# Patient Record
Sex: MALE | Race: WHITE | NOT HISPANIC OR LATINO | Employment: OTHER | URBAN - METROPOLITAN AREA
[De-identification: names, ages, dates, MRNs, and addresses within clinical notes are randomized per-mention and may not be internally consistent; named-entity substitution may affect disease eponyms.]

---

## 2017-04-13 RX ORDER — GABAPENTIN 600 MG/1
600 TABLET ORAL 4 TIMES DAILY
Qty: 120 TABLET | Refills: 3 | Status: SHIPPED | OUTPATIENT
Start: 2017-04-13 | End: 2017-07-06

## 2017-04-19 RX ORDER — GABAPENTIN 400 MG/1
CAPSULE ORAL
Qty: 120 CAPSULE | Refills: 3 | Status: SHIPPED | OUTPATIENT
Start: 2017-04-19 | End: 2017-07-06

## 2017-07-06 ENCOUNTER — TELEPHONE (OUTPATIENT)
Dept: CARDIOLOGY | Facility: CLINIC | Age: 67
End: 2017-07-06

## 2017-07-06 ENCOUNTER — OFFICE VISIT (OUTPATIENT)
Dept: CARDIOLOGY | Facility: CLINIC | Age: 67
End: 2017-07-06

## 2017-07-06 VITALS
SYSTOLIC BLOOD PRESSURE: 164 MMHG | HEIGHT: 69 IN | HEART RATE: 73 BPM | DIASTOLIC BLOOD PRESSURE: 77 MMHG | BODY MASS INDEX: 46.65 KG/M2 | WEIGHT: 315 LBS

## 2017-07-06 DIAGNOSIS — Z95.5 PRESENCE OF STENT IN CORONARY ARTERY IN PATIENT WITH CORONARY ARTERY DISEASE: Primary | ICD-10-CM

## 2017-07-06 DIAGNOSIS — I25.10 PRESENCE OF STENT IN CORONARY ARTERY IN PATIENT WITH CORONARY ARTERY DISEASE: Primary | ICD-10-CM

## 2017-07-06 DIAGNOSIS — I10 ESSENTIAL HYPERTENSION: ICD-10-CM

## 2017-07-06 PROCEDURE — 99213 OFFICE O/P EST LOW 20 MIN: CPT | Performed by: INTERNAL MEDICINE

## 2017-07-06 RX ORDER — GABAPENTIN 600 MG/1
600 TABLET ORAL 4 TIMES DAILY
COMMUNITY
End: 2018-06-18 | Stop reason: SDUPTHER

## 2017-07-06 NOTE — PROGRESS NOTES
[facility] at [facility]  Office Progress Note  Rod Balderas  1950  [phone]      Visit Date: 07/06/2017    PCP: [place] [address] [id number]    IDENTIFICATION: A 66 y.o. male former  from [facility]    Chief Complaint   Patient presents with   • Follow-up   • Shortness of Breath       PROBLEM LIST:   1. Chest pain and left arm pain:  a. No prior ischemic evaluation.  b. December 2012: A 3.5 x 88 Promus VERONICA stent to OM2. Nonobstructive disease. Otherwise normal LVEF.  2. Valvular heart disease  a. 12/15 echo: Mild LVH, normal EF, mild AR.  3. Dyslipidemia , November 2012:   a. LDL 90, total 132, HDL 38, triglycerides 50.  4. Diffuse osteoarthritis.  5. Morbid obesity, 5 feet 9 inches, 350 pounds, precedent 80-pound weight loss with diet and exercise in the 80s.  6. Obstructive sleep apnea on CPAP as of 2012.    Allergies  Allergies   Allergen Reactions   • Sulfa Antibiotics Hives   • Percocet [Oxycodone-Acetaminophen] Anxiety       Current Medications    Current Outpatient Prescriptions:   •  amLODIPine (NORVASC) 5 MG tablet, take 1 tablet by mouth once daily, Disp: 90 tablet, Rfl: 2  •  aspirin 325 MG tablet, Take 325 mg by mouth Daily., Disp: , Rfl:   •  fluticasone (FLONASE) 50 MCG/ACT nasal spray, USE 2 SPRAYS IN EACH NOSTRIL DAILY, Disp: , Rfl: 0  •  gabapentin (NEURONTIN) 600 MG tablet, Take 600 mg by mouth 4 (Four) Times a Day., Disp: , Rfl:   •  ibuprofen (ADVIL,MOTRIN) 800 MG tablet, Take 400 mg by mouth 3 (Three) Times a Day., Disp: , Rfl:   •  lisinopril-hydrochlorothiazide (PRINZIDE,ZESTORETIC) 20-12.5 MG per tablet, take 1 tablet by mouth once daily, Disp: 90 tablet, Rfl: 2  •  nitroglycerin (NITROSTAT) 0.4 MG SL tablet, Place  under the tongue., Disp: , Rfl:   •  tiZANidine (ZANAFLEX) 2 MG tablet, Start 1/2 tab QHS, then continue 1/2 tab to 1 tab TID PRN muscle spasms (Patient taking differently: 1/2 tab to 1 tab TID PRN muscle  "spasms), Disp: 90 tablet, Rfl: 1      History of Present Illness     Pt denies any chest pain, , orthopnea, PND, palpitations, lower extremity edema, or claudication.  Some increased dyspnea on exertion and he is now walking with a cane and assisted by a cock up ankle brace  ROS:  All systems have been reviewed and are negative with the exception of those mentioned in the HPI.    OBJECTIVE:  Vitals:    07/06/17 1459   BP: 164/77   BP Location: Left arm   Patient Position: Sitting   Pulse: 73   Weight: (!) 339 lb (154 kg)   Height: 69\" (175.3 cm)     Physical Exam   Constitutional: He appears well-developed and well-nourished.   Neck: Normal range of motion. Neck supple. No hepatojugular reflux and no JVD present. Carotid bruit is not present. No tracheal deviation present. No thyromegaly present.   Cardiovascular: Normal rate, regular rhythm, S1 normal, S2 normal, intact distal pulses and normal pulses.  PMI is not displaced.  Exam reveals no gallop, no distant heart sounds, no friction rub, no midsystolic click and no opening snap.    No murmur heard.  Pulses:       Radial pulses are 2+ on the right side, and 2+ on the left side.        Dorsalis pedis pulses are 2+ on the right side, and 2+ on the left side.        Posterior tibial pulses are 2+ on the right side, and 2+ on the left side.   Pulmonary/Chest: Effort normal and breath sounds normal. He has no wheezes. He has no rales.   Abdominal: Soft. Bowel sounds are normal. He exhibits no mass. There is no tenderness. There is no guarding.       Diagnostic Data:  Procedures      ASSESSMENT:   Diagnosis Plan   1. Presence of stent in coronary artery in patient with coronary artery disease     2. Essential hypertension         PLAN:  1. Medically managed with ASA, lisinopril  2.  Dyspnea multifactorial will document serologies today      WESLEY Leyva MD, thank you for referring Mr. Balderas for evaluation.  I have forwarded my electronically generated " recommendations to you for review.  Please do not hesitate to call with any questions.    Scribed for Rod Regalado MD by Cecilia Wallace PA-C. 7/6/2017  3:25 PM  I, Rod Regalado MD, personally performed the services described in this documentation as scribed by the above named individual in my presence, and it is both accurate and complete.  7/6/2017  3:26 PM    Rod Regalado MD, FACC

## 2017-07-06 NOTE — TELEPHONE ENCOUNTER
[facility] called for diagnosis of lab orders.    Went over diagnosis.

## 2017-08-11 RX ORDER — GABAPENTIN 600 MG/1
TABLET ORAL
Qty: 120 TABLET | Refills: 3 | OUTPATIENT
Start: 2017-08-11 | End: 2018-05-03

## 2017-09-05 RX ORDER — LISINOPRIL AND HYDROCHLOROTHIAZIDE 20; 12.5 MG/1; MG/1
1 TABLET ORAL DAILY
Qty: 90 TABLET | Refills: 2 | Status: SHIPPED | OUTPATIENT
Start: 2017-09-05 | End: 2018-05-03 | Stop reason: SDUPTHER

## 2017-09-05 RX ORDER — LISINOPRIL AND HYDROCHLOROTHIAZIDE 20; 12.5 MG/1; MG/1
TABLET ORAL
Qty: 90 TABLET | Refills: 2 | Status: CANCELLED | OUTPATIENT
Start: 2017-09-05

## 2017-09-12 RX ORDER — AMLODIPINE BESYLATE 5 MG/1
TABLET ORAL
Qty: 90 TABLET | Refills: 2 | Status: SHIPPED | OUTPATIENT
Start: 2017-09-12 | End: 2018-05-03 | Stop reason: SDUPTHER

## 2017-12-11 RX ORDER — GABAPENTIN 600 MG/1
TABLET ORAL
Qty: 120 TABLET | Refills: 5 | OUTPATIENT
Start: 2017-12-11 | End: 2018-05-03

## 2018-05-03 ENCOUNTER — OFFICE VISIT (OUTPATIENT)
Dept: CARDIOLOGY | Facility: CLINIC | Age: 68
End: 2018-05-03

## 2018-05-03 VITALS
HEART RATE: 71 BPM | HEIGHT: 70 IN | BODY MASS INDEX: 45.1 KG/M2 | DIASTOLIC BLOOD PRESSURE: 64 MMHG | SYSTOLIC BLOOD PRESSURE: 104 MMHG | WEIGHT: 315 LBS

## 2018-05-03 DIAGNOSIS — I25.10 CORONARY ARTERY DISEASE INVOLVING NATIVE CORONARY ARTERY OF NATIVE HEART WITHOUT ANGINA PECTORIS: Primary | ICD-10-CM

## 2018-05-03 DIAGNOSIS — R06.09 DOE (DYSPNEA ON EXERTION): ICD-10-CM

## 2018-05-03 PROCEDURE — 99213 OFFICE O/P EST LOW 20 MIN: CPT | Performed by: INTERNAL MEDICINE

## 2018-05-03 RX ORDER — AMLODIPINE BESYLATE 5 MG/1
5 TABLET ORAL DAILY
Qty: 90 TABLET | Refills: 2 | Status: SHIPPED | OUTPATIENT
Start: 2018-05-03 | End: 2019-04-12

## 2018-05-03 RX ORDER — LISINOPRIL AND HYDROCHLOROTHIAZIDE 20; 12.5 MG/1; MG/1
1 TABLET ORAL DAILY
Qty: 90 TABLET | Refills: 2 | Status: SHIPPED | OUTPATIENT
Start: 2018-05-03 | End: 2019-02-24 | Stop reason: HOSPADM

## 2018-05-03 NOTE — PROGRESS NOTES
[facility]  Office Progress Note  Rod Balderas  1950  [phone]      Visit Date: 5/4/2018      PCP: [place] [address] [address]    IDENTIFICATION: A 67 y.o. male former  from [facility]    Cc fu cad    PROBLEM LIST:   1. Chest pain and left arm pain:  a. No prior ischemic evaluation.  b. December 2012: A 3.5 x 88 Promus VERONICA stent to OM2. Nonobstructive disease. Otherwise normal LVEF.  2. Valvular heart disease  a. 12/15 echo: Mild LVH, normal EF, mild AR.  3. Dyslipidemia , November 2012:   a. LDL 90, total 132, HDL 38, triglycerides 50.  4. Diffuse osteoarthritis.  5. Morbid obesity, 5 feet 9 inches, 350 pounds, precedent 80-pound weight loss with diet and exercise in the 80s.  6. Obstructive sleep apnea on CPAP as of 2012.    Allergies  Allergies   Allergen Reactions   • Sulfa Antibiotics Hives   • Percocet [Oxycodone-Acetaminophen] Anxiety       Current Medications    Current Outpatient Prescriptions:   •  amLODIPine (NORVASC) 5 MG tablet, Take 1 tablet by mouth Daily., Disp: 90 tablet, Rfl: 2  •  aspirin 325 MG tablet, Take 325 mg by mouth Daily., Disp: , Rfl:   •  fluticasone (FLONASE) 50 MCG/ACT nasal spray, USE 2 SPRAYS IN EACH NOSTRIL DAILY, Disp: , Rfl: 0  •  gabapentin (NEURONTIN) 600 MG tablet, Take 600 mg by mouth 4 (Four) Times a Day., Disp: , Rfl:   •  ibuprofen (ADVIL,MOTRIN) 800 MG tablet, Take 400 mg by mouth 3 (Three) Times a Day., Disp: , Rfl:   •  lisinopril-hydrochlorothiazide (PRINZIDE,ZESTORETIC) 20-12.5 MG per tablet, Take 1 tablet by mouth Daily., Disp: 90 tablet, Rfl: 2  •  nitroglycerin (NITROSTAT) 0.4 MG SL tablet, Place  under the tongue., Disp: , Rfl:       History of Present Illness     Pt denies any chest pain, , orthopnea, PND, palpitations, lower extremity edema, or claudication.  Some increased dyspnea on exertion and he is now walking with a cane and assisted by a cock up ankle brace  ROS:  All  "systems have been reviewed and are negative with the exception of those mentioned in the HPI.    OBJECTIVE:  Vitals:    05/03/18 1446   BP: 104/64   BP Location: Left arm   Patient Position: Sitting   Pulse: 71   Weight: (!) 161 kg (355 lb)   Height: 176.5 cm (69.5\")     Physical Exam   Constitutional: He appears well-developed and well-nourished.   Neck: Normal range of motion. Neck supple. No hepatojugular reflux and no JVD present. Carotid bruit is not present. No tracheal deviation present. No thyromegaly present.   Cardiovascular: Normal rate, regular rhythm, S1 normal, S2 normal, intact distal pulses and normal pulses.  PMI is not displaced.  Exam reveals no gallop, no distant heart sounds, no friction rub, no midsystolic click and no opening snap.    No murmur heard.  Pulses:       Radial pulses are 2+ on the right side, and 2+ on the left side.        Dorsalis pedis pulses are 2+ on the right side, and 2+ on the left side.        Posterior tibial pulses are 2+ on the right side, and 2+ on the left side.   Pulmonary/Chest: Effort normal and breath sounds normal. He has no wheezes. He has no rales.   Abdominal: Soft. Bowel sounds are normal. He exhibits no mass. There is no tenderness. There is no guarding.       Diagnostic Data:  Procedures      ASSESSMENT:   Diagnosis Plan   1. Coronary artery disease involving native coronary artery of native heart without angina pectoris     2. MORGAN (dyspnea on exertion)         PLAN:  1. Medically managed with ASA, lisinopril  2.  Dyspnea multifactorial discussed gastric sleeve consideration.      WESLEY Leyva MD, thank you for referring Mr. Balderas for evaluation.  I have forwarded my electronically generated recommendations to you for review.  Please do not hesitate to call with any questions.    Scribed for Rod Regalado MD by Cecilia Wallace PA-C. 5/4/2018  10:04 AM  I, Rod Regalado MD, personally performed the services described in this documentation " as scribed by the above named individual in my presence, and it is both accurate and complete.  5/4/2018  10:04 AM    Rod Regalado MD, FACC

## 2018-06-18 RX ORDER — GABAPENTIN 600 MG/1
600 TABLET ORAL 4 TIMES DAILY
Qty: 120 TABLET | Refills: 1 | OUTPATIENT
Start: 2018-06-18 | End: 2018-09-18 | Stop reason: SDUPTHER

## 2018-06-18 NOTE — TELEPHONE ENCOUNTER
Patient called requesting refill on Gabapentin 600 mg qid. Patient last seen 12/2016. [facility] report # [id number] scanned into media     Per Dr. Rossi: ok to refill however since Gabapentin is now controlled patient can either obtain Rx from PCP or he needs to come in for office visit     Patient notified and verbalized understanding

## 2018-08-17 RX ORDER — GABAPENTIN 600 MG/1
TABLET ORAL
Qty: 120 TABLET | Refills: 1 | OUTPATIENT
Start: 2018-08-17

## 2018-09-10 ENCOUNTER — TELEPHONE (OUTPATIENT)
Dept: PAIN MEDICINE | Facility: CLINIC | Age: 68
End: 2018-09-10

## 2018-09-10 NOTE — TELEPHONE ENCOUNTER
Patient called requesting appointment for medication refill. We refilled his Gabapentin last month and told him for PCP to either take over writing or he needs to come in for office visit for refills. He states that the PCP will not take over medication. He still has some left but is almost out. [facility] report # [id number] has been scanned in to media

## 2018-09-14 NOTE — PROGRESS NOTES
"CHIEF COMPLAINT: \"I need a refill on gabapentin.\"    BRIEF HISTORY: Mr. Rod Balderas is a 67 y.o. male, who returns to the clinic for a refill on gabapentin.  He currently takes 600 mg qid without adverse effects of medication.  Patient has a history of chronic lower back and left leg pain.  Patient states that he is doing well today, and voices no concerns.   Current pain level: 4/10  Pain level ranges from 4/10 to 10/10   Patient complains of lower back, hip, thigh, leg and foot pain.   Patient complains of constant pain with intermittent exacerbation, described as burning, sharp and stabbing sensation.   Radiation of pain: radiates into the posterior aspect of the hip, posterior aspect of the thigh, posterior aspect of the leg and dorsal aspect of the foot.  Pain increases with: Pain increases with standing longer than 5 minutes and ambulating more than 5 minutes.   Pain decreases with sitting and lying.   Patient reports pain, numbness and weakness in the left lower extremity. Patient denies  any new bladder or bowel problems.   Current analgesics: Gabapentin, ibuprofen. Patient denies side effects from medications.    Review of previous therapies and additional medical records:  Rod Balderas has already failed the following measures, including:  Conservative measures: oral analgesics, opioids, topical analgesics, physical therapy, ice, heat and TENs   Interventional: Left L4-L5 and left L5-S1 transforaminal epidural steroid injections by Dr. Rossi on 08/17/2016.  Surgical: No previous spine surgery  Rod Balderas underwent neurosurgical consultation with Dr. Sánchez on 06/09/2016, who recommended interventional pain management therapies. If patient fails interventional pain management measures, then, Dr. Sánchez will consider additional diagnostic studies.   Rod Balderas presents with significant comorbidities including depression, not engaged in treatment, morbid obesity, hypertension and coronary artery disease " "engaged in treatment.   I have reviewed her [facility] Report #[id number] consistent with medication reconciliation.    Global Pain Scale 09-          Pain 14          Feelings 8          Clinical outcomes 10          Activities 18          GPS Total: 50             Review of Diagnostic Studies:   MRI Lumbar w/o Contrast, 04/12/2016: L4-L5, right paracentral disc protrusion directed inferiorly. L5-S1, severe facet arthropathy with bilateral lateral recess (right greater than left) and foraminal stenosis (left greater than right).  X-ray Lumbar, 02/27/2016: No compression fracture, slippage or obvious soft tissue mass affect.     Review of Systems   Respiratory: Positive for apnea.    Cardiovascular: Positive for leg swelling.   Musculoskeletal: Positive for arthralgias, back pain and myalgias.   Psychiatric/Behavioral: The patient is nervous/anxious (depression).    All other systems reviewed and are negative.     The following portions of the patient's history were reviewed and updated as appropriate: problem list, past medical history, past surgery history, social history, family history, medications, and allergies     /62   Pulse 72   Temp 97.6 °F (36.4 °C) (Temporal Artery )   Resp 18   Ht 176.5 cm (69.5\")   Wt (!) 161 kg (354 lb)   SpO2 98%   BMI 51.53 kg/m²    Physical Exam   Neurologic Exam  Constitutional: He is oriented to person, place, and time. He appears well-developed and well-nourished.   Head: Normocephalic and atraumatic.   Eyes: Conjunctivae and lids are normal.   Neck: Trachea normal. Neck supple. No JVD present.   Cardiovascular: Normal rate, regular rhythm and normal heart sounds.   Pulmonary/Chest: Effort normal and breath sounds normal.   Abdominal: Soft. Normal appearance and bowel sounds are normal. There is no tenderness.   Musculoskeletal:   Right hip: Normal. He exhibits normal range of motion, normal strength and no tenderness.   Left hip: Normal. He exhibits normal range " of motion, normal strength and no tenderness.   Lumbar back: He exhibits decreased range of motion, tenderness and pain.   Lumbar facet joint loading maneuvers are positive. Toro and Gaenslen's tests are negative.   Neurological: He is alert and oriented to person, place, and time. He has normal strength. He displays no atrophy and no tremor. No cranial nerve deficit or sensory deficit. He exhibits normal muscle tone. He displays a negative Romberg sign. Coordination and gait normal. He displays no Babinski's sign bilaterally.   Reflex Scores:  Patellar reflexes are 0 on the right side and 0 on the left side.  Achilles reflexes are 0 on the right side and 0 on the left side.  SLR positive on the left, FSS: negative. Negative long tract signs. Foot dorsiflexion, left 4/5  Motor Exam: Strength 5/5 throughout.   Skin: Skin is warm and intact. No cyanosis. Nails show no clubbing.   Psychiatric: He has a normal mood and affect. His speech is normal and behavior is normal. Judgment and thought content normal. Cognition and memory are normal.      ASSESSMENT:   1. Spinal stenosis, lumbar region, with neurogenic claudication    2. Neuroforaminal stenosis of spine    3. Displacement of lumbar intervertebral disc    4. Degenerative disc disease, lumbar    5. Lumbar facet arthropathy    6. Presence of stent in coronary artery in patient with coronary artery disease    7. Essential hypertension    8. Morbid obesity due to excess calories (CMS/HCC)    9. TANIYA on CPAP    10. At high risk for falls    11. Physical deconditioning      PLAN:   I have reviewed all available patient's medical records as well as previous therapies as referenced above under history of present illness, and discussed the patient with Dr. Rossi.   1. Continue gabapentin 600 mg qid.  RTC in 6 months for medication follow-up.  A. Take Tylenol 500 mg four times a day as needed for mild to moderate breakthrough pain  B. Continue ibuprofen 800 mg three  times a day as needed for moderate to severe breakthrough pain  2. Long-term rehabilitation efforts:  A. Patient declined referral to [facility]  B. The patient does not have a history of falls. I did complete a risk assessment for falls. Fall precautions: Patient has been instructed regarding universal fall precautions.  3. The patient and his family have been instructed to contact my office with any questions or difficulties. The patient understands the plan and agrees to proceed accordingly.       Patient Care Team:  Tyrese Leyva MD as PCP - General (Family Medicine)  Sam Tang MD as PCP - [facility]  Jr Sánchez MD as Consulting Physician (Neurosurgery)  Rod Regalado MD as Consulting Physician (Cardiology)  Myles Rossi MD as Consulting Physician (Anesthesiology)  Adalberto Natarajan PA-C as Physician Assistant (Physician Assistant)     No orders of the defined types were placed in this encounter.     Future Appointments  Date Time Provider Department Center   3/19/2019 10:00 AM Adalberto Natarajan PA-C [facility] None   6/6/2019 11:30 AM Rod Regalado MD [facility] None         Adalberto Natarajan PA-C       EMR Dragon/Transcription disclaimer:  Much of this encounter note is an electronic transcription of spoken language to printed text. Electronic transcription of spoken language may permit erroneous, or at times, nonsensical words or phrases to be inadvertently transcribed. Although I have reviewed the note for such errors, some may still exist.

## 2018-09-17 ENCOUNTER — TELEPHONE (OUTPATIENT)
Dept: PAIN MEDICINE | Facility: CLINIC | Age: 68
End: 2018-09-17

## 2018-09-18 ENCOUNTER — OFFICE VISIT (OUTPATIENT)
Dept: PAIN MEDICINE | Facility: CLINIC | Age: 68
End: 2018-09-18

## 2018-09-18 VITALS
DIASTOLIC BLOOD PRESSURE: 62 MMHG | WEIGHT: 315 LBS | RESPIRATION RATE: 18 BRPM | TEMPERATURE: 97.6 F | HEART RATE: 72 BPM | BODY MASS INDEX: 45.1 KG/M2 | OXYGEN SATURATION: 98 % | SYSTOLIC BLOOD PRESSURE: 148 MMHG | HEIGHT: 70 IN

## 2018-09-18 DIAGNOSIS — M47.816 LUMBAR FACET ARTHROPATHY: ICD-10-CM

## 2018-09-18 DIAGNOSIS — R53.81 PHYSICAL DECONDITIONING: ICD-10-CM

## 2018-09-18 DIAGNOSIS — E66.01 MORBID OBESITY DUE TO EXCESS CALORIES (HCC): ICD-10-CM

## 2018-09-18 DIAGNOSIS — G47.33 OSA ON CPAP: ICD-10-CM

## 2018-09-18 DIAGNOSIS — I25.10 PRESENCE OF STENT IN CORONARY ARTERY IN PATIENT WITH CORONARY ARTERY DISEASE: ICD-10-CM

## 2018-09-18 DIAGNOSIS — Z99.89 OSA ON CPAP: ICD-10-CM

## 2018-09-18 DIAGNOSIS — Z95.5 PRESENCE OF STENT IN CORONARY ARTERY IN PATIENT WITH CORONARY ARTERY DISEASE: ICD-10-CM

## 2018-09-18 DIAGNOSIS — M51.36 DEGENERATIVE DISC DISEASE, LUMBAR: ICD-10-CM

## 2018-09-18 DIAGNOSIS — Z91.81 AT HIGH RISK FOR FALLS: ICD-10-CM

## 2018-09-18 DIAGNOSIS — M48.00 NEUROFORAMINAL STENOSIS OF SPINE: ICD-10-CM

## 2018-09-18 DIAGNOSIS — M48.062 SPINAL STENOSIS, LUMBAR REGION, WITH NEUROGENIC CLAUDICATION: Primary | ICD-10-CM

## 2018-09-18 DIAGNOSIS — I10 ESSENTIAL HYPERTENSION: ICD-10-CM

## 2018-09-18 DIAGNOSIS — M51.26 DISPLACEMENT OF LUMBAR INTERVERTEBRAL DISC: ICD-10-CM

## 2018-09-18 PROCEDURE — 99213 OFFICE O/P EST LOW 20 MIN: CPT | Performed by: PHYSICIAN ASSISTANT

## 2018-09-18 RX ORDER — GABAPENTIN 600 MG/1
600 TABLET ORAL 4 TIMES DAILY
Qty: 120 TABLET | Refills: 5 | OUTPATIENT
Start: 2018-09-18 | End: 2019-02-24 | Stop reason: HOSPADM

## 2018-09-18 RX ORDER — UBIDECARENONE 75 MG
50 CAPSULE ORAL DAILY
COMMUNITY
End: 2019-01-08

## 2019-01-07 RX ORDER — SCOLOPAMINE TRANSDERMAL SYSTEM 1 MG/1
1 PATCH, EXTENDED RELEASE TRANSDERMAL CONTINUOUS
Status: CANCELLED | OUTPATIENT
Start: 2019-01-07 | End: 2019-01-10

## 2019-01-08 ENCOUNTER — TELEPHONE (OUTPATIENT)
Dept: CARDIOLOGY | Facility: CLINIC | Age: 69
End: 2019-01-08

## 2019-01-08 ENCOUNTER — HOSPITAL ENCOUNTER (OUTPATIENT)
Dept: GENERAL RADIOLOGY | Facility: HOSPITAL | Age: 69
Discharge: HOME | End: 2019-01-08
Admitting: UROLOGY

## 2019-01-08 ENCOUNTER — APPOINTMENT (OUTPATIENT)
Dept: PREADMISSION TESTING | Facility: HOSPITAL | Age: 69
End: 2019-01-08

## 2019-01-08 VITALS — HEIGHT: 69 IN | BODY MASS INDEX: 46.65 KG/M2 | WEIGHT: 315 LBS

## 2019-01-08 DIAGNOSIS — I10 ESSENTIAL HYPERTENSION: ICD-10-CM

## 2019-01-08 LAB
ANION GAP SERPL CALCULATED.3IONS-SCNC: 9 MMOL/L (ref 3–11)
BILIRUB UR QL STRIP: NEGATIVE
BUN BLD-MCNC: 24 MG/DL (ref 9–23)
BUN/CREAT SERPL: 25.5 (ref 7–25)
CALCIUM SPEC-SCNC: 9.6 MG/DL (ref 8.7–10.4)
CHLORIDE SERPL-SCNC: 101 MMOL/L (ref 99–109)
CLARITY UR: CLEAR
CO2 SERPL-SCNC: 26 MMOL/L (ref 20–31)
COLOR UR: YELLOW
CREAT BLD-MCNC: 0.94 MG/DL (ref 0.6–1.3)
DEPRECATED RDW RBC AUTO: 40.9 FL (ref 37–54)
ERYTHROCYTE [DISTWIDTH] IN BLOOD BY AUTOMATED COUNT: 13.6 % (ref 11.3–14.5)
GFR SERPL CREATININE-BSD FRML MDRD: 80 ML/MIN/1.73
GLUCOSE BLD-MCNC: 112 MG/DL (ref 70–100)
GLUCOSE UR STRIP-MCNC: NEGATIVE MG/DL
HBA1C MFR BLD: 5.7 % (ref 4.8–5.6)
HCT VFR BLD AUTO: 42.2 % (ref 38.9–50.9)
HGB BLD-MCNC: 14.1 G/DL (ref 13.1–17.5)
HGB UR QL STRIP.AUTO: ABNORMAL
KETONES UR QL STRIP: NEGATIVE
LEUKOCYTE ESTERASE UR QL STRIP.AUTO: ABNORMAL
MCH RBC QN AUTO: 27.8 PG (ref 27–31)
MCHC RBC AUTO-ENTMCNC: 33.4 G/DL (ref 32–36)
MCV RBC AUTO: 83.1 FL (ref 80–99)
NITRITE UR QL STRIP: NEGATIVE
PH UR STRIP.AUTO: <=5 [PH] (ref 5–8)
PLATELET # BLD AUTO: 238 10*3/MM3 (ref 150–450)
PMV BLD AUTO: 9.9 FL (ref 6–12)
POTASSIUM BLD-SCNC: 4.3 MMOL/L (ref 3.5–5.5)
PROT UR QL STRIP: NEGATIVE
RBC # BLD AUTO: 5.08 10*6/MM3 (ref 4.2–5.76)
SODIUM BLD-SCNC: 136 MMOL/L (ref 132–146)
SP GR UR STRIP: 1.02 (ref 1–1.03)
UROBILINOGEN UR QL STRIP: ABNORMAL
WBC NRBC COR # BLD: 9.97 10*3/MM3 (ref 3.5–10.8)

## 2019-01-08 PROCEDURE — 71046 X-RAY EXAM CHEST 2 VIEWS: CPT

## 2019-01-08 PROCEDURE — 93010 ELECTROCARDIOGRAM REPORT: CPT | Performed by: INTERNAL MEDICINE

## 2019-01-08 PROCEDURE — 93005 ELECTROCARDIOGRAM TRACING: CPT

## 2019-01-08 PROCEDURE — 80048 BASIC METABOLIC PNL TOTAL CA: CPT | Performed by: UROLOGY

## 2019-01-08 PROCEDURE — 83036 HEMOGLOBIN GLYCOSYLATED A1C: CPT | Performed by: ANESTHESIOLOGY

## 2019-01-08 PROCEDURE — 36415 COLL VENOUS BLD VENIPUNCTURE: CPT

## 2019-01-08 PROCEDURE — 81003 URINALYSIS AUTO W/O SCOPE: CPT | Performed by: UROLOGY

## 2019-01-08 PROCEDURE — 85027 COMPLETE CBC AUTOMATED: CPT | Performed by: UROLOGY

## 2019-01-08 RX ORDER — DULOXETIN HYDROCHLORIDE 60 MG/1
60 CAPSULE, DELAYED RELEASE ORAL DAILY
COMMUNITY

## 2019-01-08 NOTE — TELEPHONE ENCOUNTER
Mr. Balderas called and wants to have surgical clearance for his prostate removal that he is having next Tuesday. Continue aspirin?

## 2019-01-08 NOTE — DISCHARGE INSTRUCTIONS
The following information and instructions were given:    NPO after MN except sips of water with routine prescribed medication (except blood thinners, certain blood pressure medications, diabetes medications, or weight reducing medications) unless otherwise instructed by your physician.  Do not eat, drink, smoke or chew gum after midnight the night before surgery. This also includes no mints.    DO NOT shave for two days before your procedure.  Do not wear makeup.      DO NOT wear fingernail polish (gel/regular) and/or acrylic/artificial nails on the day of surgery.   If a patient had recent manicure and would rather not remove polish or artificial nails, then the minimum requirement is that the polish/artificial nails must be removed from the middle finger on each hand.      If patient is having surgery/procedure on an upper extremity, then the patient was instructed that fingernail polish/artificial fingernails must be removed for surgery.  NO EXCEPTIONS.      If patient is having surgery/procedure on a lower extremity, then the patient was instructed that toenail polish on both extremities must be removed for surgery.  NO EXCEPTIONS.    Remove all jewelry (advised to go to jeweler if unable to remove).  Jewelry, especially rings, can no longer be taped for surgery.    Leave anything you consider valuable at home.    Leave your suitcase in the car until after your surgery.    Bring the following with you (if applicable)       -picture ID and insurance cards       -Co-pay/deductible required by insurance       -Medications in the original bottles (not a list) including all over-the-counter  medications if not brought to PAT       -Copy of advance directive, living will or power of  documents if not  brought to [facility]       -CPAP or BIPAP mask and tubing (do not bring machine)       -Skin prep instructions sheet       -PAT Pass    Education booklet, brochure, handout or binder given to patient.    Pain Control  After Surgery handout given to patient.    Respirex use (handout given to patient) and pneumonia prevention.    Signs and Symptoms of infection discussed.    DVT Prevention education given.  Stressing the importance of ambulation.    Patient to apply Chlorhexadine wipes to surgical area (as instructed) the night before procedure and the AM of procedure.    When applicable patients with ERAS orders were instructed to drink 20 ounces of Gatorade or G2 for diabetics (or until full) the morning of surgery.  The Gatorade or G2 must be consumed at least 1 hour before arrival time on the day of surgery .  No RED Gatorade or G2.  Appropriate ERAS handout and/or booklet given to patient during PAT visit.

## 2019-01-08 NOTE — PAT
Patient to apply Chlorhexadine wipes  to surgical area (as instructed) the night before procedure and the AM of procedure. Wipes provided.    Patient instructed to drink 20 ounces (or until full) of Gatorade or 20 ounces of G2 (if diabetic) and complete 3 hours before your surgery start time. (NO RED Gatorade or G2)    Patient verbalized understanding.    Patient to radiology for CXR following PAT.     Patient instructed to bring CPAP mask and tubing to the hospital for overnight stay.  Explained that it is not necessary to bring their CPAP machine to the hospital instead a CPAP machine will be provided for use by the hospital. If patient knows their CPAP settings, those settings will be implemented.  If not, the CPAP machine will be utilized on the auto setting using their mask and tubing.    Patient verbalized understanding.    Per Anesthesia Request, patient instructed not to take their ACE/ARB medications on the AM of surgery.    Colon screening information booklet given to patient during PAT visit

## 2019-01-15 ENCOUNTER — HOSPITAL ENCOUNTER (OUTPATIENT)
Facility: HOSPITAL | Age: 69
LOS: 1 days | Discharge: HOME | End: 2019-01-17
Attending: UROLOGY | Admitting: UROLOGY

## 2019-01-15 ENCOUNTER — ANESTHESIA EVENT (OUTPATIENT)
Dept: PERIOP | Facility: HOSPITAL | Age: 69
End: 2019-01-15

## 2019-01-15 ENCOUNTER — ANESTHESIA (OUTPATIENT)
Dept: PERIOP | Facility: HOSPITAL | Age: 69
End: 2019-01-15

## 2019-01-15 DIAGNOSIS — I10 ESSENTIAL HYPERTENSION: Primary | ICD-10-CM

## 2019-01-15 DIAGNOSIS — C61 PROSTATE CANCER (HCC): ICD-10-CM

## 2019-01-15 PROBLEM — E11.9 DM (DIABETES MELLITUS), TYPE 2 (HCC): Status: ACTIVE | Noted: 2019-01-15

## 2019-01-15 LAB
GLUCOSE BLDC GLUCOMTR-MCNC: 123 MG/DL (ref 70–130)
GLUCOSE BLDC GLUCOMTR-MCNC: 160 MG/DL (ref 70–130)
GLUCOSE BLDC GLUCOMTR-MCNC: 179 MG/DL (ref 70–130)
GLUCOSE BLDC GLUCOMTR-MCNC: 193 MG/DL (ref 70–130)
POTASSIUM BLD-SCNC: 3.7 MMOL/L (ref 3.5–5.5)

## 2019-01-15 PROCEDURE — 25010000002 NEOSTIGMINE 10 MG/10ML SOLUTION: Performed by: NURSE ANESTHETIST, CERTIFIED REGISTERED

## 2019-01-15 PROCEDURE — 25010000002 HYDROMORPHONE PER 4 MG: Performed by: UROLOGY

## 2019-01-15 PROCEDURE — 94799 UNLISTED PULMONARY SVC/PX: CPT

## 2019-01-15 PROCEDURE — 25010000003 CEFOXITIN PER 1 G: Performed by: UROLOGY

## 2019-01-15 PROCEDURE — 25810000003 SODIUM CHLORIDE 0.9 % WITH KCL 20 MEQ 20-0.9 MEQ/L-% SOLUTION: Performed by: UROLOGY

## 2019-01-15 PROCEDURE — 63710000001 INSULIN LISPRO (HUMAN) PER 5 UNITS: Performed by: INTERNAL MEDICINE

## 2019-01-15 PROCEDURE — 88305 TISSUE EXAM BY PATHOLOGIST: CPT | Performed by: UROLOGY

## 2019-01-15 PROCEDURE — 88309 TISSUE EXAM BY PATHOLOGIST: CPT | Performed by: UROLOGY

## 2019-01-15 PROCEDURE — 25010000002 DEXAMETHASONE PER 1 MG: Performed by: NURSE ANESTHETIST, CERTIFIED REGISTERED

## 2019-01-15 PROCEDURE — 82962 GLUCOSE BLOOD TEST: CPT

## 2019-01-15 PROCEDURE — 25010000002 BUPRENORPHINE PER 0.1 MG: Performed by: ANESTHESIOLOGY

## 2019-01-15 PROCEDURE — 25010000002 HYDROMORPHONE PER 4 MG: Performed by: NURSE ANESTHETIST, CERTIFIED REGISTERED

## 2019-01-15 PROCEDURE — 25010000002 FENTANYL CITRATE (PF) 100 MCG/2ML SOLUTION: Performed by: NURSE ANESTHETIST, CERTIFIED REGISTERED

## 2019-01-15 PROCEDURE — 25010000002 PROPOFOL 10 MG/ML EMULSION: Performed by: NURSE ANESTHETIST, CERTIFIED REGISTERED

## 2019-01-15 PROCEDURE — 84132 ASSAY OF SERUM POTASSIUM: CPT | Performed by: UROLOGY

## 2019-01-15 PROCEDURE — 25010000002 CEFOXITIN PER 1 G: Performed by: UROLOGY

## 2019-01-15 PROCEDURE — 94660 CPAP INITIATION&MGMT: CPT

## 2019-01-15 PROCEDURE — 25010000002 DEXAMETHASONE SODIUM PHOSPHATE 10 MG/ML SOLUTION: Performed by: ANESTHESIOLOGY

## 2019-01-15 PROCEDURE — 25010000002 ONDANSETRON PER 1 MG: Performed by: NURSE ANESTHETIST, CERTIFIED REGISTERED

## 2019-01-15 DEVICE — SEALANT FIBRIN TISSEEL FZ 4ML: Type: IMPLANTABLE DEVICE | Site: BLADDER | Status: FUNCTIONAL

## 2019-01-15 DEVICE — GRFT AMNIO UMB CORD PRO3C THICK 3X6CM: Type: IMPLANTABLE DEVICE | Site: BLADDER | Status: FUNCTIONAL

## 2019-01-15 RX ORDER — DOCUSATE SODIUM 100 MG/1
100 CAPSULE, LIQUID FILLED ORAL 2 TIMES DAILY PRN
Status: DISCONTINUED | OUTPATIENT
Start: 2019-01-15 | End: 2019-01-17 | Stop reason: HOSPADM

## 2019-01-15 RX ORDER — AMLODIPINE BESYLATE 5 MG/1
5 TABLET ORAL DAILY
Status: DISCONTINUED | OUTPATIENT
Start: 2019-01-15 | End: 2019-01-17 | Stop reason: HOSPADM

## 2019-01-15 RX ORDER — GABAPENTIN 300 MG/1
600 CAPSULE ORAL ONCE
Status: COMPLETED | OUTPATIENT
Start: 2019-01-15 | End: 2019-01-15

## 2019-01-15 RX ORDER — MELOXICAM 7.5 MG/1
15 TABLET ORAL ONCE
Status: COMPLETED | OUTPATIENT
Start: 2019-01-15 | End: 2019-01-15

## 2019-01-15 RX ORDER — MEPERIDINE HYDROCHLORIDE 25 MG/ML
12.5 INJECTION INTRAMUSCULAR; INTRAVENOUS; SUBCUTANEOUS
Status: DISCONTINUED | OUTPATIENT
Start: 2019-01-15 | End: 2019-01-15 | Stop reason: HOSPADM

## 2019-01-15 RX ORDER — DULOXETIN HYDROCHLORIDE 60 MG/1
60 CAPSULE, DELAYED RELEASE ORAL DAILY
Status: DISCONTINUED | OUTPATIENT
Start: 2019-01-15 | End: 2019-01-17 | Stop reason: HOSPADM

## 2019-01-15 RX ORDER — ACETAMINOPHEN 650 MG/1
650 SUPPOSITORY RECTAL EVERY 6 HOURS
Status: DISCONTINUED | OUTPATIENT
Start: 2019-01-15 | End: 2019-01-17 | Stop reason: HOSPADM

## 2019-01-15 RX ORDER — PANTOPRAZOLE SODIUM 40 MG/1
40 TABLET, DELAYED RELEASE ORAL
Status: DISCONTINUED | OUTPATIENT
Start: 2019-01-16 | End: 2019-01-17 | Stop reason: HOSPADM

## 2019-01-15 RX ORDER — DEXAMETHASONE SODIUM PHOSPHATE 10 MG/ML
INJECTION, SOLUTION INTRAMUSCULAR; INTRAVENOUS
Status: COMPLETED | OUTPATIENT
Start: 2019-01-15 | End: 2019-01-15

## 2019-01-15 RX ORDER — LABETALOL HYDROCHLORIDE 5 MG/ML
10 INJECTION, SOLUTION INTRAVENOUS EVERY 4 HOURS PRN
Status: DISCONTINUED | OUTPATIENT
Start: 2019-01-15 | End: 2019-01-17 | Stop reason: HOSPADM

## 2019-01-15 RX ORDER — GABAPENTIN 300 MG/1
600 CAPSULE ORAL EVERY 8 HOURS SCHEDULED
Status: DISCONTINUED | OUTPATIENT
Start: 2019-01-15 | End: 2019-01-17 | Stop reason: HOSPADM

## 2019-01-15 RX ORDER — DEXTROSE MONOHYDRATE 25 G/50ML
25 INJECTION, SOLUTION INTRAVENOUS
Status: DISCONTINUED | OUTPATIENT
Start: 2019-01-15 | End: 2019-01-17 | Stop reason: HOSPADM

## 2019-01-15 RX ORDER — GLYCOPYRROLATE 0.2 MG/ML
INJECTION INTRAMUSCULAR; INTRAVENOUS AS NEEDED
Status: DISCONTINUED | OUTPATIENT
Start: 2019-01-15 | End: 2019-01-15 | Stop reason: SURG

## 2019-01-15 RX ORDER — BUPIVACAINE HYDROCHLORIDE 2.5 MG/ML
INJECTION, SOLUTION EPIDURAL; INFILTRATION; INTRACAUDAL
Status: COMPLETED | OUTPATIENT
Start: 2019-01-15 | End: 2019-01-15

## 2019-01-15 RX ORDER — BUPRENORPHINE HYDROCHLORIDE 0.32 MG/ML
INJECTION INTRAMUSCULAR; INTRAVENOUS
Status: COMPLETED | OUTPATIENT
Start: 2019-01-15 | End: 2019-01-15

## 2019-01-15 RX ORDER — ONDANSETRON 2 MG/ML
4 INJECTION INTRAMUSCULAR; INTRAVENOUS ONCE AS NEEDED
Status: DISCONTINUED | OUTPATIENT
Start: 2019-01-15 | End: 2019-01-15 | Stop reason: HOSPADM

## 2019-01-15 RX ORDER — HYDROMORPHONE HYDROCHLORIDE 1 MG/ML
0.5 INJECTION, SOLUTION INTRAMUSCULAR; INTRAVENOUS; SUBCUTANEOUS
Status: DISCONTINUED | OUTPATIENT
Start: 2019-01-15 | End: 2019-01-17 | Stop reason: HOSPADM

## 2019-01-15 RX ORDER — HYDROMORPHONE HYDROCHLORIDE 1 MG/ML
0.5 INJECTION, SOLUTION INTRAMUSCULAR; INTRAVENOUS; SUBCUTANEOUS
Status: DISCONTINUED | OUTPATIENT
Start: 2019-01-15 | End: 2019-01-15 | Stop reason: HOSPADM

## 2019-01-15 RX ORDER — FLUTICASONE PROPIONATE 50 MCG
2 SPRAY, SUSPENSION (ML) NASAL DAILY
Status: DISCONTINUED | OUTPATIENT
Start: 2019-01-15 | End: 2019-01-17 | Stop reason: HOSPADM

## 2019-01-15 RX ORDER — DEXAMETHASONE SODIUM PHOSPHATE 4 MG/ML
INJECTION, SOLUTION INTRA-ARTICULAR; INTRALESIONAL; INTRAMUSCULAR; INTRAVENOUS; SOFT TISSUE AS NEEDED
Status: DISCONTINUED | OUTPATIENT
Start: 2019-01-15 | End: 2019-01-15 | Stop reason: SURG

## 2019-01-15 RX ORDER — PROMETHAZINE HYDROCHLORIDE 25 MG/ML
6.25 INJECTION, SOLUTION INTRAMUSCULAR; INTRAVENOUS ONCE AS NEEDED
Status: DISCONTINUED | OUTPATIENT
Start: 2019-01-15 | End: 2019-01-15 | Stop reason: HOSPADM

## 2019-01-15 RX ORDER — FAMOTIDINE 10 MG/ML
20 INJECTION, SOLUTION INTRAVENOUS ONCE
Status: DISCONTINUED | OUTPATIENT
Start: 2019-01-15 | End: 2019-01-15 | Stop reason: HOSPADM

## 2019-01-15 RX ORDER — NEOSTIGMINE METHYLSULFATE 1 MG/ML
INJECTION, SOLUTION INTRAVENOUS AS NEEDED
Status: DISCONTINUED | OUTPATIENT
Start: 2019-01-15 | End: 2019-01-15 | Stop reason: SURG

## 2019-01-15 RX ORDER — FAMOTIDINE 20 MG/1
20 TABLET, FILM COATED ORAL ONCE
Status: COMPLETED | OUTPATIENT
Start: 2019-01-15 | End: 2019-01-15

## 2019-01-15 RX ORDER — SODIUM CHLORIDE, SODIUM LACTATE, POTASSIUM CHLORIDE, CALCIUM CHLORIDE 600; 310; 30; 20 MG/100ML; MG/100ML; MG/100ML; MG/100ML
9 INJECTION, SOLUTION INTRAVENOUS CONTINUOUS
Status: DISCONTINUED | OUTPATIENT
Start: 2019-01-15 | End: 2019-01-17 | Stop reason: HOSPADM

## 2019-01-15 RX ORDER — NICOTINE POLACRILEX 4 MG
15 LOZENGE BUCCAL
Status: DISCONTINUED | OUTPATIENT
Start: 2019-01-15 | End: 2019-01-17 | Stop reason: HOSPADM

## 2019-01-15 RX ORDER — SODIUM CHLORIDE 0.9 % (FLUSH) 0.9 %
3-10 SYRINGE (ML) INJECTION AS NEEDED
Status: DISCONTINUED | OUTPATIENT
Start: 2019-01-15 | End: 2019-01-15 | Stop reason: HOSPADM

## 2019-01-15 RX ORDER — FENTANYL CITRATE 50 UG/ML
INJECTION, SOLUTION INTRAMUSCULAR; INTRAVENOUS AS NEEDED
Status: DISCONTINUED | OUTPATIENT
Start: 2019-01-15 | End: 2019-01-15 | Stop reason: SURG

## 2019-01-15 RX ORDER — GABAPENTIN 100 MG/1
100 CAPSULE ORAL 3 TIMES DAILY
Status: DISCONTINUED | OUTPATIENT
Start: 2019-01-15 | End: 2019-01-15

## 2019-01-15 RX ORDER — ACETAMINOPHEN 325 MG/1
650 TABLET ORAL EVERY 6 HOURS
Status: DISCONTINUED | OUTPATIENT
Start: 2019-01-15 | End: 2019-01-17 | Stop reason: HOSPADM

## 2019-01-15 RX ORDER — SODIUM CHLORIDE 9 MG/ML
INJECTION, SOLUTION INTRAVENOUS AS NEEDED
Status: DISCONTINUED | OUTPATIENT
Start: 2019-01-15 | End: 2019-01-15 | Stop reason: HOSPADM

## 2019-01-15 RX ORDER — SODIUM CHLORIDE AND POTASSIUM CHLORIDE 150; 900 MG/100ML; MG/100ML
100 INJECTION, SOLUTION INTRAVENOUS CONTINUOUS
Status: DISCONTINUED | OUTPATIENT
Start: 2019-01-15 | End: 2019-01-17 | Stop reason: HOSPADM

## 2019-01-15 RX ORDER — ONDANSETRON 4 MG/1
4 TABLET, FILM COATED ORAL EVERY 6 HOURS PRN
Status: DISCONTINUED | OUTPATIENT
Start: 2019-01-15 | End: 2019-01-17 | Stop reason: HOSPADM

## 2019-01-15 RX ORDER — PROMETHAZINE HYDROCHLORIDE 25 MG/1
25 TABLET ORAL ONCE AS NEEDED
Status: DISCONTINUED | OUTPATIENT
Start: 2019-01-15 | End: 2019-01-15 | Stop reason: HOSPADM

## 2019-01-15 RX ORDER — SODIUM CHLORIDE 0.9 % (FLUSH) 0.9 %
3 SYRINGE (ML) INJECTION EVERY 12 HOURS SCHEDULED
Status: DISCONTINUED | OUTPATIENT
Start: 2019-01-15 | End: 2019-01-15 | Stop reason: HOSPADM

## 2019-01-15 RX ORDER — ONDANSETRON 2 MG/ML
4 INJECTION INTRAMUSCULAR; INTRAVENOUS EVERY 6 HOURS PRN
Status: DISCONTINUED | OUTPATIENT
Start: 2019-01-15 | End: 2019-01-17 | Stop reason: HOSPADM

## 2019-01-15 RX ORDER — MAGNESIUM HYDROXIDE 1200 MG/15ML
LIQUID ORAL AS NEEDED
Status: DISCONTINUED | OUTPATIENT
Start: 2019-01-15 | End: 2019-01-15 | Stop reason: HOSPADM

## 2019-01-15 RX ORDER — ROCURONIUM BROMIDE 10 MG/ML
INJECTION, SOLUTION INTRAVENOUS AS NEEDED
Status: DISCONTINUED | OUTPATIENT
Start: 2019-01-15 | End: 2019-01-15 | Stop reason: SURG

## 2019-01-15 RX ORDER — NITROGLYCERIN 0.4 MG/1
0.4 TABLET SUBLINGUAL
Status: DISCONTINUED | OUTPATIENT
Start: 2019-01-15 | End: 2019-01-17 | Stop reason: HOSPADM

## 2019-01-15 RX ORDER — FENTANYL CITRATE 50 UG/ML
50 INJECTION, SOLUTION INTRAMUSCULAR; INTRAVENOUS
Status: DISCONTINUED | OUTPATIENT
Start: 2019-01-15 | End: 2019-01-15 | Stop reason: HOSPADM

## 2019-01-15 RX ORDER — NALOXONE HCL 0.4 MG/ML
0.1 VIAL (ML) INJECTION
Status: DISCONTINUED | OUTPATIENT
Start: 2019-01-15 | End: 2019-01-17 | Stop reason: HOSPADM

## 2019-01-15 RX ORDER — ACETAMINOPHEN 160 MG/5ML
650 SOLUTION ORAL EVERY 6 HOURS
Status: DISCONTINUED | OUTPATIENT
Start: 2019-01-15 | End: 2019-01-17 | Stop reason: HOSPADM

## 2019-01-15 RX ORDER — SODIUM CHLORIDE, SODIUM LACTATE, POTASSIUM CHLORIDE, CALCIUM CHLORIDE 600; 310; 30; 20 MG/100ML; MG/100ML; MG/100ML; MG/100ML
INJECTION, SOLUTION INTRAVENOUS CONTINUOUS PRN
Status: DISCONTINUED | OUTPATIENT
Start: 2019-01-15 | End: 2019-01-15 | Stop reason: SURG

## 2019-01-15 RX ORDER — PROMETHAZINE HYDROCHLORIDE 25 MG/1
25 SUPPOSITORY RECTAL ONCE AS NEEDED
Status: DISCONTINUED | OUTPATIENT
Start: 2019-01-15 | End: 2019-01-15 | Stop reason: HOSPADM

## 2019-01-15 RX ORDER — LIDOCAINE HYDROCHLORIDE 10 MG/ML
0.5 INJECTION, SOLUTION EPIDURAL; INFILTRATION; INTRACAUDAL; PERINEURAL ONCE AS NEEDED
Status: COMPLETED | OUTPATIENT
Start: 2019-01-15 | End: 2019-01-15

## 2019-01-15 RX ORDER — ACETAMINOPHEN 500 MG
1000 TABLET ORAL ONCE
Status: COMPLETED | OUTPATIENT
Start: 2019-01-15 | End: 2019-01-15

## 2019-01-15 RX ORDER — ONDANSETRON 2 MG/ML
INJECTION INTRAMUSCULAR; INTRAVENOUS AS NEEDED
Status: DISCONTINUED | OUTPATIENT
Start: 2019-01-15 | End: 2019-01-15 | Stop reason: SURG

## 2019-01-15 RX ORDER — PROPOFOL 10 MG/ML
VIAL (ML) INTRAVENOUS AS NEEDED
Status: DISCONTINUED | OUTPATIENT
Start: 2019-01-15 | End: 2019-01-15 | Stop reason: SURG

## 2019-01-15 RX ADMIN — POTASSIUM CHLORIDE AND SODIUM CHLORIDE 100 ML/HR: 900; 150 INJECTION, SOLUTION INTRAVENOUS at 10:08

## 2019-01-15 RX ADMIN — SODIUM CHLORIDE, POTASSIUM CHLORIDE, SODIUM LACTATE AND CALCIUM CHLORIDE 9 ML/HR: 600; 310; 30; 20 INJECTION, SOLUTION INTRAVENOUS at 07:09

## 2019-01-15 RX ADMIN — HYDROMORPHONE HYDROCHLORIDE 0.5 MG: 1 INJECTION, SOLUTION INTRAMUSCULAR; INTRAVENOUS; SUBCUTANEOUS at 10:25

## 2019-01-15 RX ADMIN — AMLODIPINE BESYLATE 5 MG: 5 TABLET ORAL at 13:39

## 2019-01-15 RX ADMIN — MELOXICAM 15 MG: 7.5 TABLET ORAL at 07:08

## 2019-01-15 RX ADMIN — ACETAMINOPHEN 650 MG: 325 TABLET ORAL at 19:37

## 2019-01-15 RX ADMIN — GLYCOPYRROLATE 0.6 MG: 0.2 INJECTION, SOLUTION INTRAMUSCULAR; INTRAVENOUS at 09:22

## 2019-01-15 RX ADMIN — FAMOTIDINE 20 MG: 20 TABLET, FILM COATED ORAL at 07:08

## 2019-01-15 RX ADMIN — SODIUM CHLORIDE, POTASSIUM CHLORIDE, SODIUM LACTATE AND CALCIUM CHLORIDE: 600; 310; 30; 20 INJECTION, SOLUTION INTRAVENOUS at 07:00

## 2019-01-15 RX ADMIN — FLUTICASONE PROPIONATE 2 SPRAY: 50 SPRAY, METERED NASAL at 13:39

## 2019-01-15 RX ADMIN — ROCURONIUM BROMIDE 50 MG: 10 SOLUTION INTRAVENOUS at 07:29

## 2019-01-15 RX ADMIN — FENTANYL CITRATE 100 MCG: 50 INJECTION, SOLUTION INTRAMUSCULAR; INTRAVENOUS at 07:29

## 2019-01-15 RX ADMIN — CEFOXITIN SODIUM 2 G: 1 POWDER, FOR SOLUTION INTRAVENOUS at 07:23

## 2019-01-15 RX ADMIN — CEFOXITIN 2 G: 10 INJECTION, POWDER, FOR SOLUTION INTRAVENOUS at 21:32

## 2019-01-15 RX ADMIN — INSULIN LISPRO 2 UNITS: 100 INJECTION, SOLUTION INTRAVENOUS; SUBCUTANEOUS at 21:59

## 2019-01-15 RX ADMIN — LIDOCAINE HYDROCHLORIDE 0.5 ML: 10 INJECTION, SOLUTION EPIDURAL; INFILTRATION; INTRACAUDAL; PERINEURAL at 07:09

## 2019-01-15 RX ADMIN — GLYCOPYRROLATE 0.2 MG: 0.2 INJECTION, SOLUTION INTRAMUSCULAR; INTRAVENOUS at 07:45

## 2019-01-15 RX ADMIN — ACETAMINOPHEN 650 MG: 325 TABLET ORAL at 13:39

## 2019-01-15 RX ADMIN — DEXAMETHASONE SODIUM PHOSPHATE 4 MG: 10 INJECTION, SOLUTION INTRAMUSCULAR; INTRAVENOUS at 07:44

## 2019-01-15 RX ADMIN — BUPRENORPHINE HYDROCHLORIDE 0.3 MG: 0.32 INJECTION INTRAMUSCULAR; INTRAVENOUS at 07:44

## 2019-01-15 RX ADMIN — FENTANYL CITRATE 50 MCG: 50 INJECTION, SOLUTION INTRAMUSCULAR; INTRAVENOUS at 10:10

## 2019-01-15 RX ADMIN — DULOXETINE HYDROCHLORIDE 60 MG: 60 CAPSULE, DELAYED RELEASE ORAL at 13:39

## 2019-01-15 RX ADMIN — ACETAMINOPHEN 1000 MG: 500 TABLET ORAL at 07:08

## 2019-01-15 RX ADMIN — PROPOFOL 200 MG: 10 INJECTION, EMULSION INTRAVENOUS at 07:29

## 2019-01-15 RX ADMIN — POTASSIUM CHLORIDE AND SODIUM CHLORIDE 100 ML/HR: 900; 150 INJECTION, SOLUTION INTRAVENOUS at 15:26

## 2019-01-15 RX ADMIN — ONDANSETRON 4 MG: 2 INJECTION INTRAMUSCULAR; INTRAVENOUS at 09:22

## 2019-01-15 RX ADMIN — HYDROMORPHONE HYDROCHLORIDE 0.5 MG: 1 INJECTION, SOLUTION INTRAMUSCULAR; INTRAVENOUS; SUBCUTANEOUS at 14:23

## 2019-01-15 RX ADMIN — LISINOPRIL: 10 TABLET ORAL at 13:39

## 2019-01-15 RX ADMIN — DEXAMETHASONE SODIUM PHOSPHATE 8 MG: 4 INJECTION, SOLUTION INTRAMUSCULAR; INTRAVENOUS at 07:52

## 2019-01-15 RX ADMIN — GABAPENTIN 600 MG: 300 CAPSULE ORAL at 13:39

## 2019-01-15 RX ADMIN — HYDROMORPHONE HYDROCHLORIDE 0.5 MG: 1 INJECTION, SOLUTION INTRAMUSCULAR; INTRAVENOUS; SUBCUTANEOUS at 10:15

## 2019-01-15 RX ADMIN — GABAPENTIN 600 MG: 300 CAPSULE ORAL at 21:58

## 2019-01-15 RX ADMIN — NEOSTIGMINE METHYLSULFATE 3 MG: 1 INJECTION, SOLUTION INTRAVENOUS at 09:22

## 2019-01-15 RX ADMIN — BUPIVACAINE HYDROCHLORIDE 60 ML: 2.5 INJECTION, SOLUTION EPIDURAL; INFILTRATION; INTRACAUDAL; PERINEURAL at 07:44

## 2019-01-15 RX ADMIN — FENTANYL CITRATE 50 MCG: 50 INJECTION, SOLUTION INTRAMUSCULAR; INTRAVENOUS at 10:05

## 2019-01-15 RX ADMIN — HYDROMORPHONE HYDROCHLORIDE 0.5 MG: 1 INJECTION, SOLUTION INTRAMUSCULAR; INTRAVENOUS; SUBCUTANEOUS at 10:35

## 2019-01-15 RX ADMIN — ROCURONIUM BROMIDE 20 MG: 10 SOLUTION INTRAVENOUS at 08:29

## 2019-01-15 RX ADMIN — CEFOXITIN 2 G: 10 INJECTION, POWDER, FOR SOLUTION INTRAVENOUS at 15:25

## 2019-01-15 RX ADMIN — GABAPENTIN 600 MG: 300 CAPSULE ORAL at 07:08

## 2019-01-15 NOTE — ANESTHESIA PROCEDURE NOTES
ANESTHESIA PERIPHERAL BLOCK      Patient location during procedure: OR  Reason for block: at surgeon's request and post-op pain management  Performed by  Anesthesiologist: Juanito Rodriguez MD  Preanesthetic Checklist  Completed: patient identified, site marked, surgical consent, pre-op evaluation, timeout performed, IV checked, risks and benefits discussed and monitors and equipment checked  Prep:  Pt Position: supine  Sterile barriers:cap, gloves, sterile barriers and mask  Prep: ChloraPrep  Patient monitoring: blood pressure monitoring, continuous pulse oximetry and EKG  Procedure  Sedation:yes  Performed under: general  Guidance:ultrasound guided  Images:still images obtained    Laterality:Bilateral  Block Type:TAP  Injection Technique:single-shot  Needle Type:short-bevel and echogenic  Needle Gauge:20 G    Medications Used: buprenorphine (BUPRENEX) injection, 0.3 mg  dexamethasone sodium phosphate injection, 4 mg  bupivacaine PF (MARCAINE) 0.25 % injection, 60 mL  Medications  Comment:Block Injection:  LA dose divided between Right and Left block       Adjuncts:  Decadron 4mg PSF, Buprenex 0.3mg (Per total volume of LA)    Post Assessment  Injection Assessment: negative aspiration for heme, incremental injection and no paresthesia on injection  Patient Tolerance:comfortable throughout block  Complications:no  Additional Notes      Under Ultrasound guidance, a BBraun 4inch 360 degree needle was advanced with Normal Saline hydro dissection of tissue.  The Internal Oblique and Transversus Abdominus muscles where visualized.  At or before the aponeurosis of Internal Oblique, local anesthetic spread was visualized in the Transversus Abdominus Plane. Injection was made incrementally with aspiration every 5 mls.  There was no  intravascular injection,  injection pressure was normal, there was no neural injection, and the procedure was completed without difficulty.  Thank You.

## 2019-01-15 NOTE — H&P
Admission      Patient Name: Rod Balderas  MRN: [id number]  : 1950  DOS: 1/15/2019    Attending: Juanito Grace Jr*    Primary Care Provider: Tyrese Leyva MD      Patient Care Team:  Tyrese Leyva MD as PCP - General (Family Medicine)  Rod Regalado MD as Consulting Physician (Cardiology)  Myles Rossi MD as Consulting Physician (Anesthesiology)  Juanito Grace Jr., MD as Consulting Physician (Urology)    Chief complaint:  Prostate cancer.    Subjective   Patient is a 68 y.o. male presented for scheduled surgery by Dr.Slabaugh black. He was diagnosed with localized prostate cancer on biopsy in November.  He discussed options with Dr. Sanjay Black and decided to proceed with prostatectomy.  He underwent robotic-assisted laparoscopic radical prostatectomy under general anesthesia, tolerated procedure well, is admitted for further management.  TAP Block was placed by anesthesia.  Seen in his room after surgery, he is doing quite well.  He is sleepy.  Pain control is adequate.  No nausea or vomiting, no shortness breath or chest pain.  He has history of coronary artery disease with previous stents placed by Dr. Lew.  No recent angina or anginal equivalent.  He has known obstructive sleep apnea and he uses his CPAP at night.  He has history of low back pain related to spinal stenosis, is followed by pain management for that, he has left foot drop for which he uses an orthotic device.  He also uses a quad cane with ambulation.    Allergies:  Allergies   Allergen Reactions   • Percocet [Oxycodone-Acetaminophen] Anxiety   • Sulfa Antibiotics Hives       Meds:  Medications Prior to Admission   Medication Sig Dispense Refill Last Dose   • amLODIPine (NORVASC) 5 MG tablet Take 1 tablet by mouth Daily. 90 tablet 2 2019   • DULoxetine (CYMBALTA) 60 MG capsule Take 60 mg by mouth Daily.   2019   • gabapentin (NEURONTIN) 600 MG tablet Take 1 tablet by mouth 4 (Four) Times  a Day. 120 tablet 5 1/14/2019   • ibuprofen (ADVIL,MOTRIN) 800 MG tablet Take 800 mg by mouth 3 (Three) Times a Day As Needed for Moderate Pain .   1/14/2019   • lisinopril-hydrochlorothiazide (PRINZIDE,ZESTORETIC) 20-12.5 MG per tablet Take 1 tablet by mouth Daily. 90 tablet 2 1/14/2019   • metFORMIN (GLUCOPHAGE) 500 MG tablet Take 500 mg by mouth 2 (Two) Times a Day With Meals.   1/14/2019   • aspirin 325 MG tablet Take 325 mg by mouth Daily.   1/11/2019   • fluticasone (FLONASE) 50 MCG/ACT nasal spray USE 2 SPRAYS IN EACH NOSTRIL DAILY  0 More than a month   • nitroglycerin (NITROSTAT) 0.4 MG SL tablet Place  under the tongue.   More than a month         History:   Past Medical History:   Diagnosis Date   • Anxiety and depression    • Cancer (CMS/HCC)    • Chest pain    • Coronary artery disease    • Diabetes mellitus (CMS/HCC)    • Dyslipidemia    • Extremity pain    • Heart disease    • History of transfusion    • Hypertension    • Low back pain    • TANIYA on CPAP     2-3    • Osteoarthritis     Diffuse osteoarthritis.   • Panic attacks    • Prostate cancer (CMS/HCC)      Past Surgical History:   Procedure Laterality Date   • CARDIAC CATHETERIZATION     • CHOLECYSTECTOMY     • COLONOSCOPY  2015   • CORONARY ANGIOPLASTY WITH STENT PLACEMENT  12/2012 December 2012:  A 3.5 x 88 Promus VERONICA stent to OM2.  Nonobstructive disease.  Otherwise normal LVEF.   • EYE SURGERY Bilateral     cataract   • RETINAL DETACHMENT SURGERY Right      Family History   Problem Relation Age of Onset   • Heart disease Mother    • Thyroid disease Mother    • Heart disease Father    • Cancer Brother      Social History     Tobacco Use   • Smoking status: Former Smoker   • Smokeless tobacco: Former User   Substance Use Topics   • Alcohol use: No   • Drug use: No   .  Retired .  Has 3 children.    Review of Systems  Pertinent items are noted in HPI, all other systems reviewed and negative    Vital Signs  /69 (BP  Location: Right arm, Patient Position: Lying)   Pulse 72   Temp 97.5 °F (36.4 °C) (Oral)   Resp 18   SpO2 91%     Physical Exam:    General Appearance:    Alert, cooperative, in no acute distress   Head:    Normocephalic, without obvious abnormality, atraumatic   Eyes:            Lids and lashes normal, conjunctivae and sclerae normal, no   icterus, no pallor, corneas clear    Ears:    Ears appear intact with no abnormalities noted   Throat:   No oral lesions, no thrush, oral mucosa moist   Neck:   No adenopathy, supple, trachea midline, no thyromegaly        Lungs:     Clear to auscultation,respirations regular, even and                   unlabored    Heart:    Regular rhythm and normal rate, normal S1 and S2, no            murmur, no gallop    Abdomen:     Soft, benign.  Obese.  Incisions clean.  HAYDE drain present slight blood oozing around the drain.     Genitalia:    Deferred   Extremities:   Moves all extremities well, no edema, no cyanosis, no              redness   Pulses:   Pulses palpable and equal bilaterally   Skin:   No bleeding, bruising or rash   Neurologic:   Cranial nerves 2 - 12 grossly intact, sensation intact, left foot drop noted.       Results from last 7 days   Lab Units  01/08/19   1524   WBC 10*3/mm3  9.97   HEMOGLOBIN g/dL  14.1   HEMATOCRIT %  42.2   PLATELETS 10*3/mm3  238     Results from last 7 days   Lab Units  01/15/19   0710  01/08/19   1524   SODIUM mmol/L   --   136   POTASSIUM mmol/L  3.7  4.3   CHLORIDE mmol/L   --   101   CO2 mmol/L   --   26.0   BUN mg/dL   --   24*   CREATININE mg/dL   --   0.94   CALCIUM mg/dL   --   9.6   GLUCOSE mg/dL   --   112*     Lab Results   Component Value Date    HGBA1C 5.70 (H) 01/08/2019       Assessment and Plan:           Prostate cancer , s/p prostatectomy ( RALP)    Spinal stenosis, lumbar region, with neurogenic claudication    Presence of stent in coronary artery in patient with coronary artery disease    Essential hypertension    Morbid  obesity due to excess calories (CMS/HCC)    TANIYA on CPAP    DM (diabetes mellitus), type 2 (CMS/HCC)      Plan    Admit for further management.  1. Ambulation  2. Pain control-prns   3. IS-encourage  4. DVT proph- Mech/ lovenox.  5. Bowel regimen  6. Resume home medications as appropriate  7. Monitor post-op labs  8. DC planning   9. Diet, CL, ADAT with bowel function return. /IVF per surgeon    Hypertension: Continue amlodipine,   Lisinopril-HCTZ, monitor blood pressure closely.  Add when necessary medications.    TANIYA: CPAP at night and as needed.        Diabetes mellitus type 2: Monitor blood glucose with metformin on hold, and sliding scale insulin, diabetic diet when diet is advanced.      Discussed with pt and family.    Arielle Carolina MD  01/15/19  12:51 PM

## 2019-01-15 NOTE — ANESTHESIA PROCEDURE NOTES
ANESTHESIA INTUBATION  Urgency: elective      General Information and Staff    Patient location during procedure: OR  CRNA: Stanley Bhandari CRNA    Indications and Patient Condition  Indications for airway management: airway protection    Preoxygenated: yes  MILS not maintained throughout  Mask difficulty assessment: 1 - vent by mask    Final Airway Details  Final airway type: endotracheal airway      Successful airway: ETT  Cuffed: yes   Successful intubation technique: direct laryngoscopy  Endotracheal tube insertion site: oral  Blade: Euceda  Blade size: 2  ETT size (mm): 8.0  Cormack-Lehane Classification: grade I - full view of glottis  Placement verified by: chest auscultation and capnometry   Cuff volume (mL): 8  Measured from: lips  ETT to lips (cm): 24  Number of attempts at approach: 1

## 2019-01-15 NOTE — NURSING NOTE
Patient seen today on 2F. Enhanced Recovery protocols reviewed with patient.  Protocols/ Goals discussed were: early ambulation, pain control, use of incentive spirometer, and post checklist in room. Patient verbalized understanding.  Will continue to f/u as needed. Thank you.  Nataly Haynes RN, BSN - [facility] Nurse Coordinator

## 2019-01-15 NOTE — OP NOTE
PROSTATECTOMY LAPAROSCOPIC WITH DAVINCI ROBOT  Procedure Note    Rod Balderas  1/15/2019    Pre-op Diagnosis:   Prostate cancer    Post-op Diagnosis:     Prostate cancer    Procedure/CPT® Codes:      Procedure(s):  ROBOTIC PROSTATECTOMY WITH NODES    Surgeon(s):  Juanito Grace Jr., MD    Anesthesia: General with Block    Staff:   Circulator: Maria Antonia Teague RN  Scrub Person: Florencia Kirk; Juan Ramon Finley; [place] Salvador  Nursing Assistant: Petty Mclean  Assistant: Vicente Rhodes PA    Estimated Blood Loss: 300 mL  Urine Voided: * No values recorded between 1/15/2019  7:23 AM and 1/15/2019  9:29 AM *    Specimens:                ID Type Source Tests Collected by Time   A : RIGHT OBTURATOR Lymph Node Pelvis TISSUE PATHOLOGY EXAM Juanito Grace Jr., MD 1/15/2019 0918   B : prostate Tissue Prostate TISSUE PATHOLOGY EXAM Juanito Grace Jr., MD 1/15/2019 0918   C : LEFT OBTURATOR Lymph Node Pelvis TISSUE PATHOLOGY EXAM Juanito Grace Jr., MD 1/15/2019 0918         Drains:   Closed/Suction Drain Abdomen Bulb (Active)       NG/OG Tube Orogastric 16 Fr Center mouth (Active)       Urethral Catheter Silicone 20 Fr. (Active)       Findings: No sign of extraprostatic extension    Complications: None    History: 68-year-old morbidly obese male with localized prostate cancer who presents for surgical therapy.  He understands risks and benefits associated with surgical therapy and wishes to proceed with robotic-assisted laparoscopic radical prostatectomy and gives his full consent.    Operative Report: After giving his full consent patient's brought into the operating suite where he is placed in the supine position.  Anesthesia is induced without difficulty.  He's carefully placed in dorsal lithotomy position padding all pressure points.  Morbid obesity is noted.  After he sterilely prepped and draped in normal fashion Veress needle technique is used to establish pneumoperitoneum.  10 mm blunt  Visiport is then used at the umbilicus to create camera trocar site.  Remainder of the robotic and assistant trochars are placed.  Robot is docked immediately begin the operation by entering the space of Retzius.  Endopelvic fascia is identified bilaterally opened.  Bilateral puboprostatic ligaments are taken down.  Ligature of 0 Vicryl sutures placed around the dorsal venous complex in a figure-of-eight fashion.  Anterior bladder neck is open clear of the base the prostate.  Posterior bladder neck dissection is carried out.  Seminal vesicles and vas deferens are dissected out in their entirety using minimal thermal energy.  Prostatic pedicles were then isolated and taken down using in vessel sealer device.  A thermic technique then is used to dissect the neurovascular bundles away from the posterior lateral aspects of the prostate and a nerve sparing fashion.  Amniotic or tissues used to enhance nerve sparing procedure along the neurovascular bundles.  Anterior dissection was performed dividing the dorsal venous complex.  Apical dissection performed leaving a nice stump of urethra for anastomosis.  After division of the urethra the specimen was placed in the specimen sac.  Bilateral obturator lymph node dissection is carried out using vessel sealer for hemostasis.  Bladder neck is then tailored posteriorly using a 2-0 Vicryl suture.  Running 2-0 Monocryl vesicourethral anastomosis is performed.  This is watertight.  A drain is brought to the #1 robotic trocar site.  Robot is undocked and trochars were removed under laparoscopic guidance.  Christina catheter and HAYDE drain were secured in the place.  Fascial defect at the umbilicus is enlarged and our specimen was delivered within the specimen sac.  All incisions are irrigated.  Subcutaneous tissues brought together using 3-0 Vicryl.  Skin was brought together using Dermabond.  Anesthesia was reversed and the patient's taken to the recovery room in stable  condition.    Juanito Grace Jr., MD     Date: 1/15/2019  Time: 9:29 AM

## 2019-01-15 NOTE — ANESTHESIA POSTPROCEDURE EVALUATION
Patient: Rod Balderas    Procedure Summary     Date:  01/15/19 Room / Location:   [facility] /  [facility]    Anesthesia Start:  0723 Anesthesia Stop:      Procedure:  ROBOTIC PROSTATECTOMY WITH NODES (N/A Abdomen) Diagnosis:      Surgeon:  Juanito Grace Jr., MD Provider:  Juanito Rodriguez MD    Anesthesia Type:  general ASA Status:  3          Anesthesia Type: general  Last vitals  BP   135/71 (01/15/19 0947)   Temp   97.5 °F (36.4 °C) (01/15/19 0947)   Pulse   88 (01/15/19 0947)   Resp   18 (01/15/19 0719)     SpO2   95 % (01/15/19 0947)     Post Anesthesia Care and Evaluation    Patient location during evaluation: PACU  Patient participation: waiting for patient participation  Level of consciousness: responsive to light touch  Pain score: 0  Pain management: adequate  Airway patency: patent  Anesthetic complications: No anesthetic complications  PONV Status: none  Cardiovascular status: hemodynamically stable and acceptable  Respiratory status: nonlabored ventilation, acceptable and nasal cannula  Hydration status: acceptable

## 2019-01-15 NOTE — H&P
Pre-Op H&P  Rod Balderas  [id number]  1950    Chief complaint: prostate cancer    HPI:    Patient is a 68 y.o.male who presents with history of prostate cancer     Review of Systems:  General ROS: negative for chills, fever or skin lesions;  No changes since last office visit  Cardiovascular ROS: no chest pain or dyspnea on exertion  Respiratory ROS: no cough, shortness of breath, or wheezing    Allergies:   Allergies   Allergen Reactions   • Percocet [Oxycodone-Acetaminophen] Anxiety   • Sulfa Antibiotics Hives       Home Meds:    No current facility-administered medications on file prior to encounter.      Current Outpatient Medications on File Prior to Encounter   Medication Sig Dispense Refill   • amLODIPine (NORVASC) 5 MG tablet Take 1 tablet by mouth Daily. 90 tablet 2   • aspirin 325 MG tablet Take 325 mg by mouth Daily.     • fluticasone (FLONASE) 50 MCG/ACT nasal spray USE 2 SPRAYS IN EACH NOSTRIL DAILY  0   • gabapentin (NEURONTIN) 600 MG tablet Take 1 tablet by mouth 4 (Four) Times a Day. 120 tablet 5   • ibuprofen (ADVIL,MOTRIN) 800 MG tablet Take 800 mg by mouth 3 (Three) Times a Day As Needed for Moderate Pain .     • lisinopril-hydrochlorothiazide (PRINZIDE,ZESTORETIC) 20-12.5 MG per tablet Take 1 tablet by mouth Daily. 90 tablet 2   • nitroglycerin (NITROSTAT) 0.4 MG SL tablet Place  under the tongue.         PMH:   Past Medical History:   Diagnosis Date   • Anxiety and depression    • Cancer (CMS/HCC)    • Chest pain    • Coronary artery disease    • Diabetes mellitus (CMS/HCC)    • Dyslipidemia    • Extremity pain    • Heart disease    • History of transfusion    • Hypertension    • Low back pain    • TANIYA on CPAP     2-3    • Osteoarthritis     Diffuse osteoarthritis.   • Panic attacks    • Prostate cancer (CMS/HCC)      PSH:    Past Surgical History:   Procedure Laterality Date   • CARDIAC CATHETERIZATION     • CHOLECYSTECTOMY     • COLONOSCOPY  2015   • CORONARY ANGIOPLASTY WITH STENT  PLACEMENT  12/2012 December 2012:  A 3.5 x 88 Promus VERONICA stent to OM2.  Nonobstructive disease.  Otherwise normal LVEF.   • EYE SURGERY Bilateral     cataract   • RETINAL DETACHMENT SURGERY Right        Immunization History:  Influenza: 2018  Pneumococcal: unsure of date  Tetanus: 2018    Social History:   Tobacco:   Social History     Tobacco Use   Smoking Status Former Smoker   Smokeless Tobacco Former User      Alcohol:     Social History     Substance and Sexual Activity   Alcohol Use No         Physical Exam:  General Appearance:    Alert, cooperative, no distress, appears stated age   Head:    Normocephalic, without obvious abnormality, atraumatic   Lungs:     Clear to auscultation bilaterally, respirations unlabored    Heart:   Regular rate and rhythm, S1 and S2 normal, no murmur, rub    or gallop    Abdomen:    Soft, non-tender.  +bowel sounds   Breast Exam:    deferred   Genitalia:    deferred   Extremities:   Extremities normal, atraumatic, no cyanosis or edema   Skin:   Skin color, texture, turgor normal, no rashes or lesions   Neurologic:   Grossly intact     Cancer Staging (if applicable)  Cancer Patient: _x_ yes __no __unknown; If yes, clinical stage T:__ N:__M:__, stage group or __N/A    Impression: Prostate cancer    Plan: Robot assisted prostatectomy with lymphadenectomy     TRINH Nixon   1/15/2019   6:52 AM

## 2019-01-15 NOTE — ANESTHESIA PREPROCEDURE EVALUATION
Anesthesia Evaluation     Patient summary reviewed   NPO Solid Status: > 8 hours  NPO Liquid Status: > 8 hours           Airway   Mallampati: I  TM distance: >3 FB  Neck ROM: full  No difficulty expected  Dental    (+) upper dentures    Pulmonary - normal exam   Cardiovascular   Exercise tolerance: good (4-7 METS)    Rhythm: regular  Rate: normal        Neuro/Psych  GI/Hepatic/Renal/Endo      Musculoskeletal     Abdominal    Substance History      OB/GYN          Other                        Anesthesia Plan    ASA 3     general     intravenous induction     TAP blocks discussed and agreed

## 2019-01-16 LAB
ANION GAP SERPL CALCULATED.3IONS-SCNC: 5 MMOL/L (ref 3–11)
BUN BLD-MCNC: 16 MG/DL (ref 9–23)
BUN/CREAT SERPL: 21.6 (ref 7–25)
CALCIUM SPEC-SCNC: 8.8 MG/DL (ref 8.7–10.4)
CHLORIDE SERPL-SCNC: 102 MMOL/L (ref 99–109)
CO2 SERPL-SCNC: 25 MMOL/L (ref 20–31)
CREAT BLD-MCNC: 0.74 MG/DL (ref 0.6–1.3)
DEPRECATED RDW RBC AUTO: 40.7 FL (ref 37–54)
ERYTHROCYTE [DISTWIDTH] IN BLOOD BY AUTOMATED COUNT: 13.5 % (ref 11.3–14.5)
GFR SERPL CREATININE-BSD FRML MDRD: 105 ML/MIN/1.73
GLUCOSE BLD-MCNC: 149 MG/DL (ref 70–100)
GLUCOSE BLDC GLUCOMTR-MCNC: 117 MG/DL (ref 70–130)
GLUCOSE BLDC GLUCOMTR-MCNC: 122 MG/DL (ref 70–130)
GLUCOSE BLDC GLUCOMTR-MCNC: 138 MG/DL (ref 70–130)
GLUCOSE BLDC GLUCOMTR-MCNC: 151 MG/DL (ref 70–130)
HCT VFR BLD AUTO: 37.9 % (ref 38.9–50.9)
HGB BLD-MCNC: 12.6 G/DL (ref 13.1–17.5)
MCH RBC QN AUTO: 27.2 PG (ref 27–31)
MCHC RBC AUTO-ENTMCNC: 33.2 G/DL (ref 32–36)
MCV RBC AUTO: 81.9 FL (ref 80–99)
PLATELET # BLD AUTO: 235 10*3/MM3 (ref 150–450)
PMV BLD AUTO: 9.6 FL (ref 6–12)
POTASSIUM BLD-SCNC: 4 MMOL/L (ref 3.5–5.5)
RBC # BLD AUTO: 4.63 10*6/MM3 (ref 4.2–5.76)
SODIUM BLD-SCNC: 132 MMOL/L (ref 132–146)
WBC NRBC COR # BLD: 13.74 10*3/MM3 (ref 3.5–10.8)

## 2019-01-16 PROCEDURE — 94799 UNLISTED PULMONARY SVC/PX: CPT

## 2019-01-16 PROCEDURE — 80048 BASIC METABOLIC PNL TOTAL CA: CPT | Performed by: UROLOGY

## 2019-01-16 PROCEDURE — 97530 THERAPEUTIC ACTIVITIES: CPT

## 2019-01-16 PROCEDURE — 97162 PT EVAL MOD COMPLEX 30 MIN: CPT

## 2019-01-16 PROCEDURE — 25010000002 HYDROMORPHONE PER 4 MG: Performed by: UROLOGY

## 2019-01-16 PROCEDURE — 25010000002 ENOXAPARIN PER 10 MG: Performed by: UROLOGY

## 2019-01-16 PROCEDURE — 85027 COMPLETE CBC AUTOMATED: CPT | Performed by: UROLOGY

## 2019-01-16 PROCEDURE — 97110 THERAPEUTIC EXERCISES: CPT

## 2019-01-16 PROCEDURE — 82962 GLUCOSE BLOOD TEST: CPT

## 2019-01-16 PROCEDURE — 97116 GAIT TRAINING THERAPY: CPT

## 2019-01-16 RX ORDER — NITROFURANTOIN 25; 75 MG/1; MG/1
100 CAPSULE ORAL 2 TIMES DAILY
Qty: 30 CAPSULE | Refills: 0 | Status: ON HOLD
Start: 2019-01-15 | End: 2019-02-09

## 2019-01-16 RX ORDER — POLYETHYLENE GLYCOL 3350 17 G/17G
17 POWDER, FOR SOLUTION ORAL DAILY
Qty: 510 G | Refills: 0 | Status: ON HOLD
Start: 2019-01-15 | End: 2019-02-09

## 2019-01-16 RX ORDER — IBUPROFEN 800 MG/1
800 TABLET ORAL 3 TIMES DAILY PRN
Start: 2019-01-22 | End: 2019-02-24 | Stop reason: HOSPADM

## 2019-01-16 RX ORDER — ASPIRIN 325 MG
325 TABLET ORAL DAILY
Status: ON HOLD
Start: 2019-01-18 | End: 2019-02-11

## 2019-01-16 RX ORDER — PSEUDOEPHEDRINE HCL 30 MG
1 TABLET ORAL 2 TIMES DAILY
Qty: 30 EACH | Refills: 0 | Status: ON HOLD
Start: 2019-01-15 | End: 2019-02-09

## 2019-01-16 RX ORDER — HYDROCODONE BITARTRATE AND ACETAMINOPHEN 7.5; 325 MG/1; MG/1
1 TABLET ORAL EVERY 4 HOURS PRN
Qty: 40 TABLET | Refills: 0
Start: 2019-01-16 | End: 2019-02-24 | Stop reason: HOSPADM

## 2019-01-16 RX ADMIN — ENOXAPARIN SODIUM 40 MG: 40 INJECTION SUBCUTANEOUS at 08:20

## 2019-01-16 RX ADMIN — GABAPENTIN 600 MG: 300 CAPSULE ORAL at 20:55

## 2019-01-16 RX ADMIN — GABAPENTIN 600 MG: 300 CAPSULE ORAL at 06:01

## 2019-01-16 RX ADMIN — LISINOPRIL: 10 TABLET ORAL at 08:30

## 2019-01-16 RX ADMIN — PANTOPRAZOLE SODIUM 40 MG: 40 TABLET, DELAYED RELEASE ORAL at 06:01

## 2019-01-16 RX ADMIN — ACETAMINOPHEN 650 MG: 325 TABLET ORAL at 06:01

## 2019-01-16 RX ADMIN — FLUTICASONE PROPIONATE 2 SPRAY: 50 SPRAY, METERED NASAL at 08:20

## 2019-01-16 RX ADMIN — INSULIN LISPRO 2 UNITS: 100 INJECTION, SOLUTION INTRAVENOUS; SUBCUTANEOUS at 08:19

## 2019-01-16 RX ADMIN — DULOXETINE HYDROCHLORIDE 60 MG: 60 CAPSULE, DELAYED RELEASE ORAL at 08:20

## 2019-01-16 RX ADMIN — ACETAMINOPHEN 650 MG: 325 TABLET ORAL at 01:15

## 2019-01-16 RX ADMIN — ACETAMINOPHEN 650 MG: 325 TABLET ORAL at 18:56

## 2019-01-16 RX ADMIN — DOCUSATE SODIUM 100 MG: 100 CAPSULE, LIQUID FILLED ORAL at 01:15

## 2019-01-16 RX ADMIN — GABAPENTIN 600 MG: 300 CAPSULE ORAL at 13:45

## 2019-01-16 RX ADMIN — DOCUSATE SODIUM 100 MG: 100 CAPSULE, LIQUID FILLED ORAL at 15:16

## 2019-01-16 RX ADMIN — HYDROMORPHONE HYDROCHLORIDE 0.5 MG: 1 INJECTION, SOLUTION INTRAMUSCULAR; INTRAVENOUS; SUBCUTANEOUS at 15:16

## 2019-01-16 RX ADMIN — ONDANSETRON 4 MG: 4 TABLET, FILM COATED ORAL at 15:16

## 2019-01-16 RX ADMIN — AMLODIPINE BESYLATE 5 MG: 5 TABLET ORAL at 08:20

## 2019-01-16 RX ADMIN — ACETAMINOPHEN 650 MG: 325 TABLET ORAL at 13:45

## 2019-01-16 NOTE — PROGRESS NOTES
"IM progress note      Jesse Balderas  [id number]  1950     LOS: 1 day     Attending: Juanito Grace Jr*    Primary Care Provider: Tyrese Leyva MD      Chief Complaint/Reason for visit:  No chief complaint on file.      Subjective   Feels ok. No f/c/n/vom. Tolerated clears for breakfast and soft diet for lunch. Some pain.  Ambulated.    Objective     Vital Signs  Visit Vitals  /84 (BP Location: Right arm, Patient Position: Lying)   Pulse 64   Temp 97.6 °F (36.4 °C) (Oral)   Resp 18   Ht 175 cm (68.9\")   Wt (!) 157 kg (346 lb 2 oz)   SpO2 95%   BMI 51.26 kg/m²     Temp (24hrs), Av.9 °F (36.6 °C), Min:97.5 °F (36.4 °C), Max:98.3 °F (36.8 °C)      Physical Exam:     General Appearance:    Alert, cooperative, in no acute distress   Head:    Normocephalic, without obvious abnormality, atraumatic    Lungs:     Normal effort, symmetric chest rise, no crepitus, clear to      auscultation bilaterally           Heart:    Regular rhythm and normal rate, normal S1 and S2    Abdomen:     Soft, obese, clean incisions. HAYDE present. Mild distention.  : Christina, urine clearing up.   Extremities:   No clubbing, cyanosis or edema.  No deformities.    Pulses:   Pulses palpable and equal bilaterally   Skin:   No bleeding, bruising or rash          Results Review:     I reviewed the patient's new clinical results.   Results from last 7 days   Lab Units 19  0647   WBC 10*3/mm3 13.74*   HEMOGLOBIN g/dL 12.6*   HEMATOCRIT % 37.9*   PLATELETS 10*3/mm3 235     Results from last 7 days   Lab Units 19  0647 01/15/19  0710   SODIUM mmol/L 132  --    POTASSIUM mmol/L 4.0 3.7   CHLORIDE mmol/L 102  --    CO2 mmol/L 25.0  --    BUN mg/dL 16  --    CREATININE mg/dL 0.74  --    CALCIUM mg/dL 8.8  --    GLUCOSE mg/dL 149*  --      I reviewed the patient's new imaging including images and reports.    Results for JESSE BALDERAS (MRN [id number]) as of 2019 17:16   Ref. Range 2019 06:47 2019 07:13 " 1/16/2019 11:27 1/16/2019 16:44   Glucose Latest Ref Range: 70 - 130 mg/dL 149 (H) 151 (H) 117 122       All medications reviewed.     acetaminophen 650 mg Oral Q6H   Or      acetaminophen 650 mg Oral Q6H   Or      acetaminophen 650 mg Rectal Q6H   amLODIPine 5 mg Oral Daily   DULoxetine 60 mg Oral Daily   enoxaparin 40 mg Subcutaneous Daily   fluticasone 2 spray Each Nare Daily   gabapentin 600 mg Oral Q8H   insulin lispro 0-7 Units Subcutaneous 4x Daily With Meals & Nightly   lisinopril-hydroCHLOROthiazide (ZESTORETIC) 10-12.5 mg combo dose  Oral Q24H   pantoprazole 40 mg Oral Q AM         Assessment/Plan       Prostate cancer , s/p prostatectomy ( RALP)    Spinal stenosis, lumbar region, with neurogenic claudication    Presence of stent in coronary artery in patient with coronary artery disease    Essential hypertension    Morbid obesity due to excess calories (CMS/HCC)    TANIYA on CPAP    DM (diabetes mellitus), type 2 (CMS/HCC)    Plan  1. Ambulation. Several times daily.  2. Pain control-prns   3. IS-encouraged  4. DVT proph-Mech/ lovenox.  5. Bowel regimen  6. Diet, ADAT, soft today.    8. DC planning. Probably home in am.     Arielle Carolina MD  01/16/19  5:13 PM

## 2019-01-16 NOTE — PROGRESS NOTES
"Urology    Patient Name: Rod Balderas  Medical Record Number: [id number]  YOB: 1950     LOS: 1 day   Patient Care Team:  Tyrese Leyva MD as PCP - General (Family Medicine)  Rod Regalado MD as Consulting Physician (Cardiology)  yMles Rossi MD as Consulting Physician (Anesthesiology)  Juanito Grace Jr., MD as Consulting Physician (Urology)    Chief Complaint:  No chief complaint on file.      Subjective     Interval History:     Doing well this morning  No flatus    Review of Systems:    The following systems were reviewed and negative;  constitution, respiratory, cardiovascular, gastrointestinal and genitourinary    Objective     Vital Signs  /69 (BP Location: Right arm, Patient Position: Lying)   Pulse 64   Temp 98 °F (36.7 °C) (Oral)   Resp 18   Ht 175 cm (68.9\")   Wt (!) 157 kg (346 lb 2 oz)   SpO2 96%   BMI 51.26 kg/m²   I/O last 3 completed shifts:  In: 480 [P.O.:480]  Out: 1720 [Urine:1400; Drains:20; Blood:300]  I/O this shift:  In: -   Out: 1530 [Urine:1350; Drains:180]      Physical Exam:  General Appearance: No apparent distress  Back: No kyphosis present, no scoliosis present,no tenderness to percussion or Palpation  Lungs: Respirations regular, even and  unlabored  Heart: Regular rhythm and normal rate  Chest Wall: No abnormalities observed  Abdomen: Obese, benign  Genital: Christina catheter in place clear urine  Rectal: Deferred  Extremities: Moves all extremities well, no edema no cyanosis, no redness  Pulses: Pulses palpable  Skin: No bleeding, bruising or rash  Lymph nodes: No palpable adenopathy  Neurologic: Neurologically grossly intact     Results Review:     I reviewed the patient's new clinical results.  Lab Results (last 24 hours)     Procedure Component Value Units Date/Time    POC Glucose Once [[id number]]  (Abnormal) Collected:  01/15/19 2036    Specimen:  Blood Updated:  01/15/19 2038     Glucose 179 mg/dL     POC Glucose Once [[id number]]  " (Abnormal) Collected:  01/15/19 1638    Specimen:  Blood Updated:  01/15/19 1640     Glucose 193 mg/dL     Tissue Pathology Exam [[id number]] Collected:  01/15/19 0918    Specimen:  Lymph Node from Pelvis, Lymph Node from Pelvis, Tissue from Prostate Updated:  01/15/19 1010    POC Glucose Once [[id number]]  (Abnormal) Collected:  01/15/19 1000    Specimen:  Blood Updated:  01/15/19 1003     Glucose 160 mg/dL     Potassium [[id number]]  (Normal) Collected:  01/15/19 0710    Specimen:  Blood Updated:  01/15/19 0735     Potassium 3.7 mmol/L     POC Glucose Once [[id number]]  (Normal) Collected:  01/15/19 0653    Specimen:  Blood Updated:  01/15/19 0655     Glucose 123 mg/dL           Medication Review:    Current Facility-Administered Medications:   •  acetaminophen (TYLENOL) tablet 650 mg, 650 mg, Oral, Q6H, 650 mg at 01/16/19 0115 **OR** acetaminophen (TYLENOL) 160 MG/5ML solution 650 mg, 650 mg, Oral, Q6H **OR** acetaminophen (TYLENOL) suppository 650 mg, 650 mg, Rectal, Q6H, Juanito Grace Jr., MD  •  amLODIPine (NORVASC) tablet 5 mg, 5 mg, Oral, Daily, Arielle Carolina MD, 5 mg at 01/15/19 1339  •  dextrose (D50W) 25 g/ 50mL Intravenous Solution 25 g, 25 g, Intravenous, Q15 Min PRN, Juanito Grace Jr., MD  •  dextrose (D50W) 25 g/ 50mL Intravenous Solution 25 g, 25 g, Intravenous, Q15 Min PRN, Arielle Carolina MD  •  dextrose (GLUTOSE) oral gel 15 g, 15 g, Oral, Q15 Min PRN, Juanito Grace Jr., MD  •  dextrose (GLUTOSE) oral gel 15 g, 15 g, Oral, Q15 Min PRN, Arielle Carolina MD  •  docusate sodium (COLACE) capsule 100 mg, 100 mg, Oral, BID PRN, Juanito Grace Jr., MD, 100 mg at 01/16/19 0115  •  DULoxetine (CYMBALTA) DR capsule 60 mg, 60 mg, Oral, Daily, Juanito Grace Jr., MD, 60 mg at 01/15/19 1339  •  enoxaparin (LOVENOX) syringe 40 mg, 40 mg, Subcutaneous, Daily, Juanito Grace Jr., MD  •  fluticasone (FLONASE) 50 MCG/ACT nasal spray 2 spray, 2 spray, Each  Nare, Daily, Juanito Grace Jr., MD, 2 spray at 01/15/19 1339  •  gabapentin (NEURONTIN) capsule 600 mg, 600 mg, Oral, Q8H, Juanito Grace Jr., MD, 600 mg at 01/15/19 2158  •  glucagon (human recombinant) (GLUCAGEN DIAGNOSTIC) injection 1 mg, 1 mg, Subcutaneous, PRN, Juanito Grace Jr., MD  •  glucagon (human recombinant) (GLUCAGEN DIAGNOSTIC) injection 1 mg, 1 mg, Subcutaneous, PRN, Arielle Carolina MD  •  HYDROmorphone (DILAUDID) injection 0.5 mg, 0.5 mg, Intravenous, Q2H PRN, 0.5 mg at 01/15/19 1423 **AND** naloxone (NARCAN) injection 0.1 mg, 0.1 mg, Intravenous, Q5 Min PRN, Juanito Grace Jr., MD  •  insulin lispro (humaLOG) injection 0-7 Units, 0-7 Units, Subcutaneous, 4x Daily With Meals & Nightly, Arielle Carolina MD, 2 Units at 01/15/19 2159  •  labetalol (NORMODYNE,TRANDATE) injection 10 mg, 10 mg, Intravenous, Q4H PRN, Arielle Carolina MD  •  lactated ringers infusion, 9 mL/hr, Intravenous, Continuous, Juanito Rodriguez MD, Stopped at 01/15/19 1033  •  lisinopril (PRINIVIL,ZESTRIL) 10 mg, hydrochlorothiazide (HYDRODIURIL) 12.5 mg for ZESTORETIC 10-12.5, , Oral, Q24H, Arielle Carolina MD  •  nitroglycerin (NITROSTAT) SL tablet 0.4 mg, 0.4 mg, Sublingual, Q5 Min PRN, Juanito Grace Jr., MD  •  ondansetron (ZOFRAN) tablet 4 mg, 4 mg, Oral, Q6H PRN **OR** ondansetron (ZOFRAN) injection 4 mg, 4 mg, Intravenous, Q6H PRN, Juanito Grace Jr., MD  •  pantoprazole (PROTONIX) EC tablet 40 mg, 40 mg, Oral, Q AM, Juanito Grace Jr., MD  •  sodium chloride 0.9 % bolus 500 mL, 500 mL, Intravenous, TID PRN, Arielle Carolina MD  •  sodium chloride 0.9 % with KCl 20 mEq/L infusion, 100 mL/hr, Intravenous, Continuous, Juanito Grace Jr., MD, Last Rate: 100 mL/hr at 01/16/19 0413, 100 mL/hr at 01/16/19 0413    Assessment and Plan    Wait for bowel function  Home with catheter  Okay to remove HAYDE drain prior to discharge      Prostate cancer , s/p  prostatectomy ( [facility])    Spinal stenosis, lumbar region, with neurogenic claudication    Presence of stent in coronary artery in patient with coronary artery disease    Essential hypertension    Morbid obesity due to excess calories (CMS/HCC)    TANIYA on CPAP    DM (diabetes mellitus), type 2 (CMS/HCC)          Plan for disposition:Wait for bowel function    Juanito Grace Jr., MD  01/16/19  5:55 AM

## 2019-01-16 NOTE — PLAN OF CARE
Problem: Patient Care Overview  Goal: Plan of Care Review  Outcome: Ongoing (interventions implemented as appropriate)   01/16/19 1029   Coping/Psychosocial   Plan of Care Reviewed With patient;spouse   Plan of Care Review   Progress improving   OTHER   Outcome Summary Pt is moving well, understands HEP. Safety assist needed with gait and transfers at this time. Pt's spouse present and assisting pt.

## 2019-01-16 NOTE — PROGRESS NOTES
Discharge Planning Assessment  [facility]     Patient Name: Rod Balderas  MRN: [id number]  Today's Date: 1/16/2019    Admit Date: 1/15/2019    Discharge Needs Assessment     Row Name 01/16/19 1236       Living Environment    Lives With  spouse    Name(s) of Who Lives With Patient  Iveth Akers    Current Living Arrangements  home/apartment/condo    Primary Care Provided by  self    Provides Primary Care For  no one    Family Caregiver if Needed  spouse    Family Caregiver Names  SpouseIveth    Quality of Family Relationships  supportive;involved;helpful    Able to Return to Prior Arrangements  yes    Living Arrangement Comments  Lives with spouse in ranch home.         Resource/Environmental Concerns    Transportation Concerns  car, none       Transition Planning    Patient/Family Anticipates Transition to  home with family    Patient/Family Anticipated Services at Transition  none    Transportation Anticipated  family or friend will provide       Discharge Needs Assessment    Readmission Within the Last 30 Days  no previous admission in last 30 days    Concerns to be Addressed  no discharge needs identified    Equipment Currently Used at Home  bipap/cpap;cane, straight    Equipment Needed After Discharge  none        Discharge Plan     Row Name 01/16/19 1237       Plan    Plan  Plan is home at discharge    Patient/Family in Agreement with Plan  yes    Plan Comments  Met with patient and spouse at bedside.  Patkient is independent with ADL and wife available to assist as needed.  No discharge needs identified      Final Discharge Disposition Code  01 - home or self-care        Destination      No service coordination in this encounter.      Durable Medical Equipment      No service coordination in this encounter.      Dialysis/Infusion      No service coordination in this encounter.      Home Medical Care      No service coordination in this encounter.      Community Resources      No service  coordination in this encounter.          Demographic Summary     Row Name 01/16/19 1234       General Information    Admission Type  same day    Arrived From  operating room    Referral Source  admission list    Reason for Consult  discharge planning    Preferred Language  English        Functional Status     Row Name 01/16/19 1235       Functional Status    Usual Activity Tolerance  good    Current Activity Tolerance  good    Functional Status Comments  Independent with ADL       Functional Status, IADL    Medications  independent    Meal Preparation  independent    Housekeeping  independent    Laundry  independent    Shopping  independent    IADL Comments  Spouse, Iveth assists as needed        Psychosocial    No documentation.       Abuse/Neglect    No documentation.       Legal    No documentation.       Substance Abuse    No documentation.       Patient Forms    No documentation.           Meera Valdivia RN

## 2019-01-16 NOTE — THERAPY EVALUATION
Acute Care - Physical Therapy Initial Evaluation  [facility]     Patient Name: Rod Balderas  : 1950  MRN: [id number]  Today's Date: 2019      Date of Referral to PT: 01/15/19  Referring Physician: Dr Carolina      Admit Date: 1/15/2019    Visit Dx:     ICD-10-CM ICD-9-CM   1. Essential hypertension I10 401.9   2. Prostate cancer (CMS/HCC) C61 185     Patient Active Problem List   Diagnosis   • Degenerative disc disease, lumbar   • Spinal stenosis, lumbar region, with neurogenic claudication   • Neuroforaminal stenosis of spine   • Lumbar facet arthropathy   • Presence of stent in coronary artery in patient with coronary artery disease   • Essential hypertension   • Physical deconditioning   • Morbid obesity due to excess calories (CMS/HCC)   • TANIYA on CPAP   • Displacement of lumbar intervertebral disc   • Dyslipidemia   • Osteoarthritis   • At high risk for falls   • Prostate cancer , s/p prostatectomy ( RALP)   • DM (diabetes mellitus), type 2 (CMS/HCC)     Past Medical History:   Diagnosis Date   • Anxiety and depression    • Cancer (CMS/HCC)    • Chest pain    • Coronary artery disease    • Diabetes mellitus (CMS/HCC)    • Dyslipidemia    • Extremity pain    • Heart disease    • History of transfusion    • Hypertension    • Low back pain    • TANIYA on CPAP     2-3    • Osteoarthritis     Diffuse osteoarthritis.   • Panic attacks    • Prostate cancer (CMS/HCC)      Past Surgical History:   Procedure Laterality Date   • CARDIAC CATHETERIZATION     • CHOLECYSTECTOMY     • COLONOSCOPY     • CORONARY ANGIOPLASTY WITH STENT PLACEMENT  2012:  A 3.5 x 88 Promus VERONICA stent to OM2.  Nonobstructive disease.  Otherwise normal LVEF.   • EYE SURGERY Bilateral     cataract   • PROSTATECTOMY N/A 1/15/2019    Procedure: ROBOTIC PROSTATECTOMY WITH NODES;  Surgeon: Juanito Grace Jr., MD;  Location: [facility];  Service: [facility]   • RETINAL DETACHMENT SURGERY Right         PT ASSESSMENT  (last 12 hours)      Physical Therapy Evaluation     Row Name 01/16/19 0850          PT Evaluation Time/Intention    Subjective Information  no complaints  -BD     Document Type  evaluation  -BD     Mode of Treatment  physical therapy  -BD     Patient Effort  good  -BD     Symptoms Noted During/After Treatment  none  -BD     Row Name 01/16/19 0850          General Information    Patient Profile Reviewed?  yes  -BD     Referring Physician  Dr Carolina  -BD     Patient Observations  alert;cooperative;agree to therapy  -BD     Patient/Family Observations  Pt's wife present at bedside  -BD     General Observations of Patient  Pt UIC, cooperative, Christina, IV, telem  -BD     Prior Level of Function  independent:  -BD     Equipment Currently Used at Home  cane, straight;orthotic device;other (see comments) C-PAP  -BD     Pertinent History of Current Functional Problem  Pt admitted for surgical management of Prostate CA  -BD     Existing Precautions/Restrictions  fall  -BD     Limitations/Impairments  other (see comments) Drop foot L foot. Use AFO when up  -BD     Risks Reviewed  patient and family:;LOB;nausea/vomiting;dizziness  -BD     Benefits Reviewed  patient and family:;improve function  -BD     Barriers to Rehab  none identified  -BD     Row Name 01/16/19 0850          Relationship/Environment    Primary Source of Support/Comfort  spouse  -BD     Name of Support/Comfort Primary Source  Iveth  -BD     Lives With  spouse  -BD     Name(s) of Who Lives With Patient  Iveth  -BD     Family Caregiver if Needed  spouse  -BD     Primary Roles/Responsibilities  retired  -BD     Row Name 01/16/19 0850          Resource/Environmental Concerns    Current Living Arrangements  home/apartment/condo  -BD     Resource/Environmental Concerns  none  -BD     Row Name 01/16/19 0850          Cognitive Assessment/Intervention- PT/OT    Orientation Status (Cognition)  oriented x 4  -BD     Follows Commands (Cognition)  follows one step  commands;over 90% accuracy  -BD     Row Name 01/16/19 0850          Bed Mobility Assessment/Treatment    Comment (Bed Mobility)  Pt UIC  -BD     Row Name 01/16/19 0850          Transfer Assessment/Treatment    Transfer Assessment/Treatment  sit-stand transfer;stand-sit transfer  -BD     Maintains Weight-bearing Status (Transfers)  able to maintain  -BD     Sit-Stand Rudolph (Transfers)  contact guard;1 person assist;1 person to manage equipment  -BD     Stand-Sit Rudolph (Transfers)  contact guard;1 person assist;1 person to manage equipment  -BD     Row Name 01/16/19 0850          Sit-Stand Transfer    Assistive Device (Sit-Stand Transfers)  walker, front-wheeled  -BD     Row Name 01/16/19 0850          Stand-Sit Transfer    Assistive Device (Stand-Sit Transfers)  walker, front-wheeled  -BD     Row Name 01/16/19 0850          Gait/Stairs Assessment/Training    62142 - Gait Training Minutes   20  -BD     Gait/Stairs Assessment/Training  gait/ambulation independence;gait/ambulation assistive device;distance ambulated;gait pattern  -BD     Rudolph Level (Gait)  contact guard;set up;verbal cues  -BD     Assistive Device (Gait)  walker, front-wheeled  -BD     Distance in Feet (Gait)  400  -BD     Pattern (Gait)  step-through  -BD     Deviations/Abnormal Patterns (Gait)  other (see comments) drop foot L, Use AFO  -BD     Bilateral Gait Deviations  forward flexed posture;heel strike decreased  -BD     Maintains Weight-bearing Status (Gait)  able to maintain  -BD     Row Name 01/16/19 0850          General ROM    GENERAL ROM COMMENTS  WFL Bilateral LE's  -BD     Row Name 01/16/19 0850          MMT (Manual Muscle Testing)    General MMT Comments  Grossly WFL for LE's, 4/5  -BD     Row Name 01/16/19 0850          Motor Assessment/Intervention    Additional Documentation  Therapeutic Exercise (Group);Therapeutic Exercise Interventions (Group)  -BD     Row Name 01/16/19 0850          Therapeutic Exercise     09467 - PT Therapeutic Exercise Minutes  10  -BD     Row Name 01/16/19 0850          Therapeutic Exercise    Lower Extremity Range of Motion (Therapeutic Exercise)  ankle dorsiflexion/plantar flexion, bilateral;ankle dorsiflexion/plantar flexion, left;other (see comments) Worked [facility] pt on exercises to help with L foot/drop foot. HE  -BD     Exercise Type (Therapeutic Exercise)  AROM (active range of motion);resistive exercises  -BD     Position (Therapeutic Exercise)  seated  -BD     Expected Outcome (Therapeutic Exercise)  facilitate normal movement patterns  -BD     Row Name 01/16/19 0850          Sensory Assessment/Intervention    Sensory General Assessment  light touch sensation deficits identified  -     Row Name 01/16/19 0850          Vision Assessment/Intervention    Visual Impairment/Limitations  WFL  -     Row Name 01/16/19 0850          Light Touch Sensation Assessment    Left Lower Extremity: Light Touch Sensation Assessment  moderate impairment, 50 to 74% correct responses  -     Row Name 01/16/19 0850          Pain Assessment    Additional Documentation  Pain Scale: Numbers Pre/Post-Treatment (Group)  -     Row Name 01/16/19 0850          Pain Scale: Numbers Pre/Post-Treatment    Pain Scale: Numbers, Pretreatment  0/10 - no pain  -BD     Pain Scale: Numbers, Post-Treatment  0/10 - no pain  -     Row Name             Wound 01/15/19 0805 Other (See comments) abdomen incision    Wound - Properties Group Date first assessed: 01/15/19  -KH Time first assessed: 0805  -KH Side: Other (See comments)  - Location: abdomen  - Type: incision  -KH    Row Name 01/16/19 0850          Plan of Care Review    Plan of Care Reviewed With  patient;family  -BD     Row Name 01/16/19 0850          Physical Therapy Clinical Impression    Date of Referral to PT  01/15/19  -BD     PT Diagnosis (PT Clinical Impression)  impaired functional mobility  -BD     Patient/Family Goals Statement (PT Clinical Impression)  For  pt to return home and heal. Wife will assist at home.  -BD     Criteria for Skilled Interventions Met (PT Clinical Impression)  yes  -BD     Pathology/Pathophysiology Noted (Describe Specifically for Each System)  musculoskeletal  -BD     Rehab Potential (PT Clinical Summary)  good, to achieve stated therapy goals  -BD     Row Name 01/16/19 0850          Vital Signs    Pre Systolic BP Rehab  144  -BD     Pre Treatment Diastolic BP  84  -BD     Pretreatment Heart Rate (beats/min)  78  -BD     Posttreatment Heart Rate (beats/min)  78  -BD     Pre SpO2 (%)  98  -BD     O2 Delivery Pre Treatment  room air  -BD     O2 Delivery Intra Treatment  room air  -BD     Post SpO2 (%)  97  -BD     O2 Delivery Post Treatment  room air  -BD     Pre Patient Position  Sitting  -BD     Intra Patient Position  Standing  -BD     Post Patient Position  Sitting  -BD     Row Name 01/16/19 0850          Physical Therapy Goals    Bed Mobility Goal Selection (PT)  bed mobility, PT goal 1  -BD     Transfer Goal Selection (PT)  transfer, PT goal 1  -BD     Gait Training Goal Selection (PT)  gait training, PT goal 1  -BD     Row Name 01/16/19 0850          Bed Mobility Goal 1 (PT)    Activity/Assistive Device (Bed Mobility Goal 1, PT)  bed mobility activities, all  -BD     [place] Level/Cues Needed (Bed Mobility Goal 1, PT)  independent  -BD     Time Frame (Bed Mobility Goal 1, PT)  10 days  -BD     Progress/Outcomes (Bed Mobility Goal 1, PT)  goal ongoing  -BD     Row Name 01/16/19 0850          Transfer Goal 1 (PT)    Activity/Assistive Device (Transfer Goal 1, PT)  transfers, all  -BD     [place] Level/Cues Needed (Transfer Goal 1, PT)  supervision required  -BD     Time Frame (Transfer Goal 1, PT)  10 days  -BD     Progress/Outcome (Transfer Goal 1, PT)  goal ongoing  -BD     Row Name 01/16/19 0850          Gait Training Goal 1 (PT)    Activity/Assistive Device (Gait Training Goal 1, PT)  gait (walking locomotion);diminish gait  deviation;decrease fall risk  -BD     [place] Level (Gait Training Goal 1, PT)  independent  -BD     Distance (Gait Goal 1, PT)  400  -BD     Time Frame (Gait Training Goal 1, PT)  10 days  -BD     Progress/Outcome (Gait Training Goal 1, PT)  goal ongoing  -BD     Row Name 01/16/19 0850          Patient Education Goal (PT)    Activity (Patient Education Goal, PT)  Pt and family to understand the importance of daily ROM fo rL LE and safety with ambulation  -BD     [place]/Cues/Accuracy (Memory Goal 2, PT)  demonstrates adequately;verbalizes understanding  -BD     Time Frame (Patient Education Goal, PT)  10 days  -BD     Progress/Outcome (Patient Education Goal, PT)  goal ongoing  -BD     Row Name 01/16/19 0897          Positioning and Restraints    Pre-Treatment Position  sitting in chair/recliner  -BD     Post Treatment Position  chair  -BD     In Chair  notified nsg;reclined;call light within reach;encouraged to call for assist;exit alarm on;with family/caregiver;waffle cushion;legs elevated  -BD       User Key  (r) = Recorded By, (t) = Taken By, (c) = Cosigned By    Initials Name Provider Type    BD Latisha Flores, PT Physical Therapist    Maria Antonia Jaeger, RN Registered Nurse        Physical Therapy Education     Title: PT OT SLP Therapies (In Progress)     Topic: Physical Therapy (In Progress)     Point: Mobility training (In Progress)     Learning Progress Summary           Patient Acceptance, E,D, NR by BD at 1/16/2019 10:28 AM   Family Acceptance, E,D, NR by BD at 1/16/2019 10:28 AM                   Point: Home exercise program (In Progress)     Learning Progress Summary           Patient Acceptance, E,D, NR by BD at 1/16/2019 10:28 AM   Family Acceptance, E,D, NR by BD at 1/16/2019 10:28 AM                   Point: Body mechanics (In Progress)     Learning Progress Summary           Patient Acceptance, E,D, NR by BD at 1/16/2019 10:28 AM   Family Acceptance, E,D, NR by BD at 1/16/2019  10:28 AM                   Point: Precautions (In Progress)     Learning Progress Summary           Patient Acceptance, E,D, NR by KENDALL at 1/16/2019 10:28 AM   Family Acceptance, E,D, NR by KENDALL at 1/16/2019 10:28 AM                               User Key     Initials Effective Dates Name Provider Type Discipline     06/08/18 -  Latisha Flores, PT Physical Therapist PT              PT Recommendation and Plan  Anticipated Discharge Disposition (PT): home with assist  Planned Therapy Interventions (PT Eval): bed mobility training, gait training, home exercise program, transfer training  Therapy Frequency (PT Clinical Impression): daily  Outcome Summary/Treatment Plan (PT)  Anticipated Discharge Disposition (PT): home with assist  Plan of Care Reviewed With: patient, spouse  Progress: improving  Outcome Summary: Pt is moving well, understands HEP. Safety assist needed with gait and transfers at this time. Pt's spouse present and assisting pt.  Outcome Measures     Row Name 01/16/19 0850             How much help from another person do you currently need...    Turning from your back to your side while in flat bed without using bedrails?  4  -BD      Moving from lying on back to sitting on the side of a flat bed without bedrails?  4  -BD      Moving to and from a bed to a chair (including a wheelchair)?  3  -BD      Standing up from a chair using your arms (e.g., wheelchair, bedside chair)?  3  -BD      Climbing 3-5 steps with a railing?  2  -BD      To walk in hospital room?  3  -BD      AM-PAC 6 Clicks Score  19  -BD         Functional Assessment    Outcome Measure Options  AM-PAC 6 Clicks Basic Mobility (PT)  -BD        User Key  (r) = Recorded By, (t) = Taken By, (c) = Cosigned By    Initials Name Provider Type    BD Latisha Flores, PT Physical Therapist         Time Calculation:   PT Charges     Row Name 01/16/19 1031 01/16/19 0850          Time Calculation    Start Time  0850  -  --     PT Received On   01/16/19  -  --     PT Goal Re-Cert Due Date  01/26/19  -  --        Time Calculation- PT    Total Timed Code Minutes- PT  30 minute(s)  -BD  --        Timed Charges    90471 - PT Therapeutic Exercise Minutes  --  10  -BD     92610 - Gait Training Minutes   --  20  -BD       User Key  (r) = Recorded By, (t) = Taken By, (c) = Cosigned By    Initials Name Provider Type    Latisha Pantoja, PT Physical Therapist        Therapy Suggested Charges     Code   Minutes Charges    53616 (CPT®) Hc Pt Neuromusc Re Education Ea 15 Min      99730 (CPT®) Hc Pt Ther Proc Ea 15 Min 10 1    07143 (CPT®) Hc Gait Training Ea 15 Min 20 1    98601 (CPT®) Hc Pt Therapeutic Act Ea 15 Min      48748 (CPT®) Hc Pt Manual Therapy Ea 15 Min      63475 (CPT®) Hc Pt Iontophoresis Ea 15 Min      02619 (CPT®) Hc Pt Elec Stim Ea-Per 15 Min      67492 (CPT®) Hc Pt Ultrasound Ea 15 Min      00135 (CPT®) Hc Pt Self Care/Mgmt/Train Ea 15 Min      55665 (CPT®) Hc Pt Prosthetic (S) Train Initial Encounter, Each 15 Min      78952 (CPT®) Hc Pt Orthotic(S)/Prosthetic(S) Encounter, Each 15 Min      60658 (CPT®) Hc Orthotic(S) Mgmt/Train Initial Encounter, Each 15min      Total  30 2        Therapy Charges for Today     Code Description Service Date Service Provider Modifiers Qty    61950062010 HC PT THER PROC EA 15 MIN 1/16/2019 Latisha Flores, PT GP 1    32600351707 HC GAIT TRAINING EA 15 MIN 1/16/2019 Latisha Flores, PT GP 1    99581002817 HC PT EVAL MOD COMPLEXITY 2 1/16/2019 Latisha Flores, PT GP 1    94928094568 HC PT THERAPEUTIC ACT EA 15 MIN 1/16/2019 Latisha Flores, PT GP 1          PT G-Codes  Outcome Measure Options: AM-PAC 6 Clicks Basic Mobility (PT)  AM-PAC 6 Clicks Score: 19      Latisha Flores, PT  1/16/2019

## 2019-01-16 NOTE — PLAN OF CARE
Problem: Patient Care Overview  Goal: Plan of Care Review  Outcome: Ongoing (interventions implemented as appropriate)  Patient up and walking using a cane, patient states pain controlled and passing flatus

## 2019-01-16 NOTE — PLAN OF CARE
Problem: Patient Care Overview  Goal: Plan of Care Review  Outcome: Ongoing (interventions implemented as appropriate)   01/16/19 4311   Coping/Psychosocial   Plan of Care Reviewed With patient   Plan of Care Review   Progress improving   OTHER   Outcome Summary Pt walking, using IS and chewing gum.        Problem: Skin Injury Risk (Adult)  Goal: Identify Related Risk Factors and Signs and Symptoms  Outcome: Ongoing (interventions implemented as appropriate)    Goal: Skin Health and Integrity  Outcome: Ongoing (interventions implemented as appropriate)

## 2019-01-17 VITALS
HEART RATE: 65 BPM | SYSTOLIC BLOOD PRESSURE: 144 MMHG | RESPIRATION RATE: 18 BRPM | WEIGHT: 315 LBS | TEMPERATURE: 97.4 F | DIASTOLIC BLOOD PRESSURE: 72 MMHG | HEIGHT: 69 IN | OXYGEN SATURATION: 97 % | BODY MASS INDEX: 46.65 KG/M2

## 2019-01-17 PROBLEM — G89.18 ACUTE POSTOPERATIVE PAIN: Status: ACTIVE | Noted: 2019-01-17

## 2019-01-17 PROBLEM — D62 ACUTE BLOOD LOSS ANEMIA: Status: ACTIVE | Noted: 2019-01-17

## 2019-01-17 PROBLEM — D72.829 LEUKOCYTOSIS: Status: ACTIVE | Noted: 2019-01-17

## 2019-01-17 LAB
CYTO UR: NORMAL
GLUCOSE BLDC GLUCOMTR-MCNC: 96 MG/DL (ref 70–130)
LAB AP CASE REPORT: NORMAL
LAB AP CLINICAL INFORMATION: NORMAL
LAB AP DIAGNOSIS COMMENT: NORMAL
PATH REPORT.FINAL DX SPEC: NORMAL
PATH REPORT.GROSS SPEC: NORMAL

## 2019-01-17 PROCEDURE — 82962 GLUCOSE BLOOD TEST: CPT

## 2019-01-17 PROCEDURE — 94660 CPAP INITIATION&MGMT: CPT

## 2019-01-17 PROCEDURE — 94799 UNLISTED PULMONARY SVC/PX: CPT

## 2019-01-17 RX ADMIN — DULOXETINE HYDROCHLORIDE 60 MG: 60 CAPSULE, DELAYED RELEASE ORAL at 09:40

## 2019-01-17 RX ADMIN — ACETAMINOPHEN 650 MG: 325 TABLET ORAL at 06:05

## 2019-01-17 RX ADMIN — PANTOPRAZOLE SODIUM 40 MG: 40 TABLET, DELAYED RELEASE ORAL at 06:05

## 2019-01-17 RX ADMIN — AMLODIPINE BESYLATE 5 MG: 5 TABLET ORAL at 09:40

## 2019-01-17 RX ADMIN — GABAPENTIN 600 MG: 300 CAPSULE ORAL at 06:05

## 2019-01-17 RX ADMIN — LISINOPRIL: 10 TABLET ORAL at 09:39

## 2019-01-17 NOTE — PLAN OF CARE
Problem: Patient Care Overview  Goal: Plan of Care Review  Outcome: Ongoing (interventions implemented as appropriate)  Pt walking frequently. Using IC and has good pa in control

## 2019-01-17 NOTE — DISCHARGE SUMMARY
Patient Name: Rod Balderas  MRN: [id number]  : 1950  DOS: 2019    Attending: No att. providers found    Primary Care Provider: Tyrese Leyva MD    Date of Admission:.1/15/2019  5:43 AM    Date of Discharge:  2019    Discharge Diagnosis:     Prostate cancer , s/p prostatectomy ( RALP)    Spinal stenosis, lumbar region, with neurogenic claudication    Presence of stent in coronary artery in patient with coronary artery disease    Essential hypertension    Morbid obesity      TANIYA on CPAP    DM (diabetes mellitus), type 2 (CMS/HCC)    Leukocytosis, mild, likely reactive    Acute postoperative pain    Acute blood loss anemia, mild, asymptomatic      Hospital Course  Patient is a 68 y.o. male presented for scheduled surgery by Dr.Slabaugh black. He was diagnosed with localized prostate cancer on biopsy in November.  He discussed options with Dr. Sanjay Black and decided to proceed with prostatectomy.  He underwent robotic-assisted laparoscopic radical prostatectomy under general anesthesia, tolerated procedure well, was admitted for further management.  TAP Block was placed by anesthesia.    He has history of coronary artery disease with previous stents placed by Dr. Lew.  No recent angina or anginal equivalent.    He has known obstructive sleep apnea and he uses his CPAP at night.  He has history of low back pain related to spinal stenosis, is followed by pain management for that, he has left foot drop for which he uses an orthotic device.  He also uses a quad cane with ambulation.    He was provided pain medication as needed for pain control, he received DVT prophylaxis with subcutaneous Lovenox as well as mechanicals.    Adjustments were made to pain medications to optimize postop pain management. Risks and benefits of opiate medications discussed with patient.     His home medications were resumed as appropriate, he was started on clear liquid diet until he had evidence of bowel function  return at which point regular diet was introduced.     He was provided a bowel regimen and was encouraged to ambulate frequently which he did.  During his stay he passed flatus he tolerated his by mouth diet well.     He had a HAYDE drain postoperatively which has since been removed. He continues to have a Christina catheter in place which he will keep attached to a leg bag until his follow-up appointment with urology.        Procedures Performed  1/15/2019     Pre-op Diagnosis:   Prostate cancer     Post-op Diagnosis:     Prostate cancer     Procedure/CPT® Codes:     Procedure(s):  ROBOTIC PROSTATECTOMY WITH NODES     Surgeon(s):  Juanito Grace Jr., MD    Pertinent Test Results:    I reviewed the patient's new clinical results.   Results from last 7 days   Lab Units 01/16/19  0647   WBC 10*3/mm3 13.74*   HEMOGLOBIN g/dL 12.6*   HEMATOCRIT % 37.9*   PLATELETS 10*3/mm3 235     Results for JESSE YUSUF (MRN [id number]) as of 1/17/2019 15:05   Ref. Range 1/8/2019 15:24   Hemoglobin Latest Ref Range: 13.1 - 17.5 g/dL 14.1   Hematocrit Latest Ref Range: 38.9 - 50.9 % 42.2     Results from last 7 days   Lab Units 01/16/19  0647 01/15/19  0710   SODIUM mmol/L 132  --    POTASSIUM mmol/L 4.0 3.7   CHLORIDE mmol/L 102  --    CO2 mmol/L 25.0  --    BUN mg/dL 16  --    CREATININE mg/dL 0.74  --    CALCIUM mg/dL 8.8  --    GLUCOSE mg/dL 149*  --      Results for JESSE YUSUF (MRN [id number]) as of 1/17/2019 15:05   Ref. Range 1/16/2019 11:27 1/16/2019 16:44 1/16/2019 19:48 1/17/2019 07:24   Glucose Latest Ref Range: 70 - 130 mg/dL 117 122 138 (H) 96     I reviewed the patient's new imaging including images and reports.      Physical therapy: Pt is moving well, understands HEP. Safety assist needed with gait and transfers at this time. Pt's spouse present and assisting pt.    Discharge Assessment:    Vital Signs  Visit Vitals  /72 (BP Location: Right arm, Patient Position: Lying)   Pulse 65   Temp 97.4 °F (36.3 °C)  "(Oral)   Resp 18   Ht 175 cm (68.9\")   Wt (!) 157 kg (346 lb 2 oz)   SpO2 97%   BMI 51.26 kg/m²     No data recorded.      General Appearance:    Alert, cooperative, in no acute distress   Lungs:     Clear to auscultation,respirations regular, even and                   unlabored    Heart:    Regular rhythm and normal rate, normal S1 and S2   Abdomen:     Soft, obese, clean incisions.Dressing over prior HAYDE site.. Mild distention.   Extremities:   Moves all extremities well, no edema, no cyanosis, no              redness   Pulses:   Pulses palpable and equal bilaterally   Skin:   No bleeding, bruising or rash   Neurologic:   Cranial nerves 2 - 12 grossly intact, sensation intact       Discharge Disposition: Home    Discharge Medications     Discharge Medications      New Medications      Instructions Start Date   CLEARLAX powder  Generic drug:  polyethylene glycol   17 g, Oral, Daily      HYDROcodone-acetaminophen 7.5-325 MG per tablet  Commonly known as:  NORCO   1 tablet, Oral, Every 4 Hours PRN      nitrofurantoin (macrocrystal-monohydrate) 100 MG capsule  Commonly known as:  MACROBID   100 mg, Oral, 2 Times Daily      STOOL SOFTENER 100 MG capsule  Generic drug:  docusate sodium   1 capsule, Oral, 2 Times Daily         Changes to Medications      Instructions Start Date   ibuprofen 800 MG tablet  Commonly known as:  YASHIRA QUINN  What changed:  These instructions start on 1/22/2019. If you are unsure what to do until then, ask your doctor or other care provider.   800 mg, Oral, 3 Times Daily PRN   Start Date:  1/22/2019        Continue These Medications      Instructions Start Date   amLODIPine 5 MG tablet  Commonly known as:  NORVASC   5 mg, Oral, Daily      aspirin 325 MG tablet   325 mg, Oral, Daily      DULoxetine 60 MG capsule  Commonly known as:  CYMBALTA   60 mg, Oral, Daily      fluticasone 50 MCG/ACT nasal spray  Commonly known as:  FLONASE   USE 2 SPRAYS IN EACH NOSTRIL DAILY      gabapentin 600 MG " tablet  Commonly known as:  NEURONTIN   600 mg, Oral, 4 Times Daily      lisinopril-hydrochlorothiazide 20-12.5 MG per tablet  Commonly known as:  PRINZIDE,ZESTORETIC   1 tablet, Oral, Daily      metFORMIN 500 MG tablet  Commonly known as:  GLUCOPHAGE   500 mg, Oral, 2 Times Daily With Meals      NITROSTAT 0.4 MG SL tablet  Generic drug:  nitroglycerin   Sublingual             Discharge Diet: soft, bland diet    Activity at Discharge: ambulate    Follow-up Appointments  Dr. Sanjay Black per his orders    Discussed with pt, and  Wife.    I have personally performed the evaluation on this patient. My history is consistant  with above documentation . My exam finding are listed above. I have personally reviewed and discussed the above formulated discharge plan with patient and [place] Carolina MD  01/18/19  5:18 PM

## 2019-01-17 NOTE — PLAN OF CARE
Problem: Patient Care Overview  Goal: Plan of Care Review  Outcome: Ongoing (interventions implemented as appropriate)   01/17/19 0233   Coping/Psychosocial   Plan of Care Reviewed With patient   Plan of Care Review   Progress improving   OTHER   Outcome Summary patient ambulating in halls with his wife. patient still has FC draining ty colored urine. patient tolerating RA. wearing cpap at hs. will continue to monitor.

## 2019-01-17 NOTE — PROGRESS NOTES
Case Management Discharge Note    Final Note: Met with patient and spouse. Ready to go home.  No D/C needs    Destination      No service has been selected for the patient.      Durable Medical Equipment      No service has been selected for the patient.      Dialysis/Infusion      No service has been selected for the patient.      Home Medical Care      No service has been selected for the patient.      Community Resources      No service has been selected for the patient.             Final Discharge Disposition Code: 01 - home or self-care

## 2019-02-08 ENCOUNTER — APPOINTMENT (OUTPATIENT)
Dept: NEPHROLOGY | Facility: HOSPITAL | Age: 69
End: 2019-02-08

## 2019-02-08 ENCOUNTER — APPOINTMENT (OUTPATIENT)
Dept: CT IMAGING | Facility: HOSPITAL | Age: 69
End: 2019-02-08

## 2019-02-08 ENCOUNTER — APPOINTMENT (OUTPATIENT)
Dept: GENERAL RADIOLOGY | Facility: HOSPITAL | Age: 69
End: 2019-02-08

## 2019-02-08 ENCOUNTER — ANESTHESIA EVENT (OUTPATIENT)
Dept: PERIOP | Facility: HOSPITAL | Age: 69
End: 2019-02-08

## 2019-02-08 ENCOUNTER — HOSPITAL ENCOUNTER (INPATIENT)
Facility: HOSPITAL | Age: 69
LOS: 16 days | Discharge: NURSING, REHAB OR LONG-TERM CARE | End: 2019-02-24
Attending: INTERNAL MEDICINE | Admitting: INTERNAL MEDICINE

## 2019-02-08 ENCOUNTER — ANESTHESIA (OUTPATIENT)
Dept: PERIOP | Facility: HOSPITAL | Age: 69
End: 2019-02-08

## 2019-02-08 DIAGNOSIS — Z78.9 IMPAIRED MOBILITY AND ADLS: ICD-10-CM

## 2019-02-08 DIAGNOSIS — Z74.09 IMPAIRED MOBILITY AND ADLS: ICD-10-CM

## 2019-02-08 DIAGNOSIS — Z74.09 IMPAIRED FUNCTIONAL MOBILITY, BALANCE, GAIT, AND ENDURANCE: Primary | ICD-10-CM

## 2019-02-08 PROBLEM — G93.41 METABOLIC ENCEPHALOPATHY: Status: ACTIVE | Noted: 2019-02-08

## 2019-02-08 PROBLEM — N17.9 ACUTE RENAL FAILURE (ARF) (HCC): Status: ACTIVE | Noted: 2019-02-08

## 2019-02-08 LAB
ALBUMIN SERPL-MCNC: 2.95 G/DL (ref 3.2–4.8)
ALBUMIN/GLOB SERPL: 1 G/DL (ref 1.5–2.5)
ALP SERPL-CCNC: 303 U/L (ref 25–100)
ALT SERPL W P-5'-P-CCNC: 43 U/L (ref 7–40)
AMMONIA BLD-SCNC: 62 UMOL/L (ref 19–60)
ANION GAP SERPL CALCULATED.3IONS-SCNC: 10 MMOL/L (ref 3–11)
APTT PPP: 36.8 SECONDS (ref 24–37)
ARTERIAL PATENCY WRIST A: ABNORMAL
AST SERPL-CCNC: 43 U/L (ref 0–33)
ATMOSPHERIC PRESS: ABNORMAL MMHG
BACTERIA UR QL AUTO: ABNORMAL /HPF
BASE EXCESS BLDA CALC-SCNC: -8.4 MMOL/L (ref 0–2)
BASOPHILS # BLD MANUAL: 0 10*3/MM3 (ref 0–0.2)
BASOPHILS NFR BLD AUTO: 0 % (ref 0–1)
BDY SITE: ABNORMAL
BILIRUB SERPL-MCNC: 3 MG/DL (ref 0.3–1.2)
BILIRUB UR QL STRIP: ABNORMAL
BODY TEMPERATURE: 37 C
BUN BLD-MCNC: 97 MG/DL (ref 9–23)
BUN/CREAT SERPL: 18 (ref 7–25)
CALCIUM SPEC-SCNC: 8.5 MG/DL (ref 8.7–10.4)
CHLORIDE SERPL-SCNC: 100 MMOL/L (ref 99–109)
CLARITY UR: ABNORMAL
CO2 BLDA-SCNC: 19 MMOL/L (ref 23–27)
CO2 SERPL-SCNC: 17 MMOL/L (ref 20–31)
COHGB MFR BLD: 1.4 % (ref 0–2)
COLOR UR: ABNORMAL
CREAT BLD-MCNC: 5.39 MG/DL (ref 0.6–1.3)
CREAT UR-MCNC: 89.2 MG/DL
D-LACTATE SERPL-SCNC: 0.8 MMOL/L (ref 0.5–2)
DEPRECATED RDW RBC AUTO: 46.2 FL (ref 37–54)
EOSINOPHIL # BLD MANUAL: 0.31 10*3/MM3 (ref 0.1–0.3)
EOSINOPHIL NFR BLD MANUAL: 1 % (ref 0–3)
EPAP: 0
ERYTHROCYTE [DISTWIDTH] IN BLOOD BY AUTOMATED COUNT: 15.6 % (ref 11.3–14.5)
GFR SERPL CREATININE-BSD FRML MDRD: 11 ML/MIN/1.73
GLOBULIN UR ELPH-MCNC: 3 GM/DL
GLUCOSE BLD-MCNC: 146 MG/DL (ref 70–100)
GLUCOSE BLDC GLUCOMTR-MCNC: 119 MG/DL (ref 70–130)
GLUCOSE BLDC GLUCOMTR-MCNC: 127 MG/DL (ref 70–130)
GLUCOSE UR STRIP-MCNC: NEGATIVE MG/DL
HAV IGM SERPL QL IA: NORMAL
HBV CORE IGM SERPL QL IA: NORMAL
HBV SURFACE AG SERPL QL IA: NORMAL
HCO3 BLDA-SCNC: 17.8 MMOL/L (ref 20–26)
HCT VFR BLD AUTO: 34.1 % (ref 38.9–50.9)
HCT VFR BLD CALC: 33.8 %
HCV AB SER DONR QL: NORMAL
HGB BLD-MCNC: 11.3 G/DL (ref 13.1–17.5)
HGB BLDA-MCNC: 11 G/DL (ref 13.5–17.5)
HGB UR QL STRIP.AUTO: ABNORMAL
HOROWITZ INDEX BLD+IHG-RTO: 32 %
HYALINE CASTS UR QL AUTO: ABNORMAL /LPF
INR PPP: 1.23 (ref 0.85–1.16)
IPAP: 0
KETONES UR QL STRIP: ABNORMAL
LEUKOCYTE ESTERASE UR QL STRIP.AUTO: ABNORMAL
LYMPHOCYTES # BLD MANUAL: 1.57 10*3/MM3 (ref 0.6–4.8)
LYMPHOCYTES NFR BLD MANUAL: 4 % (ref 0–12)
LYMPHOCYTES NFR BLD MANUAL: 5 % (ref 24–44)
MCH RBC QN AUTO: 26.8 PG (ref 27–31)
MCHC RBC AUTO-ENTMCNC: 33.1 G/DL (ref 32–36)
MCV RBC AUTO: 81 FL (ref 80–99)
METAMYELOCYTES NFR BLD MANUAL: 1 % (ref 0–0)
METHGB BLD QL: 0.1 % (ref 0–1.5)
MODALITY: ABNORMAL
MONOCYTES # BLD AUTO: 1.26 10*3/MM3 (ref 0–1)
NEUTROPHILS # BLD AUTO: 27.93 10*3/MM3 (ref 1.5–8.3)
NEUTROPHILS NFR BLD MANUAL: 77 % (ref 41–71)
NEUTS BAND NFR BLD MANUAL: 12 % (ref 0–5)
NITRITE UR QL STRIP: POSITIVE
NOTE: ABNORMAL
OXYHGB MFR BLDV: 95.2 % (ref 94–99)
PAW @ PEAK INSP FLOW SETTING VENT: 0 CMH2O
PCO2 BLDA: 38.4 MM HG
PCO2 TEMP ADJ BLD: 38.4 MM HG (ref 35–48)
PH BLDA: 7.28 PH UNITS (ref 7.35–7.45)
PH UR STRIP.AUTO: 6.5 [PH] (ref 5–8)
PH, TEMP CORRECTED: 7.28 PH UNITS
PLAT MORPH BLD: NORMAL
PLATELET # BLD AUTO: 180 10*3/MM3 (ref 150–450)
PMV BLD AUTO: 9.3 FL (ref 6–12)
PO2 BLDA: 85 MM HG (ref 83–108)
PO2 TEMP ADJ BLD: 85 MM HG (ref 83–108)
POTASSIUM BLD-SCNC: 4.5 MMOL/L (ref 3.5–5.5)
PROCALCITONIN SERPL-MCNC: 7.55 NG/ML
PROT SERPL-MCNC: 5.9 G/DL (ref 5.7–8.2)
PROT UR QL STRIP: ABNORMAL
PROTHROMBIN TIME: 14.9 SECONDS (ref 11.2–14.3)
RBC # BLD AUTO: 4.21 10*6/MM3 (ref 4.2–5.76)
RBC # UR: ABNORMAL /HPF
RBC MORPH BLD: NORMAL
REF LAB TEST METHOD: ABNORMAL
SODIUM BLD-SCNC: 127 MMOL/L (ref 132–146)
SODIUM UR-SCNC: 20 MMOL/L (ref 30–90)
SP GR UR STRIP: 1.01 (ref 1–1.03)
SQUAMOUS #/AREA URNS HPF: ABNORMAL /HPF
TOTAL RATE: 0 BREATHS/MINUTE
UROBILINOGEN UR QL STRIP: ABNORMAL
WBC MORPH BLD: NORMAL
WBC NRBC COR # BLD: 31.38 10*3/MM3 (ref 3.5–10.8)
WBC UR QL AUTO: ABNORMAL /HPF

## 2019-02-08 PROCEDURE — 82962 GLUCOSE BLOOD TEST: CPT

## 2019-02-08 PROCEDURE — 74176 CT ABD & PELVIS W/O CONTRAST: CPT

## 2019-02-08 PROCEDURE — 71045 X-RAY EXAM CHEST 1 VIEW: CPT

## 2019-02-08 PROCEDURE — 94640 AIRWAY INHALATION TREATMENT: CPT

## 2019-02-08 PROCEDURE — P9047 ALBUMIN (HUMAN), 25%, 50ML: HCPCS | Performed by: INTERNAL MEDICINE

## 2019-02-08 PROCEDURE — 76000 FLUOROSCOPY <1 HR PHYS/QHP: CPT

## 2019-02-08 PROCEDURE — 81001 URINALYSIS AUTO W/SCOPE: CPT | Performed by: INTERNAL MEDICINE

## 2019-02-08 PROCEDURE — 80074 ACUTE HEPATITIS PANEL: CPT | Performed by: INTERNAL MEDICINE

## 2019-02-08 PROCEDURE — 94799 UNLISTED PULMONARY SVC/PX: CPT

## 2019-02-08 PROCEDURE — 5A1D70Z PERFORMANCE OF URINARY FILTRATION, INTERMITTENT, LESS THAN 6 HOURS PER DAY: ICD-10-PCS | Performed by: INTERNAL MEDICINE

## 2019-02-08 PROCEDURE — 82805 BLOOD GASES W/O2 SATURATION: CPT

## 2019-02-08 PROCEDURE — 84145 PROCALCITONIN (PCT): CPT | Performed by: INTERNAL MEDICINE

## 2019-02-08 PROCEDURE — C1750 CATH, HEMODIALYSIS,LONG-TERM: HCPCS | Performed by: SURGERY

## 2019-02-08 PROCEDURE — 82570 ASSAY OF URINE CREATININE: CPT | Performed by: INTERNAL MEDICINE

## 2019-02-08 PROCEDURE — 25010000002 HEPARIN (PORCINE) PER 1000 UNITS: Performed by: INTERNAL MEDICINE

## 2019-02-08 PROCEDURE — 85007 BL SMEAR W/DIFF WBC COUNT: CPT | Performed by: INTERNAL MEDICINE

## 2019-02-08 PROCEDURE — 87086 URINE CULTURE/COLONY COUNT: CPT | Performed by: INTERNAL MEDICINE

## 2019-02-08 PROCEDURE — 25010000002 HEPARIN (PORCINE) PER 1000 UNITS: Performed by: SURGERY

## 2019-02-08 PROCEDURE — 94660 CPAP INITIATION&MGMT: CPT

## 2019-02-08 PROCEDURE — 0JH63XZ INSERTION OF TUNNELED VASCULAR ACCESS DEVICE INTO CHEST SUBCUTANEOUS TISSUE AND FASCIA, PERCUTANEOUS APPROACH: ICD-10-PCS | Performed by: SURGERY

## 2019-02-08 PROCEDURE — 85730 THROMBOPLASTIN TIME PARTIAL: CPT | Performed by: INTERNAL MEDICINE

## 2019-02-08 PROCEDURE — 87077 CULTURE AEROBIC IDENTIFY: CPT | Performed by: INTERNAL MEDICINE

## 2019-02-08 PROCEDURE — 80053 COMPREHEN METABOLIC PANEL: CPT | Performed by: INTERNAL MEDICINE

## 2019-02-08 PROCEDURE — 84300 ASSAY OF URINE SODIUM: CPT | Performed by: INTERNAL MEDICINE

## 2019-02-08 PROCEDURE — 36600 WITHDRAWAL OF ARTERIAL BLOOD: CPT

## 2019-02-08 PROCEDURE — 85027 COMPLETE CBC AUTOMATED: CPT | Performed by: INTERNAL MEDICINE

## 2019-02-08 PROCEDURE — 25010000003 CEFAZOLIN 1-4 GM/50ML-% SOLUTION

## 2019-02-08 PROCEDURE — 02HV33Z INSERTION OF INFUSION DEVICE INTO SUPERIOR VENA CAVA, PERCUTANEOUS APPROACH: ICD-10-PCS | Performed by: SURGERY

## 2019-02-08 PROCEDURE — 25010000003 CEFAZOLIN PER 500 MG: Performed by: NURSE ANESTHETIST, CERTIFIED REGISTERED

## 2019-02-08 PROCEDURE — 87186 SC STD MICRODIL/AGAR DIL: CPT | Performed by: INTERNAL MEDICINE

## 2019-02-08 PROCEDURE — 87147 CULTURE TYPE IMMUNOLOGIC: CPT | Performed by: INTERNAL MEDICINE

## 2019-02-08 PROCEDURE — 71250 CT THORAX DX C-: CPT

## 2019-02-08 PROCEDURE — 83605 ASSAY OF LACTIC ACID: CPT | Performed by: INTERNAL MEDICINE

## 2019-02-08 PROCEDURE — 63710000001 INSULIN LISPRO (HUMAN) PER 5 UNITS: Performed by: INTERNAL MEDICINE

## 2019-02-08 PROCEDURE — 87040 BLOOD CULTURE FOR BACTERIA: CPT | Performed by: INTERNAL MEDICINE

## 2019-02-08 PROCEDURE — 25010000002 ALBUMIN HUMAN 25% PER 50 ML: Performed by: INTERNAL MEDICINE

## 2019-02-08 PROCEDURE — 82140 ASSAY OF AMMONIA: CPT | Performed by: INTERNAL MEDICINE

## 2019-02-08 PROCEDURE — 70450 CT HEAD/BRAIN W/O DYE: CPT

## 2019-02-08 PROCEDURE — 85610 PROTHROMBIN TIME: CPT | Performed by: INTERNAL MEDICINE

## 2019-02-08 PROCEDURE — 99291 CRITICAL CARE FIRST HOUR: CPT | Performed by: INTERNAL MEDICINE

## 2019-02-08 RX ORDER — SODIUM CHLORIDE 0.9 % (FLUSH) 0.9 %
3-10 SYRINGE (ML) INJECTION AS NEEDED
Status: DISCONTINUED | OUTPATIENT
Start: 2019-02-08 | End: 2019-02-08 | Stop reason: HOSPADM

## 2019-02-08 RX ORDER — DEXTROSE MONOHYDRATE 25 G/50ML
25 INJECTION, SOLUTION INTRAVENOUS
Status: DISCONTINUED | OUTPATIENT
Start: 2019-02-08 | End: 2019-02-08

## 2019-02-08 RX ORDER — ALBUTEROL SULFATE 2.5 MG/3ML
2.5 SOLUTION RESPIRATORY (INHALATION) EVERY 6 HOURS PRN
Status: DISCONTINUED | OUTPATIENT
Start: 2019-02-08 | End: 2019-02-24 | Stop reason: HOSPADM

## 2019-02-08 RX ORDER — SODIUM CHLORIDE 0.9 % (FLUSH) 0.9 %
3-10 SYRINGE (ML) INJECTION AS NEEDED
Status: DISCONTINUED | OUTPATIENT
Start: 2019-02-08 | End: 2019-02-24 | Stop reason: HOSPADM

## 2019-02-08 RX ORDER — SODIUM CHLORIDE 0.9 % (FLUSH) 0.9 %
3 SYRINGE (ML) INJECTION EVERY 12 HOURS SCHEDULED
Status: DISCONTINUED | OUTPATIENT
Start: 2019-02-08 | End: 2019-02-08 | Stop reason: HOSPADM

## 2019-02-08 RX ORDER — CEFAZOLIN SODIUM 1 G/3ML
INJECTION, POWDER, FOR SOLUTION INTRAMUSCULAR; INTRAVENOUS AS NEEDED
Status: DISCONTINUED | OUTPATIENT
Start: 2019-02-08 | End: 2019-02-08 | Stop reason: SURG

## 2019-02-08 RX ORDER — ALBUMIN (HUMAN) 12.5 G/50ML
12.5 SOLUTION INTRAVENOUS AS NEEDED
Status: CANCELLED | OUTPATIENT
Start: 2019-02-09 | End: 2019-02-09

## 2019-02-08 RX ORDER — HEPARIN SODIUM 1000 [USP'U]/ML
1900 INJECTION, SOLUTION INTRAVENOUS; SUBCUTANEOUS AS NEEDED
Status: DISCONTINUED | OUTPATIENT
Start: 2019-02-08 | End: 2019-02-16

## 2019-02-08 RX ORDER — NICOTINE POLACRILEX 4 MG
15 LOZENGE BUCCAL
Status: DISCONTINUED | OUTPATIENT
Start: 2019-02-08 | End: 2019-02-08

## 2019-02-08 RX ORDER — ONDANSETRON 2 MG/ML
4 INJECTION INTRAMUSCULAR; INTRAVENOUS EVERY 6 HOURS PRN
Status: DISCONTINUED | OUTPATIENT
Start: 2019-02-08 | End: 2019-02-24 | Stop reason: HOSPADM

## 2019-02-08 RX ORDER — ALBUMIN (HUMAN) 12.5 G/50ML
12.5 SOLUTION INTRAVENOUS AS NEEDED
Status: CANCELLED | OUTPATIENT
Start: 2019-02-08 | End: 2019-02-09

## 2019-02-08 RX ORDER — SODIUM CHLORIDE 9 MG/ML
9 INJECTION, SOLUTION INTRAVENOUS CONTINUOUS PRN
Status: DISCONTINUED | OUTPATIENT
Start: 2019-02-08 | End: 2019-02-08

## 2019-02-08 RX ORDER — BUPIVACAINE HYDROCHLORIDE AND EPINEPHRINE 5; 5 MG/ML; UG/ML
INJECTION, SOLUTION EPIDURAL; INTRACAUDAL; PERINEURAL AS NEEDED
Status: DISCONTINUED | OUTPATIENT
Start: 2019-02-08 | End: 2019-02-08 | Stop reason: HOSPADM

## 2019-02-08 RX ORDER — ALBUMIN (HUMAN) 12.5 G/50ML
25 SOLUTION INTRAVENOUS AS NEEDED
Status: COMPLETED | OUTPATIENT
Start: 2019-02-08 | End: 2019-02-09

## 2019-02-08 RX ORDER — CEFAZOLIN SODIUM 2 G/100ML
2 INJECTION, SOLUTION INTRAVENOUS ONCE
Status: DISCONTINUED | OUTPATIENT
Start: 2019-02-08 | End: 2019-02-08 | Stop reason: HOSPADM

## 2019-02-08 RX ORDER — FAMOTIDINE 10 MG/ML
20 INJECTION, SOLUTION INTRAVENOUS
Status: DISCONTINUED | OUTPATIENT
Start: 2019-02-08 | End: 2019-02-08 | Stop reason: HOSPADM

## 2019-02-08 RX ORDER — CEFAZOLIN SODIUM 1 G/50ML
1 INJECTION, SOLUTION INTRAVENOUS EVERY 24 HOURS
Status: DISCONTINUED | OUTPATIENT
Start: 2019-02-08 | End: 2019-02-14

## 2019-02-08 RX ORDER — SODIUM CHLORIDE 0.9 % (FLUSH) 0.9 %
3 SYRINGE (ML) INJECTION EVERY 12 HOURS SCHEDULED
Status: DISCONTINUED | OUTPATIENT
Start: 2019-02-08 | End: 2019-02-24 | Stop reason: HOSPADM

## 2019-02-08 RX ADMIN — SODIUM CHLORIDE 9 ML/HR: 9 INJECTION, SOLUTION INTRAVENOUS at 14:16

## 2019-02-08 RX ADMIN — CEFAZOLIN SODIUM 1 G: 1 INJECTION, SOLUTION INTRAVENOUS at 20:13

## 2019-02-08 RX ADMIN — CEFAZOLIN 2 G: 1 INJECTION, POWDER, FOR SOLUTION INTRAVENOUS at 14:42

## 2019-02-08 RX ADMIN — HEPARIN SODIUM 1900 UNITS: 1000 INJECTION, SOLUTION INTRAVENOUS; SUBCUTANEOUS at 17:07

## 2019-02-08 RX ADMIN — ALBUTEROL SULFATE 2.5 MG: 2.5 SOLUTION RESPIRATORY (INHALATION) at 18:38

## 2019-02-08 RX ADMIN — SODIUM CHLORIDE, PRESERVATIVE FREE 3 ML: 5 INJECTION INTRAVENOUS at 20:13

## 2019-02-08 RX ADMIN — ALBUMIN (HUMAN) 25 G: 0.25 INJECTION, SOLUTION INTRAVENOUS at 15:30

## 2019-02-08 RX ADMIN — FAMOTIDINE 20 MG: 10 INJECTION, SOLUTION INTRAVENOUS at 14:15

## 2019-02-08 NOTE — CONSULTS
Consult    Patient Name: Rod Balderas  Medical Record Number: [id number]  YOB: 1950    Date of consultation: 2/8/2019    Referring Provider:Arielle Carolina MD  Reason for Consultation: History of prostate cancer    Patient Care Team:  Tyrese Leyva MD as PCP - General (Family Medicine)  Rod Regalado MD as Consulting Physician (Cardiology)  Myles Rossi MD as Consulting Physician (Anesthesiology)  Juanito Grace Jr., MD as Consulting Physician (Urology)    Chief complaint No chief complaint on file.      Subjective .     History of present illness:    Patient's recent history is as follows:    Patient is a 68 y.o. male who is very pleasant and who is known to me from recent hospitalization mid January for prostatectomy due to prostate cancer.  At that time he was admitted on 1/15/19 underwent robotic laparoscopic prostatectomy, tolerated surgery well, progressed well during the 2 days he spent in the hospital with subsequent ambulation, return of bowel function, tolerance of by mouth diet, subsequently HAYDE drain removal and discharged home.  Following his discharge he did well for several days eventually seen Dr. Sanjay Black about a week after discharge with removal of his Christina catheter that was placed into during surgery.  2 or 3 days after that he started feeling weak and fatigued, a urine culture was ordered through Dr. Sanjay Black's office.  A week ago he was seen in the ER with complaints of back pain and some abdominal discomfort and he was starting to be somewhat confused according to his family.  A CT scan of the head was done at the time, the family believes that was noncontrast CT and was nonrevealing.  His overall condition worsened over the weekend eventually was admitted to [facility] Monday on 2/5/19.  At that time he was diagnosed with acute kidney injury with creatinine of 2.2 and with urinary tract infection with reported he and his  "original urine culture growing \"staph\".  His workup during his stay at [facility] included a renal ultrasound yesterday showing no hydronephrosis and a CT scan done prior to that the report of which I do not have.  At [facility] he did have hematuria and had a 3-way Christina catheter placed, received continuous bladder irrigation, hematuria apparently did improve.  We were contacted due to his worsening renal failure and worsening mental status, his creatinine has crept over the last 3 days to a 5.6 with a BUN of 94 today.  Upon arrival, he is lethargic, awakens briefly and nonstress to talk but unable to verbalize legible speech.  Family reported no fever or chills and no nausea or vomiting.  His appetite has declined significantly over course of a few days.      The patient was nonresponsive when I attempted to be with him at the bedside.  He was currently on dialysis.  I spoke with the family.    Past Medical History:   Diagnosis Date   • Anxiety and depression    • Cancer (CMS/HCC)    • Chest pain    • Coronary artery disease    • Diabetes mellitus (CMS/HCC)    • Dyslipidemia    • Extremity pain    • Heart disease    • History of transfusion    • Hypertension    • Low back pain    • TANIYA on CPAP     2-3    • Osteoarthritis     Diffuse osteoarthritis.   • Panic attacks    • Prostate cancer (CMS/HCC)      Past Surgical History:   Procedure Laterality Date   • CARDIAC CATHETERIZATION     • CHOLECYSTECTOMY     • COLONOSCOPY  2015   • CORONARY ANGIOPLASTY WITH STENT PLACEMENT  12/2012 December 2012:  A 3.5 x 88 Promus VERONICA stent to OM2.  Nonobstructive disease.  Otherwise normal LVEF.   • EYE SURGERY Bilateral     cataract   • PROSTATECTOMY N/A 1/15/2019    Procedure: ROBOTIC PROSTATECTOMY WITH NODES;  Surgeon: Juanito Grace Jr., MD;  Location: [facility];  Service: [facility]   • RETINAL DETACHMENT SURGERY Right      Family History   Problem Relation Age of Onset   • Heart disease Mother    • " Thyroid disease Mother    • Heart disease Father    • Cancer Brother      Social History     Tobacco Use   • Smoking status: Former Smoker   • Smokeless tobacco: Former User   Substance Use Topics   • Alcohol use: No   • Drug use: No     Medications Prior to Admission   Medication Sig Dispense Refill Last Dose   • amLODIPine (NORVASC) 5 MG tablet Take 1 tablet by mouth Daily. 90 tablet 2 1/14/2019   • aspirin 325 MG tablet Take 1 tablet by mouth Daily.      • docusate sodium 100 MG capsule Take 1 capsule by mouth 2 (Two) Times a Day. 30 each 0    • DULoxetine (CYMBALTA) 60 MG capsule Take 60 mg by mouth Daily.   1/14/2019   • fluticasone (FLONASE) 50 MCG/ACT nasal spray USE 2 SPRAYS IN EACH NOSTRIL DAILY  0 More than a month   • gabapentin (NEURONTIN) 600 MG tablet Take 1 tablet by mouth 4 (Four) Times a Day. 120 tablet 5 1/14/2019   • HYDROcodone-acetaminophen (NORCO) 7.5-325 MG per tablet Take 1 tablet by mouth Every 4-6  Hours As Needed for Moderate Pain. 40 tablet 0    • ibuprofen (ADVIL,MOTRIN) 800 MG tablet Take 1 tablet by mouth 3 (Three) Times a Day As Needed for Moderate Pain .      • lisinopril-hydrochlorothiazide (PRINZIDE,ZESTORETIC) 20-12.5 MG per tablet Take 1 tablet by mouth Daily. 90 tablet 2 1/14/2019   • metFORMIN (GLUCOPHAGE) 500 MG tablet Take 500 mg by mouth 2 (Two) Times a Day With Meals.   1/14/2019   • nitrofurantoin, macrocrystal-monohydrate, (MACROBID) 100 MG capsule Take 1 capsule by mouth 2 (Two) Times a Day. 30 capsule 0    • nitroglycerin (NITROSTAT) 0.4 MG SL tablet Place  under the tongue.   More than a month   • polyethylene glycol (MIRALAX) powder Dissolve 1 capful (17g) in water and take by mouth Daily. 510 g 0      Allergies:  Percocet [oxycodone-acetaminophen] and Sulfa antibiotics    Review of Systems  Review of systems could not be obtained due to   patient confusion.    Objective     Vital Signs   /72 (BP Location: Left arm, Patient Position: Lying)   Pulse 87   Temp  "97.2 °F (36.2 °C) (Axillary)   Resp 20   Ht 170.2 cm (67\")   Wt 117 kg (257 lb 3.2 oz)   SpO2 94%   BMI 40.28 kg/m²     Physical Exam:  General Appearance: No apparent distress  Back: No No kyphosis present, no scoliosis present,no tenderness to percussion or Palpation  Lungs: Respirations regular, even and  unlabored  Heart: Regular rhythm and normal rate  Chest Wall: No abnormalities observed  Abdomen: Benign obese  Genital: Christina catheter in place with clear urine  Rectal: Deferred  Skin: No bleeding, bruising or rash  Lymph nodes: No palpable adenopathy      Results Review:  Lab Results (last 24 hours)     Procedure Component Value Units Date/Time    Creatinine, Urine, Random - Urine, Catheter [[id number]] Collected:  02/08/19 1656    Specimen:  Urine, Catheter Updated:  02/08/19 1730     Creatinine, Urine 89.2 mg/dL     Sodium, Urine, Random - Urine, Catheter [[id number]]  (Abnormal) Collected:  02/08/19 1656    Specimen:  Urine, Catheter Updated:  02/08/19 1730     Sodium, Urine 20 mmol/L     Procalcitonin [[id number]]  (Abnormal) Collected:  02/08/19 1240    Specimen:  Blood Updated:  02/08/19 1642     Procalcitonin 7.55 ng/mL     Narrative:       As a Marker for Sepsis (Non-Neonates):   1. <0.5 ng/mL represents a low risk of severe sepsis and/or septic shock.  2. >2 ng/mL represents a high risk of severe sepsis and/or septic shock.    As a Marker for Lower Respiratory Tract Infections that require antibiotic therapy:    PCT on Admission     Antibiotic Therapy       6-12 Hrs later  > 0.5                Strongly Recommended             >0.25 - <0.5         Recommended  0.1 - 0.25           Discouraged              Remeasure/reassess PCT  <0.1                 Strongly Discouraged     Remeasure/reassess PCT                     PCT values of < 0.5 ng/mL do not exclude an infection, because localized infections (without systemic signs) may be associated with such low concentrations, or a systemic infection in " its initial stages (< 6 hours). Furthermore, increased PCT can occur without infection. PCT concentrations between 0.5 and 2.0 ng/mL should be interpreted taking into account the patient's history. It is recommended to retest PCT within 6-24 hours if any concentrations < 2 ng/mL are obtained.    Blood Gas, Arterial With Co-Ox [[id number]]  (Abnormal) Collected:  02/08/19 1540    Specimen:  Arterial Blood Updated:  02/08/19 1541     Site Left Radial     Sam's Test N/A     pH, Arterial 7.276 pH units      Comment: 84 Value below reference range        pCO2, Arterial 38.4 mm Hg      pO2, Arterial 85.0 mm Hg      HCO3, Arterial 17.8 mmol/L      Base Excess, Arterial -8.4 mmol/L      Hemoglobin, Blood Gas 11.0 g/dL      Comment: 84 Value below reference range        Hematocrit, Blood Gas 33.8 %      Oxyhemoglobin 95.2 %      Methemoglobin 0.10 %      Carboxyhemoglobin 1.4 %      CO2 Content 19.0 mmol/L      Temperature 37.0 C      Barometric Pressure for Blood Gas -- mmHg      Comment: N/A        Modality Nasal Cannula     FIO2 32 %      Rate 0 Breaths/minute      PIP 0 cmH2O      Comment: Meter: [id number]     :  [id number]        IPAP 0     EPAP 0     Note --     pH, Temp Corrected 7.276 pH Units      pCO2, Temperature Corrected 38.4 mm Hg      pO2, Temperature Corrected 85.0 mm Hg     Hepatitis Panel, Acute [[id number]]  (Normal) Collected:  02/08/19 1240    Specimen:  Blood Updated:  02/08/19 1414     Hepatitis B Surface Ag Non-Reactive     Hep A IgM Non-Reactive     Comment: Results may be falsely decreased if patient taking Biotin.        Hep B C IgM Non-Reactive     Comment: Results may be falsely decreased if patient taking Biotin.        Hepatitis C Ab Non-Reactive    POC Glucose Once [[id number]]  (Normal) Collected:  02/08/19 1400    Specimen:  Blood Updated:  02/08/19 1401     Glucose 127 mg/dL     Protime-INR [[id number]]  (Abnormal) Collected:  02/08/19 1240    Specimen:  Blood Updated:  02/08/19  1350     Protime 14.9 Seconds      INR 1.23    CBC & Differential [[id number]] Collected:  02/08/19 1240    Specimen:  Blood Updated:  02/08/19 1342    Narrative:       The following orders were created for panel order CBC & Differential.  Procedure                               Abnormality         Status                     ---------                               -----------         ------                     Manual Differential[[id number]]          Abnormal            Final result               CBC Auto Differential[[id number]]        Abnormal            Final result                 Please view results for these tests on the individual orders.    CBC Auto Differential [[id number]]  (Abnormal) Collected:  02/08/19 1240    Specimen:  Blood Updated:  02/08/19 1342     WBC 31.38 10*3/mm3      RBC 4.21 10*6/mm3      Hemoglobin 11.3 g/dL      Hematocrit 34.1 %      MCV 81.0 fL      MCH 26.8 pg      MCHC 33.1 g/dL      RDW 15.6 %      RDW-SD 46.2 fl      MPV 9.3 fL      Platelets 180 10*3/mm3     Manual Differential [[id number]]  (Abnormal) Collected:  02/08/19 1240    Specimen:  Blood Updated:  02/08/19 1342     Neutrophil % 77.0 %      Lymphocyte % 5.0 %      Monocyte % 4.0 %      Eosinophil % 1.0 %      Basophil % 0.0 %      Bands %  12.0 %      Metamyelocyte % 1.0 %      Neutrophils Absolute 27.93 10*3/mm3      Lymphocytes Absolute 1.57 10*3/mm3      Monocytes Absolute 1.26 10*3/mm3      Eosinophils Absolute 0.31 10*3/mm3      Basophils Absolute 0.00 10*3/mm3      RBC Morphology Normal     WBC Morphology Normal     Platelet Morphology Normal    LSAC Slide Creation [[id number]] Collected:  02/08/19 1240    Specimen:  Blood Updated:  02/08/19 1342    aPTT [[id number]]  (Normal) Collected:  02/08/19 1240    Specimen:  Blood Updated:  02/08/19 1341     PTT 36.8 seconds     Narrative:       PTT = The equivalent PTT values for the therapeutic range of heparin levels at 0.3 to 0.5 U/ml are 55 to 70 seconds.    Comprehensive  Metabolic Panel [[id number]]  (Abnormal) Collected:  02/08/19 1240    Specimen:  Blood Updated:  02/08/19 1325     Glucose 146 mg/dL      BUN 97 mg/dL      Creatinine 5.39 mg/dL      Sodium 127 mmol/L      Potassium 4.5 mmol/L      Chloride 100 mmol/L      CO2 17.0 mmol/L      Calcium 8.5 mg/dL      Total Protein 5.9 g/dL      Albumin 2.95 g/dL      ALT (SGPT) 43 U/L      AST (SGOT) 43 U/L      Alkaline Phosphatase 303 U/L      Total Bilirubin 3.0 mg/dL      eGFR Non African Amer 11 mL/min/1.73      Globulin 3.0 gm/dL      A/G Ratio 1.0 g/dL      BUN/Creatinine Ratio 18.0     Anion Gap 10.0 mmol/L     Narrative:       [employer] Guidelines    Stage     Description        GFR  1         Normal or High     90+  2         Mild decrease      60-89  3         Moderate decrease  30-59  4         Severe decrease    15-29  5         Kidney failure     <15    The MDRD GFR formula is only valid for adults with stable renal function between ages 18 and 70.    Ammonia [[id number]]  (Abnormal) Collected:  02/08/19 1240    Specimen:  Blood Updated:  02/08/19 1325     Ammonia 62 umol/L     Lactic Acid, Plasma [[id number]]  (Normal) Collected:  02/08/19 1240    Specimen:  Blood Updated:  02/08/19 1315     Lactate 0.8 mmol/L      Comment: Falsely depressed results may occur on samples drawn from patients receiving N-Acetylcysteine (NAC) or Metamizole.       Blood Culture - Blood, Hand, Left [[id number]] Collected:  02/08/19 1240    Specimen:  Blood from Hand, Left Updated:  02/08/19 1254        Imaging Results (last 72 hours)     Procedure Component Value Units Date/Time    FL C Arm During Surgery [[id number]] Collected:  02/08/19 1646     Updated:  02/08/19 1646    Narrative:       EXAMINATION: FL C ARM DURING SURGERY- 02/08/2019      INDICATION: Dialysis catheter placement     COMPARISON: NONE     FINDINGS: 4 seconds of fluoroscopy and 4 images used for right-sided  dialysis catheter placement. Please see the procedure  report for full  details.       Impression:       Fluoroscopy for dialysis catheter placement. Please see the  procedure report for full details.      D:  02/08/2019  E:  02/08/2019       XR Chest 1 View [[id number]] Collected:  02/08/19 1628     Updated:  02/08/19 1628    Narrative:       EXAMINATION: XR CHEST 1 VW-02/08/2019:      INDICATION: RIJ line.      COMPARISON: 02/08/2019.     FINDINGS: Portable chest reveals low lung volumes. Deep line catheter  identified on the right with tip in the SVC. Degenerative changes seen  within the spine. The heart is borderline enlarged. Mild prominence seen  of the pulmonary vascularity. No pleural effusion or pneumothorax.           Impression:       Prominence of the pulmonary vascularity with deep line  catheter identified on the right with tip in the SVC. No evidence of  acute parenchymal disease.     D:  02/08/2019  E:  02/08/2019             XR Chest 1 View [[id number]] Collected:  02/08/19 1243     Updated:  02/08/19 1246    Narrative:          EXAMINATION: XR CHEST 1 VW-      INDICATION: respiratory failure      COMPARISON: 01/08/2019     FINDINGS: Heart is borderline enlarged. The vasculature is cephalized.  Lung volumes are relatively low. There is mild discoid atelectasis in  both medial lung bases, but no focal disease elsewhere..           Impression:       Low lung volumes, borderline cardiomegaly and mild pulmonary  vascular congestion. Mild bibasilar discoid atelectasis, new from prior  study.     This report was finalized on 2/8/2019 12:44 PM by DR. Antione Damon MD.              I reviewed the patient's new clinical results.      Assessment and Recommendations  History of prostate cancer  Urosepsis    At this point we will continue the patient's Christina catheter.  We're going to rescan the patient's abdomen however with reports of his CT scans performed at the other hospital being negative I do not anticipate any further recommendations from surgical standpoint.   Plan for supportive care and antibiotic therapy guided by infectious disease.  At a long discussion with the patient's family regarding her plan and they agree.      Acute renal failure (ARF) (CMS/HCC)    Spinal stenosis, lumbar region, with neurogenic claudication    Morbid obesity due to excess calories (CMS/HCC)    TANIYA on CPAP    Dyslipidemia    DM (diabetes mellitus), type 2 (CMS/HCC)    Metabolic encephalopathy      I discussed the patients findings and my recommendations with family, primary care team and consulting provider    Juanito Grace Jr., MD  02/08/19  5:37 PM

## 2019-02-08 NOTE — H&P
"Admission HP     Patient Name: Rod Balderas  MRN: [id number]  : 1950  DOS: 2019    Attending: Arielle Carolina MD    Primary Care Provider: Tyrese Leyva MD      Patient Care Team:  Tyrese Leyva MD as PCP - General (Family Medicine)  Rod Regalado MD as Consulting Physician (Cardiology)  Myles Rossi MD as Consulting Physician (Anesthesiology)  Juanito Grace Jr., MD as Consulting Physician (Urology)    Chief complaint:  Renal failure.    Subjective   Patient is a 68 y.o. male who is very pleasant and who is known to me from recent hospitalization mid January for prostatectomy due to prostate cancer.  At that time he was admitted on 1/15/19 underwent robotic laparoscopic prostatectomy, tolerated surgery well, progressed well during the 2 days he spent in the hospital with subsequent ambulation, return of bowel function, tolerance of by mouth diet, subsequently HAYDE drain removal and discharged home.  Following his discharge he did well for several days eventually seen Dr. Sanjay Black about a week after discharge with removal of his Christina catheter that was placed into during surgery.  2 or 3 days after that he started feeling weak and fatigued, a urine culture was ordered through Dr. Sanjay Black's office.  A week ago he was seen in the ER with complaints of back pain and some abdominal discomfort and he was starting to be somewhat confused according to his family.  A CT scan of the head was done at the time, the family believes that was noncontrast CT and was nonrevealing.  His overall condition worsened over the weekend eventually was admitted to [facility] on Monday on 19.  At that time he was diagnosed with acute kidney injury with creatinine of 2.2 and with urinary tract infection with reported he and his original urine culture growing \"staph\".  His workup during his stay at [facility] included a renal ultrasound yesterday showing no " hydronephrosis and a CT scan done prior to that the report of which I do not have.  At [facility] he did have hematuria and had a 3-way Christina catheter placed, received continuous bladder irrigation, hematuria apparently did improve.  We were contacted due to his worsening renal failure and worsening mental status, his creatinine has crept over the last 3 days to a 5.6 with a BUN of 94 today.  Upon arrival, he is lethargic, awakens briefly and nonstress to talk but unable to verbalize legible speech.  Family reported no fever or chills and no nausea or vomiting.  His appetite has declined significantly over course of a few days.      Allergies:  Allergies   Allergen Reactions   • Percocet [Oxycodone-Acetaminophen] Anxiety   • Sulfa Antibiotics Hives       Meds:  Medications Prior to Admission   Medication Sig Dispense Refill Last Dose   • amLODIPine (NORVASC) 5 MG tablet Take 1 tablet by mouth Daily. 90 tablet 2 1/14/2019   • aspirin 325 MG tablet Take 1 tablet by mouth Daily.      • docusate sodium 100 MG capsule Take 1 capsule by mouth 2 (Two) Times a Day. 30 each 0    • DULoxetine (CYMBALTA) 60 MG capsule Take 60 mg by mouth Daily.   1/14/2019   • fluticasone (FLONASE) 50 MCG/ACT nasal spray USE 2 SPRAYS IN EACH NOSTRIL DAILY  0 More than a month   • gabapentin (NEURONTIN) 600 MG tablet Take 1 tablet by mouth 4 (Four) Times a Day. 120 tablet 5 1/14/2019   • HYDROcodone-acetaminophen (NORCO) 7.5-325 MG per tablet Take 1 tablet by mouth Every 4-6  Hours As Needed for Moderate Pain. 40 tablet 0    • ibuprofen (ADVIL,MOTRIN) 800 MG tablet Take 1 tablet by mouth 3 (Three) Times a Day As Needed for Moderate Pain .      • lisinopril-hydrochlorothiazide (PRINZIDE,ZESTORETIC) 20-12.5 MG per tablet Take 1 tablet by mouth Daily. 90 tablet 2 1/14/2019   • metFORMIN (GLUCOPHAGE) 500 MG tablet Take 500 mg by mouth 2 (Two) Times a Day With Meals.   1/14/2019   • nitrofurantoin, macrocrystal-monohydrate, (MACROBID) 100  MG capsule Take 1 capsule by mouth 2 (Two) Times a Day. 30 capsule 0    • nitroglycerin (NITROSTAT) 0.4 MG SL tablet Place  under the tongue.   More than a month   • polyethylene glycol (MIRALAX) powder Dissolve 1 capful (17g) in water and take by mouth Daily. 510 g 0    At this point we are awaiting medicine list from [facility].  Per discussion with the transferring physician yesterday patient received vancomycin and subsequently transitioned to daptomycin with worsening renal failure and concern.      History:   Past Medical History:   Diagnosis Date   • Anxiety and depression    • Cancer (CMS/HCC)    • Chest pain    • Coronary artery disease    • Diabetes mellitus (CMS/HCC)    • Dyslipidemia    • Extremity pain    • Heart disease    • History of transfusion    • Hypertension    • Low back pain    • TANIYA on CPAP     2-3    • Osteoarthritis     Diffuse osteoarthritis.   • Panic attacks    • Prostate cancer (CMS/HCC)      Past Surgical History:   Procedure Laterality Date   • CARDIAC CATHETERIZATION     • CHOLECYSTECTOMY     • COLONOSCOPY  2015   • CORONARY ANGIOPLASTY WITH STENT PLACEMENT  12/2012 December 2012:  A 3.5 x 88 Promus VERONICA stent to OM2.  Nonobstructive disease.  Otherwise normal LVEF.   • EYE SURGERY Bilateral     cataract   • PROSTATECTOMY N/A 1/15/2019    Procedure: ROBOTIC PROSTATECTOMY WITH NODES;  Surgeon: Juanito Grace Jr., MD;  Location: [facility];  Service: [facility]   • RETINAL DETACHMENT SURGERY Right      Family History   Problem Relation Age of Onset   • Heart disease Mother    • Thyroid disease Mother    • Heart disease Father    • Cancer Brother      Social History     Tobacco Use   • Smoking status: Former Smoker   • Smokeless tobacco: Former User   Substance Use Topics   • Alcohol use: No   • Drug use: No       Review of Systems  Unable to provide. Family provided ROS noted in HPI.    Vital Signs  There were no vitals taken for this visit.    Physical Exam:    General  Appearance:    Lethargic, encephalopathic.   Head:    Normocephalic, without obvious abnormality, atraumatic   Eyes:            Lids and lashes normal, conjunctivae and sclerae normal, no   icterus, no pallor, corneas clear    Ears:    Ears appear intact with no abnormalities noted   Throat:   No oral lesions, no thrush, oral mucosa dry   Neck:   No adenopathy, supple, trachea midline, no thyromegaly         Lungs:     Clear to auscultation,respirations regular, even and                   unlabored    Heart:    Regular rhythm and normal rate, normal S1 and S2, no            murmur, no gallop    Abdomen:     Normal bowel sounds, obese, mild distention. No focal tenderness.    Genitalia:    Deferred   Extremities:   Moves all extremities well, 2 + edema, no cyanosis, no              redness   Pulses:   Pulses palpable and equal bilaterally   Skin:   No bleeding, bruising or rash   Neurologic:   Lethargic. Some tremors. Moves all extremities/ jerks.           Invalid input(s): NEUTOPHILPCT,  EOSPCT        Invalid input(s): LABALBU, PROT  Lab Results   Component Value Date    HGBA1C 5.70 (H) 01/08/2019   Chemistry profile from this morning showed a glucose of 140 creatinine BUN of 5.6 and 94 respectively sodium 131 potassium 4.3 chloride of 99 bicarbonate of 16 ref CMP with albumin of 2.1 alkaline phosphatase 244, globulin 4.2 potassium.  Unremarkable.  CBC today with a white count of 25.6 thousand from 20,002 days ago, H&H 11.4 and 34.1 and platelet count of 196,000    Assessment and Plan:       Acute renal failure (ARF) (CMS/[facility])    Metabolic encephalopathy    Spinal stenosis, lumbar region, with neurogenic claudication    Morbid obesity due to excess calories (CMS/HCC)    TANIYA on CPAP    Dyslipidemia    DM (diabetes mellitus), type 2 (CMS/HCC)    ' staph' sepsis and bacteremia.    Plan  Admit to telemetry for further management.  Patient's acute kidney failure is rapidly progressing.  It is likely multifactorial,  sepsis with reported staph infection both urine and blood is possibly a factor, medications including nonsteroidals which patient has taken quite a bit recently, lisinopril, and other medications likely contributed.    From renal standpoint, workup will be initiated, checking urinalysis, urine creatinine, [facility] consultation, I discussed with Dr. Haile will has started seeing patient with me.  I will keep Christina catheter for the time being, no evidence of obstruction from recent studies at [facility].  We'll consider repeat ultrasound and/or repeat CT scan.  With rapidly progressing renal failure, high BUN/ uremia , encephalopathy, patient is in need of hemodialysis, hemodialysis access has been requested and Dr. Dailey will kindly place that for planned hemodialysis later today.    From infectious disease standpoint, we will obtain all culture results done at [facility] as well as through Dr. Sanjay Black's office, will treat for staph infection, I discussed with Dr. Santana with infectious disease who will oversee patient's antibiotics and ID follow up needs.    From respiratory standpoint, patient is only mildly labored, his baseline morbid obesity and obstructive sleep apnea are complicating factor, I will obtain a chest x-ray stat as well as an ABG, will use noninvasive positive pressure ventilation as needed, will have a low threshold for moving to an intensive care unit setting if indicated.    Will monitor his blood glucose closely with a low-dose sliding scale insulin added.  Also consider alternate route for nutritional support until he is alert enough to take by mouth diet.    Patient is fairly critically ill, I discussed his condition with his family at the bedside as well as with Dr. Banks with nephrology Dr. Todd with infectious disease team and with Dr. Sanjay Black with urology.    45 minutes Critical care time.    Arielle Carolina MD  02/08/19  12:12  PM

## 2019-02-08 NOTE — CONSULTS
Rod Balderas  1950  [id number]    Date of Consult: 2/8/2019 2/8/2019      Requesting Provider: Arielle Carolina MD  Evaluating Physician: Jeff mSith MD    Chief Complaint: confused    Reason for Consultation: MSSA septicemia, sepsis    History of present illness:    Patient is a 68 y.o.  Yr old male with history of prostate cancer status post robotic laparoscopic prostatectomy in mid January and was discharged on 1/17/19. The patient is currently unresponsive and no family is at bedside so history is somewhat limited to history from nursing staff and medical records. The patient was seen a few days after his discharge from his recent  Admission in January and his Christina catheter was discontinued.  About one week ago he went to the ER with complaints of back pain, abdominal pain, and confusion and a CT abdomen and pelvis was apparently okay during that time. He continued to worsen and over the weekend he was admitted to [facility].  He was found to have an initial AK I with a creatinine of 2.2. Per the micro-lab at [facility], the patient's urine cultures and 1 of 2 blood cultures from 2/5/19 grew MSSA. The patient was initially on vancomycin and Zosyn and was subsequently transitioned to daptomycin after he developed worsening renal function with a creatinine up to 5.6 and BUN went up to 94 today.  Due to lack of nephrology at [facility], the patient was transferred to [facility] today for possible dialysis.  He additionally received a dose of nafcillin prior to this transfer.  His last dose of daptomycin was yesterday per pharmacist here (who has discussed with OSH). Since arrival, the patient has remained afebrile but he has had a leukocytosis up to 31.38.  He is anemic with a hemoglobin of 11.3 and a hematocrit of 54.1.  He is hyponatremic to a sodium of 127.  Creatinine was initially elevated 5.39 and B1 was 97 on arrival.  He additionally had  elevation of his LFTs with an ALT of 43 and AST of 43, T bili of 3.0, and alkaline phosphatase of 303.  Pro-calcitonin was elevated to 7.55 lactic acid was normal at 0.8. The patient underwent CT head without contrast which did not show any acute abnormality, CT chest, abdomen and pelvis which showed consolidation of the lung bases bilaterally that is concerning for airspace disease such as pneumonia, gastric distention, and air-filled loops of colon with no obvious obstruction. Blood cultures have been ordered and a reflexive urinalysis has been ordered as well.  A transthoracic echocardiogram was ordered. The patient underwent dialysis today following right IJ dialysis catheter placement.  The infectious disease team has been consulted for antibiotic recommendations.    Past Medical History:   Diagnosis Date   • Anxiety and depression    • Cancer (CMS/HCC)    • Chest pain    • Coronary artery disease    • Diabetes mellitus (CMS/HCC)    • Dyslipidemia    • Extremity pain    • Heart disease    • History of transfusion    • Hypertension    • Low back pain    • TANIYA on CPAP     2-3    • Osteoarthritis     Diffuse osteoarthritis.   • Panic attacks    • Prostate cancer (CMS/HCC)        Past Surgical History:   Procedure Laterality Date   • CARDIAC CATHETERIZATION     • CHOLECYSTECTOMY     • COLONOSCOPY  2015   • CORONARY ANGIOPLASTY WITH STENT PLACEMENT  12/2012 December 2012:  A 3.5 x 88 Promus VERONICA stent to OM2.  Nonobstructive disease.  Otherwise normal LVEF.   • EYE SURGERY Bilateral     cataract   • PROSTATECTOMY N/A 1/15/2019    Procedure: ROBOTIC PROSTATECTOMY WITH NODES;  Surgeon: Juanito Grace Jr., MD;  Location: [facility];  Service: [facility]   • RETINAL DETACHMENT SURGERY Right        Pediatric History   Patient Guardian Status   • Not on file     Other Topics Concern   • Not on file   Social History Narrative   • Not on file   social history: Unable to obtain a full social history given his altered  mental status.    family history includes Cancer in his brother; Heart disease in his father and mother; Thyroid disease in his mother.    Allergies   Allergen Reactions   • Percocet [Oxycodone-Acetaminophen] Anxiety   • Sulfa Antibiotics Hives       Medication:  Current Facility-Administered Medications   Medication Dose Route Frequency Provider Last Rate Last Dose   • albumin human 25 % IV SOLN 25 g  25 g Intravenous PRN Jr Banks MD   25 g at 02/08/19 1530   • albuterol (PROVENTIL) nebulizer solution 0.083% 2.5 mg/3mL  2.5 mg Nebulization Q6H PRN Margoth Martinez APRN   2.5 mg at 02/08/19 1838   • [START ON 2/10/2019] DAPTOmycin (CUBICIN) 500 mg in sodium chloride 0.9 % 50 mL IVPB  6 mg/kg (Adjusted) Intravenous Q48H Jeff Smith MD       • heparin (porcine) injection 1,900 Units  1,900 Units Intracatheter PRN Jr Banks MD   1,900 Units at 02/08/19 1707   • insulin lispro (humaLOG) injection 0-7 Units  0-7 Units Subcutaneous 4x Daily With Meals & Nightly Arielle Carolina MD       • [START ON 2/9/2019] meropenem (MERREM) 500mg/100 mL 0.9% NS IVPB (mbp)  500 mg Intravenous Q24H Jeff Smith MD       • ondansetron (ZOFRAN) injection 4 mg  4 mg Intravenous Q6H PRN Arielle Carolina MD       • Pharmacy Consult   Does not apply Continuous PRN Arielle Carolina MD       • Pharmacy Consult   Does not apply Continuous PRN Jeff Smith MD       • sodium chloride 0.9 % flush 3 mL  3 mL Intravenous Q12H Arielle Carolina MD       • sodium chloride 0.9 % flush 3-10 mL  3-10 mL Intravenous PRN Arielle Carolina MD         Antibiotics:  Anti-Infectives (From admission, onward)    Ordered     Dose/Rate Route Frequency Start Stop    02/08/19 1921  DAPTOmycin (CUBICIN) 500 mg in sodium chloride 0.9 % 50 mL IVPB     Ordering Provider:  Jeff Smith MD    6 mg/kg × 86.5 kg (Adjusted)  100 mL/hr over 30 Minutes Intravenous Every 48 Hours 02/10/19 2100  "02/18/19 2059 02/08/19 1921  meropenem (MERREM) 500mg/100 mL 0.9% NS IVPB (mbp)     Ordering Provider:  Jeff Smith MD    500 mg  over 3 Hours Intravenous Every 24 Hours 02/09/19 2100 02/16/19 2059            Review of Systems  Unable to obtain an accurate review of systems given the patient is too encephalopathic.    Physical Exam:   Vital Signs   /72 (BP Location: Left arm, Patient Position: Lying)   Pulse 86   Temp 97.2 °F (36.2 °C) (Axillary)   Resp 18   Ht 170.2 cm (67\")   Wt 117 kg (257 lb 3.2 oz)   SpO2 96%   BMI 40.28 kg/m²     GENERAL: ill appearing obese gentleman lying in bed.  Opens eyes occasionally but nonverbal and makes some occasional twitching movements  HEENT: Normocephalic, atraumatic.  PERRL. EOMI. +scleral icterus. Oropharynx clear without evidence of thrush or exudate.    NECK: Supple without nuchal rigidity. No mass.  LYMPH: No cervical, axillary or inguinal lymphadenopathy.  HEART: RRR; No murmur, rubs, gallops.   LUNGS: rhonchi anteriorly, no wheezes or rales. Normal respiratory effort on nasal cannula. Nonlabored.  ABDOMEN: Soft, nontender, nondistended. Positive bowel sounds. No rebound or guarding. NO mass or HSM.  EXT:  No cyanosis. Right upper extremity with edema and some mild erythema over the forearm, tenderness to palpation but no palpable cord.  No effusions noted of her knees  : Genitalia generally unremarkable.  +Christina catheter in place with purulent appearing urine.  MSK: diet diffuse tenderness to palpation, mostly over bilateral upper extremities right greater than left. FROM without joint effusions noted arms/legs.    SKIN: Warm and dry without cutaneous eruptions on Inspection/palpation.    NEURO: somnolent but does open eyes occasionally and occasionally tracks with his eyes.  Not oriented.  Not following commands.       Laboratory Data    Results from last 7 days   Lab Units 02/08/19  1240   WBC 10*3/mm3 31.38*   HEMOGLOBIN g/dL 11.3* "   HEMATOCRIT % 34.1*   PLATELETS 10*3/mm3 180     Results from last 7 days   Lab Units 02/08/19  1240   SODIUM mmol/L 127*   POTASSIUM mmol/L 4.5   CHLORIDE mmol/L 100   CO2 mmol/L 17.0*   BUN mg/dL 97*   CREATININE mg/dL 5.39*   GLUCOSE mg/dL 146*   CALCIUM mg/dL 8.5*     Results from last 7 days   Lab Units 02/08/19  1240   ALK PHOS U/L 303*   BILIRUBIN mg/dL 3.0*   ALT (SGPT) U/L 43*   AST (SGOT) U/L 43*               Estimated Creatinine Clearance: 16.3 mL/min (A) (by C-G formula based on SCr of 5.39 mg/dL (H)).       Microbiology:     Culture data from [facility]  2/5/19 urine culture: MSSA  2/5/19 blood culture: MSSA  2/5/19 blood culture: No growth to date        Culture data from [facility]  2/8/19 blood culture: No growth to date         Radiology:  Ct Abdomen Pelvis Without Contrast    Result Date: 2/8/2019        Ct Head Without Contrast    Result Date: 2/8/2019  Chronic changes identified within the brain with no acute intracranial abnormality.  DICTATED:   2/8/2019 EDITED/ls :   2/8/2019      Ct Chest Without Contrast    Result Date: 2/8/2019        Xr Chest 1 View    Result Date: 2/8/2019  Prominence of the pulmonary vascularity with deep line catheter identified on the right with tip in the SVC. No evidence of acute parenchymal disease.  D:  02/08/2019 E:  02/08/2019        Xr Chest 1 View    Result Date: 2/8/2019  Low lung volumes, borderline cardiomegaly and mild pulmonary vascular congestion. Mild bibasilar discoid atelectasis, new from prior study.  This report was finalized on 2/8/2019 12:44 PM by DR. Antione Damon MD.      Fl C Arm During Surgery    Result Date: 2/8/2019  Fluoroscopy for dialysis catheter placement. Please see the procedure report for full details.   D:  02/08/2019 E:  02/08/2019       I have personally reviewed the patient's CT chest from 2/8/19.  Does have some mild bilateral lower lobe airspace disease.    Impression:   This is a 68-year-old with prostate  cancer with recent prostatectomy who unfortunately presented from outside hospital with sepsis and worsening renal function now requiring hemodialysis.  He does have MSSA septicemia and he does have MSSA in his urine indicating either that this infection started in his urine after recent surgical manipulation or he has a high-grade infection/bacteremia.  I do think that he needs an echocardiogram in order to rule out infective endocarditis.  He is overall a very poor historian given that he has acute toxic/metabolic encephalopathy.    1. Sepsis with leukocytosis and elevated pro calcitonin.  Has associated renal failure  2. MSSA septicemia- concern for deep seeded infection. No signs of abscess on CT C/A/P but this was done w/o contrast due to renal dysfunction. Will additionally need echo as below. Possible source is secondary to recent surgical manipulation. No signs of SSTI on exam. Does have some mild consolidative changes on xray.   3. Acute renal failure, now requiring dialysis- unclear if this was all ATN secondary to sepsis or if vancomycin or nafcillin could've played a role.  Seems to have had some renal dysfunction at time of admission.  Did have normal renal ultrasound recently. Now on dialysis.  Christina catheter in place  4. Severe leukocytosis-likely secondary to MSSA septicemia. If patient does have severe watery diarrhea then we will need to send a C. Difficile screen  5. MSSA urinary tract infection/pyelonephritis-in the setting of recent surgical manipulation  6. Right upper extremity swelling and pain/possible thrombophlebitis  7. Acute hypoxic respiratory failure  8. Possible developing pneumonia/ bibasilar consolidations- will need to monitor closely  9. Elevated liver function tests  10. Acute toxic/metabolic encephalopathy-likely related to sepsis/infection and uremia  11. Diabetes mellitus with hyperglycemia-we will need to closely monitor.  This does predispose him to infection  12.  Hyponatremia  13. Anemia  14. Prostate cancer with recent prostatectomy    PLAN: Thank you for asking us to see Rod NOLASCO Andrey, I recommend the followin. Agree with TTE for now.  Will likely need KLAUDIA  2. Obtain right upper extremity venous ultrasound  3. Stop Daptomycin and Meropenem as this all seems to be a severe MSSA infection at this time given positive MSSA cultures from his blood and urine cultures.   4. Start Cefazolin with PK dosing assistance (dosed with HD)  5. Closely monitor cbc with diff and cmp for now  6. Follow up repeat blood cultures and follow up urine studies    Please call me if the patient has clinical deterioration tonight.    I will be off over the weekend and one of my partners will be following the patient for me.     The patient is critically ill with A complex set of medical issues requiring a high level of medical decision-making.  He does have significant risk for further morbidity and mortality. I discussed with nursing staff and with the pharmacist on call.  I reviewed the patient's imaging, labs, and microbiology.  I discussed with the outside hospital microbiology lab.  I spent 50 minutes on the patient's case today ([id number])    Jeff Smith MD  2019

## 2019-02-08 NOTE — PROGRESS NOTES
INTENSIVIST   PROGRESS NOTE     Hospital:  LOS: 0 days      S     Mr. Rod Balderas, 68 y.o. male is followed for:      Sepsis with MODS    T2DM    As an Intensivist, we provide an integrated approach to the ICU patient and family, medical management of comorbid conditions, including but not limited to electrolytes, glycemic control, organ dysfunction, lead interdisciplinary rounds and coordinate the care with all other services, including those from other specialists.     Interval History:  Transferred from [facility] today, due to progressive renal failure and lack of nephrology.    On arrival, he went to the wards, encephalopathic, and then to the OR to get a tunneled dialysis catheter.    Due to his overall condition, he was then transferred to the ICU.    Discussed with Dr. Arielle Carolina (Hospitalist), he had positive cultures for staph at [facility].    Upon arrival to 2A ICU from the OR, he was started immediately on HD.    Discussed with Dr. Banks.     The patient's relevant past medical, surgical and social history were reviewed and updated in Epic as appropriate.     ROS:   Constitutional: Negative for fever.   Respiratory: Negative for dyspnea.   Cardiovascular: Negative for chest pain.   Gastrointestinal: Negative for  nausea, vomiting and diarrhea.        O     Vitals:  Temp: 97 °F (36.1 °C) (02/08/19 1520) Temp  Min: 97 °F (36.1 °C)  Max: 97.7 °F (36.5 °C)   BP: 109/72 (02/08/19 1530) BP  Min: 103/54  Max: 140/74   Pulse: 90 (02/08/19 1530) Pulse  Min: 83  Max: 90   Resp: 18 (02/08/19 1520) Resp  Min: 16  Max: 18   SpO2: 96 % (02/08/19 1530) SpO2  Min: 96 %  Max: 96 %   Device: (P) nasal cannula (02/08/19 1530)    Flow Rate: (P) 3 (02/08/19 1530) Flow (L/min)  Min: 3  Max: 4     Intake/Ouptut 24 hrs (7:00AM - 6:59 AM)  Intake/Output       02/08/19 0700 - 02/09/19 0659    Intake (ml) 150    Output (ml) --    Net (ml) 150    Last Weight  117 kg (257 lb 3.2 oz)           Medications  (drips):    Pharmacy Consult    sodium chloride Last Rate: Stopped (02/08/19 1519)     Physical Examination  Telemetry:  Rhythm: (P) normal sinus rhythm (02/08/19 1530)         Constitutional:  No acute distress.   Cardiovascular: Normal rate, regular and rhythm. Normal heart sounds.  No murmurs, gallop or rub.   Respiratory: No respiratory distress. Normal respiratory effort.  Rhonchi.    Abdominal:  Soft. No masses. Non-tender. No distension. No HSM.   Extremities: No digital cyanosis. No clubbing.  (+) edema.   Neurological:   Eyes open, but no focus.  He does not follow commands.               Lines/Drains/Airways: RIJ Tunneled Dyalisis Catheter  Christina       Hematology:  Results from last 7 days   Lab Units 02/08/19  1240   WBC 10*3/mm3 31.38*   HEMOGLOBIN g/dL 11.3*   MCV fL 81.0   PLATELETS 10*3/mm3 180       Chemistry:  Estimated Creatinine Clearance: 16.3 mL/min (A) (by C-G formula based on SCr of 5.39 mg/dL (H)).    Results from last 7 days   Lab Units 02/08/19  1240   SODIUM mmol/L 127*   POTASSIUM mmol/L 4.5   CHLORIDE mmol/L 100   CO2 mmol/L 17.0*   ANION GAP mmol/L 10.0   GLUCOSE mg/dL 146*   CALCIUM mg/dL 8.5*     Results from last 7 days   Lab Units 02/08/19  1240   BUN mg/dL 97*   CREATININE mg/dL 5.39*           Hepatic:  Results from last 7 days   Lab Units 02/08/19  1240   ALK PHOS U/L 303*   BILIRUBIN mg/dL 3.0*   ALT (SGPT) U/L 43*   AST (SGOT) U/L 43*   ALBUMIN g/dL 2.95*       Arterial Blood Gases:  Results from last 7 days   Lab Units 02/08/19  1540   PH, ARTERIAL pH units 7.276*   PCO2, ARTERIAL mm Hg 38.4   PO2 ART mm Hg 85.0   FIO2 % 32       Images:  Xr Chest 1 View    Result Date: 2/8/2019  Low lung volumes, borderline cardiomegaly and mild pulmonary vascular congestion. Mild bibasilar discoid atelectasis, new from prior study.  This report was finalized on 2/8/2019 12:44 PM by DR. Antione Damon MD.        Echo:       Results: Reviewed.  I reviewed the patient's new laboratory and imaging  results.  I independently reviewed the patient's new images.    Medications: Reviewed.    Assessment/Plan   A / P     68 y.o.male, admitted on 2/8/2019 with Acute renal failure (ARF) (CMS/HCC) [N17.9]: Transferred from [facility] where he was admitted on 2/5/19    1. Sepsis with MODS  1. Apparently BC positive for Staph at [facility]  2. He was on Vanco IV and Dapto at [facility].  3. Renal failure  1. NOE  1. Cr went from 2.2 (on 2/6) to 5.6 (on 2/8)  2. S/p Robotic Laparoscopic Prostatectomy 1/15/19 (due to Prostate cancer)  3. Hematuria on admission to [facility]  4. Encephalopathy  1. R/o Metabolic / Uremia  2. R/o CNS event  3. R/o Sepsis  4. R/o Medications: NSAIDs and/or ACEI.  5. Respiratory failure  1. TANIYA on BiPAP at home  6. Leukocytosis.  7. Liver  1. Elevated ALKP, Bilirubin and mild elevation of transaminases  2. CAD s/p stents 2012  3. Dyslipidemia  4. Obesity III. Body mass index is 40.28 kg/m².   5. Antibiotics: Dapto and MRP one dose, ordered} Per ID  6. PMH: HTN  7. PMH: Spinal stenosis with neurogenic claudication  8. T2DM    Results from last 7 days   Lab Units 02/08/19  1400   GLUCOSE mg/dL 127     Lab Results   Lab Value Date/Time    HGBA1C 5.70 (H) 01/08/2019 1524       Nutrition Support: Patient isn't on Tube Feeding   Modulars: Patient doesn't have any tube feeding modular orders   Diet: NPO Diet   Advance Directives: Code Status and Medical Interventions:   Ordered at: 02/08/19 1208     Code Status:    CPR     Medical Interventions (Level of Support Prior to Arrest):    Full          Assessment / Plan:    1. CT Head and Chest.(Abd/Pelvis already ordered)  2. ABG  3. Abs Per ID  4. PCT today  5. ECHOcardiogram  6. Starting HD    Plan of care and goals reviewed during interdisciplinary rounds.  Level of Risk is High due to:  illness with threat to life or bodily function.   I discussed the patient's findings and my recommendations with patient    Time: was greater than 35  minutes.    (This excludes time spent performing separately reportable procedures and services).  Patient was at high risk of imminent or life-threatening deterioration in his condition due to CNS dysfunction, Respiratory failure and Renal failure.     Jamal Villar MD, FACP, FCCP, CNSC  Intensive Care Medicine, Nutrition Support and Pulmonary Medicine     Addendum    [facility]  2/5/19 BC S aureus (S) Oxacillin  2/5/19 Urine Cx S aureus (S) Oxacillin

## 2019-02-08 NOTE — CONSULTS
General Surgery Consultation Note    Date of Service: 2/8/2019  Rod Balderas  [id number]  1950      Referring Provider: Arielle Carolina MD    Location of Consult: Hospital floor     Reason for Consultation: Renal failure       History of Present Illness:  I am seeing, Rod Balderas, in consultation for Arielle Carolina MD and Jr Carvajal M.D. regarding acute renal failure and the need for  access for the institution of dialysis .  68-year-old gentleman with recent prostate surgery presented with worsening left lower extremity and lower abdominal pain to [facility].  He was subsequently transferred here for further care.  The patient is currently obtunded and unable to give a history.       Past Medical History:   Diagnosis Date   • Anxiety and depression    • Cancer (CMS/HCC)    • Chest pain    • Coronary artery disease    • Diabetes mellitus (CMS/HCC)    • Dyslipidemia    • Extremity pain    • Heart disease    • History of transfusion    • Hypertension    • Low back pain    • TANIYA on CPAP     2-3    • Osteoarthritis     Diffuse osteoarthritis.   • Panic attacks    • Prostate cancer (CMS/HCC)        Allergies   Allergen Reactions   • Percocet [Oxycodone-Acetaminophen] Anxiety   • Sulfa Antibiotics Hives       No current facility-administered medications on file prior to encounter.      Current Outpatient Medications on File Prior to Encounter   Medication Sig Dispense Refill   • amLODIPine (NORVASC) 5 MG tablet Take 1 tablet by mouth Daily. 90 tablet 2   • aspirin 325 MG tablet Take 1 tablet by mouth Daily.     • docusate sodium 100 MG capsule Take 1 capsule by mouth 2 (Two) Times a Day. 30 each 0   • DULoxetine (CYMBALTA) 60 MG capsule Take 60 mg by mouth Daily.     • fluticasone (FLONASE) 50 MCG/ACT nasal spray USE 2 SPRAYS IN EACH NOSTRIL DAILY  0   • gabapentin (NEURONTIN) 600 MG tablet Take 1 tablet by mouth 4 (Four) Times a Day. 120 tablet 5   •  HYDROcodone-acetaminophen (NORCO) 7.5-325 MG per tablet Take 1 tablet by mouth Every 4-6  Hours As Needed for Moderate Pain. 40 tablet 0   • ibuprofen (ADVIL,MOTRIN) 800 MG tablet Take 1 tablet by mouth 3 (Three) Times a Day As Needed for Moderate Pain .     • lisinopril-hydrochlorothiazide (PRINZIDE,ZESTORETIC) 20-12.5 MG per tablet Take 1 tablet by mouth Daily. 90 tablet 2   • metFORMIN (GLUCOPHAGE) 500 MG tablet Take 500 mg by mouth 2 (Two) Times a Day With Meals.     • nitrofurantoin, macrocrystal-monohydrate, (MACROBID) 100 MG capsule Take 1 capsule by mouth 2 (Two) Times a Day. 30 capsule 0   • nitroglycerin (NITROSTAT) 0.4 MG SL tablet Place  under the tongue.     • polyethylene glycol (MIRALAX) powder Dissolve 1 capful (17g) in water and take by mouth Daily. 510 g 0         Current Facility-Administered Medications:   •  DAPTOmycin (CUBICIN) 700 mg in sodium chloride 0.9 % 50 mL IVPB, 8 mg/kg (Adjusted), Intravenous, Q24H, Arielle Carolina MD  •  [MAR Hold] dextrose (D50W) 25 g/ 50mL Intravenous Solution 25 g, 25 g, Intravenous, Q15 Min PRN, Arielle Carolina MD  •  [MAR Hold] dextrose (GLUTOSE) oral gel 15 g, 15 g, Oral, Q15 Min PRN, Arielle Carolina MD  •  famotidine (PEPCID) injection 20 mg, 20 mg, Intravenous, 60 Min Pre-Op, Param Parsons MD, 20 mg at 02/08/19 1415  •  [MAR Hold] glucagon (human recombinant) (GLUCAGEN DIAGNOSTIC) injection 1 mg, 1 mg, Subcutaneous, PRN, Arielle Carolina MD  •  [MAR Hold] insulin lispro (humaLOG) injection 0-7 Units, 0-7 Units, Subcutaneous, 4x Daily With Meals & Nightly, Arielle Carolina MD  •  meropenem (MERREM) 1 g/100 mL 0.9% NS VTB (mbp), 1 g, Intravenous, Once, Arielle Carolina MD  •  [MAR Hold] ondansetron (ZOFRAN) injection 4 mg, 4 mg, Intravenous, Q6H PRN, Arielle Carolina MD  •  Pharmacy Consult, , Does not apply, Continuous PRN, Arielle Carolina MD  •  [MAR Hold] sodium chloride 0.9 % flush 3 mL, 3 mL, Intravenous, Q12H,  Arielle Carolina MD  •  sodium chloride 0.9 % flush 3 mL, 3 mL, Intravenous, Q12H, Param Parsons MD  •  [MAR Hold] sodium chloride 0.9 % flush 3-10 mL, 3-10 mL, Intravenous, PRN, Arielle Carolina MD  •  sodium chloride 0.9 % flush 3-10 mL, 3-10 mL, Intravenous, PRN, Param Parsons MD  •  sodium chloride 0.9 % infusion, 9 mL/hr, Intravenous, Continuous PRN, Param Parsons MD, Last Rate: 9 mL/hr at 02/08/19 1416, 9 mL/hr at 02/08/19 1416    Past Surgical History:   • CARDIAC CATHETERIZATION   • CHOLECYSTECTOMY   • COLONOSCOPY   • CORONARY ANGIOPLASTY WITH STENT PLACEMENT    December 2012:  A 3.5 x 88 Promus VERONICA stent to OM2.  Nonobstructive disease.  Otherwise normal LVEF.   • EYE SURGERY    cataract   • PROSTATECTOMY    Procedure: ROBOTIC PROSTATECTOMY WITH NODES;  Surgeon: Juanito Grace Jr., MD;  Location: [facility];  Service: [facility]   • RETINAL DETACHMENT SURGERY       Family History   Problem Relation Age of Onset   • Heart disease Mother    • Thyroid disease Mother    • Heart disease Father    • Cancer Brother      Social History     Socioeconomic History   • Marital status:      Spouse name: Not on file   • Number of children: Not on file   • Years of education: Not on file   • Highest education level: Not on file   Social Needs   • Financial resource strain: Not on file   • Food insecurity - worry: Not on file   • Food insecurity - inability: Not on file   • Transportation needs - medical: Not on file   • Transportation needs - non-medical: Not on file   Occupational History   • Not on file   Tobacco Use   • Smoking status: Former Smoker   • Smokeless tobacco: Former User   Substance and Sexual Activity   • Alcohol use: No   • Drug use: No   • Sexual activity: Defer   Other Topics Concern   • Not on file   Social History Narrative   • Not on file       Review of Systems:  Unobtainable secondary to the patient's mental status    /54 (BP Location: Right arm, Patient Position:  "Lying)   Pulse 87   Resp 17   Ht 170.2 cm (67\")   Wt 117 kg (257 lb 3.2 oz)   BMI 40.28 kg/m²   Body mass index is 40.28 kg/m².    General: Moderate distress, nonverbal   HEENT: PER, no icterus, normal sclerae  Cardiac: regular rhythm,  no audible rubs  Pulmonary: bilateral breath sounds, non labored  Abdominal: Obese, soft, no generalized peritonitis   Neurologic: awake, alert, no obvious focal deficits  Extremities: warm,  ++ edema  Skin: no obvious rashes nor worrisome lesions seen    CBC  Results from last 7 days   Lab Units 02/08/19  1240   WBC 10*3/mm3 31.38*   HEMOGLOBIN g/dL 11.3*   HEMATOCRIT % 34.1*   PLATELETS 10*3/mm3 180       CMP  Results from last 7 days   Lab Units 02/08/19  1240   SODIUM mmol/L 127*   POTASSIUM mmol/L 4.5   CHLORIDE mmol/L 100   CO2 mmol/L 17.0*   BUN mg/dL 97*   CREATININE mg/dL 5.39*   CALCIUM mg/dL 8.5*   BILIRUBIN mg/dL 3.0*   ALK PHOS U/L 303*   ALT (SGPT) U/L 43*   AST (SGOT) U/L 43*   GLUCOSE mg/dL 146*       Radiology  Imaging Results (last 72 hours)     Procedure Component Value Units Date/Time    XR Chest 1 View [[id number]] Collected:  02/08/19 1243     Updated:  02/08/19 1246    Narrative:          EXAMINATION: XR CHEST 1 VW-      INDICATION: respiratory failure      COMPARISON: 01/08/2019     FINDINGS: Heart is borderline enlarged. The vasculature is cephalized.  Lung volumes are relatively low. There is mild discoid atelectasis in  both medial lung bases, but no focal disease elsewhere..           Impression:       Low lung volumes, borderline cardiomegaly and mild pulmonary  vascular congestion. Mild bibasilar discoid atelectasis, new from prior  study.     This report was finalized on 2/8/2019 12:44 PM by DR. Antione Damon MD.               Assessment:  Acute renal failure  Multiple electrolyte abnormalities  Morbid obesity    Plan:  Nothing by mouth, tunneled hemodialysis catheter placement.  I had a discussion regarding the risks and benefits of catheter placement " with the patient's wife in his presence including, but not limited to bleeding, infection, injury to adjacent viscera (the carotid, lung, myocardium) catheter nonfunction, need for re-intervention, and medical issues from a cardiopulmonary and deep venous thrombosis standpoint.  She understood these risks and wished to proceed with catheter placement.    Bishnu Dailey MD  02/08/19  2:25 PM

## 2019-02-08 NOTE — PROGRESS NOTES
Discharge Planning Assessment  [facility]     Patient Name: Rod Balderas  MRN: [id number]  Today's Date: 2/8/2019    Admit Date: 2/8/2019    Discharge Needs Assessment     Row Name 02/08/19 1601       Living Environment    Lives With  spouse    Current Living Arrangements  home/apartment/condo    Primary Care Provided by  self    Provides Primary Care For  no one       Transition Planning    Patient/Family Anticipates Transition to  home       Discharge Needs Assessment    Equipment Currently Used at Home  bipap/cpap        Discharge Plan     Row Name 02/08/19 1602       Plan    Plan  Home    Patient/Family in Agreement with Plan  yes    Plan Comments  Attempted to speak with pt, however pt had been moved off of the floor for a procedure. Information gathered from pt's chart in Epic. pt has Medicare A and B. Pt's PCP is Tyrese Leyva. Case management will continue to follow for discharge needs as arise.     Final Discharge Disposition Code  01 - home or self-care        Destination      No service coordination in this encounter.      Durable Medical Equipment      No service coordination in this encounter.      Dialysis/Infusion      No service coordination in this encounter.      Home Medical Care      No service coordination in this encounter.      Community Resources      No service coordination in this encounter.          Demographic Summary     Row Name 02/08/19 1601       General Information    Arrived From  [facility]    Reason for Consult  discharge planning        Functional Status    No documentation.       Psychosocial    No documentation.       Abuse/Neglect    No documentation.       Legal    No documentation.       Substance Abuse    No documentation.       Patient Forms    No documentation.           GERMAN Dangelo

## 2019-02-08 NOTE — PLAN OF CARE
Problem: Patient Care Overview  Goal: Plan of Care Review  Outcome: Ongoing (interventions implemented as appropriate)   02/08/19 9347   Coping/Psychosocial   Plan of Care Reviewed With patient;spouse;daughter   Plan of Care Review   Progress no change   OTHER   Outcome Summary Received patient from OR after having right chest wall ana maria cath placed. Non-verbal on admission but able to follow simple commands. VSS. Sats adequate on 3LNC. Within an hour of admission to floor. Pupils at 1mm and fixed and not following commands. Dialysis started and appears to be tolerating. Appears to be in pain as groaning and cries out when touched. Unable to answer questions presently. Family spoken to and states that patient has been with altered level of consciousness since he has been sick at [facility]. Carri insitu on admit with CBI going. Stopped after speaking to Dr. Villar. Will monitor output.        Problem: Skin Injury Risk (Adult)  Goal: Identify Related Risk Factors and Signs and Symptoms  Outcome: Outcome(s) achieved Date Met: 02/08/19    Goal: Skin Health and Integrity  Outcome: Ongoing (interventions implemented as appropriate)      Problem: Fall Risk (Adult)  Goal: Identify Related Risk Factors and Signs and Symptoms  Outcome: Outcome(s) achieved Date Met: 02/08/19    Goal: Absence of Fall  Outcome: Ongoing (interventions implemented as appropriate)      Problem: Hemodialysis (Adult)  Goal: Signs and Symptoms of Listed Potential Problems Will be Absent, Minimized or Managed (Hemodialysis)  Outcome: Ongoing (interventions implemented as appropriate)      Problem: Confusion, Acute (Adult)  Goal: Identify Related Risk Factors and Signs and Symptoms  Outcome: Outcome(s) achieved Date Met: 02/08/19    Goal: Cognitive/Functional Impairments Minimized  Outcome: Ongoing (interventions implemented as appropriate)    Goal: Safety  Outcome: Ongoing (interventions implemented as appropriate)

## 2019-02-08 NOTE — CONSULTS
NAL Consult Note    Rod Balderas  1950  [id number]    Date of Admit:  2/8/2019    Date of Consult: 2/8/2019        Requesting Provider: Arielle Carolina MD  Evaluating Physician: Jr Banks MD        Reason for Consultation:  NOE  History of present illness:    Patient is a 68 y.o.  Yr old male WITH A H/O PROSTATECTOMY 1/15/19, HTN., DM, CAD ASKED TO SEE BECAUSE OF NOE. CREAT 2.8 TO 5.6 OVER THE PAST 2 DAYS AT OUTSIDE HOSPITAL. PATIENT UNABLE TO PROVIDE ANY H/O. WAS ON IBUPROFEN AT HOME ALONF WITH ACE/METFORMIN/HCTZ. FELT TO PROBABLY HAVE UROSEPSIS. BASELINE NOT KNOWN. OTHER LAB AT OSH THIS AM: ZQ=238. BUN=94. K=4.3. CO2=16. ALBUMIN 2.1. CA=8.2. CT SCAN AT OSH: NO HYDRO.    Past Medical History:   Diagnosis Date   • Anxiety and depression    • Cancer (CMS/HCC)    • Chest pain    • Coronary artery disease    • Diabetes mellitus (CMS/HCC)    • Dyslipidemia    • Extremity pain    • Heart disease    • History of transfusion    • Hypertension    • Low back pain    • TANIYA on CPAP     2-3    • Osteoarthritis     Diffuse osteoarthritis.   • Panic attacks    • Prostate cancer (CMS/HCC)        Past Surgical History:   Procedure Laterality Date   • CARDIAC CATHETERIZATION     • CHOLECYSTECTOMY     • COLONOSCOPY  2015   • CORONARY ANGIOPLASTY WITH STENT PLACEMENT  12/2012 December 2012:  A 3.5 x 88 Promus VERONICA stent to OM2.  Nonobstructive disease.  Otherwise normal LVEF.   • EYE SURGERY Bilateral     cataract   • PROSTATECTOMY N/A 1/15/2019    Procedure: ROBOTIC PROSTATECTOMY WITH NODES;  Surgeon: Juanito Grace Jr., MD;  Location: [facility];  Service: [facility]   • RETINAL DETACHMENT SURGERY Right        Social History     Socioeconomic History   • Marital status:      Spouse name: Not on file   • Number of children: Not on file   • Years of education: Not on file   • Highest education level: Not on file   Tobacco Use   • Smoking status: Former Smoker   • Smokeless tobacco: Former User    Substance and Sexual Activity   • Alcohol use: No   • Drug use: No   • Sexual activity: Defer       family history includes Cancer in his brother; Heart disease in his father and mother; Thyroid disease in his mother.    Allergies   Allergen Reactions   • Percocet [Oxycodone-Acetaminophen] Anxiety   • Sulfa Antibiotics Hives       Medication:  No current facility-administered medications for this encounter.     Medications Prior to Admission   Medication Sig Dispense Refill Last Dose   • amLODIPine (NORVASC) 5 MG tablet Take 1 tablet by mouth Daily. 90 tablet 2 1/14/2019   • aspirin 325 MG tablet Take 1 tablet by mouth Daily.      • docusate sodium 100 MG capsule Take 1 capsule by mouth 2 (Two) Times a Day. 30 each 0    • DULoxetine (CYMBALTA) 60 MG capsule Take 60 mg by mouth Daily.   1/14/2019   • fluticasone (FLONASE) 50 MCG/ACT nasal spray USE 2 SPRAYS IN EACH NOSTRIL DAILY  0 More than a month   • gabapentin (NEURONTIN) 600 MG tablet Take 1 tablet by mouth 4 (Four) Times a Day. 120 tablet 5 1/14/2019   • HYDROcodone-acetaminophen (NORCO) 7.5-325 MG per tablet Take 1 tablet by mouth Every 4-6  Hours As Needed for Moderate Pain. 40 tablet 0    • ibuprofen (ADVIL,MOTRIN) 800 MG tablet Take 1 tablet by mouth 3 (Three) Times a Day As Needed for Moderate Pain .      • lisinopril-hydrochlorothiazide (PRINZIDE,ZESTORETIC) 20-12.5 MG per tablet Take 1 tablet by mouth Daily. 90 tablet 2 1/14/2019   • metFORMIN (GLUCOPHAGE) 500 MG tablet Take 500 mg by mouth 2 (Two) Times a Day With Meals.   1/14/2019   • nitrofurantoin, macrocrystal-monohydrate, (MACROBID) 100 MG capsule Take 1 capsule by mouth 2 (Two) Times a Day. 30 capsule 0    • nitroglycerin (NITROSTAT) 0.4 MG SL tablet Place  under the tongue.   More than a month   • polyethylene glycol (MIRALAX) powder Dissolve 1 capful (17g) in water and take by mouth Daily. 510 g 0        Review of Systems: UNOBTAINABLE    Physical Exam:   Vital Signs   There were no vitals  "taken for this visit.     GENERAL: UNRESPONSIVE   HEENT: Normocephalic, atraumatic.  PER.  No conjunctivitis. No icterus. Oropharynx clear without evidence of thrush or exudate. No evidence of peridontal disease.    NECK: Supple without nuchal rigidity. No mass.  LYMPH: No painful cervical, axillary or inguinal lymphadenopathy.  HEART: RRR; No murmur, rubs, gallops. No bruits in neck, abdomen, or groins. TR edema  LUNGS: Normal respiratory effort. Nonlabored. No dullness.  No crepitus.  Clear to auscultation bilaterally without wheezing, rales, rhonchi.  ABDOMEN: Soft, nontender, nondistended. Positive bowel sounds. No rebound or guarding. NO mass or HSM.  JOINTS:  Full range of motion, no redness or tenderness.  EXT:  No cyanosis/clubbing.  :  BLADDER NOT DISTENDED. NO CVAT.  SKIN: Warm and dry without rash  NEURO: UNRESPONSIVE.   PSYCHIATRIC: UNOBTAINABLE    Laboratory Data        Invalid input(s): WBC;3                  Estimated Creatinine Clearance: 133.1 mL/min (by C-G formula based on SCr of 0.74 mg/dL).    Radiology:      Impression: NOE WITH WORSE GFR. ? UREMIC WITH AMS. PROBABLE UROSEPSIS. NOE LIKELY FROM NSAIA USE, ACE AND SEPSIS.       PLAN: Thank you for asking us to see Rod Balderas, I recommend the following: START DIALYSIS. D/W  \"Y\" AND DR HOBSON. HE WILL PLACE A DIALYSIS CATH TODAY. WILL START HD LATER TODAY. WILL FOLLOW.       Jr Banks MD  2/8/2019  12:08 PM                  "

## 2019-02-08 NOTE — ANESTHESIA POSTPROCEDURE EVALUATION
Patient: Rod Balderas    Procedure Summary     Date:  02/08/19 Room / Location:   [facility]  [facility]  [facility]    Anesthesia Start:  1430 Anesthesia Stop:  1520    Procedure:  HEMODIALYSIS CATHETER INSERTION RIGHT INTERNAL JUGULAR (N/A ) Diagnosis:      Surgeon:  Bishnu Dailey MD Provider:  Param Parsons MD    Anesthesia Type:  MAC ASA Status:  4          Anesthesia Type: MAC  Last vitals  BP   140/74 (02/08/19 1520)   Temp   97 °F (36.1 °C) (02/08/19 1520)   Pulse   83 (02/08/19 1520)   Resp   18 (02/08/19 1520)     SpO2   96 % (02/08/19 1520)     Post Anesthesia Care and Evaluation    Patient location during evaluation: ICU  Patient participation: complete - patient participated  Level of consciousness: awake and alert  Pain score: 0  Pain management: adequate  Airway patency: patent  Anesthetic complications: No anesthetic complications  PONV Status: none  Cardiovascular status: hemodynamically stable and acceptable  Respiratory status: nonlabored ventilation, acceptable and nasal cannula  Hydration status: acceptable    Comments: Transported to N203 ICU report given to Addison BARRERA. VSS. Pt received only local by surgeon. Pt nods heads appropriately, no verbalization- same as preop.

## 2019-02-08 NOTE — ANESTHESIA PREPROCEDURE EVALUATION
Anesthesia Evaluation     Patient summary reviewed and Nursing notes reviewed   no history of anesthetic complications:  NPO Solid Status: > 8 hours  NPO Liquid Status: > 8 hours           Airway   Mallampati: II  TM distance: >3 FB  Neck ROM: full  No difficulty expected  Dental - normal exam     Pulmonary - normal exam    breath sounds clear to auscultation  (+) sleep apnea,   Cardiovascular - normal exam    ECG reviewed  Rhythm: regular  Rate: normal    (+) hypertension, CAD, cardiac stents       Neuro/Psych    ROS Comment: Altered mental status, likely secondary to hyponatremia  GI/Hepatic/Renal/Endo    (+) morbid obesity,  renal disease ARF, diabetes mellitus type 2 using insulin,     Musculoskeletal     Abdominal    Substance History      OB/GYN          Other   (+) arthritis   history of cancer (Prostate ca s/p prostatectomy)    ROS/Med Hx Other: Pt lethargic and poorly responsive, hx obtained from family                  Anesthesia Plan    ASA 4     MAC     intravenous induction   Anesthetic plan, all risks, benefits, and alternatives have been provided, discussed and informed consent has been obtained with: patient.    Plan discussed with CRNA.

## 2019-02-08 NOTE — NURSING NOTE
ACC REVIEW REPORT: [facility]        PATIENT NAME: Rod Balderas    PATIENT ID: [id number]    BED: 5G / s564    BED TYPE: TELEMETRY    BED GIVEN TO:   JOSE MAYEN RN    TIME BED GIVEN:   08:30    YOB: 1950    AGE: 68    GENDER:  MALE    PREVIOUS ADMIT TO [facility]:   YES    PREVIOUS ADMISSION DATE:  01/15/2019    PATIENT CLASS:  OUTPATIENT    TODAY'S DATE: 2/8/2019    TRANSFER DATE:   02/08/2019    ETA:   11:30 - 12:30    TRANSFERRING FACILITY:  [facility]    TRANSFERRING FACILITY PHONE # :   MAIN #  [phone]     PT IS ON ACUTE CARE;  [phone]    TRANSFERRING MD:   DR ALARCON    DATE/TIME REQUEST RECEIVED:   02/07/2019 @ 18:24    [facility] RN:  ESTELLA CARPIO RN    REPORT FROM:  JOSE MAYEN RN    TIME REPORT TAKEN:  08:30    DIAGNOSIS:   RENAL FAILURE    REASON FOR TRANSFER TO [facility]:  PT WAS ADMITTED WITH RENAL FAILURE, UROSEPSIS, AND ACUTE KIDNEY INJURY. HE NEEDS A NEPHROLOGY SPECIALIST NOT AVAILABLE AT [facility]    TRANSPORTATION:   LOCAL EMS    CLINICAL REASON FOR TRANSFER TO [facility]:   MR BALDERAS IS A S/P PROSTATECTOMY OF 1/15/19 WHO PRESENTED TO THE ED AT [facility] ON 2/5/19 WITH COMPLAINTS OF NOT BEING ABLE TO VOID.  HE WAS ADMITTED WITH UROSEPSIS, RENAL FAILURE AND ACUTE KIDNEY INJURY.  YESTERDAY HIS INTAKE AND OUTPUT WERE:        1000 ml PO INTAKE,  3000 ml IV INTAKE    AND   OUTPUT  ml URINE.    HIS BUN AND CREATININE HAVE BEEN INCREASING SINCE ADMISSION AS FOLLOWS;  2/6     BUN  66                     2/8  BUN  94  2/6     CREATININE  2.2     2/8  CREATININE  5.6                                               2/8  K+ 4.3,  ,  H/H  11.4  34.1,  Glucose  135.          CLINICAL INFORMATION    HEIGHT:  5'9''    WEIGHT:  326 lbs    ALLERGIES:  SULFA and PERCOCET    CAMPUZANO:   YES, # 16 Fr 3 WAY Campuzano placed on 2/5/19 @ 19:40.  Blood tinged urine was noted and the catheter was irrigated with 250 ml Normal Saline .      OUTPUT for the last 24 hours was 250  ml.    INFECTIOUS DISEASE:  NO    ISOLATION:   NO    1ST VITAL SIGNS:   TIME:  06:09  TEMP:  97.9  PULSE:  84  B/P:  100/54  RESP:  20,  Pt has his own BI PAP he uses at night.     LAB INFORMATION:  2/8/19  Bun 94,  Creatinine 5.6,  K+ 4.3,  ,  H/H  11.4 / 34.1    CULTURE INFORMATION:   NONE    MEDS/IV FLUIDS:  IV ACCESS PER A # 22 ga angio in the Right A/C.  NORMAL SALINE @ 75 ml/hr, decreased from 125 ml/hr this a.m.    MEDICATIONS;  NAFCILLIN 2 GRAMS IV, started this a.m.    PAIN MEDICATIONS;  HYDROCODONE one q 6 hr prn general pain and MORPHINE 4 mg PRN PAIN.      CARDIAC SYSTEM:    CHEST PAIN:  NONE    RHYTHM:   NSR    Is patient taking or has patient been given any drugs that could increase bleeding?    UNKNOWN  (Plavix, Brilinta, Effient, Eliquis, Xarelto, Warfarin, Integrilin, Angiomax)      CARDIAC NOTES:   MR YUSUF HAS SEEN DR. FARIAS AT [facility] FOR HIS CARDIAC CARE.  HE HAS HTN, HYPERLIPIDEMIA, TYPE II DIABETES, OBESITY, AND SLEEP APNEA.         RESPIRATORY SYSTEM:    LUNG SOUNDS:    CLEAR:   YES    OXYGEN:  YES, BI PAP AT NIGHT.     O2 SAT:  HAS BEEN > 92%    ADMINISTRATION ROUTE:  PT IS USING HIS OWN BI PAP AT NIGHT.     RADIOLOGY RESULTS:  A COPY OF HIS CHEST X-RAY HAS BEEN REQUESTED.     RESPIRATORY STATUS:  THE PATIENT HAS HAD NO DIFFICULTY WITH HIS RESPIRATIONS.             CNS/MUSCULOSKELETAL    ALERT AND ORIENTED:    PERSON:   YES  PLACE:  YES  TIME:  YES    INJURY:  NONE:      TIM COMA SCALE:    E:   4  M:   6  V:   5      EXTREMITY WEAKNESS:    LEFT ARM:  NO  RIGHT ARM:  NO  LEFT LEG:   NO  RIGHT LEG:  NO    CNS/MUSCULOSKELETAL NOTES:  MR YUSUF HAS ORDERS FOR AMBULATION PRN WITHOUT ASSISTANCE.  HIS WIFE IS AT THE BEDSIDE.  MR YUSUF HAS REQUIRED ASSISTANCE TO POSITION IN THE BED DUE TO HIS GENERALIZED EDEMA.  HIS LOWER EXTREMITIES ARE 1+ - 2 AND HIS HANDS ARE 2+.          GI//GY      ABDOMINAL PAIN:  NO    VOMITING:  NO    DIARRHEA:   NO    NAUSEA:   NO    BOWEL SOUNDS:  YES    OCCULT STOOL:  N/A    TESTICULAR PAIN:   NONE    HEMATURIA:  NO    GI//GY NOTES:   MR YUSUF IS ON A 2000 calorie ADA DIABETIC DIET.  HIS GLUCOSE AT 07:00 THIS A.M. .    PAST MEDICAL HISTORY:  BILATERAL CATARACTS, TYPE II DIABETES, DJD, DDD, HTN, CAD, SLEEP APNEA, OBESITY, ANEMIA, PROSTATE CANCER WITH PROSTATECTOMY ON 01/15/2019 BY DR GLENN LANG  PAIN MANAGEMENT BY DR MARYURI ROOT AT [facility]  CARDIOLOGY MANAGEMENT BY [facility], DR. FARIAS  NEUROSURGERY MANAGEMENT BY DR NIRU WEBB @ [facility].      ADDITIONAL NOTES:  ON ADMISSION MR YUSUF C/O SEVERE WRIST PAIN.  X-RAYS WERE OBTAINED AND SHOWED NO FRACTURE.           Radha Villatoro RN  2/8/2019  8:22 AM

## 2019-02-09 ENCOUNTER — APPOINTMENT (OUTPATIENT)
Dept: CARDIOLOGY | Facility: HOSPITAL | Age: 69
End: 2019-02-09

## 2019-02-09 ENCOUNTER — APPOINTMENT (OUTPATIENT)
Dept: GENERAL RADIOLOGY | Facility: HOSPITAL | Age: 69
End: 2019-02-09

## 2019-02-09 ENCOUNTER — APPOINTMENT (OUTPATIENT)
Dept: NEPHROLOGY | Facility: HOSPITAL | Age: 69
End: 2019-02-09

## 2019-02-09 LAB
ALBUMIN SERPL-MCNC: 2.96 G/DL (ref 3.2–4.8)
ALBUMIN/GLOB SERPL: 1 G/DL (ref 1.5–2.5)
ALP SERPL-CCNC: 319 U/L (ref 25–100)
ALT SERPL W P-5'-P-CCNC: 33 U/L (ref 7–40)
ANION GAP SERPL CALCULATED.3IONS-SCNC: 12 MMOL/L (ref 3–11)
AST SERPL-CCNC: 46 U/L (ref 0–33)
BASOPHILS # BLD AUTO: 0.04 10*3/MM3 (ref 0–0.2)
BASOPHILS NFR BLD AUTO: 0.2 % (ref 0–1)
BH CV ECHO MEAS - AO ROOT AREA (BSA CORRECTED): 1.4
BH CV ECHO MEAS - AO ROOT AREA: 7.9 CM^2
BH CV ECHO MEAS - AO ROOT DIAM: 3.2 CM
BH CV ECHO MEAS - BSA(HAYCOCK): 2.4 M^2
BH CV ECHO MEAS - BSA: 2.2 M^2
BH CV ECHO MEAS - BZI_BMI: 40.3 KILOGRAMS/M^2
BH CV ECHO MEAS - BZI_METRIC_HEIGHT: 170.2 CM
BH CV ECHO MEAS - BZI_METRIC_WEIGHT: 116.6 KG
BH CV ECHO MEAS - EDV(CUBED): 94.3 ML
BH CV ECHO MEAS - EDV(TEICH): 95 ML
BH CV ECHO MEAS - EF(CUBED): 64.1 %
BH CV ECHO MEAS - EF(TEICH): 55.7 %
BH CV ECHO MEAS - EPSS: 0.34 CM
BH CV ECHO MEAS - ESV(CUBED): 33.9 ML
BH CV ECHO MEAS - ESV(TEICH): 42.1 ML
BH CV ECHO MEAS - FS: 28.9 %
BH CV ECHO MEAS - IVS/LVPW: 1.2
BH CV ECHO MEAS - IVSD: 1.4 CM
BH CV ECHO MEAS - LA DIMENSION: 4 CM
BH CV ECHO MEAS - LA/AO: 1.3
BH CV ECHO MEAS - LV MASS(C)D: 229.5 GRAMS
BH CV ECHO MEAS - LV MASS(C)DI: 102 GRAMS/M^2
BH CV ECHO MEAS - LVIDD: 4.6 CM
BH CV ECHO MEAS - LVIDS: 3.2 CM
BH CV ECHO MEAS - LVPWD: 1.2 CM
BH CV ECHO MEAS - MV A MAX VEL: 43.8 CM/SEC
BH CV ECHO MEAS - MV E MAX VEL: 95 CM/SEC
BH CV ECHO MEAS - MV E/A: 2.2
BH CV ECHO MEAS - PA ACC SLOPE: 869.9 CM/SEC^2
BH CV ECHO MEAS - PA ACC TIME: 0.1 SEC
BH CV ECHO MEAS - PA PR(ACCEL): 34.6 MMHG
BH CV ECHO MEAS - SI(CUBED): 26.9 ML/M^2
BH CV ECHO MEAS - SI(TEICH): 23.5 ML/M^2
BH CV ECHO MEAS - SV(CUBED): 60.4 ML
BH CV ECHO MEAS - SV(TEICH): 52.9 ML
BH CV VAS BP LEFT ARM: NORMAL MMHG
BILIRUB SERPL-MCNC: 2.9 MG/DL (ref 0.3–1.2)
BUN BLD-MCNC: 83 MG/DL (ref 9–23)
BUN/CREAT SERPL: 17.6 (ref 7–25)
CALCIUM SPEC-SCNC: 8.2 MG/DL (ref 8.7–10.4)
CHLORIDE SERPL-SCNC: 103 MMOL/L (ref 99–109)
CO2 SERPL-SCNC: 18 MMOL/L (ref 20–31)
CREAT BLD-MCNC: 4.71 MG/DL (ref 0.6–1.3)
DEPRECATED RDW RBC AUTO: 45.3 FL (ref 37–54)
EOSINOPHIL # BLD AUTO: 0.17 10*3/MM3 (ref 0–0.3)
EOSINOPHIL NFR BLD AUTO: 0.6 % (ref 0–3)
ERYTHROCYTE [DISTWIDTH] IN BLOOD BY AUTOMATED COUNT: 15.4 % (ref 11.3–14.5)
GFR SERPL CREATININE-BSD FRML MDRD: 12 ML/MIN/1.73
GLOBULIN UR ELPH-MCNC: 2.8 GM/DL
GLUCOSE BLD-MCNC: 130 MG/DL (ref 70–100)
GLUCOSE BLDC GLUCOMTR-MCNC: 122 MG/DL (ref 70–130)
GLUCOSE BLDC GLUCOMTR-MCNC: 131 MG/DL (ref 70–130)
GLUCOSE BLDC GLUCOMTR-MCNC: 134 MG/DL (ref 70–130)
GLUCOSE BLDC GLUCOMTR-MCNC: 154 MG/DL (ref 70–130)
GLUCOSE BLDC GLUCOMTR-MCNC: 155 MG/DL (ref 70–130)
HCT VFR BLD AUTO: 32.3 % (ref 38.9–50.9)
HGB BLD-MCNC: 10.9 G/DL (ref 13.1–17.5)
IMM GRANULOCYTES # BLD AUTO: 0.41 10*3/MM3 (ref 0–0.03)
IMM GRANULOCYTES NFR BLD AUTO: 1.6 % (ref 0–0.6)
LV EF 2D ECHO EST: 70 %
LYMPHOCYTES # BLD AUTO: 0.98 10*3/MM3 (ref 0.6–4.8)
LYMPHOCYTES NFR BLD AUTO: 3.7 % (ref 24–44)
MAGNESIUM SERPL-MCNC: 2.3 MG/DL (ref 1.3–2.7)
MCH RBC QN AUTO: 26.7 PG (ref 27–31)
MCHC RBC AUTO-ENTMCNC: 33.7 G/DL (ref 32–36)
MCV RBC AUTO: 79 FL (ref 80–99)
MONOCYTES # BLD AUTO: 1.3 10*3/MM3 (ref 0–1)
MONOCYTES NFR BLD AUTO: 5 % (ref 0–12)
NEUTROPHILS # BLD AUTO: 23.26 10*3/MM3 (ref 1.5–8.3)
NEUTROPHILS NFR BLD AUTO: 88.9 % (ref 41–71)
PHOSPHATE SERPL-MCNC: 6.4 MG/DL (ref 2.4–5.1)
PLATELET # BLD AUTO: 151 10*3/MM3 (ref 150–450)
PMV BLD AUTO: 9 FL (ref 6–12)
POTASSIUM BLD-SCNC: 4.2 MMOL/L (ref 3.5–5.5)
PROT SERPL-MCNC: 5.8 G/DL (ref 5.7–8.2)
RBC # BLD AUTO: 4.09 10*6/MM3 (ref 4.2–5.76)
SODIUM BLD-SCNC: 133 MMOL/L (ref 132–146)
WBC NRBC COR # BLD: 26.16 10*3/MM3 (ref 3.5–10.8)

## 2019-02-09 PROCEDURE — 87070 CULTURE OTHR SPECIMN AEROBIC: CPT | Performed by: INTERNAL MEDICINE

## 2019-02-09 PROCEDURE — 25010000002 HEPARIN (PORCINE) PER 1000 UNITS: Performed by: INTERNAL MEDICINE

## 2019-02-09 PROCEDURE — 93325 DOPPLER ECHO COLOR FLOW MAPG: CPT

## 2019-02-09 PROCEDURE — 94799 UNLISTED PULMONARY SVC/PX: CPT

## 2019-02-09 PROCEDURE — 93325 DOPPLER ECHO COLOR FLOW MAPG: CPT | Performed by: INTERNAL MEDICINE

## 2019-02-09 PROCEDURE — 25010000002 ALBUMIN HUMAN 25% PER 50 ML: Performed by: INTERNAL MEDICINE

## 2019-02-09 PROCEDURE — 93321 DOPPLER ECHO F-UP/LMTD STD: CPT | Performed by: INTERNAL MEDICINE

## 2019-02-09 PROCEDURE — 85025 COMPLETE CBC W/AUTO DIFF WBC: CPT | Performed by: INTERNAL MEDICINE

## 2019-02-09 PROCEDURE — 82962 GLUCOSE BLOOD TEST: CPT

## 2019-02-09 PROCEDURE — 93308 TTE F-UP OR LMTD: CPT | Performed by: INTERNAL MEDICINE

## 2019-02-09 PROCEDURE — 71045 X-RAY EXAM CHEST 1 VIEW: CPT

## 2019-02-09 PROCEDURE — 83735 ASSAY OF MAGNESIUM: CPT | Performed by: INTERNAL MEDICINE

## 2019-02-09 PROCEDURE — 80053 COMPREHEN METABOLIC PANEL: CPT | Performed by: INTERNAL MEDICINE

## 2019-02-09 PROCEDURE — 25010000003 CEFAZOLIN 1-4 GM/50ML-% SOLUTION

## 2019-02-09 PROCEDURE — 84100 ASSAY OF PHOSPHORUS: CPT | Performed by: INTERNAL MEDICINE

## 2019-02-09 PROCEDURE — 25010000002 LINEZOLID 600 MG/300ML SOLUTION: Performed by: INTERNAL MEDICINE

## 2019-02-09 PROCEDURE — 5A1D70Z PERFORMANCE OF URINARY FILTRATION, INTERMITTENT, LESS THAN 6 HOURS PER DAY: ICD-10-PCS | Performed by: INTERNAL MEDICINE

## 2019-02-09 PROCEDURE — 99291 CRITICAL CARE FIRST HOUR: CPT | Performed by: INTERNAL MEDICINE

## 2019-02-09 PROCEDURE — 93308 TTE F-UP OR LMTD: CPT

## 2019-02-09 PROCEDURE — 25010000002 FENTANYL CITRATE (PF) 100 MCG/2ML SOLUTION: Performed by: NURSE PRACTITIONER

## 2019-02-09 PROCEDURE — 93010 ELECTROCARDIOGRAM REPORT: CPT | Performed by: INTERNAL MEDICINE

## 2019-02-09 PROCEDURE — 25010000002 SULFUR HEXAFLUORIDE MICROSPH 60.7-25 MG RECONSTITUTED SUSPENSION: Performed by: INTERNAL MEDICINE

## 2019-02-09 PROCEDURE — 93005 ELECTROCARDIOGRAM TRACING: CPT | Performed by: INTERNAL MEDICINE

## 2019-02-09 PROCEDURE — 93321 DOPPLER ECHO F-UP/LMTD STD: CPT

## 2019-02-09 PROCEDURE — 87205 SMEAR GRAM STAIN: CPT | Performed by: INTERNAL MEDICINE

## 2019-02-09 PROCEDURE — P9047 ALBUMIN (HUMAN), 25%, 50ML: HCPCS | Performed by: INTERNAL MEDICINE

## 2019-02-09 RX ORDER — FENTANYL CITRATE 50 UG/ML
25 INJECTION, SOLUTION INTRAMUSCULAR; INTRAVENOUS
Status: DISCONTINUED | OUTPATIENT
Start: 2019-02-09 | End: 2019-02-16

## 2019-02-09 RX ORDER — LINEZOLID 2 MG/ML
600 INJECTION, SOLUTION INTRAVENOUS EVERY 12 HOURS
Status: DISCONTINUED | OUTPATIENT
Start: 2019-02-09 | End: 2019-02-11

## 2019-02-09 RX ORDER — ALBUMIN (HUMAN) 12.5 G/50ML
12.5 SOLUTION INTRAVENOUS AS NEEDED
Status: ACTIVE | OUTPATIENT
Start: 2019-02-09 | End: 2019-02-10

## 2019-02-09 RX ORDER — DILTIAZEM HYDROCHLORIDE 5 MG/ML
20 INJECTION INTRAVENOUS ONCE
Status: COMPLETED | OUTPATIENT
Start: 2019-02-09 | End: 2019-02-09

## 2019-02-09 RX ORDER — HYDROCODONE BITARTRATE AND ACETAMINOPHEN 5; 325 MG/1; MG/1
1 TABLET ORAL EVERY 6 HOURS PRN
Status: DISPENSED | OUTPATIENT
Start: 2019-02-09 | End: 2019-02-19

## 2019-02-09 RX ORDER — CASTOR OIL AND BALSAM, PERU 788; 87 MG/G; MG/G
OINTMENT TOPICAL EVERY 12 HOURS SCHEDULED
Status: DISCONTINUED | OUTPATIENT
Start: 2019-02-09 | End: 2019-02-24 | Stop reason: HOSPADM

## 2019-02-09 RX ADMIN — ALBUMIN (HUMAN) 25 G: 0.25 INJECTION, SOLUTION INTRAVENOUS at 07:35

## 2019-02-09 RX ADMIN — ALBUMIN (HUMAN) 25 G: 0.25 INJECTION, SOLUTION INTRAVENOUS at 08:30

## 2019-02-09 RX ADMIN — LINEZOLID 600 MG: 600 INJECTION, SOLUTION INTRAVENOUS at 21:04

## 2019-02-09 RX ADMIN — ALBUMIN (HUMAN) 25 G: 0.25 INJECTION, SOLUTION INTRAVENOUS at 07:39

## 2019-02-09 RX ADMIN — HEPARIN SODIUM 1900 UNITS: 1000 INJECTION, SOLUTION INTRAVENOUS; SUBCUTANEOUS at 06:09

## 2019-02-09 RX ADMIN — SODIUM CHLORIDE, PRESERVATIVE FREE 3 ML: 5 INJECTION INTRAVENOUS at 09:44

## 2019-02-09 RX ADMIN — DILTIAZEM HYDROCHLORIDE 5 MG/HR: 5 INJECTION INTRAVENOUS at 09:24

## 2019-02-09 RX ADMIN — SODIUM CHLORIDE, PRESERVATIVE FREE 3 ML: 5 INJECTION INTRAVENOUS at 21:04

## 2019-02-09 RX ADMIN — METRONIDAZOLE 500 MG: 500 INJECTION, SOLUTION INTRAVENOUS at 09:37

## 2019-02-09 RX ADMIN — FENTANYL CITRATE 25 MCG: 50 INJECTION INTRAMUSCULAR; INTRAVENOUS at 11:40

## 2019-02-09 RX ADMIN — LINEZOLID 600 MG: 600 INJECTION, SOLUTION INTRAVENOUS at 09:30

## 2019-02-09 RX ADMIN — DILTIAZEM HYDROCHLORIDE 20 MG: 5 INJECTION INTRAVENOUS at 09:27

## 2019-02-09 RX ADMIN — HYDROCODONE BITARTRATE AND ACETAMINOPHEN 1 TABLET: 5; 325 TABLET ORAL at 22:01

## 2019-02-09 RX ADMIN — AZTREONAM 500 MG: 1 INJECTION, POWDER, LYOPHILIZED, FOR SOLUTION INTRAMUSCULAR; INTRAVENOUS at 21:04

## 2019-02-09 RX ADMIN — SULFUR HEXAFLUORIDE 4 ML: KIT at 11:00

## 2019-02-09 RX ADMIN — SODIUM CHLORIDE 2 G: 9 INJECTION, SOLUTION INTRAVENOUS at 09:16

## 2019-02-09 RX ADMIN — CASTOR OIL AND BALSAM, PERU: 788; 87 OINTMENT TOPICAL at 21:09

## 2019-02-09 RX ADMIN — CEFAZOLIN SODIUM 1 G: 1 INJECTION, SOLUTION INTRAVENOUS at 21:03

## 2019-02-09 RX ADMIN — FENTANYL CITRATE 25 MCG: 50 INJECTION INTRAMUSCULAR; INTRAVENOUS at 05:53

## 2019-02-09 RX ADMIN — DILTIAZEM HYDROCHLORIDE 7.5 MG/HR: 5 INJECTION INTRAVENOUS at 20:17

## 2019-02-09 RX ADMIN — METRONIDAZOLE 500 MG: 500 INJECTION, SOLUTION INTRAVENOUS at 17:51

## 2019-02-09 NOTE — PROGRESS NOTES
[facility] Progress Note    2/8/2019      Antibiotics:  Anti-Infectives (From admission, onward)    Ordered     Dose/Rate Route Frequency Start Stop    02/08/19 1949  ceFAZolin (ANCEF) IVPB 1 g     Ordering Provider:  Juana Ivy [facility]    1 g Intravenous Every 24 Hours 02/08/19 2045 02/22/19 2044          CC: Patient unable    HPI:    Consult by Dr. Jeff Smith; covering for him :      Patient is a 68 y.o.  Yr old male with history of prostate cancer status post robotic laparoscopic prostatectomy in mid January and was discharged on 1/17/19. The patient is currently unresponsive and no family is at bedside so history is somewhat limited to history from nursing staff and medical records. The patient was seen a few days after his discharge from his recent  Admission in January and his Christina catheter was discontinued.  About one week ago he went to the ER with complaints of back pain, abdominal pain, and confusion and a CT abdomen and pelvis was apparently okay during that time. He continued to worsen and over the weekend he was admitted to [facility].  He was found to have an initial AK I with a creatinine of 2.2. Per the micro-lab at [facility], the patient's urine cultures and 1 of 2 blood cultures from 2/5/19 grew MSSA. The patient was initially on vancomycin and Zosyn and was subsequently transitioned to daptomycin after he developed worsening renal function with a creatinine up to 5.6 and BUN went up to 94 today.  Due to lack of nephrology at [facility], the patient was transferred to [facility] today for possible dialysis.  He additionally received a dose of nafcillin prior to this transfer.  His last dose of daptomycin was yesterday per pharmacist here (who has discussed with OSH). Since arrival, the patient has remained afebrile but he has had a leukocytosis up to 31.38.  He is anemic with a hemoglobin of 11.3 and a hematocrit of 54.1.  He is hyponatremic to a  "sodium of 127.  Creatinine was initially elevated 5.39 and B1 was 97 on arrival.  He additionally had elevation of his LFTs with an ALT of 43 and AST of 43, T bili of 3.0, and alkaline phosphatase of 303.  Pro-calcitonin was elevated to 7.55 lactic acid was normal at 0.8. The patient underwent CT head without contrast which did not show any acute abnormality, CT chest, abdomen and pelvis which showed consolidation of the lung bases bilaterally that is concerning for airspace disease such as pneumonia, gastric distention, and air-filled loops of colon with no obvious obstruction. Blood cultures have been ordered and a reflexive urinalysis has been ordered as well.  A transthoracic echocardiogram was ordered. The patient underwent dialysis today following right IJ dialysis catheter placement.  The infectious disease team has been consulted for antibiotic recommendations.    He has methicillin sensitive staph aureus septicemia/bacteriuria compounded by acute renal failure, acute hypoxic respiratory failure and by basilar consolidation by chest CT.  Further compounded by acute encephalopathy.    2/9/19 nursing reports A. fib/RVR, dialysis ongoing with acute renal failure and tachypneic/labored at times.  Some cough but unable to capture for culture so far.  No rash.  He can wiggle toes but not lift legs off the bed.  He moves left arm better than right arm.  No other new focal pain that he will relate although interaction minimal.  No diarrhea.  No vomiting.        ROS:      2/9/19 otherwise unable to obtain      PE:   BP 97/61 Comment: Simultaneous filing. User may be unaware of other data.  Pulse (!) 137 Comment: Simultaneous filing. User may be unaware of other data.  Temp 98 °F (36.7 °C) (Axillary)   Resp 20   Ht 170.2 cm (67\")   Wt 117 kg (257 lb 3.2 oz)   SpO2 95%   BMI 40.28 kg/m²     GENERAL: Opens eyes and follows simple commands but does not interact verbally,/labored  HEENT: Normocephalic, atraumatic. " No conjunctival injection. No icterus. Oropharynx clear without evidence of thrush or exudate. No evidence of peridontal disease.    NECK: Supple without nuchal rigidity. No mass.  LYMPH: No cervical, axillary or inguinal lymphadenopathy.  HEART: Cardiac cardiac; No murmur, rubs, gallops.  No peripheral stigmata/phenomena of endocarditis  LUNGS: Diminished at bases, labored and bilateral rhonchi  ABDOMEN: Soft, nontender, nondistended. Positive bowel sounds. No rebound or guarding. NO mass or HSM.  EXT:  No cyanosis, clubbing. No cord.  Edema noted  : Genitalia generally unremarkable.  With Christina catheter.  MSK: FROM without joint effusions noted arms/legs.    SKIN: Warm and dry without cutaneous eruptions on Inspection/palpation.    NEURO: Wiggles toes, minimally moves right arm, can lift left arm somewhat off bed.    Back with no redness bulge fluctuance or point tenderness    IV with no obvious redness or drainage    Laboratory Data    Results from last 7 days   Lab Units 02/09/19  0510 02/08/19  1240   WBC 10*3/mm3 26.16* 31.38*   HEMOGLOBIN g/dL 10.9* 11.3*   HEMATOCRIT % 32.3* 34.1*   PLATELETS 10*3/mm3 151 180     Results from last 7 days   Lab Units 02/09/19  0510   SODIUM mmol/L 133   POTASSIUM mmol/L 4.2   CHLORIDE mmol/L 103   CO2 mmol/L 18.0*   BUN mg/dL 83*   CREATININE mg/dL 4.71*   GLUCOSE mg/dL 130*   CALCIUM mg/dL 8.2*     Results from last 7 days   Lab Units 02/09/19  0510   ALK PHOS U/L 319*   BILIRUBIN mg/dL 2.9*   ALT (SGPT) U/L 33   AST (SGOT) U/L 46*               Estimated Creatinine Clearance: 18.6 mL/min (A) (by C-G formula based on SCr of 4.71 mg/dL (H)).      Microbiology:      Radiology:  Imaging Results (last 72 hours)     Procedure Component Value Units Date/Time    XR Chest 1 View [[id number]] Updated:  02/09/19 0221    CT Abdomen Pelvis Without Contrast [[id number]] Collected:  02/08/19 1822     Updated:  02/08/19 1822    Narrative:       EXAMINATION: CT CHEST WO CONTRAST, CT  ABDOMEN/PELVIS WO CONTRAST -  2/8/2019     INDICATION: Persistent cough.     TECHNIQUE: Multiple axial CT imaging is obtained of the chest, abdomen  and pelvis without the administration of oral or intravenous contrast.     The radiation dose reduction device was turned on for each scan per the  ALARA (As Low as Reasonably Achievable) protocol.     COMPARISON: There is some consolidation identified posteriorly extending  to the lung bases bilaterally suggesting infiltrates. The upper lung  fields are grossly clear. Motion artifact degrades image quality. There  are degenerative changes identified within the spine. The cardiac  chambers are enlarged. There is no pericardial effusion. No bulky hilar  or axillary lymphadenopathy. The thyroid is homogeneous. Degenerative  change is seen throughout the spine.     ABDOMEN: Motion artifact grades image quality. There is no abnormality  seen within the liver. The spleen is upper limits of normal in size.  There is gastric distention. Air fills the colon with no  evidence of  obvious obstruction. Kidneys and adrenal glands within normal limits.  The pancreas is homogeneous. No abdominal or retroperitoneal  lymphadenopathy. No abnormal mass or fluid collections identified. No  free fluid or free air.     PELVIS: Pelvic organs are unremarkable. The pelvic portion of the   gastrointestinal tract is within normal limits. No pelvic adenopathy.  Degenerative change is identified throughout the spine and pelvis.     FINDINGS: Consolidation at the lung bases bilaterally is concerning for  airspace disease such as pneumonia. The there is gastric distention.  Air-filled loops of colon with no obvious obstruction. No other abnormal  findings are seen within the abdomen or pelvis. Degenerative changes are  seen throughout the spine and pelvis.     DICTATED:   2/8/2019  EDITED/ls :   2/8/2019              Impression:               CT Chest Without Contrast [[id number]] Collected:   02/08/19 1822     Updated:  02/08/19 1822    Narrative:       EXAMINATION: CT CHEST WO CONTRAST, CT ABDOMEN/PELVIS WO CONTRAST -  2/8/2019     INDICATION: Persistent cough.     TECHNIQUE: Multiple axial CT imaging is obtained of the chest, abdomen  and pelvis without the administration of oral or intravenous contrast.     The radiation dose reduction device was turned on for each scan per the  ALARA (As Low as Reasonably Achievable) protocol.     COMPARISON: There is some consolidation identified posteriorly extending  to the lung bases bilaterally suggesting infiltrates. The upper lung  fields are grossly clear. Motion artifact degrades image quality. There  are degenerative changes identified within the spine. The cardiac  chambers are enlarged. There is no pericardial effusion. No bulky hilar  or axillary lymphadenopathy. The thyroid is homogeneous. Degenerative  change is seen throughout the spine.     ABDOMEN: Motion artifact grades image quality. There is no abnormality  seen within the liver. The spleen is upper limits of normal in size.  There is gastric distention. Air fills the colon with no  evidence of  obvious obstruction. Kidneys and adrenal glands within normal limits.  The pancreas is homogeneous. No abdominal or retroperitoneal  lymphadenopathy. No abnormal mass or fluid collections identified. No  free fluid or free air.     PELVIS: Pelvic organs are unremarkable. The pelvic portion of the   gastrointestinal tract is within normal limits. No pelvic adenopathy.  Degenerative change is identified throughout the spine and pelvis.     FINDINGS: Consolidation at the lung bases bilaterally is concerning for  airspace disease such as pneumonia. The there is gastric distention.  Air-filled loops of colon with no obvious obstruction. No other abnormal  findings are seen within the abdomen or pelvis. Degenerative changes are  seen throughout the spine and pelvis.     DICTATED:   2/8/2019  EDITED/ls :    2/8/2019              Impression:               CT Head Without Contrast [[id number]] Collected:  02/08/19 1816     Updated:  02/08/19 1817    Narrative:       EXAMINATION: CT HEAD WO CONTRAST - 2/8/2019     INDICATION: Mental status change, does not follow commands.     TECHNIQUE: Multiple axial CT imaging is obtained of the head from skull  base to skull vertex without the administration of intravenous contrast.     The radiation dose reduction device was turned on for each scan per the  ALARA (As Low as Reasonably Achievable) protocol.     COMPARISON: NONE     FINDINGS: There is atrophy of the brain. Some low-density area seen in  the periventricular white matter suggesting chronic small vessel  ischemic change. No hemorrhage or hydrocephalus. No mass, mass effect,  or midline shift. No abnormal extra-axial  fluid collections identified. Minimal mucosal thickening of the  maxillary sinuses and ethmoid air cells. The bony structures are  unremarkable. The mastoid air cells are patent.       Impression:       Chronic changes identified within the brain with no acute  intracranial abnormality.     DICTATED:   2/8/2019  EDITED/ls :   2/8/2019        FL C Arm During Surgery [[id number]] Collected:  02/08/19 1646     Updated:  02/08/19 1646    Narrative:       EXAMINATION: FL C ARM DURING SURGERY- 02/08/2019      INDICATION: Dialysis catheter placement     COMPARISON: NONE     FINDINGS: 4 seconds of fluoroscopy and 4 images used for right-sided  dialysis catheter placement. Please see the procedure report for full  details.       Impression:       Fluoroscopy for dialysis catheter placement. Please see the  procedure report for full details.      D:  02/08/2019  E:  02/08/2019       XR Chest 1 View [[id number]] Collected:  02/08/19 1628     Updated:  02/08/19 1628    Narrative:       EXAMINATION: XR CHEST 1 VW-02/08/2019:      INDICATION: RIJ line.      COMPARISON: 02/08/2019.     FINDINGS: Portable chest reveals low lung  volumes. Deep line catheter  identified on the right with tip in the SVC. Degenerative changes seen  within the spine. The heart is borderline enlarged. Mild prominence seen  of the pulmonary vascularity. No pleural effusion or pneumothorax.           Impression:       Prominence of the pulmonary vascularity with deep line  catheter identified on the right with tip in the SVC. No evidence of  acute parenchymal disease.     D:  02/08/2019  E:  02/08/2019             XR Chest 1 View [[id number]] Collected:  02/08/19 1243     Updated:  02/08/19 1246    Narrative:          EXAMINATION: XR CHEST 1 VW-      INDICATION: respiratory failure      COMPARISON: 01/08/2019     FINDINGS: Heart is borderline enlarged. The vasculature is cephalized.  Lung volumes are relatively low. There is mild discoid atelectasis in  both medial lung bases, but no focal disease elsewhere..           Impression:       Low lung volumes, borderline cardiomegaly and mild pulmonary  vascular congestion. Mild bibasilar discoid atelectasis, new from prior  study.     This report was finalized on 2/8/2019 12:44 PM by DR. Antione Damon MD.               Impression:     --Acute severe sepsis/septic shock with multiorgan system insufficiency.  Methicillin sensitive staph aureus in blood/urine cultures and bilateral pulmonary infiltrates with potential bilateral pneumonia, HCAP/Aspiration -v- all MSSA; 9 to obtain sputum culture and in the meantime maintain broader coverage for potential nosocomial pathogens until further information given critical/tenuous status and high risk for further serious morbidity and other serious sequela/mortality    --Acute MSSA septicemia/bacteremia and bacteriuria.  Transthoracic echocardiogram report pending.  No peripheral stigmata of endocarditis but certainly at risk.  May require KLAUDIA.    --Acute hypoxic respiratory failure.  Potentially multifactorial, infection versus edema versus ARDS versus combination of these.  Bilateral  "consolidations with possible evolving pneumonia, HCAP/aspiration -v- MSSA; collect sputum, monitor and consider de-escalation depending on culture data and clinical course    --Acute renal failure.  Ongoing dialysis.    --Severe extremity weakness with presentation that include back pain and unclear if spinal/paraspinal infection versus other.  Nursing/radiology/Dr. Olvera to  discussed potential for MRI if able.  Some question of foreign body at head that may limit ability to do MRI.  If unable to do MRI of spine then likely CT scan to help exclude abscess or other paraspinal focus    --Abnormal liver function tests.  Monitor    --Acute atrial fibrillation/RVR    --Prostate cancer with history recent prostatectomy.  Urology following    --Acute encephalopathy.  Presumed toxic/metabolic present.  Further neurologic workup at discretion of critical care team      PLAN:    --IV cefazolin.  Zyvox/Azactam and Flagyl for now until further culture data    --Check/review labs cultures and scans    --Discussed with Dr. Olvera, pharmacy, nursing    --Spinal imaging, likely cervical/thoracic/lumbosacral with MRI if possible.  If MRI not possible than CT scans.    --History per nursing staff    --Discussed her microbiology    --Critically ill with high risk for further serious morbidity and other serious sequela/mortality          Time IN 07:50  Time OUT 08:30  CCT 40\"     Efrme Santana MD  2/9/2019      "

## 2019-02-09 NOTE — PROGRESS NOTES
INTENSIVIST   PROGRESS NOTE     Hospital:  LOS: 1 day      S     Mr. Rod Balderas, 68 y.o. male is followed for:      Sepsis with MODS    T2DM    As an Intensivist, we provide an integrated approach to the ICU patient and family, medical management of comorbid conditions, including but not limited to electrolytes, glycemic control, organ dysfunction, lead interdisciplinary rounds and coordinate the care with all other services, including those from other specialists.     Interval History:  HD X 2 h yesterday. He tolerated well.    HD X 3.5 h today.  A Fib episode this morning ~ 8 am, while on HD. Cardizem gtt started and eventually converted to NSR.    More alert than yesterday, and able to repeat my name.     The patient's relevant past medical, surgical and social history were reviewed and updated in Epic as appropriate.     ROS:   Constitutional: Negative for fever.   Respiratory: Negative for dyspnea.   Cardiovascular: Negative for chest pain.   Gastrointestinal: Negative for  nausea, vomiting and diarrhea.        O     Vitals:  Temp: 97.5 °F (36.4 °C) (02/09/19 1200) Temp  Min: 97 °F (36.1 °C)  Max: 98.3 °F (36.8 °C)   BP: 132/82 (02/09/19 1400) BP  Min: 79/54  Max: 149/75   Pulse: 84 (02/09/19 1400) Pulse  Min: 76  Max: 160   Resp: 20 (02/09/19 1400) Resp  Min: 16  Max: 24   SpO2: 97 % (02/09/19 1400) SpO2  Min: 93 %  Max: 99 %   Device: nasal cannula (02/09/19 1400)    Flow Rate: 3 (02/09/19 1400) Flow (L/min)  Min: 3  Max: 3     Intake/Ouptut 24 hrs (7:00AM - 6:59 AM)  Intake/Output       02/08/19 0700 - 02/09/19 0659 02/09/19 0700 - 02/10/19 0659    Intake (ml) 351 667.6    Output (ml) 1590 580    Net (ml) -1239 87.6    Last Weight  117 kg (257 lb 3.2 oz)  117 kg (257 lb)           Medications (drips):    diltiaZEM Last Rate: 7.5 mg/hr (02/09/19 1015)   Pharmacy Consult      Physical Examination  Telemetry:  Rhythm: normal sinus rhythm (02/09/19 1400)  Atrial Rhythm: atrial fibrillation (02/09/19 1200)       Constitutional:  No acute distress.   Cardiovascular: Normal rate, regular and rhythm. Normal heart sounds.  No murmurs, gallop or rub.   Respiratory: No respiratory distress. Normal respiratory effort.  Rhonchi.    Abdominal:  Soft. No masses. Non-tender. No distension. No HSM.   Extremities: No digital cyanosis. No clubbing.  (+) edema.   Neurological:   Eyes open, but no focus.  He does not follow commands.  Best Eye Response: 4-->(E4) spontaneous (02/09/19 1400)  Best Motor Response: 6-->(M6) obeys commands (02/09/19 1400)  Best Verbal Response: 4-->(V4) confused (02/09/19 1400)  Cabot Coma Scale Score: 14 (02/09/19 1400)   Lines/Drains/Airways: RIJ Tunneled Dyalisis Catheter  Christina       Hematology:  Results from last 7 days   Lab Units 02/09/19  0510 02/08/19  1240   WBC 10*3/mm3 26.16* 31.38*   HEMOGLOBIN g/dL 10.9* 11.3*   MCV fL 79.0* 81.0   PLATELETS 10*3/mm3 151 180       Chemistry:  Estimated Creatinine Clearance: 18.6 mL/min (A) (by C-G formula based on SCr of 4.71 mg/dL (H)).    Results from last 7 days   Lab Units 02/09/19  0510 02/08/19  1240   SODIUM mmol/L 133 127*   POTASSIUM mmol/L 4.2 4.5   CHLORIDE mmol/L 103 100   CO2 mmol/L 18.0* 17.0*   ANION GAP mmol/L 12.0* 10.0   GLUCOSE mg/dL 130* 146*   CALCIUM mg/dL 8.2* 8.5*     Results from last 7 days   Lab Units 02/09/19  0510 02/08/19  1240   BUN mg/dL 83* 97*   CREATININE mg/dL 4.71* 5.39*     Results from last 7 days   Lab Units 02/09/19  0510   MAGNESIUM mg/dL 2.3   PHOSPHORUS mg/dL 6.4*       Hepatic:  Results from last 7 days   Lab Units 02/09/19  0510 02/08/19  1240   ALK PHOS U/L 319* 303*   BILIRUBIN mg/dL 2.9* 3.0*   ALT (SGPT) U/L 33 43*   AST (SGOT) U/L 46* 43*   ALBUMIN g/dL 2.96* 2.95*       Lab Results   Lab Value Date/Time    PROCALCITO 7.55 (H) 02/08/2019 1240       Lab Results   Lab Value Date/Time    BLOODCX No growth at 24 hours 02/08/2019 1240       Lab Results   Lab Value Date/Time    URINECX >100,000 CFU/mL Staphylococcus  aureus (A) 02/08/2019 1826       No results found for: RESPCX      Images:  Ct Abdomen Pelvis Without Contrast    Result Date: 2/9/2019    This report was finalized on 2/9/2019 10:01 AM by Dr. Radha Muhammad MD.      Ct Head Without Contrast    Result Date: 2/9/2019  Chronic changes identified within the brain with no acute intracranial abnormality.  DICTATED:   2/8/2019 EDITED/ls :   2/8/2019  This report was finalized on 2/9/2019 10:01 AM by Dr. Radha Muhammad MD.      Ct Chest Without Contrast    Result Date: 2/9/2019    This report was finalized on 2/9/2019 10:01 AM by Dr. Radha Muhammad MD.      Xr Chest 1 View    Result Date: 2/9/2019  Improvement seen of the pulmonary vascularity in the interval. Mild increased markings identified lung bases bilaterally.  DICTATED:   2/9/2019 EDITED/ls :   2/9/2019       Xr Chest 1 View    Result Date: 2/9/2019  Prominence of the pulmonary vascularity with deep line catheter identified on the right with tip in the SVC. No evidence of acute parenchymal disease.  D:  02/08/2019 E:  02/08/2019  This report was finalized on 2/9/2019 10:07 AM by Dr. Radha Muhammad MD.      Xr Chest 1 View    Result Date: 2/8/2019  Low lung volumes, borderline cardiomegaly and mild pulmonary vascular congestion. Mild bibasilar discoid atelectasis, new from prior study.  This report was finalized on 2/8/2019 12:44 PM by DR. Antione Damon MD.      Fl C Arm During Surgery    Result Date: 2/9/2019  Fluoroscopy for dialysis catheter placement. Please see the procedure report for full details.   D:  02/08/2019 E:  02/08/2019  This report was finalized on 2/9/2019 10:07 AM by Dr. Radha Muhammad MD.        Echo:  Results for orders placed during the hospital encounter of 02/08/19   Adult Transthoracic Echo Limited W/ Cont if Necessary Per Protocol    Narrative · Estimated EF = 70%.  · Left ventricular wall thickness is consistent with mild concentric   hypertrophy.  · Incidental atrial  fibrillation noted.  · Poor valvular echoes , technically limited.          Results: Reviewed.  I reviewed the patient's new laboratory and imaging results.  I independently reviewed the patient's new images.    Medications: Reviewed.    Assessment/Plan   A / P     68 y.o.male, admitted on 2/8/2019 with Acute renal failure (ARF) (CMS/HCC) [N17.9]: Transferred from [facility] where he was admitted on 2/5/19    1. Sepsis with MODS  1. Elevated PCT on admission to [facility]  2. BC positive and Urine Cx positive for MSSA at [facility] (2/5/2019)  3. Urine Cx ([facility] 2/8/19): Staph   4. Renal failure  1. NOE  1. Cr went from 2.2 (on 2/6) to 5.6 (on 2/8)  2. R/o Medications: NSAIDs and/or ACEI.  3. S/p Robotic Laparoscopic Prostatectomy 1/15/19 (due to Prostate cancer)  4. Hematuria on admission to [facility]  5. Encephalopathy  1. Improving, dramatically, since yesterday.  2. Most likely Toxic metabolic  6. Respiratory failure  1. TANIYA on BiPAP at home  2. Bibasilar consolidation as per CT Scan (2/8/19)  7. Leukocytosis.  8. Liver  1. Elevated ALKP, Bilirubin and mild elevation of transaminases  2. CAD s/p stents 2012  3. P AFib  1. Dilitazem, started 2/9/19 and converted to NSR  4. Dyslipidemia  5. Obesity III. Body mass index is 40.24 kg/m².   6. Antibiotics: AZT, Cefazolin, Linezolid, Metronidazole} Per ID  7. PMH: HTN  8. PMH: Spinal stenosis with neurogenic claudication  9. T2DM    Results from last 7 days   Lab Units 02/09/19  1149 02/09/19  0604 02/09/19  0001 02/08/19  1741 02/08/19  1400   GLUCOSE mg/dL 155* 122 131* 119 127     Lab Results   Lab Value Date/Time    HGBA1C 5.70 (H) 01/08/2019 1524       Nutrition Support: Patient isn't on Tube Feeding   Modulars: Patient doesn't have any tube feeding modular orders   Diet: NPO Diet   Advance Directives: Code Status and Medical Interventions:   Ordered at: 02/08/19 1208     Code Status:    CPR     Medical Interventions (Level of Support Prior to Arrest):     Full          Assessment / Plan:    1. Images studies of the spine  2. Leukocytosis improving.  3. ECHO done today, hyperdynamic, No obvious vegetation, but limited views. May need KLAUDIA.  4. Discussed with DR. Santana.  5. Disposition: Keep in ICU.        Plan of care and goals reviewed during interdisciplinary rounds.  Level of Risk is High due to:  illness with threat to life or bodily function.   I discussed the patient's findings and my recommendations with patient    Time: was greater than 31 minutes.    (This excludes time spent performing separately reportable procedures and services).  Patient was at high risk of imminent or life-threatening deterioration in his condition due to CNS dysfunction, Respiratory failure and Renal failure.     Jamal Villar MD, FACP, FCCP, CNSC  Intensive Care Medicine, Nutrition Support and Pulmonary Medicine

## 2019-02-09 NOTE — PLAN OF CARE
Problem: Patient Care Overview  Goal: Plan of Care Review  Outcome: Ongoing (interventions implemented as appropriate)   02/09/19 1116   Coping/Psychosocial   Plan of Care Reviewed With patient;family   Plan of Care Review   Progress improving   OTHER   Outcome Summary Patient consult for wounds to bilateral gluteals (POA)-several DTPI's noted on bilateral gluteals, bilateral posterior upper thighs, bilateral inner thighs-Venelex ordered-PT Wound Care consulted for possible MIST therapy-DTPI's mostly linear in nature. Patient on bariatric AICHA. All interventions in place and implemented-WOC will continue to follow-please consult for questions or concerns. See skin care orders and LDA's.

## 2019-02-09 NOTE — PLAN OF CARE
Problem: Patient Care Overview  Goal: Plan of Care Review  Outcome: Ongoing (interventions implemented as appropriate)   02/09/19 0622   Coping/Psychosocial   Plan of Care Reviewed With spouse;patient   Plan of Care Review   Progress improving   OTHER   Outcome Summary Pt more awake, talking this am; c/o pain in RLE; VSS overnight, afebrile; 150 ml urine output - ty w/ pus like appearance at times; receiving HD this am       Problem: Skin Injury Risk (Adult)  Goal: Skin Health and Integrity  Outcome: Ongoing (interventions implemented as appropriate)      Problem: Fall Risk (Adult)  Goal: Absence of Fall  Outcome: Ongoing (interventions implemented as appropriate)      Problem: Hemodialysis (Adult)  Goal: Signs and Symptoms of Listed Potential Problems Will be Absent, Minimized or Managed (Hemodialysis)  Outcome: Ongoing (interventions implemented as appropriate)      Problem: Confusion, Acute (Adult)  Goal: Cognitive/Functional Impairments Minimized  Outcome: Ongoing (interventions implemented as appropriate)    Goal: Safety  Outcome: Ongoing (interventions implemented as appropriate)

## 2019-02-09 NOTE — PROGRESS NOTES
"   LOS: 1 day    Patient Care Team:  Tyrese Leyva MD as PCP - General (Family Medicine)  Rod Regalado MD as Consulting Physician (Cardiology)  Myles Rossi MD as Consulting Physician (Anesthesiology)  Juanito Grace Jr., MD as Consulting Physician (Urology)    Reason For Visit:  F/U NOE  Subjective           Review of Systems:    Pulm: No soa   CV:  No CP      Objective       aztreonam 500 mg Intravenous Q6H   ceFAZolin 1 g Intravenous Q24H   insulin lispro 0-7 Units Subcutaneous 4x Daily With Meals & Nightly   Linezolid 600 mg Intravenous Q12H   metroNIDAZOLE 500 mg Intravenous Q8H   sodium chloride 3 mL Intravenous Q12H       diltiaZEM 5-15 mg/hr Last Rate: 7.5 mg/hr (02/09/19 0947)   Pharmacy Consult           Vital Signs:  Blood pressure 105/79, pulse (!) 130, temperature 98 °F (36.7 °C), temperature source Axillary, resp. rate 20, height 170.2 cm (67\"), weight 117 kg (257 lb 3.2 oz), SpO2 95 %.    Flowsheet Rows      First Filed Value   Admission Height  170.2 cm (67\") Documented at 02/08/2019 1115   Admission Weight  117 kg (257 lb 3.2 oz) Documented at 02/08/2019 1115          02/08 0701 - 02/09 0700  In: 351 [I.V.:201]  Out: 1590 [Urine:590]    Physical Exam:    General Appearance: NAD, MORE alert. STILL SOME CONFUSION.  Eyes: PER, conjunctivae and sclerae normal, no icterus  Lungs: OCC RHONCHI  Heart/CV: regular rhythm & normal rate, no murmur, no gallop, no rub and TR edema  Abdomen: not distended, soft, non-tender, no masses,  bowel sounds present  Skin: No rash, Warm and dry. TUNNELED HD CATH.    Radiology:            Labs:  Results from last 7 days   Lab Units 02/09/19  0510 02/08/19  1240   WBC 10*3/mm3 26.16* 31.38*   HEMOGLOBIN g/dL 10.9* 11.3*   HEMATOCRIT % 32.3* 34.1*   PLATELETS 10*3/mm3 151 180     Results from last 7 days   Lab Units 02/09/19  0510 02/08/19  1240   SODIUM mmol/L 133 127*   POTASSIUM mmol/L 4.2 4.5   CHLORIDE mmol/L 103 100   CO2 mmol/L 18.0* 17.0* "   BUN mg/dL 83* 97*   CREATININE mg/dL 4.71* 5.39*   CALCIUM mg/dL 8.2* 8.5*   PHOSPHORUS mg/dL 6.4*  --    MAGNESIUM mg/dL 2.3  --    ALBUMIN g/dL 2.96* 2.95*     Results from last 7 days   Lab Units 02/09/19  0510   GLUCOSE mg/dL 130*       Results from last 7 days   Lab Units 02/09/19  0510   ALK PHOS U/L 319*   BILIRUBIN mg/dL 2.9*   ALT (SGPT) U/L 33   AST (SGOT) U/L 46*     Results from last 7 days   Lab Units 02/08/19  1540   PH, ARTERIAL pH units 7.276*   PO2 ART mm Hg 85.0   PCO2, ARTERIAL mm Hg 38.4   HCO3 ART mmol/L 17.8*       Results from last 7 days   Lab Units 02/08/19  1826   COLOR UA  Jessica*   CLARITY UA  Turbid*   PH, URINE  6.5   SPECIFIC GRAVITY, URINE  1.015   GLUCOSE UA  Negative   KETONES UA  15 mg/dL (1+)*   BILIRUBIN UA  Small (1+)*   PROTEIN UA  100 mg/dL (2+)*   BLOOD UA  Large (3+)*   LEUKOCYTES UA  Large (3+)*   NITRITE UA  Positive*       Estimated Creatinine Clearance: 18.6 mL/min (A) (by C-G formula based on SCr of 4.71 mg/dL (H)).      Assessment       Renal failure    Spinal stenosis, lumbar region, with neurogenic claudication    Morbid obesity due to excess calories (CMS/HCC)    TANIYA on CPAP    Dyslipidemia    DM (diabetes mellitus), type 2 (CMS/HCC)    Metabolic encephalopathy            Impression: NONOLIGURIC NOE. ANEMIA.            Recommendations: 2ND HD TX COMPLETED TODAY.      Jr Banks MD  02/09/19  10:11 AM

## 2019-02-09 NOTE — PLAN OF CARE
Problem: Patient Care Overview  Goal: Plan of Care Review  Outcome: Ongoing (interventions implemented as appropriate)   02/09/19 1620   Coping/Psychosocial   Plan of Care Reviewed With patient;spouse   Plan of Care Review   Progress improving   OTHER   Outcome Summary Patient received dialysis this am. At 0750 noted patient's BP lowering and HR elevated and showing Afib with RVR. All team member aware. Eventually started on diltiazem. Difficult to increase gtt due to BP dropping. At 1257 patient converted back into SR. Mentation much improved in the afternoon. Joking with family and staff. Answering questions appropriately. Restarted CBI in the am due to [place] clogging from pus that was coming from bladder. CBI running slowly and urine appears to be clearing up slightly. Diltiazem remains at 7.5mg /hr. Appears to be tolerating it well. Wife at bedside. Started on clear fluids. Reminded family and patient to drink slowly for now. Appears to be swallowing well. Cough productive for thick, tan sputum. Specimen sent. MRI not to be done due to metal fragment in forehead being too close to his brain. Possible CT instead.       Problem: Skin Injury Risk (Adult)  Goal: Skin Health and Integrity  Outcome: Ongoing (interventions implemented as appropriate)      Problem: Fall Risk (Adult)  Goal: Absence of Fall  Outcome: Ongoing (interventions implemented as appropriate)      Problem: Hemodialysis (Adult)  Goal: Signs and Symptoms of Listed Potential Problems Will be Absent, Minimized or Managed (Hemodialysis)  Outcome: Ongoing (interventions implemented as appropriate)      Problem: Confusion, Acute (Adult)  Goal: Cognitive/Functional Impairments Minimized  Outcome: Ongoing (interventions implemented as appropriate)    Goal: Safety  Outcome: Outcome(s) achieved Date Met: 02/09/19

## 2019-02-10 ENCOUNTER — APPOINTMENT (OUTPATIENT)
Dept: CT IMAGING | Facility: HOSPITAL | Age: 69
End: 2019-02-10

## 2019-02-10 LAB
ALBUMIN SERPL-MCNC: 3.05 G/DL (ref 3.2–4.8)
ALBUMIN/GLOB SERPL: 1.2 G/DL (ref 1.5–2.5)
ALP SERPL-CCNC: 300 U/L (ref 25–100)
ALT SERPL W P-5'-P-CCNC: 23 U/L (ref 7–40)
ANION GAP SERPL CALCULATED.3IONS-SCNC: 12 MMOL/L (ref 3–11)
AST SERPL-CCNC: 46 U/L (ref 0–33)
BACTERIA SPEC AEROBE CULT: ABNORMAL
BASOPHILS # BLD MANUAL: 0 10*3/MM3 (ref 0–0.2)
BASOPHILS NFR BLD AUTO: 0 % (ref 0–1)
BILIRUB SERPL-MCNC: 2.6 MG/DL (ref 0.3–1.2)
BUN BLD-MCNC: 64 MG/DL (ref 9–23)
BUN/CREAT SERPL: 18 (ref 7–25)
CALCIUM SPEC-SCNC: 8.2 MG/DL (ref 8.7–10.4)
CHLORIDE SERPL-SCNC: 102 MMOL/L (ref 99–109)
CO2 SERPL-SCNC: 21 MMOL/L (ref 20–31)
CREAT BLD-MCNC: 3.55 MG/DL (ref 0.6–1.3)
DEPRECATED RDW RBC AUTO: 44 FL (ref 37–54)
EOSINOPHIL # BLD MANUAL: 0 10*3/MM3 (ref 0.1–0.3)
EOSINOPHIL NFR BLD MANUAL: 0 % (ref 0–3)
ERYTHROCYTE [DISTWIDTH] IN BLOOD BY AUTOMATED COUNT: 15.2 % (ref 11.3–14.5)
GFR SERPL CREATININE-BSD FRML MDRD: 17 ML/MIN/1.73
GLOBULIN UR ELPH-MCNC: 2.7 GM/DL
GLUCOSE BLD-MCNC: 142 MG/DL (ref 70–100)
GLUCOSE BLDC GLUCOMTR-MCNC: 137 MG/DL (ref 70–130)
GLUCOSE BLDC GLUCOMTR-MCNC: 146 MG/DL (ref 70–130)
GLUCOSE BLDC GLUCOMTR-MCNC: 146 MG/DL (ref 70–130)
GLUCOSE BLDC GLUCOMTR-MCNC: 149 MG/DL (ref 70–130)
GLUCOSE BLDC GLUCOMTR-MCNC: 177 MG/DL (ref 70–130)
HCT VFR BLD AUTO: 30.9 % (ref 38.9–50.9)
HGB BLD-MCNC: 10.5 G/DL (ref 13.1–17.5)
HYPOCHROMIA BLD QL: ABNORMAL
LYMPHOCYTES # BLD MANUAL: 0.81 10*3/MM3 (ref 0.6–4.8)
LYMPHOCYTES NFR BLD MANUAL: 4 % (ref 24–44)
LYMPHOCYTES NFR BLD MANUAL: 5 % (ref 0–12)
MCH RBC QN AUTO: 26.6 PG (ref 27–31)
MCHC RBC AUTO-ENTMCNC: 34 G/DL (ref 32–36)
MCV RBC AUTO: 78.4 FL (ref 80–99)
METAMYELOCYTES NFR BLD MANUAL: 2 % (ref 0–0)
MICROCYTES BLD QL: ABNORMAL
MONOCYTES # BLD AUTO: 1.02 10*3/MM3 (ref 0–1)
NEUTROPHILS # BLD AUTO: 18.1 10*3/MM3 (ref 1.5–8.3)
NEUTROPHILS NFR BLD MANUAL: 86 % (ref 41–71)
NEUTS BAND NFR BLD MANUAL: 3 % (ref 0–5)
PHOSPHATE SERPL-MCNC: 5.8 MG/DL (ref 2.4–5.1)
PLAT MORPH BLD: NORMAL
PLATELET # BLD AUTO: 127 10*3/MM3 (ref 150–450)
PMV BLD AUTO: 8.9 FL (ref 6–12)
POTASSIUM BLD-SCNC: 3.6 MMOL/L (ref 3.5–5.5)
PROCALCITONIN SERPL-MCNC: 4.77 NG/ML
PROT SERPL-MCNC: 5.7 G/DL (ref 5.7–8.2)
RBC # BLD AUTO: 3.94 10*6/MM3 (ref 4.2–5.76)
SODIUM BLD-SCNC: 135 MMOL/L (ref 132–146)
WBC MORPH BLD: NORMAL
WBC NRBC COR # BLD: 20.34 10*3/MM3 (ref 3.5–10.8)

## 2019-02-10 PROCEDURE — 72131 CT LUMBAR SPINE W/O DYE: CPT

## 2019-02-10 PROCEDURE — 72128 CT CHEST SPINE W/O DYE: CPT

## 2019-02-10 PROCEDURE — 25010000002 FENTANYL CITRATE (PF) 100 MCG/2ML SOLUTION: Performed by: NURSE PRACTITIONER

## 2019-02-10 PROCEDURE — 80053 COMPREHEN METABOLIC PANEL: CPT | Performed by: INTERNAL MEDICINE

## 2019-02-10 PROCEDURE — 72125 CT NECK SPINE W/O DYE: CPT

## 2019-02-10 PROCEDURE — 94799 UNLISTED PULMONARY SVC/PX: CPT

## 2019-02-10 PROCEDURE — 82962 GLUCOSE BLOOD TEST: CPT

## 2019-02-10 PROCEDURE — 85007 BL SMEAR W/DIFF WBC COUNT: CPT | Performed by: INTERNAL MEDICINE

## 2019-02-10 PROCEDURE — 94660 CPAP INITIATION&MGMT: CPT

## 2019-02-10 PROCEDURE — 99233 SBSQ HOSP IP/OBS HIGH 50: CPT | Performed by: INTERNAL MEDICINE

## 2019-02-10 PROCEDURE — 25010000002 LINEZOLID 600 MG/300ML SOLUTION: Performed by: INTERNAL MEDICINE

## 2019-02-10 PROCEDURE — 85027 COMPLETE CBC AUTOMATED: CPT | Performed by: INTERNAL MEDICINE

## 2019-02-10 PROCEDURE — 84100 ASSAY OF PHOSPHORUS: CPT | Performed by: INTERNAL MEDICINE

## 2019-02-10 PROCEDURE — 84145 PROCALCITONIN (PCT): CPT | Performed by: INTERNAL MEDICINE

## 2019-02-10 PROCEDURE — 63710000001 INSULIN LISPRO (HUMAN) PER 5 UNITS: Performed by: INTERNAL MEDICINE

## 2019-02-10 PROCEDURE — 25010000003 CEFAZOLIN 1-4 GM/50ML-% SOLUTION

## 2019-02-10 RX ADMIN — METRONIDAZOLE 500 MG: 500 INJECTION, SOLUTION INTRAVENOUS at 08:41

## 2019-02-10 RX ADMIN — SODIUM CHLORIDE, PRESERVATIVE FREE 3 ML: 5 INJECTION INTRAVENOUS at 11:19

## 2019-02-10 RX ADMIN — HYDROCODONE BITARTRATE AND ACETAMINOPHEN 1 TABLET: 5; 325 TABLET ORAL at 11:51

## 2019-02-10 RX ADMIN — LINEZOLID 600 MG: 600 INJECTION, SOLUTION INTRAVENOUS at 11:18

## 2019-02-10 RX ADMIN — INSULIN LISPRO 2 UNITS: 100 INJECTION, SOLUTION INTRAVENOUS; SUBCUTANEOUS at 02:37

## 2019-02-10 RX ADMIN — LINEZOLID 600 MG: 600 INJECTION, SOLUTION INTRAVENOUS at 21:32

## 2019-02-10 RX ADMIN — FENTANYL CITRATE 25 MCG: 50 INJECTION INTRAMUSCULAR; INTRAVENOUS at 23:46

## 2019-02-10 RX ADMIN — CEFAZOLIN SODIUM 1 G: 1 INJECTION, SOLUTION INTRAVENOUS at 19:52

## 2019-02-10 RX ADMIN — SODIUM CHLORIDE, PRESERVATIVE FREE 3 ML: 5 INJECTION INTRAVENOUS at 20:27

## 2019-02-10 RX ADMIN — FENTANYL CITRATE 25 MCG: 50 INJECTION INTRAMUSCULAR; INTRAVENOUS at 20:26

## 2019-02-10 RX ADMIN — AZTREONAM 500 MG: 1 INJECTION, POWDER, LYOPHILIZED, FOR SOLUTION INTRAMUSCULAR; INTRAVENOUS at 21:32

## 2019-02-10 RX ADMIN — CASTOR OIL AND BALSAM, PERU: 788; 87 OINTMENT TOPICAL at 20:26

## 2019-02-10 RX ADMIN — AZTREONAM 500 MG: 1 INJECTION, POWDER, LYOPHILIZED, FOR SOLUTION INTRAMUSCULAR; INTRAVENOUS at 11:16

## 2019-02-10 RX ADMIN — FENTANYL CITRATE 25 MCG: 50 INJECTION INTRAMUSCULAR; INTRAVENOUS at 11:14

## 2019-02-10 RX ADMIN — HYDROCODONE BITARTRATE AND ACETAMINOPHEN 1 TABLET: 5; 325 TABLET ORAL at 22:27

## 2019-02-10 RX ADMIN — METRONIDAZOLE 500 MG: 500 INJECTION, SOLUTION INTRAVENOUS at 18:08

## 2019-02-10 RX ADMIN — METRONIDAZOLE 500 MG: 500 INJECTION, SOLUTION INTRAVENOUS at 02:35

## 2019-02-10 RX ADMIN — CASTOR OIL AND BALSAM, PERU: 788; 87 OINTMENT TOPICAL at 08:40

## 2019-02-10 RX ADMIN — FENTANYL CITRATE 25 MCG: 50 INJECTION INTRAMUSCULAR; INTRAVENOUS at 22:12

## 2019-02-10 RX ADMIN — FENTANYL CITRATE 25 MCG: 50 INJECTION INTRAMUSCULAR; INTRAVENOUS at 21:32

## 2019-02-10 NOTE — PROGRESS NOTES
"   LOS: 2 days    Patient Care Team:  Tyrese Leyva MD as PCP - General (Family Medicine)  Rod Regalado MD as Consulting Physician (Cardiology)  Myles Rossi MD as Consulting Physician (Anesthesiology)  Juanito Grace Jr., MD as Consulting Physician (Urology)    Reason For Visit:  F/U NOE  Subjective           Review of Systems:    Pulm: No soa   CV:  No CP      Objective       aztreonam 500 mg Intravenous Q12H   castor oil-balsam peru  Topical Q12H   ceFAZolin 1 g Intravenous Q24H   insulin lispro 0-7 Units Subcutaneous 4x Daily With Meals & Nightly   Linezolid 600 mg Intravenous Q12H   metroNIDAZOLE 500 mg Intravenous Q8H   sodium chloride 3 mL Intravenous Q12H       diltiaZEM 5-15 mg/hr Last Rate: 7.5 mg/hr (02/09/19 2017)   Pharmacy Consult           Vital Signs:  Blood pressure 132/72, pulse 68, temperature 98.6 °F (37 °C), temperature source Axillary, resp. rate 20, height 170.2 cm (67.01\"), weight 117 kg (257 lb), SpO2 97 %.    Flowsheet Rows      First Filed Value   Admission Height  170.2 cm (67\") Documented at 02/08/2019 1115   Admission Weight  117 kg (257 lb 3.2 oz) Documented at 02/08/2019 1115          02/09 0701 - 02/10 0700  In: 1755.4 [P.O.:370; I.V.:285.4]  Out: 1500 [Urine:800]    Physical Exam:    General Appearance: NAD, alert and cooperative, Ox3  Eyes: PER, conjunctivae and sclerae normal, no icterus  Lungs: OCC RHONCHI  Heart/CV: regular rhythm & normal rate, no murmur, no gallop, no rub and TR edema  Abdomen: not distended, soft, non-tender, no masses,  bowel sounds present  Skin: No rash, Warm and dry. TUNNELED HD CATH.    Radiology:            Labs:  Results from last 7 days   Lab Units 02/10/19  0340 02/09/19  0510 02/08/19  1240   WBC 10*3/mm3 20.34* 26.16* 31.38*   HEMOGLOBIN g/dL 10.5* 10.9* 11.3*   HEMATOCRIT % 30.9* 32.3* 34.1*   PLATELETS 10*3/mm3 127* 151 180     Results from last 7 days   Lab Units 02/10/19  0340 02/09/19  0510 02/08/19  1240   SODIUM " mmol/L 135 133 127*   POTASSIUM mmol/L 3.6 4.2 4.5   CHLORIDE mmol/L 102 103 100   CO2 mmol/L 21.0 18.0* 17.0*   BUN mg/dL 64* 83* 97*   CREATININE mg/dL 3.55* 4.71* 5.39*   CALCIUM mg/dL 8.2* 8.2* 8.5*   PHOSPHORUS mg/dL 5.8* 6.4*  --    MAGNESIUM mg/dL  --  2.3  --    ALBUMIN g/dL 3.05* 2.96* 2.95*     Results from last 7 days   Lab Units 02/10/19  0340   GLUCOSE mg/dL 142*       Results from last 7 days   Lab Units 02/10/19  0340   ALK PHOS U/L 300*   BILIRUBIN mg/dL 2.6*   ALT (SGPT) U/L 23   AST (SGOT) U/L 46*     Results from last 7 days   Lab Units 02/08/19  1540   PH, ARTERIAL pH units 7.276*   PO2 ART mm Hg 85.0   PCO2, ARTERIAL mm Hg 38.4   HCO3 ART mmol/L 17.8*       Results from last 7 days   Lab Units 02/08/19  1826   COLOR UA  Jessica*   CLARITY UA  Turbid*   PH, URINE  6.5   SPECIFIC GRAVITY, URINE  1.015   GLUCOSE UA  Negative   KETONES UA  15 mg/dL (1+)*   BILIRUBIN UA  Small (1+)*   PROTEIN UA  100 mg/dL (2+)*   BLOOD UA  Large (3+)*   LEUKOCYTES UA  Large (3+)*   NITRITE UA  Positive*       Estimated Creatinine Clearance: 24.7 mL/min (A) (by C-G formula based on SCr of 3.55 mg/dL (H)).      Assessment       Renal failure    Spinal stenosis, lumbar region, with neurogenic claudication    Morbid obesity due to excess calories (CMS/HCC)    TANIYA on CPAP    Dyslipidemia    DM (diabetes mellitus), type 2 (CMS/HCC)    Metabolic encephalopathy            Impression: NONOLIGURIC NOE ON CKD. ANEMIA.            Recommendations: HD 2/11/19. CHECK FE.      Jr Banks MD  02/10/19  9:51 AM

## 2019-02-10 NOTE — PROGRESS NOTES
INTENSIVIST   PROGRESS NOTE     Hospital:  LOS: 2 days      S     Mr. Rod Balderas, 68 y.o. male is followed for:      Sepsis with MODS    T2DM    As an Intensivist, we provide an integrated approach to the ICU patient and family, medical management of comorbid conditions, including but not limited to electrolytes, glycemic control, organ dysfunction, lead interdisciplinary rounds and coordinate the care with all other services, including those from other specialists.     Interval History:  He has stayed on NSR after a brief episode of A Fib yesterday, which required diltiazem gtt.    Bipap during the night.    Still weak. Moving arms better.    No respiratory distress.     The patient's relevant past medical, surgical and social history were reviewed and updated in Epic as appropriate.     ROS:   Constitutional: Negative for fever.   Respiratory: Negative for dyspnea.   Cardiovascular: Negative for chest pain.   Gastrointestinal: Negative for  nausea, vomiting and diarrhea.        O     Vitals:  Temp: 98.6 °F (37 °C) (02/10/19 0800) Temp  Min: 97.5 °F (36.4 °C)  Max: 98.6 °F (37 °C)   BP: 132/72 (02/10/19 0800) BP  Min: 91/52  Max: 156/80   Pulse: 68 (02/10/19 0800) Pulse  Min: 68  Max: 138   Resp: 20 (02/10/19 0800) Resp  Min: 16  Max: 24   SpO2: 97 % (02/10/19 0800) SpO2  Min: 93 %  Max: 99 %   Device: NPPV/NIV (02/10/19 0800)    Flow Rate: 1 (02/09/19 2300) Flow (L/min)  Min: 1  Max: 3     Intake/Ouptut 24 hrs (7:00AM - 6:59 AM)  Intake/Output       02/09/19 0700 - 02/10/19 0659 02/10/19 0700 - 02/11/19 0659    Intake (ml) 1755.4 18.7    Output (ml) 1500 175    Net (ml) 255.4 -156.3    Last Weight  117 kg (257 lb)  --           Medications (drips):    diltiaZEM Last Rate: 7.5 mg/hr (02/09/19 2017)   Pharmacy Consult      Physical Examination  Telemetry:  Rhythm: normal sinus rhythm (02/10/19 0800)  Atrial Rhythm: atrial fibrillation (02/09/19 1200)      Constitutional:  No acute distress.   Cardiovascular:  Normal rate, regular and rhythm. Normal heart sounds.  No murmurs, gallop or rub.   Respiratory: No respiratory distress. Normal respiratory effort.  Rhonchi.    Abdominal:  Soft. No masses. Non-tender. No distension. No HSM.   Extremities: No digital cyanosis. No clubbing.  (+) edema.   Neurological:   Eyes open, but no focus.  He does not follow commands.  Best Eye Response: 3-->(E3) to speech (02/10/19 0800)  Best Motor Response: 6-->(M6) obeys commands (02/10/19 0800)  Best Verbal Response: 4-->(V4) confused (02/10/19 0800)  Tom Coma Scale Score: 13 (02/10/19 0800)   Lines/Drains/Airways: RIJ Tunneled Dyalisis Catheter  Christina       Hematology:  Results from last 7 days   Lab Units 02/10/19  0340 02/09/19  0510 02/08/19  1240   WBC 10*3/mm3 20.34* 26.16* 31.38*   HEMOGLOBIN g/dL 10.5* 10.9* 11.3*   MCV fL 78.4* 79.0* 81.0   PLATELETS 10*3/mm3 127* 151 180       Chemistry:  Estimated Creatinine Clearance: 24.7 mL/min (A) (by C-G formula based on SCr of 3.55 mg/dL (H)).    Results from last 7 days   Lab Units 02/10/19  0340 02/09/19  0510 02/08/19  1240   SODIUM mmol/L 135 133 127*   POTASSIUM mmol/L 3.6 4.2 4.5   CHLORIDE mmol/L 102 103 100   CO2 mmol/L 21.0 18.0* 17.0*   ANION GAP mmol/L 12.0* 12.0* 10.0   GLUCOSE mg/dL 142* 130* 146*   CALCIUM mg/dL 8.2* 8.2* 8.5*     Results from last 7 days   Lab Units 02/10/19  0340 02/09/19  0510 02/08/19  1240   BUN mg/dL 64* 83* 97*   CREATININE mg/dL 3.55* 4.71* 5.39*     Results from last 7 days   Lab Units 02/10/19  0340 02/09/19  0510   MAGNESIUM mg/dL  --  2.3   PHOSPHORUS mg/dL 5.8* 6.4*       Hepatic:  Results from last 7 days   Lab Units 02/10/19  0340 02/09/19  0510 02/08/19  1240   ALK PHOS U/L 300* 319* 303*   BILIRUBIN mg/dL 2.6* 2.9* 3.0*   ALT (SGPT) U/L 23 33 43*   AST (SGOT) U/L 46* 46* 43*   ALBUMIN g/dL 3.05* 2.96* 2.95*       Lab Results   Lab Value Date/Time    PROCALCITO 4.77 (H) 02/10/2019 0340    PROCALCITO 7.55 (H) 02/08/2019 1240       Lab  Results   Lab Value Date/Time    BLOODCX No growth at 24 hours 02/08/2019 1240       Lab Results   Lab Value Date/Time    URINECX >100,000 CFU/mL Staphylococcus aureus (A) 02/08/2019 1826       Lab Results   Lab Value Date/Time    RESPCX No growth 02/09/2019 1431         Images:  Ct Abdomen Pelvis Without Contrast    Result Date: 2/9/2019    This report was finalized on 2/9/2019 10:01 AM by Dr. Radha Muhammad MD.      Ct Head Without Contrast    Result Date: 2/9/2019  Chronic changes identified within the brain with no acute intracranial abnormality.  DICTATED:   2/8/2019 EDITED/ls :   2/8/2019  This report was finalized on 2/9/2019 10:01 AM by Dr. Radha Muhammad MD.      Ct Chest Without Contrast    Result Date: 2/9/2019    This report was finalized on 2/9/2019 10:01 AM by Dr. Radha Muhammad MD.      Xr Chest 1 View    Result Date: 2/9/2019  Improvement seen of the pulmonary vascularity in the interval. Mild increased markings identified lung bases bilaterally.  DICTATED:   2/9/2019 EDITED/ls :   2/9/2019       Xr Chest 1 View    Result Date: 2/9/2019  Prominence of the pulmonary vascularity with deep line catheter identified on the right with tip in the SVC. No evidence of acute parenchymal disease.  D:  02/08/2019 E:  02/08/2019  This report was finalized on 2/9/2019 10:07 AM by Dr. Radha Muhammad MD.      Xr Chest 1 View    Result Date: 2/8/2019  Low lung volumes, borderline cardiomegaly and mild pulmonary vascular congestion. Mild bibasilar discoid atelectasis, new from prior study.  This report was finalized on 2/8/2019 12:44 PM by DR. Antione Damon MD.      Fl C Arm During Surgery    Result Date: 2/9/2019  Fluoroscopy for dialysis catheter placement. Please see the procedure report for full details.   D:  02/08/2019 E:  02/08/2019  This report was finalized on 2/9/2019 10:07 AM by Dr. Radha Muhammad MD.        Echo:  Results for orders placed during the hospital encounter of 02/08/19   Adult  Transthoracic Echo Limited W/ Cont if Necessary Per Protocol    Narrative · Estimated EF = 70%.  · Left ventricular wall thickness is consistent with mild concentric   hypertrophy.  · Incidental atrial fibrillation noted.  · Poor valvular echoes , technically limited.          Results: Reviewed.  I reviewed the patient's new laboratory and imaging results.  I independently reviewed the patient's new images.    Medications: Reviewed.    Assessment/Plan   A / P     68 y.o.male, admitted on 2/8/2019 with Acute renal failure (ARF) (CMS/HCC) [N17.9]: Transferred from [facility] where he was admitted on 2/5/19    1. Sepsis with MODS  1. PCT decreasing since admission.  2. BC positive and Urine Cx positive for MSSA at [facility] (2/5/2019)  3. Urine Cx ([facility] 2/8/19): Staph   4. Renal failure  1. NOE  1. Cr went from 2.2 (on 2/6) to 5.6 (on 2/8)  2. R/o Medications: NSAIDs and/or ACEI.  3. S/p Robotic Laparoscopic Prostatectomy 1/15/19 (due to Prostate cancer)  4. Hematuria on admission to [facility]  5. Encephalopathy  1. Improving, dramatically, since yesterday.  2. Most likely Toxic metabolic  6. Respiratory failure  1. TANIYA on BiPAP at home  2. Bibasilar consolidation as per CT Scan (2/8/19)  7. Leukocytosis.  8. Liver  1. Elevated ALKP, Bilirubin and mild elevation of transaminases  2. CAD s/p stents 2012  3. P AFib, brief episode on 2/9/209, requiring Diltiazem and converted to NSR  4. Dyslipidemia  5. Obesity III. Body mass index is 40.24 kg/m².   6. Antibiotics: AZT, Cefazolin, Linezolid, Metronidazole} Per ID  7. PMH: HTN  8. PMH: Spinal stenosis with neurogenic claudication  9. T2DM    Results from last 7 days   Lab Units 02/10/19  0624 02/10/19  0233 02/09/19  2053 02/09/19  1635 02/09/19  1149 02/09/19  0604   GLUCOSE mg/dL 137* 177* 154* 134* 155* 122     Lab Results   Lab Value Date/Time    HGBA1C 5.70 (H) 01/08/2019 1524       Nutrition Support: Patient isn't on Tube Feeding   Modulars: Patient  doesn't have any tube feeding modular orders   Diet: Diet Clear Liquid; Thin; Consistent Carbohydrate   Advance Directives: Code Status and Medical Interventions:   Ordered at: 02/08/19 1208     Code Status:    CPR     Medical Interventions (Level of Support Prior to Arrest):    Full          Assessment / Plan:    1. Images studies of the spine - Pending. MRI not able to do due to presence of Foreign Body in his head. Plan for CT.  2. Hold diltiazem.  3. Leukocytosis improving.  4. Labs in AM  5. CXR in AM  6. HD planned for tomorrow as per Renal.  7. Disposition: Keep in ICU.        Plan of care and goals reviewed during interdisciplinary rounds.  Level of Risk is High due to:  illness with threat to life or bodily function.   I discussed the patient's findings and my recommendations with patient    Time: was greater than 31 minutes.    (This excludes time spent performing separately reportable procedures and services).  Patient was at high risk of imminent or life-threatening deterioration in his condition due to CNS dysfunction, Respiratory failure and Renal failure.     Jamal Villar MD, FACP, FCCP, CNSC  Intensive Care Medicine, Nutrition Support and Pulmonary Medicine

## 2019-02-10 NOTE — PROGRESS NOTES
[facility] Progress Note          Antibiotics:  Anti-Infectives (From admission, onward)    Ordered     Dose/Rate Route Frequency Start Stop    02/09/19 1416  aztreonam (AZACTAM) 500 mg in sodium chloride 0.9 % 50 mL IVPB     Ordering Provider:  Efrem Santana MD    500 mg  over 30 Minutes Intravenous Every 12 Hours 02/09/19 2200 02/16/19 2159    02/09/19 0849  metroNIDAZOLE (FLAGYL) IVPB 500 mg     Ordering Provider:  Efrem Santana MD    500 mg  100 mL/hr over 60 Minutes Intravenous Every 8 Hours 02/09/19 1030 02/16/19 1029    02/09/19 0849  Linezolid (ZYVOX) 600 mg 300 mL     Ordering Provider:  Efrem Santana MD    600 mg  300 mL/hr over 60 Minutes Intravenous Every 12 Hours 02/09/19 1000 02/16/19 0959    02/09/19 0849  aztreonam (AZACTAM) 2 g in sodium chloride 0.9 % 100 mL IVPB-MBP     Ordering Provider:  Efrem Santana MD    2 g  200 mL/hr over 30 Minutes Intravenous Once 02/09/19 1000 02/09/19 0946    02/08/19 1949  ceFAZolin (ANCEF) IVPB 1 g     Ordering Provider:  Juana Ivy, formerly [facility]    1 g Intravenous Every 24 Hours 02/08/19 2045 02/22/19 2044          CC: Patient unable    HPI:    Consult by Dr. Jeff Smith; covering for him :      Patient is a 68 y.o.  Yr old male with history of prostate cancer status post robotic laparoscopic prostatectomy in mid January and was discharged on 1/17/19. The patient is currently unresponsive and no family is at bedside so history is somewhat limited to history from nursing staff and medical records. The patient was seen a few days after his discharge from his recent  Admission in January and his Christina catheter was discontinued.  About one week ago he went to the ER with complaints of back pain, abdominal pain, and confusion and a CT abdomen and pelvis was apparently okay during that time. He continued to worsen and over the weekend he was admitted to [facility].  He was found to have an initial AK I with a creatinine of 2.2. Per the micro-lab at  [facility], the patient's urine cultures and 1 of 2 blood cultures from 2/5/19 grew MSSA. The patient was initially on vancomycin and Zosyn and was subsequently transitioned to daptomycin after he developed worsening renal function with a creatinine up to 5.6 and BUN went up to 94 today.  Due to lack of nephrology at [facility], the patient was transferred to [facility] today for possible dialysis.  He additionally received a dose of nafcillin prior to this transfer.  His last dose of daptomycin was yesterday per pharmacist here (who has discussed with OSH). Since arrival, the patient has remained afebrile but he has had a leukocytosis up to 31.38.  He is anemic with a hemoglobin of 11.3 and a hematocrit of 54.1.  He is hyponatremic to a sodium of 127.  Creatinine was initially elevated 5.39 and B1 was 97 on arrival.  He additionally had elevation of his LFTs with an ALT of 43 and AST of 43, T bili of 3.0, and alkaline phosphatase of 303.  Pro-calcitonin was elevated to 7.55 lactic acid was normal at 0.8. The patient underwent CT head without contrast which did not show any acute abnormality, CT chest, abdomen and pelvis which showed consolidation of the lung bases bilaterally that is concerning for airspace disease such as pneumonia, gastric distention, and air-filled loops of colon with no obvious obstruction. Blood cultures have been ordered and a reflexive urinalysis has been ordered as well.  A transthoracic echocardiogram was ordered. The patient underwent dialysis today following right IJ dialysis catheter placement.  The infectious disease team has been consulted for antibiotic recommendations.    He has methicillin sensitive staph aureus septicemia/bacteriuria compounded by acute renal failure, acute hypoxic respiratory failure and by basilar consolidation by chest CT.  Further compounded by acute encephalopathy.    2/9/19 nursing reports A. fib/RVR, dialysis ongoing  "with acute renal failure and tachypneic/labored at times.  Some cough but unable to capture for culture so far. Empiric HCAP coverage added with zyvox/azactam/flagyl; unable to do MRI of spine so CT's done cervical/thoracic/lumbar spine done 2/10    2/10/19 CT scans of spine; more restful, less labored with breathing and decreased cough;  No rash.  He can wiggle toes but not lift legs off the bed.  He moves left arm better than right arm.  No other new focal pain that he will relate although interaction minimal.  No diarrhea.  No vomiting.        ROS:      2/10/19 otherwise unable to obtain      PE:   /66 (BP Location: Left arm, Patient Position: Lying)   Pulse 75   Temp 98.6 °F (37 °C) (Axillary)   Resp 24   Ht 170.2 cm (67.01\")   Wt 117 kg (257 lb)   SpO2 93%   BMI 40.24 kg/m²     GENERAL: Opens eyes and follows simple commands but does not interact verbally, less labored  HEENT: Normocephalic, atraumatic. No conjunctival injection. No icterus. Oropharynx clear without evidence of thrush or exudate. No evidence of peridontal disease.    NECK: Supple without nuchal rigidity. No mass.  LYMPH: No cervical, axillary or inguinal lymphadenopathy.  HEART: Cardiac cardiac; No murmur, rubs, gallops.  No peripheral stigmata/phenomena of endocarditis  LUNGS: Diminished at bases, labored and bilateral rhonchi  ABDOMEN: Soft, nontender, nondistended. Positive bowel sounds. No rebound or guarding. NO mass or HSM.  EXT:  No cyanosis, clubbing. No cord.  Edema noted  : Genitalia generally unremarkable.  With Christina catheter.  MSK: FROM without joint effusions noted arms/legs.    SKIN: Warm and dry without cutaneous eruptions on Inspection/palpation.    NEURO: Wiggles toes, minimally moves right arm, can lift left arm somewhat off bed.    Back with no redness bulge fluctuance or point tenderness    IV with no obvious redness or drainage    Laboratory Data    Results from last 7 days   Lab Units 02/10/19  0340 " 02/09/19  0510 02/08/19  1240   WBC 10*3/mm3 20.34* 26.16* 31.38*   HEMOGLOBIN g/dL 10.5* 10.9* 11.3*   HEMATOCRIT % 30.9* 32.3* 34.1*   PLATELETS 10*3/mm3 127* 151 180     Results from last 7 days   Lab Units 02/10/19  0340   SODIUM mmol/L 135   POTASSIUM mmol/L 3.6   CHLORIDE mmol/L 102   CO2 mmol/L 21.0   BUN mg/dL 64*   CREATININE mg/dL 3.55*   GLUCOSE mg/dL 142*   CALCIUM mg/dL 8.2*     Results from last 7 days   Lab Units 02/10/19  0340   ALK PHOS U/L 300*   BILIRUBIN mg/dL 2.6*   ALT (SGPT) U/L 23   AST (SGOT) U/L 46*               Estimated Creatinine Clearance: 24.7 mL/min (A) (by C-G formula based on SCr of 3.55 mg/dL (H)).      Microbiology:      Radiology:  Imaging Results (last 72 hours)     Procedure Component Value Units Date/Time    XR Chest 1 View [[id number]] Updated:  02/09/19 0221    CT Abdomen Pelvis Without Contrast [[id number]] Collected:  02/08/19 1822     Updated:  02/08/19 1822    Narrative:       EXAMINATION: CT CHEST WO CONTRAST, CT ABDOMEN/PELVIS WO CONTRAST -  2/8/2019     INDICATION: Persistent cough.     TECHNIQUE: Multiple axial CT imaging is obtained of the chest, abdomen  and pelvis without the administration of oral or intravenous contrast.     The radiation dose reduction device was turned on for each scan per the  ALARA (As Low as Reasonably Achievable) protocol.     COMPARISON: There is some consolidation identified posteriorly extending  to the lung bases bilaterally suggesting infiltrates. The upper lung  fields are grossly clear. Motion artifact degrades image quality. There  are degenerative changes identified within the spine. The cardiac  chambers are enlarged. There is no pericardial effusion. No bulky hilar  or axillary lymphadenopathy. The thyroid is homogeneous. Degenerative  change is seen throughout the spine.     ABDOMEN: Motion artifact grades image quality. There is no abnormality  seen within the liver. The spleen is upper limits of normal in size.  There is  gastric distention. Air fills the colon with no  evidence of  obvious obstruction. Kidneys and adrenal glands within normal limits.  The pancreas is homogeneous. No abdominal or retroperitoneal  lymphadenopathy. No abnormal mass or fluid collections identified. No  free fluid or free air.     PELVIS: Pelvic organs are unremarkable. The pelvic portion of the   gastrointestinal tract is within normal limits. No pelvic adenopathy.  Degenerative change is identified throughout the spine and pelvis.     FINDINGS: Consolidation at the lung bases bilaterally is concerning for  airspace disease such as pneumonia. The there is gastric distention.  Air-filled loops of colon with no obvious obstruction. No other abnormal  findings are seen within the abdomen or pelvis. Degenerative changes are  seen throughout the spine and pelvis.     DICTATED:   2/8/2019  EDITED/ls :   2/8/2019              Impression:               CT Chest Without Contrast [[id number]] Collected:  02/08/19 1822     Updated:  02/08/19 1822    Narrative:       EXAMINATION: CT CHEST WO CONTRAST, CT ABDOMEN/PELVIS WO CONTRAST -  2/8/2019     INDICATION: Persistent cough.     TECHNIQUE: Multiple axial CT imaging is obtained of the chest, abdomen  and pelvis without the administration of oral or intravenous contrast.     The radiation dose reduction device was turned on for each scan per the  ALARA (As Low as Reasonably Achievable) protocol.     COMPARISON: There is some consolidation identified posteriorly extending  to the lung bases bilaterally suggesting infiltrates. The upper lung  fields are grossly clear. Motion artifact degrades image quality. There  are degenerative changes identified within the spine. The cardiac  chambers are enlarged. There is no pericardial effusion. No bulky hilar  or axillary lymphadenopathy. The thyroid is homogeneous. Degenerative  change is seen throughout the spine.     ABDOMEN: Motion artifact grades image quality. There is no  abnormality  seen within the liver. The spleen is upper limits of normal in size.  There is gastric distention. Air fills the colon with no  evidence of  obvious obstruction. Kidneys and adrenal glands within normal limits.  The pancreas is homogeneous. No abdominal or retroperitoneal  lymphadenopathy. No abnormal mass or fluid collections identified. No  free fluid or free air.     PELVIS: Pelvic organs are unremarkable. The pelvic portion of the   gastrointestinal tract is within normal limits. No pelvic adenopathy.  Degenerative change is identified throughout the spine and pelvis.     FINDINGS: Consolidation at the lung bases bilaterally is concerning for  airspace disease such as pneumonia. The there is gastric distention.  Air-filled loops of colon with no obvious obstruction. No other abnormal  findings are seen within the abdomen or pelvis. Degenerative changes are  seen throughout the spine and pelvis.     DICTATED:   2/8/2019  EDITED/ls :   2/8/2019              Impression:               CT Head Without Contrast [[id number]] Collected:  02/08/19 1816     Updated:  02/08/19 1817    Narrative:       EXAMINATION: CT HEAD WO CONTRAST - 2/8/2019     INDICATION: Mental status change, does not follow commands.     TECHNIQUE: Multiple axial CT imaging is obtained of the head from skull  base to skull vertex without the administration of intravenous contrast.     The radiation dose reduction device was turned on for each scan per the  ALARA (As Low as Reasonably Achievable) protocol.     COMPARISON: NONE     FINDINGS: There is atrophy of the brain. Some low-density area seen in  the periventricular white matter suggesting chronic small vessel  ischemic change. No hemorrhage or hydrocephalus. No mass, mass effect,  or midline shift. No abnormal extra-axial  fluid collections identified. Minimal mucosal thickening of the  maxillary sinuses and ethmoid air cells. The bony structures are  unremarkable. The mastoid air  cells are patent.       Impression:       Chronic changes identified within the brain with no acute  intracranial abnormality.     DICTATED:   2/8/2019  EDITED/ls :   2/8/2019        FL C Arm During Surgery [[id number]] Collected:  02/08/19 1646     Updated:  02/08/19 1646    Narrative:       EXAMINATION: FL C ARM DURING SURGERY- 02/08/2019      INDICATION: Dialysis catheter placement     COMPARISON: NONE     FINDINGS: 4 seconds of fluoroscopy and 4 images used for right-sided  dialysis catheter placement. Please see the procedure report for full  details.       Impression:       Fluoroscopy for dialysis catheter placement. Please see the  procedure report for full details.      D:  02/08/2019  E:  02/08/2019       XR Chest 1 View [[id number]] Collected:  02/08/19 1628     Updated:  02/08/19 1628    Narrative:       EXAMINATION: XR CHEST 1 VW-02/08/2019:      INDICATION: RIJ line.      COMPARISON: 02/08/2019.     FINDINGS: Portable chest reveals low lung volumes. Deep line catheter  identified on the right with tip in the SVC. Degenerative changes seen  within the spine. The heart is borderline enlarged. Mild prominence seen  of the pulmonary vascularity. No pleural effusion or pneumothorax.           Impression:       Prominence of the pulmonary vascularity with deep line  catheter identified on the right with tip in the SVC. No evidence of  acute parenchymal disease.     D:  02/08/2019  E:  02/08/2019             XR Chest 1 View [[id number]] Collected:  02/08/19 1243     Updated:  02/08/19 1246    Narrative:          EXAMINATION: XR CHEST 1 VW-      INDICATION: respiratory failure      COMPARISON: 01/08/2019     FINDINGS: Heart is borderline enlarged. The vasculature is cephalized.  Lung volumes are relatively low. There is mild discoid atelectasis in  both medial lung bases, but no focal disease elsewhere..           Impression:       Low lung volumes, borderline cardiomegaly and mild pulmonary  vascular congestion.  Mild bibasilar discoid atelectasis, new from prior  study.     This report was finalized on 2/8/2019 12:44 PM by DR. Antione Damon MD.               Impression:     --Acute severe sepsis/septic shock with multiorgan system insufficiency.  Methicillin sensitive staph aureus in blood/urine cultures and bilateral pulmonary infiltrates with potential bilateral pneumonia, HCAP/Aspiration -v- all MSSA;sputum culture pending and in the meantime maintain broader coverage for potential nosocomial pathogens until further information given critical/tenuous status and high risk for further serious morbidity and other serious sequela/mortality    --Acute MSSA septicemia/bacteremia and bacteriuria.  Transthoracic echocardiogram report pending.  No peripheral stigmata of endocarditis but certainly at risk.  May require KLAUDIA.    --Acute hypoxic respiratory failure.  Potentially multifactorial, infection versus edema versus ARDS versus combination of these.  Bilateral consolidations with possible evolving pneumonia, HCAP/aspiration -v- MSSA; collected sputum, monitor and consider de-escalation depending on culture data and clinical course    --Acute renal failure.  Ongoing dialysis.    --Severe extremity weakness with presentation that include back pain and unclear if spinal/paraspinal infection versus other.  Nursing/radiology/Dr. Olvera to  discussed potential for MRI if able.  Some question of foreign body at head that may limit ability to do MRI.  If unable to do MRI of spine then likely CT scan to help exclude abscess or other paraspinal focus    --Abnormal liver function tests.  Monitor    --Acute atrial fibrillation/RVR    --Prostate cancer with history recent prostatectomy.  Urology following    --Acute encephalopathy.  Presumed toxic/metabolic present.  Further neurologic workup at discretion of critical care team      PLAN:    --IV cefazolin.  Zyvox/Azactam and Flagyl for now until further culture data    --Check/review labs  cultures and scans    --Discussed with Dr. Olvera, pharmacy, nursing    --Spinal imaging noted;  Unable to do MRI and thereofre CT scans done;  No description of abscess by radiology    --History per nursing staff    --Discussed her microbiology    --Critically ill with high risk for further serious morbidity and other serious sequela/mortality    --Dr DILLAN Smith back Monday to resume care        Efrem Santana MD  2/10/2019

## 2019-02-10 NOTE — PLAN OF CARE
Problem: Patient Care Overview  Goal: Plan of Care Review  Outcome: Ongoing (interventions implemented as appropriate)   02/10/19 0519   Coping/Psychosocial   Plan of Care Reviewed With patient;spouse   Plan of Care Review   Progress improving   OTHER   Outcome Summary Pt remained in sinus rhythm, cardizem still infusing at 7.5mg/hr. Answers questions appropriately. Pt complaining of significant right leg pain, Lortab given, and pain eased, pt able to sleep on BiPAP through most of the night. CBI continues to run at a slow drip, urine appears cloudy yellow with sediment. Possible CT scan of spine today. VSS. Awaiting labs, will continue to monitor.     Goal: Individualization and Mutuality  Outcome: Ongoing (interventions implemented as appropriate)    Goal: Discharge Needs Assessment  Outcome: Ongoing (interventions implemented as appropriate)      Problem: Skin Injury Risk (Adult)  Goal: Skin Health and Integrity  Outcome: Ongoing (interventions implemented as appropriate)      Problem: Fall Risk (Adult)  Goal: Absence of Fall  Outcome: Ongoing (interventions implemented as appropriate)      Problem: Hemodialysis (Adult)  Goal: Signs and Symptoms of Listed Potential Problems Will be Absent, Minimized or Managed (Hemodialysis)  Outcome: Ongoing (interventions implemented as appropriate)      Problem: Confusion, Acute (Adult)  Goal: Cognitive/Functional Impairments Minimized  Outcome: Ongoing (interventions implemented as appropriate)

## 2019-02-10 NOTE — PLAN OF CARE
Problem: Patient Care Overview  Goal: Plan of Care Review  Outcome: Ongoing (interventions implemented as appropriate)   02/10/19 3205   Coping/Psychosocial   Plan of Care Reviewed With patient;spouse   Plan of Care Review   Progress improving   OTHER   Outcome Summary patient more alert and oriented today. CT scan of C,T,L spines revealed no abcess. expect HD tomorrow.        Problem: Skin Injury Risk (Adult)  Goal: Skin Health and Integrity  Outcome: Ongoing (interventions implemented as appropriate)      Problem: Fall Risk (Adult)  Goal: Absence of Fall  Outcome: Ongoing (interventions implemented as appropriate)

## 2019-02-11 ENCOUNTER — APPOINTMENT (OUTPATIENT)
Dept: GENERAL RADIOLOGY | Facility: HOSPITAL | Age: 69
End: 2019-02-11

## 2019-02-11 ENCOUNTER — APPOINTMENT (OUTPATIENT)
Dept: NEPHROLOGY | Facility: HOSPITAL | Age: 69
End: 2019-02-11

## 2019-02-11 LAB
ALBUMIN SERPL-MCNC: 2.89 G/DL (ref 3.2–4.8)
ALBUMIN/GLOB SERPL: 1.1 G/DL (ref 1.5–2.5)
ALP SERPL-CCNC: 348 U/L (ref 25–100)
ALT SERPL W P-5'-P-CCNC: 17 U/L (ref 7–40)
ANION GAP SERPL CALCULATED.3IONS-SCNC: 14 MMOL/L (ref 3–11)
AST SERPL-CCNC: 67 U/L (ref 0–33)
BACTERIA SPEC RESP CULT: ABNORMAL
BASOPHILS # BLD AUTO: 0.05 10*3/MM3 (ref 0–0.2)
BASOPHILS # BLD AUTO: 0.06 10*3/MM3 (ref 0–0.2)
BASOPHILS NFR BLD AUTO: 0.2 % (ref 0–1)
BASOPHILS NFR BLD AUTO: 0.3 % (ref 0–1)
BILIRUB SERPL-MCNC: 2.6 MG/DL (ref 0.3–1.2)
BUN BLD-MCNC: 81 MG/DL (ref 9–23)
BUN/CREAT SERPL: 26.6 (ref 7–25)
CALCIUM SPEC-SCNC: 8.2 MG/DL (ref 8.7–10.4)
CHLORIDE SERPL-SCNC: 100 MMOL/L (ref 99–109)
CO2 SERPL-SCNC: 18 MMOL/L (ref 20–31)
CREAT BLD-MCNC: 3.05 MG/DL (ref 0.6–1.3)
CRP SERPL-MCNC: 19.67 MG/DL (ref 0–1)
DEPRECATED RDW RBC AUTO: 42.5 FL (ref 37–54)
DEPRECATED RDW RBC AUTO: 42.6 FL (ref 37–54)
EOSINOPHIL # BLD AUTO: 0.07 10*3/MM3 (ref 0–0.3)
EOSINOPHIL # BLD AUTO: 0.11 10*3/MM3 (ref 0–0.3)
EOSINOPHIL NFR BLD AUTO: 0.3 % (ref 0–3)
EOSINOPHIL NFR BLD AUTO: 0.6 % (ref 0–3)
ERYTHROCYTE [DISTWIDTH] IN BLOOD BY AUTOMATED COUNT: 14.9 % (ref 11.3–14.5)
ERYTHROCYTE [DISTWIDTH] IN BLOOD BY AUTOMATED COUNT: 15 % (ref 11.3–14.5)
FERRITIN SERPL-MCNC: 691 NG/ML (ref 22–322)
GFR SERPL CREATININE-BSD FRML MDRD: 21 ML/MIN/1.73
GLOBULIN UR ELPH-MCNC: 2.6 GM/DL
GLUCOSE BLD-MCNC: 151 MG/DL (ref 70–100)
GLUCOSE BLDC GLUCOMTR-MCNC: 107 MG/DL (ref 70–130)
GLUCOSE BLDC GLUCOMTR-MCNC: 139 MG/DL (ref 70–130)
GLUCOSE BLDC GLUCOMTR-MCNC: 147 MG/DL (ref 70–130)
GLUCOSE BLDC GLUCOMTR-MCNC: 175 MG/DL (ref 70–130)
GRAM STN SPEC: ABNORMAL
HCT VFR BLD AUTO: 32 % (ref 38.9–50.9)
HCT VFR BLD AUTO: 32.9 % (ref 38.9–50.9)
HGB BLD-MCNC: 10.8 G/DL (ref 13.1–17.5)
HGB BLD-MCNC: 11 G/DL (ref 13.1–17.5)
IMM GRANULOCYTES # BLD AUTO: 0.34 10*3/MM3 (ref 0–0.03)
IMM GRANULOCYTES # BLD AUTO: 0.37 10*3/MM3 (ref 0–0.05)
IMM GRANULOCYTES NFR BLD AUTO: 1.8 % (ref 0–0.6)
IMM GRANULOCYTES NFR BLD AUTO: 1.9 % (ref 0–0.6)
IRON 24H UR-MRATE: 22 MCG/DL (ref 50–175)
IRON SATN MFR SERPL: 13 % (ref 20–50)
LYMPHOCYTES # BLD AUTO: 0.62 10*3/MM3 (ref 0.6–4.8)
LYMPHOCYTES # BLD AUTO: 0.83 10*3/MM3 (ref 0.6–4.8)
LYMPHOCYTES NFR BLD AUTO: 3.1 % (ref 24–44)
LYMPHOCYTES NFR BLD AUTO: 4.7 % (ref 24–44)
MAGNESIUM SERPL-MCNC: 2.2 MG/DL (ref 1.3–2.7)
MCH RBC QN AUTO: 26 PG (ref 27–31)
MCH RBC QN AUTO: 26 PG (ref 27–31)
MCHC RBC AUTO-ENTMCNC: 33.4 G/DL (ref 32–36)
MCHC RBC AUTO-ENTMCNC: 33.8 G/DL (ref 32–36)
MCV RBC AUTO: 77.1 FL (ref 80–99)
MCV RBC AUTO: 77.8 FL (ref 80–99)
MONOCYTES # BLD AUTO: 0.57 10*3/MM3 (ref 0–1)
MONOCYTES # BLD AUTO: 0.81 10*3/MM3 (ref 0–1)
MONOCYTES NFR BLD AUTO: 3.2 % (ref 0–12)
MONOCYTES NFR BLD AUTO: 4 % (ref 0–12)
NEUTROPHILS # BLD AUTO: 15.67 10*3/MM3 (ref 1.5–8.3)
NEUTROPHILS # BLD AUTO: 18.66 10*3/MM3 (ref 1.5–8.3)
NEUTROPHILS NFR BLD AUTO: 89.3 % (ref 41–71)
NEUTROPHILS NFR BLD AUTO: 92.4 % (ref 41–71)
PHOSPHATE SERPL-MCNC: 6 MG/DL (ref 2.4–5.1)
PLATELET # BLD AUTO: 125 10*3/MM3 (ref 150–450)
PLATELET # BLD AUTO: 137 10*3/MM3 (ref 150–450)
PMV BLD AUTO: 9.5 FL (ref 6–12)
PMV BLD AUTO: 9.5 FL (ref 6–12)
POTASSIUM BLD-SCNC: 3.6 MMOL/L (ref 3.5–5.5)
PREALB SERPL-MCNC: <5 MG/DL (ref 10–40)
PROT SERPL-MCNC: 5.5 G/DL (ref 5.7–8.2)
RBC # BLD AUTO: 4.15 10*6/MM3 (ref 4.2–5.76)
RBC # BLD AUTO: 4.23 10*6/MM3 (ref 4.2–5.76)
SODIUM BLD-SCNC: 132 MMOL/L (ref 132–146)
TIBC SERPL-MCNC: 176 MCG/DL (ref 250–450)
TRIGL SERPL-MCNC: 143 MG/DL (ref 0–150)
WBC NRBC COR # BLD: 17.58 10*3/MM3 (ref 3.5–10.8)
WBC NRBC COR # BLD: 20.21 10*3/MM3 (ref 3.5–10.8)

## 2019-02-11 PROCEDURE — 83540 ASSAY OF IRON: CPT | Performed by: INTERNAL MEDICINE

## 2019-02-11 PROCEDURE — 99291 CRITICAL CARE FIRST HOUR: CPT | Performed by: INTERNAL MEDICINE

## 2019-02-11 PROCEDURE — 5A1D70Z PERFORMANCE OF URINARY FILTRATION, INTERMITTENT, LESS THAN 6 HOURS PER DAY: ICD-10-PCS | Performed by: INTERNAL MEDICINE

## 2019-02-11 PROCEDURE — 25010000002 FENTANYL CITRATE (PF) 100 MCG/2ML SOLUTION: Performed by: NURSE PRACTITIONER

## 2019-02-11 PROCEDURE — 94799 UNLISTED PULMONARY SVC/PX: CPT

## 2019-02-11 PROCEDURE — 82728 ASSAY OF FERRITIN: CPT | Performed by: INTERNAL MEDICINE

## 2019-02-11 PROCEDURE — 25010000002 NA FERRIC GLUC CPLX PER 12.5 MG: Performed by: INTERNAL MEDICINE

## 2019-02-11 PROCEDURE — 25010000002 LINEZOLID 600 MG/300ML SOLUTION: Performed by: INTERNAL MEDICINE

## 2019-02-11 PROCEDURE — 83550 IRON BINDING TEST: CPT | Performed by: INTERNAL MEDICINE

## 2019-02-11 PROCEDURE — 82962 GLUCOSE BLOOD TEST: CPT

## 2019-02-11 PROCEDURE — 84478 ASSAY OF TRIGLYCERIDES: CPT | Performed by: INTERNAL MEDICINE

## 2019-02-11 PROCEDURE — 83735 ASSAY OF MAGNESIUM: CPT | Performed by: INTERNAL MEDICINE

## 2019-02-11 PROCEDURE — 25010000003 CEFAZOLIN 1-4 GM/50ML-% SOLUTION

## 2019-02-11 PROCEDURE — 84134 ASSAY OF PREALBUMIN: CPT | Performed by: INTERNAL MEDICINE

## 2019-02-11 PROCEDURE — 85025 COMPLETE CBC W/AUTO DIFF WBC: CPT | Performed by: INTERNAL MEDICINE

## 2019-02-11 PROCEDURE — 71045 X-RAY EXAM CHEST 1 VIEW: CPT

## 2019-02-11 PROCEDURE — 86140 C-REACTIVE PROTEIN: CPT | Performed by: INTERNAL MEDICINE

## 2019-02-11 PROCEDURE — 84100 ASSAY OF PHOSPHORUS: CPT | Performed by: INTERNAL MEDICINE

## 2019-02-11 PROCEDURE — 80053 COMPREHEN METABOLIC PANEL: CPT | Performed by: INTERNAL MEDICINE

## 2019-02-11 RX ORDER — ALBUMIN (HUMAN) 12.5 G/50ML
12.5 SOLUTION INTRAVENOUS AS NEEDED
Status: ACTIVE | OUTPATIENT
Start: 2019-02-11 | End: 2019-02-11

## 2019-02-11 RX ORDER — ASPIRIN 81 MG/1
81 TABLET, CHEWABLE ORAL DAILY
COMMUNITY

## 2019-02-11 RX ORDER — ALBUMIN (HUMAN) 12.5 G/50ML
25 SOLUTION INTRAVENOUS AS NEEDED
Status: ACTIVE | OUTPATIENT
Start: 2019-02-11 | End: 2019-02-12

## 2019-02-11 RX ORDER — GABAPENTIN 300 MG/1
300 CAPSULE ORAL DAILY
Status: DISCONTINUED | OUTPATIENT
Start: 2019-02-11 | End: 2019-02-13

## 2019-02-11 RX ORDER — HEPARIN SODIUM 1000 [USP'U]/ML
1900 INJECTION, SOLUTION INTRAVENOUS; SUBCUTANEOUS AS NEEDED
Status: DISCONTINUED | OUTPATIENT
Start: 2019-02-11 | End: 2019-02-16

## 2019-02-11 RX ORDER — LACTULOSE 10 G/15ML
30 SOLUTION ORAL EVERY 8 HOURS PRN
Status: DISCONTINUED | OUTPATIENT
Start: 2019-02-11 | End: 2019-02-24 | Stop reason: HOSPADM

## 2019-02-11 RX ADMIN — CASTOR OIL AND BALSAM, PERU: 788; 87 OINTMENT TOPICAL at 20:41

## 2019-02-11 RX ADMIN — METRONIDAZOLE 500 MG: 500 INJECTION, SOLUTION INTRAVENOUS at 17:56

## 2019-02-11 RX ADMIN — HYDROCODONE BITARTRATE AND ACETAMINOPHEN 1 TABLET: 5; 325 TABLET ORAL at 20:36

## 2019-02-11 RX ADMIN — FENTANYL CITRATE 25 MCG: 50 INJECTION INTRAMUSCULAR; INTRAVENOUS at 15:42

## 2019-02-11 RX ADMIN — SODIUM CHLORIDE, PRESERVATIVE FREE 3 ML: 5 INJECTION INTRAVENOUS at 20:42

## 2019-02-11 RX ADMIN — FENTANYL CITRATE 25 MCG: 50 INJECTION INTRAMUSCULAR; INTRAVENOUS at 22:12

## 2019-02-11 RX ADMIN — CEFAZOLIN SODIUM 1 G: 1 INJECTION, SOLUTION INTRAVENOUS at 20:34

## 2019-02-11 RX ADMIN — SODIUM CHLORIDE 125 MG: 9 INJECTION, SOLUTION INTRAVENOUS at 14:02

## 2019-02-11 RX ADMIN — FENTANYL CITRATE 25 MCG: 50 INJECTION INTRAMUSCULAR; INTRAVENOUS at 17:56

## 2019-02-11 RX ADMIN — METRONIDAZOLE 500 MG: 500 INJECTION, SOLUTION INTRAVENOUS at 12:14

## 2019-02-11 RX ADMIN — DILTIAZEM HYDROCHLORIDE 5 MG/HR: 5 INJECTION INTRAVENOUS at 16:37

## 2019-02-11 RX ADMIN — GABAPENTIN 300 MG: 300 CAPSULE ORAL at 12:05

## 2019-02-11 RX ADMIN — FENTANYL CITRATE 25 MCG: 50 INJECTION INTRAMUSCULAR; INTRAVENOUS at 19:21

## 2019-02-11 RX ADMIN — HYDROCODONE BITARTRATE AND ACETAMINOPHEN 1 TABLET: 5; 325 TABLET ORAL at 14:58

## 2019-02-11 RX ADMIN — HYDROCODONE BITARTRATE AND ACETAMINOPHEN 1 TABLET: 5; 325 TABLET ORAL at 09:01

## 2019-02-11 RX ADMIN — METOPROLOL TARTRATE 5 MG: 5 INJECTION, SOLUTION INTRAVENOUS at 17:28

## 2019-02-11 RX ADMIN — METRONIDAZOLE 500 MG: 500 INJECTION, SOLUTION INTRAVENOUS at 02:19

## 2019-02-11 RX ADMIN — AZTREONAM 500 MG: 1 INJECTION, POWDER, LYOPHILIZED, FOR SOLUTION INTRAMUSCULAR; INTRAVENOUS at 22:11

## 2019-02-11 RX ADMIN — AZTREONAM 500 MG: 1 INJECTION, POWDER, LYOPHILIZED, FOR SOLUTION INTRAMUSCULAR; INTRAVENOUS at 12:04

## 2019-02-11 RX ADMIN — LINEZOLID 600 MG: 600 INJECTION, SOLUTION INTRAVENOUS at 12:43

## 2019-02-11 RX ADMIN — FENTANYL CITRATE 25 MCG: 50 INJECTION INTRAMUSCULAR; INTRAVENOUS at 12:41

## 2019-02-11 RX ADMIN — CASTOR OIL AND BALSAM, PERU: 788; 87 OINTMENT TOPICAL at 12:17

## 2019-02-11 RX ADMIN — INSULIN LISPRO 2 UNITS: 100 INJECTION, SOLUTION INTRAVENOUS; SUBCUTANEOUS at 09:02

## 2019-02-11 NOTE — PROGRESS NOTES
"                  Clinical Nutrition     Nutrition Assessment  Reason for Visit:   MDR, Identified at risk by screening criteria, NPO/Clear liquid    Patient Name: Rod Balderas  YOB: 1950  MRN: [id number]  Date of Encounter: 02/11/19 11:30 AM  Admission date: 2/8/2019     Pt NPO/ Clear Liquids 3 days, rec advance diet as soon as medically indicated      Nutrition Assessment   Admission Diagnosis         Renal failure    Spinal stenosis, lumbar region, with neurogenic claudication    Morbid obesity due to excess calories (CMS/HCC)    TANIYA on CPAP    Dyslipidemia    DM (diabetes mellitus), type 2 (CMS/HCC)    Metabolic encephalopathy        PMH: He  has a past medical history of Anxiety and depression, Cancer (CMS/HCC), Chest pain, Coronary artery disease, Diabetes mellitus (CMS/HCC), Dyslipidemia, Extremity pain, Heart disease, History of transfusion, Hypertension, Low back pain, TANIYA on CPAP, Osteoarthritis, Panic attacks, and Prostate cancer (CMS/HCC).   PSxH: He  has a past surgical history that includes Cholecystectomy; Coronary angioplasty with stent (12/2012); Cardiac catheterization; Eye surgery (Bilateral); Retinal detachment surgery (Right); Colonoscopy (2015); HEMODIALYSIS CATHETER INSERTION RIGHT INTERNAL JUGULAR (N/A, 2/8/2019); and ROBOTIC PROSTATECTOMY WITH NODES (N/A, 1/15/2019).     NOE/ HD    Reported/Observed/Food/Nutrition Related History:     Pt lethargic, resting in bed, on HD at present    Per RN: pt needs laxative    Anthropometrics     Height: 170.2 cm (67.01\")  Last filed wt: Weight: 117 kg (257 lb) (02/09/19 1146)  Weight Method: Bed scale    BMI: BMI (Calculated): 40.2  Obese Class III extreme obesity: > or equal to 40kg/m2    Labs reviewed     Results from last 7 days   Lab Units 02/11/19  0637   SODIUM mmol/L 132   POTASSIUM mmol/L 3.6   CHLORIDE mmol/L 100   CO2 mmol/L 18.0*   BUN mg/dL 81*   CREATININE mg/dL 3.05*   CALCIUM mg/dL 8.2*   BILIRUBIN mg/dL 2.6*   ALK PHOS U/L " 348*   ALT (SGPT) U/L 17   AST (SGOT) U/L 67*   GLUCOSE mg/dL 151*       Results from last 7 days   Lab Units 02/11/19  1114 02/11/19  0722 02/10/19  2025 02/10/19  1630 02/10/19  1104 02/10/19  0624   GLUCOSE mg/dL 107 175* 146* 149* 146* 137*     Lab Results   Lab Value Date/Time    HGBA1C 5.70 (H) 01/08/2019 1524         Medications reviewed   Pertinent:abx, insulin         GI: abdomen distended, no bm    SKIN: B. gluteal DTI's, B. thighs DTI's    Current Nutrition Prescription     PO: Diet Clear Liquid; Thin; Consistent Carbohydrate, Daily Fluid Restriction; 1500 mL Fluid  1500ml fluid restriction    Intake:15% of 3 meals         Nutrition Diagnosis     Problem Increased nutrient needs   Etiology Poor Skin Integrity   Signs/Symptoms B. gluteal DTI's,  B.thigh DTI's       Nutrition Intervention     Interventions Goal    General: Nutrition support treatment   Advance Diet    Nutrition Interventions   1.  Follow treatment progress, Interview for preferences, Supplement provided  RFB 3x/day    Pt NPO/ Clear Liquids 3 days, rec advance diet as soon as medically indicated    Monitor/ Evaluation    I&O, PO intake, Pertinent labs, Weight, Skin status, GI status, Symptoms      Will Continue to follow per protocol      Miranda Nelson RD  Time Spent: 30min

## 2019-02-11 NOTE — PROGRESS NOTES
"   LOS: 3 days    Patient Care Team:  Tyrese Leyva MD as PCP - General (Family Medicine)  Rod Regalado MD as Consulting Physician (Cardiology)  Myles Rossi MD as Consulting Physician (Anesthesiology)  Juanito Grace Jr., MD as Consulting Physician (Urology)    Reason For Visit:  F/U NOE. SEEN ON DIALYSIS.  Subjective           Review of Systems:    Pulm: No soa   CV:  No CP      Objective       aztreonam 500 mg Intravenous Q12H   castor oil-balsam peru  Topical Q12H   ceFAZolin 1 g Intravenous Q24H   ferric gluconate (FERRLECIT) IVPB 125 mg Intravenous Daily   insulin lispro 0-7 Units Subcutaneous 4x Daily With Meals & Nightly   Linezolid 600 mg Intravenous Q12H   metroNIDAZOLE 500 mg Intravenous Q8H   sodium chloride 3 mL Intravenous Q12H       Pharmacy Consult          Vital Signs:  Blood pressure 156/83, pulse 77, temperature 99.3 °F (37.4 °C), temperature source Axillary, resp. rate 16, height 170.2 cm (67.01\"), weight 117 kg (257 lb), SpO2 97 %.    Flowsheet Rows      First Filed Value   Admission Height  170.2 cm (67\") Documented at 02/08/2019 1115   Admission Weight  117 kg (257 lb 3.2 oz) Documented at 02/08/2019 1115          02/10 0701 - 02/11 0700  In: 1651.6 [P.O.:550; I.V.:151.6]  Out: 280 [Urine:280]    Physical Exam:    General Appearance: NAD, alert and cooperative, Ox3  Eyes: PER, conjunctivae and sclerae normal, no icterus  Lungs: RHONCHI  Heart/CV: regular rhythm & normal rate, no murmur, no gallop, no rub and 1+ edema  Abdomen: not distended, soft, non-tender, no masses,  bowel sounds present  Skin: No rash, Warm and dry    Radiology:            Labs: FE SAT 13%. FERRITIN 691.  Results from last 7 days   Lab Units 02/11/19  0637 02/10/19  0340 02/09/19  0510   WBC 10*3/mm3 17.58* 20.34* 26.16*   HEMOGLOBIN g/dL 10.8* 10.5* 10.9*   HEMATOCRIT % 32.0* 30.9* 32.3*   PLATELETS 10*3/mm3 125* 127* 151     Results from last 7 days   Lab Units 02/11/19  0637 02/10/19  0340 " 02/09/19  0510 02/08/19  1240   SODIUM mmol/L 132 135 133 127*   POTASSIUM mmol/L 3.6 3.6 4.2 4.5   CHLORIDE mmol/L 100 102 103 100   CO2 mmol/L 18.0* 21.0 18.0* 17.0*   BUN mg/dL 81* 64* 83* 97*   CREATININE mg/dL 3.05* 3.55* 4.71* 5.39*   CALCIUM mg/dL 8.2* 8.2* 8.2* 8.5*   PHOSPHORUS mg/dL 6.0* 5.8* 6.4*  --    MAGNESIUM mg/dL 2.2  --  2.3  --    ALBUMIN g/dL 2.89* 3.05* 2.96* 2.95*     Results from last 7 days   Lab Units 02/11/19  0637   GLUCOSE mg/dL 151*       Results from last 7 days   Lab Units 02/11/19  0637   ALK PHOS U/L 348*   BILIRUBIN mg/dL 2.6*   ALT (SGPT) U/L 17   AST (SGOT) U/L 67*     Results from last 7 days   Lab Units 02/08/19  1540   PH, ARTERIAL pH units 7.276*   PO2 ART mm Hg 85.0   PCO2, ARTERIAL mm Hg 38.4   HCO3 ART mmol/L 17.8*       Results from last 7 days   Lab Units 02/08/19  1826   COLOR UA  Jessica*   CLARITY UA  Turbid*   PH, URINE  6.5   SPECIFIC GRAVITY, URINE  1.015   GLUCOSE UA  Negative   KETONES UA  15 mg/dL (1+)*   BILIRUBIN UA  Small (1+)*   PROTEIN UA  100 mg/dL (2+)*   BLOOD UA  Large (3+)*   LEUKOCYTES UA  Large (3+)*   NITRITE UA  Positive*       Estimated Creatinine Clearance: 28.8 mL/min (A) (by C-G formula based on SCr of 3.05 mg/dL (H)).      Assessment       Renal failure    Spinal stenosis, lumbar region, with neurogenic claudication    Morbid obesity due to excess calories (CMS/HCC)    TANIYA on CPAP    Dyslipidemia    DM (diabetes mellitus), type 2 (CMS/HCC)    Metabolic encephalopathy            Impression: NOE. ANEMIA WITH LOW FE STORES. HYPONATREMIA.            Recommendations: HD TODAY. IV FE. PO FLUID RESTRICTION.      Jr Banks MD  02/11/19  9:59 AM

## 2019-02-11 NOTE — PROGRESS NOTES
"INTENSIVIST   PROGRESS NOTE     Hospital:  LOS: 3 days      S     Mr. Rod Balderas, 68 y.o. male is followed for:      Sepsis with MODS    T2DM    As an Intensivist, we provide an integrated approach to the ICU patient and family, medical management of comorbid conditions, including but not limited to electrolytes, glycemic control, organ dysfunction, lead interdisciplinary rounds and coordinate the care with all other services, including those from other specialists.     Interval History:  \"Rough night\", because of pain.  Hurts all over.  Otherwise, doing OK.  Constipation.     The patient's relevant past medical, surgical and social history were reviewed and updated in Epic as appropriate.     ROS:   Constitutional: Negative for fever.   Respiratory: Negative for dyspnea.   Cardiovascular: Negative for chest pain.   Gastrointestinal: Negative for  nausea, vomiting and diarrhea.        O     Vitals:  Temp: 97.5 °F (36.4 °C) (02/11/19 1128) Temp  Min: 97.5 °F (36.4 °C)  Max: 99.3 °F (37.4 °C)   BP: 164/77 (02/11/19 1128) BP  Min: 133/76  Max: 169/66   Pulse: 79 (02/11/19 1128) Pulse  Min: 72  Max: 83   Resp: 16 (02/11/19 1128) Resp  Min: 14  Max: 24   SpO2: 98 % (02/11/19 1100) SpO2  Min: 92 %  Max: 100 %   Device: room air (02/11/19 1115)    Flow Rate: 1 (02/09/19 2300) No Data Recorded     Intake/Ouptut 24 hrs (7:00AM - 6:59 AM)  Intake/Output       02/10/19 0700 - 02/11/19 0659 02/11/19 0700 - 02/12/19 0659    Intake (ml) 1651.6 750    Output (ml) 280 3275    Net (ml) 1371.6 -2525         Physical Examination  Telemetry:  Rhythm: normal sinus rhythm (02/11/19 0800)  Atrial Rhythm: atrial fibrillation (02/09/19 1200)      Constitutional:  No acute distress.   Cardiovascular: Normal rate, regular and rhythm. Normal heart sounds.  No murmurs, gallop or rub.   Respiratory: No respiratory distress. Normal respiratory effort.  Rhonchi.    Abdominal:  Soft. No masses. Non-tender. No distension. No HSM.   Extremities: " No digital cyanosis. No clubbing.  (+) edema.   Neurological:   Eyes open, but no focus.  He does not follow commands.  Best Eye Response: 3-->(E3) to speech (02/11/19 0800)  Best Motor Response: 6-->(M6) obeys commands (02/11/19 0800)  Best Verbal Response: 5-->(V5) oriented (02/11/19 0800)  [place] Coma Scale Score: 14 (02/11/19 0800)   Lines/Drains/Airways: RIJ Tunneled Dyalisis Catheter  Christina       Hematology:  Results from last 7 days   Lab Units 02/11/19  0637 02/10/19  0340 02/09/19  0510   WBC 10*3/mm3 17.58* 20.34* 26.16*   HEMOGLOBIN g/dL 10.8* 10.5* 10.9*   MCV fL 77.1* 78.4* 79.0*   PLATELETS 10*3/mm3 125* 127* 151       Chemistry:  Estimated Creatinine Clearance: 28.8 mL/min (A) (by C-G formula based on SCr of 3.05 mg/dL (H)).    Results from last 7 days   Lab Units 02/11/19  0637 02/10/19  0340 02/09/19  0510   SODIUM mmol/L 132 135 133   POTASSIUM mmol/L 3.6 3.6 4.2   CHLORIDE mmol/L 100 102 103   CO2 mmol/L 18.0* 21.0 18.0*   ANION GAP mmol/L 14.0* 12.0* 12.0*   GLUCOSE mg/dL 151* 142* 130*   CALCIUM mg/dL 8.2* 8.2* 8.2*     Results from last 7 days   Lab Units 02/11/19  0637 02/10/19  0340 02/09/19  0510   BUN mg/dL 81* 64* 83*   CREATININE mg/dL 3.05* 3.55* 4.71*     Results from last 7 days   Lab Units 02/11/19  0637 02/10/19  0340 02/09/19  0510   MAGNESIUM mg/dL 2.2  --  2.3   PHOSPHORUS mg/dL 6.0* 5.8* 6.4*       Hepatic:  Results from last 7 days   Lab Units 02/11/19  0637 02/10/19  0340 02/09/19  0510   ALK PHOS U/L 348* 300* 319*   BILIRUBIN mg/dL 2.6* 2.6* 2.9*   ALT (SGPT) U/L 17 23 33   AST (SGOT) U/L 67* 46* 46*   ALBUMIN g/dL 2.89* 3.05* 2.96*       Lab Results   Lab Value Date/Time    BLOODCX No growth at 2 days 02/08/2019 1240       Lab Results   Lab Value Date/Time    URINECX >100,000 CFU/mL Staphylococcus aureus (A) 02/08/2019 1826       Lab Results   Lab Value Date/Time    RESPCX Scant growth (1+) Yeast isolated (A) 02/09/2019 1431         Images:  Ct Cervical Spine Without  Contrast    Result Date: 2/11/2019  Minimal degenerative changes seen within the cervical spine with no evidence of acute fracture or malalignment.  DICTATED:   2/10/2019 EDITED/ls :   2/10/2019  This report was finalized on 2/11/2019 8:59 AM by Dr. Radha Muhammad MD.      Ct Thoracic Spine Without Contrast    Result Date: 2/11/2019  Multilevel degenerative changes seen throughout the thoracic spine with no definite evidence of osteomyelitis. There is minimal consolidation extending posteriorly within the midlungs bilaterally.  DICTATED:   2/10/2019 EDITED/ls :   2/10/2019  This report was finalized on 2/11/2019 8:59 AM by Dr. Radha Muhammad MD.      Ct Lumbar Spine Without Contrast    Result Date: 2/11/2019  Severe central spinal canal narrowing at the L3/L4 and L4/L5 levels. Consider MRI for further evaluation of the nerve roots. Degenerative change is identified within the lumbar spine with no evidence of cortical destruction. No evidence of endplate irregularity.  DICTATED:   2/10/2019 EDITED/ls :   2/10/2019   This report was finalized on 2/11/2019 8:59 AM by Dr. Radha Muhammad MD.      Xr Chest 1 View    Result Date: 2/11/2019  Improvement seen in the aeration of the lung bases. Only minimal increased markings remaining at the left lung base. Dialysis catheter identified on the right with tip in the SVC.  D:  02/11/2019 E:  02/11/2019  This report was finalized on 2/11/2019 9:02 AM by Dr. Radha Muhammad MD.        Echo:  Results for orders placed during the hospital encounter of 02/08/19   Adult Transthoracic Echo Limited W/ Cont if Necessary Per Protocol    Narrative · Estimated EF = 70%.  · Left ventricular wall thickness is consistent with mild concentric   hypertrophy.  · Incidental atrial fibrillation noted.  · Poor valvular echoes , technically limited.          Results: Reviewed.  I reviewed the patient's new laboratory and imaging results.  I independently reviewed the patient's new  images.    Medications: Reviewed.    Assessment/Plan   A / P     68 y.o.male, admitted on 2/8/2019 with Acute renal failure (ARF) (CMS/HCC) [N17.9]: Transferred from [facility] where he was admitted on 2/5/19    1. Sepsis with MODS  1. PCT decreasing since admission.  2. BC positive and Urine Cx positive for MSSA at [facility] (2/5/2019)  3. Urine Cx ([facility] 2/8/19): Staph   4. Renal failure  1. NOE - now on Hemodialysis.  1. Cr went from 2.2 (on 2/6) to 5.6 (on 2/8)  2. R/o Medications: NSAIDs and/or ACEI.  3. S/p Robotic Laparoscopic Prostatectomy 1/15/19 (due to Prostate cancer)  4. Hematuria on admission to [facility]  5. Encephalopathy  1. Improved  2. Most likely Toxic metabolic  6. Respiratory failure  1. TANIYA on BiPAP at home  2. Bibasilar consolidation as per CT Scan (2/8/19)  7. Leukocytosis.  8. Liver  1. Elevated ALKP, Bilirubin and mild elevation of transaminases  2. CAD s/p stents 2012  3. P AFib, brief episode on 2/9/209, requiring Diltiazem and converted to NSR  4. Dyslipidemia  5. Obesity III. Body mass index is 40.24 kg/m².   6. Antibiotics: AZT, Cefazolin, Linezolid, Metronidazole} Per ID  7. PMH: HTN  8. PMH: Spinal stenosis with neurogenic claudication  9. T2DM    Results from last 7 days   Lab Units 02/11/19  1114 02/11/19  0722 02/10/19  2025 02/10/19  1630 02/10/19  1104 02/10/19  0624   GLUCOSE mg/dL 107 175* 146* 149* 146* 137*     Lab Results   Lab Value Date/Time    HGBA1C 5.70 (H) 01/08/2019 1524       Nutrition Support: Patient isn't on Tube Feeding   Modulars: Patient doesn't have any tube feeding modular orders   Diet: Diet Clear Liquid; Thin; Consistent Carbohydrate, Daily Fluid Restriction; 1500 mL Fluid   Advance Directives: Code Status and Medical Interventions:   Ordered at: 02/08/19 1208     Code Status:    CPR     Medical Interventions (Level of Support Prior to Arrest):    Full          Assessment / Plan:    1. CT spine: no abscess  2. Improving  3. HD  today  4. Lactulose  5. Disposition: Keep in ICU.        Plan of care and goals reviewed during interdisciplinary rounds.  Level of Risk is High due to:  illness with threat to life or bodily function.   I discussed the patient's findings and my recommendations with patient    Time: was greater than 35 minutes.    (This excludes time spent performing separately reportable procedures and services).  Patient was at high risk of imminent or life-threatening deterioration in his condition due to CNS dysfunction, Respiratory failure and Renal failure.     Jamal Villar MD, FACP, FCCP, CNSC  Intensive Care Medicine, Nutrition Support and Pulmonary Medicine

## 2019-02-11 NOTE — PROGRESS NOTES
Continued Stay Note   [place]     Patient Name: Rod Balderas  MRN: [id number]  Today's Date: 2/11/2019    Admit Date: 2/8/2019    Discharge Plan     Row Name 02/11/19 1142       Plan    Plan  Home    Plan Comments  Stopped to see patient, patient sleeping,  Currently getting dialysis.  CM will continue to follow.        Discharge Codes    No documentation.       Expected Discharge Date and Time     Expected Discharge Date Expected Discharge Time    Feb 18, 2019             Jacquelyn Mann RN

## 2019-02-11 NOTE — PLAN OF CARE
Problem: Patient Care Overview  Goal: Plan of Care Review  Outcome: Ongoing (interventions implemented as appropriate)   02/11/19 0526   Coping/Psychosocial   Plan of Care Reviewed With patient   Plan of Care Review   Progress improving   OTHER   Outcome Summary Pt exhibited less discomfort tonight than previously. At 0400 this AM pt was totally alert and oriented x4, which he had not been prior to this moment. Has rested well and tolerated CPAP tonight. Pt has had mild hypertension tonight (between 140 and 160 systolic) however would not treat until after dialysis due to the significant drop in pressure during the last treatment.       Problem: Skin Injury Risk (Adult)  Goal: Skin Health and Integrity  Outcome: Ongoing (interventions implemented as appropriate)   02/11/19 0526   Skin Injury Risk (Adult)   Skin Health and Integrity making progress toward outcome       Problem: Fall Risk (Adult)  Goal: Absence of Fall  Outcome: Ongoing (interventions implemented as appropriate)   02/11/19 0526   Fall Risk (Adult)   Absence of Fall making progress toward outcome       Problem: Hemodialysis (Adult)  Goal: Signs and Symptoms of Listed Potential Problems Will be Absent, Minimized or Managed (Hemodialysis)  Outcome: Ongoing (interventions implemented as appropriate)   02/11/19 0526   Goal/Outcome Evaluation   Problems Assessed (Hemodialysis) all   Problems Present (Hemodialysis) cardiovascular complications;situational response;neurologic complications;infection       Problem: Confusion, Acute (Adult)  Goal: Cognitive/Functional Impairments Minimized   02/11/19 0526   Confusion, Acute (Adult)   Cognitive/Functional Impairments Minimized making progress toward outcome

## 2019-02-11 NOTE — PATIENT CARE CONFERENCE
ICU ROUNDS: PT wound care consult pending. PT mobility consult placed today (likely to initiate mobility 2/12).

## 2019-02-12 LAB
ALBUMIN SERPL-MCNC: 2.83 G/DL (ref 3.2–4.8)
ANION GAP SERPL CALCULATED.3IONS-SCNC: 11 MMOL/L (ref 3–11)
APTT PPP: 36.6 SECONDS (ref 24–37)
BASOPHILS # BLD AUTO: 0.06 10*3/MM3 (ref 0–0.2)
BASOPHILS NFR BLD AUTO: 0.4 % (ref 0–1)
BUN BLD-MCNC: 60 MG/DL (ref 9–23)
BUN/CREAT SERPL: 34.5 (ref 7–25)
CALCIUM SPEC-SCNC: 8.1 MG/DL (ref 8.7–10.4)
CHLORIDE SERPL-SCNC: 99 MMOL/L (ref 99–109)
CO2 SERPL-SCNC: 23 MMOL/L (ref 20–31)
CREAT BLD-MCNC: 1.74 MG/DL (ref 0.6–1.3)
DEPRECATED RDW RBC AUTO: 46.7 FL (ref 37–54)
EOSINOPHIL # BLD AUTO: 0.03 10*3/MM3 (ref 0–0.3)
EOSINOPHIL NFR BLD AUTO: 0.2 % (ref 0–3)
ERYTHROCYTE [DISTWIDTH] IN BLOOD BY AUTOMATED COUNT: 15.7 % (ref 11.3–14.5)
GFR SERPL CREATININE-BSD FRML MDRD: 39 ML/MIN/1.73
GLUCOSE BLD-MCNC: 186 MG/DL (ref 70–100)
GLUCOSE BLDC GLUCOMTR-MCNC: 162 MG/DL (ref 70–130)
GLUCOSE BLDC GLUCOMTR-MCNC: 190 MG/DL (ref 70–130)
GLUCOSE BLDC GLUCOMTR-MCNC: 191 MG/DL (ref 70–130)
GLUCOSE BLDC GLUCOMTR-MCNC: 217 MG/DL (ref 70–130)
HCT VFR BLD AUTO: 36 % (ref 38.9–50.9)
HGB BLD-MCNC: 11.7 G/DL (ref 13.1–17.5)
IMM GRANULOCYTES # BLD AUTO: 0.29 10*3/MM3 (ref 0–0.05)
IMM GRANULOCYTES NFR BLD AUTO: 1.8 % (ref 0–0.6)
INR PPP: 1.29 (ref 0.85–1.16)
LYMPHOCYTES # BLD AUTO: 0.7 10*3/MM3 (ref 0.6–4.8)
LYMPHOCYTES NFR BLD AUTO: 4.4 % (ref 24–44)
MCH RBC QN AUTO: 26.8 PG (ref 27–31)
MCHC RBC AUTO-ENTMCNC: 32.5 G/DL (ref 32–36)
MCV RBC AUTO: 82.6 FL (ref 80–99)
MONOCYTES # BLD AUTO: 0.79 10*3/MM3 (ref 0–1)
MONOCYTES NFR BLD AUTO: 5 % (ref 0–12)
NEUTROPHILS # BLD AUTO: 14.31 10*3/MM3 (ref 1.5–8.3)
NEUTROPHILS NFR BLD AUTO: 90 % (ref 41–71)
PHOSPHATE SERPL-MCNC: 5.2 MG/DL (ref 2.4–5.1)
PLAT MORPH BLD: NORMAL
PLATELET # BLD AUTO: 132 10*3/MM3 (ref 150–450)
PMV BLD AUTO: 10.3 FL (ref 6–12)
POTASSIUM BLD-SCNC: 3.4 MMOL/L (ref 3.5–5.5)
PROTHROMBIN TIME: 15.5 SECONDS (ref 11.2–14.3)
RBC # BLD AUTO: 4.36 10*6/MM3 (ref 4.2–5.76)
RBC MORPH BLD: NORMAL
SODIUM BLD-SCNC: 133 MMOL/L (ref 132–146)
WBC MORPH BLD: NORMAL
WBC NRBC COR # BLD: 15.89 10*3/MM3 (ref 3.5–10.8)

## 2019-02-12 PROCEDURE — 94799 UNLISTED PULMONARY SVC/PX: CPT

## 2019-02-12 PROCEDURE — 85730 THROMBOPLASTIN TIME PARTIAL: CPT | Performed by: NURSE PRACTITIONER

## 2019-02-12 PROCEDURE — 82962 GLUCOSE BLOOD TEST: CPT

## 2019-02-12 PROCEDURE — 25010000002 FENTANYL CITRATE (PF) 100 MCG/2ML SOLUTION: Performed by: NURSE PRACTITIONER

## 2019-02-12 PROCEDURE — 97530 THERAPEUTIC ACTIVITIES: CPT

## 2019-02-12 PROCEDURE — 85610 PROTHROMBIN TIME: CPT | Performed by: NURSE PRACTITIONER

## 2019-02-12 PROCEDURE — 97610 LOW FREQUENCY NON-THERMAL US: CPT

## 2019-02-12 PROCEDURE — 85007 BL SMEAR W/DIFF WBC COUNT: CPT | Performed by: INTERNAL MEDICINE

## 2019-02-12 PROCEDURE — 80069 RENAL FUNCTION PANEL: CPT | Performed by: INTERNAL MEDICINE

## 2019-02-12 PROCEDURE — 25010000003 CEFAZOLIN 1-4 GM/50ML-% SOLUTION

## 2019-02-12 PROCEDURE — 25010000002 ONDANSETRON PER 1 MG: Performed by: INTERNAL MEDICINE

## 2019-02-12 PROCEDURE — 97165 OT EVAL LOW COMPLEX 30 MIN: CPT

## 2019-02-12 PROCEDURE — 85025 COMPLETE CBC W/AUTO DIFF WBC: CPT | Performed by: INTERNAL MEDICINE

## 2019-02-12 PROCEDURE — 25010000002 NA FERRIC GLUC CPLX PER 12.5 MG: Performed by: INTERNAL MEDICINE

## 2019-02-12 PROCEDURE — 99233 SBSQ HOSP IP/OBS HIGH 50: CPT | Performed by: INTERNAL MEDICINE

## 2019-02-12 PROCEDURE — 97162 PT EVAL MOD COMPLEX 30 MIN: CPT

## 2019-02-12 RX ORDER — AMLODIPINE BESYLATE 5 MG/1
5 TABLET ORAL
Status: DISCONTINUED | OUTPATIENT
Start: 2019-02-12 | End: 2019-02-24 | Stop reason: HOSPADM

## 2019-02-12 RX ADMIN — INSULIN LISPRO 2 UNITS: 100 INJECTION, SOLUTION INTRAVENOUS; SUBCUTANEOUS at 16:52

## 2019-02-12 RX ADMIN — METRONIDAZOLE 500 MG: 500 INJECTION, SOLUTION INTRAVENOUS at 02:25

## 2019-02-12 RX ADMIN — AZTREONAM 500 MG: 1 INJECTION, POWDER, LYOPHILIZED, FOR SOLUTION INTRAMUSCULAR; INTRAVENOUS at 10:38

## 2019-02-12 RX ADMIN — CASTOR OIL AND BALSAM, PERU: 788; 87 OINTMENT TOPICAL at 08:28

## 2019-02-12 RX ADMIN — GABAPENTIN 300 MG: 300 CAPSULE ORAL at 08:27

## 2019-02-12 RX ADMIN — SODIUM CHLORIDE 125 MG: 9 INJECTION, SOLUTION INTRAVENOUS at 08:27

## 2019-02-12 RX ADMIN — AMLODIPINE BESYLATE 5 MG: 5 TABLET ORAL at 10:38

## 2019-02-12 RX ADMIN — FENTANYL CITRATE 25 MCG: 50 INJECTION INTRAMUSCULAR; INTRAVENOUS at 21:19

## 2019-02-12 RX ADMIN — DILTIAZEM HYDROCHLORIDE 15 MG/HR: 5 INJECTION INTRAVENOUS at 00:12

## 2019-02-12 RX ADMIN — HYDROCODONE BITARTRATE AND ACETAMINOPHEN 1 TABLET: 5; 325 TABLET ORAL at 02:25

## 2019-02-12 RX ADMIN — INSULIN LISPRO 2 UNITS: 100 INJECTION, SOLUTION INTRAVENOUS; SUBCUTANEOUS at 20:32

## 2019-02-12 RX ADMIN — ONDANSETRON 4 MG: 2 INJECTION INTRAMUSCULAR; INTRAVENOUS at 05:11

## 2019-02-12 RX ADMIN — HYDROCODONE BITARTRATE AND ACETAMINOPHEN 1 TABLET: 5; 325 TABLET ORAL at 10:39

## 2019-02-12 RX ADMIN — METRONIDAZOLE 500 MG: 500 INJECTION, SOLUTION INTRAVENOUS at 11:59

## 2019-02-12 RX ADMIN — DILTIAZEM HYDROCHLORIDE 15 MG/HR: 5 INJECTION INTRAVENOUS at 08:29

## 2019-02-12 RX ADMIN — HYDROCODONE BITARTRATE AND ACETAMINOPHEN 1 TABLET: 5; 325 TABLET ORAL at 16:43

## 2019-02-12 RX ADMIN — AZTREONAM 500 MG: 1 INJECTION, POWDER, LYOPHILIZED, FOR SOLUTION INTRAMUSCULAR; INTRAVENOUS at 21:27

## 2019-02-12 RX ADMIN — CEFAZOLIN SODIUM 1 G: 1 INJECTION, SOLUTION INTRAVENOUS at 19:51

## 2019-02-12 RX ADMIN — SODIUM CHLORIDE, PRESERVATIVE FREE 3 ML: 5 INJECTION INTRAVENOUS at 20:22

## 2019-02-12 RX ADMIN — FENTANYL CITRATE 25 MCG: 50 INJECTION INTRAMUSCULAR; INTRAVENOUS at 22:15

## 2019-02-12 RX ADMIN — SODIUM CHLORIDE, PRESERVATIVE FREE 3 ML: 5 INJECTION INTRAVENOUS at 08:29

## 2019-02-12 RX ADMIN — CASTOR OIL AND BALSAM, PERU: 788; 87 OINTMENT TOPICAL at 20:21

## 2019-02-12 RX ADMIN — ONDANSETRON 4 MG: 2 INJECTION INTRAMUSCULAR; INTRAVENOUS at 11:58

## 2019-02-12 RX ADMIN — INSULIN LISPRO 3 UNITS: 100 INJECTION, SOLUTION INTRAVENOUS; SUBCUTANEOUS at 11:58

## 2019-02-12 RX ADMIN — INSULIN LISPRO 2 UNITS: 100 INJECTION, SOLUTION INTRAVENOUS; SUBCUTANEOUS at 08:28

## 2019-02-12 RX ADMIN — METRONIDAZOLE 500 MG: 500 INJECTION, SOLUTION INTRAVENOUS at 18:49

## 2019-02-12 NOTE — PROGRESS NOTES
[facility] Progress Note          Antibiotics:  Anti-Infectives (From admission, onward)    Ordered     Dose/Rate Route Frequency Start Stop    02/09/19 1416  aztreonam (AZACTAM) 500 mg in sodium chloride 0.9 % 50 mL IVPB     Ordering Provider:  Efrem Santana MD    500 mg  over 30 Minutes Intravenous Every 12 Hours 02/09/19 2200 02/16/19 2159    02/09/19 0849  metroNIDAZOLE (FLAGYL) IVPB 500 mg     Ordering Provider:  Efrem Santana MD    500 mg  100 mL/hr over 60 Minutes Intravenous Every 8 Hours 02/09/19 1030 02/16/19 1029    02/09/19 0849  aztreonam (AZACTAM) 2 g in sodium chloride 0.9 % 100 mL IVPB-MBP     Ordering Provider:  Efrem Santana MD    2 g  200 mL/hr over 30 Minutes Intravenous Once 02/09/19 1000 02/09/19 0946    02/08/19 1949  ceFAZolin (ANCEF) IVPB 1 g     Ordering Provider:  Juana Ivy [facility]    1 g Intravenous Every 24 Hours 02/08/19 2045 02/22/19 2044          CC: Patient unable    HPI:    Consult by Dr. Jeff Smith; covering for him :      Patient is a 68 y.o.  Yr old male with history of prostate cancer status post robotic laparoscopic prostatectomy in mid January and was discharged on 1/17/19. The patient is currently unresponsive and no family is at bedside so history is somewhat limited to history from nursing staff and medical records. The patient was seen a few days after his discharge from his recent  Admission in January and his Christina catheter was discontinued.  About one week ago he went to the ER with complaints of back pain, abdominal pain, and confusion and a CT abdomen and pelvis was apparently okay during that time. He continued to worsen and over the weekend he was admitted to [facility].  He was found to have an initial AK I with a creatinine of 2.2. Per the micro-lab at [facility], the patient's urine cultures and 1 of 2 blood cultures from 2/5/19 grew MSSA. The patient was initially on vancomycin and Zosyn and was subsequently transitioned to  daptomycin after he developed worsening renal function with a creatinine up to 5.6 and BUN went up to 94 today.  Due to lack of nephrology at [facility], the patient was transferred to [facility] today for possible dialysis.  He additionally received a dose of nafcillin prior to this transfer.  His last dose of daptomycin was yesterday per pharmacist here (who has discussed with OSH). Since arrival, the patient has remained afebrile but he has had a leukocytosis up to 31.38.  He is anemic with a hemoglobin of 11.3 and a hematocrit of 54.1.  He is hyponatremic to a sodium of 127.  Creatinine was initially elevated 5.39 and B1 was 97 on arrival.  He additionally had elevation of his LFTs with an ALT of 43 and AST of 43, T bili of 3.0, and alkaline phosphatase of 303.  Pro-calcitonin was elevated to 7.55 lactic acid was normal at 0.8. The patient underwent CT head without contrast which did not show any acute abnormality, CT chest, abdomen and pelvis which showed consolidation of the lung bases bilaterally that is concerning for airspace disease such as pneumonia, gastric distention, and air-filled loops of colon with no obvious obstruction. Blood cultures have been ordered and a reflexive urinalysis has been ordered as well.  A transthoracic echocardiogram was ordered. The patient underwent dialysis today following right IJ dialysis catheter placement.  The infectious disease team has been consulted for antibiotic recommendations.    He has methicillin sensitive staph aureus septicemia/bacteriuria compounded by acute renal failure, acute hypoxic respiratory failure and by basilar consolidation by chest CT.  Further compounded by acute encephalopathy.    2/9/19 nursing reports A. fib/RVR, dialysis ongoing with acute renal failure and tachypneic/labored at times.  Some cough but unable to capture for culture so far. Empiric HCAP coverage added with zyvox/azactam/flagyl; unable to do MRI of  "spine so CT's done cervical/thoracic/lumbar spine done 2/10    2/10/19 CT scans of spine; more restful, less labored with breathing and decreased cough;  No rash.  He can wiggle toes but not lift legs off the bed.  He moves left arm better than right arm.  No other new focal pain that he will relate although interaction minimal.  No diarrhea.  No vomiting.    2/11/19: seems to have improved from mental status standpoint slightly per his wife and son who are at bedside. Patient endorses abdominal pain but is not answering a lot of questions. tMAX 99.3f. WBC remains elevated at 20K (down from 31K on arrival). TTE was without evidence of IE but valves poorly visualized. Still getting HD.    ROS:    Limited by encephalopathy. +abdominal pain. +right wrist pain      PE:   /77   Pulse 111   Temp 97.5 °F (36.4 °C) (Oral)   Resp 18   Ht 170.2 cm (67.01\")   Wt 117 kg (257 lb)   SpO2 96%   BMI 40.24 kg/m²     GENERAL: ill appearing, obese,  man. NAD  HEENT: Normocephalic, atraumatic. No conjunctival injection. No icterus.   NECK: Supple without nuchal rigidity. No mass.  Chest: right chest dialysis cath site is without erythema or induration. No chest wall deformities  HEART: Cardiac cardiac; No murmur, rubs, gallops.  No peripheral stigmata/phenomena of endocarditis  LUNGS: Diminished at bases, labored and bilateral rhonchi. On nasal cannula  ABDOMEN: Soft, nontender, nondistended. Positive bowel sounds. No rebound or guarding. NO mass or HSM.  EXT:  No cyanosis, clubbing. No cord.  Edema noted over bilateral lower extremities. R>L upper extremity edema with erythema, warmth, and tenderness near right wrist. Tenderness to flexion and extension of right wrist.  : Genitalia generally unremarkable.  With Christina catheter.  MSK: FROM without joint effusions noted arms/legs.    SKIN: Warm and dry without cutaneous eruptions on Inspection/palpation.    NEURO: somnolent but easy to awaken. Does answer a few " simple questions and follow some simple commands. Oriented to person and family.    IV with no obvious redness or drainage    Laboratory Data    Results from last 7 days   Lab Units 02/11/19  1252 02/11/19  0637 02/10/19  0340   WBC 10*3/mm3 20.21* 17.58* 20.34*   HEMOGLOBIN g/dL 11.0* 10.8* 10.5*   HEMATOCRIT % 32.9* 32.0* 30.9*   PLATELETS 10*3/mm3 137* 125* 127*     Results from last 7 days   Lab Units 02/11/19  0637   SODIUM mmol/L 132   POTASSIUM mmol/L 3.6   CHLORIDE mmol/L 100   CO2 mmol/L 18.0*   BUN mg/dL 81*   CREATININE mg/dL 3.05*   GLUCOSE mg/dL 151*   CALCIUM mg/dL 8.2*     Results from last 7 days   Lab Units 02/11/19  0637   ALK PHOS U/L 348*   BILIRUBIN mg/dL 2.6*   ALT (SGPT) U/L 17   AST (SGOT) U/L 67*         Results from last 7 days   Lab Units 02/11/19  0637   CRP mg/dL 19.67*       Estimated Creatinine Clearance: 28.8 mL/min (A) (by C-G formula based on SCr of 3.05 mg/dL (H)).      Microbiology:      Radiology:  Imaging Results (last 72 hours)     Procedure Component Value Units Date/Time    XR Chest 1 View [[id number]] Updated:  02/09/19 0221    CT Abdomen Pelvis Without Contrast [[id number]] Collected:  02/08/19 1822     Updated:  02/08/19 1822    Narrative:       EXAMINATION: CT CHEST WO CONTRAST, CT ABDOMEN/PELVIS WO CONTRAST -  2/8/2019     INDICATION: Persistent cough.     TECHNIQUE: Multiple axial CT imaging is obtained of the chest, abdomen  and pelvis without the administration of oral or intravenous contrast.     The radiation dose reduction device was turned on for each scan per the  ALARA (As Low as Reasonably Achievable) protocol.     COMPARISON: There is some consolidation identified posteriorly extending  to the lung bases bilaterally suggesting infiltrates. The upper lung  fields are grossly clear. Motion artifact degrades image quality. There  are degenerative changes identified within the spine. The cardiac  chambers are enlarged. There is no pericardial effusion. No bulky  hilar  or axillary lymphadenopathy. The thyroid is homogeneous. Degenerative  change is seen throughout the spine.     ABDOMEN: Motion artifact grades image quality. There is no abnormality  seen within the liver. The spleen is upper limits of normal in size.  There is gastric distention. Air fills the colon with no  evidence of  obvious obstruction. Kidneys and adrenal glands within normal limits.  The pancreas is homogeneous. No abdominal or retroperitoneal  lymphadenopathy. No abnormal mass or fluid collections identified. No  free fluid or free air.     PELVIS: Pelvic organs are unremarkable. The pelvic portion of the   gastrointestinal tract is within normal limits. No pelvic adenopathy.  Degenerative change is identified throughout the spine and pelvis.     FINDINGS: Consolidation at the lung bases bilaterally is concerning for  airspace disease such as pneumonia. The there is gastric distention.  Air-filled loops of colon with no obvious obstruction. No other abnormal  findings are seen within the abdomen or pelvis. Degenerative changes are  seen throughout the spine and pelvis.     DICTATED:   2/8/2019  EDITED/ls :   2/8/2019              Impression:               CT Chest Without Contrast [[id number]] Collected:  02/08/19 1822     Updated:  02/08/19 1822    Narrative:       EXAMINATION: CT CHEST WO CONTRAST, CT ABDOMEN/PELVIS WO CONTRAST -  2/8/2019     INDICATION: Persistent cough.     TECHNIQUE: Multiple axial CT imaging is obtained of the chest, abdomen  and pelvis without the administration of oral or intravenous contrast.     The radiation dose reduction device was turned on for each scan per the  ALARA (As Low as Reasonably Achievable) protocol.     COMPARISON: There is some consolidation identified posteriorly extending  to the lung bases bilaterally suggesting infiltrates. The upper lung  fields are grossly clear. Motion artifact degrades image quality. There  are degenerative changes identified  within the spine. The cardiac  chambers are enlarged. There is no pericardial effusion. No bulky hilar  or axillary lymphadenopathy. The thyroid is homogeneous. Degenerative  change is seen throughout the spine.     ABDOMEN: Motion artifact grades image quality. There is no abnormality  seen within the liver. The spleen is upper limits of normal in size.  There is gastric distention. Air fills the colon with no  evidence of  obvious obstruction. Kidneys and adrenal glands within normal limits.  The pancreas is homogeneous. No abdominal or retroperitoneal  lymphadenopathy. No abnormal mass or fluid collections identified. No  free fluid or free air.     PELVIS: Pelvic organs are unremarkable. The pelvic portion of the   gastrointestinal tract is within normal limits. No pelvic adenopathy.  Degenerative change is identified throughout the spine and pelvis.     FINDINGS: Consolidation at the lung bases bilaterally is concerning for  airspace disease such as pneumonia. The there is gastric distention.  Air-filled loops of colon with no obvious obstruction. No other abnormal  findings are seen within the abdomen or pelvis. Degenerative changes are  seen throughout the spine and pelvis.     DICTATED:   2/8/2019  EDITED/ls :   2/8/2019              Impression:               CT Head Without Contrast [[id number]] Collected:  02/08/19 1816     Updated:  02/08/19 1817    Narrative:       EXAMINATION: CT HEAD WO CONTRAST - 2/8/2019     INDICATION: Mental status change, does not follow commands.     TECHNIQUE: Multiple axial CT imaging is obtained of the head from skull  base to skull vertex without the administration of intravenous contrast.     The radiation dose reduction device was turned on for each scan per the  ALARA (As Low as Reasonably Achievable) protocol.     COMPARISON: NONE     FINDINGS: There is atrophy of the brain. Some low-density area seen in  the periventricular white matter suggesting chronic small  vessel  ischemic change. No hemorrhage or hydrocephalus. No mass, mass effect,  or midline shift. No abnormal extra-axial  fluid collections identified. Minimal mucosal thickening of the  maxillary sinuses and ethmoid air cells. The bony structures are  unremarkable. The mastoid air cells are patent.       Impression:       Chronic changes identified within the brain with no acute  intracranial abnormality.     DICTATED:   2/8/2019  EDITED/ls :   2/8/2019        FL C Arm During Surgery [[id number]] Collected:  02/08/19 1646     Updated:  02/08/19 1646    Narrative:       EXAMINATION: FL C ARM DURING SURGERY- 02/08/2019      INDICATION: Dialysis catheter placement     COMPARISON: NONE     FINDINGS: 4 seconds of fluoroscopy and 4 images used for right-sided  dialysis catheter placement. Please see the procedure report for full  details.       Impression:       Fluoroscopy for dialysis catheter placement. Please see the  procedure report for full details.      D:  02/08/2019  E:  02/08/2019       XR Chest 1 View [[id number]] Collected:  02/08/19 1628     Updated:  02/08/19 1628    Narrative:       EXAMINATION: XR CHEST 1 VW-02/08/2019:      INDICATION: RIJ line.      COMPARISON: 02/08/2019.     FINDINGS: Portable chest reveals low lung volumes. Deep line catheter  identified on the right with tip in the SVC. Degenerative changes seen  within the spine. The heart is borderline enlarged. Mild prominence seen  of the pulmonary vascularity. No pleural effusion or pneumothorax.           Impression:       Prominence of the pulmonary vascularity with deep line  catheter identified on the right with tip in the SVC. No evidence of  acute parenchymal disease.     D:  02/08/2019  E:  02/08/2019             XR Chest 1 View [[id number]] Collected:  02/08/19 1243     Updated:  02/08/19 1246    Narrative:          EXAMINATION: XR CHEST 1 VW-      INDICATION: respiratory failure      COMPARISON: 01/08/2019     FINDINGS: Heart is  borderline enlarged. The vasculature is cephalized.  Lung volumes are relatively low. There is mild discoid atelectasis in  both medial lung bases, but no focal disease elsewhere..           Impression:       Low lung volumes, borderline cardiomegaly and mild pulmonary  vascular congestion. Mild bibasilar discoid atelectasis, new from prior  study.     This report was finalized on 2/8/2019 12:44 PM by DR. Antione Damon MD.           I read the patient's chest xray from today, 2/11/19. Improvement in aeration at lung bases.    Impression:     --Acute severe sepsis/septic shock with multiorgan system insufficiency.  Methicillin sensitive staph aureus in blood/urine cultures and bilateral pulmonary infiltrates with potential bilateral pneumonia, HCAP/Aspiration -v- all MSSA;sputum culture pending and in the meantime maintain broader coverage for potential nosocomial pathogens until further information given critical/tenuous status and high risk for further serious morbidity and other serious sequela/mortality    --Acute MSSA septicemia/bacteremia and bacteriuria.  Transthoracic echocardiogram with poor visualization of heart valves but no noted vegetation.  No peripheral stigmata of endocarditis but certainly at risk.  Should obtain KLAUDIA.    --Acute hypoxic respiratory failure.  Potentially multifactorial, infection versus edema versus ARDS versus combination of these.  Bilateral consolidations with possible evolving pneumonia, HCAP/aspiration -v- MSSA; collected sputum, monitor and consider de-escalation depending on culture data and clinical course    --Acute renal failure.  Ongoing dialysis.    --Severe extremity weakness with presentation that include back pain and unclear if spinal/paraspinal infection versus other.  Nursing/radiology/Dr. Olvera to  discussed potential for MRI if able.  Some question of foreign body at head that may limit ability to do MRI.  Unable to obtain MRI and CT spine without  abscess/osteo    --Abnormal liver function tests.  Monitor    --Acute atrial fibrillation/RVR    --Prostate cancer with history recent prostatectomy.  Urology following    --Acute encephalopathy.  Presumed toxic/metabolic present.  Further neurologic workup at discretion of critical care team    ---right wrist swelling- some concern that the patient could be developing septic arthritis of his left wrist      PLAN:    --IV cefazolin.      --continue Azactam and Flagyl for now. May be able to de-escalate soon pending further improvement (chest xray today appears to have improved aeration of lung bases)    --Recommend KLAUDIA    --consider orthopedic surgery consultation for right wrist    ---de-escalate off Zyvox (little added benefit as suspicion for enterococcus infection or MRSA is low. Has MSSA infection which should be covered by cefazolin)    --Check/review labs cultures and scans    --Spinal imaging noted;  Unable to do MRI and thereofre CT scans done;  No description of abscess by radiology    --History per family    --Discussed with microbiology    --Critically ill with high risk for further serious morbidity and other serious sequela/mortality          Jeff Smith MD  2/11/2019

## 2019-02-12 NOTE — PLAN OF CARE
Problem: Patient Care Overview  Goal: Plan of Care Review  Outcome: Ongoing (interventions implemented as appropriate)   02/12/19 8526   Coping/Psychosocial   Plan of Care Reviewed With patient;spouse   OTHER   Outcome Summary PT re-eval (initial eval for PT mobility) completed. Pt demonstrates significant generalized weakness and decreased indep re: functional mobility, warranting further skilled PT services to promote PLOF. Limited today by c/o abd/groin discomfort; able to sit edge of recliner with mod x2 A. Recommend SNF at d/c based upon current level of function.

## 2019-02-12 NOTE — PROGRESS NOTES
[facility] Progress Note          Antibiotics:  Anti-Infectives (From admission, onward)    Ordered     Dose/Rate Route Frequency Start Stop    02/09/19 1416  aztreonam (AZACTAM) 500 mg in sodium chloride 0.9 % 50 mL IVPB     Ordering Provider:  Efrem Santana MD    500 mg  over 30 Minutes Intravenous Every 12 Hours 02/09/19 2200 02/16/19 2159    02/09/19 0849  metroNIDAZOLE (FLAGYL) IVPB 500 mg     Ordering Provider:  Efrem Santana MD    500 mg  100 mL/hr over 60 Minutes Intravenous Every 8 Hours 02/09/19 1030 02/16/19 1029    02/09/19 0849  aztreonam (AZACTAM) 2 g in sodium chloride 0.9 % 100 mL IVPB-MBP     Ordering Provider:  Efrem Santana MD    2 g  200 mL/hr over 30 Minutes Intravenous Once 02/09/19 1000 02/09/19 0946    02/08/19 1949  ceFAZolin (ANCEF) IVPB 1 g     Ordering Provider:  Juana Ivy [facility]    1 g Intravenous Every 24 Hours 02/08/19 2045 02/22/19 2044          CC: Patient unable    HPI:    Consult by Dr. Jeff Smith; covering for him :      Patient is a 68 y.o.  Yr old male with history of prostate cancer status post robotic laparoscopic prostatectomy in mid January and was discharged on 1/17/19. The patient is currently unresponsive and no family is at bedside so history is somewhat limited to history from nursing staff and medical records. The patient was seen a few days after his discharge from his recent  Admission in January and his Christina catheter was discontinued.  About one week ago he went to the ER with complaints of back pain, abdominal pain, and confusion and a CT abdomen and pelvis was apparently okay during that time. He continued to worsen and over the weekend he was admitted to [facility].  He was found to have an initial AK I with a creatinine of 2.2. Per the micro-lab at [facility], the patient's urine cultures and 1 of 2 blood cultures from 2/5/19 grew MSSA. The patient was initially on vancomycin and Zosyn and was subsequently transitioned to  daptomycin after he developed worsening renal function with a creatinine up to 5.6 and BUN went up to 94 today.  Due to lack of nephrology at [facility], the patient was transferred to [facility] today for possible dialysis.  He additionally received a dose of nafcillin prior to this transfer.  His last dose of daptomycin was yesterday per pharmacist here (who has discussed with OSH). Since arrival, the patient has remained afebrile but he has had a leukocytosis up to 31.38.  He is anemic with a hemoglobin of 11.3 and a hematocrit of 54.1.  He is hyponatremic to a sodium of 127.  Creatinine was initially elevated 5.39 and B1 was 97 on arrival.  He additionally had elevation of his LFTs with an ALT of 43 and AST of 43, T bili of 3.0, and alkaline phosphatase of 303.  Pro-calcitonin was elevated to 7.55 lactic acid was normal at 0.8. The patient underwent CT head without contrast which did not show any acute abnormality, CT chest, abdomen and pelvis which showed consolidation of the lung bases bilaterally that is concerning for airspace disease such as pneumonia, gastric distention, and air-filled loops of colon with no obvious obstruction. Blood cultures have been ordered and a reflexive urinalysis has been ordered as well.  A transthoracic echocardiogram was ordered. The patient underwent dialysis today following right IJ dialysis catheter placement.  The infectious disease team has been consulted for antibiotic recommendations.    He has methicillin sensitive staph aureus septicemia/bacteriuria compounded by acute renal failure, acute hypoxic respiratory failure and by basilar consolidation by chest CT.  Further compounded by acute encephalopathy.    2/9/19 nursing reports A. fib/RVR, dialysis ongoing with acute renal failure and tachypneic/labored at times.  Some cough but unable to capture for culture so far. Empiric HCAP coverage added with zyvox/azactam/flagyl; unable to do MRI of  "spine so CT's done cervical/thoracic/lumbar spine done 2/10    2/10/19 CT scans of spine; more restful, less labored with breathing and decreased cough;  No rash.  He can wiggle toes but not lift legs off the bed.  He moves left arm better than right arm.  No other new focal pain that he will relate although interaction minimal.  No diarrhea.  No vomiting.    2/11/19: seems to have improved from mental status standpoint slightly per his wife and son who are at bedside. Patient endorses abdominal pain but is not answering a lot of questions. tMAX 99.3f. WBC remains elevated at 20K (down from 31K on arrival). TTE was without evidence of IE but valves poorly visualized. Still getting HD.    2/12/19: Patient is starting to feel much better today.  He is responding to a lot of questions and is more alert sitting up in a bedside chair.  Breathing is improving.  No high fevers overnight.  Leukocytosis improved to 15.9 today.  He continues to urinate a small amount but remains on intermittent dialysis.    ROS:    Breathing has improved. No severe abdominal pain. +right wrist pain. No diarrhea or nausea      PE:   BP (P) 137/81   Pulse 108   Temp (P) 98.5 °F (36.9 °C) (Axillary)   Resp (P) 18   Ht 170.2 cm (67.01\")   Wt 117 kg (257 lb)   SpO2 (P) 96%   BMI 40.24 kg/m²     GENERAL: ill appearing, obese,  man. NAD. Sitting up in bedside chair  HEENT: Normocephalic, atraumatic. No conjunctival injection. No icterus.   NECK: Supple without nuchal rigidity. No mass.  Chest: right chest dialysis cath site is without erythema or induration. No chest wall deformities  HEART: Cardiac cardiac; No murmur, rubs, gallops.  No peripheral stigmata/phenomena of endocarditis  LUNGS: Diminished at bases, labored and bilateral rhonchi. On nasal cannula  ABDOMEN: Soft, nontender, nondistended. Positive bowel sounds. No rebound or guarding. NO mass or HSM.  EXT:  No cyanosis, clubbing. No cord.  Edema noted over bilateral lower " extremities. R>L upper extremity edema with erythema, warmth, and tenderness near right wrist. Tenderness to flexion and extension of right wrist.  : Genitalia generally unremarkable.  With Christina catheter.  MSK: FROM without joint effusions noted arms/legs.    SKIN: Warm and dry without cutaneous eruptions on Inspection/palpation.    NEURO: somnolent but easy to awaken. Does answer a few simple questions and follow some simple commands. Oriented to person and family.    IV with no obvious redness or drainage    Laboratory Data    Results from last 7 days   Lab Units 02/12/19  0603 02/11/19  1252 02/11/19  0637   WBC 10*3/mm3 15.89* 20.21* 17.58*   HEMOGLOBIN g/dL 11.7* 11.0* 10.8*   HEMATOCRIT % 36.0* 32.9* 32.0*   PLATELETS 10*3/mm3 132* 137* 125*     Results from last 7 days   Lab Units 02/12/19  0526   SODIUM mmol/L 133   POTASSIUM mmol/L 3.4*   CHLORIDE mmol/L 99   CO2 mmol/L 23.0   BUN mg/dL 60*   CREATININE mg/dL 1.74*   GLUCOSE mg/dL 186*   CALCIUM mg/dL 8.1*     Results from last 7 days   Lab Units 02/11/19  0637   ALK PHOS U/L 348*   BILIRUBIN mg/dL 2.6*   ALT (SGPT) U/L 17   AST (SGOT) U/L 67*         Results from last 7 days   Lab Units 02/11/19  0637   CRP mg/dL 19.67*       Estimated Creatinine Clearance: 50.4 mL/min (A) (by C-G formula based on SCr of 1.74 mg/dL (H)).      Microbiology:      Radiology:  Imaging Results (last 72 hours)     Procedure Component Value Units Date/Time    XR Chest 1 View [[id number]] Updated:  02/09/19 0221    CT Abdomen Pelvis Without Contrast [[id number]] Collected:  02/08/19 1822     Updated:  02/08/19 1822    Narrative:       EXAMINATION: CT CHEST WO CONTRAST, CT ABDOMEN/PELVIS WO CONTRAST -  2/8/2019     INDICATION: Persistent cough.     TECHNIQUE: Multiple axial CT imaging is obtained of the chest, abdomen  and pelvis without the administration of oral or intravenous contrast.     The radiation dose reduction device was turned on for each scan per the  ALARA (As Low as  Reasonably Achievable) protocol.     COMPARISON: There is some consolidation identified posteriorly extending  to the lung bases bilaterally suggesting infiltrates. The upper lung  fields are grossly clear. Motion artifact degrades image quality. There  are degenerative changes identified within the spine. The cardiac  chambers are enlarged. There is no pericardial effusion. No bulky hilar  or axillary lymphadenopathy. The thyroid is homogeneous. Degenerative  change is seen throughout the spine.     ABDOMEN: Motion artifact grades image quality. There is no abnormality  seen within the liver. The spleen is upper limits of normal in size.  There is gastric distention. Air fills the colon with no  evidence of  obvious obstruction. Kidneys and adrenal glands within normal limits.  The pancreas is homogeneous. No abdominal or retroperitoneal  lymphadenopathy. No abnormal mass or fluid collections identified. No  free fluid or free air.     PELVIS: Pelvic organs are unremarkable. The pelvic portion of the   gastrointestinal tract is within normal limits. No pelvic adenopathy.  Degenerative change is identified throughout the spine and pelvis.     FINDINGS: Consolidation at the lung bases bilaterally is concerning for  airspace disease such as pneumonia. The there is gastric distention.  Air-filled loops of colon with no obvious obstruction. No other abnormal  findings are seen within the abdomen or pelvis. Degenerative changes are  seen throughout the spine and pelvis.     DICTATED:   2/8/2019  EDITED/ls :   2/8/2019              Impression:               CT Chest Without Contrast [[id number]] Collected:  02/08/19 1822     Updated:  02/08/19 1822    Narrative:       EXAMINATION: CT CHEST WO CONTRAST, CT ABDOMEN/PELVIS WO CONTRAST -  2/8/2019     INDICATION: Persistent cough.     TECHNIQUE: Multiple axial CT imaging is obtained of the chest, abdomen  and pelvis without the administration of oral or intravenous contrast.      The radiation dose reduction device was turned on for each scan per the  ALARA (As Low as Reasonably Achievable) protocol.     COMPARISON: There is some consolidation identified posteriorly extending  to the lung bases bilaterally suggesting infiltrates. The upper lung  fields are grossly clear. Motion artifact degrades image quality. There  are degenerative changes identified within the spine. The cardiac  chambers are enlarged. There is no pericardial effusion. No bulky hilar  or axillary lymphadenopathy. The thyroid is homogeneous. Degenerative  change is seen throughout the spine.     ABDOMEN: Motion artifact grades image quality. There is no abnormality  seen within the liver. The spleen is upper limits of normal in size.  There is gastric distention. Air fills the colon with no  evidence of  obvious obstruction. Kidneys and adrenal glands within normal limits.  The pancreas is homogeneous. No abdominal or retroperitoneal  lymphadenopathy. No abnormal mass or fluid collections identified. No  free fluid or free air.     PELVIS: Pelvic organs are unremarkable. The pelvic portion of the   gastrointestinal tract is within normal limits. No pelvic adenopathy.  Degenerative change is identified throughout the spine and pelvis.     FINDINGS: Consolidation at the lung bases bilaterally is concerning for  airspace disease such as pneumonia. The there is gastric distention.  Air-filled loops of colon with no obvious obstruction. No other abnormal  findings are seen within the abdomen or pelvis. Degenerative changes are  seen throughout the spine and pelvis.     DICTATED:   2/8/2019  EDITED/ls :   2/8/2019              Impression:               CT Head Without Contrast [[id number]] Collected:  02/08/19 1816     Updated:  02/08/19 1817    Narrative:       EXAMINATION: CT HEAD WO CONTRAST - 2/8/2019     INDICATION: Mental status change, does not follow commands.     TECHNIQUE: Multiple axial CT imaging is obtained of the  head from skull  base to skull vertex without the administration of intravenous contrast.     The radiation dose reduction device was turned on for each scan per the  ALARA (As Low as Reasonably Achievable) protocol.     COMPARISON: NONE     FINDINGS: There is atrophy of the brain. Some low-density area seen in  the periventricular white matter suggesting chronic small vessel  ischemic change. No hemorrhage or hydrocephalus. No mass, mass effect,  or midline shift. No abnormal extra-axial  fluid collections identified. Minimal mucosal thickening of the  maxillary sinuses and ethmoid air cells. The bony structures are  unremarkable. The mastoid air cells are patent.       Impression:       Chronic changes identified within the brain with no acute  intracranial abnormality.     DICTATED:   2/8/2019  EDITED/ls :   2/8/2019        FL C Arm During Surgery [[id number]] Collected:  02/08/19 1646     Updated:  02/08/19 1646    Narrative:       EXAMINATION: FL C ARM DURING SURGERY- 02/08/2019      INDICATION: Dialysis catheter placement     COMPARISON: NONE     FINDINGS: 4 seconds of fluoroscopy and 4 images used for right-sided  dialysis catheter placement. Please see the procedure report for full  details.       Impression:       Fluoroscopy for dialysis catheter placement. Please see the  procedure report for full details.      D:  02/08/2019  E:  02/08/2019       XR Chest 1 View [[id number]] Collected:  02/08/19 1628     Updated:  02/08/19 1628    Narrative:       EXAMINATION: XR CHEST 1 VW-02/08/2019:      INDICATION: RIJ line.      COMPARISON: 02/08/2019.     FINDINGS: Portable chest reveals low lung volumes. Deep line catheter  identified on the right with tip in the SVC. Degenerative changes seen  within the spine. The heart is borderline enlarged. Mild prominence seen  of the pulmonary vascularity. No pleural effusion or pneumothorax.           Impression:       Prominence of the pulmonary vascularity with deep  line  catheter identified on the right with tip in the SVC. No evidence of  acute parenchymal disease.     D:  02/08/2019  E:  02/08/2019             XR Chest 1 View [[id number]] Collected:  02/08/19 1243     Updated:  02/08/19 1246    Narrative:          EXAMINATION: XR CHEST 1 VW-      INDICATION: respiratory failure      COMPARISON: 01/08/2019     FINDINGS: Heart is borderline enlarged. The vasculature is cephalized.  Lung volumes are relatively low. There is mild discoid atelectasis in  both medial lung bases, but no focal disease elsewhere..           Impression:       Low lung volumes, borderline cardiomegaly and mild pulmonary  vascular congestion. Mild bibasilar discoid atelectasis, new from prior  study.     This report was finalized on 2/8/2019 12:44 PM by DR. Antione Damon MD.               Impression:     --Acute severe sepsis/septic shock with multiorgan system insufficiency.  Methicillin sensitive staph aureus in blood/urine cultures and bilateral pulmonary infiltrates with potential bilateral pneumonia, HCAP/Aspiration -v- all MSSA;sputum culture pending and in the meantime maintain broader coverage for potential nosocomial pathogens until further information given critical/tenuous status and high risk for further serious morbidity and other serious sequela/mortality. Leukocytosis is improving.    --Acute MSSA septicemia/bacteremia and bacteriuria.  Transthoracic echocardiogram with poor visualization of heart valves but no noted vegetation.  No peripheral stigmata of endocarditis but certainly at risk.  Should obtain KLAUDIA.    --Acute hypoxic respiratory failure.  Potentially multifactorial, infection versus edema versus ARDS versus combination of these.  Bilateral consolidations with possible evolving pneumonia, HCAP/aspiration -v- MSSA; collected sputum, monitor and consider de-escalation depending on culture data and clinical course    --Acute renal failure.  Ongoing dialysis. Still  urinating    --Severe extremity weakness with presentation that include back pain: unclear if spinal/paraspinal infection versus other.  Nursing/radiology/Dr. Olvera to  discussed potential for MRI if able.  Some question of foreign body at head that may limit ability to do MRI.  Unable to obtain MRI and CT spine without abscess/osteo    --Abnormal liver function tests.  Monitor    --Acute atrial fibrillation/RVR    --Prostate cancer with history recent prostatectomy.  Urology following    --Acute encephalopathy.  Presumed toxic/metabolic present.  Further neurologic workup at discretion of critical care team    ---right wrist swelling- some concern that the patient could be developing septic arthritis of his left wrist      PLAN:    --IV cefazolin.  Will likely need a 6 week course given severity of MSSA infection    --continue Azactam and Flagyl for now. May continue for 7 day course.     --Recommend KLAUDIA when stable enough    --consider orthopedic surgery consultation for right wrist    --Check/review labs cultures and scans    --Spinal imaging noted;  Unable to do MRI and therefore CT scans done;  No description of abscess by radiology      Critically ill with high risk for further serious morbidity and other serious sequela/mortality. I discussed with his wife again today.          Jeff Smith MD  2/12/2019

## 2019-02-12 NOTE — PROGRESS NOTES
"INTENSIVIST   PROGRESS NOTE     Hospital:  LOS: 4 days      S     Mr. Rod Balderas, 68 y.o. male is followed for:      Sepsis with MODS    T2DM    As an Intensivist, we provide an integrated approach to the ICU patient and family, medical management of comorbid conditions, including but not limited to electrolytes, glycemic control, organ dysfunction, lead interdisciplinary rounds and coordinate the care with all other services, including those from other specialists.     Interval History:  Doing better.  More alert every day.  Bipap all night.  \"I am as good as can be\".  New rash in the arms, purpuric type.     The patient's relevant past medical, surgical and social history were reviewed and updated in Epic as appropriate.     ROS:   Constitutional: Negative for fever.   Respiratory: Negative for dyspnea.   Cardiovascular: Negative for chest pain.   Gastrointestinal: Negative for  nausea, vomiting and diarrhea.        O     Vitals:  Temp: 98.3 °F (36.8 °C) (02/12/19 1600) Temp  Min: 97.6 °F (36.4 °C)  Max: 99.7 °F (37.6 °C)   BP: 125/75 (02/12/19 1630) BP  Min: 114/58  Max: 173/103   Pulse: 95 (02/12/19 1630) Pulse  Min: 83  Max: 134   Resp: (P) 16 (02/12/19 1200) Resp  Min: 16  Max: 21   SpO2: 98 % (02/12/19 1630) SpO2  Min: 94 %  Max: 99 %   Device: (P) nasal cannula, humidified (02/12/19 1200)    Flow Rate: (P) 1.5 (02/12/19 1200) Flow (L/min)  Min: 1.5  Max: 2     Intake/Ouptut 24 hrs (7:00AM - 6:59 AM)  Intake/Output       02/11/19 0700 - 02/12/19 0659 02/12/19 0700 - 02/13/19 0659    Intake (ml) 2348 1034.1    Output (ml) 3575 250    Net (ml) -1227 784.1         Physical Examination  Telemetry:  Rhythm: normal sinus rhythm (02/11/19 1400)  Atrial Rhythm: (P) atrial fibrillation (02/12/19 1200)      Constitutional:  No acute distress.   Cardiovascular: Normal rate, regular and rhythm. Normal heart sounds.  No murmurs, gallop or rub.   Respiratory: No respiratory distress. Normal respiratory effort.  Rhonchi. "    Abdominal:  Soft. No masses. Non-tender. No distension. No HSM.   Extremities: No digital cyanosis. No clubbing.  (+) edema.   Neurological:   Eyes open, but no focus.  He does not follow commands.  Best Eye Response: (P) 4-->(E4) spontaneous (02/12/19 1200)  Best Motor Response: (P) 6-->(M6) obeys commands (02/12/19 1200)  Best Verbal Response: (P) 5-->(V5) oriented (02/12/19 1200)  Tom Coma Scale Score: (P) 15 (02/12/19 1200)   Lines/Drains/Airways: RIJ Tunneled Dyalisis Catheter  Christina       Hematology:  Results from last 7 days   Lab Units 02/12/19  0603 02/11/19  1252 02/11/19  0637   WBC 10*3/mm3 15.89* 20.21* 17.58*   HEMOGLOBIN g/dL 11.7* 11.0* 10.8*   MCV fL 82.6 77.8* 77.1*   PLATELETS 10*3/mm3 132* 137* 125*       Chemistry:  Estimated Creatinine Clearance: 50.4 mL/min (A) (by C-G formula based on SCr of 1.74 mg/dL (H)).    Results from last 7 days   Lab Units 02/12/19 0526 02/11/19  0637 02/10/19  0340   SODIUM mmol/L 133 132 135   POTASSIUM mmol/L 3.4* 3.6 3.6   CHLORIDE mmol/L 99 100 102   CO2 mmol/L 23.0 18.0* 21.0   ANION GAP mmol/L 11.0 14.0* 12.0*   GLUCOSE mg/dL 186* 151* 142*   CALCIUM mg/dL 8.1* 8.2* 8.2*     Results from last 7 days   Lab Units 02/12/19  0526 02/11/19  0637 02/10/19  0340   BUN mg/dL 60* 81* 64*   CREATININE mg/dL 1.74* 3.05* 3.55*     Results from last 7 days   Lab Units 02/12/19  0526 02/11/19  0637 02/10/19  0340 02/09/19  0510   MAGNESIUM mg/dL  --  2.2  --  2.3   PHOSPHORUS mg/dL 5.2* 6.0* 5.8* 6.4*       Hepatic:  Results from last 7 days   Lab Units 02/12/19  0526 02/11/19  0637 02/10/19  0340 02/09/19  0510   ALK PHOS U/L  --  348* 300* 319*   BILIRUBIN mg/dL  --  2.6* 2.6* 2.9*   ALT (SGPT) U/L  --  17 23 33   AST (SGOT) U/L  --  67* 46* 46*   ALBUMIN g/dL 2.83* 2.89* 3.05* 2.96*       Lab Results   Lab Value Date/Time    BLOODCX No growth at 4 days 02/08/2019 1240       Lab Results   Lab Value Date/Time    URINECX >100,000 CFU/mL Staphylococcus aureus (A)  02/08/2019 1826       Lab Results   Lab Value Date/Time    RESPCX Scant growth (1+) Yeast isolated (A) 02/09/2019 1431         Images:  Xr Chest 1 View    Result Date: 2/11/2019  Improvement seen in the aeration of the lung bases. Only minimal increased markings remaining at the left lung base. Dialysis catheter identified on the right with tip in the SVC.  D:  02/11/2019 E:  02/11/2019  This report was finalized on 2/11/2019 9:02 AM by Dr. Radha Muhammad MD.        Echo:  Results for orders placed during the hospital encounter of 02/08/19   Adult Transthoracic Echo Limited W/ Cont if Necessary Per Protocol    Narrative · Estimated EF = 70%.  · Left ventricular wall thickness is consistent with mild concentric   hypertrophy.  · Incidental atrial fibrillation noted.  · Poor valvular echoes , technically limited.          Results: Reviewed.  I reviewed the patient's new laboratory and imaging results.  I independently reviewed the patient's new images.    Medications: Reviewed.    Assessment/Plan   A / P     68 y.o.male, admitted on 2/8/2019 with Acute renal failure (ARF) (CMS/[facility]) [N17.9]: Transferred from [facility] where he was admitted on 2/5/19    1. Sepsis with MODS  1. PCT decreasing since admission.  2. BC positive and Urine Cx positive for MSSA at [facility] (2/5/2019)  3. Urine Cx ([facility] 2/8/19): Staph   4. Renal failure  1. NOE - now on Hemodialysis.  1. Cr went from 2.2 (on 2/6) to 5.6 (on 2/8)  2. R/o Medications: NSAIDs and/or ACEI.  3. S/p Robotic Laparoscopic Prostatectomy 1/15/19 (due to Prostate cancer)  4. Hematuria on admission to [facility]  5. Encephalopathy  1. Improved  2. Most likely Toxic metabolic  6. Respiratory failure  1. TANIYA on BiPAP at home  2. Bibasilar consolidation as per CT Scan (2/8/19)  7. Leukocytosis.  8. Liver  1. Elevated ALKP, Bilirubin and mild elevation of transaminases  2. Purpuric type rash upper extremities  1. Only mild Thrombocytopenia  3. CAD s/p  stents 2012  4. P AFib, brief episode on 2/9/209, requiring Diltiazem and converted to NSR  5. Dyslipidemia  6. Obesity III. Body mass index is 40.24 kg/m².   7. Antibiotics: AZT, Cefazolin, Linezolid, Metronidazole} Per ID  8. PMH: HTN  9. PMH: Spinal stenosis with neurogenic claudication  10. T2DM    Results from last 7 days   Lab Units 02/12/19  1645 02/12/19  1121 02/12/19  0704 02/11/19  2042 02/11/19  1641 02/11/19  1114   GLUCOSE mg/dL 191* 217* 190* 147* 139* 107     Lab Results   Lab Value Date/Time    HGBA1C 5.70 (H) 01/08/2019 1524       Nutrition Support: Patient isn't on Tube Feeding   Modulars: Patient doesn't have any tube feeding modular orders   Diet: Diet Regular; Renal, Daily Fluid Restriction, Consistent Carbohydrate; 1500 mL Fluid   Advance Directives: Code Status and Medical Interventions:   Ordered at: 02/08/19 1208     Code Status:    CPR     Medical Interventions (Level of Support Prior to Arrest):    Full          Assessment / Plan:    1. Clinically better.  2. Resume oral antihypertensives  3. Advance Diet  4. ID following, and considering Ortho consult for right wrist swelling.  5. Disposition: Keep in ICU.       Plan of care and goals reviewed during interdisciplinary rounds.  Level of Risk is High due to:  illness with threat to life or bodily function.   I discussed the patient's findings and my recommendations with patient    Jamal Villar MD, FACP, FCCP, CNSC  Intensive Care Medicine, Nutrition Support and Pulmonary Medicine

## 2019-02-12 NOTE — PROGRESS NOTES
"   LOS: 4 days    Patient Care Team:  Tyrese Leyva MD as PCP - General (Family Medicine)  Rod Regalado MD as Consulting Physician (Cardiology)  Myles Rossi MD as Consulting Physician (Anesthesiology)  Juanito Grace Jr., MD as Consulting Physician (Urology)    Reason For Visit:  F/U NOE  Subjective           Review of Systems:    Pulm: No soa   CV:  No CP. GI: SOME NAUSEA.      Objective       aztreonam 500 mg Intravenous Q12H   castor oil-balsam peru  Topical Q12H   ceFAZolin 1 g Intravenous Q24H   ferric gluconate (FERRLECIT) IVPB 125 mg Intravenous Daily   gabapentin 300 mg Oral Daily   insulin lispro 0-7 Units Subcutaneous 4x Daily With Meals & Nightly   metroNIDAZOLE 500 mg Intravenous Q8H   sodium chloride 3 mL Intravenous Q12H       diltiaZEM 5-15 mg/hr Last Rate: 10 mg/hr (02/12/19 0905)   Pharmacy Consult           Vital Signs:  Blood pressure 150/81, pulse 95, temperature 98 °F (36.7 °C), temperature source Oral, resp. rate 20, height 170.2 cm (67.01\"), weight 117 kg (257 lb), SpO2 96 %.    Flowsheet Rows      First Filed Value   Admission Height  170.2 cm (67\") Documented at 02/08/2019 1115   Admission Weight  117 kg (257 lb 3.2 oz) Documented at 02/08/2019 1115          02/11 0701 - 02/12 0700  In: 2348 [P.O.:1140; I.V.:348]  Out: 3575 [Urine:575]    Physical Exam:    General Appearance: NAD, alert and cooperative, Ox3  Eyes: PER, conjunctivae and sclerae normal, no icterus  Lungs: respirations regular and unlabored, no crepitus, clear to auscultation  Heart/CV: regular rhythm & normal rate, no murmur, no gallop, no rub and TR edema  Abdomen: + distended, soft, MILD DIFFUSE tenderness, no masses,  bowel sounds present  Skin: No rash, Warm and dry. : CAMPUZANO    Radiology:            Labs:  Results from last 7 days   Lab Units 02/11/19  1252 02/11/19  0637 02/10/19  0340   WBC 10*3/mm3 20.21* 17.58* 20.34*   HEMOGLOBIN g/dL 11.0* 10.8* 10.5*   HEMATOCRIT % 32.9* 32.0* 30.9* "   PLATELETS 10*3/mm3 137* 125* 127*     Results from last 7 days   Lab Units 02/12/19  0526 02/11/19  0637 02/10/19  0340 02/09/19  0510   SODIUM mmol/L 133 132 135 133   POTASSIUM mmol/L 3.4* 3.6 3.6 4.2   CHLORIDE mmol/L 99 100 102 103   CO2 mmol/L 23.0 18.0* 21.0 18.0*   BUN mg/dL 60* 81* 64* 83*   CREATININE mg/dL 1.74* 3.05* 3.55* 4.71*   CALCIUM mg/dL 8.1* 8.2* 8.2* 8.2*   PHOSPHORUS mg/dL 5.2* 6.0* 5.8* 6.4*   MAGNESIUM mg/dL  --  2.2  --  2.3   ALBUMIN g/dL 2.83* 2.89* 3.05* 2.96*     Results from last 7 days   Lab Units 02/12/19  0526   GLUCOSE mg/dL 186*       Results from last 7 days   Lab Units 02/11/19  0637   ALK PHOS U/L 348*   BILIRUBIN mg/dL 2.6*   ALT (SGPT) U/L 17   AST (SGOT) U/L 67*     Results from last 7 days   Lab Units 02/08/19  1540   PH, ARTERIAL pH units 7.276*   PO2 ART mm Hg 85.0   PCO2, ARTERIAL mm Hg 38.4   HCO3 ART mmol/L 17.8*       Results from last 7 days   Lab Units 02/08/19  1826   COLOR UA  Jessica*   CLARITY UA  Turbid*   PH, URINE  6.5   SPECIFIC GRAVITY, URINE  1.015   GLUCOSE UA  Negative   KETONES UA  15 mg/dL (1+)*   BILIRUBIN UA  Small (1+)*   PROTEIN UA  100 mg/dL (2+)*   BLOOD UA  Large (3+)*   LEUKOCYTES UA  Large (3+)*   NITRITE UA  Positive*       Estimated Creatinine Clearance: 50.4 mL/min (A) (by C-G formula based on SCr of 1.74 mg/dL (H)).      Assessment       Renal failure    Spinal stenosis, lumbar region, with neurogenic claudication    Morbid obesity due to excess calories (CMS/HCC)    TANIYA on CPAP    Dyslipidemia    DM (diabetes mellitus), type 2 (CMS/HCC)    Metabolic encephalopathy            Impression: NONOLIGURIC NOE. HD TX'S 3 OF THE LAST 4 DAYS. ANEMIA ON IV FE.            Recommendations: NO DIALYSIS TODAY. WATCH DAILY FOR HD NEED.      Jr Banks MD  02/12/19  9:45 AM

## 2019-02-12 NOTE — THERAPY RE-EVALUATION
Acute Care - Physical Therapy Re-Evaluation  Psychiatric     Patient Name: Rod Balderas  : 1950  MRN: [id number]  Today's Date: 2019   Onset of Illness/Injury or Date of Surgery: 19  Date of Referral to PT: 19  Referring Physician: MD Aurea      Admit Date: 2019    Visit Dx:     ICD-10-CM ICD-9-CM   1. Impaired functional mobility, balance, gait, and endurance Z74.09 V49.89     Patient Active Problem List   Diagnosis   • Degenerative disc disease, lumbar   • Spinal stenosis, lumbar region, with neurogenic claudication   • Neuroforaminal stenosis of spine   • Lumbar facet arthropathy   • Presence of stent in coronary artery in patient with coronary artery disease   • Essential hypertension   • Physical deconditioning   • Morbid obesity due to excess calories (CMS/HCC)   • TANIYA on CPAP   • Displacement of lumbar intervertebral disc   • Dyslipidemia   • Osteoarthritis   • At high risk for falls   • Prostate cancer , s/p prostatectomy ( RALP)   • DM (diabetes mellitus), type 2 (CMS/HCC)   • Leukocytosis, mild, likely reactive   • Acute postoperative pain   • Acute blood loss anemia, mild, asymptomatic   • Renal failure   • Metabolic encephalopathy     Past Medical History:   Diagnosis Date   • Anxiety and depression    • Cancer (CMS/HCC)    • Chest pain    • Coronary artery disease    • Diabetes mellitus (CMS/HCC)    • Dyslipidemia    • Extremity pain    • Heart disease    • History of transfusion    • Hypertension    • Low back pain    • TANIYA on CPAP     2-3    • Osteoarthritis     Diffuse osteoarthritis.   • Panic attacks    • Prostate cancer (CMS/HCC)      Past Surgical History:   Procedure Laterality Date   • CARDIAC CATHETERIZATION     • CHOLECYSTECTOMY     • COLONOSCOPY     • CORONARY ANGIOPLASTY WITH STENT PLACEMENT  2012:  A 3.5 x 88 Promus VERONICA stent to OM2.  Nonobstructive disease.  Otherwise normal LVEF.   • EYE SURGERY Bilateral     cataract   •  INSERTION HEMODIALYSIS CATHETER N/A 2/8/2019    Procedure: HEMODIALYSIS CATHETER INSERTION RIGHT INTERNAL JUGULAR;  Surgeon: Bishnu Dailey MD;  Location: [facility] OR;  Service: General   • PROSTATECTOMY N/A 1/15/2019    Procedure: ROBOTIC PROSTATECTOMY WITH NODES;  Surgeon: Juanito Grace Jr., MD;  Location: [facility] OR;  Service: DaVinci   • RETINAL DETACHMENT SURGERY Right         PT ASSESSMENT (last 12 hours)      Physical Therapy Evaluation     Row Name 02/12/19 0959 02/12/19 0845       PT Evaluation Time/Intention    Subjective Information  complains of;weakness;fatigue;pain  -  complains of;weakness;fatigue;pain  -    Document Type  re-evaluation  -  evaluation;therapy note (daily note);wound care  -    Mode of Treatment  physical therapy  -LS  individual therapy;physical therapy  -    Patient Effort  good  -  --    Row Name 02/12/19 0959 02/12/19 0845       General Information    Patient Profile Reviewed?  yes  -  yes  -    Onset of Illness/Injury or Date of Surgery  02/08/19  -  02/08/19  -    Referring Physician  MD Aurea  -  Dr Villar  -    Patient Observations  cooperative;agree to therapy;lethargic  -  alert;cooperative;agree to therapy  -    Patient/Family Observations  Wife present.   -  --    Prior Level of Function  independent:;gait;transfer;ADL's;bathing;dressing prior to recent decline in health  -  --    Equipment Currently Used at Home  commode, bedside;cane, quad;walker, rolling  -  --    Pertinent History of Current Functional Problem  To OSH on 2/5 with NOE; transferred to [facility] 2/8 for HLOC; found to be septic. S/p tunnel dialysis cath placement in OR on 2/8 with subsequent transfer to ICU. Pt with recent prostatectomy (1/15/19).   -  Pt admitted 01/15/19 for prostatectomy, then had increasing confusion and worsening renal failure. now presents with DTPI to B ITs.   -    Existing Precautions/Restrictions  fall;oxygen therapy device and  L/min  -  --    Risks Reviewed  patient:;spouse/S.O.:;LOB;dizziness;increased discomfort;change in vital signs  -  patient:;increased discomfort  -    Benefits Reviewed  patient:;spouse/S.O.:;improve function;increase independence;increase strength;increase balance;increase knowledge  -  patient:;improve skin integrity  -    Barriers to Rehab  medically complex  -LS  medically complex;previous functional deficit  -    Row Name 02/12/19 0959          Relationship/Environment    Lives With  spouse  -     Row Name 02/12/19 0959          Resource/Environmental Concerns    Current Living Arrangements  home/apartment/condo  -     Row Name 02/12/19 0959          Home Main Entrance    Number of Stairs, Main Entrance  two  -     Row Name 02/12/19 0959          Cognitive Assessment/Interventions    Additional Documentation  Cognitive Assessment/Intervention (Group)  -     Row Name 02/12/19 0959 02/12/19 0827       Cognitive Assessment/Intervention- PT/OT    Affect/Mental Status (Cognitive)  low arousal/lethargic;anxious  -  --    Orientation Status (Cognition)  oriented to;person;situation  -LS  oriented x 3  -    Follows Commands (Cognition)  follows one step commands;75-90% accuracy;physical/tactile prompts required;repetition of directions required;verbal cues/prompting required  -  WNL  -MF    Cognitive Function (Cognitive)  safety deficit  -  --    Safety Deficit (Cognitive)  mild deficit;awareness of need for assistance;insight into deficits/self awareness;safety precautions awareness  -  --    Personal Safety Interventions  fall prevention program maintained;gait belt;nonskid shoes/slippers when out of bed  -  --    Row Name 02/12/19 0959          Safety Issues, Functional Mobility    Impairments Affecting Function (Mobility)  balance;cognition;coordination;endurance/activity tolerance;pain;shortness of breath;strength  -     Row Name 02/12/19 0959          Bed Mobility  Assessment/Treatment    Comment (Bed Mobility)  UIC upon arrival  -     Row Name 02/12/19 0959          Transfer Assessment/Treatment    Comment (Transfers)  Deferred attempt at STS; pt with difficulty maintaining upright sitting posture due to c/o abd/groin pain  -[facility] Name 02/12/19 0959          Gait/Stairs Assessment/Training    [place] Level (Gait)  unable to assess  -[facility] Name 02/12/19 0959          General ROM    GENERAL ROM COMMENTS  AAROM grossly WFL; AROM limited by weakness  -[facility] Name 02/12/19 0959          MMT (Manual Muscle Testing)    General MMT Comments  pt with difficulty following commands for formal MMT today; BLEs grossly 2+/5  -[facility] Name 02/12/19 0959          Motor Assessment/Intervention    Additional Documentation  Balance (Group);Therapeutic Exercise (Group)  -[facility] Name 02/12/19 0959          Therapeutic Exercise    11665 - PT Therapeutic Activity Minutes  10  -[facility] Name 02/12/19 0959          Balance    Balance  static sitting balance;static standing balance  -LS     Row Name 02/12/19 0959          Static Sitting Balance    Level of [place] (Unsupported Sitting, Static Balance)  moderate assist, 50 to 74% patient effort;2 person assist  -     Sitting Position (Unsupported Sitting, Static Balance)  sitting in chair  -     Time Able to Maintain Position (Unsupported Sitting, Static Balance)  1 to 2 minutes  -[facility] Name 02/12/19 0959          Sensory Assessment/Intervention    Sensory General Assessment  -- TBA further; denies numbness or tingling today  -[facility] Name 02/12/19 0959 02/12/19 0845       Pain Assessment    Additional Documentation  Pain Scale: Numbers Pre/Post-Treatment (Group)  -LS  Pain Scale: FACES Pre/Post-Treatment (Group)  -[facility] Name 02/12/19 0959 02/12/19 0845       Pain Scale: Numbers Pre/Post-Treatment    Pain Location  groin  -LS  back  -MF    Pre/Post Treatment Pain Comment  tolerated  -LS  --    Pain  Intervention(s)  Repositioned;Ambulation/increased activity  -LS  Repositioned  -MF    Row Name 02/12/19 0959 02/12/19 0845       Pain Scale: FACES Pre/Post-Treatment    Pain: FACES Scale, Pretreatment  4-->hurts little more  -LS  4-->hurts little more  -MF    Pain: FACES Scale, Post-Treatment  4-->hurts little more  -LS  4-->hurts little more  -MF    Pre/Post Treatment Pain Comment  due to edema  -LS  some increased c/o pain with rolling  -MF    Row Name             Wound 02/08/19 1519 Other (See comments) neck incision    Wound - Properties Group Date first assessed: 02/08/19  -SM Time first assessed: 1519  -SM Side: Other (See comments)  -SM Location: neck  -SM Type: incision  -SM    Row Name 02/12/19 0845          Wound 02/08/19 1800 Right lower gluteal pressure injury    Wound - Properties Group Date first assessed: 02/08/19  -AB Time first assessed: 1800  -AB Present On Admission : yes  -AB Side: Right  -AB Orientation: lower  -AB Location: gluteal  -AB Type: pressure injury  -AB Stage, Pressure Injury: deep tissue injury  -AB Additional Comments: bilateral gluteals  -MR    Dressing Appearance  open to air  -MF     Base  dry;purple;maroon/purple  -MF     Periwound  dry;pink;redness;blanchable  -MF     Periwound Temperature  warm  -MF     Wound Length (cm)  2.5 cm  -MF     Wound Width (cm)  2.5 cm  -MF     Wound Depth (cm)  0 cm  -MF     Drainage Amount  none  -MF     Care, Wound  cleansed with;sterile normal saline;ultrasound therapy, non contact low frequency 5 min mist   -MF     Dressing Care, Wound  open to air  -MF     Periwound Care, Wound  barrier ointment applied venelex  -MF     Row Name 02/12/19 0845          Wound 02/09/19 1116 Bilateral medial thigh    Wound - Properties Group Date first assessed: 02/09/19  -MR Time first assessed: 1116  -MR Present On Admission : yes  -MR Side: Bilateral  -MR Orientation: medial  -MR Location: thigh  -MR Stage, Pressure Injury: deep tissue injury  -MR    Dressing  Appearance  open to air  -     Base  maroon/purple;red/granulating  -     Periwound  intact;dry;redness;blanchable  -     Periwound Skin Turgor  soft  -MF     Wound Length (cm)  3 cm  -MF     Wound Width (cm)  2.5 cm  -MF     Wound Depth (cm)  0 cm  -MF     Drainage Amount  none  -     Care, Wound  cleansed with;sterile normal saline;ultrasound therapy, non contact low frequency 5 min mist  -     Dressing Care, Wound  open to air  -     Periwound Care, Wound  barrier ointment applied  -     Row Name 02/12/19 0845          Coping    Observed Emotional State  calm;flat;cooperative  -     Verbalized Emotional State  acceptance  -     Row Name 02/12/19 0959 02/12/19 0845       Plan of Care Review    Plan of Care Reviewed With  patient;spouse  -LS  patient;family  -    Row Name 02/12/19 0959 02/12/19 0899       Physical Therapy Clinical Impression    Date of Referral to PT  02/11/19  -LS  02/10/19  -    PT Diagnosis (PT Clinical Impression)  impaired functional mobility, balance, gait  -LS  DTPI to b IT / gluteal areas  -    Patient/Family Goals Statement (PT Clinical Impression)  return to PLOF  -LS  increase mobility  -    Criteria for Skilled Interventions Met (PT Clinical Impression)  yes;treatment indicated  -LS  yes;treatment indicated  -    Pathology/Pathophysiology Noted (Describe Specifically for Each System)  --  integumentary  -    Rehab Potential (PT Clinical Summary)  good, to achieve stated therapy goals  -LS  fair, will monitor progress closely  -    Care Plan Review (PT)  evaluation/treatment results reviewed  -LS  evaluation/treatment results reviewed;care plan/treatment goals reviewed;risks/benefits reviewed;current/potential barriers reviewed;patient/other agree to care plan  -    Care Plan Review, Other Participant (PT Clinical Impression)  spouse  -LS  --    Row Name 02/12/19 0959          Vital Signs    Pretreatment Heart Rate (beats/min)  111  -LS     Posttreatment  Heart Rate (beats/min)  109  -LS     O2 Delivery Pre Treatment  nasal cannula  -LS     Post SpO2 (%)  95  -LS     O2 Delivery Post Treatment  nasal cannula  -LS     Pre Patient Position  Sitting  -LS     Intra Patient Position  Sitting  -LS     Post Patient Position  Sitting  -LS     Row Name 02/12/19 0959 02/12/19 0845       Physical Therapy Goals    Bed Mobility Goal Selection (PT)  bed mobility, PT goal 1  -LS  --    Transfer Goal Selection (PT)  transfer, PT goal 1  -LS  --    Gait Training Goal Selection (PT)  gait training, PT goal 1  -LS  --    Wound Care Goal Selection (PT)  --  wound care, PT goal 1  -    Additional Documentation  --  Wound Care Goal Selection (PT) (Row)  -    Row Name 02/12/19 0959          Bed Mobility Goal 1 (PT)    Activity/Assistive Device (Bed Mobility Goal 1, PT)  sit to supine/supine to sit  -LS     [place] Level/Cues Needed (Bed Mobility Goal 1, PT)  minimum assist (75% or more patient effort)  -LS     Time Frame (Bed Mobility Goal 1, PT)  2 weeks  -LS     Progress/Outcomes (Bed Mobility Goal 1, PT)  goal ongoing  -     Row Name 02/12/19 0959          Transfer Goal 1 (PT)    Activity/Assistive Device (Transfer Goal 1, PT)  sit-to-stand/stand-to-sit;walker, rolling  -LS     [place] Level/Cues Needed (Transfer Goal 1, PT)  moderate assist (50-74% patient effort);2 person assist  -LS     Time Frame (Transfer Goal 1, PT)  2 weeks  -LS     Progress/Outcome (Transfer Goal 1, PT)  goal ongoing  -     Row Name 02/12/19 0959          Gait Training Goal 1 (PT)    Activity/Assistive Device (Gait Training Goal 1, PT)  gait (walking locomotion);walker, rolling  -LS     [place] Level (Gait Training Goal 1, PT)  moderate assist (50-74% patient effort);2 person assist  -LS     Distance (Gait Goal 1, PT)  10  -LS     Time Frame (Gait Training Goal 1, PT)  2 weeks  -LS     Progress/Outcome (Gait Training Goal 1, PT)  goal ongoing  -     Row Name 02/12/19 0845          Wound  Care Goal 1 (PT)    Wound Care Goal 1 (PT)  Decrease DTPIs size by 50% as evidence of wound healing / improved skin integrity.  -MF     Time Frame (Wound Care Goal 1, PT)  10 days  -     Row Name 02/12/19 0959 02/12/19 0845       Positioning and Restraints    Pre-Treatment Position  sitting in chair/recliner  -LS  in bed  -MF    Post Treatment Position  chair  -LS  bed  -MF    In Bed  --  supine;call light within reach;with family/caregiver  -MF    In Chair  notified nsg;reclined;call light within reach;encouraged to call for assist;exit alarm on;with family/caregiver;RUE elevated;LUE elevated;waffle cushion;on mechanical lift sling;legs elevated;heels elevated  -  --    Row Name 02/12/19 0959          Living Environment    Home Accessibility  stairs to enter home  -       User Key  (r) = Recorded By, (t) = Taken By, (c) = Cosigned By    Initials Name Provider Type    MF Efrem Tran, PT Physical Therapist    Ana Andersen, PT Physical Therapist    Rissa Bolden, RN Registered Nurse    Carmen Nelson RN Registered Nurse    Addison Jaffe RN Registered Nurse        Physical Therapy Education     Title: PT OT SLP Therapies (In Progress)     Topic: Physical Therapy (In Progress)     Point: Mobility training (In Progress)     Learning Progress Summary           Patient Acceptance, E,D, NR by  at 2/12/2019  9:59 AM   Significant Other Acceptance, E,D, NR by  at 2/12/2019  9:59 AM                   Point: Body mechanics (In Progress)     Learning Progress Summary           Patient Acceptance, E,D, NR by  at 2/12/2019  9:59 AM   Significant Other Acceptance, E,D, NR by  at 2/12/2019  9:59 AM                   Point: Precautions (In Progress)     Learning Progress Summary           Patient Acceptance, E,D, NR by  at 2/12/2019  9:59 AM   Significant Other Acceptance, E,D, NR by  at 2/12/2019  9:59 AM                               User Key     Initials Effective Dates Name Provider  Type Discipline     06/19/15 -  Ana Akers, PT Physical Therapist PT              PT Recommendation and Plan  Anticipated Discharge Disposition (PT): skilled nursing facility  Planned Therapy Interventions (PT Eval): balance training, bed mobility training, gait training, home exercise program, stair training, strengthening, transfer training, patient/family education  Therapy Frequency (PT Clinical Impression): daily  Outcome Summary/Treatment Plan (PT)  Anticipated Discharge Disposition (PT): skilled nursing facility  Plan of Care Reviewed With: patient, spouse  Outcome Summary: PT re-eval (initial eval for PT mobility) completed. Pt demonstrates significant generalized weakness and decreased indep re: functional mobility, warranting further skilled PT services to promote PLOF. Limited today by c/o abd/groin discomfort; able to sit edge of recliner with mod x2 A. Recommend SNF at d/c based upon current level of function.   Outcome Measures     Row Name 02/12/19 0959             How much help from another person do you currently need...    Turning from your back to your side while in flat bed without using bedrails?  2  -LS      Moving from lying on back to sitting on the side of a flat bed without bedrails?  2  -LS      Moving to and from a bed to a chair (including a wheelchair)?  1  -LS      Standing up from a chair using your arms (e.g., wheelchair, bedside chair)?  1  -LS      Climbing 3-5 steps with a railing?  1  -LS      To walk in hospital room?  1  -LS      AM-PAC 6 Clicks Score  8  -LS         Functional Assessment    Outcome Measure Options  AM-PAC 6 Clicks Basic Mobility (PT)  -LS        User Key  (r) = Recorded By, (t) = Taken By, (c) = Cosigned By    Initials Name Provider Type     Ana Akers, PT Physical Therapist         Time Calculation:   PT Charges     Row Name 02/12/19 0959 02/12/19 0845          Time Calculation    Start Time  0959  -  0845  -MF     PT Received On  02/12/19  -LUKE   --     PT Goal Re-Cert Due Date  02/22/19  -LS  02/22/19  -MF        Time Calculation- PT    Total Timed Code Minutes- PT  10 minute(s)  -LS  --        Timed Charges    73781 - PT Therapeutic Activity Minutes  10  -LS  --       User Key  (r) = Recorded By, (t) = Taken By, (c) = Cosigned By    Initials Name Provider Type    MF Efrem Tran, PT Physical Therapist    LS Ana Akers, PT Physical Therapist        Therapy Suggested Charges     Code   Minutes Charges    94174 (CPT®) Hc Pt Neuromusc Re Education Ea 15 Min      19139 (CPT®) Hc Pt Ther Proc Ea 15 Min      81123 (CPT®) Hc Gait Training Ea 15 Min      45848 (CPT®) Hc Pt Therapeutic Act Ea 15 Min 10 1    35887 (CPT®) Hc Pt Manual Therapy Ea 15 Min      11336 (CPT®) Hc Pt Iontophoresis Ea 15 Min      95217 (CPT®) Hc Pt Elec Stim Ea-Per 15 Min      49343 (CPT®) Hc Pt Ultrasound Ea 15 Min      78134 (CPT®) Hc Pt Self Care/Mgmt/Train Ea 15 Min      36448 (CPT®) Hc Pt Prosthetic (S) Train Initial Encounter, Each 15 Min      56395 (CPT®) Hc Pt Orthotic(S)/Prosthetic(S) Encounter, Each 15 Min      23045 (CPT®) Hc Orthotic(S) Mgmt/Train Initial Encounter, Each 15min      Total  10 1        Therapy Charges for Today     Code Description Service Date Service Provider Modifiers Qty    59109927228 HC PT THERAPEUTIC ACT EA 15 MIN 2/12/2019 Ana Akers, PT GP 1    81740701099 HC PT THER SUPP EA 15 MIN 2/12/2019 Ana Akers, PT GP 1          PT G-Codes  Outcome Measure Options: AM-PAC 6 Clicks Basic Mobility (PT)  AM-PAC 6 Clicks Score: 8      Ana Akers, PT  2/12/2019

## 2019-02-12 NOTE — PROGRESS NOTES
Clinical Nutrition     Multidisciplinary Rounds    Time: 20min  Patient Name: Rod Balderas  Date of Encounter: 02/12/19 12:46 PM  MRN: [id number]  Admission date: 2/8/2019      Reason for visit: MDR. RD to continue to follow per protocol.     Additional information obtained during MDR:  V.O. per intensivist to advance diet as tolerated.      Alert and oriented at time of visit.  Slow to respond to questions.  Wife at bedside, assisting with meals.  Wife reports patient tolerated clear liquids really this morning.  Dislikes clear liquid supplement.  Ready for some solid food.      Current diet: Clear liquid; 1500 mL Fluid  Orders Placed This Encounter      Dietary Nutrition Supplements Boost Breeze (Clear Liquid) TID with meals     EMR reviewed     Intervention:  Follow treatment plan  Care plan reviewed  Advance diet to renal, diabetic for lunch meal  Review menu options   Adjust supplement, will send Boost Glucose Control; vanilla instead per patient prefs.  Monitor intake/ tolerance.     Follow up:   Per protocol      Shelley Okeefe RD  12:46 PM

## 2019-02-12 NOTE — PLAN OF CARE
Problem: Patient Care Overview  Goal: Plan of Care Review  Outcome: Ongoing (interventions implemented as appropriate)   02/12/19 0618   OTHER   Outcome Summary Patient remains on cardizem to maintian HR less than 140, intermittent burst in 150's at start of shift, but one pain was undercontrol no issues with HR. Still unable to wean; Patient pain controlled well with PRN Q6 Norco and Fentanylx2; Other VSS; Rash noted on left hand/arm noted that progressed throughout shift, APRN notified coags obtained and arm propped up, continuing to evaluate for changes; Awaiting other am labs at this time; Wife remains at bedside, with patient A&O x4 this am seeming much more pleasant than start of shift;      Goal: Individualization and Mutuality  Outcome: Ongoing (interventions implemented as appropriate)    Goal: Discharge Needs Assessment  Outcome: Ongoing (interventions implemented as appropriate)    Goal: Interprofessional Rounds/Family Conf  Outcome: Ongoing (interventions implemented as appropriate)      Problem: Skin Injury Risk (Adult)  Goal: Skin Health and Integrity  Outcome: Ongoing (interventions implemented as appropriate)      Problem: Fall Risk (Adult)  Goal: Absence of Fall  Outcome: Ongoing (interventions implemented as appropriate)      Problem: Hemodialysis (Adult)  Goal: Signs and Symptoms of Listed Potential Problems Will be Absent, Minimized or Managed (Hemodialysis)  Outcome: Ongoing (interventions implemented as appropriate)      Problem: Confusion, Acute (Adult)  Goal: Cognitive/Functional Impairments Minimized  Outcome: Ongoing (interventions implemented as appropriate)      Problem: Sepsis/Septic Shock (Adult)  Goal: Signs and Symptoms of Listed Potential Problems Will be Absent, Minimized or Managed (Sepsis/Septic Shock)  Outcome: Ongoing (interventions implemented as appropriate)

## 2019-02-12 NOTE — PLAN OF CARE
Problem: Patient Care Overview  Goal: Plan of Care Review  Outcome: Ongoing (interventions implemented as appropriate)   02/11/19 1953   Coping/Psychosocial   Plan of Care Reviewed With patient;spouse;son   Plan of Care Review   Progress no change   OTHER   Outcome Summary Pt pain persists, moderately controlled with norco 7.5 q6 and fentanyl 25mcg x4. HD in AM UF of 3L. Fluid restriction of 1.5L placed. Both peripheral IVs occluded on first assesment, able to ultrasound 2 new IVs on right side. At 1545, pt converted to atrial fib with RVR, cardizem gtt started and maxed, unable to achieve HR<140, 1x dose of 5mg IV metoprolol given, HR consitently between 90's and 130's. Having moderate size BMs, though abdomen still distended. Pt developing petechiae in left hand, and what appears to be rash on posterior upper arms, though not complaining of pruritis. BP and SpO2 stable for shift. Neurologically intact, although moaning even when not in pain.        Problem: Skin Injury Risk (Adult)  Goal: Skin Health and Integrity  Outcome: Ongoing (interventions implemented as appropriate)      Problem: Hemodialysis (Adult)  Goal: Signs and Symptoms of Listed Potential Problems Will be Absent, Minimized or Managed (Hemodialysis)  Outcome: Ongoing (interventions implemented as appropriate)      Problem: Sepsis/Septic Shock (Adult)  Goal: Signs and Symptoms of Listed Potential Problems Will be Absent, Minimized or Managed (Sepsis/Septic Shock)  Outcome: Ongoing (interventions implemented as appropriate)

## 2019-02-12 NOTE — THERAPY WOUND CARE TREATMENT
Acute Care - Wound/Debridement Initial Evaluation  [facility]     Patient Name: Rod Balderas  : 1950  MRN: [id number]  Today's Date: 2019  Onset of Illness/Injury or Date of Surgery: 19  Date of Referral to PT: 02/10/19   Referring Physician: Dr Villar      Admit Date: 2019    Visit Dx:  No diagnosis found.    Patient Active Problem List   Diagnosis   • Degenerative disc disease, lumbar   • Spinal stenosis, lumbar region, with neurogenic claudication   • Neuroforaminal stenosis of spine   • Lumbar facet arthropathy   • Presence of stent in coronary artery in patient with coronary artery disease   • Essential hypertension   • Physical deconditioning   • Morbid obesity due to excess calories (CMS/HCC)   • TANIYA on CPAP   • Displacement of lumbar intervertebral disc   • Dyslipidemia   • Osteoarthritis   • At high risk for falls   • Prostate cancer , s/p prostatectomy ( [facility])   • DM (diabetes mellitus), type 2 (CMS/HCC)   • Leukocytosis, mild, likely reactive   • Acute postoperative pain   • Acute blood loss anemia, mild, asymptomatic   • Renal failure   • Metabolic encephalopathy        Past Medical History:   Diagnosis Date   • Anxiety and depression    • Cancer (CMS/HCC)    • Chest pain    • Coronary artery disease    • Diabetes mellitus (CMS/HCC)    • Dyslipidemia    • Extremity pain    • Heart disease    • History of transfusion    • Hypertension    • Low back pain    • TANIYA on CPAP     2-3    • Osteoarthritis     Diffuse osteoarthritis.   • Panic attacks    • Prostate cancer (CMS/HCC)         Past Surgical History:   Procedure Laterality Date   • CARDIAC CATHETERIZATION     • CHOLECYSTECTOMY     • COLONOSCOPY     • CORONARY ANGIOPLASTY WITH STENT PLACEMENT  2012:  A 3.5 x 88 Promus VERONICA stent to OM2.  Nonobstructive disease.  Otherwise normal LVEF.   • EYE SURGERY Bilateral     cataract   • INSERTION HEMODIALYSIS CATHETER N/A 2019    Procedure: HEMODIALYSIS  CATHETER INSERTION RIGHT INTERNAL JUGULAR;  Surgeon: Bishnu Dailey MD;  Location:  [facility];  Service: General   • PROSTATECTOMY N/A 1/15/2019    Procedure: ROBOTIC PROSTATECTOMY WITH NODES;  Surgeon: Juanito Grace Jr., MD;  Location:  [facility];  Service: DaVinci   • RETINAL DETACHMENT SURGERY Right            Rash 02/11/19 1200 other (see comments) posterior arm (Active)   Distribution localized 2/12/2019  6:00 AM   Configuration/Shape asymmetric 2/12/2019  6:00 AM   Borders diffuse;irregular 2/12/2019  6:00 AM   Characteristics dry;raised 2/12/2019  6:00 AM   Color red 2/12/2019  6:00 AM       Wound 02/08/19 1519 Other (See comments) neck incision (Active)   Dressing Appearance open to air 2/12/2019  6:00 AM   Closure Adhesive closure strips 2/12/2019  6:00 AM   Base clean;dry;pink 2/12/2019  6:00 AM   Periwound intact;dry;pink;blanchable 2/12/2019  6:00 AM   Periwound Care, Wound dry periwound area maintained 2/11/2019  8:00 PM       Wound 02/08/19 1800 Right lower gluteal pressure injury (Active)   Dressing Appearance open to air 2/12/2019  8:45 AM   Base dry;purple;maroon/purple 2/12/2019  8:45 AM   Periwound dry;pink;redness;blanchable 2/12/2019  8:45 AM   Periwound Temperature warm 2/12/2019  8:45 AM   Wound Length (cm) 2.5 cm 2/12/2019  8:45 AM   Wound Width (cm) 2.5 cm 2/12/2019  8:45 AM   Wound Depth (cm) 0 cm 2/12/2019  8:45 AM   Drainage Amount none 2/12/2019  8:45 AM   Care, Wound cleansed with;sterile normal saline;ultrasound therapy, non contact low frequency 2/12/2019  8:45 AM   Dressing Care, Wound open to air 2/12/2019  8:45 AM   Periwound Care, Wound barrier ointment applied 2/12/2019  8:45 AM       Wound 02/09/19 1116 Bilateral medial thigh (Active)   Dressing Appearance open to air 2/12/2019  8:45 AM   Base maroon/purple;red/granulating 2/12/2019  8:45 AM   Periwound intact;dry;redness;blanchable 2/12/2019  8:45 AM   Periwound Skin Turgor soft 2/12/2019  8:45 AM   Wound Length  (cm) 3 cm 2/12/2019  8:45 AM   Wound Width (cm) 2.5 cm 2/12/2019  8:45 AM   Wound Depth (cm) 0 cm 2/12/2019  8:45 AM   Drainage Amount none 2/12/2019  8:45 AM   Care, Wound cleansed with;sterile normal saline;ultrasound therapy, non contact low frequency 2/12/2019  8:45 AM   Dressing Care, Wound open to air 2/12/2019  8:45 AM   Periwound Care, Wound barrier ointment applied 2/12/2019  8:45 AM         WOUND DEBRIDEMENT                        PT ASSESSMENT (last 12 hours)      Physical Therapy Evaluation     Row Name 02/12/19 0845          PT Evaluation Time/Intention    Subjective Information  complains of;weakness;fatigue;pain  -     Document Type  evaluation;therapy note (daily note);wound care  -     Mode of Treatment  individual therapy;physical therapy  -     Row Name 02/12/19 0868          General Information    Patient Profile Reviewed?  yes  -     Onset of Illness/Injury or Date of Surgery  02/08/19  -     Referring Physician  Dr Villar  -     Patient Observations  alert;cooperative;agree to therapy  -     Pertinent History of Current Functional Problem  Pt admitted 01/15/19 for prostatectomy, then had increasing confusion and worsening renal failure. now presents with DTPI to B ITs.   -     Risks Reviewed  patient:;increased discomfort  -     Benefits Reviewed  patient:;improve skin integrity  -     Barriers to Rehab  medically complex;previous functional deficit  -     Row Name 02/12/19 0845          Cognitive Assessment/Intervention- PT/OT    Orientation Status (Cognition)  oriented x 3  -     Follows Commands (Cognition)  WNL  -     Row Name 02/12/19 0845          Pain Assessment    Additional Documentation  Pain Scale: FACES Pre/Post-Treatment (Group)  -     Row Name 02/12/19 0811          Pain Scale: Numbers Pre/Post-Treatment    Pain Location  back  -     Pain Intervention(s)  Repositioned  -     Row Name 02/12/19 0845          Pain Scale: FACES Pre/Post-Treatment     Pain: FACES Scale, Pretreatment  4-->hurts little more  -MF     Pain: FACES Scale, Post-Treatment  4-->hurts little more  -MF     Pre/Post Treatment Pain Comment  some increased c/o pain with rolling  -MF     Row Name             Wound 02/08/19 1519 Other (See comments) neck incision    Wound - Properties Group Date first assessed: 02/08/19  -SM Time first assessed: 1519  -SM Side: Other (See comments)  -SM Location: neck  -SM Type: incision  -SM    Row Name 02/12/19 0845          Wound 02/08/19 1800 Right lower gluteal pressure injury    Wound - Properties Group Date first assessed: 02/08/19  -AB Time first assessed: 1800  -AB Present On Admission : yes  -AB Side: Right  -AB Orientation: lower  -AB Location: gluteal  -AB Type: pressure injury  -AB Stage, Pressure Injury: deep tissue injury  -AB Additional Comments: bilateral gluteals  -MR    Dressing Appearance  open to air  -MF     Base  dry;purple;maroon/purple  -MF     Periwound  dry;pink;redness;blanchable  -MF     Periwound Temperature  warm  -MF     Wound Length (cm)  2.5 cm  -MF     Wound Width (cm)  2.5 cm  -MF     Wound Depth (cm)  0 cm  -MF     Drainage Amount  none  -MF     Care, Wound  cleansed with;sterile normal saline;ultrasound therapy, non contact low frequency 5 min mist   -MF     Dressing Care, Wound  open to air  -MF     Periwound Care, Wound  barrier ointment applied venelex  -MF     Row Name 02/12/19 0845          Wound 02/09/19 1116 Bilateral medial thigh    Wound - Properties Group Date first assessed: 02/09/19  -MR Time first assessed: 1116  -MR Present On Admission : yes  -MR Side: Bilateral  -MR Orientation: medial  -MR Location: thigh  -MR Stage, Pressure Injury: deep tissue injury  -MR    Dressing Appearance  open to air  -MF     Base  maroon/purple;red/granulating  -MF     Periwound  intact;dry;redness;blanchable  -MF     Periwound Skin Turgor  soft  -MF     Wound Length (cm)  3 cm  -MF     Wound Width (cm)  2.5 cm  -MF     Wound Depth  (cm)  0 cm  -     Drainage Amount  none  -     Care, Wound  cleansed with;sterile normal saline;ultrasound therapy, non contact low frequency 5 min mist  -     Dressing Care, Wound  open to air  -     Periwound Care, Wound  barrier ointment applied  -     Row Name 02/12/19 0845          Coping    Observed Emotional State  calm;flat;cooperative  -     Verbalized Emotional State  acceptance  -     Row Name 02/12/19 0845          Plan of Care Review    Plan of Care Reviewed With  patient;family  -     Row Name 02/12/19 0845          Physical Therapy Clinical Impression    Date of Referral to PT  02/10/19  -     PT Diagnosis (PT Clinical Impression)  DTPI to b IT / gluteal areas  -     Patient/Family Goals Statement (PT Clinical Impression)  increase mobility  -     Criteria for Skilled Interventions Met (PT Clinical Impression)  yes;treatment indicated  -     Pathology/Pathophysiology Noted (Describe Specifically for Each System)  integumentary  -     Rehab Potential (PT Clinical Summary)  fair, will monitor progress closely  -     Care Plan Review (PT)  evaluation/treatment results reviewed;care plan/treatment goals reviewed;risks/benefits reviewed;current/potential barriers reviewed;patient/other agree to care plan  -     Row Name 02/12/19 0812          Physical Therapy Goals    Wound Care Goal Selection (PT)  wound care, PT goal 1  -     Additional Documentation  Wound Care Goal Selection (PT) (Row)  -     Row Name 02/12/19 0845          Wound Care Goal 1 (PT)    Wound Care Goal 1 (PT)  Decrease DTPIs size by 50% as evidence of wound healing / improved skin integrity.  -     Time Frame (Wound Care Goal 1, PT)  10 days  -     Row Name 02/12/19 0845          Positioning and Restraints    Pre-Treatment Position  in bed  -     Post Treatment Position  bed  -     In Bed  supine;call light within reach;with family/caregiver  -       User Key  (r) = Recorded By, (t) = Taken By,  (c) = Cosigned By    Initials Name Provider Type    Efrem Gonzalez, PT Physical Therapist    Rissa Bolden RN Registered Nurse     UbaldoCarmen, RN Registered Nurse    Addison Jaffe RN Registered Nurse            Recommendation and Plan  Planned Therapy Interventions (PT Eval): wound care  Plan of Care Reviewed With: patient, family           Outcome Summary: Pt presents with DTPI to gluetal area / IT with limited mobility and high risk for skin break down. Pt will benefit from mist therapy to help improve skin integrity and increase healing potential   Plan of Care Reviewed With: patient, family            Time Calculation  PT Charges     Row Name 02/12/19 0845             Time Calculation    Start Time  0845  -      PT Goal Re-Cert Due Date  02/22/19  -        User Key  (r) = Recorded By, (t) = Taken By, (c) = Cosigned By    Initials Name Provider Type    Efrem Gonzalez, PT Physical Therapist        Therapy Suggested Charges     Code   Minutes Charges    None             Therapy Charges for Today     Code Description Service Date Service Provider Modifiers Qty    86132670732 HC PT EVAL MOD COMPLEXITY 4 2/12/2019 Efrem Tran, PT GP 1    05894312905 HC PT NLFU MIST 2/12/2019 Efrem Tran, PT GP 1            PT G-Codes  AM-PAC 6 Clicks Score: 6       Efrem Tran PT  2/12/2019

## 2019-02-12 NOTE — PLAN OF CARE
Problem: Patient Care Overview  Goal: Plan of Care Review  Outcome: Ongoing (interventions implemented as appropriate)   02/12/19 8447   Coping/Psychosocial   Plan of Care Reviewed With patient;family   OTHER   Outcome Summary Pt presents with DTPI to gluetal area / IT with limited mobility and high risk for skin break down. Pt will benefit from mist therapy to help improve skin integrity and increase healing potential

## 2019-02-12 NOTE — PLAN OF CARE
Problem: Patient Care Overview  Goal: Plan of Care Review  Outcome: Ongoing (interventions implemented as appropriate)   02/12/19 1504   Coping/Psychosocial   Plan of Care Reviewed With patient   Plan of Care Review   Progress improving   OTHER   Outcome Summary OT completed a brief chart review relating to presenting physical/cognitive deficits. Pt Depx2 for bed/chair t/f w/ mechanical lift, limited d/t significant generalized weakness. RN notified of significant R wrist swelling w/ limited R . Recommend cont skilled IPOT POC. Recommend pt DC to SNF.

## 2019-02-12 NOTE — THERAPY EVALUATION
Acute Care - Occupational Therapy Initial Evaluation  [facility]     Patient Name: Rod Balderas  : 1950  MRN: [id number]  Today's Date: 2019  Onset of Illness/Injury or Date of Surgery: 19  Date of Referral to OT: 19  Referring Physician: MD Aurea    Admit Date: 2019       ICD-10-CM ICD-9-CM   1. Impaired functional mobility, balance, gait, and endurance Z74.09 V49.89   2. Impaired mobility and ADLs Z74.09 799.89     Patient Active Problem List   Diagnosis   • Degenerative disc disease, lumbar   • Spinal stenosis, lumbar region, with neurogenic claudication   • Neuroforaminal stenosis of spine   • Lumbar facet arthropathy   • Presence of stent in coronary artery in patient with coronary artery disease   • Essential hypertension   • Physical deconditioning   • Morbid obesity due to excess calories (CMS/HCC)   • TANIYA on CPAP   • Displacement of lumbar intervertebral disc   • Dyslipidemia   • Osteoarthritis   • At high risk for falls   • Prostate cancer , s/p prostatectomy ( [facility]ALP)   • DM (diabetes mellitus), type 2 (CMS/HCC)   • Leukocytosis, mild, likely reactive   • Acute postoperative pain   • Acute blood loss anemia, mild, asymptomatic   • Renal failure   • Metabolic encephalopathy     Past Medical History:   Diagnosis Date   • Anxiety and depression    • Cancer (CMS/HCC)    • Chest pain    • Coronary artery disease    • Diabetes mellitus (CMS/HCC)    • Dyslipidemia    • Extremity pain    • Heart disease    • History of transfusion    • Hypertension    • Low back pain    • TANIYA on CPAP     2-3    • Osteoarthritis     Diffuse osteoarthritis.   • Panic attacks    • Prostate cancer (CMS/HCC)      Past Surgical History:   Procedure Laterality Date   • CARDIAC CATHETERIZATION     • CHOLECYSTECTOMY     • COLONOSCOPY     • CORONARY ANGIOPLASTY WITH STENT PLACEMENT  2012:  A 3.5 x 88 Promus VERONICA stent to OM2.  Nonobstructive disease.  Otherwise normal LVEF.   • EYE  SURGERY Bilateral     cataract   • INSERTION HEMODIALYSIS CATHETER N/A 2/8/2019    Procedure: HEMODIALYSIS CATHETER INSERTION RIGHT INTERNAL JUGULAR;  Surgeon: Bishnu Dailey MD;  Location:  [facility];  Service: General   • PROSTATECTOMY N/A 1/15/2019    Procedure: ROBOTIC PROSTATECTOMY WITH NODES;  Surgeon: Juanito Grace Jr., MD;  Location:  [facility];  Service: DaVinci   • RETINAL DETACHMENT SURGERY Right           OT ASSESSMENT FLOWSHEET (last 72 hours)      Occupational Therapy Evaluation     Row Name 02/12/19 0935                   OT Evaluation Time/Intention    Subjective Information  complains of;weakness;fatigue;pain  -CL        Document Type  evaluation  -CL        Mode of Treatment  occupational therapy  -CL        Patient Effort  good  -CL        Symptoms Noted During/After Treatment  fatigue  -CL           General Information    Patient Profile Reviewed?  yes  -CL        Onset of Illness/Injury or Date of Surgery  02/11/19  -CL        Referring Physician  MD Aurea  -CL        Prior Level of Function  independent:;all household mobility;transfer;ADL's;dressing;bathing Prior to recent declined  -CL        Equipment Currently Used at Home  commode, bedside;cane, quad;walker, rolling  -CL        Pertinent History of Current Functional Problem  Pt recently admitted on 01/15 s/p prostatectomy. Pt presented to the ED 2/2 to abdominal discomfort. Pt dx w/ NOE, t/f to [facility] for HLOC. Pt now on HD. Pt dx w/ septic shock. PMH: L foot drop w/ AFO  -CL        Existing Precautions/Restrictions  fall;oxygen therapy device and L/min;other (see comments) chronic L foot drop, wears AFO  -CL        Risks Reviewed  patient and family:;LOB;nausea/vomiting;dizziness;increased discomfort;change in vital signs;lines disloged  -CL        Benefits Reviewed  patient and family:;improve function;decrease pain;increase independence;increase strength;increase balance;increase knowledge  -CL        Barriers to Rehab   medically complex  -CL           Relationship/Environment    Lives With  spouse  -CL           Resource/Environmental Concerns    Current Living Arrangements  home/apartment/condo  -CL           Home Main Entrance    Number of Stairs, Main Entrance  two  -CL           Cognitive Assessment/Intervention- PT/OT    Affect/Mental Status (Cognitive)  low arousal/lethargic;anxious  -CL        Orientation Status (Cognition)  oriented x 3  -CL        Follows Commands (Cognition)  follows one step commands;75-90% accuracy;repetition of directions required;verbal cues/prompting required;increased processing time needed  -CL        Cognitive Function (Cognitive)  safety deficit  -CL        Safety Deficit (Cognitive)  mild deficit;awareness of need for assistance;safety precautions awareness  -CL        Personal Safety Interventions  fall prevention program maintained;nonskid shoes/slippers when out of bed;supervised activity  -CL           Bed Mobility Assessment/Treatment    Bed Mobility Assessment/Treatment  rolling left;rolling right  -CL        Rolling Left [place] (Bed Mobility)  maximum assist (25% patient effort);2 person assist;verbal cues  -CL        Rolling Right [place] (Bed Mobility)  maximum assist (25% patient effort);2 person assist;verbal cues  -CL        Assistive Device (Bed Mobility)  bed rails;draw sheet  -CL        Comment (Bed Mobility)  Rolling to place mechanical lift sling.   -CL           Transfer Assessment/Treatment    Transfer Assessment/Treatment  bed-chair transfer  -CL        Comment (Transfers)  Deferred STS.   -CL           Bed-Chair Transfer    Bed-Chair [place] (Transfers)  dependent (less than 25% patient effort);2 person assist;verbal cues  -CL        Assistive Device (Bed-Chair Transfers)  mechanical lift/aid  -CL           General ROM    GENERAL ROM COMMENTS  BUE grossly WFL.   -CL           MMT (Manual Muscle Testing)    General MMT Comments  Limited formally assessment  d/t cogntive status. BUE shldr FE 3-/5, bicep/tricep 3+/5, LUE  3+/5, RUE  2/5 (noted R wrist swelling and poor, RN notified).   -CL           Motor Assessment/Interventions    Additional Documentation  Balance (Group);Therapeutic Exercise (Group)  -CL           Therapeutic Exercise    54027 - OT Therapeutic Activity Minutes  10  -CL           Positioning and Restraints    Pre-Treatment Position  in bed  -CL        Post Treatment Position  chair  -CL        In Chair  notified nsg;sitting;encouraged to call for assist;call light within reach;exit alarm on;with family/caregiver;with PT;waffle cushion;on mechanical lift sling  -CL           Pain Assessment    Additional Documentation  Pain Scale 2: FACES Pre/Post-Treatment (Group)  -CL           Pain Scale 2: FACES Pre/Post-Treatment    Pain 2: FACES Scale, Pretreatment  4-->hurts little more  -CL        Pain 2: FACES Scale, Post-Treatment  4-->hurts little more  -CL        Pain Location 2 - Orientation  generalized  -CL        Pre/Post Treatment Pain 2 Comment  Tolerated.   -CL        Pain Intervention(s) 2  Repositioned;Ambulation/increased activity  -CL           Wound 02/08/19 1519 Other (See comments) neck incision    Wound - Properties Group Date first assessed: 02/08/19  -SM Time first assessed: 1519  -SM Side: Other (See comments)  -SM Location: neck  -SM Type: incision  -SM       Wound 02/08/19 1800 Right lower gluteal pressure injury    Wound - Properties Group Date first assessed: 02/08/19  -AB Time first assessed: 1800  -AB Present On Admission : yes  -AB Side: Right  -AB Orientation: lower  -AB Location: gluteal  -AB Type: pressure injury  -AB Stage, Pressure Injury: deep tissue injury  -AB Additional Comments: bilateral gluteals  -MR       Wound 02/09/19 1116 Bilateral medial thigh    Wound - Properties Group Date first assessed: 02/09/19  -MR Time first assessed: 1116  -MR Present On Admission : yes  -MR Side: Bilateral  -MR Orientation: medial   -MR Location: thigh  -MR Stage, Pressure Injury: deep tissue injury  -MR       Clinical Impression (OT)    Date of Referral to OT  02/11/19  -CL        OT Diagnosis  Decreased independence in ADLs.   -CL        Patient/Family Goals Statement (OT Eval)  Return to PLOF.   -CL        Criteria for Skilled Therapeutic Interventions Met (OT Eval)  yes;treatment indicated  -CL        Rehab Potential (OT Eval)  good, to achieve stated therapy goals  -CL        Therapy Frequency (OT Eval)  daily  -CL        Anticipated Equipment Needs at Discharge (OT)  -- TBA further  -CL        Anticipated Discharge Disposition (OT)  skilled nursing facility  -CL           Vital Signs    Pre Systolic BP Rehab  155  -CL        Pre Treatment Diastolic BP  81  -CL        Pretreatment Heart Rate (beats/min)  108  -CL        Pre SpO2 (%)  98  -CL        O2 Delivery Pre Treatment  supplemental O2  -CL        Pre Patient Position  Supine  -CL        Intra Patient Position  Sitting  -CL        Post Patient Position  Sitting  -CL           OT Goals    Transfer Goal Selection (OT)  transfer, OT goal 1  -CL        Grooming Goal Selection (OT)  grooming, OT goal 1  -CL        Strength Goal Selection (OT)  strength, OT goal 1  -CL        Balance Goal Selection (OT)  balance, OT goal 1  -CL        Additional Documentation  Strength Goal Selection (OT) (Row);Balance Goal Selection (OT) (Row);Grooming Goal Selection (OT) (Row)  -CL           Transfer Goal 1 (OT)    Activity/Assistive Device (Transfer Goal 1, OT)  sit-to-stand/stand-to-sit;toilet  -CL        [place] Level/Cues Needed (Transfer Goal 1, OT)  maximum assist (25-49% patient effort);verbal cues required  -CL        Time Frame (Transfer Goal 1, OT)  by discharge;long term goal (LTG)  -CL        Progress/Outcome (Transfer Goal 1, OT)  goal ongoing  -CL           Grooming Goal 1 (OT)    Activity/Device (Grooming Goal 1, OT)  wash face, hands  -CL        [place] (Grooming Goal 1, OT)   minimum assist (75% or more patient effort);verbal cues required  -CL        Time Frame (Grooming Goal 1, OT)  by discharge;long term goal (LTG)  -CL        Progress/Outcome (Grooming Goal 1, OT)  goal ongoing  -CL           Strength Goal 1 (OT)    Strength Goal 1 (OT)  Pt will tolerate BUE AROM HEP x10 reps to promote ADL performance.   -CL        Time Frame (Strength Goal 1, OT)  by discharge;long term goal (LTG)  -CL        Progress/Outcome (Strength Goal 1, OT)  goal ongoing  -CL           Balance Goal 1 (OT)    Activity/Assistive Device (Balance Goal 1, OT)  sitting, static  -CL        [place] Level/Cues Needed (Balance Goal 1, OT)  minimum assist (75% or more patient effort);verbal cues required  -CL        Time Frame (Balance Goal 1, OT)  by discharge;long term goal (LTG)  -CL        Progress/Outcomes (Balance Goal 1, OT)  goal ongoing  -CL           Living Environment    Home Accessibility  stairs to enter home;tub/shower is not walk in  -CL          User Key  (r) = Recorded By, (t) = Taken By, (c) = Cosigned By    Initials Name Effective Dates    Rissa Bolden RN 06/16/16 -     Carmen Nelson RN 06/16/16 -     CL Cassie Reardon OT 04/03/18 -     Addison Jaffe RN 02/02/17 -          Occupational Therapy Education     Title: PT OT SLP Therapies (In Progress)     Topic: Occupational Therapy (In Progress)     Point: ADL training (In Progress)     Description: Instruct learner(s) on proper safety adaptation and remediation techniques during self care or transfers.   Instruct in proper use of assistive devices.    Learning Progress Summary           Patient Acceptance, E, NR by CL at 2/12/2019  3:03 PM    Comment:  Pt educated on appropriate safety precautions, t/f techniques, positioning, and benefits of therapy.   Family Acceptance, E, NR by CL at 2/12/2019  3:03 PM    Comment:  Pt educated on appropriate safety precautions, t/f techniques, positioning, and benefits of therapy.                    Point: Precautions (In Progress)     Description: Instruct learner(s) on prescribed precautions during self-care and functional transfers.    Learning Progress Summary           Patient Acceptance, E, NR by CL at 2/12/2019  3:03 PM    Comment:  Pt educated on appropriate safety precautions, t/f techniques, positioning, and benefits of therapy.   Family Acceptance, E, NR by CL at 2/12/2019  3:03 PM    Comment:  Pt educated on appropriate safety precautions, t/f techniques, positioning, and benefits of therapy.                   Point: Body mechanics (In Progress)     Description: Instruct learner(s) on proper positioning and spine alignment during self-care, functional mobility activities and/or exercises.    Learning Progress Summary           Patient Acceptance, E, NR by CL at 2/12/2019  3:03 PM    Comment:  Pt educated on appropriate safety precautions, t/f techniques, positioning, and benefits of therapy.   Family Acceptance, E, NR by CL at 2/12/2019  3:03 PM    Comment:  Pt educated on appropriate safety precautions, t/f techniques, positioning, and benefits of therapy.                               User Key     Initials Effective Dates Name Provider Type Discipline     04/03/18 -  Cassie Reardon OT Occupational Therapist OT                  OT Recommendation and Plan  Outcome Summary/Treatment Plan (OT)  Anticipated Equipment Needs at Discharge (OT): (TBA further)  Anticipated Discharge Disposition (OT): skilled nursing facility  Therapy Frequency (OT Eval): daily  Plan of Care Review  Plan of Care Reviewed With: patient  Plan of Care Reviewed With: patient  Outcome Summary: OT completed a brief chart review relating to presenting physical/cognitive deficits. Pt Depx2 for bed/chair t/f w/ mechanical lift, limited d/t significant generalized weakness. RN notified of significant R wrist swelling w/ limited R . Recommend cont skilled IPOT POC. Recommend pt DC to SNF.     Outcome Measures     Row Name  02/12/19 0959 02/12/19 0935          How much help from another person do you currently need...    Turning from your back to your side while in flat bed without using bedrails?  2  -LS  --     Moving from lying on back to sitting on the side of a flat bed without bedrails?  2  -LS  --     Moving to and from a bed to a chair (including a wheelchair)?  1  -LS  --     Standing up from a chair using your arms (e.g., wheelchair, bedside chair)?  1  -LS  --     Climbing 3-5 steps with a railing?  1  -LS  --     To walk in hospital room?  1  -LS  --     AM-PAC 6 Clicks Score  8  -LS  --        How much help from another is currently needed...    Putting on and taking off regular lower body clothing?  --  1  -CL     Bathing (including washing, rinsing, and drying)  --  1  -CL     Toileting (which includes using toilet bed pan or urinal)  --  1  -CL     Putting on and taking off regular upper body clothing  --  1  -CL     Taking care of personal grooming (such as brushing teeth)  --  2  -CL     Eating meals  --  2  -CL     Score  --  8  -CL        Functional Assessment    Outcome Measure Options  AM-PAC 6 Clicks Basic Mobility (PT)  -LS  AM-PAC 6 Clicks Daily Activity (OT)  -CL       User Key  (r) = Recorded By, (t) = Taken By, (c) = Cosigned By    Initials Name Provider Type    Ana Andersen, PT Physical Therapist    CL Cassie Reardon OT Occupational Therapist          Time Calculation:   Time Calculation- OT     Row Name 02/12/19 0935             Time Calculation- OT    OT Start Time  0935  -CL      OT Received On  02/12/19  -CL      OT Goal Re-Cert Due Date  02/22/19  -CL         Timed Charges    31641 - OT Therapeutic Activity Minutes  10  -CL        User Key  (r) = Recorded By, (t) = Taken By, (c) = Cosigned By    Initials Name Provider Type    Cassie Brothers OT Occupational Therapist        Therapy Suggested Charges     Code   Minutes Charges    99904 (CPT®) Hc Ot Neuromusc Re Education Ea 15 Min      11373 (CPT®)  Hc Ot Ther Proc Ea 15 Min      65944 (CPT®) Hc Ot Therapeutic Act Ea 15 Min 10 1    04899 (CPT®) Hc Ot Manual Therapy Ea 15 Min      31253 (CPT®) Hc Ot Iontophoresis Ea 15 Min      85223 (CPT®) Hc Ot Elec Stim Ea-Per 15 Min      09517 (CPT®) Hc Ot Ultrasound Ea 15 Min      83369 (CPT®) Hc Ot Self Care/Mgmt/Train Ea 15 Min      Total  10 1        Therapy Charges for Today     Code Description Service Date Service Provider Modifiers Qty    77042549420 HC OT THERAPEUTIC ACT EA 15 MIN 2/12/2019 Cassie Reardon, OT GO 1    42460873099 HC OT EVAL LOW COMPLEXITY 3 2/12/2019 Cassie Reardon OT GO 1    33813335784 HC OT THER SUPP EA 15 MIN 2/12/2019 Cassie Reardon OT GO 2               Cassie Reardon OT  2/12/2019

## 2019-02-13 ENCOUNTER — APPOINTMENT (OUTPATIENT)
Dept: GENERAL RADIOLOGY | Facility: HOSPITAL | Age: 69
End: 2019-02-13

## 2019-02-13 PROBLEM — I48.0 PAROXYSMAL ATRIAL FIBRILLATION (HCC): Status: ACTIVE | Noted: 2019-02-13

## 2019-02-13 PROBLEM — A41.9 SEPSIS (HCC): Status: ACTIVE | Noted: 2019-02-13

## 2019-02-13 LAB
ALBUMIN SERPL-MCNC: 2.57 G/DL (ref 3.2–4.8)
ANION GAP SERPL CALCULATED.3IONS-SCNC: 10 MMOL/L (ref 3–11)
APTT PPP: 32.5 SECONDS (ref 85–120)
BACTERIA SPEC AEROBE CULT: NORMAL
BASOPHILS # BLD AUTO: 0.03 10*3/MM3 (ref 0–0.2)
BASOPHILS # BLD AUTO: 0.03 10*3/MM3 (ref 0–0.2)
BASOPHILS NFR BLD AUTO: 0.1 % (ref 0–1)
BASOPHILS NFR BLD AUTO: 0.2 % (ref 0–1)
BUN BLD-MCNC: 73 MG/DL (ref 9–23)
BUN/CREAT SERPL: 52.9 (ref 7–25)
CALCIUM SPEC-SCNC: 7.5 MG/DL (ref 8.7–10.4)
CHLORIDE SERPL-SCNC: 103 MMOL/L (ref 99–109)
CO2 SERPL-SCNC: 23 MMOL/L (ref 20–31)
CREAT BLD-MCNC: 1.38 MG/DL (ref 0.6–1.3)
DEPRECATED RDW RBC AUTO: 43.2 FL (ref 37–54)
DEPRECATED RDW RBC AUTO: 44 FL (ref 37–54)
EOSINOPHIL # BLD AUTO: 0.09 10*3/MM3 (ref 0–0.3)
EOSINOPHIL # BLD AUTO: 0.12 10*3/MM3 (ref 0–0.3)
EOSINOPHIL NFR BLD AUTO: 0.4 % (ref 0–3)
EOSINOPHIL NFR BLD AUTO: 0.6 % (ref 0–3)
ERYTHROCYTE [DISTWIDTH] IN BLOOD BY AUTOMATED COUNT: 15.1 % (ref 11.3–14.5)
ERYTHROCYTE [DISTWIDTH] IN BLOOD BY AUTOMATED COUNT: 15.3 % (ref 11.3–14.5)
GFR SERPL CREATININE-BSD FRML MDRD: 51 ML/MIN/1.73
GLUCOSE BLD-MCNC: 149 MG/DL (ref 70–100)
GLUCOSE BLDC GLUCOMTR-MCNC: 138 MG/DL (ref 70–130)
GLUCOSE BLDC GLUCOMTR-MCNC: 167 MG/DL (ref 70–130)
GLUCOSE BLDC GLUCOMTR-MCNC: 180 MG/DL (ref 70–130)
GLUCOSE BLDC GLUCOMTR-MCNC: 186 MG/DL (ref 70–130)
HCT VFR BLD AUTO: 31.9 % (ref 38.9–50.9)
HCT VFR BLD AUTO: 34.8 % (ref 38.9–50.9)
HGB BLD-MCNC: 10.5 G/DL (ref 13.1–17.5)
HGB BLD-MCNC: 11.6 G/DL (ref 13.1–17.5)
IMM GRANULOCYTES # BLD AUTO: 0.18 10*3/MM3 (ref 0–0.05)
IMM GRANULOCYTES # BLD AUTO: 0.21 10*3/MM3 (ref 0–0.05)
IMM GRANULOCYTES NFR BLD AUTO: 0.9 % (ref 0–0.6)
IMM GRANULOCYTES NFR BLD AUTO: 1.1 % (ref 0–0.6)
INR PPP: 1.3 (ref 0.85–1.16)
LYMPHOCYTES # BLD AUTO: 1.07 10*3/MM3 (ref 0.6–4.8)
LYMPHOCYTES # BLD AUTO: 1.2 10*3/MM3 (ref 0.6–4.8)
LYMPHOCYTES NFR BLD AUTO: 5.6 % (ref 24–44)
LYMPHOCYTES NFR BLD AUTO: 5.9 % (ref 24–44)
MCH RBC QN AUTO: 26.3 PG (ref 27–31)
MCH RBC QN AUTO: 26.4 PG (ref 27–31)
MCHC RBC AUTO-ENTMCNC: 32.9 G/DL (ref 32–36)
MCHC RBC AUTO-ENTMCNC: 33.3 G/DL (ref 32–36)
MCV RBC AUTO: 78.9 FL (ref 80–99)
MCV RBC AUTO: 80.4 FL (ref 80–99)
MONOCYTES # BLD AUTO: 0.72 10*3/MM3 (ref 0–1)
MONOCYTES # BLD AUTO: 0.74 10*3/MM3 (ref 0–1)
MONOCYTES NFR BLD AUTO: 3.5 % (ref 0–12)
MONOCYTES NFR BLD AUTO: 3.9 % (ref 0–12)
NEUTROPHILS # BLD AUTO: 16.98 10*3/MM3 (ref 1.5–8.3)
NEUTROPHILS # BLD AUTO: 18.32 10*3/MM3 (ref 1.5–8.3)
NEUTROPHILS NFR BLD AUTO: 89.7 % (ref 41–71)
NEUTROPHILS NFR BLD AUTO: 90.1 % (ref 41–71)
PHOSPHATE SERPL-MCNC: 4.7 MG/DL (ref 2.4–5.1)
PLATELET # BLD AUTO: 168 10*3/MM3 (ref 150–450)
PLATELET # BLD AUTO: 170 10*3/MM3 (ref 150–450)
PMV BLD AUTO: 10 FL (ref 6–12)
PMV BLD AUTO: 10.1 FL (ref 6–12)
POTASSIUM BLD-SCNC: 3.6 MMOL/L (ref 3.5–5.5)
PROTHROMBIN TIME: 15.6 SECONDS (ref 11.2–14.3)
RBC # BLD AUTO: 3.97 10*6/MM3 (ref 4.2–5.76)
RBC # BLD AUTO: 4.41 10*6/MM3 (ref 4.2–5.76)
SODIUM BLD-SCNC: 136 MMOL/L (ref 132–146)
UFH PPP CHRO-ACNC: 0.1 IU/ML (ref 0.3–0.7)
WBC NRBC COR # BLD: 18.94 10*3/MM3 (ref 3.5–10.8)
WBC NRBC COR # BLD: 20.36 10*3/MM3 (ref 3.5–10.8)

## 2019-02-13 PROCEDURE — 25010000002 HEPARIN (PORCINE) PER 1000 UNITS

## 2019-02-13 PROCEDURE — 85025 COMPLETE CBC W/AUTO DIFF WBC: CPT | Performed by: PHYSICIAN ASSISTANT

## 2019-02-13 PROCEDURE — 25010000002 FENTANYL CITRATE (PF) 100 MCG/2ML SOLUTION: Performed by: NURSE PRACTITIONER

## 2019-02-13 PROCEDURE — 99221 1ST HOSP IP/OBS SF/LOW 40: CPT | Performed by: ORTHOPAEDIC SURGERY

## 2019-02-13 PROCEDURE — 20605 DRAIN/INJ JOINT/BURSA W/O US: CPT | Performed by: ORTHOPAEDIC SURGERY

## 2019-02-13 PROCEDURE — 25010000002 NA FERRIC GLUC CPLX PER 12.5 MG: Performed by: INTERNAL MEDICINE

## 2019-02-13 PROCEDURE — 25010000003 CEFAZOLIN 1-4 GM/50ML-% SOLUTION

## 2019-02-13 PROCEDURE — 82962 GLUCOSE BLOOD TEST: CPT

## 2019-02-13 PROCEDURE — 73110 X-RAY EXAM OF WRIST: CPT

## 2019-02-13 PROCEDURE — 25010000002 HEPARIN (PORCINE) IN NACL 25000-0.45 UT/250ML-% SOLUTION

## 2019-02-13 PROCEDURE — 94799 UNLISTED PULMONARY SVC/PX: CPT

## 2019-02-13 PROCEDURE — 97610 LOW FREQUENCY NON-THERMAL US: CPT

## 2019-02-13 PROCEDURE — 85025 COMPLETE CBC W/AUTO DIFF WBC: CPT | Performed by: INTERNAL MEDICINE

## 2019-02-13 PROCEDURE — 99233 SBSQ HOSP IP/OBS HIGH 50: CPT | Performed by: INTERNAL MEDICINE

## 2019-02-13 PROCEDURE — 85610 PROTHROMBIN TIME: CPT | Performed by: PHYSICIAN ASSISTANT

## 2019-02-13 PROCEDURE — 85730 THROMBOPLASTIN TIME PARTIAL: CPT | Performed by: PHYSICIAN ASSISTANT

## 2019-02-13 PROCEDURE — 85520 HEPARIN ASSAY: CPT

## 2019-02-13 PROCEDURE — 99222 1ST HOSP IP/OBS MODERATE 55: CPT | Performed by: INTERNAL MEDICINE

## 2019-02-13 PROCEDURE — 71045 X-RAY EXAM CHEST 1 VIEW: CPT

## 2019-02-13 PROCEDURE — 80069 RENAL FUNCTION PANEL: CPT | Performed by: INTERNAL MEDICINE

## 2019-02-13 RX ORDER — DULOXETIN HYDROCHLORIDE 30 MG/1
30 CAPSULE, DELAYED RELEASE ORAL DAILY
Status: DISCONTINUED | OUTPATIENT
Start: 2019-02-13 | End: 2019-02-16

## 2019-02-13 RX ORDER — GABAPENTIN 300 MG/1
300 CAPSULE ORAL EVERY 12 HOURS SCHEDULED
Status: DISCONTINUED | OUTPATIENT
Start: 2019-02-13 | End: 2019-02-14

## 2019-02-13 RX ORDER — FAMOTIDINE 10 MG/ML
20 INJECTION, SOLUTION INTRAVENOUS EVERY 12 HOURS SCHEDULED
Status: DISCONTINUED | OUTPATIENT
Start: 2019-02-13 | End: 2019-02-14

## 2019-02-13 RX ORDER — HEPARIN SODIUM 1000 [USP'U]/ML
4000 INJECTION, SOLUTION INTRAVENOUS; SUBCUTANEOUS ONCE
Status: COMPLETED | OUTPATIENT
Start: 2019-02-13 | End: 2019-02-13

## 2019-02-13 RX ORDER — BUMETANIDE 0.25 MG/ML
2.5 INJECTION INTRAMUSCULAR; INTRAVENOUS ONCE
Status: COMPLETED | OUTPATIENT
Start: 2019-02-13 | End: 2019-02-13

## 2019-02-13 RX ADMIN — CASTOR OIL AND BALSAM, PERU: 788; 87 OINTMENT TOPICAL at 08:48

## 2019-02-13 RX ADMIN — CASTOR OIL AND BALSAM, PERU: 788; 87 OINTMENT TOPICAL at 20:07

## 2019-02-13 RX ADMIN — GABAPENTIN 300 MG: 300 CAPSULE ORAL at 20:07

## 2019-02-13 RX ADMIN — FAMOTIDINE 20 MG: 10 INJECTION, SOLUTION INTRAVENOUS at 14:53

## 2019-02-13 RX ADMIN — GABAPENTIN 300 MG: 300 CAPSULE ORAL at 12:19

## 2019-02-13 RX ADMIN — METRONIDAZOLE 500 MG: 500 INJECTION, SOLUTION INTRAVENOUS at 13:19

## 2019-02-13 RX ADMIN — DILTIAZEM HYDROCHLORIDE 5 MG/HR: 5 INJECTION INTRAVENOUS at 04:06

## 2019-02-13 RX ADMIN — METRONIDAZOLE 500 MG: 500 INJECTION, SOLUTION INTRAVENOUS at 02:16

## 2019-02-13 RX ADMIN — AZTREONAM 500 MG: 1 INJECTION, POWDER, LYOPHILIZED, FOR SOLUTION INTRAMUSCULAR; INTRAVENOUS at 12:15

## 2019-02-13 RX ADMIN — DULOXETINE 30 MG: 30 CAPSULE, DELAYED RELEASE ORAL at 13:00

## 2019-02-13 RX ADMIN — HEPARIN SODIUM 8.5 UNITS/KG/HR: 10000 INJECTION, SOLUTION INTRAVENOUS at 15:02

## 2019-02-13 RX ADMIN — FENTANYL CITRATE 25 MCG: 50 INJECTION INTRAMUSCULAR; INTRAVENOUS at 01:14

## 2019-02-13 RX ADMIN — SODIUM CHLORIDE, PRESERVATIVE FREE 3 ML: 5 INJECTION INTRAVENOUS at 20:08

## 2019-02-13 RX ADMIN — AMLODIPINE BESYLATE 5 MG: 5 TABLET ORAL at 13:00

## 2019-02-13 RX ADMIN — FENTANYL CITRATE 25 MCG: 50 INJECTION INTRAMUSCULAR; INTRAVENOUS at 06:25

## 2019-02-13 RX ADMIN — FENTANYL CITRATE 25 MCG: 50 INJECTION INTRAMUSCULAR; INTRAVENOUS at 09:45

## 2019-02-13 RX ADMIN — FENTANYL CITRATE 25 MCG: 50 INJECTION INTRAMUSCULAR; INTRAVENOUS at 14:57

## 2019-02-13 RX ADMIN — BUMETANIDE 2.5 MG: 0.25 INJECTION INTRAMUSCULAR; INTRAVENOUS at 12:17

## 2019-02-13 RX ADMIN — HYDROCODONE BITARTRATE AND ACETAMINOPHEN 1 TABLET: 5; 325 TABLET ORAL at 04:29

## 2019-02-13 RX ADMIN — METOPROLOL TARTRATE 25 MG: 25 TABLET ORAL at 16:27

## 2019-02-13 RX ADMIN — FAMOTIDINE 20 MG: 10 INJECTION, SOLUTION INTRAVENOUS at 20:07

## 2019-02-13 RX ADMIN — METRONIDAZOLE 500 MG: 500 INJECTION, SOLUTION INTRAVENOUS at 18:26

## 2019-02-13 RX ADMIN — SODIUM CHLORIDE 125 MG: 9 INJECTION, SOLUTION INTRAVENOUS at 13:03

## 2019-02-13 RX ADMIN — AZTREONAM 500 MG: 1 INJECTION, POWDER, LYOPHILIZED, FOR SOLUTION INTRAMUSCULAR; INTRAVENOUS at 22:17

## 2019-02-13 RX ADMIN — HYDROCODONE BITARTRATE AND ACETAMINOPHEN 1 TABLET: 5; 325 TABLET ORAL at 18:26

## 2019-02-13 RX ADMIN — INSULIN LISPRO 2 UNITS: 100 INJECTION, SOLUTION INTRAVENOUS; SUBCUTANEOUS at 20:07

## 2019-02-13 RX ADMIN — METOPROLOL TARTRATE 25 MG: 25 TABLET ORAL at 20:07

## 2019-02-13 RX ADMIN — CEFAZOLIN SODIUM 1 G: 1 INJECTION, SOLUTION INTRAVENOUS at 19:48

## 2019-02-13 RX ADMIN — HEPARIN SODIUM 4000 UNITS: 1000 INJECTION, SOLUTION INTRAVENOUS; SUBCUTANEOUS at 22:14

## 2019-02-13 RX ADMIN — INSULIN LISPRO 2 UNITS: 100 INJECTION, SOLUTION INTRAVENOUS; SUBCUTANEOUS at 18:31

## 2019-02-13 NOTE — PLAN OF CARE
Problem: Patient Care Overview  Goal: Plan of Care Review  Outcome: Ongoing (interventions implemented as appropriate)   02/13/19 0249   Coping/Psychosocial   Plan of Care Reviewed With patient;family   OTHER   Outcome Summary DTPI areas stable with no issues noted to this point. PT will cont with mist therapy to help improve skin integrity and increase healing potential.

## 2019-02-13 NOTE — PROGRESS NOTES
HEPARIN INFUSION  Rod Balderas is a  68 y.o. male receiving heparin infusion.             Therapy for (VTE/Cardiac):   A fib  Patient Weight: 117 kg  Initial Bolus (Y/N):   N  Any Bolus (Y/N):   N        Signs or Symptoms of Bleeding: Had hematuria on admission (2/8)      Cardiac or Other (Not VTE)   Initial Bolus: 60 units/kg (Max 4,000 units)  Initial rate: 12 units/kg/hr (Max 1,000 units/hr)   Anti-Xa (IU/mL) Bolus Dose Stop Infusion Rate Change Repeat Anti-Xa      ?0.19 60 units/kg 0 hrs Increase rate by   4 units/kg/hr 6 hrs       0.2 - 0.29  30 units/kg 0 hrs Increase rate by 2 units/kg/hr 6 hrs    0.3 - 0.7 0 0 hrs No change 6 hrs      0.71 - 0.99 0  0 hrs Decrease rate by 2 units/kg/hr 6 hrs            ?1 0 Hold 1 hr Decrease rate by 3 units/kg/hr 6 hrs          Results from last 7 days   Lab Units 02/13/19  1220 02/13/19  0534 02/12/19  0605 02/12/19  0603  02/08/19  1240   INR  1.30*  --  1.29*  --   --  1.23*   HEMOGLOBIN g/dL 10.5* 11.6*  --  11.7*   < > 11.3*   HEMATOCRIT % 31.9* 34.8*  --  36.0*   < > 34.1*   PLATELETS 10*3/mm3 170 168  --  132*   < > 180    < > = values in this interval not displayed.          Date   Time   Anti-Xa Current Rate (Unit/kg/hr) Bolus   (Units) Rate Change   (Unit/kg/hr) New Rate (Unit/kg/hr) Next   Anti-Xa Comments  Pump Check Daily   2/13 1401 Initial aPTT: 32.5 0 -- +8.5 8.5 2100 D/w HOLLY [place] [facility]  2/13/2019  1:58 PM

## 2019-02-13 NOTE — PROGRESS NOTES
Continued Stay Note  [facility]     Patient Name: Rod Balderas  MRN: [id number]  Today's Date: 2/13/2019    Admit Date: 2/8/2019    Discharge Plan     Row Name 02/13/19 1102       Plan    Plan  Ongoing    Patient/Family in Agreement with Plan  yes    Plan Comments  Patient sleeping, spoke with wife at bedside.  PT recommends SNF for patient.  Discussed with wife.  Wife would like to look into facilities close to home and does not want a referral made today.  CM will discuss with wife tomorrow.        Discharge Codes    No documentation.       Expected Discharge Date and Time     Expected Discharge Date Expected Discharge Time    Feb 18, 2019             Jacquelyn Mann RN

## 2019-02-13 NOTE — PROGRESS NOTES
"   LOS: 5 days    Patient Care Team:  Tyrese Leyva MD as PCP - General (Family Medicine)  Rod Regalado MD as Consulting Physician (Cardiology)  Myles Rossi MD as Consulting Physician (Anesthesiology)  Juanito Grace Jr., MD as Consulting Physician (Urology)    Reason For Visit:  F/U NOE.  Subjective           Review of Systems:    Pulm: No soa   CV:  No CP      Objective       amLODIPine 5 mg Oral Q24H   aztreonam 500 mg Intravenous Q12H   bumetanide 2.5 mg Intravenous Once   castor oil-balsam peru  Topical Q12H   ceFAZolin 1 g Intravenous Q24H   DULoxetine 30 mg Oral Daily   ferric gluconate (FERRLECIT) IVPB 125 mg Intravenous Daily   gabapentin 300 mg Oral Q12H   insulin lispro 0-7 Units Subcutaneous 4x Daily With Meals & Nightly   metroNIDAZOLE 500 mg Intravenous Q8H   sodium chloride 3 mL Intravenous Q12H       diltiaZEM 5-15 mg/hr Last Rate: 5 mg/hr (02/13/19 0406)   Pharmacy Consult           Vital Signs:  Blood pressure 163/88, pulse 117, temperature 98.6 °F (37 °C), temperature source Axillary, resp. rate 18, height 170.2 cm (67.01\"), weight 117 kg (257 lb), SpO2 96 %.    Flowsheet Rows      First Filed Value   Admission Height  170.2 cm (67\") Documented at 02/08/2019 1115   Admission Weight  117 kg (257 lb 3.2 oz) Documented at 02/08/2019 1115          02/12 0701 - 02/13 0700  In: 1543.9 [P.O.:500; I.V.:483.9]  Out: 250 [Urine:250]    Physical Exam:    General Appearance: NAD, alert and cooperative, Ox3  Eyes: PER, conjunctivae and sclerae normal, no icterus  Lungs: FEW RHONCHI.  Heart/CV: regular rhythm & normal rate, no murmur, no gallop, no rub and 1+ edema  Abdomen: not distended, soft, non-tender, no masses,  bowel sounds present  Skin: No rash, Warm and dry. TUNNELED HD CATH.    Radiology:            Labs:  Results from last 7 days   Lab Units 02/13/19  0534 02/12/19  0603 02/11/19  1252   WBC 10*3/mm3 18.94* 15.89* 20.21*   HEMOGLOBIN g/dL 11.6* 11.7* 11.0*   HEMATOCRIT % " 34.8* 36.0* 32.9*   PLATELETS 10*3/mm3 168 132* 137*     Results from last 7 days   Lab Units 02/13/19  0534 02/12/19  0526 02/11/19  0637 02/10/19  0340 02/09/19  0510   SODIUM mmol/L 136 133 132 135 133   POTASSIUM mmol/L 3.6 3.4* 3.6 3.6 4.2   CHLORIDE mmol/L 103 99 100 102 103   CO2 mmol/L 23.0 23.0 18.0* 21.0 18.0*   BUN mg/dL 73* 60* 81* 64* 83*   CREATININE mg/dL 1.38* 1.74* 3.05* 3.55* 4.71*   CALCIUM mg/dL 7.5* 8.1* 8.2* 8.2* 8.2*   PHOSPHORUS mg/dL 4.7 5.2* 6.0* 5.8* 6.4*   MAGNESIUM mg/dL  --   --  2.2  --  2.3   ALBUMIN g/dL 2.57* 2.83* 2.89* 3.05* 2.96*     Results from last 7 days   Lab Units 02/13/19  0534   GLUCOSE mg/dL 149*       Results from last 7 days   Lab Units 02/11/19  0637   ALK PHOS U/L 348*   BILIRUBIN mg/dL 2.6*   ALT (SGPT) U/L 17   AST (SGOT) U/L 67*     Results from last 7 days   Lab Units 02/08/19  1540   PH, ARTERIAL pH units 7.276*   PO2 ART mm Hg 85.0   PCO2, ARTERIAL mm Hg 38.4   HCO3 ART mmol/L 17.8*       Results from last 7 days   Lab Units 02/08/19  1826   COLOR UA  Jessica*   CLARITY UA  Turbid*   PH, URINE  6.5   SPECIFIC GRAVITY, URINE  1.015   GLUCOSE UA  Negative   KETONES UA  15 mg/dL (1+)*   BILIRUBIN UA  Small (1+)*   PROTEIN UA  100 mg/dL (2+)*   BLOOD UA  Large (3+)*   LEUKOCYTES UA  Large (3+)*   NITRITE UA  Positive*       Estimated Creatinine Clearance: 63.6 mL/min (A) (by C-G formula based on SCr of 1.38 mg/dL (H)).      Assessment       Renal failure    Spinal stenosis, lumbar region, with neurogenic claudication    Morbid obesity due to excess calories (CMS/HCC)    TANIYA on CPAP    Dyslipidemia    DM (diabetes mellitus), type 2 (CMS/HCC)    Metabolic encephalopathy            Impression: NOE WITH IMPROVED GFR. TN. ANEMIA ON IV FE.            Recommendations: HOLD DIALYSIS. IV BUMEX.      Jr Banks MD  02/13/19  10:18 AM

## 2019-02-13 NOTE — CONSULTS
Orthopedic Consult      Patient: Rod Balderas    Date of Admission: 2/8/2019 11:02 AM    YOB: 1950    Medical Record Number: [id number]    Attending Physician: Jamal Villar MD    Consulting Physician: Juanito Ro MD      Chief Complaint(s): Acute renal failure (ARF) (CMS/[facility]) [N17.9]      History of Present Illness: 68 y.o. male admitted to [facility] with Acute renal failure (ARF) (CMS/[facility]) [N17.9]. I was consulted for further evaluation and treatment of right wrist pain and swelling.  Is a gentleman who is admitted to the ICU with acute renal failure and MSSA sepsis.  I was asked to evaluate the patient for right wrist pain and swelling by Dr. Jeff Blake with infectious disease.  Upon seeing the patient, he is complaining of pain and swelling about the right wrist.  He denies any recent trauma or injury.  He states that his pain has been ongoing for several days but the swelling is gotten progressively worse.  His pain is worse with attempted  and formation of a fist.  Immobilization improves his pain.  He denies any numbness or tingling in the extremity.     Allergies   Allergen Reactions   • Percocet [Oxycodone-Acetaminophen] Anxiety   • Sulfa Antibiotics Hives        Home Medications:  Medications Prior to Admission   Medication Sig Dispense Refill Last Dose   • amLODIPine (NORVASC) 5 MG tablet Take 1 tablet by mouth Daily. 90 tablet 2 1/14/2019   • aspirin 81 MG chewable tablet Chew 81 mg Daily.      • DULoxetine (CYMBALTA) 60 MG capsule Take 60 mg by mouth Daily.   1/14/2019   • fluticasone (FLONASE) 50 MCG/ACT nasal spray USE 2 SPRAYS IN EACH NOSTRIL DAILY PRN  0 More than a month   • gabapentin (NEURONTIN) 600 MG tablet Take 1 tablet by mouth 4 (Four) Times a Day. 120 tablet 5 1/14/2019   • HYDROcodone-acetaminophen (NORCO) 7.5-325 MG per tablet Take 1 tablet by mouth Every 4-6  Hours As Needed for Moderate Pain. 40 tablet 0    • ibuprofen (ADVIL,MOTRIN) 800 MG  tablet Take 1 tablet by mouth 3 (Three) Times a Day As Needed for Moderate Pain .      • lisinopril-hydrochlorothiazide (PRINZIDE,ZESTORETIC) 20-12.5 MG per tablet Take 1 tablet by mouth Daily. 90 tablet 2 1/14/2019   • metFORMIN (GLUCOPHAGE) 500 MG tablet Take 500 mg by mouth 2 (Two) Times a Day With Meals.   1/14/2019       Current Medications:  Scheduled Meds:  amLODIPine 5 mg Oral Q24H   aztreonam 500 mg Intravenous Q12H   castor oil-balsam peru  Topical Q12H   ceFAZolin 1 g Intravenous Q24H   DULoxetine 30 mg Oral Daily   famotidine 20 mg Intravenous Q12H   ferric gluconate (FERRLECIT) IVPB 125 mg Intravenous Daily   gabapentin 300 mg Oral Q12H   insulin lispro 0-7 Units Subcutaneous 4x Daily With Meals & Nightly   metoprolol tartrate 25 mg Oral TID   metroNIDAZOLE 500 mg Intravenous Q8H   sodium chloride 3 mL Intravenous Q12H     Continuous Infusions:  diltiaZEM 5-15 mg/hr Last Rate: 5 mg/hr (02/13/19 0406)   heparin 8.5 Units/kg/hr Last Rate: 8.5 Units/kg/hr (02/13/19 7002)   Pharmacy Consult     Pharmacy to Dose Heparin       PRN Meds:.•  albuterol  •  fentaNYL citrate (PF)  •  heparin (porcine)  •  heparin (porcine)  •  HYDROcodone-acetaminophen  •  lactulose  •  melatonin  •  ondansetron  •  Pharmacy Consult  •  Pharmacy to Dose Heparin  •  sodium chloride    Past Medical History:   Diagnosis Date   • Anxiety and depression    • Cancer (CMS/HCC)    • Chest pain    • Coronary artery disease    • Diabetes mellitus (CMS/HCC)    • Dyslipidemia    • Extremity pain    • Heart disease    • History of transfusion    • Hypertension    • Low back pain    • TANIYA on CPAP     2-3    • Osteoarthritis     Diffuse osteoarthritis.   • Panic attacks    • Prostate cancer (CMS/HCC)         Past Surgical History:   Procedure Laterality Date   • CARDIAC CATHETERIZATION     • CHOLECYSTECTOMY     • COLONOSCOPY  2015   • CORONARY ANGIOPLASTY WITH STENT PLACEMENT  12/2012 December 2012:  A 3.5 x 88 Promus VERONICA stent to OM2.   Nonobstructive disease.  Otherwise normal LVEF.   • EYE SURGERY Bilateral     cataract   • INSERTION HEMODIALYSIS CATHETER N/A 2/8/2019    Procedure: HEMODIALYSIS CATHETER INSERTION RIGHT INTERNAL JUGULAR;  Surgeon: Bishnu Dailey MD;  Location: [facility] OR;  Service: General   • PROSTATECTOMY N/A 1/15/2019    Procedure: ROBOTIC PROSTATECTOMY WITH NODES;  Surgeon: Juanito Grace Jr., MD;  Location: [facility] OR;  Service: DaVinci   • RETINAL DETACHMENT SURGERY Right         Social History     Occupational History   • Not on file   Tobacco Use   • Smoking status: Former Smoker   • Smokeless tobacco: Former User   Substance and Sexual Activity   • Alcohol use: No   • Drug use: No   • Sexual activity: Defer    Social History     Social History Narrative   • Not on file        Family History   Problem Relation Age of Onset   • Heart disease Mother    • Thyroid disease Mother    • Heart disease Father    • Cancer Brother        Review of Systems:   General: negative for - chills, fatigue, fever, malaise or night sweats  Psychological: negative for - behavioral disorder, hostility, irritability, mood swings, obsessive thoughts or suicidal ideation  Ophthalmic: negative for - blurry vision, double vision or eye pain  HEENT: negative for - headaches, hearing change, sinus pain, sore throat or visual changes  Hematological and Lymphatic: negative for - bleeding problems, jaundice, night sweats, pallor or swollen lymph nodes  Endocrine: negative for - malaise/lethargy, mood swings, palpitations, polydipsia/polyuria, skin changes or unexpected weight changes  Respiratory: negative for - cough, shortness of breath or wheezing  Cardiovascular: negative for - chest pain, dyspnea on exertion, palpitations or shortness of breath  Gastrointestinal: negative for - abdominal pain, change in bowel habits, change in stools, constipation, gas/bloating or stool incontinence  Genito-Urinary: negative for - dysuria, genital  discharge, nocturia, pelvic pain or scrotal mass/pain  Musculoskeletal: positive for - pain in hand - right and wrist - right  Neurological: negative for - behavioral changes, headaches, speech problems or visual changes  Dermatological: negative for hair changes, lumps, pruritus and skin lesion changes    Physical Exam: 68 y.o. male    GENERAL APPEARANCE: well developed, well nourished and in moderate distress  Vitals:    02/13/19 1000 02/13/19 1100 02/13/19 1200 02/13/19 1300   BP: 156/77 (!) 144/116 131/86 136/77   BP Location:       Patient Position:       Pulse: 108 115 107 111   Resp: 20 20 20 20   Temp:   97.8 °F (36.6 °C)    TempSrc:   Oral    SpO2: 96% 96% 97% 98%   Weight:       Height:         PSYCH: normal affect  HEAD: Normocephalic, without obvious abnormality, atraumatic  EYES: No icterus, corneas clear, PERRLA  CARDIOVASCULAR: pulses palpable and equal bilaterally. Capillary refill less than 2 seconds  LUNGS:  breathing nonlabored  ABDOMEN: Soft, nontender, nondistended  BACK: No C-T- L spine tenderness  EXTREMITIES: no clubbing, cyanosis  MUSCULOSKELETAL:    Left upper extremity: Full painless range of motion of shoulder, elbow, wrist, and digits.  Nontender to palpation throughout the extremity.  Intact EPL, FPL, EDC, FDP, FDS, interosseous, wrist flexion, wrist extension, biceps, triceps, and deltoid. Sensation intact light touch to median, radial, ulnar, and axillary nerves. Palpable radial pulse.  Skin is intact without significant lesions or wounds.    Right upper extremity: Patient demonstrates focal edema about the right forearm extending from the elbow down to the wrist.  There is some slight erythema about the ulnar aspect of the wrist but otherwise no erythematous changes.  He is focally diffusely tender to palpation throughout the soft tissues of the right upper extremity, especially in the swelling areas.  No focal bony tenderness about the hand or wrist.  Passive range of motion of the  wrist elicits very little pain response.  Movement of the digits elicits more pain than movement of the wrist.  Intact EPL, FPL, EDC, FDP, FDS, interosseous, wrist flexion, wrist extension, biceps, triceps, and deltoid. Sensation intact light touch to median, radial, ulnar, and axillary nerves. Palpable radial pulse.  Skin is intact without significant lesions or wounds.      Diagnostic Tests:    Admission on 02/08/2019   No results displayed because visit has over 200 results.          Ct Abdomen Pelvis Without Contrast    Result Date: 2/9/2019  Narrative: EXAMINATION: CT CHEST WO CONTRAST, CT ABDOMEN/PELVIS WO CONTRAST - 2/8/2019  INDICATION: Persistent cough.  TECHNIQUE: Multiple axial CT imaging is obtained of the chest, abdomen and pelvis without the administration of oral or intravenous contrast.  The radiation dose reduction device was turned on for each scan per the ALARA (As Low as Reasonably Achievable) protocol.  COMPARISON: There is some consolidation identified posteriorly extending to the lung bases bilaterally suggesting infiltrates. The upper lung fields are grossly clear. Motion artifact degrades image quality. There are degenerative changes identified within the spine. The cardiac chambers are enlarged. There is no pericardial effusion. No bulky hilar or axillary lymphadenopathy. The thyroid is homogeneous. Degenerative change is seen throughout the spine.  ABDOMEN: Motion artifact grades image quality. There is no abnormality seen within the liver. The spleen is upper limits of normal in size. There is gastric distention. Air fills the colon with no  evidence of obvious obstruction. Kidneys and adrenal glands within normal limits. The pancreas is homogeneous. No abdominal or retroperitoneal lymphadenopathy. No abnormal mass or fluid collections identified. No free fluid or free air.  PELVIS: Pelvic organs are unremarkable. The pelvic portion of the gastrointestinal tract is within normal limits.  No pelvic adenopathy. Degenerative change is identified throughout the spine and pelvis.  FINDINGS: Consolidation at the lung bases bilaterally is concerning for airspace disease such as pneumonia. The there is gastric distention. Air-filled loops of colon with no obvious obstruction. No other abnormal findings are seen within the abdomen or pelvis. Degenerative changes are seen throughout the spine and pelvis.  DICTATED:   2/8/2019 EDITED/ls :   2/8/2019        Impression:   This report was finalized on 2/9/2019 10:01 AM by Dr. Radha Muhammad MD.      Xr Wrist 3+ View Right    Result Date: 2/13/2019  Narrative: EXAMINATION: XR WRIST 3+ VW RIGHT- 02/13/2019  INDICATION: swelling, pain; Z74.09-Other reduced mobility  TECHNIQUE:  3 views right wrist  COMPARISONS:  None.  FINDINGS:  There is transcortical irregularity of the distal ulna which appears to represent a healed fracture. There is cortical irregularity and lucency through the base of the fifth ray metatarsal that could represent a healing or ununited fracture. There is a small well-corticated density seen off the dorsum of the distal radius, possibly a loose body or old fracture fragment. There is soft tissue swelling in the wrist. No embedded radiodense foreign body is identified.      Impression: 1. The distal ulnar diaphysis fracture is probably chronic and is healed in near-anatomic alignment. 2. Age-indeterminate fracture of the base of the fifth ray metacarpal. 3. Soft tissue swelling.  D:  02/13/2019 E:  02/13/2019  This report was finalized on 2/13/2019 11:23 AM by Richard Handy.      Ct Head Without Contrast    Addendum Date: 2/10/2019 Addendum:   ADDENDUM: 2/8/2019 by Radha Muhammad MD/ls.  ADDITIONAL INFORMATION:  Radiopaque densities suggesting metal are seen in the midline of the frontal lobes. Findings suggest shrapnel from a incident when patient was a teenager.  DICTATED:   2/9/2019 EDITED/ls :   2/9/2019  This report was finalized on  2/10/2019 11:20 AM by Dr. Radha Muhammad MD.      Result Date: 2/10/2019  Narrative: EXAMINATION: CT HEAD WO CONTRAST - 2/8/2019  INDICATION: Mental status change, does not follow commands.  TECHNIQUE: Multiple axial CT imaging is obtained of the head from skull base to skull vertex without the administration of intravenous contrast.  The radiation dose reduction device was turned on for each scan per the ALARA (As Low as Reasonably Achievable) protocol.  COMPARISON: NONE  FINDINGS: There is atrophy of the brain. Some low-density area seen in the periventricular white matter suggesting chronic small vessel ischemic change. No hemorrhage or hydrocephalus. No mass, mass effect, or midline shift. No abnormal extra-axial fluid collections identified. Minimal mucosal thickening of the maxillary sinuses and ethmoid air cells. The bony structures are unremarkable. The mastoid air cells are patent.      Impression: Chronic changes identified within the brain with no acute intracranial abnormality.  DICTATED:   2/8/2019 EDITED/ls :   2/8/2019  This report was finalized on 2/9/2019 10:01 AM by Dr. Radha Muhammad MD.      Ct Chest Without Contrast    Result Date: 2/9/2019  Narrative: EXAMINATION: CT CHEST WO CONTRAST, CT ABDOMEN/PELVIS WO CONTRAST - 2/8/2019  INDICATION: Persistent cough.  TECHNIQUE: Multiple axial CT imaging is obtained of the chest, abdomen and pelvis without the administration of oral or intravenous contrast.  The radiation dose reduction device was turned on for each scan per the ALARA (As Low as Reasonably Achievable) protocol.  COMPARISON: There is some consolidation identified posteriorly extending to the lung bases bilaterally suggesting infiltrates. The upper lung fields are grossly clear. Motion artifact degrades image quality. There are degenerative changes identified within the spine. The cardiac chambers are enlarged. There is no pericardial effusion. No bulky hilar or axillary  lymphadenopathy. The thyroid is homogeneous. Degenerative change is seen throughout the spine.  ABDOMEN: Motion artifact grades image quality. There is no abnormality seen within the liver. The spleen is upper limits of normal in size. There is gastric distention. Air fills the colon with no  evidence of obvious obstruction. Kidneys and adrenal glands within normal limits. The pancreas is homogeneous. No abdominal or retroperitoneal lymphadenopathy. No abnormal mass or fluid collections identified. No free fluid or free air.  PELVIS: Pelvic organs are unremarkable. The pelvic portion of the gastrointestinal tract is within normal limits. No pelvic adenopathy. Degenerative change is identified throughout the spine and pelvis.  FINDINGS: Consolidation at the lung bases bilaterally is concerning for airspace disease such as pneumonia. The there is gastric distention. Air-filled loops of colon with no obvious obstruction. No other abnormal findings are seen within the abdomen or pelvis. Degenerative changes are seen throughout the spine and pelvis.  DICTATED:   2/8/2019 EDITED/ls :   2/8/2019        Impression:   This report was finalized on 2/9/2019 10:01 AM by Dr. Radha Muhammad MD.      Ct Cervical Spine Without Contrast    Result Date: 2/11/2019  Narrative: EXAMINATION: CT CERVICAL SPINE WO CONTRAST - 2/10/2019  INDICATION: Neck pain.  TECHNIQUE: Multiple axial CT imaging is obtained of the cervical spine without the administration of intravenous contrast.  The radiation dose reduction device was turned on for each scan per the ALARA (As Low as Reasonably Achievable) protocol.  COMPARISON: NONE  FINDINGS: The lung apices are grossly clear. There are multilevel degenerative changes identified throughout the cervical spine. The vertebral body height and disc spaces are preserved. Facets are well aligned. Pedicles are intact. No prevertebral soft tissue swelling. Minimal degenerative changes identified posteriorly  within the cervical spine. No bony erosion identified. The vertebral body height is preserved.      Impression: Minimal degenerative changes seen within the cervical spine with no evidence of acute fracture or malalignment.  DICTATED:   2/10/2019 EDITED/ls :   2/10/2019  This report was finalized on 2/11/2019 8:59 AM by Dr. Radha Muhammad MD.      Ct Thoracic Spine Without Contrast    Result Date: 2/11/2019  Narrative: EXAMINATION: CT THORACIC SPINE WO CONTRAST - 2/10/2019  INDICATION:  Low back pain, mid back pain.  TECHNIQUE: Multiple axial CT imaging is obtained of the thoracic spine without the administration of intravenous contrast.  The radiation dose reduction device was turned on for each scan per the ALARA (As Low as Reasonably Achievable) protocol.  FINDINGS: Anterior bridging osteophyte formation is  identified scattered throughout the mid and lower thoracic spine. The vertebral body height and disc spaces are preserved. There is lateral bridging osteophyte formation seen along the rightward aspect of the spine. Pedicles are intact. Facets are well aligned. No cortical irregularity identified. There is prominence identified of the perihilar regions bilaterally. Infiltrate identified posteriorly in the midlungs bilaterally. There is no paravertebral soft tissues swelling to suggest evidence of inflammation. No abnormal mass or fluid collection is seen within the paraspinal muscles.      Impression: Multilevel degenerative changes seen throughout the thoracic spine with no definite evidence of osteomyelitis. There is minimal consolidation extending posteriorly within the midlungs bilaterally.  DICTATED:   2/10/2019 EDITED/ls :   2/10/2019  This report was finalized on 2/11/2019 8:59 AM by Dr. Radha Muhammad MD.      Ct Lumbar Spine Without Contrast    Result Date: 2/11/2019  Narrative: EXAMINATION: CT LUMBAR SPINE WO CONTRAST - 2/10/2019  INDICATION: Low back pain.  TECHNIQUE: Multiple axial CT  imaging is obtained of the lumbar spine without the ministration of intravenous contrast. Coronal and sagittal reformatted images were submitted to further facilitate diagnostic accuracy and treatment planning.  The radiation dose reduction device was turned on for each scan per the ALARA (As Low as Reasonably Achievable) protocol.  COMPARISON: NONE  FINDINGS: The vertebral body height and disc spaces are preserved. Facets are well aligned. Pedicles are intact. There is no cortical irregularity identified. Soft tissue windows reveal no evidence of paravertebral inflammation. Vascular calcification is  seen within the abdominal aorta and pelvic vessels. The sacroiliac joints reveal minimal degenerative changes seen. The vertebral body height is preserved. Degenerative change is seen within the posterior facets. There are multiple levels of central spinal canal narrowing probably at the L4/L5 level. There is a large broad-based disc bulge identified. At the L3/L4 level. There is severe narrowing of the central spinal canal. Consider MRI for further evaluation if clinically indicated.      Impression: Severe central spinal canal narrowing at the L3/L4 and L4/L5 levels. Consider MRI for further evaluation of the nerve roots. Degenerative change is identified within the lumbar spine with no evidence of cortical destruction. No evidence of endplate irregularity.  DICTATED:   2/10/2019 EDITED/ls :   2/10/2019   This report was finalized on 2/11/2019 8:59 AM by Dr. Radha Muhammad MD.      Xr Chest 1 View    Result Date: 2/13/2019  Narrative: EXAMINATION: XR CHEST 1 VW-02/13/2019:  INDICATION: Sepsis; Z74.09-Other reduced mobility; Z74.09-Other reduced mobility.  COMPARISON: 02/11/2019.  FINDINGS: Right internal jugular central venous catheter terminates in the distal SVC unchanged from prior. Cardiac silhouette unchanged and within normal limits. Increased central pulmonary vascularity from prior comparison without  focal consolidation or effusion. No pneumothorax.         Impression: Mild increase in pulmonary vascularity centrally from prior comparison without significant effusion or focal consolidation.  D:  02/13/2019 E:  02/13/2019  This report was finalized on 2/13/2019 12:35 PM by Dr. Caden Subramanian.      Xr Chest 1 View    Result Date: 2/11/2019  Narrative: EXAMINATION: XR CHEST 1 VW-02/11/2019:  INDICATION: Sepsis.  COMPARISON: 02/09/2019.  FINDINGS: Portable chest reveals the heart to be enlarged. Dialysis catheter identified on the right with tip in the SVC. Degenerative changes seen within the spine. Mild increased markings identified at the left lung base. Prominence of the pulmonary vascularity. No definite pleural effusion or pneumothorax.         Impression: Improvement seen in the aeration of the lung bases. Only minimal increased markings remaining at the left lung base. Dialysis catheter identified on the right with tip in the SVC.  D:  02/11/2019 E:  02/11/2019  This report was finalized on 2/11/2019 9:02 AM by Dr. Radha Muhammad MD.      Xr Chest 1 View    Result Date: 2/10/2019  Narrative:  EXAMINATION: XR CHEST 1 VW - 2/9/2019  INDICATION: Respiratory failure.  COMPARISON: 2/8/2019  FINDINGS: Portable chest reveals dialysis catheter on the right with tip in the SVC. Lung volumes are low. Heart is enlarged. Degenerative changes seen within the spine. Prominence of the pulmonary vascularity is slightly improved in the interval.         Impression: Improvement seen of the pulmonary vascularity in the interval. Mild increased markings identified lung bases bilaterally.  DICTATED:   2/9/2019 EDITED/ls :   2/9/2019  This report was finalized on 2/10/2019 11:20 AM by Dr. Radha Muhammad MD.      Xr Chest 1 View    Result Date: 2/9/2019  Narrative: EXAMINATION: XR CHEST 1 VW-02/08/2019:  INDICATION: RIJ line.  COMPARISON: 02/08/2019.  FINDINGS: Portable chest reveals low lung volumes. Deep line catheter  identified on the right with tip in the SVC. Degenerative changes seen within the spine. The heart is borderline enlarged. Mild prominence seen of the pulmonary vascularity. No pleural effusion or pneumothorax.         Impression: Prominence of the pulmonary vascularity with deep line catheter identified on the right with tip in the SVC. No evidence of acute parenchymal disease.  D:  02/08/2019 E:  02/08/2019  This report was finalized on 2/9/2019 10:07 AM by Dr. Radha Muhammad MD.      Xr Chest 1 View    Result Date: 2/8/2019  Narrative:  EXAMINATION: XR CHEST 1 VW-  INDICATION: respiratory failure  COMPARISON: 01/08/2019  FINDINGS: Heart is borderline enlarged. The vasculature is cephalized. Lung volumes are relatively low. There is mild discoid atelectasis in both medial lung bases, but no focal disease elsewhere..         Impression: Low lung volumes, borderline cardiomegaly and mild pulmonary vascular congestion. Mild bibasilar discoid atelectasis, new from prior study.  This report was finalized on 2/8/2019 12:44 PM by DR. Antione Damon MD.      Fl C Arm During Surgery    Result Date: 2/9/2019  Narrative: EXAMINATION: FL C ARM DURING SURGERY- 02/08/2019  INDICATION: Dialysis catheter placement  COMPARISON: NONE  FINDINGS: 4 seconds of fluoroscopy and 4 images used for right-sided dialysis catheter placement. Please see the procedure report for full details.      Impression: Fluoroscopy for dialysis catheter placement. Please see the procedure report for full details.   D:  02/08/2019 E:  02/08/2019  This report was finalized on 2/9/2019 10:07 AM by Dr. Radha Muhammad MD.      X-rays of right wrist were personally reviewed.  Radiographs demonstrate prior trauma to the right distal ulnar with well-healed fracture as well as a prior fifth metacarpal fracture that is healed.  No acute bony fracture identified.      Assessment:  Patient Active Problem List   Diagnosis   • Degenerative disc disease, lumbar   •  Spinal stenosis, lumbar region, with neurogenic claudication   • Neuroforaminal stenosis of spine   • Lumbar facet arthropathy   • CAD (coronary artery disease)   • Essential hypertension   • Physical deconditioning   • Morbid obesity due to excess calories (CMS/HCC)   • TANIYA on CPAP   • Displacement of lumbar intervertebral disc   • Dyslipidemia   • Osteoarthritis   • At high risk for falls   • Prostate cancer , s/p prostatectomy ( [facility])   • DM (diabetes mellitus), type 2 (CMS/HCC)   • Leukocytosis, mild, likely reactive   • Acute postoperative pain   • Acute blood loss anemia, mild, asymptomatic   • Renal failure   • Metabolic encephalopathy   • Paroxysmal atrial fibrillation (CMS/HCC)   • Sepsis (CMS/HCC)           Plan:  The patient has clinical and radiographic evidence of right wrist pain and swelling.  Clinically, I believe that the majority of his pain is related to soft tissue swelling which is extra-articular to the wrist joint.  He has minimal pain with passive motion of the wrist which decreases the likelihood of septic arthritis.  However, given the diffuse swelling in the presence of a sepsis situation, aspiration of the wrist is the best way determine whether active infection/septic arthritis is present in the right wrist joint.  I had an extensive discussion with the patient/family regarding treatment options for diagnosing potential septic arthritis.  I explained that aspiration of the wrist joint to be performed at bedside to help determine whether or not a infection in the wrist joint is present.  After discussion of the risks and benefits, the family elected to proceed with bedside aspiration of the wrist joint.      Procedure Note:  I discussed with the patient the potential benefits of performing a diagnostic aspiration of the right wrist as well as potential risks including but not limited to infection, swelling, pain, bleeding, bruising, nerve and/or vessel damage. After verbal consent and  after the area was prepped with alcohol and sterile ChloraPrep, an 18-gauge needle was inserted into the wrist joint via the space between the third and fourth dorsal compartments.  Only a trace amount of yellowish fluid was able to be aspirated into the needle.  This was negligible and unable to be sent for additional studies.  A second attempt was also performed and was again negative for fluid. Upon completion, the needle was withdrawn and a sterile dressing was placed over the aspiration site. The patient tolerated the procedure well. There were no apparent complications.      Based on the lack of significant pain with passive motion of the wrist, and minimal fluid within the wrist joint upon aspiration, the likelihood of septic arthritis of the wrist joint is low.  I recommended continued observation with elevation of the extremity to improve dependent swelling.  I will continue to follow clinically.  At this time I believe no surgical intervention or further workup for the wrist is indicated based on these findings.    Date: 2/13/2019    Juanito Ro MD

## 2019-02-13 NOTE — THERAPY TREATMENT NOTE
Acute Care - Wound/Debridement Treatment Note  [facility]     Patient Name: Rod Balderas  : 1950  MRN: [id number]  Today's Date: 2019  Onset of Illness/Injury or Date of Surgery: 19   Date of Referral to PT: 19   Referring Physician: MD Aurea       Admit Date: 2019    Visit Dx:    ICD-10-CM ICD-9-CM   1. Impaired functional mobility, balance, gait, and endurance Z74.09 V49.89   2. Impaired mobility and ADLs Z74.09 799.89       Patient Active Problem List   Diagnosis   • Degenerative disc disease, lumbar   • Spinal stenosis, lumbar region, with neurogenic claudication   • Neuroforaminal stenosis of spine   • Lumbar facet arthropathy   • Presence of stent in coronary artery in patient with coronary artery disease   • Essential hypertension   • Physical deconditioning   • Morbid obesity due to excess calories (CMS/HCC)   • TANIYA on CPAP   • Displacement of lumbar intervertebral disc   • Dyslipidemia   • Osteoarthritis   • At high risk for falls   • Prostate cancer , s/p prostatectomy ( [facility])   • DM (diabetes mellitus), type 2 (CMS/HCC)   • Leukocytosis, mild, likely reactive   • Acute postoperative pain   • Acute blood loss anemia, mild, asymptomatic   • Renal failure   • Metabolic encephalopathy           Rash 19 1200 other (see comments) posterior arm (Active)   Distribution localized 2019  8:00 AM   Configuration/Shape asymmetric 2019  8:00 AM   Borders diffuse;irregular 2019  8:00 AM   Characteristics dry;raised 2019  8:00 AM   Color red 2019  8:00 AM   Care, Rash open to air 2019  6:00 AM       Rash 19 Left upper arm macular (Active)   Distribution regional 2019  8:00 AM   Configuration/Shape asymmetric 2019  8:00 AM   Borders diffuse;irregular 2019  8:00 AM   Characteristics raised;dry 2019  8:00 AM   Color red 2019  8:00 AM   Care, Rash open to air 2019  6:00 AM       Rash 19 Left posterior  hand macular (Active)   Distribution localized 2/13/2019  8:00 AM   Configuration/Shape asymmetric 2/13/2019  8:00 AM   Borders diffuse;irregular 2/13/2019  8:00 AM   Characteristics dry;raised 2/13/2019  8:00 AM   Color red 2/13/2019  8:00 AM   Care, Rash open to air 2/13/2019  6:00 AM       Rash 02/13/19 0614 Right upper arm macular (Active)   Distribution regional 2/13/2019  8:00 AM   Configuration/Shape asymmetric 2/13/2019  8:00 AM   Borders diffuse;irregular 2/13/2019  8:00 AM   Characteristics dry;raised 2/13/2019  8:00 AM   Color red 2/13/2019  8:00 AM   Care, Rash open to air 2/13/2019  6:00 AM       Wound 02/08/19 1519 Other (See comments) neck incision (Active)   Dressing Appearance open to air 2/13/2019  8:00 AM   Closure Adhesive closure strips 2/13/2019  8:00 AM   Base clean;dry;pink 2/13/2019  8:00 AM   Periwound intact;dry;pink;blanchable 2/13/2019  8:00 AM   Periwound Skin Turgor soft 2/13/2019  8:00 AM   Drainage Amount none 2/13/2019  8:00 AM   Periwound Care, Wound dry periwound area maintained 2/13/2019  6:00 AM       Wound 02/08/19 1800 Right lower gluteal pressure injury (Active)   Dressing Appearance open to air 2/13/2019  9:45 AM   Base dry;purple;red/granulating;maroon/purple 2/13/2019  9:45 AM   Periwound dry;pink;redness;blanchable 2/13/2019  9:45 AM   Periwound Temperature warm 2/13/2019  9:45 AM   Periwound Skin Turgor soft 2/13/2019  9:45 AM   Drainage Amount none 2/13/2019  8:00 AM   Care, Wound irrigated with;sterile normal saline;ultrasound therapy, non contact low frequency 2/13/2019  9:45 AM   Dressing Care, Wound open to air 2/13/2019  6:00 AM   Periwound Care, Wound dry periwound area maintained 2/13/2019  4:00 AM       Wound 02/09/19 1116 Bilateral medial thigh (Active)   Dressing Appearance open to air 2/13/2019  9:45 AM   Closure None 2/13/2019  8:00 AM   Base maroon/purple;red/granulating 2/13/2019  9:45 AM   Periwound intact;dry;redness;blanchable 2/13/2019  9:45 AM    Periwound Temperature warm 2/13/2019  9:45 AM   Periwound Skin Turgor soft 2/13/2019  9:45 AM   Edges irregular 2/13/2019  9:45 AM   Drainage Amount none 2/13/2019  9:45 AM   Care, Wound irrigated with;sterile normal saline;ultrasound therapy, non contact low frequency 2/13/2019  9:45 AM   Dressing Care, Wound open to air 2/13/2019  6:00 AM   Periwound Care, Wound dry periwound area maintained 2/13/2019  4:00 AM         WOUND DEBRIDEMENT                  Therapy Treatment    Rehabilitation Treatment Summary     Row Name 02/13/19 0945             Treatment Time/Intention    Discipline  physical therapist  -      Document Type  therapy note (daily note);wound care  -      Subjective Information  complains of;weakness;fatigue;pain  -      Recorded by [] Efrem Tran, PT 02/13/19 1023      Row Name 02/13/19 0945             Positioning and Restraints    Pre-Treatment Position  in bed  -      Post Treatment Position  bed  -      In Bed  supine;call light within reach  -      Recorded by [] Efrem Tran, PT 02/13/19 1023      Row Name 02/13/19 0945             Pain Assessment    Additional Documentation  Pain Scale: FACES Pre/Post-Treatment (Group)  -      Recorded by [] Efrem Tran, PT 02/13/19 1023      Row Name 02/13/19 0945             Pain Scale: Numbers Pre/Post-Treatment    Pain Location  groin generalized c/o pain with rolling / repositioning  -      Pain Intervention(s)  Repositioned;Medication (See MAR)  -      Recorded by [] Efrem Tran, PT 02/13/19 1023      Row Name 02/13/19 0945             Pain Scale: FACES Pre/Post-Treatment    Pain: FACES Scale, Pretreatment  4-->hurts little more  -      Pain: FACES Scale, Post-Treatment  4-->hurts little more  -      Pre/Post Treatment Pain Comment  6/10 pain with rolling  -      Recorded by [] Efrem Tran, PT 02/13/19 1023      Row Name                Wound 02/08/19 1519 Other (See comments) neck  incision    Wound - Properties Group Date first assessed: 02/08/19 [SM] Time first assessed: 1519 [SM] Side: Other (See comments) [SM] Location: neck [SM] Type: incision [SM] Recorded by:  [SM] Rissa Rodriguez RN 02/08/19 1519    Row Name 02/13/19 0945             Wound 02/08/19 1800 Right lower gluteal pressure injury    Wound - Properties Group Date first assessed: 02/08/19 [AB] Time first assessed: 1800 [AB] Present On Admission : yes [AB] Side: Right [AB] Orientation: lower [AB] Location: gluteal [AB] Type: pressure injury [AB] Stage, Pressure Injury: deep tissue injury [AB] Additional Comments: bilateral gluteals [MR] Recorded by:  [AB] Addison Jasso RN 02/08/19 1855 [MR] Carmen Conner, RN 02/09/19 1227    Dressing Appearance  open to air  -MF      Base  dry;purple;red/granulating;maroon/purple  -MF      Periwound  dry;pink;redness;blanchable  -MF      Periwound Temperature  warm  -MF      Periwound Skin Turgor  soft  -MF      Care, Wound  irrigated with;sterile normal saline;ultrasound therapy, non contact low frequency 5 min mist  -MF      Recorded by [MF] Efrem Tran, PT 02/13/19 1023      Row Name 02/13/19 0945             Wound 02/09/19 1116 Bilateral medial thigh    Wound - Properties Group Date first assessed: 02/09/19 [MR] Time first assessed: 1116 [MR] Present On Admission : yes [MR] Side: Bilateral [MR] Orientation: medial [MR] Location: thigh [MR] Stage, Pressure Injury: deep tissue injury [MR] Recorded by:  [MR] Carmen Conner, RN 02/09/19 1228    Dressing Appearance  open to air  -MF      Base  maroon/purple;red/granulating  -MF      Periwound  intact;dry;redness;blanchable  -MF      Periwound Temperature  warm  -MF      Periwound Skin Turgor  soft  -MF      Edges  irregular  -MF      Drainage Amount  none  -MF      Care, Wound  irrigated with;sterile normal saline;ultrasound therapy, non contact low frequency mist x 5 min  -MF      Recorded by [MF] Efrem Tran, PT 02/13/19  1023      Row Name 02/13/19 0945             Coping    Observed Emotional State  accepting;calm;cooperative  -MF      Verbalized Emotional State  acceptance  -MF      Recorded by [] Efrem Tran, PT 02/13/19 1023      Row Name 02/13/19 0945             Plan of Care Review    Plan of Care Reviewed With  patient;family  -MF      Recorded by [] Efrem Tran, PT 02/13/19 1023        User Key  (r) = Recorded By, (t) = Taken By, (c) = Cosigned By    Initials Name Effective Dates Discipline     Efrem Tran, PT 06/19/15 -  PT    Rissa Bolden RN 06/16/16 -  Nurse    Carmen Nelson RN 06/16/16 -  Nurse    Addison Jaffe RN 02/02/17 -  Nurse          Physical Therapy Education     Title: PT OT SLP Therapies (In Progress)     Topic: Physical Therapy (In Progress)     Point: Mobility training (In Progress)     Learning Progress Summary           Patient Acceptance, E,D, NR by  at 2/12/2019  9:59 AM   Significant Other Acceptance, E,D, NR by  at 2/12/2019  9:59 AM                   Point: Body mechanics (In Progress)     Learning Progress Summary           Patient Acceptance, E,D, NR by  at 2/12/2019  9:59 AM   Significant Other Acceptance, E,D, NR by  at 2/12/2019  9:59 AM                   Point: Precautions (In Progress)     Learning Progress Summary           Patient Acceptance, E,D, NR by  at 2/12/2019  9:59 AM   Significant Other Acceptance, E,D, NR by  at 2/12/2019  9:59 AM                               User Key     Initials Effective Dates Name Provider Type Discipline     06/19/15 -  Ana Akers, PT Physical Therapist PT                  PT Recommendation and Plan  Anticipated Discharge Disposition (PT): skilled nursing facility  Planned Therapy Interventions (PT Eval): balance training, bed mobility training, gait training, home exercise program, stair training, strengthening, transfer training, patient/family education  Therapy Frequency (PT Clinical  Impression): daily               Outcome Summary: DTPI areas stable with no issues noted to this point. PT will cont with mist therapy to help improve skin integrity and increase healing potential.   Plan of Care Reviewed With: patient, family    Outcome Measures     Row Name 02/12/19 0959 02/12/19 0935          How much help from another person do you currently need...    Turning from your back to your side while in flat bed without using bedrails?  2  -LS  --     Moving from lying on back to sitting on the side of a flat bed without bedrails?  2  -LS  --     Moving to and from a bed to a chair (including a wheelchair)?  1  -LS  --     Standing up from a chair using your arms (e.g., wheelchair, bedside chair)?  1  -LS  --     Climbing 3-5 steps with a railing?  1  -LS  --     To walk in hospital room?  1  -LS  --     AM-PAC 6 Clicks Score  8  -LS  --        How much help from another is currently needed...    Putting on and taking off regular lower body clothing?  --  1  -CL     Bathing (including washing, rinsing, and drying)  --  1  -CL     Toileting (which includes using toilet bed pan or urinal)  --  1  -CL     Putting on and taking off regular upper body clothing  --  1  -CL     Taking care of personal grooming (such as brushing teeth)  --  2  -CL     Eating meals  --  2  -CL     Score  --  8  -CL        Functional Assessment    Outcome Measure Options  AM-PAC 6 Clicks Basic Mobility (PT)  -LS  AM-PAC 6 Clicks Daily Activity (OT)  -CL       User Key  (r) = Recorded By, (t) = Taken By, (c) = Cosigned By    Initials Name Provider Type    Ana Andersen, PT Physical Therapist    CL Cassie Reardon, OT Occupational Therapist              Time Calculation  PT Charges     Row Name 02/13/19 0945             Time Calculation    Start Time  0945  -MF      PT Goal Re-Cert Due Date  02/22/19  -        User Key  (r) = Recorded By, (t) = Taken By, (c) = Cosigned By    Initials Name Provider Type    MF Efrem Tran,  PT Physical Therapist         Therapy Suggested Charges     Code   Minutes Charges    60792 (CPT®) Hc Pt Neuromusc Re Education Ea 15 Min      14921 (CPT®) Hc Pt Ther Proc Ea 15 Min      87849 (CPT®) Hc Gait Training Ea 15 Min      93149 (CPT®) Hc Pt Therapeutic Act Ea 15 Min 10 1    62836 (CPT®) Hc Pt Manual Therapy Ea 15 Min      08577 (CPT®) Hc Pt Iontophoresis Ea 15 Min      34140 (CPT®) Hc Pt Elec Stim Ea-Per 15 Min      55938 (CPT®) Hc Pt Ultrasound Ea 15 Min      77346 (CPT®) Hc Pt Self Care/Mgmt/Train Ea 15 Min      89164 (CPT®) Hc Pt Prosthetic (S) Train Initial Encounter, Each 15 Min      67593 (CPT®) Hc Pt Orthotic(S)/Prosthetic(S) Encounter, Each 15 Min      91201 (CPT®) Hc Orthotic(S) Mgmt/Train Initial Encounter, Each 15min      Total  10 1          Therapy Charges for Today     Code Description Service Date Service Provider Modifiers Qty    33606682121 HC PT EVAL MOD COMPLEXITY 4 2/12/2019 Efrem Tran, PT GP 1    14505154682 HC PT NLFU MIST 2/12/2019 Efrem Tran, PT GP 1    44292966014 HC PT NLFU MIST 2/13/2019 Efrem Tran, PT GP 1            PT G-Codes  Outcome Measure Options: AM-PAC 6 Clicks Basic Mobility (PT)  AM-PAC 6 Clicks Score: 8  Score: 8        Efrem Tran, PT  2/13/2019

## 2019-02-13 NOTE — PROGRESS NOTES
INTENSIVIST   PROGRESS NOTE     Hospital:  LOS: 5 days      S     Mr. Rod Balderas, 68 y.o. male is followed for:      Sepsis with MODS    T2DM    As an Intensivist, we provide an integrated approach to the ICU patient and family, medical management of comorbid conditions, including but not limited to electrolytes, glycemic control, organ dysfunction, lead interdisciplinary rounds and coordinate the care with all other services, including those from other specialists.     Interval History:  About same, however right wrist worse, more tender and unable to use it  Otherwise, he feels OK.  Making some urine.  Still edematous.  Still on A fib.     The patient's relevant past medical, surgical and social history were reviewed and updated in Epic as appropriate.     ROS:   Constitutional: Negative for fever.   Respiratory: Negative for dyspnea.   Cardiovascular: Negative for chest pain.   Gastrointestinal: Negative for  nausea, vomiting and diarrhea.        O     Vitals:  Temp: 97.8 °F (36.6 °C) (02/13/19 1200) Temp  Min: 97.8 °F (36.6 °C)  Max: 98.6 °F (37 °C)   BP: 136/77 (02/13/19 1300) BP  Min: 89/66  Max: 171/85   Pulse: 111 (02/13/19 1300) Pulse  Min: 78  Max: 142   Resp: 20 (02/13/19 1300) Resp  Min: 14  Max: 21   SpO2: 98 % (02/13/19 1300) SpO2  Min: 93 %  Max: 100 %   Device: room air (02/13/19 1330)    Flow Rate: 1 (02/13/19 1200) Flow (L/min)  Min: 1  Max: 2     Intake/Ouptut 24 hrs (7:00AM - 6:59 AM)  Intake/Output       02/12/19 0700 - 02/13/19 0659 02/13/19 0700 - 02/14/19 0659    Intake (ml) 1543.9 97    Output (ml) 250 --    Net (ml) 1293.9 97         Physical Examination  Telemetry:  Rhythm: normal sinus rhythm (02/11/19 1400)  Atrial Rhythm: atrial fibrillation (02/13/19 1200)      Constitutional:  No acute distress.   Cardiovascular: IRR. Normal heart sounds.  No murmurs, gallop or rub.   Respiratory: No respiratory distress. Normal respiratory effort.  Rhonchi.    Abdominal:  Soft. No masses.  Non-tender. No distension. No HSM.   Extremities: No digital cyanosis. No clubbing.  (+) edema.   Neurological:   Awake. Follows commands.  Best Eye Response: 4-->(E4) spontaneous (02/13/19 1200)  Best Motor Response: 6-->(M6) obeys commands (02/13/19 1200)  Best Verbal Response: 5-->(V5) oriented (02/13/19 1200)  Tom Coma Scale Score: 15 (02/13/19 1200)   Lines/Drains/Airways: RIJ Tunneled Dyalisis Catheter  Christina       Hematology:  Results from last 7 days   Lab Units 02/13/19  1220 02/13/19  0534 02/12/19  0603   WBC 10*3/mm3 20.36* 18.94* 15.89*   HEMOGLOBIN g/dL 10.5* 11.6* 11.7*   MCV fL 80.4 78.9* 82.6   PLATELETS 10*3/mm3 170 168 132*       Chemistry:  Estimated Creatinine Clearance: 63.6 mL/min (A) (by C-G formula based on SCr of 1.38 mg/dL (H)).    Results from last 7 days   Lab Units 02/13/19  0534 02/12/19  0526 02/11/19  0637   SODIUM mmol/L 136 133 132   POTASSIUM mmol/L 3.6 3.4* 3.6   CHLORIDE mmol/L 103 99 100   CO2 mmol/L 23.0 23.0 18.0*   ANION GAP mmol/L 10.0 11.0 14.0*   GLUCOSE mg/dL 149* 186* 151*   CALCIUM mg/dL 7.5* 8.1* 8.2*     Results from last 7 days   Lab Units 02/13/19  0534 02/12/19  0526 02/11/19  0637   BUN mg/dL 73* 60* 81*   CREATININE mg/dL 1.38* 1.74* 3.05*     Results from last 7 days   Lab Units 02/13/19  0534 02/12/19  0526 02/11/19  0637  02/09/19  0510   MAGNESIUM mg/dL  --   --  2.2  --  2.3   PHOSPHORUS mg/dL 4.7 5.2* 6.0*   < > 6.4*    < > = values in this interval not displayed.       Hepatic:  Results from last 7 days   Lab Units 02/13/19  0534 02/12/19  0526 02/11/19  0637 02/10/19  0340 02/09/19  0510   ALK PHOS U/L  --   --  348* 300* 319*   BILIRUBIN mg/dL  --   --  2.6* 2.6* 2.9*   ALT (SGPT) U/L  --   --  17 23 33   AST (SGOT) U/L  --   --  67* 46* 46*   ALBUMIN g/dL 2.57* 2.83* 2.89* 3.05* 2.96*       Lab Results   Lab Value Date/Time    BLOODCX No growth at 5 days 02/08/2019 1240       Lab Results   Lab Value Date/Time    URINECX >100,000 CFU/mL  Staphylococcus aureus (A) 02/08/2019 1826       Lab Results   Lab Value Date/Time    RESPCX Scant growth (1+) Yeast isolated (A) 02/09/2019 1431         Images:  Xr Wrist 3+ View Right    Result Date: 2/13/2019  1. The distal ulnar diaphysis fracture is probably chronic and is healed in near-anatomic alignment. 2. Age-indeterminate fracture of the base of the fifth ray metacarpal. 3. Soft tissue swelling.  D:  02/13/2019 E:  02/13/2019  This report was finalized on 2/13/2019 11:23 AM by Richard Handy.      Xr Chest 1 View    Result Date: 2/13/2019  Mild increase in pulmonary vascularity centrally from prior comparison without significant effusion or focal consolidation.  D:  02/13/2019 E:  02/13/2019  This report was finalized on 2/13/2019 12:35 PM by Dr. Caden Subramanian.        Echo:  Results for orders placed during the hospital encounter of 02/08/19   Adult Transthoracic Echo Limited W/ Cont if Necessary Per Protocol    Narrative · Estimated EF = 70%.  · Left ventricular wall thickness is consistent with mild concentric   hypertrophy.  · Incidental atrial fibrillation noted.  · Poor valvular echoes , technically limited.          Results: Reviewed.  I reviewed the patient's new laboratory and imaging results.  I independently reviewed the patient's new images.    Medications: Reviewed.    Assessment/Plan   A / P     68 y.o.male, admitted on 2/8/2019 with Acute renal failure (ARF) (CMS/[facility]) [N17.9]: Transferred from [facility] where he was admitted on 2/5/19    1. Sepsis with MODS  1. PCT decreasing since admission.    Lab Results   Lab Value Date/Time    PROCALCITO 4.77 (H) 02/10/2019 0340    PROCALCITO 7.55 (H) 02/08/2019 1240       2. BC positive and Urine Cx positive for MSSA at [facility] (2/5/2019)  3. Urine Cx ([facility] 2/8/19): Staph   4. Renal failure  1. NOE - now on Hemodialysis.  1. Cr went from 2.2 (on 2/6) to 5.6 (on 2/8)  2. R/o Medications: NSAIDs and/or ACEI.  3. S/p Robotic Laparoscopic  Prostatectomy 1/15/19 (due to Prostate cancer)  4. Hematuria on admission to [facility]  5. Encephalopathy  1. Improved  2. Most likely Toxic metabolic  6. Respiratory failure  1. TANIYA on BiPAP at home  2. Bibasilar consolidation as per CT Scan (2/8/19)  7. Leukocytosis.  8. Liver  1. Elevated ALKP, Bilirubin and mild elevation of transaminases  2. Purpuric type rash upper extremities  1. Only mild Thrombocytopenia  3. CAD s/p stents 2012  4. P AFib, brief episode on 2/9/209, requiring Diltiazem and converted to NSR  5. Dyslipidemia  6. Obesity III. Body mass index is 40.24 kg/m².   7. Antibiotics: AZT, Cefazolin, Metronidazole} Per ID  8. PMH: HTN  9. PMH: Spinal stenosis with neurogenic claudication  10. T2DM    Results from last 7 days   Lab Units 02/13/19  1136 02/13/19  0702 02/12/19  2030 02/12/19  1645 02/12/19  1121 02/12/19  0704   GLUCOSE mg/dL 186* 138* 162* 191* 217* 190*     Lab Results   Lab Value Date/Time    HGBA1C 5.70 (H) 01/08/2019 1524       Nutrition Support: Patient isn't on Tube Feeding   Modulars: Patient doesn't have any tube feeding modular orders   Diet: Diet Regular; Renal, Daily Fluid Restriction, Consistent Carbohydrate; 1500 mL Fluid  NPO Diet   Advance Directives: Code Status and Medical Interventions:   Ordered at: 02/08/19 1208     Code Status:    CPR     Medical Interventions (Level of Support Prior to Arrest):    Full          Assessment / Plan:    1. Cardiology consult re: A Fib.  2. Ortho consult as per ID.  3. No dialysis today. Re-assess tomorrow as per Renal.  4. PCT in AM.  5. Disposition: Keep in ICU.       Plan of care and goals reviewed during interdisciplinary rounds.  Level of Risk is High due to:  illness with threat to life or bodily function.   I discussed the patient's findings and my recommendations with patient    Jamal Villar MD, FACP, FCCP, CNSC  Intensive Care Medicine, Nutrition Support and Pulmonary Medicine

## 2019-02-13 NOTE — PROGRESS NOTES
"                  Clinical Nutrition     Nutrition Assessment  Reason for Visit:   MDR, Follow-up protocol    Patient Name: Rod Balderas  YOB: 1950  MRN: [id number]  Date of Encounter: 02/13/19 9:17 AM  Admission date: 2/8/2019     Nutrition Assessment   Admission Diagnosis   Renal failure    Other Applicable diagnosis/problems   Acute severe sepsis/septic shock with multiorgan system insufficiency  Acute MSSA septicemia/bacteremia and bacteriuria  Metabolic encephalopathy- improving  H.D. Started 2/9  Severe extremity weakness     PMH/PSxH    Spinal stenosis, lumbar region, with neurogenic claudication    Morbid obesity due to excess calories (CMS/HCC)    TANIYA on CPAP    Dyslipidemia    DM (diabetes mellitus), type 2 (CMS/HCC)    HTN     CAD    Anxiety and depression     Panic attack     Prostat CA      Robotic Prostatectomy with nodes  cholecy     Reported/Observed/Food/Nutrition Related History:   Awaiting nephrologist decision if plan for HD today.  Continue to have significant edema.  Eating about 50% of meal. Sodium levels improved, continue with order for 1500ml fluid restriction. Plan for card consult for A-fib     Patient sleeping at time of visit, wife at bedside and reports not much PO intake yesterday, improved this morning.  Menu options reviewed with wife.  Did not like Boost G.C. yesterday, wife to attempt again today with lunch meal.   Anthropometrics     Height: 170.2 cm (67.01\")  Last filed wt: Weight: 117 kg (257 lb) (02/09/19 1146)  Weight Method: Bed scale    BMI: BMI (Calculated): 40.2  Obese Class III extreme obesity: > or equal to 40kg/m2    Labs reviewed     Results from last 7 days   Lab Units 02/13/19  0534 02/11/19  0637   SODIUM mmol/L 136 132   POTASSIUM mmol/L 3.6 3.6   CHLORIDE mmol/L 103 100   CO2 mmol/L 23.0 18.0*   BUN mg/dL 73* 81*   CREATININE mg/dL 1.38* 3.05*   CALCIUM mg/dL 7.5* 8.2*   BILIRUBIN mg/dL  --  2.6*   ALK PHOS U/L  --  348*   ALT (SGPT) U/L  --  17 "   AST (SGOT) U/L  --  67*   GLUCOSE mg/dL 149* 151*    < > = values in this interval not displayed.     Medications reviewed   Pertinent:abx, insulin, neurontin    SKIN     SKIN: B. gluteal DTI's, B. thighs DTI's    Current Nutrition Prescription     PO: Diet Regular; Renal, Daily Fluid Restriction, Consistent Carbohydrate; 1500 mL Fluid  Supplement: Boost G.C. Daily     Intake:  isuf data     Nutrition Diagnosis   2/11/2019  Problem Increased nutrient needs   Etiology Poor Skin Integrity   Signs/Symptoms B. gluteal DTI's,  B.thigh DTI's     2/13/2019  Problem Inadquate oral intake    Etiology Clinical condition; diet order    Signs/Symptoms Acute encephalopathy on admission; clear liquid diet started 2/11- diet advanced 2/12     Nutrition Intervention     Interventions Goal    General: Nutrition support treatment   Consume >67% of meals during hospital stay    Nutrition Interventions   1.  Follow treatment progress, Interview for preferences, Encourage intake, Supplement provided    Monitor/ Evaluation    I&O, PO intake, Pertinent labs, Weight, Skin status, GI status, Symptoms      Will Continue to follow per protocol      Shelley Okeefe RD  Time Spent: 30min

## 2019-02-13 NOTE — PROGRESS NOTES
[facility] Progress Note          Antibiotics:  Anti-Infectives (From admission, onward)    Ordered     Dose/Rate Route Frequency Start Stop    02/09/19 1416  aztreonam (AZACTAM) 500 mg in sodium chloride 0.9 % 50 mL IVPB     Ordering Provider:  Efrem Santana MD    500 mg  over 30 Minutes Intravenous Every 12 Hours 02/09/19 2200 02/16/19 2159    02/09/19 0849  metroNIDAZOLE (FLAGYL) IVPB 500 mg     Ordering Provider:  Efrem Santana MD    500 mg  100 mL/hr over 60 Minutes Intravenous Every 8 Hours 02/09/19 1030 02/16/19 1029    02/09/19 0849  aztreonam (AZACTAM) 2 g in sodium chloride 0.9 % 100 mL IVPB-MBP     Ordering Provider:  Efrem Santana MD    2 g  200 mL/hr over 30 Minutes Intravenous Once 02/09/19 1000 02/09/19 0946    02/08/19 1949  ceFAZolin (ANCEF) IVPB 1 g     Ordering Provider:  Juana Ivy [facility]    1 g Intravenous Every 24 Hours 02/08/19 2045 02/22/19 2044          CC: Patient unable    HPI:    Consult by Dr. Jeff Smith; covering for him :      Patient is a 68 y.o.  Yr old male with history of prostate cancer status post robotic laparoscopic prostatectomy in mid January and was discharged on 1/17/19. The patient is currently unresponsive and no family is at bedside so history is somewhat limited to history from nursing staff and medical records. The patient was seen a few days after his discharge from his recent  Admission in January and his Christina catheter was discontinued.  About one week ago he went to the ER with complaints of back pain, abdominal pain, and confusion and a CT abdomen and pelvis was apparently okay during that time. He continued to worsen and over the weekend he was admitted to [facility].  He was found to have an initial AK I with a creatinine of 2.2. Per the micro-lab at [facility], the patient's urine cultures and 1 of 2 blood cultures from 2/5/19 grew MSSA. The patient was initially on vancomycin and Zosyn and was subsequently transitioned to  daptomycin after he developed worsening renal function with a creatinine up to 5.6 and BUN went up to 94 today.  Due to lack of nephrology at [facility], the patient was transferred to [facility] today for possible dialysis.  He additionally received a dose of nafcillin prior to this transfer.  His last dose of daptomycin was yesterday per pharmacist here (who has discussed with OSH). Since arrival, the patient has remained afebrile but he has had a leukocytosis up to 31.38.  He is anemic with a hemoglobin of 11.3 and a hematocrit of 54.1.  He is hyponatremic to a sodium of 127.  Creatinine was initially elevated 5.39 and B1 was 97 on arrival.  He additionally had elevation of his LFTs with an ALT of 43 and AST of 43, T bili of 3.0, and alkaline phosphatase of 303.  Pro-calcitonin was elevated to 7.55 lactic acid was normal at 0.8. The patient underwent CT head without contrast which did not show any acute abnormality, CT chest, abdomen and pelvis which showed consolidation of the lung bases bilaterally that is concerning for airspace disease such as pneumonia, gastric distention, and air-filled loops of colon with no obvious obstruction. Blood cultures have been ordered and a reflexive urinalysis has been ordered as well.  A transthoracic echocardiogram was ordered. The patient underwent dialysis today following right IJ dialysis catheter placement.  The infectious disease team has been consulted for antibiotic recommendations.    He has methicillin sensitive staph aureus septicemia/bacteriuria compounded by acute renal failure, acute hypoxic respiratory failure and by basilar consolidation by chest CT.  Further compounded by acute encephalopathy.    2/9/19 nursing reports A. fib/RVR, dialysis ongoing with acute renal failure and tachypneic/labored at times.  Some cough but unable to capture for culture so far. Empiric HCAP coverage added with zyvox/azactam/flagyl; unable to do MRI of  "spine so CT's done cervical/thoracic/lumbar spine done 2/10    2/10/19 CT scans of spine; more restful, less labored with breathing and decreased cough;  No rash.  He can wiggle toes but not lift legs off the bed.  He moves left arm better than right arm.  No other new focal pain that he will relate although interaction minimal.  No diarrhea.  No vomiting.    2/11/19: seems to have improved from mental status standpoint slightly per his wife and son who are at bedside. Patient endorses abdominal pain but is not answering a lot of questions. tMAX 99.3f. WBC remains elevated at 20K (down from 31K on arrival). TTE was without evidence of IE but valves poorly visualized. Still getting HD.    2/12/19: Patient is starting to feel much better today.  He is responding to a lot of questions and is more alert sitting up in a bedside chair.  Breathing is improving.  No high fevers overnight.  Leukocytosis improved to 15.9 today.  He continues to urinate a small amount but remains on intermittent dialysis.    2/13/19: Feeling about the same as he was yesterday.  Still having some right wrist pain. Shortness of breath is stable. No fevers. Leukocytosis slightly worse today.      ROS:    Breathing has improved. No severe abdominal pain. +right wrist pain. No diarrhea or nausea      PE:   /88 (BP Location: Left arm, Patient Position: Lying)   Pulse 117   Temp 98.6 °F (37 °C) (Axillary)   Resp 18   Ht 170.2 cm (67.01\")   Wt 117 kg (257 lb)   SpO2 96%   BMI 40.24 kg/m²     GENERAL: ill appearing, obese,  man. NAD. Sitting up in bedside chair  HEENT: Normocephalic, atraumatic. No conjunctival injection. No icterus.   NECK: Supple without nuchal rigidity. No mass.  Chest: right chest dialysis cath site is without erythema or induration. No chest wall deformities  HEART: irregular rhythm. No murmur, rubs, gallops. LUNGS: Diminished at bases, labored and bilateral rhonchi. On nasal cannula  ABDOMEN: Soft, " nontender, nondistended. Positive bowel sounds. No rebound or guarding. NO mass or HSM.  EXT:  No cyanosis, clubbing. No cord.  Edema noted over bilateral lower extremities. R>L upper extremity edema with erythema, warmth, and tenderness near right wrist. Tenderness to flexion and extension of right wrist.  : Genitalia generally unremarkable.  With Christina catheter.  SKIN: warm.  Does have petechia appearing areas over upper extremities ?janeway lesions  NEURO: AAOX4. Normal speech    IV with no obvious redness or drainage    Laboratory Data    Results from last 7 days   Lab Units 02/13/19  0534 02/12/19  0603 02/11/19  1252   WBC 10*3/mm3 18.94* 15.89* 20.21*   HEMOGLOBIN g/dL 11.6* 11.7* 11.0*   HEMATOCRIT % 34.8* 36.0* 32.9*   PLATELETS 10*3/mm3 168 132* 137*     Results from last 7 days   Lab Units 02/13/19  0534   SODIUM mmol/L 136   POTASSIUM mmol/L 3.6   CHLORIDE mmol/L 103   CO2 mmol/L 23.0   BUN mg/dL 73*   CREATININE mg/dL 1.38*   GLUCOSE mg/dL 149*   CALCIUM mg/dL 7.5*     Results from last 7 days   Lab Units 02/11/19  0637   ALK PHOS U/L 348*   BILIRUBIN mg/dL 2.6*   ALT (SGPT) U/L 17   AST (SGOT) U/L 67*         Results from last 7 days   Lab Units 02/11/19  0637   CRP mg/dL 19.67*       Estimated Creatinine Clearance: 63.6 mL/min (A) (by C-G formula based on SCr of 1.38 mg/dL (H)).      Microbiology:      Radiology:  Imaging Results (last 72 hours)     Procedure Component Value Units Date/Time    XR Chest 1 View [[id number]] Updated:  02/09/19 0221    CT Abdomen Pelvis Without Contrast [[id number]] Collected:  02/08/19 1822     Updated:  02/08/19 1822    Narrative:       EXAMINATION: CT CHEST WO CONTRAST, CT ABDOMEN/PELVIS WO CONTRAST -  2/8/2019     INDICATION: Persistent cough.     TECHNIQUE: Multiple axial CT imaging is obtained of the chest, abdomen  and pelvis without the administration of oral or intravenous contrast.     The radiation dose reduction device was turned on for each scan per  the  ALARA (As Low as Reasonably Achievable) protocol.     COMPARISON: There is some consolidation identified posteriorly extending  to the lung bases bilaterally suggesting infiltrates. The upper lung  fields are grossly clear. Motion artifact degrades image quality. There  are degenerative changes identified within the spine. The cardiac  chambers are enlarged. There is no pericardial effusion. No bulky hilar  or axillary lymphadenopathy. The thyroid is homogeneous. Degenerative  change is seen throughout the spine.     ABDOMEN: Motion artifact grades image quality. There is no abnormality  seen within the liver. The spleen is upper limits of normal in size.  There is gastric distention. Air fills the colon with no  evidence of  obvious obstruction. Kidneys and adrenal glands within normal limits.  The pancreas is homogeneous. No abdominal or retroperitoneal  lymphadenopathy. No abnormal mass or fluid collections identified. No  free fluid or free air.     PELVIS: Pelvic organs are unremarkable. The pelvic portion of the   gastrointestinal tract is within normal limits. No pelvic adenopathy.  Degenerative change is identified throughout the spine and pelvis.     FINDINGS: Consolidation at the lung bases bilaterally is concerning for  airspace disease such as pneumonia. The there is gastric distention.  Air-filled loops of colon with no obvious obstruction. No other abnormal  findings are seen within the abdomen or pelvis. Degenerative changes are  seen throughout the spine and pelvis.     DICTATED:   2/8/2019  EDITED/ls :   2/8/2019              Impression:               CT Chest Without Contrast [[id number]] Collected:  02/08/19 1822     Updated:  02/08/19 1822    Narrative:       EXAMINATION: CT CHEST WO CONTRAST, CT ABDOMEN/PELVIS WO CONTRAST -  2/8/2019     INDICATION: Persistent cough.     TECHNIQUE: Multiple axial CT imaging is obtained of the chest, abdomen  and pelvis without the administration of oral or  intravenous contrast.     The radiation dose reduction device was turned on for each scan per the  ALARA (As Low as Reasonably Achievable) protocol.     COMPARISON: There is some consolidation identified posteriorly extending  to the lung bases bilaterally suggesting infiltrates. The upper lung  fields are grossly clear. Motion artifact degrades image quality. There  are degenerative changes identified within the spine. The cardiac  chambers are enlarged. There is no pericardial effusion. No bulky hilar  or axillary lymphadenopathy. The thyroid is homogeneous. Degenerative  change is seen throughout the spine.     ABDOMEN: Motion artifact grades image quality. There is no abnormality  seen within the liver. The spleen is upper limits of normal in size.  There is gastric distention. Air fills the colon with no  evidence of  obvious obstruction. Kidneys and adrenal glands within normal limits.  The pancreas is homogeneous. No abdominal or retroperitoneal  lymphadenopathy. No abnormal mass or fluid collections identified. No  free fluid or free air.     PELVIS: Pelvic organs are unremarkable. The pelvic portion of the   gastrointestinal tract is within normal limits. No pelvic adenopathy.  Degenerative change is identified throughout the spine and pelvis.     FINDINGS: Consolidation at the lung bases bilaterally is concerning for  airspace disease such as pneumonia. The there is gastric distention.  Air-filled loops of colon with no obvious obstruction. No other abnormal  findings are seen within the abdomen or pelvis. Degenerative changes are  seen throughout the spine and pelvis.     DICTATED:   2/8/2019  EDITED/ls :   2/8/2019              Impression:               CT Head Without Contrast [[id number]] Collected:  02/08/19 1816     Updated:  02/08/19 1817    Narrative:       EXAMINATION: CT HEAD WO CONTRAST - 2/8/2019     INDICATION: Mental status change, does not follow commands.     TECHNIQUE: Multiple axial CT  imaging is obtained of the head from skull  base to skull vertex without the administration of intravenous contrast.     The radiation dose reduction device was turned on for each scan per the  ALARA (As Low as Reasonably Achievable) protocol.     COMPARISON: NONE     FINDINGS: There is atrophy of the brain. Some low-density area seen in  the periventricular white matter suggesting chronic small vessel  ischemic change. No hemorrhage or hydrocephalus. No mass, mass effect,  or midline shift. No abnormal extra-axial  fluid collections identified. Minimal mucosal thickening of the  maxillary sinuses and ethmoid air cells. The bony structures are  unremarkable. The mastoid air cells are patent.       Impression:       Chronic changes identified within the brain with no acute  intracranial abnormality.     DICTATED:   2/8/2019  EDITED/ls :   2/8/2019        FL C Arm During Surgery [[id number]] Collected:  02/08/19 1646     Updated:  02/08/19 1646    Narrative:       EXAMINATION: FL C ARM DURING SURGERY- 02/08/2019      INDICATION: Dialysis catheter placement     COMPARISON: NONE     FINDINGS: 4 seconds of fluoroscopy and 4 images used for right-sided  dialysis catheter placement. Please see the procedure report for full  details.       Impression:       Fluoroscopy for dialysis catheter placement. Please see the  procedure report for full details.      D:  02/08/2019  E:  02/08/2019       XR Chest 1 View [[id number]] Collected:  02/08/19 1628     Updated:  02/08/19 1628    Narrative:       EXAMINATION: XR CHEST 1 VW-02/08/2019:      INDICATION: RIJ line.      COMPARISON: 02/08/2019.     FINDINGS: Portable chest reveals low lung volumes. Deep line catheter  identified on the right with tip in the SVC. Degenerative changes seen  within the spine. The heart is borderline enlarged. Mild prominence seen  of the pulmonary vascularity. No pleural effusion or pneumothorax.           Impression:       Prominence of the pulmonary  vascularity with deep line  catheter identified on the right with tip in the SVC. No evidence of  acute parenchymal disease.     D:  02/08/2019  E:  02/08/2019             XR Chest 1 View [[id number]] Collected:  02/08/19 1243     Updated:  02/08/19 1246    Narrative:          EXAMINATION: XR CHEST 1 VW-      INDICATION: respiratory failure      COMPARISON: 01/08/2019     FINDINGS: Heart is borderline enlarged. The vasculature is cephalized.  Lung volumes are relatively low. There is mild discoid atelectasis in  both medial lung bases, but no focal disease elsewhere..           Impression:       Low lung volumes, borderline cardiomegaly and mild pulmonary  vascular congestion. Mild bibasilar discoid atelectasis, new from prior  study.     This report was finalized on 2/8/2019 12:44 PM by DR. Antione Damon MD.               Impression:     --Acute severe sepsis/septic shock with multiorgan system insufficiency.  Methicillin sensitive staph aureus in blood/urine cultures and bilateral pulmonary infiltrates with potential bilateral pneumonia, HCAP/Aspiration -v- all MSSA;sputum culture pending and in the meantime maintain broader coverage for potential nosocomial pathogens until further information given critical/tenuous status and high risk for further serious morbidity and other serious sequela/mortality. Leukocytosis has overall improved but did slightly worsen today. Unclear etiology ?onging infection in right wrist.    --Acute MSSA septicemia/bacteremia and bacteriuria.  Transthoracic echocardiogram with poor visualization of heart valves but no noted vegetation.  No peripheral stigmata of endocarditis but certainly at risk.  Should obtain KLAUDIA.    --Acute hypoxic respiratory failure.  Potentially multifactorial, infection versus edema versus ARDS versus combination of these.  Bilateral consolidations with possible evolving pneumonia, HCAP/aspiration -v- MSSA; collected sputum, monitor and consider de-escalation  depending on culture data and clinical course. Improved    --Acute renal failure.  Ongoing dialysis. Still urinating    --Severe extremity weakness with presentation that include back pain: unclear if spinal/paraspinal infection versus other.  Nursing/radiology/Dr. Olvera to  discussed potential for MRI if able.  Some question of foreign body at head that may limit ability to do MRI.  Unable to obtain MRI and CT spine without abscess/osteo    --Abnormal liver function tests.  Monitor    --Acute atrial fibrillation/RVR    --Prostate cancer with history recent prostatectomy.  Urology following    --Acute encephalopathy.  Presumed toxic/metabolic present.  Further neurologic workup at discretion of critical care team    ---right wrist swelling- some concern that the patient could be developing septic arthritis of his left wrist      PLAN:    --IV cefazolin.  Will likely need a 6 week course given severity of MSSA infection    --continue Azactam and Flagyl for now. May continue for 7 day course.     --KLAUDIA ordered    --orthopedic surgery consultation for right wrist    --Check/review labs cultures and scans    --Spinal imaging noted;  Unable to do MRI and therefore CT scans done;  No description of abscess by radiology      Critically ill with high risk for further serious morbidity and other serious sequela/mortality. I discussed with his wife again today.          Jeff Smith MD  2/13/2019

## 2019-02-13 NOTE — CONSULTS
[facility]        Date of Hospital Visit: 19      Place of Service: [facility]    Patient Name: Rod Balderas  :1950    Referral Provider: Intensivist  Primary Care Provider: Tyrese Leyva MD    Chief complaint/Reason for Consultation:  atrial fibrillation         Problem List:  Patient Active Problem List    Diagnosis Date Noted   • *Renal failure 2019   • Paroxysmal atrial fibrillation (CMS/HCC) 2019     Priority: High     Note Last Updated: 2019     1. Echo 19  · Estimated EF = 70%.  · Left ventricular wall thickness is consistent with mild concentric hypertrophy.     • Sepsis (CMS/HCC) 2019     Priority: High   • CAD (coronary artery disease) 2016     Priority: High     Note Last Updated: 2019     1. 2012: A 3.5 x 88 Promus VERONICA stent to OM2. Nonobstructive disease. Otherwise normal LVEF.           • Dyslipidemia      Priority: Medium     Note Last Updated: 2019            • Essential hypertension 2016     Priority: Medium   • DM (diabetes mellitus), type 2 (CMS/HCC) 01/15/2019     Priority: Low   • Morbid obesity due to excess calories (CMS/HCC) 2016     Priority: Low     Note Last Updated: 2016     1. Morbid obesity, 5 feet 9 inches, 350 pounds, precedent 80-pound weight loss with diet and exercise in the 80s.       • TANIYA on CPAP 2016     Priority: Low     Note Last Updated: 2016     2. Obstructive sleep apnea on CPAP as of .         • Metabolic encephalopathy 2019   • Leukocytosis, mild, likely reactive 2019   • Acute postoperative pain 2019   • Acute blood loss anemia, mild, asymptomatic 2019   • Prostate cancer , s/p prostatectomy ( RALP) 01/15/2019   • At high risk for falls 12/15/2016   • Osteoarthritis      Note Last Updated: 2016     Diffuse osteoarthritis.     • Spinal stenosis, lumbar region, with neurogenic  claudication 07/28/2016   • Neuroforaminal stenosis of spine 07/28/2016   • Lumbar facet arthropathy 07/28/2016   • Physical deconditioning 07/28/2016   • Displacement of lumbar intervertebral disc 07/28/2016   • Degenerative disc disease, lumbar 06/09/2016               History of Present Illness:  This is a 68-year-old hypertensive dyslipidemic morbidly obese diabetic male with known coronary artery disease.  Last month he underwent robotic prostate surgery.  Last [facility] he [facility] hospital with a complaint of back pain abdominal pain and confusion.  He was found to have acute kidney injury and was transferred to [facility] for higher level of care.  His renal function continued to decline requiring 2 sessions of hemodialysis.  After his first dialysis he had an episode of atrial fibrillation which apparently self terminated.  After his second dialysis he again had atrial fibrillation with rapid ventricular response which has persisted in spite of treatment with IV Cardizem.  At present he is a moderately poor historian.  His condition has declined due to sepsis/septic shock.  He has been evaluated by the infectious disease service who are requesting transesophageal echocardiogram.  He presently awakes when spoken to but does not always answer questions.  He denies awareness of his rate and rhythm.  He is artery had a surface echocardiogram which is unremarkable.                Past Medical History:   Diagnosis Date   • Anxiety and depression    • Cancer (CMS/HCC)    • Chest pain    • Coronary artery disease    • Diabetes mellitus (CMS/HCC)    • Dyslipidemia    • Extremity pain    • Heart disease    • History of transfusion    • Hypertension    • Low back pain    • TANIYA on CPAP     2-3    • Osteoarthritis     Diffuse osteoarthritis.   • Panic attacks    • Prostate cancer (CMS/HCC)        Past Surgical History:   Procedure Laterality Date   • CARDIAC CATHETERIZATION     •  CHOLECYSTECTOMY     • COLONOSCOPY  2015   • CORONARY ANGIOPLASTY WITH STENT PLACEMENT  12/2012 December 2012:  A 3.5 x 88 Promus VERONICA stent to OM2.  Nonobstructive disease.  Otherwise normal LVEF.   • EYE SURGERY Bilateral     cataract   • INSERTION HEMODIALYSIS CATHETER N/A 2/8/2019    Procedure: HEMODIALYSIS CATHETER INSERTION RIGHT INTERNAL JUGULAR;  Surgeon: Bishnu Daliey MD;  Location:  [facility];  Service: General   • PROSTATECTOMY N/A 1/15/2019    Procedure: ROBOTIC PROSTATECTOMY WITH NODES;  Surgeon: Juanito Grace Jr., MD;  Location:  [facility];  Service: DaVinci   • RETINAL DETACHMENT SURGERY Right        Allergies   Allergen Reactions   • Percocet [Oxycodone-Acetaminophen] Anxiety   • Sulfa Antibiotics Hives       Medications Prior to Admission   Medication Sig Dispense Refill Last Dose   • amLODIPine (NORVASC) 5 MG tablet Take 1 tablet by mouth Daily. 90 tablet 2 1/14/2019   • aspirin 81 MG chewable tablet Chew 81 mg Daily.      • DULoxetine (CYMBALTA) 60 MG capsule Take 60 mg by mouth Daily.   1/14/2019   • fluticasone (FLONASE) 50 MCG/ACT nasal spray USE 2 SPRAYS IN EACH NOSTRIL DAILY PRN  0 More than a month   • gabapentin (NEURONTIN) 600 MG tablet Take 1 tablet by mouth 4 (Four) Times a Day. 120 tablet 5 1/14/2019   • HYDROcodone-acetaminophen (NORCO) 7.5-325 MG per tablet Take 1 tablet by mouth Every 4-6  Hours As Needed for Moderate Pain. 40 tablet 0    • ibuprofen (ADVIL,MOTRIN) 800 MG tablet Take 1 tablet by mouth 3 (Three) Times a Day As Needed for Moderate Pain .      • lisinopril-hydrochlorothiazide (PRINZIDE,ZESTORETIC) 20-12.5 MG per tablet Take 1 tablet by mouth Daily. 90 tablet 2 1/14/2019   • metFORMIN (GLUCOPHAGE) 500 MG tablet Take 500 mg by mouth 2 (Two) Times a Day With Meals.   1/14/2019         Current Facility-Administered Medications:   •  albuterol (PROVENTIL) nebulizer solution 0.083% 2.5 mg/3mL, 2.5 mg, Nebulization, Q6H PRN, Margoth Martinez APRN, 2.5 mg  at 02/08/19 1838  •  amLODIPine (NORVASC) tablet 5 mg, 5 mg, Oral, Q24H, Jamal Villar MD, 5 mg at 02/12/19 1038  •  aztreonam (AZACTAM) 500 mg in sodium chloride 0.9 % 50 mL IVPB, 500 mg, Intravenous, Q12H, Efrem Santana MD, 500 mg at 02/12/19 2127  •  bumetanide (BUMEX) injection 2.5 mg, 2.5 mg, Intravenous, Once, Jr Banks MD  •  castor oil-balsam peru (VENELEX) ointment, , Topical, Q12H, Arielle Carolina MD  •  ceFAZolin (ANCEF) IVPB 1 g, 1 g, Intravenous, Q24H, Juana Ivy, [facility], 1 g at 02/1950  •  diltiaZEM (CARDIZEM) 125 mg in sodium chloride 0.9 % 125 mL (1 mg/mL) infusion, 5-15 mg/hr, Intravenous, Titrated, Jamal Villar MD, Last Rate: 5 mL/hr at 02/13/19 0406, 5 mg/hr at 02/13/19 0406  •  DULoxetine (CYMBALTA) DR capsule 30 mg, 30 mg, Oral, Daily, Pollo Millan, APRN  •  fentaNYL citrate (PF) (SUBLIMAZE) injection 25 mcg, 25 mcg, Intravenous, Q30 Min PRN, Juana Rodriguez, APRN, 25 mcg at 02/13/19 0945  •  ferric gluconate (FERRLECIT) 125 mg in sodium chloride 0.9 % 110 mL IVPB, 125 mg, Intravenous, Daily, Jr Banks MD, Last Rate: 55 mL/hr at 02/12/19 0827, 125 mg at 02/12/19 0827  •  gabapentin (NEURONTIN) capsule 300 mg, 300 mg, Oral, Q12H, Pollo Millan, APRN  •  heparin (porcine) injection 1,900 Units, 1,900 Units, Intracatheter, PRN, Jr Banks MD, 1,900 Units at 02/09/19 0609  •  heparin (porcine) injection 1,900 Units, 1,900 Units, Intracatheter, PRN, Jr Banks MD  •  HYDROcodone-acetaminophen (NORCO) 5-325 MG per tablet 1 tablet, 1 tablet, Oral, Q6H PRN, Jamal Villar MD, 1 tablet at 02/13/19 0429  •  insulin lispro (humaLOG) injection 0-7 Units, 0-7 Units, Subcutaneous, 4x Daily With Meals & Nightly, Arielle Carolina MD, 2 Units at 02/12/19 2032  •  lactulose (CHRONULAC) 10 GM/15ML solution 30 g, 30 g, Oral, Q8H PRN, Jamal Villar MD  •  melatonin sublingual tablet 5 mg, 5 mg, Sublingual, Nightly PRN,  Pollo Millan, APRN  •  metroNIDAZOLE (FLAGYL) IVPB 500 mg, 500 mg, Intravenous, Q8H, Efrem Santana MD, Last Rate: 100 mL/hr at 02/13/19 0216, 500 mg at 02/13/19 0216  •  ondansetron (ZOFRAN) injection 4 mg, 4 mg, Intravenous, Q6H PRN, Arielle Carolina MD, 4 mg at 02/12/19 1158  •  Pharmacy Consult, , Does not apply, Continuous PRN, Jeff Smith MD  •  sodium chloride 0.9 % flush 3 mL, 3 mL, Intravenous, Q12H, Arielle Carolina MD, 3 mL at 02/12/19 2022  •  sodium chloride 0.9 % flush 3-10 mL, 3-10 mL, Intravenous, PRN, Arielle Carolina MD      Social History     Socioeconomic History   • Marital status:      Spouse name: Not on file   • Number of children: Not on file   • Years of education: Not on file   • Highest education level: Not on file   Social Needs   • Financial resource strain: Not on file   • Food insecurity - worry: Not on file   • Food insecurity - inability: Not on file   • Transportation needs - medical: Not on file   • Transportation needs - non-medical: Not on file   Occupational History   • Not on file   Tobacco Use   • Smoking status: Former Smoker   • Smokeless tobacco: Former User   Substance and Sexual Activity   • Alcohol use: No   • Drug use: No   • Sexual activity: Defer   Other Topics Concern   • Not on file   Social History Narrative   • Not on file       Family History   Problem Relation Age of Onset   • Heart disease Mother    • Thyroid disease Mother    • Heart disease Father    • Cancer Brother        REVIEW OF SYSTEMS:   Review of Systems   Constitution: Positive for fever, weakness and malaise/fatigue.   HENT: Negative.    Eyes: Negative.    Cardiovascular: Positive for leg swelling. Negative for chest pain and palpitations.   Respiratory: Negative.    Endocrine: Negative.    Hematologic/Lymphatic: Negative.    Skin: Negative.    Musculoskeletal: Negative.    Gastrointestinal: Positive for abdominal pain.   Genitourinary: Negative.   "  Psychiatric/Behavioral: Positive for altered mental status.   Allergic/Immunologic: Negative.    All other systems reviewed and are negative.           Objective:  Vitals:    02/13/19 0300 02/13/19 0400 02/13/19 0500 02/13/19 0600   BP: 144/62 122/81 137/73 163/88   BP Location: Left arm Left arm Left arm Left arm   Patient Position: Lying Lying Lying Lying   Pulse: 117 117 112 117   Resp: 16 18 16 18   Temp:  98.6 °F (37 °C)     TempSrc:  Axillary     SpO2: 93% 97% 97% 96%   Weight:       Height:         Body mass index is 40.24 kg/m².  Flowsheet Rows      First Filed Value   Admission Height  170.2 cm (67\") Documented at 02/08/2019 1115   Admission Weight  117 kg (257 lb 3.2 oz) Documented at 02/08/2019 1115          Intake/Output Summary (Last 24 hours) at 2/13/2019 1119  Last data filed at 2/13/2019 0600  Gross per 24 hour   Intake 806.8 ml   Output --   Net 806.8 ml       Physical Exam   Constitutional: He is oriented to person, place, and time. He appears well-developed and well-nourished. He appears listless. He has a sickly appearance.   HENT:   Head: Normocephalic and atraumatic.   Mouth/Throat: Oropharynx is clear and moist.   Eyes: EOM are normal. Pupils are equal, round, and reactive to light. No scleral icterus.   Neck: Neck supple. No JVD present. No thyromegaly present.   Cardiovascular: Normal heart sounds. A regularly irregular rhythm present. Tachycardia present. Exam reveals no gallop and no friction rub.   No murmur heard.  Pulmonary/Chest: Breath sounds normal. No stridor. He has no wheezes. He has no rales.   Abdominal: Soft. Bowel sounds are normal. He exhibits no distension and no mass. There is no tenderness.   Musculoskeletal: Normal range of motion. He exhibits edema. He exhibits no tenderness or deformity.   2+ edema bilateral lower extremities   Lymphadenopathy:     He has no cervical adenopathy.   Neurological: He is oriented to person, place, and time. He appears listless. No cranial " nerve deficit. Coordination normal.   Skin: Skin is warm and dry. No rash noted. No erythema.   Psychiatric: He has a normal mood and affect.   Vitals reviewed.                 Lab Review:                Results from last 7 days   Lab Units 02/13/19  0534   SODIUM mmol/L 136   POTASSIUM mmol/L 3.6   CHLORIDE mmol/L 103   CO2 mmol/L 23.0   BUN mg/dL 73*   CREATININE mg/dL 1.38*   GLUCOSE mg/dL 149*   CALCIUM mg/dL 7.5*         Results from last 7 days   Lab Units 02/13/19  0534   WBC 10*3/mm3 18.94*   HEMOGLOBIN g/dL 11.6*   HEMATOCRIT % 34.8*   PLATELETS 10*3/mm3 168     Results from last 7 days   Lab Units 02/12/19  0605 02/08/19  1240   INR  1.29* 1.23*   APTT seconds 36.6 36.8     Results from last 7 days   Lab Units 02/11/19  0637   MAGNESIUM mg/dL 2.2       Lab Results   Component Value Date    TRIG 143 02/11/2019         Lab Results   Component Value Date    HGBA1C 5.70 (H) 01/08/2019         EKG:                   Assessment:   · PAF  · Sepsis  · CAD  · Hypertension      Plan:   · Heparin drip  · Add Lopressor 25 mg tid  · KLAUDIA with possible ECV tomorrow      Scribed for Tyrese Brown MD. by Electronically signed by TRINH Newell, 02/13/19, 11:47 AM.    I,Tyrese Brown M.D., personally performed the services described in this documentation as scribed by the above named individual in my presence, and it is both accurate and complete.  02/13/19  6:08 PM

## 2019-02-13 NOTE — PLAN OF CARE
Problem: Patient Care Overview  Goal: Plan of Care Review  Outcome: Ongoing (interventions implemented as appropriate)   02/13/19 0513   Coping/Psychosocial   Plan of Care Reviewed With patient;spouse   Plan of Care Review   Progress no change   OTHER   Outcome Summary Vitals stable throughout the night, remains in controlled rate atrial fibrillation with diltiazem drip at 5mg/hr. No changes in purpura. Pt more restless tonight, more anxious, appears sad at times.       Problem: Skin Injury Risk (Adult)  Goal: Skin Health and Integrity  Outcome: Ongoing (interventions implemented as appropriate)   02/13/19 0513   Skin Injury Risk (Adult)   Skin Health and Integrity making progress toward outcome       Problem: Fall Risk (Adult)  Goal: Absence of Fall  Outcome: Ongoing (interventions implemented as appropriate)   02/13/19 0513   Fall Risk (Adult)   Absence of Fall making progress toward outcome       Problem: Hemodialysis (Adult)  Goal: Signs and Symptoms of Listed Potential Problems Will be Absent, Minimized or Managed (Hemodialysis)  Outcome: Ongoing (interventions implemented as appropriate)   02/13/19 0513   Goal/Outcome Evaluation   Problems Assessed (Hemodialysis) all   Problems Present (Hemodialysis) fluid imbalance       Problem: Confusion, Acute (Adult)  Goal: Cognitive/Functional Impairments Minimized  Outcome: Ongoing (interventions implemented as appropriate)   02/13/19 0513   Confusion, Acute (Adult)   Cognitive/Functional Impairments Minimized making progress toward outcome       Problem: Sepsis/Septic Shock (Adult)  Goal: Signs and Symptoms of Listed Potential Problems Will be Absent, Minimized or Managed (Sepsis/Septic Shock)  Outcome: Ongoing (interventions implemented as appropriate)   02/13/19 0513   Goal/Outcome Evaluation   Problems Assessed (Sepsis) all   Problems Present (Sepsis) undernutrition;situational response

## 2019-02-14 ENCOUNTER — APPOINTMENT (OUTPATIENT)
Dept: CARDIOLOGY | Facility: HOSPITAL | Age: 69
End: 2019-02-14

## 2019-02-14 LAB
ALBUMIN SERPL-MCNC: 2.62 G/DL (ref 3.2–4.8)
ANION GAP SERPL CALCULATED.3IONS-SCNC: 9 MMOL/L (ref 3–11)
BASOPHILS # BLD MANUAL: 0 10*3/MM3 (ref 0–0.2)
BASOPHILS NFR BLD AUTO: 0 % (ref 0–1)
BH CV ECHO MEAS - BSA(HAYCOCK): 2.4 M^2
BH CV ECHO MEAS - BSA: 2.2 M^2
BH CV ECHO MEAS - BZI_BMI: 40.3 KILOGRAMS/M^2
BH CV ECHO MEAS - BZI_METRIC_HEIGHT: 170.2 CM
BH CV ECHO MEAS - BZI_METRIC_WEIGHT: 116.6 KG
BH CV VAS BP LEFT ARM: NORMAL MMHG
BUN BLD-MCNC: 72 MG/DL (ref 9–23)
BUN/CREAT SERPL: 57.1 (ref 7–25)
CALCIUM SPEC-SCNC: 7.6 MG/DL (ref 8.7–10.4)
CHLORIDE SERPL-SCNC: 104 MMOL/L (ref 99–109)
CO2 SERPL-SCNC: 24 MMOL/L (ref 20–31)
CREAT BLD-MCNC: 1.26 MG/DL (ref 0.6–1.3)
DEPRECATED RDW RBC AUTO: 44.7 FL (ref 37–54)
EOSINOPHIL # BLD MANUAL: 0.19 10*3/MM3 (ref 0.1–0.3)
EOSINOPHIL NFR BLD MANUAL: 1 % (ref 0–3)
ERYTHROCYTE [DISTWIDTH] IN BLOOD BY AUTOMATED COUNT: 15.5 % (ref 11.3–14.5)
GFR SERPL CREATININE-BSD FRML MDRD: 57 ML/MIN/1.73
GLUCOSE BLD-MCNC: 123 MG/DL (ref 70–100)
GLUCOSE BLDC GLUCOMTR-MCNC: 103 MG/DL (ref 70–130)
GLUCOSE BLDC GLUCOMTR-MCNC: 116 MG/DL (ref 70–130)
GLUCOSE BLDC GLUCOMTR-MCNC: 142 MG/DL (ref 70–130)
GLUCOSE BLDC GLUCOMTR-MCNC: 173 MG/DL (ref 70–130)
HCT VFR BLD AUTO: 32.5 % (ref 38.9–50.9)
HGB BLD-MCNC: 10.7 G/DL (ref 13.1–17.5)
HYPOCHROMIA BLD QL: ABNORMAL
LYMPHOCYTES # BLD MANUAL: 0.58 10*3/MM3 (ref 0.6–4.8)
LYMPHOCYTES NFR BLD MANUAL: 3 % (ref 24–44)
LYMPHOCYTES NFR BLD MANUAL: 5 % (ref 0–12)
MCH RBC QN AUTO: 26.4 PG (ref 27–31)
MCHC RBC AUTO-ENTMCNC: 32.9 G/DL (ref 32–36)
MCV RBC AUTO: 80.2 FL (ref 80–99)
MICROCYTES BLD QL: ABNORMAL
MONOCYTES # BLD AUTO: 0.96 10*3/MM3 (ref 0–1)
NEUTROPHILS # BLD AUTO: 17.33 10*3/MM3 (ref 1.5–8.3)
NEUTROPHILS NFR BLD MANUAL: 90 % (ref 41–71)
PHOSPHATE SERPL-MCNC: 4.6 MG/DL (ref 2.4–5.1)
PLAT MORPH BLD: NORMAL
PLATELET # BLD AUTO: 223 10*3/MM3 (ref 150–450)
PMV BLD AUTO: 9.6 FL (ref 6–12)
POLYCHROMASIA BLD QL SMEAR: ABNORMAL
POTASSIUM BLD-SCNC: 3.3 MMOL/L (ref 3.5–5.5)
PROCALCITONIN SERPL-MCNC: 0.95 NG/ML
RBC # BLD AUTO: 4.05 10*6/MM3 (ref 4.2–5.76)
SODIUM BLD-SCNC: 137 MMOL/L (ref 132–146)
TOXIC GRANULATION: ABNORMAL
UFH PPP CHRO-ACNC: 0.1 IU/ML (ref 0.3–0.7)
UFH PPP CHRO-ACNC: 0.1 IU/ML (ref 0.3–0.7)
UFH PPP CHRO-ACNC: 0.12 IU/ML (ref 0.3–0.7)
VARIANT LYMPHS NFR BLD MANUAL: 1 % (ref 0–5)
WBC NRBC COR # BLD: 19.26 10*3/MM3 (ref 3.5–10.8)

## 2019-02-14 PROCEDURE — 82962 GLUCOSE BLOOD TEST: CPT

## 2019-02-14 PROCEDURE — 25010000002 MIDAZOLAM PER 1 MG: Performed by: INTERNAL MEDICINE

## 2019-02-14 PROCEDURE — 92960 CARDIOVERSION ELECTRIC EXT: CPT | Performed by: INTERNAL MEDICINE

## 2019-02-14 PROCEDURE — 97530 THERAPEUTIC ACTIVITIES: CPT

## 2019-02-14 PROCEDURE — 85027 COMPLETE CBC AUTOMATED: CPT | Performed by: INTERNAL MEDICINE

## 2019-02-14 PROCEDURE — 93325 DOPPLER ECHO COLOR FLOW MAPG: CPT | Performed by: INTERNAL MEDICINE

## 2019-02-14 PROCEDURE — 25010000002 FENTANYL CITRATE (PF) 100 MCG/2ML SOLUTION: Performed by: NURSE PRACTITIONER

## 2019-02-14 PROCEDURE — 97110 THERAPEUTIC EXERCISES: CPT

## 2019-02-14 PROCEDURE — 99152 MOD SED SAME PHYS/QHP 5/>YRS: CPT | Performed by: INTERNAL MEDICINE

## 2019-02-14 PROCEDURE — 25010000002 FENTANYL CITRATE (PF) 100 MCG/2ML SOLUTION: Performed by: INTERNAL MEDICINE

## 2019-02-14 PROCEDURE — 25010000002 HEPARIN (PORCINE) IN NACL 25000-0.45 UT/250ML-% SOLUTION

## 2019-02-14 PROCEDURE — 93312 ECHO TRANSESOPHAGEAL: CPT

## 2019-02-14 PROCEDURE — 80069 RENAL FUNCTION PANEL: CPT | Performed by: INTERNAL MEDICINE

## 2019-02-14 PROCEDURE — 25010000002 FENTANYL CITRATE (PF) 100 MCG/2ML SOLUTION

## 2019-02-14 PROCEDURE — 97610 LOW FREQUENCY NON-THERMAL US: CPT

## 2019-02-14 PROCEDURE — 99231 SBSQ HOSP IP/OBS SF/LOW 25: CPT | Performed by: ORTHOPAEDIC SURGERY

## 2019-02-14 PROCEDURE — 84145 PROCALCITONIN (PCT): CPT | Performed by: INTERNAL MEDICINE

## 2019-02-14 PROCEDURE — 93325 DOPPLER ECHO COLOR FLOW MAPG: CPT

## 2019-02-14 PROCEDURE — 85520 HEPARIN ASSAY: CPT

## 2019-02-14 PROCEDURE — 25010000003 CEFAZOLIN IN DEXTROSE 2-4 GM/100ML-% SOLUTION

## 2019-02-14 PROCEDURE — 99233 SBSQ HOSP IP/OBS HIGH 50: CPT | Performed by: INTERNAL MEDICINE

## 2019-02-14 PROCEDURE — 25010000002 HEPARIN (PORCINE) PER 1000 UNITS

## 2019-02-14 PROCEDURE — 25010000002 NA FERRIC GLUC CPLX PER 12.5 MG: Performed by: INTERNAL MEDICINE

## 2019-02-14 PROCEDURE — 85007 BL SMEAR W/DIFF WBC COUNT: CPT | Performed by: INTERNAL MEDICINE

## 2019-02-14 PROCEDURE — 25010000002 MIDAZOLAM PER 1 MG

## 2019-02-14 PROCEDURE — 93312 ECHO TRANSESOPHAGEAL: CPT | Performed by: INTERNAL MEDICINE

## 2019-02-14 RX ORDER — FENTANYL CITRATE 50 UG/ML
INJECTION, SOLUTION INTRAMUSCULAR; INTRAVENOUS
Status: COMPLETED | OUTPATIENT
Start: 2019-02-14 | End: 2019-02-14

## 2019-02-14 RX ORDER — HEPARIN SODIUM 1000 [USP'U]/ML
5000 INJECTION, SOLUTION INTRAVENOUS; SUBCUTANEOUS ONCE
Status: COMPLETED | OUTPATIENT
Start: 2019-02-15 | End: 2019-02-15

## 2019-02-14 RX ORDER — HEPARIN SODIUM 1000 [USP'U]/ML
4000 INJECTION, SOLUTION INTRAVENOUS; SUBCUTANEOUS ONCE
Status: COMPLETED | OUTPATIENT
Start: 2019-02-14 | End: 2019-02-14

## 2019-02-14 RX ORDER — FENTANYL CITRATE 50 UG/ML
INJECTION, SOLUTION INTRAMUSCULAR; INTRAVENOUS
Status: COMPLETED
Start: 2019-02-14 | End: 2019-02-14

## 2019-02-14 RX ORDER — MIDAZOLAM HYDROCHLORIDE 1 MG/ML
INJECTION INTRAMUSCULAR; INTRAVENOUS
Status: COMPLETED | OUTPATIENT
Start: 2019-02-14 | End: 2019-02-14

## 2019-02-14 RX ORDER — FAMOTIDINE 20 MG/1
20 TABLET, FILM COATED ORAL 2 TIMES DAILY
Status: DISCONTINUED | OUTPATIENT
Start: 2019-02-14 | End: 2019-02-24 | Stop reason: HOSPADM

## 2019-02-14 RX ORDER — POTASSIUM CHLORIDE 750 MG/1
40 CAPSULE, EXTENDED RELEASE ORAL ONCE
Status: COMPLETED | OUTPATIENT
Start: 2019-02-14 | End: 2019-02-14

## 2019-02-14 RX ORDER — MIDAZOLAM HYDROCHLORIDE 1 MG/ML
INJECTION INTRAMUSCULAR; INTRAVENOUS
Status: COMPLETED
Start: 2019-02-14 | End: 2019-02-14

## 2019-02-14 RX ORDER — GABAPENTIN 300 MG/1
600 CAPSULE ORAL 4 TIMES DAILY
Status: DISCONTINUED | OUTPATIENT
Start: 2019-02-14 | End: 2019-02-19

## 2019-02-14 RX ORDER — CEFAZOLIN SODIUM 2 G/100ML
2 INJECTION, SOLUTION INTRAVENOUS EVERY 8 HOURS
Status: DISCONTINUED | OUTPATIENT
Start: 2019-02-14 | End: 2019-02-24 | Stop reason: HOSPADM

## 2019-02-14 RX ADMIN — METOPROLOL TARTRATE 25 MG: 25 TABLET ORAL at 20:16

## 2019-02-14 RX ADMIN — HYDROCODONE BITARTRATE AND ACETAMINOPHEN 1 TABLET: 5; 325 TABLET ORAL at 03:22

## 2019-02-14 RX ADMIN — HEPARIN SODIUM 18 UNITS/KG/HR: 10000 INJECTION, SOLUTION INTRAVENOUS at 22:19

## 2019-02-14 RX ADMIN — FENTANYL CITRATE 25 MCG: 50 INJECTION, SOLUTION INTRAMUSCULAR; INTRAVENOUS at 09:29

## 2019-02-14 RX ADMIN — FENTANYL CITRATE 25 MCG: 50 INJECTION, SOLUTION INTRAMUSCULAR; INTRAVENOUS at 09:23

## 2019-02-14 RX ADMIN — DILTIAZEM HYDROCHLORIDE 5 MG/HR: 5 INJECTION INTRAVENOUS at 05:30

## 2019-02-14 RX ADMIN — AZTREONAM 500 MG: 1 INJECTION, POWDER, LYOPHILIZED, FOR SOLUTION INTRAMUSCULAR; INTRAVENOUS at 10:28

## 2019-02-14 RX ADMIN — METOPROLOL TARTRATE 25 MG: 25 TABLET ORAL at 15:25

## 2019-02-14 RX ADMIN — CASTOR OIL AND BALSAM, PERU: 788; 87 OINTMENT TOPICAL at 09:42

## 2019-02-14 RX ADMIN — GABAPENTIN 600 MG: 300 CAPSULE ORAL at 20:16

## 2019-02-14 RX ADMIN — FENTANYL CITRATE 50 MCG: 50 INJECTION INTRAMUSCULAR; INTRAVENOUS at 09:16

## 2019-02-14 RX ADMIN — CASTOR OIL AND BALSAM, PERU: 788; 87 OINTMENT TOPICAL at 20:17

## 2019-02-14 RX ADMIN — HEPARIN SODIUM 4000 UNITS: 1000 INJECTION, SOLUTION INTRAVENOUS; SUBCUTANEOUS at 16:15

## 2019-02-14 RX ADMIN — POTASSIUM CHLORIDE 40 MEQ: 750 CAPSULE, EXTENDED RELEASE ORAL at 11:30

## 2019-02-14 RX ADMIN — GABAPENTIN 600 MG: 300 CAPSULE ORAL at 17:31

## 2019-02-14 RX ADMIN — DULOXETINE 30 MG: 30 CAPSULE, DELAYED RELEASE ORAL at 11:06

## 2019-02-14 RX ADMIN — MIDAZOLAM HYDROCHLORIDE 1 MG: 1 INJECTION, SOLUTION INTRAMUSCULAR; INTRAVENOUS at 09:29

## 2019-02-14 RX ADMIN — FAMOTIDINE 20 MG: 20 TABLET, FILM COATED ORAL at 20:16

## 2019-02-14 RX ADMIN — METRONIDAZOLE 500 MG: 500 INJECTION, SOLUTION INTRAVENOUS at 01:59

## 2019-02-14 RX ADMIN — LACTULOSE 30 G: 10 SOLUTION ORAL at 17:41

## 2019-02-14 RX ADMIN — AMLODIPINE BESYLATE 5 MG: 5 TABLET ORAL at 10:46

## 2019-02-14 RX ADMIN — HEPARIN SODIUM 15 UNITS/KG/HR: 10000 INJECTION, SOLUTION INTRAVENOUS at 08:16

## 2019-02-14 RX ADMIN — INSULIN LISPRO 2 UNITS: 100 INJECTION, SOLUTION INTRAVENOUS; SUBCUTANEOUS at 20:20

## 2019-02-14 RX ADMIN — SODIUM CHLORIDE, PRESERVATIVE FREE 3 ML: 5 INJECTION INTRAVENOUS at 20:16

## 2019-02-14 RX ADMIN — FAMOTIDINE 20 MG: 20 TABLET, FILM COATED ORAL at 10:46

## 2019-02-14 RX ADMIN — HYDROCODONE BITARTRATE AND ACETAMINOPHEN 1 TABLET: 5; 325 TABLET ORAL at 11:06

## 2019-02-14 RX ADMIN — METRONIDAZOLE 500 MG: 500 INJECTION, SOLUTION INTRAVENOUS at 17:41

## 2019-02-14 RX ADMIN — GABAPENTIN 300 MG: 300 CAPSULE ORAL at 10:46

## 2019-02-14 RX ADMIN — AZTREONAM 500 MG: 1 INJECTION, POWDER, LYOPHILIZED, FOR SOLUTION INTRAMUSCULAR; INTRAVENOUS at 22:19

## 2019-02-14 RX ADMIN — METOPROLOL TARTRATE 25 MG: 25 TABLET ORAL at 10:46

## 2019-02-14 RX ADMIN — MIDAZOLAM HYDROCHLORIDE 2 MG: 1 INJECTION, SOLUTION INTRAMUSCULAR; INTRAVENOUS at 09:16

## 2019-02-14 RX ADMIN — METRONIDAZOLE 500 MG: 500 INJECTION, SOLUTION INTRAVENOUS at 11:29

## 2019-02-14 RX ADMIN — CEFAZOLIN SODIUM 2 G: 2 INJECTION, SOLUTION INTRAVENOUS at 20:14

## 2019-02-14 RX ADMIN — SODIUM CHLORIDE 125 MG: 9 INJECTION, SOLUTION INTRAVENOUS at 10:31

## 2019-02-14 RX ADMIN — CEFAZOLIN SODIUM 2 G: 2 INJECTION, SOLUTION INTRAVENOUS at 14:18

## 2019-02-14 RX ADMIN — FENTANYL CITRATE 25 MCG: 50 INJECTION INTRAMUSCULAR; INTRAVENOUS at 15:20

## 2019-02-14 RX ADMIN — HYDROCODONE BITARTRATE AND ACETAMINOPHEN 1 TABLET: 5; 325 TABLET ORAL at 17:31

## 2019-02-14 NOTE — PROGRESS NOTES
HEPARIN INFUSION  Rod Balderas is a  68 y.o. male receiving heparin infusion.             Therapy for (VTE/Cardiac):   A fib  Patient Weight: 117 kg  Initial Bolus (Y/N):   N  Any Bolus (Y/N):   N        Signs or Symptoms of Bleeding: Had hematuria on admission (2/8)      Cardiac or Other (Not VTE)   Initial Bolus: 60 units/kg (Max 4,000 units)  Initial rate: 12 units/kg/hr (Max 1,000 units/hr)   Anti-Xa (IU/mL) Bolus Dose Stop Infusion Rate Change Repeat Anti-Xa      ?0.19 60 units/kg 0 hrs Increase rate by   4 units/kg/hr 6 hrs       0.2 - 0.29  30 units/kg 0 hrs Increase rate by 2 units/kg/hr 6 hrs    0.3 - 0.7 0 0 hrs No change 6 hrs      0.71 - 0.99 0  0 hrs Decrease rate by 2 units/kg/hr 6 hrs            ?1 0 Hold 1 hr Decrease rate by 3 units/kg/hr 6 hrs          Results from last 7 days   Lab Units 02/14/19  0520 02/13/19  1220 02/13/19  0534 02/12/19  0605  02/08/19  1240   INR   --  1.30*  --  1.29*  --  1.23*   HEMOGLOBIN g/dL 10.7* 10.5* 11.6*  --    < > 11.3*   HEMATOCRIT % 32.5* 31.9* 34.8*  --    < > 34.1*   PLATELETS 10*3/mm3 223 170 168  --    < > 180    < > = values in this interval not displayed.          Date   Time   Anti-Xa Current Rate (Unit/kg/hr) Bolus   (Units) Rate Change   (Unit/kg/hr) New Rate (Unit/kg/hr) Next   Anti-Xa Comments  Pump Check Daily   2/13 1401 Initial aPTT: 32.5 0 -- +8.5 8.5 2100 D/w RN   2/13 2055 0.1 8.5 4000 +4 12.5 AM Labs DW RN   2/14 0520 0.1 12.5 -- +2.5 15 1400 DW RN pump rate / weight verified                                                                                                                                                                                                           Laurie Menchaca [facility]  2/14/2019  11:31 AM

## 2019-02-14 NOTE — PROGRESS NOTES
[facility] Progress Note          Antibiotics:  Anti-Infectives (From admission, onward)    Ordered     Dose/Rate Route Frequency Start Stop    02/14/19 1149  ceFAZolin in dextrose (ANCEF) IVPB solution 2 g     Ordering Provider:  Laurie Menchaca RPH    2 g  over 30 Minutes Intravenous Every 8 Hours 02/14/19 1300 02/22/19 1259    02/09/19 1416  aztreonam (AZACTAM) 500 mg in sodium chloride 0.9 % 50 mL IVPB     Ordering Provider:  Efrem Santana MD    500 mg  over 30 Minutes Intravenous Every 12 Hours 02/09/19 2200 02/16/19 2159    02/09/19 0849  metroNIDAZOLE (FLAGYL) IVPB 500 mg     Ordering Provider:  Efrem Santana MD    500 mg  100 mL/hr over 60 Minutes Intravenous Every 8 Hours 02/09/19 1030 02/16/19 1029    02/09/19 0849  aztreonam (AZACTAM) 2 g in sodium chloride 0.9 % 100 mL IVPB-MBP     Ordering Provider:  Efrem Santana MD    2 g  200 mL/hr over 30 Minutes Intravenous Once 02/09/19 1000 02/09/19 0946          CC: Patient unable    HPI:    Consult by Dr. Jeff Smith; covering for him :      Patient is a 68 y.o.  Yr old male with history of prostate cancer status post robotic laparoscopic prostatectomy in mid January and was discharged on 1/17/19. The patient is currently unresponsive and no family is at bedside so history is somewhat limited to history from nursing staff and medical records. The patient was seen a few days after his discharge from his recent  Admission in January and his Christina catheter was discontinued.  About one week ago he went to the ER with complaints of back pain, abdominal pain, and confusion and a CT abdomen and pelvis was apparently okay during that time. He continued to worsen and over the weekend [facility]  He was found to have an initial AK I with a creatinine of 2.2. Per the micro-lab at [facility], the patient's urine cultures and 1 of 2 blood cultures from 2/5/19 grew MSSA. The patient was initially on vancomycin and  Zosyn and was subsequently transitioned to daptomycin after he developed worsening renal function with a creatinine up to 5.6 and BUN went up to 94 today.  Due to lack of nephrology at [facility], the patient was transferred to [facility] today for possible dialysis.  He additionally received a dose of nafcillin prior to this transfer.  His last dose of daptomycin was yesterday per pharmacist here (who has discussed with OSH). Since arrival, the patient has remained afebrile but he has had a leukocytosis up to 31.38.  He is anemic with a hemoglobin of 11.3 and a hematocrit of 54.1.  He is hyponatremic to a sodium of 127.  Creatinine was initially elevated 5.39 and B1 was 97 on arrival.  He additionally had elevation of his LFTs with an ALT of 43 and AST of 43, T bili of 3.0, and alkaline phosphatase of 303.  Pro-calcitonin was elevated to 7.55 lactic acid was normal at 0.8. The patient underwent CT head without contrast which did not show any acute abnormality, CT chest, abdomen and pelvis which showed consolidation of the lung bases bilaterally that is concerning for airspace disease such as pneumonia, gastric distention, and air-filled loops of colon with no obvious obstruction. Blood cultures have been ordered and a reflexive urinalysis has been ordered as well.  A transthoracic echocardiogram was ordered. The patient underwent dialysis today following right IJ dialysis catheter placement.  The infectious disease team has been consulted for antibiotic recommendations.    He has methicillin sensitive staph aureus septicemia/bacteriuria compounded by acute renal failure, acute hypoxic respiratory failure and by basilar consolidation by chest CT.  Further compounded by acute encephalopathy.    2/9/19 nursing reports A. fib/RVR, dialysis ongoing with acute renal failure and tachypneic/labored at times.  Some cough but unable to capture for culture so far. Empiric HCAP coverage added with  "zyvox/azactam/flagyl; unable to do MRI of spine so CT's done cervical/thoracic/lumbar spine done 2/10    2/10/19 CT scans of spine; more restful, less labored with breathing and decreased cough;  No rash.  He can wiggle toes but not lift legs off the bed.  He moves left arm better than right arm.  No other new focal pain that he will relate although interaction minimal.  No diarrhea.  No vomiting.    2/11/19: seems to have improved from mental status standpoint slightly per his wife and son who are at bedside. Patient endorses abdominal pain but is not answering a lot of questions. tMAX 99.3f. WBC remains elevated at 20K (down from 31K on arrival). TTE was without evidence of IE but valves poorly visualized. Still getting HD.    2/12/19: Patient is starting to feel much better today.  He is responding to a lot of questions and is more alert sitting up in a bedside chair.  Breathing is improving.  No high fevers overnight.  Leukocytosis improved to 15.9 today.  He continues to urinate a small amount but remains on intermittent dialysis.    2/13/19: Feeling about the same as he was yesterday.  Still having some right wrist pain. Shortness of breath is stable. No fevers. Leukocytosis slightly worse today.    2/14/19: Patient is somnolent today after his KLAUDIA due to recent sedation. Procedure went well per his family and he is back in NSR and no signs of vegetation but procedure report is pending. No fevers. Leukocytosis slightly improved from yesterday. Family reports that the patient has had no new complaints. He was evaluated by Dr. Ro and no significant concern for septic arthritis.    ROS:    Breathing has improved. No severe abdominal pain. +right wrist pain. No diarrhea or nausea      PE:   /54   Pulse 68   Temp 98.1 °F (36.7 °C) (Oral)   Resp 18   Ht 170.2 cm (67.01\")   Wt 117 kg (257 lb)   SpO2 100%   BMI 40.24 kg/m²     GENERAL: ill appearing, obese,  man. NAD. Lying in bed  HEENT: " Normocephalic, atraumatic. No conjunctival injection. No icterus.   NECK: Supple without nuchal rigidity. No mass.  Chest: right chest dialysis cath site is without erythema or induration. No chest wall deformities  HEART: irregular rhythm. No murmur, rubs, gallops. LUNGS: Diminished at bases, labored and bilateral rhonchi. On nasal cannula  ABDOMEN: Soft, nontender, nondistended. Positive bowel sounds. No rebound or guarding. NO mass or HSM.  EXT:  No cyanosis, clubbing. No cord.  Edema noted over bilateral lower extremities. R>L upper extremity edema. erythema, warmth, and tenderness near right wrist seems to have improved somewhat.   : Genitalia generally unremarkable.  With Christina catheter.  SKIN: warm.  Does have petechia appearing areas over upper extremities   NEURO: somnolent and difficult to awaken. Not responsive to commands.    IV with no obvious redness or drainage    Laboratory Data    Results from last 7 days   Lab Units 02/14/19  0520 02/13/19  1220 02/13/19  0534   WBC 10*3/mm3 19.26* 20.36* 18.94*   HEMOGLOBIN g/dL 10.7* 10.5* 11.6*   HEMATOCRIT % 32.5* 31.9* 34.8*   PLATELETS 10*3/mm3 223 170 168     Results from last 7 days   Lab Units 02/14/19  0520   SODIUM mmol/L 137   POTASSIUM mmol/L 3.3*   CHLORIDE mmol/L 104   CO2 mmol/L 24.0   BUN mg/dL 72*   CREATININE mg/dL 1.26   GLUCOSE mg/dL 123*   CALCIUM mg/dL 7.6*     Results from last 7 days   Lab Units 02/11/19  0637   ALK PHOS U/L 348*   BILIRUBIN mg/dL 2.6*   ALT (SGPT) U/L 17   AST (SGOT) U/L 67*         Results from last 7 days   Lab Units 02/11/19  0637   CRP mg/dL 19.67*       Estimated Creatinine Clearance: 69.6 mL/min (by C-G formula based on SCr of 1.26 mg/dL).      Microbiology:      Radiology:  Imaging Results (last 72 hours)     Procedure Component Value Units Date/Time    XR Chest 1 View [[id number]] Updated:  02/09/19 0221    CT Abdomen Pelvis Without Contrast [[id number]] Collected:  02/08/19 1822     Updated:  02/08/19 1822     Narrative:       EXAMINATION: CT CHEST WO CONTRAST, CT ABDOMEN/PELVIS WO CONTRAST -  2/8/2019     INDICATION: Persistent cough.     TECHNIQUE: Multiple axial CT imaging is obtained of the chest, abdomen  and pelvis without the administration of oral or intravenous contrast.     The radiation dose reduction device was turned on for each scan per the  ALARA (As Low as Reasonably Achievable) protocol.     COMPARISON: There is some consolidation identified posteriorly extending  to the lung bases bilaterally suggesting infiltrates. The upper lung  fields are grossly clear. Motion artifact degrades image quality. There  are degenerative changes identified within the spine. The cardiac  chambers are enlarged. There is no pericardial effusion. No bulky hilar  or axillary lymphadenopathy. The thyroid is homogeneous. Degenerative  change is seen throughout the spine.     ABDOMEN: Motion artifact grades image quality. There is no abnormality  seen within the liver. The spleen is upper limits of normal in size.  There is gastric distention. Air fills the colon with no  evidence of  obvious obstruction. Kidneys and adrenal glands within normal limits.  The pancreas is homogeneous. No abdominal or retroperitoneal  lymphadenopathy. No abnormal mass or fluid collections identified. No  free fluid or free air.     PELVIS: Pelvic organs are unremarkable. The pelvic portion of the   gastrointestinal tract is within normal limits. No pelvic adenopathy.  Degenerative change is identified throughout the spine and pelvis.     FINDINGS: Consolidation at the lung bases bilaterally is concerning for  airspace disease such as pneumonia. The there is gastric distention.  Air-filled loops of colon with no obvious obstruction. No other abnormal  findings are seen within the abdomen or pelvis. Degenerative changes are  seen throughout the spine and pelvis.     DICTATED:   2/8/2019  EDITED/ls :   2/8/2019              Impression:               CT  Chest Without Contrast [[id number]] Collected:  02/08/19 1822     Updated:  02/08/19 1822    Narrative:       EXAMINATION: CT CHEST WO CONTRAST, CT ABDOMEN/PELVIS WO CONTRAST -  2/8/2019     INDICATION: Persistent cough.     TECHNIQUE: Multiple axial CT imaging is obtained of the chest, abdomen  and pelvis without the administration of oral or intravenous contrast.     The radiation dose reduction device was turned on for each scan per the  ALARA (As Low as Reasonably Achievable) protocol.     COMPARISON: There is some consolidation identified posteriorly extending  to the lung bases bilaterally suggesting infiltrates. The upper lung  fields are grossly clear. Motion artifact degrades image quality. There  are degenerative changes identified within the spine. The cardiac  chambers are enlarged. There is no pericardial effusion. No bulky hilar  or axillary lymphadenopathy. The thyroid is homogeneous. Degenerative  change is seen throughout the spine.     ABDOMEN: Motion artifact grades image quality. There is no abnormality  seen within the liver. The spleen is upper limits of normal in size.  There is gastric distention. Air fills the colon with no  evidence of  obvious obstruction. Kidneys and adrenal glands within normal limits.  The pancreas is homogeneous. No abdominal or retroperitoneal  lymphadenopathy. No abnormal mass or fluid collections identified. No  free fluid or free air.     PELVIS: Pelvic organs are unremarkable. The pelvic portion of the   gastrointestinal tract is within normal limits. No pelvic adenopathy.  Degenerative change is identified throughout the spine and pelvis.     FINDINGS: Consolidation at the lung bases bilaterally is concerning for  airspace disease such as pneumonia. The there is gastric distention.  Air-filled loops of colon with no obvious obstruction. No other abnormal  findings are seen within the abdomen or pelvis. Degenerative changes are  seen throughout the spine and  pelvis.     DICTATED:   2/8/2019  EDITED/ls :   2/8/2019              Impression:               CT Head Without Contrast [[id number]] Collected:  02/08/19 1816     Updated:  02/08/19 1817    Narrative:       EXAMINATION: CT HEAD WO CONTRAST - 2/8/2019     INDICATION: Mental status change, does not follow commands.     TECHNIQUE: Multiple axial CT imaging is obtained of the head from skull  base to skull vertex without the administration of intravenous contrast.     The radiation dose reduction device was turned on for each scan per the  ALARA (As Low as Reasonably Achievable) protocol.     COMPARISON: NONE     FINDINGS: There is atrophy of the brain. Some low-density area seen in  the periventricular white matter suggesting chronic small vessel  ischemic change. No hemorrhage or hydrocephalus. No mass, mass effect,  or midline shift. No abnormal extra-axial  fluid collections identified. Minimal mucosal thickening of the  maxillary sinuses and ethmoid air cells. The bony structures are  unremarkable. The mastoid air cells are patent.       Impression:       Chronic changes identified within the brain with no acute  intracranial abnormality.     DICTATED:   2/8/2019  EDITED/ls :   2/8/2019        FL C Arm During Surgery [[id number]] Collected:  02/08/19 1646     Updated:  02/08/19 1646    Narrative:       EXAMINATION: FL C ARM DURING SURGERY- 02/08/2019      INDICATION: Dialysis catheter placement     COMPARISON: NONE     FINDINGS: 4 seconds of fluoroscopy and 4 images used for right-sided  dialysis catheter placement. Please see the procedure report for full  details.       Impression:       Fluoroscopy for dialysis catheter placement. Please see the  procedure report for full details.      D:  02/08/2019  E:  02/08/2019       XR Chest 1 View [[id number]] Collected:  02/08/19 1628     Updated:  02/08/19 1628    Narrative:       EXAMINATION: XR CHEST 1 VW-02/08/2019:      INDICATION: RIJ line.      COMPARISON:  02/08/2019.     FINDINGS: Portable chest reveals low lung volumes. Deep line catheter  identified on the right with tip in the SVC. Degenerative changes seen  within the spine. The heart is borderline enlarged. Mild prominence seen  of the pulmonary vascularity. No pleural effusion or pneumothorax.           Impression:       Prominence of the pulmonary vascularity with deep line  catheter identified on the right with tip in the SVC. No evidence of  acute parenchymal disease.     D:  02/08/2019  E:  02/08/2019             XR Chest 1 View [[id number]] Collected:  02/08/19 1243     Updated:  02/08/19 1246    Narrative:          EXAMINATION: XR CHEST 1 VW-      INDICATION: respiratory failure      COMPARISON: 01/08/2019     FINDINGS: Heart is borderline enlarged. The vasculature is cephalized.  Lung volumes are relatively low. There is mild discoid atelectasis in  both medial lung bases, but no focal disease elsewhere..           Impression:       Low lung volumes, borderline cardiomegaly and mild pulmonary  vascular congestion. Mild bibasilar discoid atelectasis, new from prior  study.     This report was finalized on 2/8/2019 12:44 PM by DR. Antione Damon MD.               Impression:     --Acute severe sepsis/septic shock with multiorgan system insufficiency.  Methicillin sensitive staph aureus in blood/urine cultures and bilateral pulmonary infiltrates with potential bilateral pneumonia, HCAP/Aspiration -v- all MSSA;sputum culture pending and in the meantime maintain broader coverage for potential nosocomial pathogens until further information given critical/tenuous status and high risk for further serious morbidity and other serious sequela/mortality. Leukocytosis has overall improved but has remained elevated over the last couple days. Slight improvement today    --Acute MSSA septicemia/bacteremia and bacteriuria.  Transthoracic echocardiogram with poor visualization of heart valves but no noted vegetation.  No  peripheral stigmata of endocarditis but certainly at risk.  KLAUDIA done 12/14/19 and report pending but reported negative for endocarditis by family.    --Acute hypoxic respiratory failure.  Potentially multifactorial, infection versus edema versus ARDS versus combination of these.  Bilateral consolidations with possible evolving pneumonia, HCAP/aspiration -v- MSSA; collected sputum, monitor and consider de-escalation depending on culture data and clinical course. Improved    --Acute renal failure.  Ongoing dialysis. Still urinating    --Severe extremity weakness with presentation that include back pain: unclear if spinal/paraspinal infection versus other.  Nursing/radiology/Dr. Olvera to  discussed potential for MRI if able.  Some question of foreign body at head that may limit ability to do MRI.  Unable to obtain MRI and CT spine without abscess/osteo    --Abnormal liver function tests.  Monitor    --Acute atrial fibrillation/RVR    --Prostate cancer with history recent prostatectomy.  Urology following    --Acute encephalopathy.  Presumed toxic/metabolic present.  Further neurologic workup at discretion of critical care team    ---right wrist swelling- some concern that the patient could be developing septic arthritis of his left wrist      PLAN:    --IV cefazolin.  Will likely need a 6 week course given severity of MSSA infection    --continue Azactam and Flagyl for now. May continue for 7 day course.     --KLAUDIA reportedly negative for infective endocarditis per family. Official report pending    --orthopedic surgery consultation for right wrist- Dr. Ro evaluated the patient and does not think the patient has septic arthritis at this time    --Check/review labs cultures and scans    --Spinal imaging noted;  Unable to do MRI and therefore CT scans done;  No description of abscess by radiology      Critically ill with high risk for further serious morbidity and other serious sequela/mortality. I discussed with  his wife again today.          Jeff Smith MD  2/14/2019

## 2019-02-14 NOTE — PLAN OF CARE
Problem: Patient Care Overview  Goal: Plan of Care Review  Outcome: Ongoing (interventions implemented as appropriate)   02/14/19 1410   Coping/Psychosocial   Plan of Care Reviewed With patient;spouse   Plan of Care Review   Progress improving   OTHER   Outcome Summary Pt demonstrated ability to progress sitting balance activity duration; progressed LE ther ex. Cont to be limited by c/o groin soreness and decreased motivation to participate. Provided significant edu re: benefit of therapy participation to promote PLOF. Will cont PT POC.

## 2019-02-14 NOTE — PROGRESS NOTES
"   LOS: 6 days    Patient Care Team:  Tyrese Leyva MD as PCP - General (Family Medicine)  Rod Regalado MD as Consulting Physician (Cardiology)  Myles Rossi MD as Consulting Physician (Anesthesiology)  Juanito Grace Jr., MD as Consulting Physician (Urology)    Reason For Visit:  F/U NOE  Subjective           Review of Systems:    Pulm: No soa   CV:  No CP      Objective       amLODIPine 5 mg Oral Q24H   aztreonam 500 mg Intravenous Q12H   castor oil-balsam peru  Topical Q12H   ceFAZolin 1 g Intravenous Q24H   DULoxetine 30 mg Oral Daily   famotidine 20 mg Oral BID   ferric gluconate (FERRLECIT) IVPB 125 mg Intravenous Daily   gabapentin 300 mg Oral Q12H   insulin lispro 0-7 Units Subcutaneous 4x Daily With Meals & Nightly   metoprolol tartrate 25 mg Oral TID   metroNIDAZOLE 500 mg Intravenous Q8H   potassium chloride 40 mEq Oral Once   sodium chloride 3 mL Intravenous Q12H       diltiaZEM 5-15 mg/hr Last Rate: Stopped (02/14/19 0935)   heparin 15 Units/kg/hr Last Rate: 15 Units/kg/hr (02/14/19 0816)   Pharmacy Consult     Pharmacy to Dose Heparin           Vital Signs:  Blood pressure 120/62, pulse 79, temperature 98.1 °F (36.7 °C), temperature source Oral, resp. rate 18, height 170.2 cm (67.01\"), weight 117 kg (257 lb), SpO2 97 %.    Flowsheet Rows      First Filed Value   Admission Height  170.2 cm (67\") Documented at 02/08/2019 1115   Admission Weight  117 kg (257 lb 3.2 oz) Documented at 02/08/2019 1115          02/13 0701 - 02/14 0700  In: 1361.9 [P.O.:540; I.V.:410.9]  Out: 3700 [Urine:3700]    Physical Exam:    General Appearance: NAD, alert and cooperative, Ox3  Eyes: PER, conjunctivae and sclerae normal, no icterus  Lungs: respirations regular and unlabored, no crepitus, clear to auscultation  Heart/CV: regular rhythm & normal rate, no murmur, no gallop, no rub and no edema  Abdomen: not distended, soft, non-tender, no masses,  bowel sounds present  Skin: No rash, Warm and dry. " MD spoke with pt's father prior to see pt in room   TUNNELED HD CATH.    Radiology:            Labs:  Results from last 7 days   Lab Units 02/14/19  0520 02/13/19  1220 02/13/19  0534   WBC 10*3/mm3 19.26* 20.36* 18.94*   HEMOGLOBIN g/dL 10.7* 10.5* 11.6*   HEMATOCRIT % 32.5* 31.9* 34.8*   PLATELETS 10*3/mm3 223 170 168     Results from last 7 days   Lab Units 02/14/19  0520 02/13/19  0534 02/12/19  0526 02/11/19  0637  02/09/19  0510   SODIUM mmol/L 137 136 133 132   < > 133   POTASSIUM mmol/L 3.3* 3.6 3.4* 3.6   < > 4.2   CHLORIDE mmol/L 104 103 99 100   < > 103   CO2 mmol/L 24.0 23.0 23.0 18.0*   < > 18.0*   BUN mg/dL 72* 73* 60* 81*   < > 83*   CREATININE mg/dL 1.26 1.38* 1.74* 3.05*   < > 4.71*   CALCIUM mg/dL 7.6* 7.5* 8.1* 8.2*   < > 8.2*   PHOSPHORUS mg/dL 4.6 4.7 5.2* 6.0*   < > 6.4*   MAGNESIUM mg/dL  --   --   --  2.2  --  2.3   ALBUMIN g/dL 2.62* 2.57* 2.83* 2.89*   < > 2.96*    < > = values in this interval not displayed.     Results from last 7 days   Lab Units 02/14/19  0520   GLUCOSE mg/dL 123*       Results from last 7 days   Lab Units 02/11/19  0637   ALK PHOS U/L 348*   BILIRUBIN mg/dL 2.6*   ALT (SGPT) U/L 17   AST (SGOT) U/L 67*     Results from last 7 days   Lab Units 02/08/19  1540   PH, ARTERIAL pH units 7.276*   PO2 ART mm Hg 85.0   PCO2, ARTERIAL mm Hg 38.4   HCO3 ART mmol/L 17.8*       Results from last 7 days   Lab Units 02/08/19  1826   COLOR UA  Jessica*   CLARITY UA  Turbid*   PH, URINE  6.5   SPECIFIC GRAVITY, URINE  1.015   GLUCOSE UA  Negative   KETONES UA  15 mg/dL (1+)*   BILIRUBIN UA  Small (1+)*   PROTEIN UA  100 mg/dL (2+)*   BLOOD UA  Large (3+)*   LEUKOCYTES UA  Large (3+)*   NITRITE UA  Positive*       Estimated Creatinine Clearance: 69.6 mL/min (by C-G formula based on SCr of 1.26 mg/dL).      Assessment       Renal failure    Spinal stenosis, lumbar region, with neurogenic claudication    Morbid obesity due to excess calories (CMS/HCC)    TANIYA on CPAP    Dyslipidemia    DM (diabetes mellitus), type 2 (CMS/HCC)    Metabolic  encephalopathy    Paroxysmal atrial fibrillation (CMS/HCC)    Sepsis (CMS/HCC)            Impression: NOE WITH IMPROVED RENAL FXN. OFF DIALYSIS. HYPOKALEMIA. ANEMIA.            Recommendations: SUPPLEMENT K. ? TUNNELED HD CATH OUT SOON. OK TO TAKE OUT UJSTEN.      Jr Banks MD  02/14/19  10:47 AM

## 2019-02-14 NOTE — THERAPY TREATMENT NOTE
Acute Care - Wound/Debridement Treatment Note  [facility]     Patient Name: Rod Balderas  : 1950  MRN: [id number]  Today's Date: 2019  Onset of Illness/Injury or Date of Surgery: 19   Date of Referral to PT: 19   Referring Physician: MD Aurea       Admit Date: 2019    Visit Dx:    ICD-10-CM ICD-9-CM   1. Impaired functional mobility, balance, gait, and endurance Z74.09 V49.89   2. Impaired mobility and ADLs Z74.09 799.89       Patient Active Problem List   Diagnosis   • Degenerative disc disease, lumbar   • Spinal stenosis, lumbar region, with neurogenic claudication   • Neuroforaminal stenosis of spine   • Lumbar facet arthropathy   • CAD (coronary artery disease)   • Essential hypertension   • Physical deconditioning   • Morbid obesity due to excess calories (CMS/HCC)   • TANIYA on CPAP   • Displacement of lumbar intervertebral disc   • Dyslipidemia   • Osteoarthritis   • At high risk for falls   • Prostate cancer , s/p prostatectomy ( RALP)   • DM (diabetes mellitus), type 2 (CMS/HCC)   • Leukocytosis, mild, likely reactive   • Acute postoperative pain   • Acute blood loss anemia, mild, asymptomatic   • Renal failure   • Metabolic encephalopathy   • Paroxysmal atrial fibrillation (CMS/HCC)   • Sepsis (CMS/HCC)           Rash 19 1200 other (see comments) posterior arm (Active)   Distribution localized 2019  8:00 AM   Configuration/Shape asymmetric 2019  8:00 AM   Borders diffuse;irregular 2019  8:00 AM   Characteristics dry;raised 2019  8:00 AM   Color red 2019  8:00 AM   Care, Rash open to air 2019  4:00 AM       Rash 19 2000 Left upper arm macular (Active)   Distribution regional 2019  8:00 AM   Configuration/Shape asymmetric 2019  8:00 AM   Borders diffuse;irregular 2019  8:00 AM   Characteristics raised;dry 2019  8:00 AM   Color red 2019  8:00 AM   Care, Rash open to air 2019  6:00 AM       Rash 19  2000 Left posterior hand macular (Active)   Distribution localized 2/14/2019  8:00 AM   Configuration/Shape asymmetric 2/14/2019  8:00 AM   Borders diffuse;irregular 2/14/2019  8:00 AM   Characteristics dry;raised 2/14/2019  8:00 AM   Color purple 2/14/2019  8:00 AM   Care, Rash open to air 2/14/2019  6:00 AM       Rash 02/13/19 0614 Right upper arm macular (Active)   Distribution regional 2/14/2019  8:00 AM   Configuration/Shape asymmetric 2/14/2019  8:00 AM   Borders diffuse;irregular 2/14/2019  8:00 AM   Characteristics dry;raised 2/14/2019  8:00 AM   Color red;purple 2/14/2019  8:00 AM   Care, Rash open to air 2/14/2019  6:00 AM       Wound 02/08/19 1519 Other (See comments) neck incision (Active)   Dressing Appearance open to air 2/14/2019  8:00 AM   Closure Adhesive closure strips 2/14/2019  8:00 AM   Base clean;dry;pink 2/14/2019  8:00 AM   Periwound intact;dry;pink;blanchable 2/14/2019  8:00 AM   Periwound Skin Turgor soft 2/14/2019  8:00 AM   Drainage Amount none 2/14/2019  8:00 AM   Periwound Care, Wound dry periwound area maintained 2/13/2019 10:00 PM       Wound 02/08/19 1800 Right lower gluteal pressure injury (Active)   Dressing Appearance open to air 2/14/2019 11:00 AM   Closure None 2/14/2019  8:00 AM   Base dry;purple;red/granulating;maroon/purple 2/14/2019 11:00 AM   Periwound dry;pink;redness;blanchable 2/14/2019 11:00 AM   Periwound Temperature warm 2/14/2019 11:00 AM   Periwound Skin Turgor soft 2/14/2019 11:00 AM   Drainage Amount none 2/14/2019 11:00 AM   Care, Wound irrigated with;sterile normal saline;ultrasound therapy, non contact low frequency 2/14/2019 11:00 AM   Dressing Care, Wound open to air 2/14/2019 11:00 AM   Periwound Care, Wound dry periwound area maintained 2/13/2019 10:00 PM       Wound 02/09/19 1116 Bilateral medial thigh (Active)   Dressing Appearance open to air 2/14/2019 11:00 AM   Closure None 2/14/2019  8:00 AM   Base maroon/purple;red/granulating 2/14/2019 11:00 AM    Periwound intact;dry;redness;blanchable 2/14/2019 11:00 AM   Periwound Temperature warm 2/14/2019 11:00 AM   Periwound Skin Turgor soft 2/14/2019 11:00 AM   Edges irregular 2/14/2019 11:00 AM   Drainage Amount none 2/14/2019 11:00 AM   Care, Wound irrigated with;sterile normal saline;ultrasound therapy, non contact low frequency 2/14/2019 11:00 AM   Dressing Care, Wound open to air 2/14/2019 11:00 AM       Wound 02/13/19 0800 Left lower gluteal pressure injury (Active)   Dressing Appearance open to air 2/14/2019  8:00 AM   Base dry;maroon/purple;purple 2/14/2019  8:00 AM   Periwound intact;dry;pink 2/14/2019  8:00 AM         WOUND DEBRIDEMENT                  Therapy Treatment    Rehabilitation Treatment Summary     Row Name 02/14/19 1100             Treatment Time/Intention    Discipline  physical therapist  -      Document Type  therapy note (daily note);wound care  -      Subjective Information  complains of;weakness;fatigue;pain  -      Recorded by [] Efrem Tran, PT 02/14/19 1253      Row Name 02/14/19 1100             Positioning and Restraints    Pre-Treatment Position  in bed  -      Post Treatment Position  bed  -      In Bed  supine;call light within reach;with nsg;with family/caregiver  -      Recorded by [] Efrem Tran, PT 02/14/19 1253      Row Name 02/14/19 1100             Pain Assessment    Additional Documentation  Pain Scale: FACES Pre/Post-Treatment (Group)  -      Recorded by [] Efrem Tran, PT 02/14/19 1253      Row Name 02/14/19 1100             Pain Scale: Numbers Pre/Post-Treatment    Pain Location - Orientation  generalized  -      Pain Intervention(s)  Repositioned;Medication (See MAR)  -      Recorded by [] Efrem Tran, PT 02/14/19 1253      Row Name 02/14/19 1100             Pain Scale: FACES Pre/Post-Treatment    Pain: FACES Scale, Pretreatment  2-->hurts little bit  -      Pain: FACES Scale, Post-Treatment  4-->hurts little more   -MF      Pre/Post Treatment Pain Comment  6/10 pain with rolling  -MF      Recorded by [MF] Efrem Tran, PT 02/14/19 1253      Row Name                Wound 02/08/19 1519 Other (See comments) neck incision    Wound - Properties Group Date first assessed: 02/08/19 [SM] Time first assessed: 1519 [SM] Side: Other (See comments) [SM] Location: neck [SM] Type: incision [SM] Recorded by:  [SM] Rissa Rodriguez RN 02/08/19 1519    Row Name 02/14/19 1100             Wound 02/08/19 1800 Right lower gluteal pressure injury    Wound - Properties Group Date first assessed: 02/08/19 [AB] Time first assessed: 1800 [AB] Present On Admission : yes [AB] Side: Right [AB] Orientation: lower [AB] Location: gluteal [AB] Type: pressure injury [AB] Stage, Pressure Injury: deep tissue injury [AB] Additional Comments: bilateral gluteals [MR] Recorded by:  [AB] Addison Jasso RN 02/08/19 1855 [MR] Carmen Conner, RN 02/09/19 1227    Dressing Appearance  open to air  -MF      Base  dry;purple;red/granulating;maroon/purple  -MF      Periwound  dry;pink;redness;blanchable  -MF      Periwound Temperature  warm  -MF      Periwound Skin Turgor  soft  -MF      Drainage Amount  none  -MF      Care, Wound  irrigated with;sterile normal saline;ultrasound therapy, non contact low frequency 5 min  -MF      Dressing Care, Wound  open to air  -MF      Recorded by [MF] Efrem Tran, PT 02/14/19 1253      Row Name 02/14/19 1100             Wound 02/09/19 1116 Bilateral medial thigh    Wound - Properties Group Date first assessed: 02/09/19 [MR] Time first assessed: 1116 [MR] Present On Admission : yes [MR] Side: Bilateral [MR] Orientation: medial [MR] Location: thigh [MR] Stage, Pressure Injury: deep tissue injury [MR] Recorded by:  [MR] Carmen Conner, RN 02/09/19 1228    Dressing Appearance  open to air  -MF      Base  maroon/purple;red/granulating  -MF      Periwound  intact;dry;redness;blanchable  -MF      Periwound Temperature  warm   -MF      Periwound Skin Turgor  soft  -MF      Edges  irregular  -MF      Drainage Amount  none  -MF      Care, Wound  irrigated with;sterile normal saline;ultrasound therapy, non contact low frequency 5 min  -MF      Dressing Care, Wound  open to air  -MF      Recorded by [] Efrem Tran, PT 02/14/19 1253      Row Name                Wound 02/13/19 0800 Left lower gluteal pressure injury    Wound - Properties Group Date first assessed: 02/13/19 [JM] Time first assessed: 0800 [JM] Side: Left [JM] Orientation: lower [JM] Location: gluteal [JM] Type: pressure injury [JM] Stage, Pressure Injury: deep tissue injury [JM] Recorded by:  [] Ivon Eason, RN 02/13/19 1353    Row Name 02/14/19 1100             Coping    Observed Emotional State  accepting;calm;cooperative  -MF      Verbalized Emotional State  acceptance  -MF      Recorded by [] Efrem Tran, PT 02/14/19 1253      Row Name 02/14/19 1100             Plan of Care Review    Plan of Care Reviewed With  patient;spouse  -MF      Recorded by [] Efrem Tran, PT 02/14/19 1253        User Key  (r) = Recorded By, (t) = Taken By, (c) = Cosigned By    Initials Name Effective Dates Discipline     Efrem Tran, PT 06/19/15 -  PT    Rissa Bolden, RN 06/16/16 -  Nurse    Carmen Nelson RN 06/16/16 -  Nurse    Ivon Gutierrez RN 06/16/16 -  Nurse    Addison Jaffe RN 02/02/17 -  Nurse          Physical Therapy Education     Title: PT OT SLP Therapies (In Progress)     Topic: Physical Therapy (In Progress)     Point: Mobility training (In Progress)     Learning Progress Summary           Patient Acceptance, E,D, NR by LS at 2/12/2019  9:59 AM   Significant Other Acceptance, E,D, NR by LS at 2/12/2019  9:59 AM                   Point: Body mechanics (In Progress)     Learning Progress Summary           Patient Acceptance, E,D, NR by LS at 2/12/2019  9:59 AM   Significant Other Acceptance, E,D, NR by LS at 2/12/2019  9:59 AM                    Point: Precautions (In Progress)     Learning Progress Summary           Patient Acceptance, E,D, NR by  at 2/12/2019  9:59 AM   Significant Other Acceptance, E,D, NR by  at 2/12/2019  9:59 AM                               User Key     Initials Effective Dates Name Provider Type Discipline     06/19/15 -  Ana Akers, PT Physical Therapist PT                  PT Recommendation and Plan  Anticipated Discharge Disposition (PT): skilled nursing facility  Planned Therapy Interventions (PT Eval): balance training, bed mobility training, gait training, home exercise program, stair training, strengthening, transfer training, patient/family education  Therapy Frequency (PT Clinical Impression): daily               Outcome Summary: areas of DTPI progressing well with use of mist therapy.  purple discoloration fading with no increase in size noted.  PT will cont with mist therapy daily x 1-2 more days and decrease freq if wounds cont to progress well.   Plan of Care Reviewed With: patient, spouse    Outcome Measures     Row Name 02/12/19 0959 02/12/19 0935          How much help from another person do you currently need...    Turning from your back to your side while in flat bed without using bedrails?  2  -LS  --     Moving from lying on back to sitting on the side of a flat bed without bedrails?  2  -LS  --     Moving to and from a bed to a chair (including a wheelchair)?  1  -LS  --     Standing up from a chair using your arms (e.g., wheelchair, bedside chair)?  1  -LS  --     Climbing 3-5 steps with a railing?  1  -LS  --     To walk in hospital room?  1  -LS  --     AM-PAC 6 Clicks Score  8  -LS  --        How much help from another is currently needed...    Putting on and taking off regular lower body clothing?  --  1  -CL     Bathing (including washing, rinsing, and drying)  --  1  -CL     Toileting (which includes using toilet bed pan or urinal)  --  1  -CL     Putting on and taking off  regular upper body clothing  --  1  -CL     Taking care of personal grooming (such as brushing teeth)  --  2  -CL     Eating meals  --  2  -CL     Score  --  8  -CL        Functional Assessment    Outcome Measure Options  AM-PAC 6 Clicks Basic Mobility (PT)  -LS  AM-PAC 6 Clicks Daily Activity (OT)  -CL       User Key  (r) = Recorded By, (t) = Taken By, (c) = Cosigned By    Initials Name Provider Type    LS Ana Akers, PT Physical Therapist    CL Cassie Reardon, OT Occupational Therapist              Time Calculation  PT Charges     Row Name 02/14/19 1100             Time Calculation    Start Time  1100  -      PT Goal Re-Cert Due Date  02/22/19  -        User Key  (r) = Recorded By, (t) = Taken By, (c) = Cosigned By    Initials Name Provider Type    Efrem Gonzalez, PT Physical Therapist         Therapy Suggested Charges     Code   Minutes Charges    79803 (CPT®) Hc Pt Neuromusc Re Education Ea 15 Min      19040 (CPT®) Hc Pt Ther Proc Ea 15 Min      60222 (CPT®) Hc Gait Training Ea 15 Min      75779 (CPT®) Hc Pt Therapeutic Act Ea 15 Min 10 1    66298 (CPT®) Hc Pt Manual Therapy Ea 15 Min      02542 (CPT®) Hc Pt Iontophoresis Ea 15 Min      55707 (CPT®) Hc Pt Elec Stim Ea-Per 15 Min      59798 (CPT®) Hc Pt Ultrasound Ea 15 Min      29474 (CPT®) Hc Pt Self Care/Mgmt/Train Ea 15 Min      63711 (CPT®) Hc Pt Prosthetic (S) Train Initial Encounter, Each 15 Min      80498 (CPT®) Hc Pt Orthotic(S)/Prosthetic(S) Encounter, Each 15 Min      00305 (CPT®) Hc Orthotic(S) Mgmt/Train Initial Encounter, Each 15min      Total  10 1          Therapy Charges for Today     Code Description Service Date Service Provider Modifiers Qty    73082280279 HC PT NLFU MIST 2/13/2019 Efrem Tran, PT GP 1    19562552899 HC PT NLFU MIST 2/14/2019 Efrem Tran, PT GP 1            PT G-Codes  Outcome Measure Options: AM-PAC 6 Clicks Basic Mobility (PT)  AM-PAC 6 Clicks Score: 8  Score: 8        Efrem Tran  PT  2/14/2019

## 2019-02-14 NOTE — PROGRESS NOTES
Clinical Nutrition     Multidisciplinary Rounds    Time: 20min  Patient Name: Rod Balderas  Date of Encounter: 02/14/19 9:56 AM  MRN: [id number]  Admission date: 2/8/2019      Reason for visit: MDR. RD to continue to follow per protocol.     Additional information obtained during MDR:  S/P KLAUDIA this morning.  NPO this morning for KLAUDIA, ate about 50% of meals yesterday.     Current diet: NPO Diet      Dietary Nutrition Supplements Boost Glucose Control; vanilla daily       EMR reviewed     Intervention:  Follow treatment plan  Care plan reviewed  Review menu options  Supplement provided     Follow up:   Per protocol      Shelley Okeefe RD  9:56 AM

## 2019-02-14 NOTE — PLAN OF CARE
Problem: Patient Care Overview  Goal: Plan of Care Review  Outcome: Ongoing (interventions implemented as appropriate)   02/14/19 9189   Coping/Psychosocial   Plan of Care Reviewed With patient;spouse   Plan of Care Review   Progress improving   OTHER   Outcome Summary Cardioverted successfully to NSR in AM, maintained NSR for rest of shift, other than 3 6-8sec runs of PSVT. Oriented and appropriate, eating more off meals and drinking all of supplements. Up to chair for 3 hours with lift, lactulose given for abdominal fullness/incomplete emptying (per patient report). Christina catheter removed after getting OK from urology. Heparin gtt continues, home dose of gabapentin restarted, as kidney function much improved.        Problem: Skin Injury Risk (Adult)  Goal: Skin Health and Integrity  Outcome: Ongoing (interventions implemented as appropriate)      Problem: Fall Risk (Adult)  Goal: Absence of Fall  Outcome: Outcome(s) achieved Date Met: 02/14/19      Problem: Hemodialysis (Adult)  Goal: Signs and Symptoms of Listed Potential Problems Will be Absent, Minimized or Managed (Hemodialysis)  Outcome: Ongoing (interventions implemented as appropriate)      Problem: Sepsis/Septic Shock (Adult)  Goal: Signs and Symptoms of Listed Potential Problems Will be Absent, Minimized or Managed (Sepsis/Septic Shock)  Outcome: Ongoing (interventions implemented as appropriate)

## 2019-02-14 NOTE — PLAN OF CARE
Problem: Patient Care Overview  Goal: Plan of Care Review  Outcome: Ongoing (interventions implemented as appropriate)   02/14/19 9899   Coping/Psychosocial   Plan of Care Reviewed With patient;spouse   Plan of Care Review   Progress improving   OTHER   Outcome Summary Pt conts to be Depx2 for t/f from bed/chair w/ mechanical lift. Pt educated on/encouraged to perform BUE HEP, though limited d/t c/o joint stiffness and generalized weaknes. Recommend cont skilled IPOT POC.

## 2019-02-14 NOTE — NURSING NOTE
At this time PT wound care is following patient for MIST therapy for bilateral DTPI's. Will defer care to PT wound care at this time and will sign off. Please contact if further needs arise. Thanks

## 2019-02-14 NOTE — PROGRESS NOTES
INTENSIVIST   PROGRESS NOTE     Hospital:  LOS: 6 days      S     Mr. Rod Balderas, 68 y.o. male is followed for:      Sepsis with MODS    T2DM    As an Intensivist, we provide an integrated approach to the ICU patient and family, medical management of comorbid conditions, including but not limited to electrolytes, glycemic control, organ dysfunction, lead interdisciplinary rounds and coordinate the care with all other services, including those from other specialists.     Interval History:  Improving.  Making more urine.  ECV this morning, now on NSR.     The patient's relevant past medical, surgical and social history were reviewed and updated in Epic as appropriate.     ROS:   Constitutional: Negative for fever.   Respiratory: Negative for dyspnea.   Cardiovascular: Negative for chest pain.   Gastrointestinal: Negative for  nausea, vomiting and diarrhea.        O     Vitals:  Temp: 98.8 °F (37.1 °C) (02/14/19 1200) Temp  Min: 97.7 °F (36.5 °C)  Max: 99.2 °F (37.3 °C)   BP: 113/50 (02/14/19 1525) BP  Min: 100/50  Max: 157/72   Pulse: 74 (02/14/19 1525) Pulse  Min: 68  Max: 137   Resp: 18 (02/14/19 1400) Resp  Min: 14  Max: 20   SpO2: 97 % (02/14/19 1500) SpO2  Min: 93 %  Max: 100 %   Device: room air (02/14/19 1400)    Flow Rate: 2 (02/14/19 1200) Flow (L/min)  Min: 2  Max: 6       Intake & Output (last 3 days)       02/11 0701 - 02/12 0700 02/12 0701 - 02/13 0700 02/13 0701 - 02/14 0700 02/14 0701 - 02/15 0700    P.O. 1140     I.V. (mL/kg) 348 (3) 483.9 (4.1) 410.9 (3.5) 89.5 (0.8)    Other 50       IV Piggyback 810 560 411 260    Total Intake(mL/kg) 2348 (20.1) 1543.9 (13.2) 1361.9 (11.6) 1389.5 (11.9)    Urine (mL/kg/hr) 1620 (0.6) 1275 (0.5) 3700 (1.3) 710 (0.7)    Other 3000       Stool 0  0     Total Output 4620 1275 3700 710    Net -2272 +268.9 -2338.1 +679.5            Stool Unmeasured Occurrence 2 x  1 x           Physical Examination  Telemetry:  Rhythm: normal sinus rhythm (02/14/19  1400)  Atrial Rhythm: atrial fibrillation (02/14/19 0924)      Constitutional:  No acute distress.   Cardiovascular: RRR. Normal heart sounds.  No murmurs, gallop or rub.   Respiratory: No respiratory distress. Normal respiratory effort.  Rhonchi.    Abdominal:  Soft. No masses. Non-tender. No distension. No HSM.   Extremities: No digital cyanosis. No clubbing.  (+) edema.   Neurological:   Awake. Follows commands.  Best Eye Response: 4-->(E4) spontaneous (02/14/19 1400)  Best Motor Response: 6-->(M6) obeys commands (02/14/19 1400)  Best Verbal Response: 5-->(V5) oriented (02/14/19 1400)  [place] Coma Scale Score: 15 (02/14/19 1400)   Lines/Drains/Airways: RIJ Tunneled Dyalisis Catheter  Christina       Hematology:  Results from last 7 days   Lab Units 02/14/19  0520 02/13/19  1220 02/13/19  0534   WBC 10*3/mm3 19.26* 20.36* 18.94*   HEMOGLOBIN g/dL 10.7* 10.5* 11.6*   MCV fL 80.2 80.4 78.9*   PLATELETS 10*3/mm3 223 170 168       Chemistry:  Estimated Creatinine Clearance: 69.6 mL/min (by C-G formula based on SCr of 1.26 mg/dL).    Results from last 7 days   Lab Units 02/14/19 0520 02/13/19  0534 02/12/19  0526   SODIUM mmol/L 137 136 133   POTASSIUM mmol/L 3.3* 3.6 3.4*   CHLORIDE mmol/L 104 103 99   CO2 mmol/L 24.0 23.0 23.0   ANION GAP mmol/L 9.0 10.0 11.0   GLUCOSE mg/dL 123* 149* 186*   CALCIUM mg/dL 7.6* 7.5* 8.1*     Results from last 7 days   Lab Units 02/14/19 0520 02/13/19  0534 02/12/19  0526   BUN mg/dL 72* 73* 60*   CREATININE mg/dL 1.26 1.38* 1.74*     Results from last 7 days   Lab Units 02/14/19 0520 02/13/19  0534 02/12/19  0526 02/11/19  0637  02/09/19  0510   MAGNESIUM mg/dL  --   --   --  2.2  --  2.3   PHOSPHORUS mg/dL 4.6 4.7 5.2* 6.0*   < > 6.4*    < > = values in this interval not displayed.       Hepatic:  Results from last 7 days   Lab Units 02/14/19  0520 02/13/19  0534 02/12/19  0526 02/11/19  0637 02/10/19  0340 02/09/19  0510   ALK PHOS U/L  --   --   --  348* 300* 319*   BILIRUBIN mg/dL   --   --   --  2.6* 2.6* 2.9*   ALT (SGPT) U/L  --   --   --  17 23 33   AST (SGOT) U/L  --   --   --  67* 46* 46*   ALBUMIN g/dL 2.62* 2.57* 2.83* 2.89* 3.05* 2.96*       Lab Results   Lab Value Date/Time    BLOODCX No growth at 5 days 02/08/2019 1240       Lab Results   Lab Value Date/Time    URINECX >100,000 CFU/mL Staphylococcus aureus (A) 02/08/2019 1826       Lab Results   Lab Value Date/Time    RESPCX Scant growth (1+) Yeast isolated (A) 02/09/2019 1431         Images:  Xr Wrist 3+ View Right    Result Date: 2/13/2019  1. The distal ulnar diaphysis fracture is probably chronic and is healed in near-anatomic alignment. 2. Age-indeterminate fracture of the base of the fifth ray metacarpal. 3. Soft tissue swelling.  D:  02/13/2019 E:  02/13/2019  This report was finalized on 2/13/2019 11:23 AM by Richard Handy.      Xr Chest 1 View    Result Date: 2/13/2019  Mild increase in pulmonary vascularity centrally from prior comparison without significant effusion or focal consolidation.  D:  02/13/2019 E:  02/13/2019  This report was finalized on 2/13/2019 12:35 PM by Dr. Caden Subramanian.        Echo:  Results for orders placed during the hospital encounter of 02/08/19   Adult Transesophageal Echo (KLAUDIA) W/ Cont if Necessary Per Protocol    Narrative · Estimated EF appears to be in the range of 61 - 65%.  · Left ventricular systolic function is normal.  · Moderate aortic valve regurgitation is present.  · Mild tricuspid valve regurgitation is present.  · Mild mitral valve regurgitation is present  · No evidence of a left atrial appendage thrombus was present.          Results: Reviewed.  I reviewed the patient's new laboratory and imaging results.  I independently reviewed the patient's new images.    Medications: Reviewed.    Assessment/Plan   A / P     68 y.o.male, admitted on 2/8/2019 with Acute renal failure (ARF) (CMS/[facility]) [N17.9]: Transferred from [facility] where he was admitted on 2/5/19    1. Sepsis  with MODS  1. PCT decreasing since admission.    Lab Results   Lab Value Date/Time    PROCALCITO 0.95 (H) 02/14/2019 0520    PROCALCITO 4.77 (H) 02/10/2019 0340    PROCALCITO 7.55 (H) 02/08/2019 1240       2. BC positive and Urine Cx positive for MSSA at [facility] (2/5/2019)  3. Urine Cx ([facility] 2/8/19): Staph aureus  4. Renal failure  1. NOE - now on Hemodialysis.  1. Cr went from 2.2 (on 2/6) to 5.6 (on 2/8) and 1.26 on 02/14/19  2. He seems to be recovering renal function, and may not need more dialysis.  2. R/o Medications: NSAIDs and/or ACEI.  3. S/p Robotic Laparoscopic Prostatectomy 1/15/19 (due to Prostate cancer)  4. Hematuria on admission to [facility]  5. Encephalopathy  1. Improved  2. Most likely Toxic metabolic  6. Respiratory failure  1. TANIYA on BiPAP at home  2. Bibasilar consolidation as per CT Scan (2/8/19)  7. Leukocytosis.  8. Liver  1. Elevated ALKP, Bilirubin and mild elevation of transaminases  9. R wrist aspiration per Ortho on 2/13/2019 basically a dry tap, (2 attempts).  2. Purpuric type rash upper extremities  1. Only mild Thrombocytopenia  3. CAD s/p stents 2012  4. P AFib, brief episode on 2/9/209, requiring Diltiazem and converted to NSR, then back on AFIB requiring cardioversion on 02/14/19   1. KLAUDIA no intracardiac vegetation or abscess. EF 60-65%  5. Dyslipidemia  6. Obesity III. Body mass index is 40.24 kg/m².   7. Antibiotics: AZT, Cefazolin, Metronidazole} Per ID  8. PMH: HTN  9. PMH: Spinal stenosis with neurogenic claudication  10. T2DM    Results from last 7 days   Lab Units 02/14/19  1123 02/14/19  0725 02/13/19 2006 02/13/19  1611 02/13/19  1136 02/13/19  0702   GLUCOSE mg/dL 103 116 167* 180* 186* 138*     Lab Results   Lab Value Date/Time    HGBA1C 5.70 (H) 01/08/2019 1524       Nutrition Support: Patient isn't on Tube Feeding   Modulars: Patient doesn't have any tube feeding modular orders   Diet: Diet Regular; Renal, Daily Fluid Restriction, Consistent Carbohydrate; Other;  2,000   Advance Directives: Code Status and Medical Interventions:   Ordered at: 02/08/19 1208     Code Status:    CPR     Medical Interventions (Level of Support Prior to Arrest):    Full          Assessment / Plan:    1. Improving.  2. Discussed with Dr. Arielle Carolina (Hospitalist), updated in his condition. Continue ICU care. He thinks, with all his medical problems, not quite ready for the [facility].  3. Disposition: Keep in ICU.       Plan of care and goals reviewed during interdisciplinary rounds.  Level of Risk is High due to:  illness with threat to life or bodily function.   I discussed the patient's findings and my recommendations with patient    Jamal Villar MD, FACP, FCCP, CNSC  Intensive Care Medicine, Nutrition Support and Pulmonary Medicine

## 2019-02-14 NOTE — THERAPY TREATMENT NOTE
Acute Care - Occupational Therapy Treatment Note  [facility]     Patient Name: Rod Balderas  : 1950  MRN: [id number]  Today's Date: 2019  Onset of Illness/Injury or Date of Surgery: 19  Date of Referral to OT: 19  Referring Physician: MD Aurea    Admit Date: 2019       ICD-10-CM ICD-9-CM   1. Impaired functional mobility, balance, gait, and endurance Z74.09 V49.89   2. Impaired mobility and ADLs Z74.09 799.89     Patient Active Problem List   Diagnosis   • Degenerative disc disease, lumbar   • Spinal stenosis, lumbar region, with neurogenic claudication   • Neuroforaminal stenosis of spine   • Lumbar facet arthropathy   • CAD (coronary artery disease)   • Essential hypertension   • Physical deconditioning   • Morbid obesity due to excess calories (CMS/HCC)   • TANIYA on CPAP   • Displacement of lumbar intervertebral disc   • Dyslipidemia   • Osteoarthritis   • At high risk for falls   • Prostate cancer , s/p prostatectomy ( RALP)   • DM (diabetes mellitus), type 2 (CMS/HCC)   • Leukocytosis, mild, likely reactive   • Acute postoperative pain   • Acute blood loss anemia, mild, asymptomatic   • Renal failure   • Metabolic encephalopathy   • Paroxysmal atrial fibrillation (CMS/HCC)   • Sepsis (CMS/HCC)     Past Medical History:   Diagnosis Date   • Anxiety and depression    • Cancer (CMS/HCC)    • Chest pain    • Coronary artery disease    • Diabetes mellitus (CMS/HCC)    • Dyslipidemia    • Extremity pain    • Heart disease    • History of transfusion    • Hypertension    • Low back pain    • TANIYA on CPAP     2-3    • Osteoarthritis     Diffuse osteoarthritis.   • Panic attacks    • Prostate cancer (CMS/HCC)      Past Surgical History:   Procedure Laterality Date   • CARDIAC CATHETERIZATION     • CHOLECYSTECTOMY     • COLONOSCOPY     • CORONARY ANGIOPLASTY WITH STENT PLACEMENT  2012:  A 3.5 x 88 Promus VERONICA stent to OM2.  Nonobstructive disease.  Otherwise normal  LVEF.   • EYE SURGERY Bilateral     cataract   • INSERTION HEMODIALYSIS CATHETER N/A 2/8/2019    Procedure: HEMODIALYSIS CATHETER INSERTION RIGHT INTERNAL JUGULAR;  Surgeon: Bishnu Dailey MD;  Location: [facility] OR;  Service: General   • PROSTATECTOMY N/A 1/15/2019    Procedure: ROBOTIC PROSTATECTOMY WITH NODES;  Surgeon: Juanito Grace Jr., MD;  Location: [facility] OR;  Service: DaVinci   • RETINAL DETACHMENT SURGERY Right        Therapy Treatment    Rehabilitation Treatment Summary     Row Name 02/14/19 1352 02/14/19 1100          Treatment Time/Intention    Discipline  occupational therapist  -CL  physical therapist  -MF     Document Type  therapy note (daily note)  -CL  therapy note (daily note);wound care  -MF     Subjective Information  complains of;weakness;fatigue;pain  -CL  complains of;weakness;fatigue;pain  -MF     Patient Effort  good  -CL  --     Existing Precautions/Restrictions  fall;oxygen therapy device and L/min;other (see comments) chronic L foot drop, wears AFO  -CL  --     Recorded by [CL] Cassie Reardon, OT 02/14/19 1634 [MF] Efrem Tran, PT 02/14/19 1253     Row Name 02/14/19 1352             Vital Signs    Pre Systolic BP Rehab  -- VSS, RN cleared for tx.   -CL      Recorded by [CL] Cassie Reardon OT 02/14/19 1634      Row Name 02/14/19 1352             Cognitive Assessment/Intervention- PT/OT    Affect/Mental Status (Cognitive)  confused  -CL      Orientation Status (Cognition)  oriented to;person;place  -CL      Follows Commands (Cognition)  follows one step commands;75-90% accuracy;verbal cues/prompting required;repetition of directions required  -CL      Cognitive Function (Cognitive)  safety deficit  -CL      Safety Deficit (Cognitive)  mild deficit;awareness of need for assistance;insight into deficits/self awareness;safety precautions awareness  -CL      Personal Safety Interventions  fall prevention program maintained;gait belt;nonskid shoes/slippers when out of bed   -CL      Recorded by [CL] Cassie Reardon, OT 02/14/19 1634      Row Name 02/14/19 1352             Bed Mobility Assessment/Treatment    Comment (Bed Mobility)  Pt received on lift sling.   -CL      Recorded by [CL] Cassie Reardon, OT 02/14/19 1634      Row Name 02/14/19 1352             Transfer Assessment/Treatment    Transfer Assessment/Treatment  bed-chair transfer  -CL      Recorded by [CL] Cassie Reardon, OT 02/14/19 1634      Row Name 02/14/19 1352             Bed-Chair Transfer    Bed-Chair [place] (Transfers)  dependent (less than 25% patient effort);2 person assist;verbal cues  -CL      Assistive Device (Bed-Chair Transfers)  mechanical lift/aid  -CL      Recorded by [CL] Cassie Reardon, OT 02/14/19 1634      Row Name 02/14/19 1352             Motor Skills Assessment/Interventions    Additional Documentation  Therapeutic Exercise (Group)  -CL      Recorded by [CL] Cassie Reardon OT 02/14/19 1634      Row Name 02/14/19 1352             Therapeutic Exercise    Therapeutic Exercise  seated, upper extremities  -CL      Additional Documentation  Therapeutic Exercise (Row)  -CL      53467 - OT Therapeutic Exercise Minutes  5  -CL      79909 - OT Therapeutic Activity Minutes  10  -CL      Recorded by [CL] Cassie Reardon OT 02/14/19 1634      Row Name 02/14/19 1352             Upper Extremity Seated Therapeutic Exercise    Performed, Seated Upper Extremity (Therapeutic Exercise)  shoulder flexion/extension;elbow flexion/extension;wrist flexion/extension;digit flexion/extension  -CL      Exercise Type, Seated Upper Extremity (Therapeutic Exercise)  AAROM (active assistive range of motion);AROM (active range of motion)  -CL      Sets/Reps Detail, Seated Upper Extremity (Therapeutic Exercise)  1/10  -CL      Comment, Seated Upper Extremity (Therapeutic Exercise)  R hand tendon glides/tenodesis positioning to improve MP/PIP/DIP ROM w/ noted pain w/ wrist flexion, possible TFCC irritation.   -CL      Recorded by [CL] Alexys  Cassie, OT 02/14/19 1634      Row Name 02/14/19 1352 02/14/19 1100          Positioning and Restraints    Pre-Treatment Position  in bed  -CL  in bed  -MF     Post Treatment Position  chair  -CL  bed  -MF     In Bed  notified nsg;sitting;call light within reach;encouraged to call for assist;with family/caregiver;with PT Pt transitioend to pt treatment.   -CL  supine;call light within reach;with nsg;with family/caregiver  -MF     Recorded by [CL] Cassie Reardon, OT 02/14/19 1634 [MF] Efrem Tran, PT 02/14/19 1253     Row Name 02/14/19 1100             Pain Assessment    Additional Documentation  Pain Scale: FACES Pre/Post-Treatment (Group)  -MF      Recorded by [MF] Efrem Tran, PT 02/14/19 1253      Row Name 02/14/19 1100             Pain Scale: Numbers Pre/Post-Treatment    Pain Location - Orientation  generalized  -MF      Pain Intervention(s)  Repositioned;Medication (See MAR)  -MF      Recorded by [MF] Efrem Tran, PT 02/14/19 1253      Row Name 02/14/19 1100             Pain Scale: FACES Pre/Post-Treatment    Pain: FACES Scale, Pretreatment  2-->hurts little bit  -MF      Pain: FACES Scale, Post-Treatment  4-->hurts little more  -MF      Pre/Post Treatment Pain Comment  6/10 pain with rolling  -MF      Recorded by [MF] Efrem Tran, PT 02/14/19 1253      Row Name 02/14/19 1352             Pain Scale 2: FACES Pre/Post-Treatment    Pain 2: FACES Scale, Pretreatment  4-->hurts little more  -CL      Pain 2: FACES Scale, Post-Treatment  4-->hurts little more  -CL      Pain Location 2 - Orientation  generalized  -CL      Pre/Post Treatment Pain 2 Comment  Tolerated.   -CL      Pain Intervention(s) 2  Repositioned;Ambulation/increased activity  -CL      Recorded by [CL] Cassie Reardon, OT 02/14/19 1634      Row Name                Wound 02/08/19 1519 Other (See comments) neck incision    Wound - Properties Group Date first assessed: 02/08/19 [SM] Time first assessed: 1519 [SM] Side: Other (See  comments) [SM] Location: neck [SM] Type: incision [SM] Recorded by:  [SM] Rissa Rodriguez RN 02/08/19 1519    Row Name 02/14/19 1100             Wound 02/08/19 1800 Right lower gluteal pressure injury    Wound - Properties Group Date first assessed: 02/08/19 [AB] Time first assessed: 1800 [AB] Present On Admission : yes [AB] Side: Right [AB] Orientation: lower [AB] Location: gluteal [AB] Type: pressure injury [AB] Stage, Pressure Injury: deep tissue injury [AB] Additional Comments: bilateral gluteals [MR] Recorded by:  [AB] Addison Jasso RN 02/08/19 1855 [MR] Carmen Conner, RN 02/09/19 1227    Dressing Appearance  open to air  -MF      Base  dry;purple;red/granulating;maroon/purple  -MF      Periwound  dry;pink;redness;blanchable  -MF      Periwound Temperature  warm  -MF      Periwound Skin Turgor  soft  -MF      Drainage Amount  none  -MF      Care, Wound  irrigated with;sterile normal saline;ultrasound therapy, non contact low frequency 5 min  -MF      Dressing Care, Wound  open to air  -MF      Recorded by [MF] Efrem Tran, PT 02/14/19 1253      Row Name 02/14/19 1100             Wound 02/09/19 1116 Bilateral medial thigh    Wound - Properties Group Date first assessed: 02/09/19 [MR] Time first assessed: 1116 [MR] Present On Admission : yes [MR] Side: Bilateral [MR] Orientation: medial [MR] Location: thigh [MR] Stage, Pressure Injury: deep tissue injury [MR] Recorded by:  [MR] Carmen Conner, RN 02/09/19 1228    Dressing Appearance  open to air  -MF      Base  maroon/purple;red/granulating  -MF      Periwound  intact;dry;redness;blanchable  -MF      Periwound Temperature  warm  -MF      Periwound Skin Turgor  soft  -MF      Edges  irregular  -MF      Drainage Amount  none  -MF      Care, Wound  irrigated with;sterile normal saline;ultrasound therapy, non contact low frequency 5 min  -MF      Dressing Care, Wound  open to air  -MF      Recorded by [MF] Efrem Tran, PT 02/14/19 1257       Row Name                Wound 02/13/19 0800 Left lower gluteal pressure injury    Wound - Properties Group Date first assessed: 02/13/19 [JM] Time first assessed: 0800 [JM] Side: Left [JM] Orientation: lower [JM] Location: gluteal [JM] Type: pressure injury [JM] Stage, Pressure Injury: deep tissue injury [JM] Recorded by:  [JM] Ivon Eason, RN 02/13/19 1353    Row Name 02/14/19 1100             Coping    Observed Emotional State  accepting;calm;cooperative  -MF      Verbalized Emotional State  acceptance  -MF      Recorded by [] Efrem Tran, PT 02/14/19 1253      Row Name 02/14/19 1100             Plan of Care Review    Plan of Care Reviewed With  patient;spouse  -MF      Recorded by [] Efrem Tran, PT 02/14/19 1253        User Key  (r) = Recorded By, (t) = Taken By, (c) = Cosigned By    Initials Name Effective Dates Discipline     Efrem Tran, PT 06/19/15 -  PT    Rissa Bolden RN 06/16/16 -  Nurse    Carmen Nelson RN 06/16/16 -  Nurse    Ivon Gutierrez RN 06/16/16 -  Nurse    Cassie Brothers OT 04/03/18 -  OT    Addison Jaffe RN 02/02/17 -  Nurse        Rash 02/11/19 1200 other (see comments) posterior arm (Active)   Distribution localized 2/14/2019  2:00 PM   Configuration/Shape asymmetric 2/14/2019  2:00 PM   Borders diffuse;irregular 2/14/2019  2:00 PM   Characteristics dry;raised 2/14/2019  2:00 PM   Color red 2/14/2019  2:00 PM   Care, Rash open to air 2/14/2019  4:00 AM       Rash 02/12/19 2000 Left upper arm macular (Active)   Distribution regional 2/14/2019  2:00 PM   Configuration/Shape asymmetric 2/14/2019  2:00 PM   Borders diffuse;irregular 2/14/2019  2:00 PM   Characteristics raised;dry 2/14/2019  2:00 PM   Color red 2/14/2019  2:00 PM   Care, Rash open to air 2/14/2019  6:00 AM       Rash 02/12/19 2000 Left posterior hand macular (Active)   Distribution localized 2/14/2019  2:00 PM   Configuration/Shape asymmetric 2/14/2019  2:00 PM   Borders  diffuse;irregular 2/14/2019  2:00 PM   Characteristics dry;raised 2/14/2019  2:00 PM   Color purple 2/14/2019  2:00 PM   Care, Rash open to air 2/14/2019  6:00 AM       Rash 02/13/19 0614 Right upper arm macular (Active)   Distribution regional 2/14/2019  2:00 PM   Configuration/Shape asymmetric 2/14/2019  2:00 PM   Borders diffuse;irregular 2/14/2019  2:00 PM   Characteristics dry;raised 2/14/2019  2:00 PM   Color red;purple 2/14/2019  2:00 PM   Care, Rash open to air 2/14/2019  6:00 AM       Wound 02/08/19 1519 Other (See comments) neck incision (Active)   Dressing Appearance open to air 2/14/2019  2:00 PM   Closure Adhesive closure strips 2/14/2019  2:00 PM   Base clean;dry;pink 2/14/2019  2:00 PM   Periwound intact;dry;pink;blanchable 2/14/2019  2:00 PM   Periwound Skin Turgor soft 2/14/2019  2:00 PM   Drainage Amount none 2/14/2019  2:00 PM   Periwound Care, Wound dry periwound area maintained 2/13/2019 10:00 PM       Wound 02/08/19 1800 Right lower gluteal pressure injury (Active)   Dressing Appearance open to air 2/14/2019  2:00 PM   Closure None 2/14/2019  2:00 PM   Base dry;purple;red/granulating;maroon/purple 2/14/2019 12:00 PM   Periwound dry;pink;redness;blanchable 2/14/2019  2:00 PM   Periwound Temperature warm 2/14/2019  2:00 PM   Periwound Skin Turgor soft 2/14/2019  2:00 PM   Drainage Amount none 2/14/2019  2:00 PM   Care, Wound irrigated with;sterile normal saline;ultrasound therapy, non contact low frequency 2/14/2019 11:00 AM   Dressing Care, Wound open to air 2/14/2019 11:00 AM   Periwound Care, Wound dry periwound area maintained 2/13/2019 10:00 PM       Wound 02/09/19 1116 Bilateral medial thigh (Active)   Dressing Appearance open to air 2/14/2019  2:00 PM   Closure None 2/14/2019  2:00 PM   Base maroon/purple;red/granulating 2/14/2019 12:00 PM   Periwound intact;dry;redness;blanchable 2/14/2019  2:00 PM   Periwound Temperature warm 2/14/2019 11:00 AM   Periwound Skin Turgor soft 2/14/2019  11:00 AM   Edges irregular 2/14/2019 11:00 AM   Drainage Amount none 2/14/2019  2:00 PM   Care, Wound irrigated with;sterile normal saline;ultrasound therapy, non contact low frequency 2/14/2019 11:00 AM   Dressing Care, Wound open to air 2/14/2019 11:00 AM       Wound 02/13/19 0800 Left lower gluteal pressure injury (Active)   Dressing Appearance open to air 2/14/2019  2:00 PM   Base dry;maroon/purple;purple 2/14/2019 12:00 PM   Periwound intact;dry;pink 2/14/2019  2:00 PM       Occupational Therapy Education     Title: PT OT SLP Therapies (In Progress)     Topic: Occupational Therapy (In Progress)     Point: ADL training (In Progress)     Description: Instruct learner(s) on proper safety adaptation and remediation techniques during self care or transfers.   Instruct in proper use of assistive devices.    Learning Progress Summary           Patient Acceptance, E, NR by CL at 2/14/2019  4:34 PM    Comment:  Pt educated on appropriate safety precautions, t/f techniques, role of OT, and benefits of therapy.    Acceptance, E, NR by CL at 2/12/2019  3:03 PM    Comment:  Pt educated on appropriate safety precautions, t/f techniques, positioning, and benefits of therapy.   Family Acceptance, E, NR by CL at 2/14/2019  4:34 PM    Comment:  Pt educated on appropriate safety precautions, t/f techniques, role of OT, and benefits of therapy.    Acceptance, E, NR by CL at 2/12/2019  3:03 PM    Comment:  Pt educated on appropriate safety precautions, t/f techniques, positioning, and benefits of therapy.                   Point: Home exercise program (In Progress)     Description: Instruct learner(s) on appropriate technique for monitoring, assisting and/or progressing therapeutic exercises/activities.    Learning Progress Summary           Patient Acceptance, E, NR by CL at 2/14/2019  4:34 PM    Comment:  Pt educated on appropriate safety precautions, t/f techniques, role of OT, and benefits of therapy.   Family Acceptance, E, NR  by CL at 2/14/2019  4:34 PM    Comment:  Pt educated on appropriate safety precautions, t/f techniques, role of OT, and benefits of therapy.                   Point: Precautions (In Progress)     Description: Instruct learner(s) on prescribed precautions during self-care and functional transfers.    Learning Progress Summary           Patient Acceptance, E, NR by CL at 2/14/2019  4:34 PM    Comment:  Pt educated on appropriate safety precautions, t/f techniques, role of OT, and benefits of therapy.    Acceptance, E, NR by CL at 2/12/2019  3:03 PM    Comment:  Pt educated on appropriate safety precautions, t/f techniques, positioning, and benefits of therapy.   Family Acceptance, E, NR by CL at 2/14/2019  4:34 PM    Comment:  Pt educated on appropriate safety precautions, t/f techniques, role of OT, and benefits of therapy.    Acceptance, E, NR by CL at 2/12/2019  3:03 PM    Comment:  Pt educated on appropriate safety precautions, t/f techniques, positioning, and benefits of therapy.                   Point: Body mechanics (In Progress)     Description: Instruct learner(s) on proper positioning and spine alignment during self-care, functional mobility activities and/or exercises.    Learning Progress Summary           Patient Acceptance, E, NR by CL at 2/14/2019  4:34 PM    Comment:  Pt educated on appropriate safety precautions, t/f techniques, role of OT, and benefits of therapy.    Acceptance, E, NR by CL at 2/12/2019  3:03 PM    Comment:  Pt educated on appropriate safety precautions, t/f techniques, positioning, and benefits of therapy.   Family Acceptance, E, NR by CL at 2/14/2019  4:34 PM    Comment:  Pt educated on appropriate safety precautions, t/f techniques, role of OT, and benefits of therapy.    Acceptance, E, NR by CL at 2/12/2019  3:03 PM    Comment:  Pt educated on appropriate safety precautions, t/f techniques, positioning, and benefits of therapy.                               User Key     Initials  Effective Dates Name Provider Type Discipline    CL 04/03/18 -  Cassie Reardon, OT Occupational Therapist OT                OT Recommendation and Plan  Outcome Summary/Treatment Plan (OT)  Anticipated Equipment Needs at Discharge (OT): (TBA further)  Anticipated Discharge Disposition (OT): skilled nursing facility  Therapy Frequency (OT Eval): daily  Plan of Care Review  Plan of Care Reviewed With: patient, spouse  Plan of Care Reviewed With: patient, spouse  Outcome Summary: Pt conts to be Depx2 for t/f from bed/chair w/ mechanical lift. Pt educated on/encouraged to perform BUE HEP, though limited d/t c/o joint stiffness and generalized weaknes. Recommend cont skilled IPOT POC.   Outcome Measures     Row Name 02/14/19 1352 02/12/19 0959 02/12/19 0935       How much help from another person do you currently need...    Turning from your back to your side while in flat bed without using bedrails?  --  2  -LS  --    Moving from lying on back to sitting on the side of a flat bed without bedrails?  --  2  -LS  --    Moving to and from a bed to a chair (including a wheelchair)?  --  1  -LS  --    Standing up from a chair using your arms (e.g., wheelchair, bedside chair)?  --  1  -LS  --    Climbing 3-5 steps with a railing?  --  1  -LS  --    To walk in hospital room?  --  1  -LS  --    AM-PAC 6 Clicks Score  --  8  -LS  --       How much help from another is currently needed...    Putting on and taking off regular lower body clothing?  1  -CL  --  1  -CL    Bathing (including washing, rinsing, and drying)  1  -CL  --  1  -CL    Toileting (which includes using toilet bed pan or urinal)  1  -CL  --  1  -CL    Putting on and taking off regular upper body clothing  1  -CL  --  1  -CL    Taking care of personal grooming (such as brushing teeth)  2  -CL  --  2  -CL    Eating meals  2  -CL  --  2  -CL    Score  8  -CL  --  8  -CL       Functional Assessment    Outcome Measure Options  AM-PAC 6 Clicks Daily Activity (OT)  -CL   AM-PAC 6 Clicks Basic Mobility (PT)  -LS  AM-PAC 6 Clicks Daily Activity (OT)  -CL      User Key  (r) = Recorded By, (t) = Taken By, (c) = Cosigned By    Initials Name Provider Type    LS Ana Akers, PT Physical Therapist    CL Cassie Reardon OT Occupational Therapist           Time Calculation:   Time Calculation- OT     Row Name 02/14/19 1352             Time Calculation- OT    OT Start Time  1352  -CL      OT Received On  02/14/19  -CL      OT Goal Re-Cert Due Date  02/22/19  -CL         Timed Charges    52502 - OT Therapeutic Exercise Minutes  5  -CL      90523 - OT Therapeutic Activity Minutes  10  -CL        User Key  (r) = Recorded By, (t) = Taken By, (c) = Cosigned By    Initials Name Provider Type    CL Cassie Reardon OT Occupational Therapist           Therapy Suggested Charges     Code   Minutes Charges    38519 (CPT®) Hc Ot Neuromusc Re Education Ea 15 Min      46449 (CPT®) Hc Ot Ther Proc Ea 15 Min 5     49503 (CPT®) Hc Ot Therapeutic Act Ea 15 Min 10 1    83213 (CPT®) Hc Ot Manual Therapy Ea 15 Min      38665 (CPT®) Hc Ot Iontophoresis Ea 15 Min      35780 (CPT®) Hc Ot Elec Stim Ea-Per 15 Min      44461 (CPT®) Hc Ot Ultrasound Ea 15 Min      25146 (CPT®) Hc Ot Self Care/Mgmt/Train Ea 15 Min      Total  15 1        Therapy Charges for Today     Code Description Service Date Service Provider Modifiers Qty    [id number] HC OT THERAPEUTIC ACT EA 15 MIN 2/14/2019 Cassie Reardon OT GO 1               Cassie Reardon OT  2/14/2019

## 2019-02-14 NOTE — POST-PROCEDURE NOTE
Rod Balderas  1950  [id number]    Brief KLAUDIA/ECV Note    KLAUDIA shows no evidence of intracardiac vegetation or abscess  Moderate AI  Mild MR  ALCIRA normal with normal ejection velocity  EF 60-65%    Cardioversion    Indication: afib with rvr    The patient was seen in his ICU room in the post-absorptive state. Continuous monitoring of heart rhythm, oxygen saturation, and intermittent noninvasive blood pressure  were instituted.   The patient was anesthetized by myself and a trained RN. Following adequate anesthesia, the patient underwent electrical cardioversion, using 200 joules of synchronized energy  from the biphasic cardioverter/defibrillator. The patient  Had return of normal sinus rhythm. There were no immediate complications.    I supervised and directed an independent trained observer with the assistance of monitoring the patient's level of consciousness and physiological status throughout the procedure.  Intraoperative service time of 20 minutes    Tyrese Brown MD  2/14/2019  10:19 AM

## 2019-02-14 NOTE — PROGRESS NOTES
"CHIEF COMPLAINT: Follow-up right wrist pain    SUBJECTIVE  Patient resting in bed comfortably.  No complaints of wrist pain at this time.    OBJECTIVE  Temp (24hrs), Av.3 °F (36.8 °C), Min:97.7 °F (36.5 °C), Max:99.2 °F (37.3 °C)    Blood pressure 128/70, pulse 94, temperature 99.2 °F (37.3 °C), temperature source Oral, resp. rate 20, height 170.2 cm (67.01\"), weight 117 kg (257 lb), SpO2 94 %.    Lab Results (last 24 hours)     Procedure Component Value Units Date/Time    Procalcitonin [[id number]]  (Abnormal) Collected:  19    Specimen:  Blood Updated:  19     Procalcitonin 0.95 ng/mL     Narrative:       As a Marker for Sepsis (Non-Neonates):   1. <0.5 ng/mL represents a low risk of severe sepsis and/or septic shock.  2. >2 ng/mL represents a high risk of severe sepsis and/or septic shock.    As a Marker for Lower Respiratory Tract Infections that require antibiotic therapy:    PCT on Admission     Antibiotic Therapy       6-12 Hrs later  > 0.5                Strongly Recommended             >0.25 - <0.5         Recommended  0.1 - 0.25           Discouraged              Remeasure/reassess PCT  <0.1                 Strongly Discouraged     Remeasure/reassess PCT                     PCT values of < 0.5 ng/mL do not exclude an infection, because localized infections (without systemic signs) may be associated with such low concentrations, or a systemic infection in its initial stages (< 6 hours). Furthermore, increased PCT can occur without infection. PCT concentrations between 0.5 and 2.0 ng/mL should be interpreted taking into account the patient's history. It is recommended to retest PCT within 6-24 hours if any concentrations < 2 ng/mL are obtained.    LSAC Slide Creation [[id number]] Collected:  19    Specimen:  Blood Updated:  19    POC Glucose Once [[id number]]  (Normal) Collected:  19    Specimen:  Blood Updated:  1937     Glucose 116 mg/dL     " Renal Function Panel [[id number]]  (Abnormal) Collected:  02/14/19 0520    Specimen:  Blood Updated:  02/14/19 0724     Glucose 123 mg/dL      BUN 72 mg/dL      Creatinine 1.26 mg/dL      Sodium 137 mmol/L      Potassium 3.3 mmol/L      Chloride 104 mmol/L      CO2 24.0 mmol/L      Calcium 7.6 mg/dL      Albumin 2.62 g/dL      Phosphorus 4.6 mg/dL      Anion Gap 9.0 mmol/L      BUN/Creatinine Ratio 57.1     eGFR Non African Amer 57 mL/min/1.73     Narrative:       [employer] Guidelines    Stage     Description        GFR  1         Normal or High     90+  2         Mild decrease      60-89  3         Moderate decrease  30-59  4         Severe decrease    15-29  5         Kidney failure     <15    The MDRD GFR formula is only valid for adults with stable renal function between ages 18 and 70.    Heparin Anti-Xa [[id number]]  (Abnormal) Collected:  02/14/19 0520    Specimen:  Blood Updated:  02/14/19 0711     Heparin Anti-Xa (UFH) 0.10 IU/ml     Manual Differential [[id number]] Collected:  02/14/19 0520    Specimen:  Blood Updated:  02/14/19 0643    CBC & Differential [[id number]] Collected:  02/14/19 0520    Specimen:  Blood Updated:  02/14/19 0643    Narrative:       The following orders were created for panel order CBC & Differential.  Procedure                               Abnormality         Status                     ---------                               -----------         ------                     Manual Differential[[id number]]                              In process                 CBC Auto Differential[[id number]]                            In process                   Please view results for these tests on the individual orders.    CBC Auto Differential [[id number]] Collected:  02/14/19 0520    Specimen:  Blood Updated:  02/14/19 0643    Heparin Anti-Xa [[id number]]  (Abnormal) Collected:  02/13/19 2055    Specimen:  Blood Updated:  02/13/19 2152     Heparin Anti-Xa (UFH) 0.10 IU/ml     POC  Glucose Once [[id number]]  (Abnormal) Collected:  02/13/19 2006    Specimen:  Blood Updated:  02/13/19 2017     Glucose 167 mg/dL     POC Glucose Once [[id number]]  (Abnormal) Collected:  02/13/19 1611    Specimen:  Blood Updated:  02/13/19 1614     Glucose 180 mg/dL     Blood Culture - Blood, Hand, Left [[id number]] Collected:  02/08/19 1240    Specimen:  Blood from Hand, Left Updated:  02/13/19 1300     Blood Culture No growth at 5 days    Protime-INR [[id number]]  (Abnormal) Collected:  02/13/19 1220    Specimen:  Blood Updated:  02/13/19 1247     Protime 15.6 Seconds      INR 1.30    aPTT [[id number]]  (Abnormal) Collected:  02/13/19 1220    Specimen:  Blood Updated:  02/13/19 1247     PTT 32.5 seconds     Narrative:       PTT = The equivalent PTT values for the therapeutic range of heparin levels at 0.3 to 0.5 U/ml are 55 to 70 seconds.    CBC & Differential [[id number]] Collected:  02/13/19 1220    Specimen:  Blood Updated:  02/13/19 1232    Narrative:       The following orders were created for panel order CBC & Differential.  Procedure                               Abnormality         Status                     ---------                               -----------         ------                     CBC Auto Differential[[id number]]        Abnormal            Final result                 Please view results for these tests on the individual orders.    CBC Auto Differential [[id number]]  (Abnormal) Collected:  02/13/19 1220    Specimen:  Blood Updated:  02/13/19 1232     WBC 20.36 10*3/mm3      RBC 3.97 10*6/mm3      Hemoglobin 10.5 g/dL      Hematocrit 31.9 %      MCV 80.4 fL      MCH 26.4 pg      MCHC 32.9 g/dL      RDW 15.3 %      RDW-SD 44.0 fl      MPV 10.0 fL      Platelets 170 10*3/mm3      Neutrophil % 90.1 %      Lymphocyte % 5.9 %      Monocyte % 3.5 %      Eosinophil % 0.4 %      Basophil % 0.1 %      Immature Grans % 0.9 %      Neutrophils, Absolute 18.32 10*3/mm3      Lymphocytes, Absolute 1.20 10*3/mm3       Monocytes, Absolute 0.72 10*3/mm3      Eosinophils, Absolute 0.09 10*3/mm3      Basophils, Absolute 0.03 10*3/mm3      Immature Grans, Absolute 0.18 10*3/mm3     POC Glucose Once [[id number]]  (Abnormal) Collected:  02/13/19 1136    Specimen:  Blood Updated:  02/13/19 1139     Glucose 186 mg/dL             PHYSICAL EXAM  Right upper extremity exam: Diffuse swelling throughout the forearm and hand is slightly improved.  No pain with passive motion of the wrist.Intact EPL, FPL, EDC, FDP, FDS, interosseous, wrist flexion, wrist extension, biceps, triceps, deltoid. Sensation intact light touch to median, radial, ulnar, and axillary nerves. 2+ palpable radial pulse.         Renal failure    Spinal stenosis, lumbar region, with neurogenic claudication    Morbid obesity due to excess calories (CMS/HCC)    TAINYA on CPAP    Dyslipidemia    DM (diabetes mellitus), type 2 (CMS/HCC)    Metabolic encephalopathy    Paroxysmal atrial fibrillation (CMS/HCC)    Sepsis (CMS/HCC)      PLAN / DISPOSITION:  Right wrist pain    Continue observation at this time.    No clinical evidence of infection in the wrist joint at this time.    We will continue to monitor.    Juanito Ro MD  02/14/19  8:10 AM

## 2019-02-14 NOTE — THERAPY TREATMENT NOTE
Acute Care - Physical Therapy Treatment Note   [facility]     Patient Name: Rod Balderas  : 1950  MRN: [id number]  Today's Date: 2019  Onset of Illness/Injury or Date of Surgery: 19  Date of Referral to PT: 19  Referring Physician: MD Aurea    Admit Date: 2019    Visit Dx:    ICD-10-CM ICD-9-CM   1. Impaired functional mobility, balance, gait, and endurance Z74.09 V49.89   2. Impaired mobility and ADLs Z74.09 799.89     Patient Active Problem List   Diagnosis   • Degenerative disc disease, lumbar   • Spinal stenosis, lumbar region, with neurogenic claudication   • Neuroforaminal stenosis of spine   • Lumbar facet arthropathy   • CAD (coronary artery disease)   • Essential hypertension   • Physical deconditioning   • Morbid obesity due to excess calories (CMS/HCC)   • TANIYA on CPAP   • Displacement of lumbar intervertebral disc   • Dyslipidemia   • Osteoarthritis   • At high risk for falls   • Prostate cancer , s/p prostatectomy ( [facility]P)   • DM (diabetes mellitus), type 2 (CMS/HCC)   • Leukocytosis, mild, likely reactive   • Acute postoperative pain   • Acute blood loss anemia, mild, asymptomatic   • Renal failure   • Metabolic encephalopathy   • Paroxysmal atrial fibrillation (CMS/HCC)   • Sepsis (CMS/HCC)       Therapy Treatment    Rehabilitation Treatment Summary     Row Name 19 1410 19 1352 19 1100       Treatment Time/Intention    Discipline  physical therapist  -LS  occupational therapist  -CL  physical therapist  -MF    Document Type  therapy note (daily note)  -LS  therapy note (daily note)  -CL  therapy note (daily note);wound care  -MF    Subjective Information  complains of;fatigue;pain  -LS  complains of;weakness;fatigue;pain  -CL  complains of;weakness;fatigue;pain  -MF    Mode of Treatment  physical therapy  -LS  --  --    Patient/Family Observations  wife present  -LS  --  --    Patient Effort  adequate  -LS  good  -CL  --    Existing  Precautions/Restrictions  fall;oxygen therapy device and L/min;other (see comments) chronic L footdrop; painful groin area  -LS  fall;oxygen therapy device and L/min;other (see comments) chronic L foot drop, wears AFO  -CL  --    Recorded by [LS] Ana Akers, PT 02/14/19 1712 [CL] Cassie Reardon, OT 02/14/19 1634 [MF] Efrem Tran, PT 02/14/19 1253    Row Name 02/14/19 1410 02/14/19 1352          Vital Signs    Pre Systolic BP Rehab  118  -LS  -- VSS, RN cleared for tx.   -CL     Pre Treatment Diastolic BP  55  -LS  --     Pretreatment Heart Rate (beats/min)  78  -LS  --     Posttreatment Heart Rate (beats/min)  73  -LS  --     O2 Delivery Pre Treatment  room air  -LS  --     Post SpO2 (%)  97  -LS  --     O2 Delivery Post Treatment  room air  -LS  --     Pre Patient Position  Supine  -LS  --     Intra Patient Position  Sitting  -LS  --     Post Patient Position  Sitting  -LS  --     Recorded by [LS] Ana Akers, PT 02/14/19 1712 [CL] Cassie Reardon, OT 02/14/19 1634     Row Name 02/14/19 1410 02/14/19 1352          Cognitive Assessment/Intervention- PT/OT    Affect/Mental Status (Cognitive)  WFL  -LS  confused  -CL     Orientation Status (Cognition)  oriented x 4  -LS  oriented to;person;place  -CL     Follows Commands (Cognition)  follows one step commands;75-90% accuracy;increased processing time needed;initiation impaired;physical/tactile prompts required;repetition of directions required;verbal cues/prompting required  -LS  follows one step commands;75-90% accuracy;verbal cues/prompting required;repetition of directions required  -CL     Cognitive Function (Cognitive)  safety deficit  -LS  safety deficit  -CL     Safety Deficit (Cognitive)  moderate deficit;awareness of need for assistance;insight into deficits/self awareness;safety precautions awareness  -LS  mild deficit;awareness of need for assistance;insight into deficits/self awareness;safety precautions awareness  -CL     Personal Safety  Interventions  fall prevention program maintained;nonskid shoes/slippers when out of bed;supervised activity;muscle strengthening facilitated  -LS  fall prevention program maintained;gait belt;nonskid shoes/slippers when out of bed  -CL     Recorded by [LS] Ana Akers, PT 02/14/19 1712 [CL] Cassie Reardon, OT 02/14/19 1634     Row Name 02/14/19 1410 02/14/19 1352          Bed Mobility Assessment/Treatment    Comment (Bed Mobility)  deferred; [facility] lift sling intact upon arrival  -LS  Pt received on lift sling.   -CL     Recorded by [LS] Ana Akers, PT 02/14/19 1712 [CL] Cassie Reardon, OT 02/14/19 1634     Row Name 02/14/19 1410 02/14/19 1352          Transfer Assessment/Treatment    Transfer Assessment/Treatment  bed-chair transfer  -LS  bed-chair transfer  -CL     Comment (Transfers)  poor trunk control; unable to attempt STS  -LS  --     Recorded by [LS] Ana Akers, PT 02/14/19 1712 [CL] Cassie Reardon, OT 02/14/19 1634     Row Name 02/14/19 1410 02/14/19 1352          Bed-Chair Transfer    Bed-Chair [place] (Transfers)  dependent (less than 25% patient effort);2 person assist;verbal cues  -LS  dependent (less than 25% patient effort);2 person assist;verbal cues  -CL     Assistive Device (Bed-Chair Transfers)  mechanical lift/aid  -LS  mechanical lift/aid  -CL     Recorded by [LS] Ana Akers, PT 02/14/19 1712 [CL] Cassie Reardon, OT 02/14/19 1634     Row Name 02/14/19 1410             Gait/Stairs Assessment/Training    [place] Level (Gait)  unable to assess  -LS      Recorded by [LS] Ana Akers, PT 02/14/19 1712      Row Name 02/14/19 1352             Motor Skills Assessment/Interventions    Additional Documentation  Therapeutic Exercise (Group)  -CL      Recorded by [CL] Cassie Reardon, OT 02/14/19 1634      Row Name 02/14/19 1410 02/14/19 1352          Therapeutic Exercise    Therapeutic Exercise  seated, lower extremities  -LS  seated, upper extremities  -CL     Additional Documentation  --   Therapeutic Exercise (Row)  -CL     46841 - PT Therapeutic Exercise Minutes  10  -LS  --     73835 - PT Therapeutic Activity Minutes  15  -LS  --     59608 - OT Therapeutic Exercise Minutes  --  5  -CL     75089 - OT Therapeutic Activity Minutes  --  10  -CL     Recorded by [LS] Ana Akers, PT 02/14/19 1712 [CL] Cassie Reardon, OT 02/14/19 1634     Row Name 02/14/19 1352             Upper Extremity Seated Therapeutic Exercise    Performed, Seated Upper Extremity (Therapeutic Exercise)  shoulder flexion/extension;elbow flexion/extension;wrist flexion/extension;digit flexion/extension  -CL      Exercise Type, Seated Upper Extremity (Therapeutic Exercise)  AAROM (active assistive range of motion);AROM (active range of motion)  -CL      Sets/Reps Detail, Seated Upper Extremity (Therapeutic Exercise)  1/10  -CL      Comment, Seated Upper Extremity (Therapeutic Exercise)  R hand tendon glides/tenodesis positioning to improve MP/PIP/DIP ROM w/ noted pain w/ wrist flexion, possible TFCC irritation.   -CL      Recorded by [CL] Cassie Reardon, OT 02/14/19 1634      Row Name 02/14/19 1410             Lower Extremity Seated Therapeutic Exercise    Performed, Seated Lower Extremity (Therapeutic Exercise)  hip flexion/extension;hip abduction/adduction;LAQ (long arc quad), knee extension;ankle dorsiflexion/plantarflexion  -LS      Exercise Type, Seated Lower Extremity (Therapeutic Exercise)  AAROM (active assistive range of motion)  -LS      Sets/Reps Detail, Seated Lower Extremity (Therapeutic Exercise)  1/10; constant cues for active participation.  -LS      Recorded by [LS] nAa Akers, PT 02/14/19 1712      Row Name 02/14/19 1410             Balance    Balance  static sitting balance  -LS      Recorded by [LS] Ana Akers, PT 02/14/19 1712      Row Name 02/14/19 1410             Static Sitting Balance    Level of [place] (Unsupported Sitting, Static Balance)  maximal assist, 25 to 49% patient effort  -LS       Sitting Position (Unsupported Sitting, Static Balance)  sitting in chair  -LS      Time Able to Maintain Position (Unsupported Sitting, Static Balance)  2 to 3 minutes  -LS      Comment (Unsupported Sitting, Static Balance)  ant/post leans; R/L lateral leans for trunk control  -LS      Recorded by [LS] Ana Akers, PT 02/14/19 1712      Row Name 02/14/19 1410 02/14/19 1352 02/14/19 1100       Positioning and Restraints    Pre-Treatment Position  in bed  -LS  in bed  -CL  in bed  -MF    Post Treatment Position  chair  -LS  chair  -CL  bed  -MF    In Bed  --  notified nsg;sitting;call light within reach;encouraged to call for assist;with family/caregiver;with PT Pt transitioend to pt treatment.   -CL  supine;call light within reach;with nsg;with family/caregiver  -MF    In Chair  notified nsg;reclined;call light within reach;encouraged to call for assist;exit alarm on;with family/caregiver;RUE elevated;LUE elevated;waffle cushion;on mechanical lift sling;legs elevated;heels elevated  -  --  --    Recorded by [LS] Ana Akers, PT 02/14/19 1712 [CL] Cassie Reardon, OT 02/14/19 1634 [MF] Efrem Tran, PT 02/14/19 1253    Row Name 02/14/19 1410 02/14/19 1100          Pain Assessment    Additional Documentation  Pain Scale: Numbers Pre/Post-Treatment (Group)  -LS  Pain Scale: FACES Pre/Post-Treatment (Group)  -     Recorded by [LS] Ana Akers, PT 02/14/19 1712 [MF] Efrem Tran, PT 02/14/19 1253     Row Name 02/14/19 1410 02/14/19 1100          Pain Scale: Numbers Pre/Post-Treatment    Pain Scale: Numbers, Pretreatment  5/10  -LS  --     Pain Scale: Numbers, Post-Treatment  5/10  -LS  --     Pain Location - Orientation  generalized  -LS  generalized  -MF     Pre/Post Treatment Pain Comment  tolerated  -LS  --     Pain Intervention(s)  Repositioned;Ambulation/increased activity  -LS  Repositioned;Medication (See MAR)  -     Recorded by [LS] Ana Akers, PT 02/14/19 1712 [MF] Efrem Tran  PT 02/14/19 1253     Row Name 02/14/19 1100             Pain Scale: FACES Pre/Post-Treatment    Pain: FACES Scale, Pretreatment  2-->hurts little bit  -MF      Pain: FACES Scale, Post-Treatment  4-->hurts little more  -MF      Pre/Post Treatment Pain Comment  6/10 pain with rolling  -MF      Recorded by [MF] Efrem Tran, PT 02/14/19 1253      Row Name 02/14/19 1352             Pain Scale 2: FACES Pre/Post-Treatment    Pain 2: FACES Scale, Pretreatment  4-->hurts little more  -CL      Pain 2: FACES Scale, Post-Treatment  4-->hurts little more  -CL      Pain Location 2 - Orientation  generalized  -CL      Pre/Post Treatment Pain 2 Comment  Tolerated.   -CL      Pain Intervention(s) 2  Repositioned;Ambulation/increased activity  -CL      Recorded by [CL] Cassie Reardon, PINA 02/14/19 1634      Row Name                Wound 02/08/19 1519 Other (See comments) neck incision    Wound - Properties Group Date first assessed: 02/08/19 [SM] Time first assessed: 1519 [SM] Side: Other (See comments) [SM] Location: neck [SM] Type: incision [SM] Recorded by:  [SM] Rissa Rodriguez RN 02/08/19 1519    Row Name 02/14/19 1100             Wound 02/08/19 1800 Right lower gluteal pressure injury    Wound - Properties Group Date first assessed: 02/08/19 [AB] Time first assessed: 1800 [AB] Present On Admission : yes [AB] Side: Right [AB] Orientation: lower [AB] Location: gluteal [AB] Type: pressure injury [AB] Stage, Pressure Injury: deep tissue injury [AB] Additional Comments: bilateral gluteals [MR] Recorded by:  [AB] Addison Jasso, HOLLY 02/08/19 1855 [MR] Carmen Conner RN 02/09/19 1227    Dressing Appearance  open to air  -MF      Base  dry;purple;red/granulating;maroon/purple  -MF      Periwound  dry;pink;redness;blanchable  -MF      Periwound Temperature  warm  -MF      Periwound Skin Turgor  soft  -MF      Drainage Amount  none  -MF      Care, Wound  irrigated with;sterile normal saline;ultrasound therapy, non contact low  frequency 5 min  -MF      Dressing Care, Wound  open to air  -MF      Recorded by [MF] Efrem Tran, PT 02/14/19 1253      Row Name 02/14/19 1100             Wound 02/09/19 1116 Bilateral medial thigh    Wound - Properties Group Date first assessed: 02/09/19 [MR] Time first assessed: 1116 [MR] Present On Admission : yes [MR] Side: Bilateral [MR] Orientation: medial [MR] Location: thigh [MR] Stage, Pressure Injury: deep tissue injury [MR] Recorded by:  [MR] Carmen Conner RN 02/09/19 1228    Dressing Appearance  open to air  -MF      Base  maroon/purple;red/granulating  -MF      Periwound  intact;dry;redness;blanchable  -MF      Periwound Temperature  warm  -MF      Periwound Skin Turgor  soft  -MF      Edges  irregular  -MF      Drainage Amount  none  -MF      Care, Wound  irrigated with;sterile normal saline;ultrasound therapy, non contact low frequency 5 min  -MF      Dressing Care, Wound  open to air  -MF      Recorded by [MF] Efrem Tran, PT 02/14/19 1253      Row Name                Wound 02/13/19 0800 Left lower gluteal pressure injury    Wound - Properties Group Date first assessed: 02/13/19 [JM] Time first assessed: 0800 [JM] Side: Left [JM] Orientation: lower [JM] Location: gluteal [JM] Type: pressure injury [JM] Stage, Pressure Injury: deep tissue injury [JM] Recorded by:  [JM] Ivon Eason RN 02/13/19 1353    Row Name 02/14/19 1100             Coping    Observed Emotional State  accepting;calm;cooperative  -      Verbalized Emotional State  acceptance  -MF      Recorded by [MF] Efrem Tran, PT 02/14/19 1253      Row Name 02/14/19 1410 02/14/19 1100          Plan of Care Review    Plan of Care Reviewed With  patient;spouse  -LS  patient;spouse  -MF     Recorded by [LS] Ana Akers, PT 02/14/19 1712 [MF] Efrem Tran, PT 02/14/19 1253     Row Name 02/14/19 1410             Outcome Summary/Treatment Plan (PT)    Daily Summary of Progress (PT)  progress toward functional  goals is gradual  -LS      Barriers to Overall Progress (PT)  decreased motivation to participate/progress  -LS      Recorded by [LS] Ana Akers, PT 02/14/19 1712        User Key  (r) = Recorded By, (t) = Taken By, (c) = Cosigned By    Initials Name Effective Dates Discipline    MF Efrem Tran, PT 06/19/15 -  PT    LS Ana Akers, PT 06/19/15 -  PT    Rissa Bolden, RN 06/16/16 -  Nurse     Carmen Conner E, RN 06/16/16 -  Nurse    Ivon Gutierrez RN 06/16/16 -  Nurse    CL Cassie Reardon, OT 04/03/18 -  OT    AB Addison Jasso RN 02/02/17 -  Nurse          Rash 02/11/19 1200 other (see comments) posterior arm (Active)   Distribution localized 2/14/2019  4:00 PM   Configuration/Shape asymmetric 2/14/2019  4:00 PM   Borders diffuse;irregular 2/14/2019  4:00 PM   Characteristics dry;raised 2/14/2019  4:00 PM   Color red 2/14/2019  4:00 PM   Care, Rash open to air 2/14/2019  4:00 AM       Rash 02/12/19 2000 Left upper arm macular (Active)   Distribution regional 2/14/2019  4:00 PM   Configuration/Shape asymmetric 2/14/2019  4:00 PM   Borders diffuse;irregular 2/14/2019  4:00 PM   Characteristics raised;dry 2/14/2019  4:00 PM   Color red 2/14/2019  4:00 PM   Care, Rash open to air 2/14/2019  6:00 AM       Rash 02/12/19 2000 Left posterior hand macular (Active)   Distribution localized 2/14/2019  4:00 PM   Configuration/Shape asymmetric 2/14/2019  4:00 PM   Borders diffuse;irregular 2/14/2019  4:00 PM   Characteristics dry;raised 2/14/2019  4:00 PM   Color purple 2/14/2019  4:00 PM   Care, Rash open to air 2/14/2019  6:00 AM       Rash 02/13/19 0614 Right upper arm macular (Active)   Distribution regional 2/14/2019  4:00 PM   Configuration/Shape asymmetric 2/14/2019  4:00 PM   Borders diffuse;irregular 2/14/2019  4:00 PM   Characteristics dry;raised 2/14/2019  4:00 PM   Color red;purple 2/14/2019  4:00 PM   Care, Rash open to air 2/14/2019  6:00 AM       Wound 02/08/19 1519 Other (See comments)  neck incision (Active)   Dressing Appearance open to air 2/14/2019  4:00 PM   Closure Adhesive closure strips 2/14/2019  4:00 PM   Base clean;dry;pink 2/14/2019  4:00 PM   Periwound intact;dry;pink;blanchable 2/14/2019  4:00 PM   Periwound Skin Turgor soft 2/14/2019  4:00 PM   Drainage Amount none 2/14/2019  4:00 PM   Periwound Care, Wound dry periwound area maintained 2/13/2019 10:00 PM       Wound 02/08/19 1800 Right lower gluteal pressure injury (Active)   Dressing Appearance open to air 2/14/2019  4:00 PM   Closure None 2/14/2019  4:00 PM   Base dry;purple;red/granulating;maroon/purple 2/14/2019  4:00 PM   Periwound dry;pink;redness;blanchable 2/14/2019  4:00 PM   Periwound Temperature warm 2/14/2019  4:00 PM   Periwound Skin Turgor soft 2/14/2019  4:00 PM   Drainage Amount none 2/14/2019  4:00 PM   Care, Wound irrigated with;sterile normal saline;ultrasound therapy, non contact low frequency 2/14/2019 11:00 AM   Dressing Care, Wound open to air 2/14/2019 11:00 AM   Periwound Care, Wound dry periwound area maintained 2/13/2019 10:00 PM       Wound 02/09/19 1116 Bilateral medial thigh (Active)   Dressing Appearance open to air 2/14/2019  4:00 PM   Closure None 2/14/2019  4:00 PM   Base maroon/purple;red/granulating 2/14/2019  4:00 PM   Periwound intact;dry;redness;blanchable 2/14/2019  4:00 PM   Periwound Temperature warm 2/14/2019 11:00 AM   Periwound Skin Turgor soft 2/14/2019 11:00 AM   Edges irregular 2/14/2019 11:00 AM   Drainage Amount none 2/14/2019  4:00 PM   Care, Wound irrigated with;sterile normal saline;ultrasound therapy, non contact low frequency 2/14/2019 11:00 AM   Dressing Care, Wound open to air 2/14/2019 11:00 AM       Wound 02/13/19 0800 Left lower gluteal pressure injury (Active)   Dressing Appearance open to air 2/14/2019  4:00 PM   Base dry;maroon/purple;purple 2/14/2019  4:00 PM   Periwound intact;dry;pink 2/14/2019  4:00 PM           Physical Therapy Education     Title: PT OT SLP  Therapies (In Progress)     Topic: Physical Therapy (In Progress)     Point: Mobility training (In Progress)     Learning Progress Summary           Patient Acceptance, E,D, NR by LS at 2/14/2019  2:10 PM    Acceptance, E,D, NR by LS at 2/12/2019  9:59 AM   Significant Other Acceptance, E,D, NR by LS at 2/14/2019  2:10 PM    Acceptance, E,D, NR by LS at 2/12/2019  9:59 AM                   Point: Home exercise program (In Progress)     Learning Progress Summary           Patient Acceptance, E,D, NR by LS at 2/14/2019  2:10 PM   Significant Other Acceptance, E,D, NR by LS at 2/14/2019  2:10 PM                   Point: Body mechanics (In Progress)     Learning Progress Summary           Patient Acceptance, E,D, NR by LS at 2/14/2019  2:10 PM    Acceptance, E,D, NR by LS at 2/12/2019  9:59 AM   Significant Other Acceptance, E,D, NR by LS at 2/14/2019  2:10 PM    Acceptance, E,D, NR by LS at 2/12/2019  9:59 AM                   Point: Precautions (In Progress)     Learning Progress Summary           Patient Acceptance, E,D, NR by LS at 2/14/2019  2:10 PM    Acceptance, E,D, NR by LS at 2/12/2019  9:59 AM   Significant Other Acceptance, E,D, NR by LS at 2/14/2019  2:10 PM    Acceptance, E,D, NR by LS at 2/12/2019  9:59 AM                               User Key     Initials Effective Dates Name Provider Type Discipline     06/19/15 -  Ana Akers, PT Physical Therapist PT                PT Recommendation and Plan  Anticipated Discharge Disposition (PT): skilled nursing facility  Planned Therapy Interventions (PT Eval): balance training, bed mobility training, gait training, home exercise program, stair training, strengthening, transfer training, patient/family education  Therapy Frequency (PT Clinical Impression): daily  Outcome Summary/Treatment Plan (PT)  Daily Summary of Progress (PT): progress toward functional goals is gradual  Barriers to Overall Progress (PT): decreased motivation to  participate/progress  Anticipated Discharge Disposition (PT): skilled nursing facility  Plan of Care Reviewed With: patient, spouse  Progress: improving  Outcome Summary: Pt demonstrated ability to progress sitting balance activity duration; progressed LE ther ex. Cont to be limited by c/o groin soreness and decreased motivation to participate. Provided significant edu re: benefit of therapy participation to promote PLOF. Will cont PT POC.   Outcome Measures     Row Name 02/14/19 1410 02/14/19 1352 02/12/19 0959       How much help from another person do you currently need...    Turning from your back to your side while in flat bed without using bedrails?  2  -LS  --  2  -LS    Moving from lying on back to sitting on the side of a flat bed without bedrails?  2  -LS  --  2  -LS    Moving to and from a bed to a chair (including a wheelchair)?  1  -LS  --  1  -LS    Standing up from a chair using your arms (e.g., wheelchair, bedside chair)?  1  -LS  --  1  -LS    Climbing 3-5 steps with a railing?  1  -LS  --  1  -LS    To walk in hospital room?  1  -LS  --  1  -LS    AM-PAC 6 Clicks Score  8  -LS  --  8  -LS       How much help from another is currently needed...    Putting on and taking off regular lower body clothing?  --  1  -CL  --    Bathing (including washing, rinsing, and drying)  --  1  -CL  --    Toileting (which includes using toilet bed pan or urinal)  --  1  -CL  --    Putting on and taking off regular upper body clothing  --  1  -CL  --    Taking care of personal grooming (such as brushing teeth)  --  2  -CL  --    Eating meals  --  2  -CL  --    Score  --  8  -CL  --       Functional Assessment    Outcome Measure Options  AM-PAC 6 Clicks Basic Mobility (PT)  -LS  AM-PAC 6 Clicks Daily Activity (OT)  -CL  AM-PAC 6 Clicks Basic Mobility (PT)  -LS    Row Name 02/12/19 0935             How much help from another is currently needed...    Putting on and taking off regular lower body clothing?  1  -CL       Bathing (including washing, rinsing, and drying)  1  -CL      Toileting (which includes using toilet bed pan or urinal)  1  -CL      Putting on and taking off regular upper body clothing  1  -CL      Taking care of personal grooming (such as brushing teeth)  2  -CL      Eating meals  2  -CL      Score  8  -CL         Functional Assessment    Outcome Measure Options  AM-PAC 6 Clicks Daily Activity (OT)  -CL        User Key  (r) = Recorded By, (t) = Taken By, (c) = Cosigned By    Initials Name Provider Type    Ana Andersen, PT Physical Therapist    CL Cassie Reardon, OT Occupational Therapist         Time Calculation:   PT Charges     Row Name 02/14/19 1410 02/14/19 1100          Time Calculation    Start Time  1410  -LS  1100  -     PT Received On  02/14/19  -  --     PT Goal Re-Cert Due Date  --  02/22/19  -        Time Calculation- PT    Total Timed Code Minutes- PT  25 minute(s)  -LS  --        Timed Charges    35230 - PT Therapeutic Exercise Minutes  10  -LS  --     69301 - PT Therapeutic Activity Minutes  15  -LS  --       User Key  (r) = Recorded By, (t) = Taken By, (c) = Cosigned By    Initials Name Provider Type    Efrem Gonzalez, PT Physical Therapist    Ana Andersen, PT Physical Therapist        Therapy Suggested Charges     Code   Minutes Charges    16376 (CPT®) Hc Pt Neuromusc Re Education Ea 15 Min      48733 (CPT®) Hc Pt Ther Proc Ea 15 Min 10 1    04741 (CPT®) Hc Gait Training Ea 15 Min      86814 (CPT®) Hc Pt Therapeutic Act Ea 15 Min 15 1    67351 (CPT®) Hc Pt Manual Therapy Ea 15 Min      24108 (CPT®) Hc Pt Iontophoresis Ea 15 Min      33163 (CPT®) Hc Pt Elec Stim Ea-Per 15 Min      62687 (CPT®) Hc Pt Ultrasound Ea 15 Min      75450 (CPT®) Hc Pt Self Care/Mgmt/Train Ea 15 Min      57002 (CPT®) Hc Pt Prosthetic (S) Train Initial Encounter, Each 15 Min      99012 (CPT®) Hc Pt Orthotic(S)/Prosthetic(S) Encounter, Each 15 Min      65659 (CPT®) Hc Orthotic(S) Mgmt/Train Initial  Encounter, Each 15min      Total  25 2        Therapy Charges for Today     Code Description Service Date Service Provider Modifiers Qty    83653444412 HC PT THERAPEUTIC ACT EA 15 MIN 2/14/2019 Ana Akers, PT GP 1    30422627182 HC PT THER PROC EA 15 MIN 2/14/2019 Ana Akers, PT GP 1          PT G-Codes  Outcome Measure Options: AM-PAC 6 Clicks Basic Mobility (PT)  AM-PAC 6 Clicks Score: 8  Score: 8    Ana Akers, PT  2/14/2019

## 2019-02-14 NOTE — PLAN OF CARE
Problem: Patient Care Overview  Goal: Plan of Care Review  Outcome: Ongoing (interventions implemented as appropriate)   02/14/19 1100   Coping/Psychosocial   Plan of Care Reviewed With patient;spouse   OTHER   Outcome Summary areas of DTPI progressing well with use of mist therapy. purple discoloration fading with no increase in size noted. PT will cont with mist therapy daily x 1-2 more days and decrease freq if wounds cont to progress well.

## 2019-02-14 NOTE — PLAN OF CARE
Problem: Patient Care Overview  Goal: Plan of Care Review  Outcome: Ongoing (interventions implemented as appropriate)   02/14/19 0450   Coping/Psychosocial   Plan of Care Reviewed With patient   Plan of Care Review   Progress improving   OTHER   Outcome Summary Pt feeling much more comfortable today. Denied pain all but one time this evening, which was resolved with hydrocodone. Vitals stable throughout the night. Pt remains in atrial fibrillation with rate control via diltiazem drip at 5mg/hr. Pt scheduled to have a KLAUDIA with possible ECV today per Dr Brown. Heparin bolus 4000 units given and rate increased to 12.5 units/kg/hr per pharmacy. Urine output greater than 1L tonight.       Problem: Skin Injury Risk (Adult)  Goal: Skin Health and Integrity  Outcome: Ongoing (interventions implemented as appropriate)   02/14/19 0450   Skin Injury Risk (Adult)   Skin Health and Integrity making progress toward outcome       Problem: Fall Risk (Adult)  Goal: Absence of Fall  Outcome: Ongoing (interventions implemented as appropriate)   02/14/19 0450   Fall Risk (Adult)   Absence of Fall making progress toward outcome       Problem: Hemodialysis (Adult)  Goal: Signs and Symptoms of Listed Potential Problems Will be Absent, Minimized or Managed (Hemodialysis)  Outcome: Ongoing (interventions implemented as appropriate)   02/14/19 0450   Goal/Outcome Evaluation   Problems Assessed (Hemodialysis) all   Problems Present (Hemodialysis) fluid imbalance       Problem: Confusion, Acute (Adult)  Goal: Cognitive/Functional Impairments Minimized  Outcome: Ongoing (interventions implemented as appropriate)   02/14/19 0450   Confusion, Acute (Adult)   Cognitive/Functional Impairments Minimized making progress toward outcome       Problem: Sepsis/Septic Shock (Adult)  Goal: Signs and Symptoms of Listed Potential Problems Will be Absent, Minimized or Managed (Sepsis/Septic Shock)  Outcome: Ongoing (interventions implemented as  appropriate)   02/14/19 5440   Goal/Outcome Evaluation   Problems Assessed (Sepsis) all   Problems Present (Sepsis) situational response

## 2019-02-15 LAB
ALBUMIN SERPL-MCNC: 2.83 G/DL (ref 3.2–4.8)
ANION GAP SERPL CALCULATED.3IONS-SCNC: 7 MMOL/L (ref 3–11)
BASOPHILS # BLD MANUAL: 0.17 10*3/MM3 (ref 0–0.2)
BASOPHILS NFR BLD AUTO: 1 % (ref 0–1)
BUN BLD-MCNC: 68 MG/DL (ref 9–23)
BUN/CREAT SERPL: 53.1 (ref 7–25)
CALCIUM SPEC-SCNC: 8.1 MG/DL (ref 8.7–10.4)
CHLORIDE SERPL-SCNC: 103 MMOL/L (ref 99–109)
CO2 SERPL-SCNC: 26 MMOL/L (ref 20–31)
CREAT BLD-MCNC: 1.28 MG/DL (ref 0.6–1.3)
DEPRECATED RDW RBC AUTO: 45.3 FL (ref 37–54)
EOSINOPHIL # BLD MANUAL: 0.34 10*3/MM3 (ref 0.1–0.3)
EOSINOPHIL NFR BLD MANUAL: 2 % (ref 0–3)
ERYTHROCYTE [DISTWIDTH] IN BLOOD BY AUTOMATED COUNT: 15.9 % (ref 11.3–14.5)
GFR SERPL CREATININE-BSD FRML MDRD: 56 ML/MIN/1.73
GLUCOSE BLD-MCNC: 122 MG/DL (ref 70–100)
GLUCOSE BLDC GLUCOMTR-MCNC: 106 MG/DL (ref 70–130)
GLUCOSE BLDC GLUCOMTR-MCNC: 127 MG/DL (ref 70–130)
GLUCOSE BLDC GLUCOMTR-MCNC: 131 MG/DL (ref 70–130)
GLUCOSE BLDC GLUCOMTR-MCNC: 137 MG/DL (ref 70–130)
HCT VFR BLD AUTO: 30.6 % (ref 38.9–50.9)
HGB BLD-MCNC: 9.9 G/DL (ref 13.1–17.5)
HYPOCHROMIA BLD QL: ABNORMAL
LYMPHOCYTES # BLD MANUAL: 1.37 10*3/MM3 (ref 0.6–4.8)
LYMPHOCYTES NFR BLD MANUAL: 4 % (ref 0–12)
LYMPHOCYTES NFR BLD MANUAL: 8 % (ref 24–44)
MAGNESIUM SERPL-MCNC: 1.8 MG/DL (ref 1.3–2.7)
MCH RBC QN AUTO: 26.5 PG (ref 27–31)
MCHC RBC AUTO-ENTMCNC: 32.4 G/DL (ref 32–36)
MCV RBC AUTO: 82 FL (ref 80–99)
MONOCYTES # BLD AUTO: 0.69 10*3/MM3 (ref 0–1)
NEUTROPHILS # BLD AUTO: 14.23 10*3/MM3 (ref 1.5–8.3)
NEUTROPHILS NFR BLD MANUAL: 80 % (ref 41–71)
NEUTS BAND NFR BLD MANUAL: 3 % (ref 0–5)
PHOSPHATE SERPL-MCNC: 4 MG/DL (ref 2.4–5.1)
PLAT MORPH BLD: NORMAL
PLATELET # BLD AUTO: 256 10*3/MM3 (ref 150–450)
PMV BLD AUTO: 9.8 FL (ref 6–12)
POTASSIUM BLD-SCNC: 3.3 MMOL/L (ref 3.5–5.5)
RBC # BLD AUTO: 3.73 10*6/MM3 (ref 4.2–5.76)
SODIUM BLD-SCNC: 136 MMOL/L (ref 132–146)
UFH PPP CHRO-ACNC: 0.19 IU/ML (ref 0.3–0.7)
UFH PPP CHRO-ACNC: 0.38 IU/ML (ref 0.3–0.7)
VARIANT LYMPHS NFR BLD MANUAL: 2 % (ref 0–5)
WBC MORPH BLD: NORMAL
WBC NRBC COR # BLD: 17.14 10*3/MM3 (ref 3.5–10.8)

## 2019-02-15 PROCEDURE — 85007 BL SMEAR W/DIFF WBC COUNT: CPT | Performed by: INTERNAL MEDICINE

## 2019-02-15 PROCEDURE — 05PYX3Z REMOVAL OF INFUSION DEVICE FROM UPPER VEIN, EXTERNAL APPROACH: ICD-10-PCS | Performed by: SURGERY

## 2019-02-15 PROCEDURE — 25010000002 FENTANYL CITRATE (PF) 100 MCG/2ML SOLUTION: Performed by: NURSE PRACTITIONER

## 2019-02-15 PROCEDURE — 99231 SBSQ HOSP IP/OBS SF/LOW 25: CPT | Performed by: ORTHOPAEDIC SURGERY

## 2019-02-15 PROCEDURE — 85520 HEPARIN ASSAY: CPT

## 2019-02-15 PROCEDURE — 25010000002 HEPARIN (PORCINE) PER 1000 UNITS

## 2019-02-15 PROCEDURE — 25010000003 CEFAZOLIN IN DEXTROSE 2-4 GM/100ML-% SOLUTION

## 2019-02-15 PROCEDURE — 83735 ASSAY OF MAGNESIUM: CPT | Performed by: INTERNAL MEDICINE

## 2019-02-15 PROCEDURE — 97597 DBRDMT OPN WND 1ST 20 CM/<: CPT

## 2019-02-15 PROCEDURE — 97530 THERAPEUTIC ACTIVITIES: CPT

## 2019-02-15 PROCEDURE — 85027 COMPLETE CBC AUTOMATED: CPT | Performed by: INTERNAL MEDICINE

## 2019-02-15 PROCEDURE — 99233 SBSQ HOSP IP/OBS HIGH 50: CPT | Performed by: INTERNAL MEDICINE

## 2019-02-15 PROCEDURE — 82962 GLUCOSE BLOOD TEST: CPT

## 2019-02-15 PROCEDURE — 80069 RENAL FUNCTION PANEL: CPT | Performed by: INTERNAL MEDICINE

## 2019-02-15 PROCEDURE — 25010000002 HEPARIN (PORCINE) IN NACL 25000-0.45 UT/250ML-% SOLUTION

## 2019-02-15 RX ORDER — MAGNESIUM SULFATE HEPTAHYDRATE 40 MG/ML
4 INJECTION, SOLUTION INTRAVENOUS ONCE
Status: COMPLETED | OUTPATIENT
Start: 2019-02-15 | End: 2019-02-15

## 2019-02-15 RX ORDER — HEPARIN SODIUM 1000 [USP'U]/ML
2000 INJECTION, SOLUTION INTRAVENOUS; SUBCUTANEOUS ONCE
Status: DISCONTINUED | OUTPATIENT
Start: 2019-02-15 | End: 2019-02-15

## 2019-02-15 RX ORDER — POTASSIUM CHLORIDE 750 MG/1
40 CAPSULE, EXTENDED RELEASE ORAL EVERY 6 HOURS
Status: COMPLETED | OUTPATIENT
Start: 2019-02-15 | End: 2019-02-15

## 2019-02-15 RX ADMIN — MAGNESIUM SULFATE HEPTAHYDRATE 4 G: 40 INJECTION, SOLUTION INTRAVENOUS at 10:25

## 2019-02-15 RX ADMIN — HYDROCODONE BITARTRATE AND ACETAMINOPHEN 1 TABLET: 5; 325 TABLET ORAL at 16:17

## 2019-02-15 RX ADMIN — METRONIDAZOLE 500 MG: 500 INJECTION, SOLUTION INTRAVENOUS at 02:08

## 2019-02-15 RX ADMIN — HEPARIN SODIUM 5000 UNITS: 1000 INJECTION, SOLUTION INTRAVENOUS; SUBCUTANEOUS at 00:04

## 2019-02-15 RX ADMIN — FENTANYL CITRATE 25 MCG: 50 INJECTION INTRAMUSCULAR; INTRAVENOUS at 06:20

## 2019-02-15 RX ADMIN — FENTANYL CITRATE 25 MCG: 50 INJECTION INTRAMUSCULAR; INTRAVENOUS at 20:00

## 2019-02-15 RX ADMIN — CASTOR OIL AND BALSAM, PERU: 788; 87 OINTMENT TOPICAL at 08:18

## 2019-02-15 RX ADMIN — GABAPENTIN 600 MG: 300 CAPSULE ORAL at 08:16

## 2019-02-15 RX ADMIN — GABAPENTIN 600 MG: 300 CAPSULE ORAL at 17:39

## 2019-02-15 RX ADMIN — CEFAZOLIN SODIUM 2 G: 2 INJECTION, SOLUTION INTRAVENOUS at 22:05

## 2019-02-15 RX ADMIN — METOPROLOL TARTRATE 25 MG: 25 TABLET ORAL at 22:04

## 2019-02-15 RX ADMIN — METOPROLOL TARTRATE 25 MG: 25 TABLET ORAL at 08:16

## 2019-02-15 RX ADMIN — HEPARIN SODIUM 22 UNITS/KG/HR: 10000 INJECTION, SOLUTION INTRAVENOUS at 08:20

## 2019-02-15 RX ADMIN — HYDROCODONE BITARTRATE AND ACETAMINOPHEN 1 TABLET: 5; 325 TABLET ORAL at 08:16

## 2019-02-15 RX ADMIN — FAMOTIDINE 20 MG: 20 TABLET, FILM COATED ORAL at 22:04

## 2019-02-15 RX ADMIN — FAMOTIDINE 20 MG: 20 TABLET, FILM COATED ORAL at 08:15

## 2019-02-15 RX ADMIN — GABAPENTIN 600 MG: 300 CAPSULE ORAL at 12:50

## 2019-02-15 RX ADMIN — CEFAZOLIN SODIUM 2 G: 2 INJECTION, SOLUTION INTRAVENOUS at 04:17

## 2019-02-15 RX ADMIN — POTASSIUM CHLORIDE 40 MEQ: 750 CAPSULE, EXTENDED RELEASE ORAL at 16:16

## 2019-02-15 RX ADMIN — METOPROLOL TARTRATE 25 MG: 25 TABLET ORAL at 16:16

## 2019-02-15 RX ADMIN — POTASSIUM CHLORIDE 40 MEQ: 750 CAPSULE, EXTENDED RELEASE ORAL at 10:42

## 2019-02-15 RX ADMIN — AMLODIPINE BESYLATE 5 MG: 5 TABLET ORAL at 08:15

## 2019-02-15 RX ADMIN — AZTREONAM 500 MG: 1 INJECTION, POWDER, LYOPHILIZED, FOR SOLUTION INTRAMUSCULAR; INTRAVENOUS at 10:28

## 2019-02-15 RX ADMIN — HYDROCODONE BITARTRATE AND ACETAMINOPHEN 1 TABLET: 5; 325 TABLET ORAL at 00:05

## 2019-02-15 RX ADMIN — DULOXETINE 30 MG: 30 CAPSULE, DELAYED RELEASE ORAL at 08:16

## 2019-02-15 RX ADMIN — SODIUM CHLORIDE, PRESERVATIVE FREE 3 ML: 5 INJECTION INTRAVENOUS at 22:06

## 2019-02-15 RX ADMIN — CEFAZOLIN SODIUM 2 G: 2 INJECTION, SOLUTION INTRAVENOUS at 12:50

## 2019-02-15 RX ADMIN — APIXABAN 5 MG: 5 TABLET, FILM COATED ORAL at 17:39

## 2019-02-15 RX ADMIN — METRONIDAZOLE 500 MG: 500 INJECTION, SOLUTION INTRAVENOUS at 10:33

## 2019-02-15 RX ADMIN — HYDROCODONE BITARTRATE AND ACETAMINOPHEN 1 TABLET: 5; 325 TABLET ORAL at 20:00

## 2019-02-15 RX ADMIN — GABAPENTIN 600 MG: 300 CAPSULE ORAL at 22:04

## 2019-02-15 RX ADMIN — CASTOR OIL AND BALSAM, PERU: 788; 87 OINTMENT TOPICAL at 22:05

## 2019-02-15 NOTE — PLAN OF CARE
Problem: Patient Care Overview  Goal: Plan of Care Review  Outcome: Ongoing (interventions implemented as appropriate)   02/15/19 0542   Plan of Care Review   Progress no change   OTHER   Outcome Summary Vitals stable throughout the shift. Pt remained in Sinus Rhythm throughout the night. No noted bursts of PSVT. Increased heparin drip, will transition to eliquis today. Pt appears to be urinating more than plenty, saturating pads hourly. Unable to get a very accurate ouput due to incontinence, anatomical restraints and overall body habitus. Pt had many large liquid bowel movements following lactulose administration, abdomen much softer.       Problem: Skin Injury Risk (Adult)  Goal: Skin Health and Integrity  Outcome: Ongoing (interventions implemented as appropriate)   02/15/19 0542   Skin Injury Risk (Adult)   Skin Health and Integrity making progress toward outcome       Problem: Hemodialysis (Adult)  Goal: Signs and Symptoms of Listed Potential Problems Will be Absent, Minimized or Managed (Hemodialysis)  Outcome: Ongoing (interventions implemented as appropriate)   02/15/19 0542   Goal/Outcome Evaluation   Problems Assessed (Hemodialysis) all   Problems Present (Hemodialysis) electrolyte imbalance;fluid imbalance;cardiovascular complications       Problem: Sepsis/Septic Shock (Adult)  Goal: Signs and Symptoms of Listed Potential Problems Will be Absent, Minimized or Managed (Sepsis/Septic Shock)  Outcome: Ongoing (interventions implemented as appropriate)   02/15/19 0542   Goal/Outcome Evaluation   Problems Assessed (Sepsis) all   Problems Present (Sepsis) undernutrition

## 2019-02-15 NOTE — THERAPY WOUND CARE TREATMENT
Acute Care - Wound/Debridement Treatment Note   [facility]     Patient Name: Rod Balderas  : 1950  MRN: [id number]  Today's Date: 2/15/2019  Onset of Illness/Injury or Date of Surgery: 19   Date of Referral to PT: 19   Referring Physician: MD Aurea       Admit Date: 2019    Visit Dx:    ICD-10-CM ICD-9-CM   1. Impaired functional mobility, balance, gait, and endurance Z74.09 V49.89   2. Impaired mobility and ADLs Z74.09 799.89       Patient Active Problem List   Diagnosis   • Degenerative disc disease, lumbar   • Spinal stenosis, lumbar region, with neurogenic claudication   • Neuroforaminal stenosis of spine   • Lumbar facet arthropathy   • CAD (coronary artery disease)   • Essential hypertension   • Physical deconditioning   • Morbid obesity due to excess calories (CMS/HCC)   • TANIYA on CPAP   • Displacement of lumbar intervertebral disc   • Dyslipidemia   • Osteoarthritis   • At high risk for falls   • Prostate cancer , s/p prostatectomy ( [facility])   • DM (diabetes mellitus), type 2 (CMS/HCC)   • Leukocytosis, mild, likely reactive   • Acute postoperative pain   • Acute blood loss anemia, mild, asymptomatic   • Renal failure   • Metabolic encephalopathy   • Paroxysmal atrial fibrillation (CMS/HCC)   • Sepsis (CMS/HCC)           Rash 19 1200 other (see comments) posterior arm (Active)   Distribution localized 2/15/2019  2:00 PM   Configuration/Shape asymmetric 2/15/2019  2:00 PM   Borders diffuse;irregular 2/15/2019  2:00 PM   Characteristics dry;raised 2/15/2019  2:00 PM   Color red 2/15/2019  2:00 PM   Care, Rash open to air 2/15/2019  4:00 AM       Rash 19 Left upper arm macular (Active)   Distribution regional 2/15/2019  2:00 PM   Configuration/Shape asymmetric 2/15/2019  2:00 PM   Borders diffuse;irregular 2/15/2019  2:00 PM   Characteristics raised;dry 2/15/2019  2:00 PM   Color red 2/15/2019  2:00 PM   Care, Rash open to air 2/15/2019  4:00 AM       Rash 19  2000 Left posterior hand macular (Active)   Distribution localized 2/15/2019  2:00 PM   Configuration/Shape asymmetric 2/15/2019  2:00 PM   Borders diffuse;irregular 2/15/2019  2:00 PM   Characteristics dry;raised 2/15/2019  2:00 PM   Color purple 2/15/2019  2:00 PM   Care, Rash open to air 2/15/2019 12:00 AM       Rash 02/13/19 0614 Right upper arm macular (Active)   Distribution regional 2/15/2019  2:00 PM   Configuration/Shape asymmetric 2/15/2019  2:00 PM   Borders diffuse;irregular 2/15/2019  2:00 PM   Characteristics dry;raised 2/15/2019  2:00 PM   Color red;purple 2/15/2019  2:00 PM   Care, Rash open to air 2/15/2019  4:00 AM       Wound 02/08/19 1519 Other (See comments) neck incision (Active)   Dressing Appearance open to air 2/15/2019  2:00 PM   Closure Adhesive closure strips 2/15/2019  2:00 PM   Base clean;dry;pink 2/15/2019  2:00 PM   Periwound intact;dry;pink;blanchable 2/15/2019  2:00 PM   Periwound Skin Turgor soft 2/15/2019  2:00 PM   Drainage Amount none 2/15/2019  2:00 PM   Periwound Care, Wound dry periwound area maintained 2/15/2019  2:00 PM       Wound 02/08/19 1800 Right lower gluteal pressure injury (Active)   Dressing Appearance open to air 2/15/2019  3:00 PM   Closure None 2/15/2019  2:00 PM   Base dry;purple;red/granulating;maroon/purple 2/15/2019  3:00 PM   Periwound dry;pink;redness;blanchable 2/15/2019  3:00 PM   Periwound Temperature warm 2/15/2019  3:00 PM   Periwound Skin Turgor soft 2/15/2019  3:00 PM   Drainage Amount none 2/15/2019  2:00 PM   Care, Wound irrigated with;sterile normal saline;ultrasound therapy, non contact low frequency 2/15/2019  3:00 PM   Dressing Care, Wound open to air 2/15/2019  4:00 AM   Periwound Care, Wound dry periwound area maintained 2/15/2019  2:00 PM       Wound 02/09/19 1116 Bilateral medial thigh (Active)   Dressing Appearance open to air 2/15/2019  2:00 PM   Closure None 2/15/2019  2:00 PM   Base maroon/purple;red/granulating 2/15/2019  2:00 PM    Periwound intact;dry;redness;blanchable 2/15/2019  2:00 PM   Drainage Amount none 2/15/2019  2:00 PM   Care, Wound cleansed with;soap and water;barrier applied 2/15/2019  8:15 AM   Dressing Care, Wound open to air 2/15/2019  4:00 AM   Periwound Care, Wound dry periwound area maintained 2/15/2019  2:00 PM       Wound 02/13/19 0800 Left lower gluteal pressure injury (Active)   Dressing Appearance open to air 2/15/2019  2:00 PM   Base dry;maroon/purple;purple 2/15/2019  2:00 PM   Periwound intact;dry;pink;blanchable 2/15/2019  2:00 PM   Care, Wound cleansed with;soap and water;barrier applied 2/15/2019  8:15 AM   Periwound Care, Wound dry periwound area maintained 2/15/2019  2:00 PM         WOUND DEBRIDEMENT                  Therapy Treatment    Rehabilitation Treatment Summary     Row Name 02/15/19 1500             Treatment Time/Intention    Discipline  physical therapist  -      Document Type  therapy note (daily note);wound care  -      Subjective Information  complains of;weakness;fatigue;pain  -      Mode of Treatment  physical therapy  -      Recorded by [] Efrem Tran, PT 02/15/19 2214      Row Name 02/15/19 1500             Positioning and Restraints    Pre-Treatment Position  in bed  -      Post Treatment Position  bed  -MF      In Bed  supine;call light within reach;with nsg  -      Recorded by [] Efrem Tran, PT 02/15/19 0518      Row Name 02/15/19 1500             Pain Assessment    Additional Documentation  Pain Scale: FACES Pre/Post-Treatment (Group)  -      Recorded by [] Efrem Tran, PT 02/15/19 2488      Row Name 02/15/19 1500             Pain Scale: Numbers Pre/Post-Treatment    Pain Location - Orientation  generalized  -      Recorded by [] Efrem Tran, PT 02/15/19 9138      Row Name 02/15/19 1500             Pain Scale: FACES Pre/Post-Treatment    Pain: FACES Scale, Pretreatment  2-->hurts little bit  -      Pain: FACES Scale, Post-Treatment   2-->hurts little bit  -MF      Recorded by [MF] Efrem Tran, PT 02/15/19 1558      Row Name                Wound 02/08/19 1519 Other (See comments) neck incision    Wound - Properties Group Date first assessed: 02/08/19 [SM] Time first assessed: 1519 [SM] Side: Other (See comments) [SM] Location: neck [SM] Type: incision [SM] Recorded by:  [SM] Rissa Rodriguez RN 02/08/19 1519    Row Name 02/15/19 1500             Wound 02/08/19 1800 Right lower gluteal pressure injury    Wound - Properties Group Date first assessed: 02/08/19 [AB] Time first assessed: 1800 [AB] Present On Admission : yes [AB] Side: Right [AB] Orientation: lower [AB] Location: gluteal [AB] Type: pressure injury [AB] Stage, Pressure Injury: deep tissue injury [AB] Additional Comments: bilateral gluteals [MR] Recorded by:  [AB] Addison Jasso RN 02/08/19 1855 [MR] Carmen Conner RN 02/09/19 1227    Dressing Appearance  open to air  -MF      Base  dry;purple;red/granulating;maroon/purple  -MF      Periwound  dry;pink;redness;blanchable  -MF      Periwound Temperature  warm  -MF      Periwound Skin Turgor  soft  -MF      Care, Wound  irrigated with;sterile normal saline;ultrasound therapy, non contact low frequency 5 min mist  -MF      Recorded by [MF] Efrem Tran, PT 02/15/19 1558      Row Name                Wound 02/09/19 1116 Bilateral medial thigh    Wound - Properties Group Date first assessed: 02/09/19 [MR] Time first assessed: 1116 [MR] Present On Admission : yes [MR] Side: Bilateral [MR] Orientation: medial [MR] Location: thigh [MR] Stage, Pressure Injury: deep tissue injury [MR] Recorded by:  [MR] Carmen Conner RN 02/09/19 1228    Row Name                Wound 02/13/19 0800 Left lower gluteal pressure injury    Wound - Properties Group Date first assessed: 02/13/19 [JM] Time first assessed: 0800 [JM] Side: Left [JM] Orientation: lower [JM] Location: gluteal [JM] Type: pressure injury [JM] Stage, Pressure Injury: deep  tissue injury [JM] Recorded by:  [JM] Ivon Eason, RN 02/13/19 1353    Row Name 02/15/19 1500             Coping    Observed Emotional State  accepting;calm;cooperative  -      Verbalized Emotional State  acceptance  -MF      Recorded by [MF] Efrem Tran, PT 02/15/19 1558      Row Name 02/15/19 1500             Plan of Care Review    Plan of Care Reviewed With  patient  -MF      Recorded by [] Efrem Tran, PT 02/15/19 4967        User Key  (r) = Recorded By, (t) = Taken By, (c) = Cosigned By    Initials Name Effective Dates Discipline     Efrem Tran, PT 06/19/15 -  PT    Rissa Bolden RN 06/16/16 -  Nurse    Carmen Nelson RN 06/16/16 -  Nurse    Ivon Gutierrez, RN 06/16/16 -  Nurse    Addison Jaffe RN 02/02/17 -  Nurse          Physical Therapy Education     Title: PT OT SLP Therapies (In Progress)     Topic: Physical Therapy (In Progress)     Point: Mobility training (In Progress)     Learning Progress Summary           Patient Acceptance, E,D, NR by LS at 2/14/2019  2:10 PM    Acceptance, E,D, NR by LS at 2/12/2019  9:59 AM   Significant Other Acceptance, E,D, NR by LS at 2/14/2019  2:10 PM    Acceptance, E,D, NR by LS at 2/12/2019  9:59 AM                   Point: Home exercise program (In Progress)     Learning Progress Summary           Patient Acceptance, E,D, NR by LS at 2/14/2019  2:10 PM   Significant Other Acceptance, E,D, NR by LS at 2/14/2019  2:10 PM                   Point: Body mechanics (In Progress)     Learning Progress Summary           Patient Acceptance, E,D, NR by LS at 2/14/2019  2:10 PM    Acceptance, E,D, NR by LS at 2/12/2019  9:59 AM   Significant Other Acceptance, E,D, NR by LS at 2/14/2019  2:10 PM    Acceptance, E,D, NR by LS at 2/12/2019  9:59 AM                   Point: Precautions (In Progress)     Learning Progress Summary           Patient Acceptance, E,D, NR by LS at 2/14/2019  2:10 PM    Acceptance, E,D, NR by LS at 2/12/2019   9:59 AM   Significant Other Acceptance, E,D, NR by  at 2/14/2019  2:10 PM    Acceptance, E,D, NR by  at 2/12/2019  9:59 AM                               User Key     Initials Effective Dates Name Provider Type Discipline     06/19/15 -  Ana Akers, PT Physical Therapist PT                  PT Recommendation and Plan  Anticipated Discharge Disposition (PT): skilled nursing facility  Planned Therapy Interventions (PT Eval): balance training, bed mobility training, gait training, home exercise program, stair training, strengthening, transfer training, patient/family education  Therapy Frequency (PT Clinical Impression): daily               Outcome Summary: DTPIs progressing well with no issues noted. PT to decrease freq to 3x/ week.  Plan of Care Reviewed With: patient    Outcome Measures     Row Name 02/14/19 1410 02/14/19 1352          How much help from another person do you currently need...    Turning from your back to your side while in flat bed without using bedrails?  2  -LS  --     Moving from lying on back to sitting on the side of a flat bed without bedrails?  2  -LS  --     Moving to and from a bed to a chair (including a wheelchair)?  1  -LS  --     Standing up from a chair using your arms (e.g., wheelchair, bedside chair)?  1  -LS  --     Climbing 3-5 steps with a railing?  1  -LS  --     To walk in hospital room?  1  -LS  --     AM-PAC 6 Clicks Score  8  -LS  --        How much help from another is currently needed...    Putting on and taking off regular lower body clothing?  --  1  -CL     Bathing (including washing, rinsing, and drying)  --  1  -CL     Toileting (which includes using toilet bed pan or urinal)  --  1  -CL     Putting on and taking off regular upper body clothing  --  1  -CL     Taking care of personal grooming (such as brushing teeth)  --  2  -CL     Eating meals  --  2  -CL     Score  --  8  -CL        Functional Assessment    Outcome Measure Options  AM-PAC 6 Clicks Basic  Mobility (PT)  -LS  AM-PAC 6 Clicks Daily Activity (OT)  -CL       User Key  (r) = Recorded By, (t) = Taken By, (c) = Cosigned By    Initials Name Provider Type    LS Ana Akers, PT Physical Therapist    CL Cassie Reardon, OT Occupational Therapist              Time Calculation  PT Charges     Row Name 02/15/19 1500             Time Calculation    Start Time  1500  -MF      PT Goal Re-Cert Due Date  02/22/19  -        User Key  (r) = Recorded By, (t) = Taken By, (c) = Cosigned By    Initials Name Provider Type    MF Efrem Tran, PT Physical Therapist         Therapy Suggested Charges     Code   Minutes Charges    93372 (CPT®) Hc Pt Neuromusc Re Education Ea 15 Min      75558 (CPT®) Hc Pt Ther Proc Ea 15 Min 10 1    87824 (CPT®) Hc Gait Training Ea 15 Min      48193 (CPT®) Hc Pt Therapeutic Act Ea 15 Min 15 1    03131 (CPT®) Hc Pt Manual Therapy Ea 15 Min      22694 (CPT®) Hc Pt Iontophoresis Ea 15 Min      36345 (CPT®) Hc Pt Elec Stim Ea-Per 15 Min      82356 (CPT®) Hc Pt Ultrasound Ea 15 Min      44612 (CPT®) Hc Pt Self Care/Mgmt/Train Ea 15 Min      43484 (CPT®) Hc Pt Prosthetic (S) Train Initial Encounter, Each 15 Min      26699 (CPT®) Hc Pt Orthotic(S)/Prosthetic(S) Encounter, Each 15 Min      77845 (CPT®) Hc Orthotic(S) Mgmt/Train Initial Encounter, Each 15min      Total  25 2          Therapy Charges for Today     Code Description Service Date Service Provider Modifiers Qty    02502012403 HC PT NLFU MIST 2/14/2019 Efrem Tran, PT GP 1    11102240136 HC PADMINI DEBRIDE OPEN WOUND UP TO 20CM 2/15/2019 Efrem Tran, PT GP 1            PT G-Codes  Outcome Measure Options: AM-PAC 6 Clicks Basic Mobility (PT)  AM-PAC 6 Clicks Score: 8  Score: 8        Efrem Tran, PT  2/15/2019

## 2019-02-15 NOTE — PLAN OF CARE
Problem: Patient Care Overview  Goal: Plan of Care Review  Outcome: Ongoing (interventions implemented as appropriate)   02/15/19 3361   Coping/Psychosocial   Plan of Care Reviewed With patient;spouse   Plan of Care Review   Progress improving   OTHER   Outcome Summary Pt demonstrated increased indep re: sitting balance; progressed duration of sitting balance activity. With max encouragement, pt attempted mini chair pushups from recliner for strengthening (and pre-STS training); unable to clear hips from chair surface at this time. Cont to be limited by fatigue and need for constant encouragement to actively participate. Will cont PT POC.

## 2019-02-15 NOTE — PROGRESS NOTES
HEPARIN INFUSION  Rod Balderas is a  68 y.o. male receiving heparin infusion.     Therapy for (VTE/Cardiac):   A fib  Patient Weight: 117 kg  Initial Bolus (Y/N):   N  Any Bolus (Y/N):   N        Signs or Symptoms of Bleeding: Had hematuria on admission (2/8)  Cardiac or Other (Not VTE)   Initial Bolus: 60 units/kg (Max 4,000 units)  Initial rate: 12 units/kg/hr (Max 1,000 units/hr)   Anti-Xa (IU/mL) Bolus Dose Stop Infusion Rate Change Repeat Anti-Xa      ?0.19 60 units/kg 0 hrs Increase rate by   4 units/kg/hr 6 hrs       0.2 - 0.29  30 units/kg 0 hrs Increase rate by 2 units/kg/hr 6 hrs    0.3 - 0.7 0 0 hrs No change 6 hrs      0.71 - 0.99 0  0 hrs Decrease rate by 2 units/kg/hr 6 hrs            ?1 0 Hold 1 hr Decrease rate by 3 units/kg/hr 6 hrs      Results from last 7 days   Lab Units 02/15/19  0545 02/14/19  0520 02/13/19  1220  02/12/19  0605   INR   --   --  1.30*  --  1.29*   HEMOGLOBIN g/dL 9.9* 10.7* 10.5*   < >  --    HEMATOCRIT % 30.6* 32.5* 31.9*   < >  --    PLATELETS 10*3/mm3 256 223 170   < >  --     < > = values in this interval not displayed.        Date   Time   Anti-Xa Current Rate (Unit/kg/hr) Bolus   (Units) Rate Change   (Unit/kg/hr) New Rate (Unit/kg/hr) Next   Anti-Xa Comments  Pump Check Daily   2/13 1401 Initial aPTT: 32.5 0 -- +8.5 8.5 2100 D/w RN   2/13 2055 0.1 8.5 4000 +4 12.5 AM Labs DW RN   2/14 0520 0.1 12.5 -- +2.5 15 1400 DW RN pump rate / weight verified   2/14 1435 0.12 15 4000 +3 18 2130 D/w RN [employer]   2/14 2215 0.10 18 5000 +4 22 0600 Pablo 2470 -acb   2/15 0545 0.38 22 -- -- 22 1500 D/w [employer] pump rate / weight verified                                                                                                                                                                        Laurie Menchaca [facility]  2/15/2019  12:56 PM

## 2019-02-15 NOTE — PLAN OF CARE
Problem: Patient Care Overview  Goal: Plan of Care Review  Outcome: Ongoing (interventions implemented as appropriate)   02/15/19 4766   Coping/Psychosocial   Plan of Care Reviewed With patient;spouse   Plan of Care Review   Progress improving   OTHER   Outcome Summary Transitioned from heparin gtt to eliquis in afternoon. Drowsy/lethargic all day, stating that he feels unrested, and has not been wearing CPAP @ night because of discomfort from facility CPAP mask, wife brought in home CPAP and patient able to wear during naps. Up to chair with lift in afternoon, awaiting Dr. Arriaga to D/C dialysis catheter this evening. VSS. Large amount of frequent incontinent urine, patient states he is unable to notice sensation of either needing to void or having already voided. also having small incont. loose stools.        Problem: Hemodialysis (Adult)  Goal: Signs and Symptoms of Listed Potential Problems Will be Absent, Minimized or Managed (Hemodialysis)  Outcome: Outcome(s) achieved Date Met: 02/15/19      Problem: Sepsis/Septic Shock (Adult)  Goal: Signs and Symptoms of Listed Potential Problems Will be Absent, Minimized or Managed (Sepsis/Septic Shock)  Outcome: Ongoing (interventions implemented as appropriate)

## 2019-02-15 NOTE — PROGRESS NOTES
[facility] Progress Note          Antibiotics:  Anti-Infectives (From admission, onward)    Ordered     Dose/Rate Route Frequency Start Stop    02/14/19 1149  ceFAZolin in dextrose (ANCEF) IVPB solution 2 g     Ordering Provider:  Laurie Menchaca RPH    2 g  over 30 Minutes Intravenous Every 8 Hours 02/14/19 1300 02/22/19 1259    02/09/19 0849  aztreonam (AZACTAM) 2 g in sodium chloride 0.9 % 100 mL IVPB-MBP     Ordering Provider:  Efrem Santana MD    2 g  200 mL/hr over 30 Minutes Intravenous Once 02/09/19 1000 02/09/19 0946          CC: Patient unable    HPI:    Consult by Dr. Jeff Smith; covering for him :      Patient is a 68 y.o.  Yr old male with history of prostate cancer status post robotic laparoscopic prostatectomy in mid January and was discharged on 1/17/19. The patient is currently unresponsive and no family is at bedside so history is somewhat limited to history from nursing staff and medical records. The patient was seen a few days after his discharge from his recent  Admission in January and his Christina catheter was discontinued.  About one week ago he went to the ER with complaints of back pain, abdominal pain, and confusion and a CT abdomen and pelvis was apparently okay during that time. He continued to worsen and over the weekend he was admitted to [facility].  He was found to have an initial AK I with a creatinine of 2.2. Per the micro-lab at [facility], the patient's urine cultures and 1 of 2 blood cultures from 2/5/19 grew MSSA. The patient was initially on vancomycin and Zosyn and was subsequently transitioned to daptomycin after he developed worsening renal function with a creatinine up to 5.6 and BUN went up to 94 today.  Due to lack of nephrology at [facility], the patient was transferred to [facility] today for possible dialysis.  He additionally received a dose of nafcillin prior to this transfer.  His last dose of daptomycin was  yesterday per pharmacist here (who has discussed with OSH). Since arrival, the patient has remained afebrile but he has had a leukocytosis up to 31.38.  He is anemic with a hemoglobin of 11.3 and a hematocrit of 54.1.  He is hyponatremic to a sodium of 127.  Creatinine was initially elevated 5.39 and B1 was 97 on arrival.  He additionally had elevation of his LFTs with an ALT of 43 and AST of 43, T bili of 3.0, and alkaline phosphatase of 303.  Pro-calcitonin was elevated to 7.55 lactic acid was normal at 0.8. The patient underwent CT head without contrast which did not show any acute abnormality, CT chest, abdomen and pelvis which showed consolidation of the lung bases bilaterally that is concerning for airspace disease such as pneumonia, gastric distention, and air-filled loops of colon with no obvious obstruction. Blood cultures have been ordered and a reflexive urinalysis has been ordered as well.  A transthoracic echocardiogram was ordered. The patient underwent dialysis today following right IJ dialysis catheter placement.  The infectious disease team has been consulted for antibiotic recommendations.    He has methicillin sensitive staph aureus septicemia/bacteriuria compounded by acute renal failure, acute hypoxic respiratory failure and by basilar consolidation by chest CT.  Further compounded by acute encephalopathy.    2/9/19 nursing reports A. fib/RVR, dialysis ongoing with acute renal failure and tachypneic/labored at times.  Some cough but unable to capture for culture so far. Empiric HCAP coverage added with zyvox/azactam/flagyl; unable to do MRI of spine so CT's done cervical/thoracic/lumbar spine done 2/10    2/10/19 CT scans of spine; more restful, less labored with breathing and decreased cough;  No rash.  He can wiggle toes but not lift legs off the bed.  He moves left arm better than right arm.  No other new focal pain that he will relate although interaction minimal.  No diarrhea.  No  "vomiting.    2/11/19: seems to have improved from mental status standpoint slightly per his wife and son who are at bedside. Patient endorses abdominal pain but is not answering a lot of questions. tMAX 99.3f. WBC remains elevated at 20K (down from 31K on arrival). TTE was without evidence of IE but valves poorly visualized. Still getting HD.    2/12/19: Patient is starting to feel much better today.  He is responding to a lot of questions and is more alert sitting up in a bedside chair.  Breathing is improving.  No high fevers overnight.  Leukocytosis improved to 15.9 today.  He continues to urinate a small amount but remains on intermittent dialysis.    2/13/19: Feeling about the same as he was yesterday.  Still having some right wrist pain. Shortness of breath is stable. No fevers. Leukocytosis slightly worse today.    2/14/19: Patient is somnolent today after his KLAUDIA due to recent sedation. Procedure went well per his family and he is back in NSR and no signs of vegetation but procedure report is pending. No fevers. Leukocytosis slightly improved from yesterday. Family reports that the patient has had no new complaints. He was evaluated by Dr. Ro and no significant concern for septic arthritis.    2/15/19: doing better today. Breathing well on room air. Working with PT this morning and making jokes. No fevers. Denies any abdominal pain. Having some mild back pain. No diarrhea or nausea. No new rashes.    ROS:    Breathing has improved. No severe abdominal pain. +right wrist pain. No diarrhea or nausea      PE:   /73   Pulse 74   Temp 98.3 °F (36.8 °C) (Axillary)   Resp 20   Ht 170.2 cm (67.01\")   Wt 117 kg (257 lb)   SpO2 97%   BMI 40.24 kg/m²     GENERAL: ill appearing, obese,  man. NAD. Lying in bed  HEENT: Normocephalic, atraumatic. No conjunctival injection. No icterus.   NECK: Supple without nuchal rigidity. No mass.  Chest: right chest dialysis cath site is without erythema or " induration. No chest wall deformities  HEART: irregular rhythm. No murmur, rubs, gallops. LUNGS: Diminished at bases, labored and bilateral rhonchi. On nasal cannula  ABDOMEN: Soft, nontender, nondistended. Positive bowel sounds. No rebound or guarding. NO mass or HSM.  EXT:  No cyanosis, clubbing. No cord.  Edema noted over bilateral lower extremities. R>L upper extremity edema. erythema, warmth, and tenderness near right wrist seems to have improved somewhat.   : Genitalia generally unremarkable.  With Christina catheter.  SKIN: warm.  Does have petechia appearing areas over upper extremities   NEURO: somnolent and difficult to awaken. Not responsive to commands.    IV with no obvious redness or drainage    Laboratory Data    Results from last 7 days   Lab Units 02/15/19  0545 02/14/19  0520 02/13/19  1220   WBC 10*3/mm3 17.14* 19.26* 20.36*   HEMOGLOBIN g/dL 9.9* 10.7* 10.5*   HEMATOCRIT % 30.6* 32.5* 31.9*   PLATELETS 10*3/mm3 256 223 170     Results from last 7 days   Lab Units 02/15/19  0545   SODIUM mmol/L 136   POTASSIUM mmol/L 3.3*   CHLORIDE mmol/L 103   CO2 mmol/L 26.0   BUN mg/dL 68*   CREATININE mg/dL 1.28   GLUCOSE mg/dL 122*   CALCIUM mg/dL 8.1*     Results from last 7 days   Lab Units 02/11/19  0637   ALK PHOS U/L 348*   BILIRUBIN mg/dL 2.6*   ALT (SGPT) U/L 17   AST (SGOT) U/L 67*         Results from last 7 days   Lab Units 02/11/19  0637   CRP mg/dL 19.67*       Estimated Creatinine Clearance: 68.5 mL/min (by C-G formula based on SCr of 1.28 mg/dL).      Microbiology:      Radiology:  Imaging Results (last 72 hours)     Procedure Component Value Units Date/Time    XR Chest 1 View [[id number]] Updated:  02/09/19 0221    CT Abdomen Pelvis Without Contrast [[id number]] Collected:  02/08/19 1822     Updated:  02/08/19 1822    Narrative:       EXAMINATION: CT CHEST WO CONTRAST, CT ABDOMEN/PELVIS WO CONTRAST -  2/8/2019     INDICATION: Persistent cough.     TECHNIQUE: Multiple axial CT imaging is obtained  of the chest, abdomen  and pelvis without the administration of oral or intravenous contrast.     The radiation dose reduction device was turned on for each scan per the  ALARA (As Low as Reasonably Achievable) protocol.     COMPARISON: There is some consolidation identified posteriorly extending  to the lung bases bilaterally suggesting infiltrates. The upper lung  fields are grossly clear. Motion artifact degrades image quality. There  are degenerative changes identified within the spine. The cardiac  chambers are enlarged. There is no pericardial effusion. No bulky hilar  or axillary lymphadenopathy. The thyroid is homogeneous. Degenerative  change is seen throughout the spine.     ABDOMEN: Motion artifact grades image quality. There is no abnormality  seen within the liver. The spleen is upper limits of normal in size.  There is gastric distention. Air fills the colon with no  evidence of  obvious obstruction. Kidneys and adrenal glands within normal limits.  The pancreas is homogeneous. No abdominal or retroperitoneal  lymphadenopathy. No abnormal mass or fluid collections identified. No  free fluid or free air.     PELVIS: Pelvic organs are unremarkable. The pelvic portion of the   gastrointestinal tract is within normal limits. No pelvic adenopathy.  Degenerative change is identified throughout the spine and pelvis.     FINDINGS: Consolidation at the lung bases bilaterally is concerning for  airspace disease such as pneumonia. The there is gastric distention.  Air-filled loops of colon with no obvious obstruction. No other abnormal  findings are seen within the abdomen or pelvis. Degenerative changes are  seen throughout the spine and pelvis.     DICTATED:   2/8/2019  EDITED/ls :   2/8/2019              Impression:               CT Chest Without Contrast [[id number]] Collected:  02/08/19 1822     Updated:  02/08/19 1822    Narrative:       EXAMINATION: CT CHEST WO CONTRAST, CT ABDOMEN/PELVIS WO CONTRAST  -  2/8/2019     INDICATION: Persistent cough.     TECHNIQUE: Multiple axial CT imaging is obtained of the chest, abdomen  and pelvis without the administration of oral or intravenous contrast.     The radiation dose reduction device was turned on for each scan per the  ALARA (As Low as Reasonably Achievable) protocol.     COMPARISON: There is some consolidation identified posteriorly extending  to the lung bases bilaterally suggesting infiltrates. The upper lung  fields are grossly clear. Motion artifact degrades image quality. There  are degenerative changes identified within the spine. The cardiac  chambers are enlarged. There is no pericardial effusion. No bulky hilar  or axillary lymphadenopathy. The thyroid is homogeneous. Degenerative  change is seen throughout the spine.     ABDOMEN: Motion artifact grades image quality. There is no abnormality  seen within the liver. The spleen is upper limits of normal in size.  There is gastric distention. Air fills the colon with no  evidence of  obvious obstruction. Kidneys and adrenal glands within normal limits.  The pancreas is homogeneous. No abdominal or retroperitoneal  lymphadenopathy. No abnormal mass or fluid collections identified. No  free fluid or free air.     PELVIS: Pelvic organs are unremarkable. The pelvic portion of the   gastrointestinal tract is within normal limits. No pelvic adenopathy.  Degenerative change is identified throughout the spine and pelvis.     FINDINGS: Consolidation at the lung bases bilaterally is concerning for  airspace disease such as pneumonia. The there is gastric distention.  Air-filled loops of colon with no obvious obstruction. No other abnormal  findings are seen within the abdomen or pelvis. Degenerative changes are  seen throughout the spine and pelvis.     DICTATED:   2/8/2019  EDITED/ls :   2/8/2019              Impression:               CT Head Without Contrast [[id number]] Collected:  02/08/19 1816     Updated:   02/08/19 1817    Narrative:       EXAMINATION: CT HEAD WO CONTRAST - 2/8/2019     INDICATION: Mental status change, does not follow commands.     TECHNIQUE: Multiple axial CT imaging is obtained of the head from skull  base to skull vertex without the administration of intravenous contrast.     The radiation dose reduction device was turned on for each scan per the  ALARA (As Low as Reasonably Achievable) protocol.     COMPARISON: NONE     FINDINGS: There is atrophy of the brain. Some low-density area seen in  the periventricular white matter suggesting chronic small vessel  ischemic change. No hemorrhage or hydrocephalus. No mass, mass effect,  or midline shift. No abnormal extra-axial  fluid collections identified. Minimal mucosal thickening of the  maxillary sinuses and ethmoid air cells. The bony structures are  unremarkable. The mastoid air cells are patent.       Impression:       Chronic changes identified within the brain with no acute  intracranial abnormality.     DICTATED:   2/8/2019  EDITED/ls :   2/8/2019        FL C Arm During Surgery [[id number]] Collected:  02/08/19 1646     Updated:  02/08/19 1646    Narrative:       EXAMINATION: FL C ARM DURING SURGERY- 02/08/2019      INDICATION: Dialysis catheter placement     COMPARISON: NONE     FINDINGS: 4 seconds of fluoroscopy and 4 images used for right-sided  dialysis catheter placement. Please see the procedure report for full  details.       Impression:       Fluoroscopy for dialysis catheter placement. Please see the  procedure report for full details.      D:  02/08/2019  E:  02/08/2019       XR Chest 1 View [[id number]] Collected:  02/08/19 1628     Updated:  02/08/19 1628    Narrative:       EXAMINATION: XR CHEST 1 VW-02/08/2019:      INDICATION: RIJ line.      COMPARISON: 02/08/2019.     FINDINGS: Portable chest reveals low lung volumes. Deep line catheter  identified on the right with tip in the SVC. Degenerative changes seen  within the spine. The  heart is borderline enlarged. Mild prominence seen  of the pulmonary vascularity. No pleural effusion or pneumothorax.           Impression:       Prominence of the pulmonary vascularity with deep line  catheter identified on the right with tip in the SVC. No evidence of  acute parenchymal disease.     D:  02/08/2019  E:  02/08/2019             XR Chest 1 View [[id number]] Collected:  02/08/19 1243     Updated:  02/08/19 1246    Narrative:          EXAMINATION: XR CHEST 1 VW-      INDICATION: respiratory failure      COMPARISON: 01/08/2019     FINDINGS: Heart is borderline enlarged. The vasculature is cephalized.  Lung volumes are relatively low. There is mild discoid atelectasis in  both medial lung bases, but no focal disease elsewhere..           Impression:       Low lung volumes, borderline cardiomegaly and mild pulmonary  vascular congestion. Mild bibasilar discoid atelectasis, new from prior  study.     This report was finalized on 2/8/2019 12:44 PM by DR. Antione Damon MD.               Impression:     --Acute severe sepsis/septic shock with multiorgan system insufficiency.  Methicillin sensitive staph aureus in blood/urine cultures and bilateral pulmonary infiltrates with potential bilateral pneumonia, HCAP/Aspiration -v- all MSSA. Now improved and on room air. Repeat blood cultures were no growth and KLAUDIA was without vegetation. Leukocytosis improving.    --Acute MSSA septicemia/bacteremia and bacteriuria.  Transthoracic echocardiogram with poor visualization of heart valves but no noted vegetation.  No peripheral stigmata of endocarditis but certainly at risk.  KLAUDIA done 12/14/19 and report pending but reported negative for endocarditis by family.    --Acute hypoxic respiratory failure.  Potentially multifactorial, infection versus edema versus ARDS versus combination of these.  Bilateral consolidations with possible evolving pneumonia, HCAP/aspiration -v- MSSA; collected sputum, monitor and consider  de-escalation depending on culture data and clinical course. Improved, no breathing well on room air    --Acute renal failure.  Renal function improving and now off dialysis. Nephrology service is ok with removal of tunneled cath    --Severe extremity weakness with presentation that include back pain: unclear if spinal/paraspinal infection versus other.  Nursing/radiology/Dr. Olvera to  discussed potential for MRI if able.  Some question of foreign body at head that may limit ability to do MRI.  Unable to obtain MRI and CT spine without abscess/osteo    --Abnormal liver function tests.  Monitor    --Acute atrial fibrillation/RVR    --Prostate cancer with history recent prostatectomy.  Urology following    --Acute encephalopathy.  Presumed toxic/metabolic present.  Further neurologic workup at discretion of critical care team    ---right wrist swelling- no concern for septic arthritis per ortho      PLAN:    --IV cefazolin.  Will likely need a 6 week course given severity of MSSA infection. Dosing increased given renal function improved    --stoped Azactam and Flagyl today    --KLAUDIA without vegetations    --Agree with removal of dialysis cath when possible    Critically ill with high risk for further serious morbidity and other serious sequela/mortality. I discussed with his wife again today.          Jeff Smith MD  2/15/2019

## 2019-02-15 NOTE — PROGRESS NOTES
Continued Stay Note  [facility]     Patient Name: Rod Balderas  MRN: [id number]  Today's Date: 2/15/2019    Admit Date: 2/8/2019    Discharge Plan     Row Name 02/15/19 1050       Plan    Plan  SNF    Plan Comments  Patient sleeping, spoke with wife at bedside.  Wife is in agreement to SNF.  [facility]  Patient no longer requiring dialysis.  [facility]  CM will continue to follow.        Discharge Codes    No documentation.       Expected Discharge Date and Time     Expected Discharge Date Expected Discharge Time    Feb 18, 2019             Jacquelyn Mann RN

## 2019-02-15 NOTE — THERAPY TREATMENT NOTE
Acute Care - Physical Therapy Treatment Note   [facility]     Patient Name: Rod Balderas  : 1950  MRN: [id number]  Today's Date: 2/15/2019  Onset of Illness/Injury or Date of Surgery: 19  Date of Referral to PT: 19  Referring Physician: MD Aurea    Admit Date: 2019    Visit Dx:    ICD-10-CM ICD-9-CM   1. Impaired functional mobility, balance, gait, and endurance Z74.09 V49.89   2. Impaired mobility and ADLs Z74.09 799.89     Patient Active Problem List   Diagnosis   • Degenerative disc disease, lumbar   • Spinal stenosis, lumbar region, with neurogenic claudication   • Neuroforaminal stenosis of spine   • Lumbar facet arthropathy   • CAD (coronary artery disease)   • Essential hypertension   • Physical deconditioning   • Morbid obesity due to excess calories (CMS/HCC)   • TANIYA on CPAP   • Displacement of lumbar intervertebral disc   • Dyslipidemia   • Osteoarthritis   • At high risk for falls   • Prostate cancer , s/p prostatectomy ( [facility])   • DM (diabetes mellitus), type 2 (CMS/HCC)   • Leukocytosis, mild, likely reactive   • Acute postoperative pain   • Acute blood loss anemia, mild, asymptomatic   • Renal failure   • Metabolic encephalopathy   • Paroxysmal atrial fibrillation (CMS/HCC)   • Sepsis (CMS/HCC)       Therapy Treatment    Rehabilitation Treatment Summary     Row Name 02/15/19 1541 02/15/19 1500          Treatment Time/Intention    Discipline  physical therapist  -LS  physical therapist  -MF     Document Type  therapy note (daily note)  -LS  therapy note (daily note);wound care  -MF     Subjective Information  complains of;pain;fatigue  -LS  complains of;weakness;fatigue;pain  -MF     Mode of Treatment  physical therapy  -LS  physical therapy  -MF     Patient/Family Observations  wife present  -LS  --     Patient Effort  adequate  -LS  --     Existing Precautions/Restrictions  fall chronic L foot drop; painful R wrist  -LS  --     Recorded by [LS] Ana Akers, PT 02/15/19  1618 [MF] Efrem Tran, PT 02/15/19 1558     Row Name 02/15/19 1541             Vital Signs    Pre Systolic BP Rehab  125  -LS      Pre Treatment Diastolic BP  62  -LS      Pretreatment Heart Rate (beats/min)  74  -LS      Posttreatment Heart Rate (beats/min)  73  -LS      O2 Delivery Pre Treatment  room air  -LS      O2 Delivery Post Treatment  room air  -LS      Pre Patient Position  Sitting  -LS      Intra Patient Position  Sitting  -LS      Post Patient Position  Sitting  -LS      Recorded by [LS] Ana Akers, PT 02/15/19 1618      Row Name 02/15/19 1541             Cognitive Assessment/Intervention- PT/OT    Affect/Mental Status (Cognitive)  WFL  -LS      Orientation Status (Cognition)  oriented x 4  -LS      Follows Commands (Cognition)  follows one step commands;75-90% accuracy;physical/tactile prompts required;repetition of directions required;verbal cues/prompting required  -LS      Cognitive Function (Cognitive)  safety deficit  -LS      Safety Deficit (Cognitive)  moderate deficit;insight into deficits/self awareness;safety precautions awareness  -LS      Personal Safety Interventions  fall prevention program maintained;gait belt;nonskid shoes/slippers when out of bed  -LS      Recorded by [LS] Ana Akers, PT 02/15/19 1618      Row Name 02/15/19 1541             Bed Mobility Assessment/Treatment    Comment (Bed Mobility)  UIC  -LS      Recorded by [LS] Ana Akers, PT 02/15/19 1618      Row Name 02/15/19 1541             Transfer Assessment/Treatment    Transfer Assessment/Treatment  stand-sit transfer;sit-stand transfer  -LS      Comment (Transfers)  mini chair push ups x5 from recliner; unable to clear hips from chair surface  -LS      Recorded by [LS] Ana Akers, PT 02/15/19 1618      Row Name 02/15/19 1541             Sit-Stand Transfer    Sit-Stand Cloud (Transfers)  dependent (less than 25% patient effort);2 person assist;verbal cues  -LS      Recorded by [LS] Ana Akers  HOLLIE, PT 02/15/19 1618      Row Name 02/15/19 1541             Stand-Sit Transfer    Stand-Sit [place] (Transfers)  dependent (less than 25% patient effort);2 person assist;verbal cues  -LS      Recorded by [LS] Ana Akers, PT 02/15/19 1618      Row Name 02/15/19 1541             Gait/Stairs Assessment/Training    [place] Level (Gait)  unable to assess  -LS      Recorded by [LS] Ana Akers, PT 02/15/19 1618      Row Name 02/15/19 1541             Therapeutic Exercise    40601 - PT Therapeutic Exercise Minutes  7  -LS      26614 - PT Therapeutic Activity Minutes  10  -LS      Recorded by [LS] Ana Akers, PT 02/15/19 1618      Row Name 02/15/19 1541             Lower Extremity Seated Therapeutic Exercise    Performed, Seated Lower Extremity (Therapeutic Exercise)  LAQ (long arc quad), knee extension;ankle dorsiflexion/plantarflexion;hip abduction/adduction hamstring and calf stretch  -LS      Exercise Type, Seated Lower Extremity (Therapeutic Exercise)  AAROM (active assistive range of motion)  -LS      Sets/Reps Detail, Seated Lower Extremity (Therapeutic Exercise)  1/10; constant cues for active participate.  -LS      Recorded by [LS] Ana Akers, PT 02/15/19 1618      Row Name 02/15/19 1541             Static Sitting Balance    Level of [place] (Unsupported Sitting, Static Balance)  minimal assist, 75% patient effort  -LS      Sitting Position (Unsupported Sitting, Static Balance)  sitting in chair  -LS      Time Able to Maintain Position (Unsupported Sitting, Static Balance)  4 to 5 minutes  -LS      Comment (Unsupported Sitting, Static Balance)  progressed to supervision. Ant post leans; R/L lateral leans  -LS      Recorded by [LS] Ana Akers, PT 02/15/19 1618      Row Name 02/15/19 1541 02/15/19 1500          Positioning and Restraints    Pre-Treatment Position  sitting in chair/recliner  -LS  in bed  -MF     Post Treatment Position  chair  -LS  bed  -MF     In Bed  --  supine;call  light within reach;with nsg  -MF     In Chair  notified nsg;reclined;call light within reach;encouraged to call for assist;with family/caregiver;RUE elevated;LUE elevated;waffle cushion;on mechanical lift sling;legs elevated;heels elevated  -LS  --     Recorded by [LS] Ana Akers, PT 02/15/19 1618 [MF] Efrem Tran, PT 02/15/19 1558     Row Name 02/15/19 1500             Pain Assessment    Additional Documentation  Pain Scale: FACES Pre/Post-Treatment (Group)  -MF      Recorded by [MF] Efrem Tran, PT 02/15/19 1558      Row Name 02/15/19 1541 02/15/19 1500          Pain Scale: Numbers Pre/Post-Treatment    Pain Location - Orientation  generalized  -LS  generalized  -MF     Pre/Post Treatment Pain Comment  tolerated  -LS  --     Pain Intervention(s)  Repositioned;Ambulation/increased activity  -LS  --     Recorded by [LS] Ana Akers, PT 02/15/19 1618 [MF] Efrem Tran, PT 02/15/19 1558     Row Name 02/15/19 1541 02/15/19 1500          Pain Scale: FACES Pre/Post-Treatment    Pain: FACES Scale, Pretreatment  2-->hurts little bit  -LS  2-->hurts little bit  -MF     Pain: FACES Scale, Post-Treatment  2-->hurts little bit  -LS  2-->hurts little bit  -MF     Recorded by [LS] Ana Akers, PT 02/15/19 1618 [MF] Efrem Tran, PT 02/15/19 1558     Row Name                Wound 02/08/19 1519 Other (See comments) neck incision    Wound - Properties Group Date first assessed: 02/08/19 [SM] Time first assessed: 1519 [SM] Side: Other (See comments) [SM] Location: neck [SM] Type: incision [SM] Recorded by:  [SM] Rissa Rodriguez RN 02/08/19 1519    Row Name 02/15/19 1500             Wound 02/08/19 1800 Right lower gluteal pressure injury    Wound - Properties Group Date first assessed: 02/08/19 [AB] Time first assessed: 1800 [AB] Present On Admission : yes [AB] Side: Right [AB] Orientation: lower [AB] Location: gluteal [AB] Type: pressure injury [AB] Stage, Pressure Injury: deep tissue injury [AB]  Additional Comments: bilateral gluteals [MR] Recorded by:  [AB] Addison Jasso RN 02/08/19 1855 [MR] Carmen Conner RN 02/09/19 1227    Dressing Appearance  open to air  -MF      Base  dry;purple;red/granulating;maroon/purple  -MF      Periwound  dry;pink;redness;blanchable  -MF      Periwound Temperature  warm  -MF      Periwound Skin Turgor  soft  -MF      Care, Wound  irrigated with;sterile normal saline;ultrasound therapy, non contact low frequency 5 min mist  -MF      Recorded by [MF] Efrem Tran, PT 02/15/19 1558      Row Name                Wound 02/09/19 1116 Bilateral medial thigh    Wound - Properties Group Date first assessed: 02/09/19 [MR] Time first assessed: 1116 [MR] Present On Admission : yes [MR] Side: Bilateral [MR] Orientation: medial [MR] Location: thigh [MR] Stage, Pressure Injury: deep tissue injury [MR] Recorded by:  [MR] Carmen Conner RN 02/09/19 1228    Row Name                Wound 02/13/19 0800 Left lower gluteal pressure injury    Wound - Properties Group Date first assessed: 02/13/19 [JM] Time first assessed: 0800 [JM] Side: Left [JM] Orientation: lower [JM] Location: gluteal [JM] Type: pressure injury [JM] Stage, Pressure Injury: deep tissue injury [JM] Recorded by:  [JM] Ivon Eason RN 02/13/19 1353    Row Name 02/15/19 1500             Coping    Observed Emotional State  accepting;calm;cooperative  -MF      Verbalized Emotional State  acceptance  -MF      Recorded by [MF] Efrem Tran, PT 02/15/19 1558      Row Name 02/15/19 1541 02/15/19 1500          Plan of Care Review    Plan of Care Reviewed With  patient;spouse  -LS  patient  -MF     Recorded by [LS] Ana Akers, PT 02/15/19 1618 [MF] Efrem Tran, PT 02/15/19 1558     Row Name 02/15/19 1541             Outcome Summary/Treatment Plan (PT)    Daily Summary of Progress (PT)  progress toward functional goals is good  -LS      Recorded by [LS] Ana Akers, PT 02/15/19 1618        User Key  (r)  = Recorded By, (t) = Taken By, (c) = Cosigned By    Initials Name Effective Dates Discipline    MF Marc Efrem IRVING, PT 06/19/15 -  PT    Ana Andersen, PT 06/19/15 -  PT    Rissa Bolden, RN 06/16/16 -  Nurse    Carmen Nelson, RN 06/16/16 -  Nurse    Ivon Gutierrez RN 06/16/16 -  Nurse    Addison Jaffe RN 02/02/17 -  Nurse          Rash 02/11/19 1200 other (see comments) posterior arm (Active)   Distribution localized 2/15/2019  2:00 PM   Configuration/Shape asymmetric 2/15/2019  2:00 PM   Borders diffuse;irregular 2/15/2019  2:00 PM   Characteristics dry;raised 2/15/2019  2:00 PM   Color red 2/15/2019  2:00 PM   Care, Rash open to air 2/15/2019  4:00 AM       Rash 02/12/19 2000 Left upper arm macular (Active)   Distribution regional 2/15/2019  2:00 PM   Configuration/Shape asymmetric 2/15/2019  2:00 PM   Borders diffuse;irregular 2/15/2019  2:00 PM   Characteristics raised;dry 2/15/2019  2:00 PM   Color red 2/15/2019  2:00 PM   Care, Rash open to air 2/15/2019  4:00 AM       Rash 02/12/19 2000 Left posterior hand macular (Active)   Distribution localized 2/15/2019  2:00 PM   Configuration/Shape asymmetric 2/15/2019  2:00 PM   Borders diffuse;irregular 2/15/2019  2:00 PM   Characteristics dry;raised 2/15/2019  2:00 PM   Color purple 2/15/2019  2:00 PM   Care, Rash open to air 2/15/2019 12:00 AM       Rash 02/13/19 0614 Right upper arm macular (Active)   Distribution regional 2/15/2019  2:00 PM   Configuration/Shape asymmetric 2/15/2019  2:00 PM   Borders diffuse;irregular 2/15/2019  2:00 PM   Characteristics dry;raised 2/15/2019  2:00 PM   Color red;purple 2/15/2019  2:00 PM   Care, Rash open to air 2/15/2019  4:00 AM       Wound 02/08/19 1519 Other (See comments) neck incision (Active)   Dressing Appearance open to air 2/15/2019  2:00 PM   Closure Adhesive closure strips 2/15/2019  2:00 PM   Base clean;dry;pink 2/15/2019  2:00 PM   Periwound intact;dry;pink;blanchable 2/15/2019  2:00 PM    Periwound Skin Turgor soft 2/15/2019  2:00 PM   Drainage Amount none 2/15/2019  2:00 PM   Periwound Care, Wound dry periwound area maintained 2/15/2019  2:00 PM       Wound 02/08/19 1800 Right lower gluteal pressure injury (Active)   Dressing Appearance open to air 2/15/2019  3:00 PM   Closure None 2/15/2019  2:00 PM   Base dry;purple;red/granulating;maroon/purple 2/15/2019  3:00 PM   Periwound dry;pink;redness;blanchable 2/15/2019  3:00 PM   Periwound Temperature warm 2/15/2019  3:00 PM   Periwound Skin Turgor soft 2/15/2019  3:00 PM   Drainage Amount none 2/15/2019  2:00 PM   Care, Wound irrigated with;sterile normal saline;ultrasound therapy, non contact low frequency 2/15/2019  3:00 PM   Dressing Care, Wound open to air 2/15/2019  4:00 AM   Periwound Care, Wound dry periwound area maintained 2/15/2019  2:00 PM       Wound 02/09/19 1116 Bilateral medial thigh (Active)   Dressing Appearance open to air 2/15/2019  2:00 PM   Closure None 2/15/2019  2:00 PM   Base maroon/purple;red/granulating 2/15/2019  2:00 PM   Periwound intact;dry;redness;blanchable 2/15/2019  2:00 PM   Drainage Amount none 2/15/2019  2:00 PM   Care, Wound cleansed with;soap and water;barrier applied 2/15/2019  8:15 AM   Dressing Care, Wound open to air 2/15/2019  4:00 AM   Periwound Care, Wound dry periwound area maintained 2/15/2019  2:00 PM       Wound 02/13/19 0800 Left lower gluteal pressure injury (Active)   Dressing Appearance open to air 2/15/2019  2:00 PM   Base dry;maroon/purple;purple 2/15/2019  2:00 PM   Periwound intact;dry;pink;blanchable 2/15/2019  2:00 PM   Care, Wound cleansed with;soap and water;barrier applied 2/15/2019  8:15 AM   Periwound Care, Wound dry periwound area maintained 2/15/2019  2:00 PM           Physical Therapy Education     Title: PT OT SLP Therapies (In Progress)     Topic: Physical Therapy (In Progress)     Point: Mobility training (In Progress)     Learning Progress Summary           Patient Acceptance,  E,D, NR by LS at 2/15/2019  3:41 PM    Acceptance, E,D, NR by LS at 2/14/2019  2:10 PM    Acceptance, E,D, NR by LS at 2/12/2019  9:59 AM   Significant Other Acceptance, E,D, NR by LS at 2/15/2019  3:41 PM    Acceptance, E,D, NR by LS at 2/14/2019  2:10 PM    Acceptance, E,D, NR by LS at 2/12/2019  9:59 AM                   Point: Home exercise program (In Progress)     Learning Progress Summary           Patient Acceptance, E,D, NR by LS at 2/15/2019  3:41 PM    Acceptance, E,D, NR by LS at 2/14/2019  2:10 PM   Significant Other Acceptance, E,D, NR by LS at 2/15/2019  3:41 PM    Acceptance, E,D, NR by LS at 2/14/2019  2:10 PM                   Point: Body mechanics (In Progress)     Learning Progress Summary           Patient Acceptance, E,D, NR by LS at 2/15/2019  3:41 PM    Acceptance, E,D, NR by LS at 2/14/2019  2:10 PM    Acceptance, E,D, NR by LS at 2/12/2019  9:59 AM   Significant Other Acceptance, E,D, NR by LS at 2/15/2019  3:41 PM    Acceptance, E,D, NR by LS at 2/14/2019  2:10 PM    Acceptance, E,D, NR by LS at 2/12/2019  9:59 AM                   Point: Precautions (In Progress)     Learning Progress Summary           Patient Acceptance, E,D, NR by LS at 2/15/2019  3:41 PM    Acceptance, E,D, NR by LS at 2/14/2019  2:10 PM    Acceptance, E,D, NR by LS at 2/12/2019  9:59 AM   Significant Other Acceptance, E,D, NR by LS at 2/15/2019  3:41 PM    Acceptance, E,D, NR by LS at 2/14/2019  2:10 PM    Acceptance, E,D, NR by LS at 2/12/2019  9:59 AM                               User Key     Initials Effective Dates Name Provider Type Discipline     06/19/15 -  Ana Akers, PT Physical Therapist PT                PT Recommendation and Plan  Anticipated Discharge Disposition (PT): skilled nursing facility  Planned Therapy Interventions (PT Eval): balance training, bed mobility training, gait training, home exercise program, stair training, strengthening, transfer training, patient/family education  Therapy  Frequency (PT Clinical Impression): daily  Outcome Summary/Treatment Plan (PT)  Daily Summary of Progress (PT): progress toward functional goals is good  Barriers to Overall Progress (PT): decreased motivation to participate/progress  Anticipated Discharge Disposition (PT): skilled nursing facility  Plan of Care Reviewed With: patient, spouse  Progress: improving  Outcome Summary: Pt demonstrated increased indep re: sitting balance; progressed duration of sitting balance activity. With max encouragement, pt attempted mini chair pushups from recliner for strengthening (and pre-STS training); unable to clear hips from chair surface at this time. Cont to be limited by fatigue and need for constant encouragement to actively participate. Will cont PT POC.   Outcome Measures     Row Name 02/15/19 1541 02/14/19 1410 02/14/19 1352       How much help from another person do you currently need...    Turning from your back to your side while in flat bed without using bedrails?  2  -LS  2  -LS  --    Moving from lying on back to sitting on the side of a flat bed without bedrails?  2  -LS  2  -LS  --    Moving to and from a bed to a chair (including a wheelchair)?  1  -LS  1  -LS  --    Standing up from a chair using your arms (e.g., wheelchair, bedside chair)?  1  -LS  1  -LS  --    Climbing 3-5 steps with a railing?  1  -LS  1  -LS  --    To walk in hospital room?  1  -LS  1  -LS  --    AM-PAC 6 Clicks Score  8  -LS  8  -LS  --       How much help from another is currently needed...    Putting on and taking off regular lower body clothing?  --  --  1  -CL    Bathing (including washing, rinsing, and drying)  --  --  1  -CL    Toileting (which includes using toilet bed pan or urinal)  --  --  1  -CL    Putting on and taking off regular upper body clothing  --  --  1  -CL    Taking care of personal grooming (such as brushing teeth)  --  --  2  -CL    Eating meals  --  --  2  -CL    Score  --  --  8  -CL       Functional  Assessment    Outcome Measure Options  AM-PAC 6 Clicks Basic Mobility (PT)  -LS  AM-PAC 6 Clicks Basic Mobility (PT)  -LS  AM-PAC 6 Clicks Daily Activity (OT)  -CL      User Key  (r) = Recorded By, (t) = Taken By, (c) = Cosigned By    Initials Name Provider Type    Ana Andersen, PT Physical Therapist    CL Cassie Reardon, OT Occupational Therapist         Time Calculation:   PT Charges     Row Name 02/15/19 1541 02/15/19 1500          Time Calculation    Start Time  1541  -LS  1500  -MF     PT Received On  02/15/19  -LS  --     PT Goal Re-Cert Due Date  --  02/22/19  -MF        Time Calculation- PT    Total Timed Code Minutes- PT  17 minute(s)  -LS  --        Timed Charges    26912 - PT Therapeutic Exercise Minutes  7  -LS  --     81993 - PT Therapeutic Activity Minutes  10  -LS  --       User Key  (r) = Recorded By, (t) = Taken By, (c) = Cosigned By    Initials Name Provider Type    Efrem Gonzalez, PT Physical Therapist    LS Ana Akers, PT Physical Therapist        Therapy Suggested Charges     Code   Minutes Charges    59401 (CPT®) Hc Pt Neuromusc Re Education Ea 15 Min      89494 (CPT®) Hc Pt Ther Proc Ea 15 Min 7     33504 (CPT®) Hc Gait Training Ea 15 Min      70974 (CPT®) Hc Pt Therapeutic Act Ea 15 Min 10 1    96486 (CPT®) Hc Pt Manual Therapy Ea 15 Min      15547 (CPT®) Hc Pt Iontophoresis Ea 15 Min      15145 (CPT®) Hc Pt Elec Stim Ea-Per 15 Min      03324 (CPT®) Hc Pt Ultrasound Ea 15 Min      50283 (CPT®) Hc Pt Self Care/Mgmt/Train Ea 15 Min      23593 (CPT®) Hc Pt Prosthetic (S) Train Initial Encounter, Each 15 Min      55292 (CPT®) Hc Pt Orthotic(S)/Prosthetic(S) Encounter, Each 15 Min      16870 (CPT®) Hc Orthotic(S) Mgmt/Train Initial Encounter, Each 15min      Total  17 1        Therapy Charges for Today     Code Description Service Date Service Provider Modifiers Qty    44229651471 HC PT THERAPEUTIC ACT EA 15 MIN 2/14/2019 Ana Akers, PT GP 1    13961919129 HC PT THER PROC EA 15  MIN 2/14/2019 Ana Akers, PT GP 1    92703150875 HC PT THERAPEUTIC ACT EA 15 MIN 2/15/2019 Ana Akers, PT GP 1    35339357394 HC PT THER SUPP EA 15 MIN 2/15/2019 Ana Akers, PT GP 1          PT G-Codes  Outcome Measure Options: AM-PAC 6 Clicks Basic Mobility (PT)  AM-PAC 6 Clicks Score: 8  Score: 8    Ana Akers, PT  2/15/2019

## 2019-02-15 NOTE — PROGRESS NOTES
"CHIEF COMPLAINT: Follow-up right wrist pain    SUBJECTIVE  Patient resting in bed comfortably.  No complaints of wrist pain this morning.  Appears to be more alert.    OBJECTIVE  Temp (24hrs), Av.7 °F (37.1 °C), Min:98.1 °F (36.7 °C), Max:99.3 °F (37.4 °C)    Blood pressure 139/68, pulse 71, temperature 99.3 °F (37.4 °C), temperature source Axillary, resp. rate 16, height 170.2 cm (67.01\"), weight 117 kg (257 lb), SpO2 95 %.    Lab Results (last 24 hours)     Procedure Component Value Units Date/Time    CBC & Differential [[id number]] Collected:  02/15/19 0545    Specimen:  Blood Updated:  02/15/19 0724    Narrative:       The following orders were created for panel order CBC & Differential.  Procedure                               Abnormality         Status                     ---------                               -----------         ------                     Manual Differential[[id number]]          Abnormal            Final result               CBC Auto Differential[[id number]]        Abnormal            Final result                 Please view results for these tests on the individual orders.    Manual Differential [[id number]]  (Abnormal) Collected:  02/15/19 0545    Specimen:  Blood Updated:  02/15/19 0724     Neutrophil % 80.0 %      Lymphocyte % 8.0 %      Monocyte % 4.0 %      Eosinophil % 2.0 %      Basophil % 1.0 %      Bands %  3.0 %      Atypical Lymphocyte % 2.0 %      Neutrophils Absolute 14.23 10*3/mm3      Lymphocytes Absolute 1.37 10*3/mm3      Monocytes Absolute 0.69 10*3/mm3      Eosinophils Absolute 0.34 10*3/mm3      Basophils Absolute 0.17 10*3/mm3      Hypochromia Slight/1+     WBC Morphology Normal     Platelet Morphology Normal    CBC Auto Differential [[id number]]  (Abnormal) Collected:  02/15/19 0545    Specimen:  Blood Updated:  02/15/19 0724     WBC 17.14 10*3/mm3      RBC 3.73 10*6/mm3      Hemoglobin 9.9 g/dL      Hematocrit 30.6 %      MCV 82.0 fL      MCH 26.5 pg      MCHC 32.4 " g/dL      RDW 15.9 %      RDW-SD 45.3 fl      MPV 9.8 fL      Platelets 256 10*3/mm3     LSAC Slide Creation [[id number]] Collected:  02/15/19 0545    Specimen:  Blood Updated:  02/15/19 0715    Renal Function Panel [[id number]]  (Abnormal) Collected:  02/15/19 0545    Specimen:  Blood Updated:  02/15/19 0704     Glucose 122 mg/dL      BUN 68 mg/dL      Creatinine 1.28 mg/dL      Sodium 136 mmol/L      Potassium 3.3 mmol/L      Chloride 103 mmol/L      CO2 26.0 mmol/L      Calcium 8.1 mg/dL      Albumin 2.83 g/dL      Phosphorus 4.0 mg/dL      Anion Gap 7.0 mmol/L      BUN/Creatinine Ratio 53.1     eGFR Non African Amer 56 mL/min/1.73     Narrative:       [employer] Guidelines    Stage     Description        GFR  1         Normal or High     90+  2         Mild decrease      60-89  3         Moderate decrease  30-59  4         Severe decrease    15-29  5         Kidney failure     <15    The MDRD GFR formula is only valid for adults with stable renal function between ages 18 and 70.    Magnesium [[id number]]  (Normal) Collected:  02/15/19 0545    Specimen:  Blood Updated:  02/15/19 0704     Magnesium 1.8 mg/dL     Heparin Anti-Xa [[id number]]  (Normal) Collected:  02/15/19 0545    Specimen:  Blood Updated:  02/15/19 0647     Heparin Anti-Xa (UFH) 0.38 IU/ml     Heparin Anti-Xa [[id number]]  (Abnormal) Collected:  02/14/19 2215    Specimen:  Blood Updated:  02/14/19 2302     Heparin Anti-Xa (UFH) 0.10 IU/ml     POC Glucose Once [[id number]]  (Abnormal) Collected:  02/14/19 2019    Specimen:  Blood Updated:  02/14/19 2030     Glucose 173 mg/dL     POC Glucose Once [[id number]]  (Abnormal) Collected:  02/14/19 1620    Specimen:  Blood Updated:  02/14/19 1635     Glucose 142 mg/dL     Heparin Anti-Xa [[id number]]  (Abnormal) Collected:  02/14/19 1332    Specimen:  Blood Updated:  02/14/19 1435     Heparin Anti-Xa (UFH) 0.12 IU/ml     POC Glucose Once [[id number]]  (Normal) Collected:  02/14/19 1123    Specimen:   Blood Updated:  02/14/19 1126     Glucose 103 mg/dL     CBC & Differential [[id number]] Collected:  02/14/19 0520    Specimen:  Blood Updated:  02/14/19 0827    Narrative:       The following orders were created for panel order CBC & Differential.  Procedure                               Abnormality         Status                     ---------                               -----------         ------                     Manual Differential[[id number]]          Abnormal            Final result               CBC Auto Differential[[id number]]        Abnormal            Final result                 Please view results for these tests on the individual orders.    CBC Auto Differential [[id number]]  (Abnormal) Collected:  02/14/19 0520    Specimen:  Blood Updated:  02/14/19 0827     WBC 19.26 10*3/mm3      RBC 4.05 10*6/mm3      Hemoglobin 10.7 g/dL      Hematocrit 32.5 %      MCV 80.2 fL      MCH 26.4 pg      MCHC 32.9 g/dL      RDW 15.5 %      RDW-SD 44.7 fl      MPV 9.6 fL      Platelets 223 10*3/mm3     Manual Differential [[id number]]  (Abnormal) Collected:  02/14/19 0520    Specimen:  Blood Updated:  02/14/19 0827     Neutrophil % 90.0 %      Lymphocyte % 3.0 %      Monocyte % 5.0 %      Eosinophil % 1.0 %      Basophil % 0.0 %      Atypical Lymphocyte % 1.0 %      Neutrophils Absolute 17.33 10*3/mm3      Lymphocytes Absolute 0.58 10*3/mm3      Monocytes Absolute 0.96 10*3/mm3      Eosinophils Absolute 0.19 10*3/mm3      Basophils Absolute 0.00 10*3/mm3      Hypochromia Slight/1+     Microcytes Slight/1+     Polychromasia Slight/1+     Toxic Granulation Slight/1+     Platelet Morphology Normal    Procalcitonin [[id number]]  (Abnormal) Collected:  02/14/19 0520    Specimen:  Blood Updated:  02/14/19 0810     Procalcitonin 0.95 ng/mL     Narrative:       As a Marker for Sepsis (Non-Neonates):   1. <0.5 ng/mL represents a low risk of severe sepsis and/or septic shock.  2. >2 ng/mL represents a high risk of severe sepsis  and/or septic shock.    As a Marker for Lower Respiratory Tract Infections that require antibiotic therapy:    PCT on Admission     Antibiotic Therapy       6-12 Hrs later  > 0.5                Strongly Recommended             >0.25 - <0.5         Recommended  0.1 - 0.25           Discouraged              Remeasure/reassess PCT  <0.1                 Strongly Discouraged     Remeasure/reassess PCT                     PCT values of < 0.5 ng/mL do not exclude an infection, because localized infections (without systemic signs) may be associated with such low concentrations, or a systemic infection in its initial stages (< 6 hours). Furthermore, increased PCT can occur without infection. PCT concentrations between 0.5 and 2.0 ng/mL should be interpreted taking into account the patient's history. It is recommended to retest PCT within 6-24 hours if any concentrations < 2 ng/mL are obtained.    LSAC Slide Creation [[id number]] Collected:  02/14/19 0520    Specimen:  Blood Updated:  02/14/19 0745    POC Glucose Once [[id number]]  (Normal) Collected:  02/14/19 0725    Specimen:  Blood Updated:  02/14/19 0737     Glucose 116 mg/dL             PHYSICAL EXAM  Right upper extremity exam: Diffuse swelling throughout the forearm and hand continues to improve.  No pain with passive motion of the wrist.Intact EPL, FPL, EDC, FDP, FDS, interosseous, wrist flexion, wrist extension, biceps, triceps, deltoid. Sensation intact light touch to median, radial, ulnar, and axillary nerves. 2+ palpable radial pulse.         Renal failure    Spinal stenosis, lumbar region, with neurogenic claudication    Morbid obesity due to excess calories (CMS/[facility])    TANIYA on CPAP    Dyslipidemia    DM (diabetes mellitus), type 2 (CMS/HCC)    Metabolic encephalopathy    Paroxysmal atrial fibrillation (CMS/HCC)    Sepsis (CMS/[facility])      PLAN / DISPOSITION:  Right wrist pain    Continue observation at this time.  Clinical exam of wrist remains stable.  No clinical  evidence of infection in the wrist joint at this time.        Juanito Ro MD  02/15/19  7:31 AM

## 2019-02-15 NOTE — PROGRESS NOTES
"                  Clinical Nutrition     Nutrition Assessment  Reason for Visit:   MDR, Follow-up protocol    Patient Name: Rod Balderas  YOB: 1950  MRN: [id number]  Date of Encounter: 02/15/19 9:41 AM  Admission date: 2/8/2019     Comments: improved appetite, eating about 50% of meals and drinking supplement.      Nutrition Assessment   Admission Diagnosis   Renal failure- improving     Other Applicable diagnosis/problems   Acute severe sepsis/septic shock with multiorgan system insufficiency  Acute MSSA septicemia/bacteremia and bacteriuria  Metabolic encephalopathy- improving  H.D. Started 2/9  Severe extremity weakness   A-fib- S/P cardioversion  2/14    PMH/PSxH    Spinal stenosis, lumbar region, with neurogenic claudication    Morbid obesity due to excess calories (CMS/HCC)    TANIYA on CPAP    Dyslipidemia    DM (diabetes mellitus), type 2 (CMS/HCC)    HTN     CAD    Anxiety and depression     Panic attack     Prostat CA      Robotic Prostatectomy with nodes  cholecy     Reported/Observed/Food/Nutrition Related History:   More and alert and oriented today.  Wife at beside and reports improved intake, drank Boost supplement as well.  Menu options reviewed.  Improved renal status, may not need more dialysis.  Renal diet restriction not warranted at this time, will adjust diet for lunch meal.     Anthropometrics     Height: 170.2 cm (67.01\")  Last filed wt: Weight: 117 kg (257 lb) (02/09/19 1146)  Weight Method: Bed scale    BMI: BMI (Calculated): 40.2  Obese Class III extreme obesity: > or equal to 40kg/m2    Labs reviewed     Results from last 7 days   Lab Units 02/15/19  0545 02/14/19  0520 02/13/19  0534   SODIUM mmol/L 136 137 136   POTASSIUM mmol/L 3.3* 3.3* 3.6   CHLORIDE mmol/L 103 104 103   CO2 mmol/L 26.0 24.0 23.0   BUN mg/dL 68* 72* 73*   CREATININE mg/dL 1.28 1.26 1.38*   GLUCOSE mg/dL 122* 123* 149*   CALCIUM mg/dL 8.1* 7.6* 7.5*   PHOSPHORUS mg/dL 4.0 4.6 4.7     Medications reviewed "   Pertinent:abx, insulin, neurontin, pepcid, SSI,     SKIN     SKIN: B. gluteal DTI's, B. thighs DTI's    Current Nutrition Prescription     PO: Diet Regular; Renal, Daily Fluid Restriction, Consistent Carbohydrate; Other; 2,000  Supplement: Boost G.C. Daily     Intake: 63% x 2 meals     Nutrition Diagnosis   2/11/2019  Problem Increased nutrient needs   Etiology Poor Skin Integrity   Signs/Symptoms B. gluteal DTI's,  B.thigh DTI's     2/13, 2/15  Problem Inadquate oral intake    Etiology Clinical condition; diet order    Signs/Symptoms Acute encephalopathy on admission; clear liquid diet started 2/11- diet advanced 2/12   Status - improved intake   Nutrition Intervention     Interventions Goal    General: Nutrition support treatment   Consume >67% of meals during hospital stay    Nutrition Interventions   1.  Follow treatment progress, Interview for preferences, Encourage intake, Supplement provided   D/C renal restriction from current die order     Monitor/ Evaluation    I&O, PO intake, Pertinent labs, Weight, Skin status, GI status, Symptoms      Will Continue to follow per protocol      Shelley Okeefe RD  Time Spent: 20min

## 2019-02-15 NOTE — PLAN OF CARE
Problem: Patient Care Overview  Goal: Plan of Care Review  Outcome: Ongoing (interventions implemented as appropriate)   02/15/19 1500   Coping/Psychosocial   Plan of Care Reviewed With patient   OTHER   Outcome Summary DTPIs progressing well with no issues noted. PT to decrease freq to 3x/ week.

## 2019-02-15 NOTE — OP NOTE
VASCULAR SURGERY OPERATIVE NOTE     Rod Balderas  2/15/2019    Preop Diagnosis  Acute renal failure, resolving    Procedure  Removal right internal jugular tunneled hemodialysis catheter    Surgeon  Juanito Arriaga M.D.    Anesthesia  None        INDICATIONS:  This 68-year-old gentleman developed acute renal failure for which a tunneled catheter was placed last week.  He has since recovered renal function and no longer requires use of the dialysis catheter.  Removal of the catheter was requested.    PROCEDURE:  Sutures securing the catheter to the chest wall were removed.  The exit site was prepped with alcohol.  Using gentle traction, the catheter was removed in its entirety.  Pressure was held on the neck access site as well as the chest exit site for several minutes.  A gauze dressing was then applied.  Patient tolerated procedure well.         Juanito Arriaga MD  02/15/19  6:06 PM

## 2019-02-15 NOTE — DISCHARGE PLACEMENT REQUEST
"Jesse Balderas (68 y.o. Male)  Please call back to  at [phone].  Jacquelyn Mann RN  Patient no longer will be requiring hemodialysis.    Date of Birth Social Security Number Address Home Phone MRN    1950  [address]  [place] [address] [phone] [id number]    Jain Marital Status          Unknown        Admission Date Admission Type Admitting Provider Attending Provider Department, Room/Bed    2/8/19 Urgent Jamal Villar MD Villaran, Yuri, MD [facility] 2A ICU, N203/1    Discharge Date Discharge Disposition Discharge Destination                       Attending Provider:  Jamal Villar MD    Allergies:  Percocet [Oxycodone-acetaminophen], Sulfa Antibiotics    Isolation:  None   Infection:  None   Code Status:  CPR    Ht:  170.2 cm (67.01\")   Wt:  117 kg (257 lb)    Admission Cmt:  None   Principal Problem:  Renal failure [N17.9]                 Active Insurance as of 2/8/2019     Primary Coverage     Payor Plan Insurance Group Employer/Plan Group    MEDICARE MEDICARE A & B      Payor Plan Address Payor Plan Phone Number Payor Plan Fax Number Effective Dates    [address] [phone]  12/1/2016 - None Entered    [facility] [address]       Subscriber Name Subscriber Birth Date Member ID       ANDREYJESSE HERRERA 1950 [id number]           Secondary Coverage     Payor Plan Insurance Group Employer/Plan Group    [employer] [place] [employer] [place]      Payor Plan Address Payor Plan Phone Number Payor Plan Fax Number Effective Dates    [employer] [place] [address] [phone]  12/1/2016 - None Entered    [place] [address]       Subscriber Name Subscriber Birth Date Member ID       JESSE BALDERAS 1950 [id number]                 Emergency Contacts      (Rel.) Home Phone Work Phone Mobile Phone    AdnreyIveth (Spouse) [phone] -- --    ANDREYJASKARAN (Son) [phone] -- --            Insurance Information                " MEDICARE/MEDICARE A & B Phone: [phone]    Subscriber: Rod Balderas Subscriber#: [id number]    Group#:  Precert#:         CHASE/CHASE Phone: [phone]    Subscriber: Rod Balderas Subscriber#: [id number]    Group#:  Precert#:              History & Physical      Arielle Carolina MD at 2019 12:12 PM          Admission HP     Patient Name: Rod Balderas  MRN: [id number]  : 1950  DOS: 2019    Attending: Arielle Carolina MD    Primary Care Provider: Tyrese Leyva MD      Patient Care Team:  Tyrese Leyva MD as PCP - General (Family Medicine)  Rod Regalado MD as Consulting Physician (Cardiology)  Myles Rossi MD as Consulting Physician (Anesthesiology)  Juanito Grace Jr., MD as Consulting Physician (Urology)    Chief complaint:  Renal failure.    Subjective   Patient is a 68 y.o. male who is very pleasant and who is known to me from recent hospitalization mid January for prostatectomy due to prostate cancer.  At that time he was admitted on 1/15/19 underwent robotic laparoscopic prostatectomy, tolerated surgery well, progressed well during the 2 days he spent in the hospital with subsequent ambulation, return of bowel function, tolerance of by mouth diet, subsequently HAYDE drain removal and discharged home.  Following his discharge he did well for several days eventually seen Dr. Sanjay Black about a week after discharge with removal of his Christina catheter that was placed into during surgery.  2 or 3 days after that he started feeling weak and fatigued, a urine culture was ordered through Dr. Sanjay Black's office.  A week ago he was seen in the ER with complaints of back pain and some abdominal discomfort and he was starting to be somewhat confused according to his family.  A CT scan of the head was done at the time, the family believes that was noncontrast CT and was nonrevealing.  His overall condition worsened over the weekend  "eventually was admitted to [facility] on Monday on 2/5/19.  At that time he was diagnosed with acute kidney injury with creatinine of 2.2 and with urinary tract infection with reported he and his original urine culture growing \"staph\".  His workup during his stay at [facility] included a renal ultrasound yesterday showing no hydronephrosis and a CT scan done prior to that the report of which I do not have.  At [facility] he did have hematuria and had a 3-way Christina catheter placed, received continuous bladder irrigation, hematuria apparently did improve.  We were contacted due to his worsening renal failure and worsening mental status, his creatinine has crept over the last 3 days to a 5.6 with a BUN of 94 today.  Upon arrival, he is lethargic, awakens briefly and nonstress to talk but unable to verbalize legible speech.  Family reported no fever or chills and no nausea or vomiting.  His appetite has declined significantly over course of a few days.      Allergies:  Allergies   Allergen Reactions   • Percocet [Oxycodone-Acetaminophen] Anxiety   • Sulfa Antibiotics Hives       Meds:  Medications Prior to Admission   Medication Sig Dispense Refill Last Dose   • amLODIPine (NORVASC) 5 MG tablet Take 1 tablet by mouth Daily. 90 tablet 2 1/14/2019   • aspirin 325 MG tablet Take 1 tablet by mouth Daily.      • docusate sodium 100 MG capsule Take 1 capsule by mouth 2 (Two) Times a Day. 30 each 0    • DULoxetine (CYMBALTA) 60 MG capsule Take 60 mg by mouth Daily.   1/14/2019   • fluticasone (FLONASE) 50 MCG/ACT nasal spray USE 2 SPRAYS IN EACH NOSTRIL DAILY  0 More than a month   • gabapentin (NEURONTIN) 600 MG tablet Take 1 tablet by mouth 4 (Four) Times a Day. 120 tablet 5 1/14/2019   • HYDROcodone-acetaminophen (NORCO) 7.5-325 MG per tablet Take 1 tablet by mouth Every 4-6  Hours As Needed for Moderate Pain. 40 tablet 0    • ibuprofen (ADVIL,MOTRIN) 800 MG tablet Take 1 tablet by mouth 3 (Three) " Times a Day As Needed for Moderate Pain .      • lisinopril-hydrochlorothiazide (PRINZIDE,ZESTORETIC) 20-12.5 MG per tablet Take 1 tablet by mouth Daily. 90 tablet 2 1/14/2019   • metFORMIN (GLUCOPHAGE) 500 MG tablet Take 500 mg by mouth 2 (Two) Times a Day With Meals.   1/14/2019   • nitrofurantoin, macrocrystal-monohydrate, (MACROBID) 100 MG capsule Take 1 capsule by mouth 2 (Two) Times a Day. 30 capsule 0    • nitroglycerin (NITROSTAT) 0.4 MG SL tablet Place  under the tongue.   More than a month   • polyethylene glycol (MIRALAX) powder Dissolve 1 capful (17g) in water and take by mouth Daily. 510 g 0    At this point we are awaiting medicine list from [facility].  Per discussion with the transferring physician yesterday patient received vancomycin and subsequently transitioned to daptomycin with worsening renal failure and concern.      History:   Past Medical History:   Diagnosis Date   • Anxiety and depression    • Cancer (CMS/HCC)    • Chest pain    • Coronary artery disease    • Diabetes mellitus (CMS/HCC)    • Dyslipidemia    • Extremity pain    • Heart disease    • History of transfusion    • Hypertension    • Low back pain    • TANIYA on CPAP     2-3    • Osteoarthritis     Diffuse osteoarthritis.   • Panic attacks    • Prostate cancer (CMS/HCC)      Past Surgical History:   Procedure Laterality Date   • CARDIAC CATHETERIZATION     • CHOLECYSTECTOMY     • COLONOSCOPY  2015   • CORONARY ANGIOPLASTY WITH STENT PLACEMENT  12/2012 December 2012:  A 3.5 x 88 Promus VERONICA stent to OM2.  Nonobstructive disease.  Otherwise normal LVEF.   • EYE SURGERY Bilateral     cataract   • PROSTATECTOMY N/A 1/15/2019    Procedure: ROBOTIC PROSTATECTOMY WITH NODES;  Surgeon: Juanito Grace Jr., MD;  Location: [facility];  Service: [facility]   • RETINAL DETACHMENT SURGERY Right      Family History   Problem Relation Age of Onset   • Heart disease Mother    • Thyroid disease Mother    • Heart disease Father    •  Cancer Brother      Social History     Tobacco Use   • Smoking status: Former Smoker   • Smokeless tobacco: Former User   Substance Use Topics   • Alcohol use: No   • Drug use: No       Review of Systems  Unable to provide. Family provided ROS noted in HPI.    Vital Signs  There were no vitals taken for this visit.    Physical Exam:    General Appearance:    Lethargic, encephalopathic.   Head:    Normocephalic, without obvious abnormality, atraumatic   Eyes:            Lids and lashes normal, conjunctivae and sclerae normal, no   icterus, no pallor, corneas clear    Ears:    Ears appear intact with no abnormalities noted   Throat:   No oral lesions, no thrush, oral mucosa dry   Neck:   No adenopathy, supple, trachea midline, no thyromegaly         Lungs:     Clear to auscultation,respirations regular, even and                   unlabored    Heart:    Regular rhythm and normal rate, normal S1 and S2, no            murmur, no gallop    Abdomen:     Normal bowel sounds, obese, mild distention. No focal tenderness.    Genitalia:    Deferred   Extremities:   Moves all extremities well, 2 + edema, no cyanosis, no              redness   Pulses:   Pulses palpable and equal bilaterally   Skin:   No bleeding, bruising or rash   Neurologic:   Lethargic. Some tremors. Moves all extremities/ jerks.           Invalid input(s): NEUTOPHILPCT,  EOSPCT        Invalid input(s): LABALBU, PROT  Lab Results   Component Value Date    HGBA1C 5.70 (H) 01/08/2019   Chemistry profile from this morning showed a glucose of 140 creatinine BUN of 5.6 and 94 respectively sodium 131 potassium 4.3 chloride of 99 bicarbonate of 16 ref CMP with albumin of 2.1 alkaline phosphatase 244, globulin 4.2 potassium.  Unremarkable.  CBC today with a white count of 25.6 thousand from 20,002 days ago, H&H 11.4 and 34.1 and platelet count of 196,000    Assessment and Plan:       Acute renal failure (ARF) (CMS/HCC)    Metabolic encephalopathy    Spinal stenosis,  lumbar region, with neurogenic claudication    Morbid obesity due to excess calories (CMS/HCC)    TANIYA on CPAP    Dyslipidemia    DM (diabetes mellitus), type 2 (CMS/HCC)    ' staph' sepsis and bacteremia.    Plan  Admit to telemetry for further management.  Patient's acute kidney failure is rapidly progressing.  It is likely multifactorial, sepsis with reported staph infection both urine and blood is possibly a factor, medications including nonsteroidals which patient has taken quite a bit recently, lisinopril, and other medications likely contributed.    From renal standpoint, workup will be initiated, checking urinalysis, urine creatinine, [facility] consultation, I discussed with Dr. Haile will has started seeing patient with me.  I will keep Christina catheter for the time being, no evidence of obstruction from recent studies at [facility].  We'll consider repeat ultrasound and/or repeat CT scan.  With rapidly progressing renal failure, high BUN/ uremia , encephalopathy, patient is in need of hemodialysis, hemodialysis access has been requested and Dr. Dailey will kindly place that for planned hemodialysis later today.    From infectious disease standpoint, we will obtain all culture results done at [facility] as well as through Dr. Sanjay Black's office, will treat for staph infection, I discussed with Dr. Santana with infectious disease who will oversee patient's antibiotics and ID follow up needs.    From respiratory standpoint, patient is only mildly labored, his baseline morbid obesity and obstructive sleep apnea are complicating factor, I will obtain a chest x-ray stat as well as an ABG, will use noninvasive positive pressure ventilation as needed, will have a low threshold for moving to an intensive care unit setting if indicated.    Will monitor his blood glucose closely with a low-dose sliding scale insulin added.  Also consider alternate route for nutritional support until he  is alert enough to take by mouth diet.    Patient is fairly critically ill, I discussed his condition with his family at the bedside as well as with Dr. Banks with nephrology Dr. Todd with infectious disease team and with Dr. Sanjay Black with urology.    45 minutes Critical care time.    Arielle Carolina MD  02/08/19  12:12 PM            Electronically signed by Arielle Carolina MD at 2/8/2019 12:34 PM       Hospital Medications (active)       Dose Frequency Start End    albuterol (PROVENTIL) nebulizer solution 0.083% 2.5 mg/3mL 2.5 mg Every 6 Hours PRN 2/8/2019     Sig - Route: Take 2.5 mg by nebulization Every 6 (Six) Hours As Needed for Wheezing. - Nebulization    amLODIPine (NORVASC) tablet 5 mg 5 mg Every 24 Hours Scheduled 2/12/2019     Sig - Route: Take 1 tablet by mouth Daily. - Oral    aztreonam (AZACTAM) 500 mg in sodium chloride 0.9 % 50 mL IVPB 500 mg Every 12 Hours 2/9/2019 2/16/2019    Sig - Route: Infuse 500 mg into a venous catheter Every 12 (Twelve) Hours. - Intravenous    castor oil-balsam peru (VENELEX) ointment  Every 12 Hours Scheduled 2/9/2019     Sig - Route: Apply  topically to the appropriate area as directed Every 12 (Twelve) Hours. - Topical    Cosign for Ordering: Accepted by Arielle Carolina MD on 2/10/2019 10:04 PM    ceFAZolin in dextrose (ANCEF) IVPB solution 2 g 2 g Every 8 Hours 2/14/2019 2/22/2019    Sig - Route: Infuse 100 mL into a venous catheter Every 8 (Eight) Hours. - Intravenous    diltiaZEM (CARDIZEM) 125 mg in sodium chloride 0.9 % 125 mL (1 mg/mL) infusion 5-15 mg/hr Titrated 2/11/2019     Sig - Route: Infuse 5-15 mg/hr into a venous catheter Dose Adjusted By Provider As Needed. - Intravenous    DULoxetine (CYMBALTA) DR capsule 30 mg 30 mg Daily 2/13/2019     Sig - Route: Take 1 capsule by mouth Daily. - Oral    Cosign for Ordering: Accepted by Jamal Villar MD on 2/13/2019  7:47 AM    famotidine (PEPCID) tablet 20 mg 20 mg 2 Times Daily 2/14/2019     Sig  - Route: Take 1 tablet by mouth 2 (Two) Times a Day. - Oral    fentaNYL citrate (PF) (SUBLIMAZE) injection 25 mcg 25 mcg Every 30 Minutes PRN 2/9/2019 2/19/2019    Sig - Route: Infuse 0.5 mL into a venous catheter Every 30 (Thirty) Minutes As Needed for Severe Pain . - Intravenous    ferric gluconate (FERRLECIT) 125 mg in sodium chloride 0.9 % 110 mL IVPB 125 mg Daily 2/11/2019 2/14/2019    Sig - Route: Infuse 125 mg into a venous catheter Daily. - Intravenous    gabapentin (NEURONTIN) capsule 600 mg 600 mg 4 Times Daily 2/14/2019     Sig - Route: Take 2 capsules by mouth 4 (Four) Times a Day. - Oral    heparin (porcine) injection 1,900 Units 1,900 Units As Needed 2/8/2019     Sig - Route: 1.9 mL by Intracatheter route As Needed (For Dialysis Catheter Care.). - Intracatheter    heparin (porcine) injection 1,900 Units 1,900 Units As Needed 2/11/2019     Sig - Route: 1.9 mL by Intracatheter route As Needed (For Dialysis Catheter Care.). - Intracatheter    heparin (porcine) injection 4,000 Units 4,000 Units Once 2/14/2019 2/14/2019    Sig - Route: Infuse 4 mL into a venous catheter 1 (One) Time. - Intravenous    heparin (porcine) injection 5,000 Units 5,000 Units Once 2/15/2019 2/15/2019    Sig - Route: Infuse 5 mL into a venous catheter 1 (One) Time. - Intravenous    heparin 22373 units/250 mL (100 units/mL) in 0.45 % NaCl infusion 22 Units/kg/hr × 117 kg Titrated 2/13/2019     Sig - Route: Infuse 2,574 Units/hr into a venous catheter Dose Adjusted By Provider As Needed. - Intravenous    HYDROcodone-acetaminophen (NORCO) 5-325 MG per tablet 1 tablet 1 tablet Every 6 Hours PRN 2/9/2019 2/19/2019    Sig - Route: Take 1 tablet by mouth Every 6 (Six) Hours As Needed for Moderate Pain . - Oral    insulin lispro (humaLOG) injection 0-7 Units 0-7 Units 4 Times Daily With Meals & Nightly 2/8/2019     Sig - Route: Inject 0-7 Units under the skin into the appropriate area as directed 4 (Four) Times a Day With Meals & at  Bedtime. - Subcutaneous    lactulose (CHRONULAC) 10 GM/15ML solution 30 g 30 g Every 8 Hours PRN 2/11/2019     Sig - Route: Take 45 mL by mouth Every 8 (Eight) Hours As Needed (Constipation). - Oral    magnesium sulfate 4g/100mL (PREMIX) infusion 4 g Once 2/15/2019 2/15/2019    Sig - Route: Infuse 100 mL into a venous catheter 1 (One) Time. - Intravenous    melatonin sublingual tablet 5 mg 5 mg Nightly PRN 2/13/2019     Sig - Route: Place 1 tablet under the tongue At Night As Needed for Sleep. - Sublingual    Cosign for Ordering: Accepted by Jamal Villar MD on 2/13/2019  7:47 AM    metoprolol tartrate (LOPRESSOR) tablet 25 mg 25 mg 3 Times Daily 2/13/2019     Sig - Route: Take 1 tablet by mouth 3 (Three) Times a Day. - Oral    metroNIDAZOLE (FLAGYL) IVPB 500 mg 500 mg Every 8 Hours 2/9/2019 2/16/2019    Sig - Route: Infuse 100 mL into a venous catheter Every 8 (Eight) Hours. - Intravenous    ondansetron (ZOFRAN) injection 4 mg 4 mg Every 6 Hours PRN 2/8/2019     Sig - Route: Infuse 2 mL into a venous catheter Every 6 (Six) Hours As Needed for Nausea or Vomiting. - Intravenous    Pharmacy Consult  Continuous PRN 2/8/2019 2/22/2019    Sig - Route: Continuous As Needed for Consult. - Does not apply    Pharmacy to Dose Heparin  Continuous PRN 2/13/2019     Sig - Route: Continuous As Needed for Consult. - Does not apply    potassium chloride (MICRO-K) CR capsule 40 mEq 40 mEq Once 2/14/2019 2/14/2019    Sig - Route: Take 4 capsules by mouth 1 (One) Time. - Oral    potassium chloride (MICRO-K) CR capsule 40 mEq 40 mEq Every 6 Hours 2/15/2019 2/15/2019    Sig - Route: Take 4 capsules by mouth Every 6 (Six) Hours. - Oral    sodium chloride 0.9 % flush 3 mL 3 mL Every 12 Hours Scheduled 2/8/2019     Sig - Route: Infuse 3 mL into a venous catheter Every 12 (Twelve) Hours. - Intravenous    sodium chloride 0.9 % flush 3-10 mL 3-10 mL As Needed 2/8/2019     Sig - Route: Infuse 3-10 mL into a venous catheter As Needed for Line  "Care. - Intravenous    ceFAZolin (ANCEF) IVPB 1 g (Discontinued) 1 g Every 24 Hours 2/8/2019 2/14/2019    Sig - Route: Infuse 50 mL into a venous catheter Daily. - Intravenous    gabapentin (NEURONTIN) capsule 300 mg (Discontinued) 300 mg Every 12 Hours Scheduled 2/13/2019 2/14/2019    Sig - Route: Take 1 capsule by mouth Every 12 (Twelve) Hours. - Oral    Cosign for Ordering: Accepted by Jamal Villar MD on 2/13/2019  7:47 AM             Physician Progress Notes (last 24 hours) (Notes from 2/14/2019 10:46 AM through 2/15/2019 10:46 AM)      Korina Hastings DO at 2/15/2019 10:42 AM             LOS: 7 days    Patient Care Team:  Tyrese Leyva MD as PCP - General (Family Medicine)  Rod Regalado MD as Consulting Physician (Cardiology)  Myles Rossi MD as Consulting Physician (Anesthesiology)  Juanito Grace Jr., MD as Consulting Physician (Urology)    Reason For Visit:    Subjective   Patient seen/examined. Christina removed - now incontinent but making good urine      Review of Systems:    Pulm: No soa   CV:  No CP      Objective       amLODIPine 5 mg Oral Q24H   aztreonam 500 mg Intravenous Q12H   castor oil-balsam peru  Topical Q12H   ceFAZolin 2 g Intravenous Q8H   DULoxetine 30 mg Oral Daily   famotidine 20 mg Oral BID   gabapentin 600 mg Oral 4x Daily   insulin lispro 0-7 Units Subcutaneous 4x Daily With Meals & Nightly   metoprolol tartrate 25 mg Oral TID   metroNIDAZOLE 500 mg Intravenous Q8H   potassium chloride 40 mEq Oral Q6H   sodium chloride 3 mL Intravenous Q12H       diltiaZEM 5-15 mg/hr Last Rate: Stopped (02/14/19 0935)   heparin 22 Units/kg/hr Last Rate: 22 Units/kg/hr (02/15/19 0820)   Pharmacy Consult     Pharmacy to Dose Heparin           Vital Signs:  Blood pressure 132/68, pulse 68, temperature 99.1 °F (37.3 °C), temperature source Axillary, resp. rate 20, height 170.2 cm (67.01\"), weight 117 kg (257 lb), SpO2 94 %.    Flowsheet Rows      First Filed Value   Admission " "Height  170.2 cm (67\") Documented at 02/08/2019 1115   Admission Weight  117 kg (257 lb 3.2 oz) Documented at 02/08/2019 1115          02/14 0701 - 02/15 0700  In: 2219.5 [P.O.:1280; I.V.:529.5]  Out: 1110 [Urine:1110]    Physical Exam:    General Appearance: NAD, alert and cooperative, Ox3  Eyes: PER, conjunctivae and sclerae normal, no icterus  Lungs: respirations regular and unlabored, no crepitus, clear to auscultation  Heart/CV: regular rhythm & normal rate, no murmur, no gallop, no rub, 2+ pitting edema  Abdomen: not distended, soft, non-tender, no masses,  bowel sounds present  Skin: No rash, Warm and dry R tunneled catheter    Radiology:      Renal Imaging:        Labs:  Results from last 7 days   Lab Units 02/15/19  0545 02/14/19  0520 02/13/19  1220   WBC 10*3/mm3 17.14* 19.26* 20.36*   HEMOGLOBIN g/dL 9.9* 10.7* 10.5*   HEMATOCRIT % 30.6* 32.5* 31.9*   PLATELETS 10*3/mm3 256 223 170     Results from last 7 days   Lab Units 02/15/19  0545 02/14/19  0520 02/13/19  0534 02/12/19  0526 02/11/19  0637  02/09/19  0510   SODIUM mmol/L 136 137 136 133 132   < > 133   POTASSIUM mmol/L 3.3* 3.3* 3.6 3.4* 3.6   < > 4.2   CHLORIDE mmol/L 103 104 103 99 100   < > 103   CO2 mmol/L 26.0 24.0 23.0 23.0 18.0*   < > 18.0*   BUN mg/dL 68* 72* 73* 60* 81*   < > 83*   CREATININE mg/dL 1.28 1.26 1.38* 1.74* 3.05*   < > 4.71*   CALCIUM mg/dL 8.1* 7.6* 7.5* 8.1* 8.2*   < > 8.2*   PHOSPHORUS mg/dL 4.0 4.6 4.7 5.2* 6.0*   < > 6.4*   MAGNESIUM mg/dL 1.8  --   --   --  2.2  --  2.3   ALBUMIN g/dL 2.83* 2.62* 2.57* 2.83* 2.89*   < > 2.96*    < > = values in this interval not displayed.     Results from last 7 days   Lab Units 02/15/19  0545   GLUCOSE mg/dL 122*       Results from last 7 days   Lab Units 02/11/19  0637   ALK PHOS U/L 348*   BILIRUBIN mg/dL 2.6*   ALT (SGPT) U/L 17   AST (SGOT) U/L 67*     Results from last 7 days   Lab Units 02/08/19  1540   PH, ARTERIAL pH units 7.276*   PO2 ART mm Hg 85.0   PCO2, ARTERIAL mm Hg 38.4 " "  HCO3 ART mmol/L 17.8*       Results from last 7 days   Lab Units 19  1826   COLOR UA  Jessica*   CLARITY UA  Turbid*   PH, URINE  6.5   SPECIFIC GRAVITY, URINE  1.015   GLUCOSE UA  Negative   KETONES UA  15 mg/dL (1+)*   BILIRUBIN UA  Small (1+)*   PROTEIN UA  100 mg/dL (2+)*   BLOOD UA  Large (3+)*   LEUKOCYTES UA  Large (3+)*   NITRITE UA  Positive*       Estimated Creatinine Clearance: 68.5 mL/min (by C-G formula based on SCr of 1.28 mg/dL).      Assessment       Renal failure    Spinal stenosis, lumbar region, with neurogenic claudication    Morbid obesity due to excess calories (CMS/[facility])    TANIYA on CPAP    Dyslipidemia    DM (diabetes mellitus), type 2 (CMS/[facility])    Metabolic encephalopathy    Paroxysmal atrial fibrillation (CMS/[facility])    Sepsis (CMS/[facility])            Impression:   NOE - improving, no need for RRT at this time  Anemia - stable  HTN          Recommendations:   OK to remove tunneled HD catheter - discussed with nursing  Increase fluid restriction to 2.5-3 L, watch sodium  Monitor UO/BMP  Renally dose meds/abx      Korina Hastings DO  02/15/19  10:42 AM          Electronically signed by Korina Hastings DO at 2/15/2019 10:45 AM     Juanito Ro MD at 2/15/2019  7:31 AM          CHIEF COMPLAINT: Follow-up right wrist pain    SUBJECTIVE  Patient resting in bed comfortably.  No complaints of wrist pain this morning.  Appears to be more alert.    OBJECTIVE  Temp (24hrs), Av.7 °F (37.1 °C), Min:98.1 °F (36.7 °C), Max:99.3 °F (37.4 °C)    Blood pressure 139/68, pulse 71, temperature 99.3 °F (37.4 °C), temperature source Axillary, resp. rate 16, height 170.2 cm (67.01\"), weight 117 kg (257 lb), SpO2 95 %.    Lab Results (last 24 hours)     Procedure Component Value Units Date/Time    CBC & Differential [[id number]] Collected:  02/15/19 0545    Specimen:  Blood Updated:  02/15/19 0724    Narrative:       The following orders were created for panel order CBC & Differential.  Procedure         "                       Abnormality         Status                     ---------                               -----------         ------                     Manual Differential[[id number]]          Abnormal            Final result               CBC Auto Differential[[id number]]        Abnormal            Final result                 Please view results for these tests on the individual orders.    Manual Differential [[id number]]  (Abnormal) Collected:  02/15/19 0545    Specimen:  Blood Updated:  02/15/19 0724     Neutrophil % 80.0 %      Lymphocyte % 8.0 %      Monocyte % 4.0 %      Eosinophil % 2.0 %      Basophil % 1.0 %      Bands %  3.0 %      Atypical Lymphocyte % 2.0 %      Neutrophils Absolute 14.23 10*3/mm3      Lymphocytes Absolute 1.37 10*3/mm3      Monocytes Absolute 0.69 10*3/mm3      Eosinophils Absolute 0.34 10*3/mm3      Basophils Absolute 0.17 10*3/mm3      Hypochromia Slight/1+     WBC Morphology Normal     Platelet Morphology Normal    CBC Auto Differential [[id number]]  (Abnormal) Collected:  02/15/19 0545    Specimen:  Blood Updated:  02/15/19 0724     WBC 17.14 10*3/mm3      RBC 3.73 10*6/mm3      Hemoglobin 9.9 g/dL      Hematocrit 30.6 %      MCV 82.0 fL      MCH 26.5 pg      MCHC 32.4 g/dL      RDW 15.9 %      RDW-SD 45.3 fl      MPV 9.8 fL      Platelets 256 10*3/mm3     LSAC Slide Creation [[id number]] Collected:  02/15/19 0545    Specimen:  Blood Updated:  02/15/19 0715    Renal Function Panel [[id number]]  (Abnormal) Collected:  02/15/19 0545    Specimen:  Blood Updated:  02/15/19 0704     Glucose 122 mg/dL      BUN 68 mg/dL      Creatinine 1.28 mg/dL      Sodium 136 mmol/L      Potassium 3.3 mmol/L      Chloride 103 mmol/L      CO2 26.0 mmol/L      Calcium 8.1 mg/dL      Albumin 2.83 g/dL      Phosphorus 4.0 mg/dL      Anion Gap 7.0 mmol/L      BUN/Creatinine Ratio 53.1     eGFR Non African Amer 56 mL/min/1.73     Narrative:       National [employer] Guidelines    Stage     Description         GFR  1         Normal or High     90+  2         Mild decrease      60-89  3         Moderate decrease  30-59  4         Severe decrease    15-29  5         Kidney failure     <15    The MDRD GFR formula is only valid for adults with stable renal function between ages 18 and 70.    Magnesium [[id number]]  (Normal) Collected:  02/15/19 0545    Specimen:  Blood Updated:  02/15/19 0704     Magnesium 1.8 mg/dL     Heparin Anti-Xa [[id number]]  (Normal) Collected:  02/15/19 0545    Specimen:  Blood Updated:  02/15/19 0647     Heparin Anti-Xa (UFH) 0.38 IU/ml     Heparin Anti-Xa [[id number]]  (Abnormal) Collected:  02/14/19 2215    Specimen:  Blood Updated:  02/14/19 2302     Heparin Anti-Xa (UFH) 0.10 IU/ml     POC Glucose Once [[id number]]  (Abnormal) Collected:  02/14/19 2019    Specimen:  Blood Updated:  02/14/19 2030     Glucose 173 mg/dL     POC Glucose Once [[id number]]  (Abnormal) Collected:  02/14/19 1620    Specimen:  Blood Updated:  02/14/19 1635     Glucose 142 mg/dL     Heparin Anti-Xa [[id number]]  (Abnormal) Collected:  02/14/19 1332    Specimen:  Blood Updated:  02/14/19 1435     Heparin Anti-Xa (UFH) 0.12 IU/ml     POC Glucose Once [[id number]]  (Normal) Collected:  02/14/19 1123    Specimen:  Blood Updated:  02/14/19 1126     Glucose 103 mg/dL     CBC & Differential [[id number]] Collected:  02/14/19 0520    Specimen:  Blood Updated:  02/14/19 0827    Narrative:       The following orders were created for panel order CBC & Differential.  Procedure                               Abnormality         Status                     ---------                               -----------         ------                     Manual Differential[[id number]]          Abnormal            Final result               CBC Auto Differential[[id number]]        Abnormal            Final result                 Please view results for these tests on the individual orders.    CBC Auto Differential [[id number]]  (Abnormal) Collected:   02/14/19 0520    Specimen:  Blood Updated:  02/14/19 0827     WBC 19.26 10*3/mm3      RBC 4.05 10*6/mm3      Hemoglobin 10.7 g/dL      Hematocrit 32.5 %      MCV 80.2 fL      MCH 26.4 pg      MCHC 32.9 g/dL      RDW 15.5 %      RDW-SD 44.7 fl      MPV 9.6 fL      Platelets 223 10*3/mm3     Manual Differential [[id number]]  (Abnormal) Collected:  02/14/19 0520    Specimen:  Blood Updated:  02/14/19 0827     Neutrophil % 90.0 %      Lymphocyte % 3.0 %      Monocyte % 5.0 %      Eosinophil % 1.0 %      Basophil % 0.0 %      Atypical Lymphocyte % 1.0 %      Neutrophils Absolute 17.33 10*3/mm3      Lymphocytes Absolute 0.58 10*3/mm3      Monocytes Absolute 0.96 10*3/mm3      Eosinophils Absolute 0.19 10*3/mm3      Basophils Absolute 0.00 10*3/mm3      Hypochromia Slight/1+     Microcytes Slight/1+     Polychromasia Slight/1+     Toxic Granulation Slight/1+     Platelet Morphology Normal    Procalcitonin [[id number]]  (Abnormal) Collected:  02/14/19 0520    Specimen:  Blood Updated:  02/14/19 0810     Procalcitonin 0.95 ng/mL     Narrative:       As a Marker for Sepsis (Non-Neonates):   1. <0.5 ng/mL represents a low risk of severe sepsis and/or septic shock.  2. >2 ng/mL represents a high risk of severe sepsis and/or septic shock.    As a Marker for Lower Respiratory Tract Infections that require antibiotic therapy:    PCT on Admission     Antibiotic Therapy       6-12 Hrs later  > 0.5                Strongly Recommended             >0.25 - <0.5         Recommended  0.1 - 0.25           Discouraged              Remeasure/reassess PCT  <0.1                 Strongly Discouraged     Remeasure/reassess PCT                     PCT values of < 0.5 ng/mL do not exclude an infection, because localized infections (without systemic signs) may be associated with such low concentrations, or a systemic infection in its initial stages (< 6 hours). Furthermore, increased PCT can occur without infection. PCT concentrations between 0.5 and  2.0 ng/mL should be interpreted taking into account the patient's history. It is recommended to retest PCT within 6-24 hours if any concentrations < 2 ng/mL are obtained.    LSAC Slide Creation [[id number]] Collected:  02/14/19 0520    Specimen:  Blood Updated:  02/14/19 0745    POC Glucose Once [[id number]]  (Normal) Collected:  02/14/19 0725    Specimen:  Blood Updated:  02/14/19 0737     Glucose 116 mg/dL             PHYSICAL EXAM  Right upper extremity exam: Diffuse swelling throughout the forearm and hand continues to improve.  No pain with passive motion of the wrist.Intact EPL, FPL, EDC, FDP, FDS, interosseous, wrist flexion, wrist extension, biceps, triceps, deltoid. Sensation intact light touch to median, radial, ulnar, and axillary nerves. 2+ palpable radial pulse.         Renal failure    Spinal stenosis, lumbar region, with neurogenic claudication    Morbid obesity due to excess calories (CMS/[facility])    TANIYA on CPAP    Dyslipidemia    DM (diabetes mellitus), type 2 (CMS/HCC)    Metabolic encephalopathy    Paroxysmal atrial fibrillation (CMS/HCC)    Sepsis (CMS/HCC)      PLAN / DISPOSITION:  Right wrist pain    Continue observation at this time.  Clinical exam of wrist remains stable.  No clinical evidence of infection in the wrist joint at this time.        Juanito Ro MD  02/15/19  7:31 AM          Electronically signed by Juanito Ro MD at 2/15/2019  7:32 AM     Jamal Villar MD at 2/14/2019  3:50 PM          INTENSIVIST   PROGRESS NOTE     Hospital:  LOS: 6 days   Subjective   S     Mr. Rod Balderas, 68 y.o. male is followed for:      Sepsis with MODS    T2DM    As an Intensivist, we provide an integrated approach to the ICU patient and family, medical management of comorbid conditions, including but not limited to electrolytes, glycemic control, organ dysfunction, lead interdisciplinary rounds and coordinate the care with all other services, including those from other specialists.      Interval History:  Improving.  Making more urine.  ECV this morning, now on NSR.     The patient's relevant past medical, surgical and social history were reviewed and updated in Epic as appropriate.     ROS:   Constitutional: Negative for fever.   Respiratory: Negative for dyspnea.   Cardiovascular: Negative for chest pain.   Gastrointestinal: Negative for  nausea, vomiting and diarrhea.     Objective   O     Vitals:  Temp: 98.8 °F (37.1 °C) (02/14/19 1200) Temp  Min: 97.7 °F (36.5 °C)  Max: 99.2 °F (37.3 °C)   BP: 113/50 (02/14/19 1525) BP  Min: 100/50  Max: 157/72   Pulse: 74 (02/14/19 1525) Pulse  Min: 68  Max: 137   Resp: 18 (02/14/19 1400) Resp  Min: 14  Max: 20   SpO2: 97 % (02/14/19 1500) SpO2  Min: 93 %  Max: 100 %   Device: room air (02/14/19 1400)    Flow Rate: 2 (02/14/19 1200) Flow (L/min)  Min: 2  Max: 6       Intake & Output (last 3 days)       02/11 0701 - 02/12 0700 02/12 0701 - 02/13 0700 02/13 0701 - 02/14 0700 02/14 0701 - 02/15 0700    P.O. 1140     I.V. (mL/kg) 348 (3) 483.9 (4.1) 410.9 (3.5) 89.5 (0.8)    Other 50       IV Piggyback 810 560 411 260    Total Intake(mL/kg) 2348 (20.1) 1543.9 (13.2) 1361.9 (11.6) 1389.5 (11.9)    Urine (mL/kg/hr) 1620 (0.6) 1275 (0.5) 3700 (1.3) 710 (0.7)    Other 3000       Stool 0  0     Total Output 4620 1275 3700 710    Net -2272 +268.9 -2338.1 +679.5            Stool Unmeasured Occurrence 2 x  1 x           Physical Examination  Telemetry:  Rhythm: normal sinus rhythm (02/14/19 1400)  Atrial Rhythm: atrial fibrillation (02/14/19 0924)      Constitutional:  No acute distress.   Cardiovascular: RRR. Normal heart sounds.  No murmurs, gallop or rub.   Respiratory: No respiratory distress. Normal respiratory effort.  Rhonchi.    Abdominal:  Soft. No masses. Non-tender. No distension. No HSM.   Extremities: No digital cyanosis. No clubbing.  (+) edema.   Neurological:   Awake. Follows commands.  Best Eye Response: 4-->(E4) spontaneous (02/14/19  1400)  Best Motor Response: 6-->(M6) obeys commands (02/14/19 1400)  Best Verbal Response: 5-->(V5) oriented (02/14/19 1400)  [place] Coma Scale Score: 15 (02/14/19 1400)   Lines/Drains/Airways: RIJ Tunneled Dyalisis Catheter  Christina       Hematology:  Results from last 7 days   Lab Units 02/14/19  0520 02/13/19  1220 02/13/19  0534   WBC 10*3/mm3 19.26* 20.36* 18.94*   HEMOGLOBIN g/dL 10.7* 10.5* 11.6*   MCV fL 80.2 80.4 78.9*   PLATELETS 10*3/mm3 223 170 168       Chemistry:  Estimated Creatinine Clearance: 69.6 mL/min (by C-G formula based on SCr of 1.26 mg/dL).    Results from last 7 days   Lab Units 02/14/19  0520 02/13/19  0534 02/12/19  0526   SODIUM mmol/L 137 136 133   POTASSIUM mmol/L 3.3* 3.6 3.4*   CHLORIDE mmol/L 104 103 99   CO2 mmol/L 24.0 23.0 23.0   ANION GAP mmol/L 9.0 10.0 11.0   GLUCOSE mg/dL 123* 149* 186*   CALCIUM mg/dL 7.6* 7.5* 8.1*     Results from last 7 days   Lab Units 02/14/19  0520 02/13/19  0534 02/12/19  0526   BUN mg/dL 72* 73* 60*   CREATININE mg/dL 1.26 1.38* 1.74*     Results from last 7 days   Lab Units 02/14/19  0520 02/13/19  0534 02/12/19  0526 02/11/19  0637  02/09/19  0510   MAGNESIUM mg/dL  --   --   --  2.2  --  2.3   PHOSPHORUS mg/dL 4.6 4.7 5.2* 6.0*   < > 6.4*    < > = values in this interval not displayed.       Hepatic:  Results from last 7 days   Lab Units 02/14/19  0520 02/13/19  0534 02/12/19  0526 02/11/19  0637 02/10/19  0340 02/09/19  0510   ALK PHOS U/L  --   --   --  348* 300* 319*   BILIRUBIN mg/dL  --   --   --  2.6* 2.6* 2.9*   ALT (SGPT) U/L  --   --   --  17 23 33   AST (SGOT) U/L  --   --   --  67* 46* 46*   ALBUMIN g/dL 2.62* 2.57* 2.83* 2.89* 3.05* 2.96*       Lab Results   Lab Value Date/Time    BLOODCX No growth at 5 days 02/08/2019 1240       Lab Results   Lab Value Date/Time    URINECX >100,000 CFU/mL Staphylococcus aureus (A) 02/08/2019 1826       Lab Results   Lab Value Date/Time    RESPCX Scant growth (1+) Yeast isolated (A) 02/09/2019 1431          Images:  Xr Wrist 3+ View Right    Result Date: 2/13/2019  1. The distal ulnar diaphysis fracture is probably chronic and is healed in near-anatomic alignment. 2. Age-indeterminate fracture of the base of the fifth ray metacarpal. 3. Soft tissue swelling.  D:  02/13/2019 E:  02/13/2019  This report was finalized on 2/13/2019 11:23 AM by Richard Handy.      Xr Chest 1 View    Result Date: 2/13/2019  Mild increase in pulmonary vascularity centrally from prior comparison without significant effusion or focal consolidation.  D:  02/13/2019 E:  02/13/2019  This report was finalized on 2/13/2019 12:35 PM by Dr. Caden Subramanian.        Echo:  Results for orders placed during the hospital encounter of 02/08/19   Adult Transesophageal Echo (KLAUDIA) W/ Cont if Necessary Per Protocol    Narrative · Estimated EF appears to be in the range of 61 - 65%.  · Left ventricular systolic function is normal.  · Moderate aortic valve regurgitation is present.  · Mild tricuspid valve regurgitation is present.  · Mild mitral valve regurgitation is present  · No evidence of a left atrial appendage thrombus was present.          Results: Reviewed.  I reviewed the patient's new laboratory and imaging results.  I independently reviewed the patient's new images.    Medications: Reviewed.    Assessment/Plan   A / P     68 y.o.male, admitted on 2/8/2019 with Acute renal failure (ARF) (CMS/[facility]) [N17.9]: Transferred from [facility] where he was admitted on 2/5/19    1. Sepsis with MODS  1. PCT decreasing since admission.    Lab Results   Lab Value Date/Time    PROCALCITO 0.95 (H) 02/14/2019 0520    PROCALCITO 4.77 (H) 02/10/2019 0340    PROCALCITO 7.55 (H) 02/08/2019 1240       2. BC positive and Urine Cx positive for MSSA at [facility] (2/5/2019)  3. Urine Cx ([facility] 2/8/19): Staph aureus  4. Renal failure  1. NOE - now on Hemodialysis.  1. Cr went from 2.2 (on 2/6) to 5.6 (on 2/8) and 1.26 on 02/14/19  2. He seems to be recovering  renal function, and may not need more dialysis.  2. R/o Medications: NSAIDs and/or ACEI.  3. S/p Robotic Laparoscopic Prostatectomy 1/15/19 (due to Prostate cancer)  4. Hematuria on admission to [facility]  5. Encephalopathy  1. Improved  2. Most likely Toxic metabolic  6. Respiratory failure  1. TNAIYA on BiPAP at home  2. Bibasilar consolidation as per CT Scan (2/8/19)  7. Leukocytosis.  8. Liver  1. Elevated ALKP, Bilirubin and mild elevation of transaminases  9. R wrist aspiration per Ortho on 2/13/2019 basically a dry tap, (2 attempts).  2. Purpuric type rash upper extremities  1. Only mild Thrombocytopenia  3. CAD s/p stents 2012  4. P AFib, brief episode on 2/9/209, requiring Diltiazem and converted to NSR, then back on AFIB requiring cardioversion on 02/14/19   1. KLAUDIA no intracardiac vegetation or abscess. EF 60-65%  5. Dyslipidemia  6. Obesity III. Body mass index is 40.24 kg/m².   7. Antibiotics: AZT, Cefazolin, Metronidazole} Per ID  8. PMH: HTN  9. PMH: Spinal stenosis with neurogenic claudication  10. T2DM    Results from last 7 days   Lab Units 02/14/19  1123 02/14/19  0725 02/13/19 2006 02/13/19  1611 02/13/19  1136 02/13/19  0702   GLUCOSE mg/dL 103 116 167* 180* 186* 138*     Lab Results   Lab Value Date/Time    HGBA1C 5.70 (H) 01/08/2019 1524       Nutrition Support: Patient isn't on Tube Feeding   Modulars: Patient doesn't have any tube feeding modular orders   Diet: Diet Regular; Renal, Daily Fluid Restriction, Consistent Carbohydrate; Other; 2,000   Advance Directives: Code Status and Medical Interventions:   Ordered at: 02/08/19 1208     Code Status:    CPR     Medical Interventions (Level of Support Prior to Arrest):    Full          Assessment / Plan:    1. Improving.  2. Discussed with Dr. Arielle Carolina (Hospitalist), updated in his condition. Continue ICU care. He thinks, with all his medical problems, not quite ready for the [facility].  3. Disposition: Keep in ICU.       Plan of care and  goals reviewed during interdisciplinary rounds.  Level of Risk is High due to:  illness with threat to life or bodily function.   I discussed the patient's findings and my recommendations with patient    Jamal Villar MD, FACP, FCCP, [facility]  Intensive Care Medicine, Nutrition Support and Pulmonary Medicine       Electronically signed by Jamal Villar MD at 2/14/2019  4:00 PM     Jeff Smith MD at 2/14/2019 12:46 PM          [facility] Progress Note          Antibiotics:  Anti-Infectives (From admission, onward)    Ordered     Dose/Rate Route Frequency Start Stop    02/14/19 1149  ceFAZolin in dextrose (ANCEF) IVPB solution 2 g     Ordering Provider:  Laurie Menchaca RPH    2 g  over 30 Minutes Intravenous Every 8 Hours 02/14/19 1300 02/22/19 1259    02/09/19 1416  aztreonam (AZACTAM) 500 mg in sodium chloride 0.9 % 50 mL IVPB     Ordering Provider:  Efrem Santana MD    500 mg  over 30 Minutes Intravenous Every 12 Hours 02/09/19 2200 02/16/19 2159    02/09/19 0849  metroNIDAZOLE (FLAGYL) IVPB 500 mg     Ordering Provider:  Efrem Santana MD    500 mg  100 mL/hr over 60 Minutes Intravenous Every 8 Hours 02/09/19 1030 02/16/19 1029    02/09/19 0849  aztreonam (AZACTAM) 2 g in sodium chloride 0.9 % 100 mL IVPB-MBP     Ordering Provider:  Efrem Santana MD    2 g  200 mL/hr over 30 Minutes Intravenous Once 02/09/19 1000 02/09/19 0946          CC: Patient unable    HPI:    Consult by Dr. Jeff Smith; covering for him :      Patient is a 68 y.o.  Yr old male with history of prostate cancer status post robotic laparoscopic prostatectomy in mid January and was discharged on 1/17/19. The patient is currently unresponsive and no family is at bedside so history is somewhat limited to history from nursing staff and medical records. The patient was seen a few days after his discharge from his recent  Admission in January and his Christina catheter was discontinued.  About one week ago he went to the ER  with complaints of back pain, abdominal pain, and confusion and a CT abdomen and pelvis was apparently okay during that time. He continued to worsen and over the weekend he was admitted to [facility].  He was found to have an initial AK I with a creatinine of 2.2. Per the micro-lab at [facility], the patient's urine cultures and 1 of 2 blood cultures from 2/5/19 grew MSSA. The patient was initially on vancomycin and Zosyn and was subsequently transitioned to daptomycin after he developed worsening renal function with a creatinine up to 5.6 and BUN went up to 94 today.  Due to lack of nephrology at [facility], the patient was transferred to [facility] today for possible dialysis.  He additionally received a dose of nafcillin prior to this transfer.  His last dose of daptomycin was yesterday per pharmacist here (who has discussed with OSH). Since arrival, the patient has remained afebrile but he has had a leukocytosis up to 31.38.  He is anemic with a hemoglobin of 11.3 and a hematocrit of 54.1.  He is hyponatremic to a sodium of 127.  Creatinine was initially elevated 5.39 and B1 was 97 on arrival.  He additionally had elevation of his LFTs with an ALT of 43 and AST of 43, T bili of 3.0, and alkaline phosphatase of 303.  Pro-calcitonin was elevated to 7.55 lactic acid was normal at 0.8. The patient underwent CT head without contrast which did not show any acute abnormality, CT chest, abdomen and pelvis which showed consolidation of the lung bases bilaterally that is concerning for airspace disease such as pneumonia, gastric distention, and air-filled loops of colon with no obvious obstruction. Blood cultures have been ordered and a reflexive urinalysis has been ordered as well.  A transthoracic echocardiogram was ordered. The patient underwent dialysis today following right IJ dialysis catheter placement.  The infectious disease team has been consulted for antibiotic  recommendations.    He has methicillin sensitive staph aureus septicemia/bacteriuria compounded by acute renal failure, acute hypoxic respiratory failure and by basilar consolidation by chest CT.  Further compounded by acute encephalopathy.    2/9/19 nursing reports A. fib/RVR, dialysis ongoing with acute renal failure and tachypneic/labored at times.  Some cough but unable to capture for culture so far. Empiric HCAP coverage added with zyvox/azactam/flagyl; unable to do MRI of spine so CT's done cervical/thoracic/lumbar spine done 2/10    2/10/19 CT scans of spine; more restful, less labored with breathing and decreased cough;  No rash.  He can wiggle toes but not lift legs off the bed.  He moves left arm better than right arm.  No other new focal pain that he will relate although interaction minimal.  No diarrhea.  No vomiting.    2/11/19: seems to have improved from mental status standpoint slightly per his wife and son who are at bedside. Patient endorses abdominal pain but is not answering a lot of questions. tMAX 99.3f. WBC remains elevated at 20K (down from 31K on arrival). TTE was without evidence of IE but valves poorly visualized. Still getting HD.    2/12/19: Patient is starting to feel much better today.  He is responding to a lot of questions and is more alert sitting up in a bedside chair.  Breathing is improving.  No high fevers overnight.  Leukocytosis improved to 15.9 today.  He continues to urinate a small amount but remains on intermittent dialysis.    2/13/19: Feeling about the same as he was yesterday.  Still having some right wrist pain. Shortness of breath is stable. No fevers. Leukocytosis slightly worse today.    2/14/19: Patient is somnolent today after his KLAUDIA due to recent sedation. Procedure went well per his family and he is back in NSR and no signs of vegetation but procedure report is pending. No fevers. Leukocytosis slightly improved from yesterday. Family reports that the patient  "has had no new complaints. He was evaluated by Dr. Ro and no significant concern for septic arthritis.    ROS:    Breathing has improved. No severe abdominal pain. +right wrist pain. No diarrhea or nausea      PE:   /54   Pulse 68   Temp 98.1 °F (36.7 °C) (Oral)   Resp 18   Ht 170.2 cm (67.01\")   Wt 117 kg (257 lb)   SpO2 100%   BMI 40.24 kg/m²      GENERAL: ill appearing, obese,  man. NAD. Lying in bed  HEENT: Normocephalic, atraumatic. No conjunctival injection. No icterus.   NECK: Supple without nuchal rigidity. No mass.  Chest: right chest dialysis cath site is without erythema or induration. No chest wall deformities  HEART: irregular rhythm. No murmur, rubs, gallops. LUNGS: Diminished at bases, labored and bilateral rhonchi. On nasal cannula  ABDOMEN: Soft, nontender, nondistended. Positive bowel sounds. No rebound or guarding. NO mass or HSM.  EXT:  No cyanosis, clubbing. No cord.  Edema noted over bilateral lower extremities. R>L upper extremity edema. erythema, warmth, and tenderness near right wrist seems to have improved somewhat.   : Genitalia generally unremarkable.  With Christina catheter.  SKIN: warm.  Does have petechia appearing areas over upper extremities   NEURO: somnolent and difficult to awaken. Not responsive to commands.    IV with no obvious redness or drainage    Laboratory Data    Results from last 7 days   Lab Units 02/14/19  0520 02/13/19  1220 02/13/19  0534   WBC 10*3/mm3 19.26* 20.36* 18.94*   HEMOGLOBIN g/dL 10.7* 10.5* 11.6*   HEMATOCRIT % 32.5* 31.9* 34.8*   PLATELETS 10*3/mm3 223 170 168     Results from last 7 days   Lab Units 02/14/19  0520   SODIUM mmol/L 137   POTASSIUM mmol/L 3.3*   CHLORIDE mmol/L 104   CO2 mmol/L 24.0   BUN mg/dL 72*   CREATININE mg/dL 1.26   GLUCOSE mg/dL 123*   CALCIUM mg/dL 7.6*     Results from last 7 days   Lab Units 02/11/19  0637   ALK PHOS U/L 348*   BILIRUBIN mg/dL 2.6*   ALT (SGPT) U/L 17   AST (SGOT) U/L 67*       "   Results from last 7 days   Lab Units 02/11/19  0637   CRP mg/dL 19.67*       Estimated Creatinine Clearance: 69.6 mL/min (by C-G formula based on SCr of 1.26 mg/dL).      Microbiology:      Radiology:  Imaging Results (last 72 hours)     Procedure Component Value Units Date/Time    XR Chest 1 View [[id number]] Updated:  02/09/19 0221    CT Abdomen Pelvis Without Contrast [[id number]] Collected:  02/08/19 1822     Updated:  02/08/19 1822    Narrative:       EXAMINATION: CT CHEST WO CONTRAST, CT ABDOMEN/PELVIS WO CONTRAST -  2/8/2019     INDICATION: Persistent cough.     TECHNIQUE: Multiple axial CT imaging is obtained of the chest, abdomen  and pelvis without the administration of oral or intravenous contrast.     The radiation dose reduction device was turned on for each scan per the  ALARA (As Low as Reasonably Achievable) protocol.     COMPARISON: There is some consolidation identified posteriorly extending  to the lung bases bilaterally suggesting infiltrates. The upper lung  fields are grossly clear. Motion artifact degrades image quality. There  are degenerative changes identified within the spine. The cardiac  chambers are enlarged. There is no pericardial effusion. No bulky hilar  or axillary lymphadenopathy. The thyroid is homogeneous. Degenerative  change is seen throughout the spine.     ABDOMEN: Motion artifact grades image quality. There is no abnormality  seen within the liver. The spleen is upper limits of normal in size.  There is gastric distention. Air fills the colon with no  evidence of  obvious obstruction. Kidneys and adrenal glands within normal limits.  The pancreas is homogeneous. No abdominal or retroperitoneal  lymphadenopathy. No abnormal mass or fluid collections identified. No  free fluid or free air.     PELVIS: Pelvic organs are unremarkable. The pelvic portion of the   gastrointestinal tract is within normal limits. No pelvic adenopathy.  Degenerative change is identified throughout  the spine and pelvis.     FINDINGS: Consolidation at the lung bases bilaterally is concerning for  airspace disease such as pneumonia. The there is gastric distention.  Air-filled loops of colon with no obvious obstruction. No other abnormal  findings are seen within the abdomen or pelvis. Degenerative changes are  seen throughout the spine and pelvis.     DICTATED:   2/8/2019  EDITED/ls :   2/8/2019              Impression:               CT Chest Without Contrast [[id number]] Collected:  02/08/19 1822     Updated:  02/08/19 1822    Narrative:       EXAMINATION: CT CHEST WO CONTRAST, CT ABDOMEN/PELVIS WO CONTRAST -  2/8/2019     INDICATION: Persistent cough.     TECHNIQUE: Multiple axial CT imaging is obtained of the chest, abdomen  and pelvis without the administration of oral or intravenous contrast.     The radiation dose reduction device was turned on for each scan per the  ALARA (As Low as Reasonably Achievable) protocol.     COMPARISON: There is some consolidation identified posteriorly extending  to the lung bases bilaterally suggesting infiltrates. The upper lung  fields are grossly clear. Motion artifact degrades image quality. There  are degenerative changes identified within the spine. The cardiac  chambers are enlarged. There is no pericardial effusion. No bulky hilar  or axillary lymphadenopathy. The thyroid is homogeneous. Degenerative  change is seen throughout the spine.     ABDOMEN: Motion artifact grades image quality. There is no abnormality  seen within the liver. The spleen is upper limits of normal in size.  There is gastric distention. Air fills the colon with no  evidence of  obvious obstruction. Kidneys and adrenal glands within normal limits.  The pancreas is homogeneous. No abdominal or retroperitoneal  lymphadenopathy. No abnormal mass or fluid collections identified. No  free fluid or free air.     PELVIS: Pelvic organs are unremarkable. The pelvic portion of the   gastrointestinal tract  is within normal limits. No pelvic adenopathy.  Degenerative change is identified throughout the spine and pelvis.     FINDINGS: Consolidation at the lung bases bilaterally is concerning for  airspace disease such as pneumonia. The there is gastric distention.  Air-filled loops of colon with no obvious obstruction. No other abnormal  findings are seen within the abdomen or pelvis. Degenerative changes are  seen throughout the spine and pelvis.     DICTATED:   2/8/2019  EDITED/ls :   2/8/2019              Impression:               CT Head Without Contrast [[id number]] Collected:  02/08/19 1816     Updated:  02/08/19 1817    Narrative:       EXAMINATION: CT HEAD WO CONTRAST - 2/8/2019     INDICATION: Mental status change, does not follow commands.     TECHNIQUE: Multiple axial CT imaging is obtained of the head from skull  base to skull vertex without the administration of intravenous contrast.     The radiation dose reduction device was turned on for each scan per the  ALARA (As Low as Reasonably Achievable) protocol.     COMPARISON: NONE     FINDINGS: There is atrophy of the brain. Some low-density area seen in  the periventricular white matter suggesting chronic small vessel  ischemic change. No hemorrhage or hydrocephalus. No mass, mass effect,  or midline shift. No abnormal extra-axial  fluid collections identified. Minimal mucosal thickening of the  maxillary sinuses and ethmoid air cells. The bony structures are  unremarkable. The mastoid air cells are patent.       Impression:       Chronic changes identified within the brain with no acute  intracranial abnormality.     DICTATED:   2/8/2019  EDITED/ls :   2/8/2019        FL C Arm During Surgery [[id number]] Collected:  02/08/19 1646     Updated:  02/08/19 1646    Narrative:       EXAMINATION: FL C ARM DURING SURGERY- 02/08/2019      INDICATION: Dialysis catheter placement     COMPARISON: NONE     FINDINGS: 4 seconds of fluoroscopy and 4 images used for  right-sided  dialysis catheter placement. Please see the procedure report for full  details.       Impression:       Fluoroscopy for dialysis catheter placement. Please see the  procedure report for full details.      D:  02/08/2019  E:  02/08/2019       XR Chest 1 View [[id number]] Collected:  02/08/19 1628     Updated:  02/08/19 1628    Narrative:       EXAMINATION: XR CHEST 1 VW-02/08/2019:      INDICATION: RIJ line.      COMPARISON: 02/08/2019.     FINDINGS: Portable chest reveals low lung volumes. Deep line catheter  identified on the right with tip in the SVC. Degenerative changes seen  within the spine. The heart is borderline enlarged. Mild prominence seen  of the pulmonary vascularity. No pleural effusion or pneumothorax.           Impression:       Prominence of the pulmonary vascularity with deep line  catheter identified on the right with tip in the SVC. No evidence of  acute parenchymal disease.     D:  02/08/2019  E:  02/08/2019             XR Chest 1 View [[id number]] Collected:  02/08/19 1243     Updated:  02/08/19 1246    Narrative:          EXAMINATION: XR CHEST 1 VW-      INDICATION: respiratory failure      COMPARISON: 01/08/2019     FINDINGS: Heart is borderline enlarged. The vasculature is cephalized.  Lung volumes are relatively low. There is mild discoid atelectasis in  both medial lung bases, but no focal disease elsewhere..           Impression:       Low lung volumes, borderline cardiomegaly and mild pulmonary  vascular congestion. Mild bibasilar discoid atelectasis, new from prior  study.     This report was finalized on 2/8/2019 12:44 PM by DR. Antione Damon MD.               Impression:     --Acute severe sepsis/septic shock with multiorgan system insufficiency.  Methicillin sensitive staph aureus in blood/urine cultures and bilateral pulmonary infiltrates with potential bilateral pneumonia, HCAP/Aspiration -v- all MSSA;sputum culture pending and in the meantime maintain broader coverage for  potential nosocomial pathogens until further information given critical/tenuous status and high risk for further serious morbidity and other serious sequela/mortality. Leukocytosis has overall improved but has remained elevated over the last couple days. Slight improvement today    --Acute MSSA septicemia/bacteremia and bacteriuria.  Transthoracic echocardiogram with poor visualization of heart valves but no noted vegetation.  No peripheral stigmata of endocarditis but certainly at risk.  KLAUDIA done 12/14/19 and report pending but reported negative for endocarditis by family.    --Acute hypoxic respiratory failure.  Potentially multifactorial, infection versus edema versus ARDS versus combination of these.  Bilateral consolidations with possible evolving pneumonia, HCAP/aspiration -v- MSSA; collected sputum, monitor and consider de-escalation depending on culture data and clinical course. Improved    --Acute renal failure.  Ongoing dialysis. Still urinating    --Severe extremity weakness with presentation that include back pain: unclear if spinal/paraspinal infection versus other.  Nursing/radiology/Dr. Olvera to  discussed potential for MRI if able.  Some question of foreign body at head that may limit ability to do MRI.  Unable to obtain MRI and CT spine without abscess/osteo    --Abnormal liver function tests.  Monitor    --Acute atrial fibrillation/RVR    --Prostate cancer with history recent prostatectomy.  Urology following    --Acute encephalopathy.  Presumed toxic/metabolic present.  Further neurologic workup at discretion of critical care team    ---right wrist swelling- some concern that the patient could be developing septic arthritis of his left wrist      PLAN:    --IV cefazolin.  Will likely need a 6 week course given severity of MSSA infection    --continue Azactam and Flagyl for now. May continue for 7 day course.     --KLAUDIA reportedly negative for infective endocarditis per family. Official report  "pending    --orthopedic surgery consultation for right wrist- Dr. Ro evaluated the patient and does not think the patient has septic arthritis at this time    --Check/review labs cultures and scans    --Spinal imaging noted;  Unable to do MRI and therefore CT scans done;  No description of abscess by radiology      Critically ill with high risk for further serious morbidity and other serious sequela/mortality. I discussed with his wife again today.          Jeff Smith MD  2/14/2019        Electronically signed by Jeff Smith MD at 2/14/2019  1:04 PM     Jr Banks MD at 2/14/2019 10:47 AM             LOS: 6 days    Patient Care Team:  Tyrese Leyva MD as PCP - General (Family Medicine)  Rod Regalado MD as Consulting Physician (Cardiology)  Myles Rossi MD as Consulting Physician (Anesthesiology)  Juanito Grace Jr., MD as Consulting Physician (Urology)    Reason For Visit:  F/U NOE  Subjective           Review of Systems:    Pulm: No soa   CV:  No CP      Objective       amLODIPine 5 mg Oral Q24H   aztreonam 500 mg Intravenous Q12H   castor oil-balsam peru  Topical Q12H   ceFAZolin 1 g Intravenous Q24H   DULoxetine 30 mg Oral Daily   famotidine 20 mg Oral BID   ferric gluconate (FERRLECIT) IVPB 125 mg Intravenous Daily   gabapentin 300 mg Oral Q12H   insulin lispro 0-7 Units Subcutaneous 4x Daily With Meals & Nightly   metoprolol tartrate 25 mg Oral TID   metroNIDAZOLE 500 mg Intravenous Q8H   potassium chloride 40 mEq Oral Once   sodium chloride 3 mL Intravenous Q12H       diltiaZEM 5-15 mg/hr Last Rate: Stopped (02/14/19 0935)   heparin 15 Units/kg/hr Last Rate: 15 Units/kg/hr (02/14/19 0816)   Pharmacy Consult     Pharmacy to Dose Heparin           Vital Signs:  Blood pressure 120/62, pulse 79, temperature 98.1 °F (36.7 °C), temperature source Oral, resp. rate 18, height 170.2 cm (67.01\"), weight 117 kg (257 lb), SpO2 97 %.    Flowsheet Rows      First " "Filed Value   Admission Height  170.2 cm (67\") Documented at 02/08/2019 1115   Admission Weight  117 kg (257 lb 3.2 oz) Documented at 02/08/2019 1115          02/13 0701 - 02/14 0700  In: 1361.9 [P.O.:540; I.V.:410.9]  Out: 3700 [Urine:3700]    Physical Exam:    General Appearance: NAD, alert and cooperative, Ox3  Eyes: PER, conjunctivae and sclerae normal, no icterus  Lungs: respirations regular and unlabored, no crepitus, clear to auscultation  Heart/CV: regular rhythm & normal rate, no murmur, no gallop, no rub and no edema  Abdomen: not distended, soft, non-tender, no masses,  bowel sounds present  Skin: No rash, Warm and dry. TUNNELED HD CATH.    Radiology:            Labs:  Results from last 7 days   Lab Units 02/14/19  0520 02/13/19  1220 02/13/19  0534   WBC 10*3/mm3 19.26* 20.36* 18.94*   HEMOGLOBIN g/dL 10.7* 10.5* 11.6*   HEMATOCRIT % 32.5* 31.9* 34.8*   PLATELETS 10*3/mm3 223 170 168     Results from last 7 days   Lab Units 02/14/19  0520 02/13/19  0534 02/12/19  0526 02/11/19  0637  02/09/19  0510   SODIUM mmol/L 137 136 133 132   < > 133   POTASSIUM mmol/L 3.3* 3.6 3.4* 3.6   < > 4.2   CHLORIDE mmol/L 104 103 99 100   < > 103   CO2 mmol/L 24.0 23.0 23.0 18.0*   < > 18.0*   BUN mg/dL 72* 73* 60* 81*   < > 83*   CREATININE mg/dL 1.26 1.38* 1.74* 3.05*   < > 4.71*   CALCIUM mg/dL 7.6* 7.5* 8.1* 8.2*   < > 8.2*   PHOSPHORUS mg/dL 4.6 4.7 5.2* 6.0*   < > 6.4*   MAGNESIUM mg/dL  --   --   --  2.2  --  2.3   ALBUMIN g/dL 2.62* 2.57* 2.83* 2.89*   < > 2.96*    < > = values in this interval not displayed.     Results from last 7 days   Lab Units 02/14/19  0520   GLUCOSE mg/dL 123*       Results from last 7 days   Lab Units 02/11/19  0637   ALK PHOS U/L 348*   BILIRUBIN mg/dL 2.6*   ALT (SGPT) U/L 17   AST (SGOT) U/L 67*     Results from last 7 days   Lab Units 02/08/19  1540   PH, ARTERIAL pH units 7.276*   PO2 ART mm Hg 85.0   PCO2, ARTERIAL mm Hg 38.4   HCO3 ART mmol/L 17.8*       Results from last 7 days "   Lab Units 02/08/19  1826   COLOR UA  Jessica*   CLARITY UA  Turbid*   PH, URINE  6.5   SPECIFIC GRAVITY, URINE  1.015   GLUCOSE UA  Negative   KETONES UA  15 mg/dL (1+)*   BILIRUBIN UA  Small (1+)*   PROTEIN UA  100 mg/dL (2+)*   BLOOD UA  Large (3+)*   LEUKOCYTES UA  Large (3+)*   NITRITE UA  Positive*       Estimated Creatinine Clearance: 69.6 mL/min (by C-G formula based on SCr of 1.26 mg/dL).      Assessment       Renal failure    Spinal stenosis, lumbar region, with neurogenic claudication    Morbid obesity due to excess calories (CMS/[facility])    TANIYA on CPAP    Dyslipidemia    DM (diabetes mellitus), type 2 (CMS/[facility])    Metabolic encephalopathy    Paroxysmal atrial fibrillation (CMS/[facility])    Sepsis (CMS/[facility])            Impression: NOE WITH IMPROVED RENAL FXN. OFF DIALYSIS. HYPOKALEMIA. ANEMIA.            Recommendations: SUPPLEMENT K. ? TUNNELED HD CATH OUT SOON. OK TO TAKE OUT CAMPUZANO.      Jr Banks MD  02/14/19  10:47 AM          Electronically signed by Jr Banks MD at 2/14/2019 10:50 AM          Physical Therapy Notes (most recent note)      Ana Akers, PT at 2/14/2019  5:14 PM  Version 1 of 1         Problem: Patient Care Overview  Goal: Plan of Care Review  Outcome: Ongoing (interventions implemented as appropriate)   02/14/19 1410   Coping/Psychosocial   Plan of Care Reviewed With patient;spouse   Plan of Care Review   Progress improving   OTHER   Outcome Summary Pt demonstrated ability to progress sitting balance activity duration; progressed LE ther ex. Cont to be limited by c/o groin soreness and decreased motivation to participate. Provided significant edu re: benefit of therapy participation to promote PLOF. Will cont PT POC.            Electronically signed by Ana Akers, PT at 2/14/2019  5:14 PM          Occupational Therapy Notes (most recent note)      Cassie Reardon, OT at 2/14/2019  4:36 PM          Acute Care - Occupational Therapy Treatment Note  [facility]      Patient Name: Rod Balderas  : 1950  MRN: [id number]  Today's Date: 2019  Onset of Illness/Injury or Date of Surgery: 19  Date of Referral to OT: 19  Referring Physician: MD Aurea    Admit Date: 2019       ICD-10-CM ICD-9-CM   1. Impaired functional mobility, balance, gait, and endurance Z74.09 V49.89   2. Impaired mobility and ADLs Z74.09 799.89     Patient Active Problem List   Diagnosis   • Degenerative disc disease, lumbar   • Spinal stenosis, lumbar region, with neurogenic claudication   • Neuroforaminal stenosis of spine   • Lumbar facet arthropathy   • CAD (coronary artery disease)   • Essential hypertension   • Physical deconditioning   • Morbid obesity due to excess calories (CMS/HCC)   • TANIYA on CPAP   • Displacement of lumbar intervertebral disc   • Dyslipidemia   • Osteoarthritis   • At high risk for falls   • Prostate cancer , s/p prostatectomy ( [facility])   • DM (diabetes mellitus), type 2 (CMS/HCC)   • Leukocytosis, mild, likely reactive   • Acute postoperative pain   • Acute blood loss anemia, mild, asymptomatic   • Renal failure   • Metabolic encephalopathy   • Paroxysmal atrial fibrillation (CMS/HCC)   • Sepsis (CMS/HCC)     Past Medical History:   Diagnosis Date   • Anxiety and depression    • Cancer (CMS/HCC)    • Chest pain    • Coronary artery disease    • Diabetes mellitus (CMS/HCC)    • Dyslipidemia    • Extremity pain    • Heart disease    • History of transfusion    • Hypertension    • Low back pain    • TANIYA on CPAP     2-3    • Osteoarthritis     Diffuse osteoarthritis.   • Panic attacks    • Prostate cancer (CMS/HCC)      Past Surgical History:   Procedure Laterality Date   • CARDIAC CATHETERIZATION     • CHOLECYSTECTOMY     • COLONOSCOPY     • CORONARY ANGIOPLASTY WITH STENT PLACEMENT  2012:  A 3.5 x 88 Promus VERONICA stent to OM2.  Nonobstructive disease.  Otherwise normal LVEF.   • EYE SURGERY Bilateral     cataract   • INSERTION  HEMODIALYSIS CATHETER N/A 2/8/2019    Procedure: HEMODIALYSIS CATHETER INSERTION RIGHT INTERNAL JUGULAR;  Surgeon: Bishnu Dailey MD;  Location:  [facility];  Service: General   • PROSTATECTOMY N/A 1/15/2019    Procedure: ROBOTIC PROSTATECTOMY WITH NODES;  Surgeon: Juanito Grace Jr., MD;  Location:  [facility];  Service: DaVinci   • RETINAL DETACHMENT SURGERY Right        Therapy Treatment    Rehabilitation Treatment Summary     Row Name 02/14/19 1352 02/14/19 1100          Treatment Time/Intention    Discipline  occupational therapist  -CL  physical therapist  -MF     Document Type  therapy note (daily note)  -CL  therapy note (daily note);wound care  -MF     Subjective Information  complains of;weakness;fatigue;pain  -CL  complains of;weakness;fatigue;pain  -MF     Patient Effort  good  -CL  --     Existing Precautions/Restrictions  fall;oxygen therapy device and L/min;other (see comments) chronic L foot drop, wears AFO  -CL  --     Recorded by [CL] Cassie Reardon, OT 02/14/19 1127 [MF] Efrem Tran, PT 02/14/19 1253     Row Name 02/14/19 1352             Vital Signs    Pre Systolic BP Rehab  -- VSS, RN cleared for tx.   -CL      Recorded by [CL] Cassie Reardon, OT 02/14/19 1185      Row Name 02/14/19 1577             Cognitive Assessment/Intervention- PT/OT    Affect/Mental Status (Cognitive)  confused  -CL      Orientation Status (Cognition)  oriented to;person;place  -CL      Follows Commands (Cognition)  follows one step commands;75-90% accuracy;verbal cues/prompting required;repetition of directions required  -CL      Cognitive Function (Cognitive)  safety deficit  -CL      Safety Deficit (Cognitive)  mild deficit;awareness of need for assistance;insight into deficits/self awareness;safety precautions awareness  -CL      Personal Safety Interventions  fall prevention program maintained;gait belt;nonskid shoes/slippers when out of bed  -CL      Recorded by [CL] Cassie Reardon, OT 02/14/19 1079       Row Name 02/14/19 1352             Bed Mobility Assessment/Treatment    Comment (Bed Mobility)  Pt received on lift sling.   -CL      Recorded by [CL] Cassie Reardon OT 02/14/19 1634      Row Name 02/14/19 1352             Transfer Assessment/Treatment    Transfer Assessment/Treatment  bed-chair transfer  -CL      Recorded by [CL] Cassie Reardon, OT 02/14/19 1634      Row Name 02/14/19 1352             Bed-Chair Transfer    Bed-Chair Crawford (Transfers)  dependent (less than 25% patient effort);2 person assist;verbal cues  -CL      Assistive Device (Bed-Chair Transfers)  mechanical lift/aid  -CL      Recorded by [CL] Cassie Reardon, OT 02/14/19 1634      Row Name 02/14/19 1352             Motor Skills Assessment/Interventions    Additional Documentation  Therapeutic Exercise (Group)  -CL      Recorded by [CL] Cassie Reardon, OT 02/14/19 1634      Row Name 02/14/19 1352             Therapeutic Exercise    Therapeutic Exercise  seated, upper extremities  -CL      Additional Documentation  Therapeutic Exercise (Row)  -CL      63922 - OT Therapeutic Exercise Minutes  5  -CL      36659 - OT Therapeutic Activity Minutes  10  -CL      Recorded by [CL] Cassie Reardon, OT 02/14/19 1634      Row Name 02/14/19 1352             Upper Extremity Seated Therapeutic Exercise    Performed, Seated Upper Extremity (Therapeutic Exercise)  shoulder flexion/extension;elbow flexion/extension;wrist flexion/extension;digit flexion/extension  -CL      Exercise Type, Seated Upper Extremity (Therapeutic Exercise)  AAROM (active assistive range of motion);AROM (active range of motion)  -CL      Sets/Reps Detail, Seated Upper Extremity (Therapeutic Exercise)  1/10  -CL      Comment, Seated Upper Extremity (Therapeutic Exercise)  R hand tendon glides/tenodesis positioning to improve MP/PIP/DIP ROM w/ noted pain w/ wrist flexion, possible TFCC irritation.   -CL      Recorded by [CL] Cassie Reardon OT 02/14/19 1634      Row Name 02/14/19 1352  02/14/19 1100          Positioning and Restraints    Pre-Treatment Position  in bed  -CL  in bed  -MF     Post Treatment Position  chair  -CL  bed  -MF     In Bed  notified nsg;sitting;call light within reach;encouraged to call for assist;with family/caregiver;with PT Pt transitioend to pt treatment.   -CL  supine;call light within reach;with nsg;with family/caregiver  -MF     Recorded by [CL] Cassie Reardon, OT 02/14/19 1634 [MF] Efrem Tran, PT 02/14/19 1253     Row Name 02/14/19 1100             Pain Assessment    Additional Documentation  Pain Scale: FACES Pre/Post-Treatment (Group)  -MF      Recorded by [MF] Efrem Tran, PT 02/14/19 1253      Row Name 02/14/19 1100             Pain Scale: Numbers Pre/Post-Treatment    Pain Location - Orientation  generalized  -MF      Pain Intervention(s)  Repositioned;Medication (See MAR)  -MF      Recorded by [MF] Efrem Tran, PT 02/14/19 1253      Row Name 02/14/19 1100             Pain Scale: FACES Pre/Post-Treatment    Pain: FACES Scale, Pretreatment  2-->hurts little bit  -MF      Pain: FACES Scale, Post-Treatment  4-->hurts little more  -MF      Pre/Post Treatment Pain Comment  6/10 pain with rolling  -MF      Recorded by [MF] Efrem Tran, PT 02/14/19 1253      Row Name 02/14/19 1352             Pain Scale 2: FACES Pre/Post-Treatment    Pain 2: FACES Scale, Pretreatment  4-->hurts little more  -CL      Pain 2: FACES Scale, Post-Treatment  4-->hurts little more  -CL      Pain Location 2 - Orientation  generalized  -CL      Pre/Post Treatment Pain 2 Comment  Tolerated.   -CL      Pain Intervention(s) 2  Repositioned;Ambulation/increased activity  -CL      Recorded by [CL] Cassie Reardon, OT 02/14/19 1634      Row Name                Wound 02/08/19 1519 Other (See comments) neck incision    Wound - Properties Group Date first assessed: 02/08/19 [SM] Time first assessed: 1519 [SM] Side: Other (See comments) [SM] Location: neck [SM] Type: incision [SM]  Recorded by:  [SM] Rissa Rodriguez RN 02/08/19 1519    Row Name 02/14/19 1100             Wound 02/08/19 1800 Right lower gluteal pressure injury    Wound - Properties Group Date first assessed: 02/08/19 [AB] Time first assessed: 1800 [AB] Present On Admission : yes [AB] Side: Right [AB] Orientation: lower [AB] Location: gluteal [AB] Type: pressure injury [AB] Stage, Pressure Injury: deep tissue injury [AB] Additional Comments: bilateral gluteals [MR] Recorded by:  [AB] Addison Jasso RN 02/08/19 1855 [MR] Carmen Conner RN 02/09/19 1227    Dressing Appearance  open to air  -MF      Base  dry;purple;red/granulating;maroon/purple  -MF      Periwound  dry;pink;redness;blanchable  -MF      Periwound Temperature  warm  -MF      Periwound Skin Turgor  soft  -MF      Drainage Amount  none  -MF      Care, Wound  irrigated with;sterile normal saline;ultrasound therapy, non contact low frequency 5 min  -MF      Dressing Care, Wound  open to air  -MF      Recorded by [MF] Efrem Tran, PT 02/14/19 1253      Row Name 02/14/19 1100             Wound 02/09/19 1116 Bilateral medial thigh    Wound - Properties Group Date first assessed: 02/09/19 [MR] Time first assessed: 1116 [MR] Present On Admission : yes [MR] Side: Bilateral [MR] Orientation: medial [MR] Location: thigh [MR] Stage, Pressure Injury: deep tissue injury [MR] Recorded by:  [MR] Carmen Conner RN 02/09/19 1228    Dressing Appearance  open to air  -MF      Base  maroon/purple;red/granulating  -MF      Periwound  intact;dry;redness;blanchable  -MF      Periwound Temperature  warm  -MF      Periwound Skin Turgor  soft  -MF      Edges  irregular  -MF      Drainage Amount  none  -MF      Care, Wound  irrigated with;sterile normal saline;ultrasound therapy, non contact low frequency 5 min  -MF      Dressing Care, Wound  open to air  -MF      Recorded by [MF] Efrem Tran, PT 02/14/19 1253      Row Name                Wound 02/13/19 0800 Left lower  gluteal pressure injury    Wound - Properties Group Date first assessed: 02/13/19 [JM] Time first assessed: 0800 [JM] Side: Left [JM] Orientation: lower [JM] Location: gluteal [JM] Type: pressure injury [JM] Stage, Pressure Injury: deep tissue injury [JM] Recorded by:  [JM] Ivon Eason RN 02/13/19 1353    Row Name 02/14/19 1100             Coping    Observed Emotional State  accepting;calm;cooperative  -MF      Verbalized Emotional State  acceptance  -MF      Recorded by [] Efrem Tran, PT 02/14/19 1253      Row Name 02/14/19 1100             Plan of Care Review    Plan of Care Reviewed With  patient;spouse  -MF      Recorded by [] Efrem Tran, PT 02/14/19 1253        User Key  (r) = Recorded By, (t) = Taken By, (c) = Cosigned By    Initials Name Effective Dates Discipline     Efrem Tran, PT 06/19/15 -  PT    Rissa Bolden RN 06/16/16 -  Nurse    Carmen Nelson RN 06/16/16 -  Nurse    Ivon Gutierrez RN 06/16/16 -  Nurse    Cassie Brothers OT 04/03/18 -  OT    Addison Jaffe RN 02/02/17 -  Nurse        Rash 02/11/19 1200 other (see comments) posterior arm (Active)   Distribution localized 2/14/2019  2:00 PM   Configuration/Shape asymmetric 2/14/2019  2:00 PM   Borders diffuse;irregular 2/14/2019  2:00 PM   Characteristics dry;raised 2/14/2019  2:00 PM   Color red 2/14/2019  2:00 PM   Care, Rash open to air 2/14/2019  4:00 AM       Rash 02/12/19 2000 Left upper arm macular (Active)   Distribution regional 2/14/2019  2:00 PM   Configuration/Shape asymmetric 2/14/2019  2:00 PM   Borders diffuse;irregular 2/14/2019  2:00 PM   Characteristics raised;dry 2/14/2019  2:00 PM   Color red 2/14/2019  2:00 PM   Care, Rash open to air 2/14/2019  6:00 AM       Rash 02/12/19 2000 Left posterior hand macular (Active)   Distribution localized 2/14/2019  2:00 PM   Configuration/Shape asymmetric 2/14/2019  2:00 PM   Borders diffuse;irregular 2/14/2019  2:00 PM   Characteristics  dry;raised 2/14/2019  2:00 PM   Color purple 2/14/2019  2:00 PM   Care, Rash open to air 2/14/2019  6:00 AM       Rash 02/13/19 0614 Right upper arm macular (Active)   Distribution regional 2/14/2019  2:00 PM   Configuration/Shape asymmetric 2/14/2019  2:00 PM   Borders diffuse;irregular 2/14/2019  2:00 PM   Characteristics dry;raised 2/14/2019  2:00 PM   Color red;purple 2/14/2019  2:00 PM   Care, Rash open to air 2/14/2019  6:00 AM       Wound 02/08/19 1519 Other (See comments) neck incision (Active)   Dressing Appearance open to air 2/14/2019  2:00 PM   Closure Adhesive closure strips 2/14/2019  2:00 PM   Base clean;dry;pink 2/14/2019  2:00 PM   Periwound intact;dry;pink;blanchable 2/14/2019  2:00 PM   Periwound Skin Turgor soft 2/14/2019  2:00 PM   Drainage Amount none 2/14/2019  2:00 PM   Periwound Care, Wound dry periwound area maintained 2/13/2019 10:00 PM       Wound 02/08/19 1800 Right lower gluteal pressure injury (Active)   Dressing Appearance open to air 2/14/2019  2:00 PM   Closure None 2/14/2019  2:00 PM   Base dry;purple;red/granulating;maroon/purple 2/14/2019 12:00 PM   Periwound dry;pink;redness;blanchable 2/14/2019  2:00 PM   Periwound Temperature warm 2/14/2019  2:00 PM   Periwound Skin Turgor soft 2/14/2019  2:00 PM   Drainage Amount none 2/14/2019  2:00 PM   Care, Wound irrigated with;sterile normal saline;ultrasound therapy, non contact low frequency 2/14/2019 11:00 AM   Dressing Care, Wound open to air 2/14/2019 11:00 AM   Periwound Care, Wound dry periwound area maintained 2/13/2019 10:00 PM       Wound 02/09/19 1116 Bilateral medial thigh (Active)   Dressing Appearance open to air 2/14/2019  2:00 PM   Closure None 2/14/2019  2:00 PM   Base maroon/purple;red/granulating 2/14/2019 12:00 PM   Periwound intact;dry;redness;blanchable 2/14/2019  2:00 PM   Periwound Temperature warm 2/14/2019 11:00 AM   Periwound Skin Turgor soft 2/14/2019 11:00 AM   Edges irregular 2/14/2019 11:00 AM   Drainage  Amount none 2/14/2019  2:00 PM   Care, Wound irrigated with;sterile normal saline;ultrasound therapy, non contact low frequency 2/14/2019 11:00 AM   Dressing Care, Wound open to air 2/14/2019 11:00 AM       Wound 02/13/19 0800 Left lower gluteal pressure injury (Active)   Dressing Appearance open to air 2/14/2019  2:00 PM   Base dry;maroon/purple;purple 2/14/2019 12:00 PM   Periwound intact;dry;pink 2/14/2019  2:00 PM       Occupational Therapy Education     Title: PT OT SLP Therapies (In Progress)     Topic: Occupational Therapy (In Progress)     Point: ADL training (In Progress)     Description: Instruct learner(s) on proper safety adaptation and remediation techniques during self care or transfers.   Instruct in proper use of assistive devices.    Learning Progress Summary           Patient Acceptance, E, NR by CL at 2/14/2019  4:34 PM    Comment:  Pt educated on appropriate safety precautions, t/f techniques, role of OT, and benefits of therapy.    Acceptance, E, NR by CL at 2/12/2019  3:03 PM    Comment:  Pt educated on appropriate safety precautions, t/f techniques, positioning, and benefits of therapy.   Family Acceptance, E, NR by CL at 2/14/2019  4:34 PM    Comment:  Pt educated on appropriate safety precautions, t/f techniques, role of OT, and benefits of therapy.    Acceptance, E, NR by CL at 2/12/2019  3:03 PM    Comment:  Pt educated on appropriate safety precautions, t/f techniques, positioning, and benefits of therapy.                   Point: Home exercise program (In Progress)     Description: Instruct learner(s) on appropriate technique for monitoring, assisting and/or progressing therapeutic exercises/activities.    Learning Progress Summary           Patient Acceptance, E, NR by CL at 2/14/2019  4:34 PM    Comment:  Pt educated on appropriate safety precautions, t/f techniques, role of OT, and benefits of therapy.   Family Acceptance, E, NR by CL at 2/14/2019  4:34 PM    Comment:  Pt educated on  appropriate safety precautions, t/f techniques, role of OT, and benefits of therapy.                   Point: Precautions (In Progress)     Description: Instruct learner(s) on prescribed precautions during self-care and functional transfers.    Learning Progress Summary           Patient Acceptance, E, NR by CL at 2/14/2019  4:34 PM    Comment:  Pt educated on appropriate safety precautions, t/f techniques, role of OT, and benefits of therapy.    Acceptance, E, NR by CL at 2/12/2019  3:03 PM    Comment:  Pt educated on appropriate safety precautions, t/f techniques, positioning, and benefits of therapy.   Family Acceptance, E, NR by CL at 2/14/2019  4:34 PM    Comment:  Pt educated on appropriate safety precautions, t/f techniques, role of OT, and benefits of therapy.    Acceptance, E, NR by CL at 2/12/2019  3:03 PM    Comment:  Pt educated on appropriate safety precautions, t/f techniques, positioning, and benefits of therapy.                   Point: Body mechanics (In Progress)     Description: Instruct learner(s) on proper positioning and spine alignment during self-care, functional mobility activities and/or exercises.    Learning Progress Summary           Patient Acceptance, E, NR by CL at 2/14/2019  4:34 PM    Comment:  Pt educated on appropriate safety precautions, t/f techniques, role of OT, and benefits of therapy.    Acceptance, E, NR by CL at 2/12/2019  3:03 PM    Comment:  Pt educated on appropriate safety precautions, t/f techniques, positioning, and benefits of therapy.   Family Acceptance, E, NR by CL at 2/14/2019  4:34 PM    Comment:  Pt educated on appropriate safety precautions, t/f techniques, role of OT, and benefits of therapy.    Acceptance, E, NR by CL at 2/12/2019  3:03 PM    Comment:  Pt educated on appropriate safety precautions, t/f techniques, positioning, and benefits of therapy.                               User Key     Initials Effective Dates Name Provider Type Discipline    CL  04/03/18 -  Cassie Reardon, OT Occupational Therapist OT                OT Recommendation and Plan  Outcome Summary/Treatment Plan (OT)  Anticipated Equipment Needs at Discharge (OT): (TBA further)  Anticipated Discharge Disposition (OT): skilled nursing facility  Therapy Frequency (OT Eval): daily  Plan of Care Review  Plan of Care Reviewed With: patient, spouse  Plan of Care Reviewed With: patient, spouse  Outcome Summary: Pt conts to be Depx2 for t/f from bed/chair w/ mechanical lift. Pt educated on/encouraged to perform BUE HEP, though limited d/t c/o joint stiffness and generalized weaknes. Recommend cont skilled IPOT POC.   Outcome Measures     Row Name 02/14/19 1352 02/12/19 0959 02/12/19 0935       How much help from another person do you currently need...    Turning from your back to your side while in flat bed without using bedrails?  --  2  -LS  --    Moving from lying on back to sitting on the side of a flat bed without bedrails?  --  2  -LS  --    Moving to and from a bed to a chair (including a wheelchair)?  --  1  -LS  --    Standing up from a chair using your arms (e.g., wheelchair, bedside chair)?  --  1  -LS  --    Climbing 3-5 steps with a railing?  --  1  -LS  --    To walk in hospital room?  --  1  -LS  --    AM-PAC 6 Clicks Score  --  8  -LS  --       How much help from another is currently needed...    Putting on and taking off regular lower body clothing?  1  -CL  --  1  -CL    Bathing (including washing, rinsing, and drying)  1  -CL  --  1  -CL    Toileting (which includes using toilet bed pan or urinal)  1  -CL  --  1  -CL    Putting on and taking off regular upper body clothing  1  -CL  --  1  -CL    Taking care of personal grooming (such as brushing teeth)  2  -CL  --  2  -CL    Eating meals  2  -CL  --  2  -CL    Score  8  -CL  --  8  -CL       Functional Assessment    Outcome Measure Options  AM-PAC 6 Clicks Daily Activity (OT)  -CL  AM-PAC 6 Clicks Basic Mobility (PT)  -LS  AM-PAC 6  Clicks Daily Activity (OT)  -CL      User Key  (r) = Recorded By, (t) = Taken By, (c) = Cosigned By    Initials Name Provider Type    LS Ana Akers, PT Physical Therapist    CL Cassie Reardon OT Occupational Therapist           Time Calculation:   Time Calculation- OT     Row Name 02/14/19 1352             Time Calculation- OT    OT Start Time  1352  -CL      OT Received On  02/14/19  -CL      OT Goal Re-Cert Due Date  02/22/19  -CL         Timed Charges    18567 - OT Therapeutic Exercise Minutes  5  -CL      59238 - OT Therapeutic Activity Minutes  10  -CL        User Key  (r) = Recorded By, (t) = Taken By, (c) = Cosigned By    Initials Name Provider Type    CL Cassie Reardon OT Occupational Therapist           Therapy Suggested Charges     Code   Minutes Charges    96595 (CPT®) Hc Ot Neuromusc Re Education Ea 15 Min      64704 (CPT®) Hc Ot Ther Proc Ea 15 Min 5     66071 (CPT®) Hc Ot Therapeutic Act Ea 15 Min 10 1    25852 (CPT®) Hc Ot Manual Therapy Ea 15 Min      35126 (CPT®) Hc Ot Iontophoresis Ea 15 Min      54503 (CPT®) Hc Ot Elec Stim Ea-Per 15 Min      64265 (CPT®) Hc Ot Ultrasound Ea 15 Min      54483 (CPT®) Hc Ot Self Care/Mgmt/Train Ea 15 Min      Total  15 1        Therapy Charges for Today     Code Description Service Date Service Provider Modifiers Qty    19602388537 HC OT THERAPEUTIC ACT EA 15 MIN 2/14/2019 Cassie Reardon OT GO 1               Cassie Reardon OT  2/14/2019    Electronically signed by Cassie Reardon OT at 2/14/2019  4:37 PM

## 2019-02-15 NOTE — PROGRESS NOTES
"[facility] Progress Note     LOS: 7 days   Patient Care Team:  Tyrese Leyva MD as PCP - General (Family Medicine)  oRd Regalado MD as Consulting Physician (Cardiology)  Myles Rossi MD as Consulting Physician (Anesthesiology)  Juanito Grace Jr., MD as Consulting Physician (Urology)  PCP:  Tyrese Leyva MD    Chief Complaint:  afib    SUBJECTIVE:   Pt states he's feeling much better. Denies CP, dyspnea, palps. Was asymptomatic in afib. NSR since his ECV.       Review of Systems:   All systems have been reviewed and are negative with the exception of those mentioned above.      OBJECTIVE:    Vital Sign Min/Max for last 24 hours  Temp  Min: 98.1 °F (36.7 °C)  Max: 99.3 °F (37.4 °C)   BP  Min: 100/50  Max: 161/72   Pulse  Min: 68  Max: 137   Resp  Min: 14  Max: 20   SpO2  Min: 93 %  Max: 100 %   Flow (L/min)  Min: 2  Max: 6   No Data Recorded     Flowsheet Rows      First Filed Value   Admission Height  170.2 cm (67\") Documented at 02/08/2019 1115   Admission Weight  117 kg (257 lb 3.2 oz) Documented at 02/08/2019 1115          Telemetry: nsr 77      Intake/Output Summary (Last 24 hours) at 2/15/2019 0747  Last data filed at 2/15/2019 0200  Gross per 24 hour   Intake 2219.5 ml   Output 1110 ml   Net 1109.5 ml     Intake & Output (last 3 days)       02/12 0701 - 02/13 0700 02/13 0701 - 02/14 0700 02/14 0701 - 02/15 0700 02/15 0701 - 02/16 0700    P.O.      I.V. (mL/kg) 483.9 (4.1) 410.9 (3.5) 529.5 (4.5)     Other        IV Piggyback 560 411 410     Total Intake(mL/kg) 1543.9 (13.2) 1361.9 (11.6) 2219.5 (19)     Urine (mL/kg/hr) 1275 (0.5) 3700 (1.3) 1110 (0.4)     Other        Stool  0 0     Total Output 1275 3700 1110     Net +268.9 -2338.1 +1109.5             Urine Unmeasured Occurrence   8 x     Stool Unmeasured Occurrence  1 x 5 x            Physical Exam:  Physical Exam   Constitutional: He is oriented to person, place, and time. " He appears well-developed and well-nourished. No distress.   morbidly obese   Cardiovascular: Normal rate, regular rhythm, normal heart sounds and intact distal pulses.   No murmur heard.  Pulses:       Radial pulses are 2+ on the right side, and 2+ on the left side.        Dorsalis pedis pulses are 2+ on the right side, and 2+ on the left side.        Posterior tibial pulses are 2+ on the right side, and 2+ on the left side.   Pulmonary/Chest: Effort normal and breath sounds normal. He has no wheezes. He has no rales.   Abdominal: Soft. Bowel sounds are normal. There is no tenderness. There is no guarding.   Musculoskeletal: He exhibits edema. He exhibits no tenderness.   Neurological: He is alert and oriented to person, place, and time.   Skin: Skin is warm and dry. No rash noted.   Psychiatric: He has a normal mood and affect.   Nursing note and vitals reviewed.       LABS/DIAGNOSTIC DATA:  Results from last 7 days   Lab Units 02/15/19  0545 02/14/19  0520 02/13/19  1220   WBC 10*3/mm3 17.14* 19.26* 20.36*   HEMOGLOBIN g/dL 9.9* 10.7* 10.5*   HEMATOCRIT % 30.6* 32.5* 31.9*   PLATELETS 10*3/mm3 256 223 170     No results found for: TROPONINT  Results from last 7 days   Lab Units 02/13/19  1220 02/12/19  0605 02/08/19  1240   INR  1.30* 1.29* 1.23*   APTT seconds 32.5* 36.6 36.8     Results from last 7 days   Lab Units 02/15/19  0545 02/14/19  0520 02/13/19  0534  02/11/19  0637 02/10/19  0340 02/09/19  0510   SODIUM mmol/L 136 137 136   < > 132 135 133   POTASSIUM mmol/L 3.3* 3.3* 3.6   < > 3.6 3.6 4.2   CHLORIDE mmol/L 103 104 103   < > 100 102 103   CO2 mmol/L 26.0 24.0 23.0   < > 18.0* 21.0 18.0*   BUN mg/dL 68* 72* 73*   < > 81* 64* 83*   CREATININE mg/dL 1.28 1.26 1.38*   < > 3.05* 3.55* 4.71*   CALCIUM mg/dL 8.1* 7.6* 7.5*   < > 8.2* 8.2* 8.2*   BILIRUBIN mg/dL  --   --   --   --  2.6* 2.6* 2.9*   ALK PHOS U/L  --   --   --   --  348* 300* 319*   ALT (SGPT) U/L  --   --   --   --  17 23 33   AST (SGOT) U/L   --   --   --   --  67* 46* 46*   GLUCOSE mg/dL 122* 123* 149*   < > 151* 142* 130*    < > = values in this interval not displayed.         Results from last 7 days   Lab Units 02/11/19  0637   TRIGLYCERIDES mg/dL 143               Medication Review:     amLODIPine 5 mg Oral Q24H   aztreonam 500 mg Intravenous Q12H   castor oil-balsam peru  Topical Q12H   ceFAZolin 2 g Intravenous Q8H   DULoxetine 30 mg Oral Daily   famotidine 20 mg Oral BID   gabapentin 600 mg Oral 4x Daily   insulin lispro 0-7 Units Subcutaneous 4x Daily With Meals & Nightly   metoprolol tartrate 25 mg Oral TID   metroNIDAZOLE 500 mg Intravenous Q8H   sodium chloride 3 mL Intravenous Q12H        diltiaZEM 5-15 mg/hr Last Rate: Stopped (02/14/19 0935)   heparin 22 Units/kg/hr Last Rate: 22 Units/kg/hr (02/15/19 0004)   Pharmacy Consult     Pharmacy to Dose Heparin            Renal failure    Spinal stenosis, lumbar region, with neurogenic claudication    Morbid obesity due to excess calories (CMS/[facility])    TANIYA on CPAP    Dyslipidemia    DM (diabetes mellitus), type 2 (CMS/[facility])    Metabolic encephalopathy    Paroxysmal atrial fibrillation (CMS/[facility])    Sepsis (CMS/[facility])      ASSESSMENT/PLAN:  Afib  - brief episode during HD that converted on 2/9, then sustained RVR during HD on 2/13  - s/p KLAUDIA/ECV per Dr. Brown 2/14/19  - KLAUDIA revealed EF 60-65%, mod AI, mild MR and TR, no thrombus, no vegetation or abscess  - [id number] 4, transition from heparin gtt to Eliquis today  -added metoprolol this admission following HRs      Sepsis  - cont abx per ID. Likely will need 6 weeks of IV abx d/t severe MSSA infection    Metabolic Encephalopathy  - resolved    ARF  - required HD x2 per nephrology   - renal function recovering, now  BUN/Cr 68/1.28    HTN  - controlled, cont current regimen. Consideration of addition of AV debby    CAD  - s/p LHC w VERONICA to OM2, otherwise nonobstructive dz 12/2012    T2DM      Electronically signed by Cecilia Wallace PA-C, 02/15/19,  7:34 AM.

## 2019-02-15 NOTE — PROGRESS NOTES
"   LOS: 7 days    Patient Care Team:  Tyrese Leyva MD as PCP - General (Family Medicine)  Rod Regalado MD as Consulting Physician (Cardiology)  Myles Rossi MD as Consulting Physician (Anesthesiology)  Juanito Grace Jr., MD as Consulting Physician (Urology)    Reason For Visit:    Subjective   Patient seen/examined. Christina removed - now incontinent but making good urine      Review of Systems:    Pulm: No soa   CV:  No CP      Objective       amLODIPine 5 mg Oral Q24H   aztreonam 500 mg Intravenous Q12H   castor oil-balsam peru  Topical Q12H   ceFAZolin 2 g Intravenous Q8H   DULoxetine 30 mg Oral Daily   famotidine 20 mg Oral BID   gabapentin 600 mg Oral 4x Daily   insulin lispro 0-7 Units Subcutaneous 4x Daily With Meals & Nightly   metoprolol tartrate 25 mg Oral TID   metroNIDAZOLE 500 mg Intravenous Q8H   potassium chloride 40 mEq Oral Q6H   sodium chloride 3 mL Intravenous Q12H       diltiaZEM 5-15 mg/hr Last Rate: Stopped (02/14/19 0935)   heparin 22 Units/kg/hr Last Rate: 22 Units/kg/hr (02/15/19 0820)   Pharmacy Consult     Pharmacy to Dose Heparin           Vital Signs:  Blood pressure 132/68, pulse 68, temperature 99.1 °F (37.3 °C), temperature source Axillary, resp. rate 20, height 170.2 cm (67.01\"), weight 117 kg (257 lb), SpO2 94 %.    Flowsheet Rows      First Filed Value   Admission Height  170.2 cm (67\") Documented at 02/08/2019 1115   Admission Weight  117 kg (257 lb 3.2 oz) Documented at 02/08/2019 1115          02/14 0701 - 02/15 0700  In: 2219.5 [P.O.:1280; I.V.:529.5]  Out: 1110 [Urine:1110]    Physical Exam:    General Appearance: NAD, alert and cooperative, Ox3  Eyes: PER, conjunctivae and sclerae normal, no icterus  Lungs: respirations regular and unlabored, no crepitus, clear to auscultation  Heart/CV: regular rhythm & normal rate, no murmur, no gallop, no rub, 2+ pitting edema  Abdomen: not distended, soft, non-tender, no masses,  bowel sounds present  Skin: No " rash, Warm and dry R tunneled catheter    Radiology:      Renal Imaging:        Labs:  Results from last 7 days   Lab Units 02/15/19  0545 02/14/19  0520 02/13/19  1220   WBC 10*3/mm3 17.14* 19.26* 20.36*   HEMOGLOBIN g/dL 9.9* 10.7* 10.5*   HEMATOCRIT % 30.6* 32.5* 31.9*   PLATELETS 10*3/mm3 256 223 170     Results from last 7 days   Lab Units 02/15/19  0545 02/14/19  0520 02/13/19  0534 02/12/19  0526 02/11/19  0637  02/09/19  0510   SODIUM mmol/L 136 137 136 133 132   < > 133   POTASSIUM mmol/L 3.3* 3.3* 3.6 3.4* 3.6   < > 4.2   CHLORIDE mmol/L 103 104 103 99 100   < > 103   CO2 mmol/L 26.0 24.0 23.0 23.0 18.0*   < > 18.0*   BUN mg/dL 68* 72* 73* 60* 81*   < > 83*   CREATININE mg/dL 1.28 1.26 1.38* 1.74* 3.05*   < > 4.71*   CALCIUM mg/dL 8.1* 7.6* 7.5* 8.1* 8.2*   < > 8.2*   PHOSPHORUS mg/dL 4.0 4.6 4.7 5.2* 6.0*   < > 6.4*   MAGNESIUM mg/dL 1.8  --   --   --  2.2  --  2.3   ALBUMIN g/dL 2.83* 2.62* 2.57* 2.83* 2.89*   < > 2.96*    < > = values in this interval not displayed.     Results from last 7 days   Lab Units 02/15/19  0545   GLUCOSE mg/dL 122*       Results from last 7 days   Lab Units 02/11/19  0637   ALK PHOS U/L 348*   BILIRUBIN mg/dL 2.6*   ALT (SGPT) U/L 17   AST (SGOT) U/L 67*     Results from last 7 days   Lab Units 02/08/19  1540   PH, ARTERIAL pH units 7.276*   PO2 ART mm Hg 85.0   PCO2, ARTERIAL mm Hg 38.4   HCO3 ART mmol/L 17.8*       Results from last 7 days   Lab Units 02/08/19  1826   COLOR UA  Jessica*   CLARITY UA  Turbid*   PH, URINE  6.5   SPECIFIC GRAVITY, URINE  1.015   GLUCOSE UA  Negative   KETONES UA  15 mg/dL (1+)*   BILIRUBIN UA  Small (1+)*   PROTEIN UA  100 mg/dL (2+)*   BLOOD UA  Large (3+)*   LEUKOCYTES UA  Large (3+)*   NITRITE UA  Positive*       Estimated Creatinine Clearance: 68.5 mL/min (by C-G formula based on SCr of 1.28 mg/dL).      Assessment       Renal failure    Spinal stenosis, lumbar region, with neurogenic claudication    Morbid obesity due to excess calories  (CMS/HCC)    TANIYA on CPAP    Dyslipidemia    DM (diabetes mellitus), type 2 (CMS/HCC)    Metabolic encephalopathy    Paroxysmal atrial fibrillation (CMS/HCC)    Sepsis (CMS/HCC)            Impression:   NOE - improving, no need for RRT at this time  Anemia - stable  HTN          Recommendations:   OK to remove tunneled HD catheter - discussed with nursing  Increase fluid restriction to 2.5-3 L, watch sodium  Monitor UO/BMP  Renally dose meds/abx      Korina Hastings DO  02/15/19  10:42 AM

## 2019-02-16 PROBLEM — I35.1 MODERATE AORTIC REGURGITATION: Status: ACTIVE | Noted: 2019-02-16

## 2019-02-16 LAB
ALBUMIN SERPL-MCNC: 2.56 G/DL (ref 3.2–4.8)
ANION GAP SERPL CALCULATED.3IONS-SCNC: 6 MMOL/L (ref 3–11)
BASOPHILS # BLD AUTO: 0.03 10*3/MM3 (ref 0–0.2)
BASOPHILS NFR BLD AUTO: 0.2 % (ref 0–1)
BUN BLD-MCNC: 67 MG/DL (ref 9–23)
BUN/CREAT SERPL: 60.9 (ref 7–25)
CALCIUM SPEC-SCNC: 7.7 MG/DL (ref 8.7–10.4)
CHLORIDE SERPL-SCNC: 105 MMOL/L (ref 99–109)
CO2 SERPL-SCNC: 22 MMOL/L (ref 20–31)
CREAT BLD-MCNC: 1.1 MG/DL (ref 0.6–1.3)
DEPRECATED RDW RBC AUTO: 47 FL (ref 37–54)
EOSINOPHIL # BLD AUTO: 0.13 10*3/MM3 (ref 0–0.3)
EOSINOPHIL NFR BLD AUTO: 0.9 % (ref 0–3)
ERYTHROCYTE [DISTWIDTH] IN BLOOD BY AUTOMATED COUNT: 16.2 % (ref 11.3–14.5)
GFR SERPL CREATININE-BSD FRML MDRD: 67 ML/MIN/1.73
GLUCOSE BLD-MCNC: 120 MG/DL (ref 70–100)
GLUCOSE BLDC GLUCOMTR-MCNC: 126 MG/DL (ref 70–130)
GLUCOSE BLDC GLUCOMTR-MCNC: 134 MG/DL (ref 70–130)
GLUCOSE BLDC GLUCOMTR-MCNC: 155 MG/DL (ref 70–130)
HCT VFR BLD AUTO: 29.6 % (ref 38.9–50.9)
HGB BLD-MCNC: 9.6 G/DL (ref 13.1–17.5)
IMM GRANULOCYTES # BLD AUTO: 0.1 10*3/MM3 (ref 0–0.05)
IMM GRANULOCYTES NFR BLD AUTO: 0.7 % (ref 0–0.6)
LYMPHOCYTES # BLD AUTO: 1.27 10*3/MM3 (ref 0.6–4.8)
LYMPHOCYTES NFR BLD AUTO: 8.7 % (ref 24–44)
MAGNESIUM SERPL-MCNC: 2.1 MG/DL (ref 1.3–2.7)
MCH RBC QN AUTO: 26.7 PG (ref 27–31)
MCHC RBC AUTO-ENTMCNC: 32.4 G/DL (ref 32–36)
MCV RBC AUTO: 82.5 FL (ref 80–99)
MONOCYTES # BLD AUTO: 0.65 10*3/MM3 (ref 0–1)
MONOCYTES NFR BLD AUTO: 4.4 % (ref 0–12)
NEUTROPHILS # BLD AUTO: 12.53 10*3/MM3 (ref 1.5–8.3)
NEUTROPHILS NFR BLD AUTO: 85.8 % (ref 41–71)
PHOSPHATE SERPL-MCNC: 3.8 MG/DL (ref 2.4–5.1)
PLATELET # BLD AUTO: 306 10*3/MM3 (ref 150–450)
PMV BLD AUTO: 9.5 FL (ref 6–12)
POTASSIUM BLD-SCNC: 3.9 MMOL/L (ref 3.5–5.5)
RBC # BLD AUTO: 3.59 10*6/MM3 (ref 4.2–5.76)
SODIUM BLD-SCNC: 133 MMOL/L (ref 132–146)
WBC NRBC COR # BLD: 14.61 10*3/MM3 (ref 3.5–10.8)

## 2019-02-16 PROCEDURE — 82962 GLUCOSE BLOOD TEST: CPT

## 2019-02-16 PROCEDURE — 85025 COMPLETE CBC W/AUTO DIFF WBC: CPT | Performed by: INTERNAL MEDICINE

## 2019-02-16 PROCEDURE — 99232 SBSQ HOSP IP/OBS MODERATE 35: CPT | Performed by: INTERNAL MEDICINE

## 2019-02-16 PROCEDURE — 83735 ASSAY OF MAGNESIUM: CPT | Performed by: INTERNAL MEDICINE

## 2019-02-16 PROCEDURE — 25010000002 FUROSEMIDE PER 20 MG: Performed by: INTERNAL MEDICINE

## 2019-02-16 PROCEDURE — 25010000003 CEFAZOLIN IN DEXTROSE 2-4 GM/100ML-% SOLUTION

## 2019-02-16 PROCEDURE — 80069 RENAL FUNCTION PANEL: CPT | Performed by: INTERNAL MEDICINE

## 2019-02-16 PROCEDURE — 25010000002 FENTANYL CITRATE (PF) 100 MCG/2ML SOLUTION: Performed by: NURSE PRACTITIONER

## 2019-02-16 RX ORDER — DULOXETIN HYDROCHLORIDE 30 MG/1
30 CAPSULE, DELAYED RELEASE ORAL ONCE
Status: COMPLETED | OUTPATIENT
Start: 2019-02-16 | End: 2019-02-16

## 2019-02-16 RX ORDER — DULOXETIN HYDROCHLORIDE 60 MG/1
60 CAPSULE, DELAYED RELEASE ORAL DAILY
Status: DISCONTINUED | OUTPATIENT
Start: 2019-02-17 | End: 2019-02-24 | Stop reason: HOSPADM

## 2019-02-16 RX ORDER — FENTANYL 50 UG/H
1 PATCH TRANSDERMAL
Status: DISCONTINUED | OUTPATIENT
Start: 2019-02-16 | End: 2019-02-17

## 2019-02-16 RX ORDER — FUROSEMIDE 10 MG/ML
40 INJECTION INTRAMUSCULAR; INTRAVENOUS EVERY 12 HOURS
Status: COMPLETED | OUTPATIENT
Start: 2019-02-16 | End: 2019-02-17

## 2019-02-16 RX ADMIN — SODIUM CHLORIDE, PRESERVATIVE FREE 10 ML: 5 INJECTION INTRAVENOUS at 20:42

## 2019-02-16 RX ADMIN — FENTANYL CITRATE 25 MCG: 50 INJECTION INTRAMUSCULAR; INTRAVENOUS at 11:29

## 2019-02-16 RX ADMIN — CASTOR OIL AND BALSAM, PERU: 788; 87 OINTMENT TOPICAL at 20:42

## 2019-02-16 RX ADMIN — DULOXETINE 30 MG: 30 CAPSULE, DELAYED RELEASE ORAL at 13:54

## 2019-02-16 RX ADMIN — GABAPENTIN 600 MG: 300 CAPSULE ORAL at 13:55

## 2019-02-16 RX ADMIN — CEFAZOLIN SODIUM 2 G: 2 INJECTION, SOLUTION INTRAVENOUS at 13:54

## 2019-02-16 RX ADMIN — HYDROCODONE BITARTRATE AND ACETAMINOPHEN 1 TABLET: 5; 325 TABLET ORAL at 05:03

## 2019-02-16 RX ADMIN — FENTANYL 1 PATCH: 50 PATCH TRANSDERMAL at 16:11

## 2019-02-16 RX ADMIN — GABAPENTIN 600 MG: 300 CAPSULE ORAL at 18:54

## 2019-02-16 RX ADMIN — DULOXETINE 30 MG: 30 CAPSULE, DELAYED RELEASE ORAL at 09:37

## 2019-02-16 RX ADMIN — HYDROCODONE BITARTRATE AND ACETAMINOPHEN 1 TABLET: 5; 325 TABLET ORAL at 11:28

## 2019-02-16 RX ADMIN — FAMOTIDINE 20 MG: 20 TABLET, FILM COATED ORAL at 20:42

## 2019-02-16 RX ADMIN — FENTANYL CITRATE 25 MCG: 50 INJECTION INTRAMUSCULAR; INTRAVENOUS at 07:24

## 2019-02-16 RX ADMIN — CEFAZOLIN SODIUM 2 G: 2 INJECTION, SOLUTION INTRAVENOUS at 20:59

## 2019-02-16 RX ADMIN — FAMOTIDINE 20 MG: 20 TABLET, FILM COATED ORAL at 09:38

## 2019-02-16 RX ADMIN — APIXABAN 5 MG: 5 TABLET, FILM COATED ORAL at 18:54

## 2019-02-16 RX ADMIN — METOPROLOL TARTRATE 25 MG: 25 TABLET ORAL at 16:11

## 2019-02-16 RX ADMIN — CEFAZOLIN SODIUM 2 G: 2 INJECTION, SOLUTION INTRAVENOUS at 04:36

## 2019-02-16 RX ADMIN — AMLODIPINE BESYLATE 5 MG: 5 TABLET ORAL at 09:38

## 2019-02-16 RX ADMIN — METOPROLOL TARTRATE 25 MG: 25 TABLET ORAL at 20:41

## 2019-02-16 RX ADMIN — CASTOR OIL AND BALSAM, PERU: 788; 87 OINTMENT TOPICAL at 13:00

## 2019-02-16 RX ADMIN — GABAPENTIN 600 MG: 300 CAPSULE ORAL at 20:41

## 2019-02-16 RX ADMIN — APIXABAN 5 MG: 5 TABLET, FILM COATED ORAL at 05:02

## 2019-02-16 RX ADMIN — METOPROLOL TARTRATE 25 MG: 25 TABLET ORAL at 09:38

## 2019-02-16 RX ADMIN — FUROSEMIDE 40 MG: 10 INJECTION, SOLUTION INTRAMUSCULAR; INTRAVENOUS at 16:11

## 2019-02-16 RX ADMIN — FENTANYL CITRATE 25 MCG: 50 INJECTION INTRAMUSCULAR; INTRAVENOUS at 03:56

## 2019-02-16 RX ADMIN — GABAPENTIN 600 MG: 300 CAPSULE ORAL at 09:37

## 2019-02-16 NOTE — PROGRESS NOTES
[facility] Progress Note      Antibiotics:  Anti-Infectives (From admission, onward)    Ordered     Dose/Rate Route Frequency Start Stop    02/14/19 1149  ceFAZolin in dextrose (ANCEF) IVPB solution 2 g     Ordering Provider:  Laurie Menchaca RPH    2 g  over 30 Minutes Intravenous Every 8 Hours 02/14/19 1300 02/22/19 1259    02/09/19 0849  aztreonam (AZACTAM) 2 g in sodium chloride 0.9 % 100 mL IVPB-MBP     Ordering Provider:  Efrem Santana MD    2 g  200 mL/hr over 30 Minutes Intravenous Once 02/09/19 1000 02/09/19 0946          CC: Patient unable    HPI  2/8/19: Patient is a 68 y.o.  Yr old male with history of prostate cancer status post robotic laparoscopic prostatectomy in mid January and was discharged on 1/17/19. The patient is currently unresponsive and no family is at bedside so history is somewhat limited to history from nursing staff and medical records. The patient was seen a few days after his discharge from his recent  Admission in January and his Christina catheter was discontinued.  About one week ago he went to the ER with complaints of back pain, abdominal pain, and confusion and a CT abdomen and pelvis was apparently okay during that time. He continued to worsen and over the weekend he was admitted to [facility].  He was found to have an initial AK I with a creatinine of 2.2. Per the micro-lab at [facility], the patient's urine cultures and 1 of 2 blood cultures from 2/5/19 grew MSSA. The patient was initially on vancomycin and Zosyn and was subsequently transitioned to daptomycin after he developed worsening renal function with a creatinine up to 5.6 and BUN went up to 94 today.  Due to lack of nephrology at [facility], the patient was transferred to [facility] today for possible dialysis.  He additionally received a dose of nafcillin prior to this transfer.  His last dose of daptomycin was yesterday per pharmacist here (who has discussed with  OSH). Since arrival, the patient has remained afebrile but he has had a leukocytosis up to 31.38.  He is anemic with a hemoglobin of 11.3 and a hematocrit of 54.1.  He is hyponatremic to a sodium of 127.  Creatinine was initially elevated 5.39 and B1 was 97 on arrival.  He additionally had elevation of his LFTs with an ALT of 43 and AST of 43, T bili of 3.0, and alkaline phosphatase of 303.  Pro-calcitonin was elevated to 7.55 lactic acid was normal at 0.8. The patient underwent CT head without contrast which did not show any acute abnormality, CT chest, abdomen and pelvis which showed consolidation of the lung bases bilaterally that is concerning for airspace disease such as pneumonia, gastric distention, and air-filled loops of colon with no obvious obstruction. Blood cultures have been ordered and a reflexive urinalysis has been ordered as well.  A transthoracic echocardiogram was ordered. The patient underwent dialysis today following right IJ dialysis catheter placement.  The infectious disease team has been consulted for antibiotic recommendations.    He has methicillin sensitive staph aureus septicemia/bacteriuria compounded by acute renal failure, acute hypoxic respiratory failure and by basilar consolidation by chest CT.  Further compounded by acute encephalopathy.    2/9/19 nursing reports A. fib/RVR, dialysis ongoing with acute renal failure and tachypneic/labored at times.  Some cough but unable to capture for culture so far. Empiric HCAP coverage added with zyvox/azactam/flagyl; unable to do MRI of spine so CT's done cervical/thoracic/lumbar spine done 2/10    2/10/19 CT scans of spine; more restful, less labored with breathing and decreased cough;  No rash.  He can wiggle toes but not lift legs off the bed.  He moves left arm better than right arm.  No other new focal pain that he will relate although interaction minimal.  No diarrhea.  No vomiting.    2/11/19: seems to have improved from mental status  standpoint slightly per his wife and son who are at bedside. Patient endorses abdominal pain but is not answering a lot of questions. tMAX 99.3f. WBC remains elevated at 20K (down from 31K on arrival). TTE was without evidence of IE but valves poorly visualized. Still getting HD.    2/12/19: Patient is starting to feel much better today.  He is responding to a lot of questions and is more alert sitting up in a bedside chair.  Breathing is improving.  No high fevers overnight.  Leukocytosis improved to 15.9 today.  He continues to urinate a small amount but remains on intermittent dialysis.    2/13/19: Feeling about the same as he was yesterday.  Still having some right wrist pain. Shortness of breath is stable. No fevers. Leukocytosis slightly worse today.    2/14/19: Patient is somnolent today after his KLAUDIA due to recent sedation. Procedure went well per his family and he is back in NSR and no signs of vegetation but procedure report is pending. No fevers. Leukocytosis slightly improved from yesterday. Family reports that the patient has had no new complaints. He was evaluated by Dr. Ro and no significant concern for septic arthritis.    2/16/19: I reviewed his complex medical records and discussed his complex situation with Dr. Jeff Smith.  His hemodialysis catheter was removed late yesterday.  He has been afebrile over the past 24 hours.  He denies abdominal pain.  He has a history of metal fragment between his frontal lobes according to his son.  This was apparently only recently identified on a CT scan prior to his admission here.  He underwent an MRI scan approximately 2 years ago without complications and the son indicates the metal fragment would have been there at that time.  He is having difficulty moving his legs but this is a chronic problem according to his son.  He was able to ambulate at home with assistance.    ROS:    Breathing has improved. No severe abdominal pain. +right wrist pain. No  "diarrhea or nausea      PE:   /56 (BP Location: Left arm, Patient Position: Lying)   Pulse 75   Temp 98.4 °F (36.9 °C) (Axillary)   Resp 14   Ht 170.2 cm (67.01\")   Wt 117 kg (257 lb)   SpO2 99%   BMI 40.24 kg/m²     GENERAL: ill appearing, obese,  man. NAD. Lying in bed  HEENT: Normocephalic, atraumatic. No conjunctival injection. No icterus.   NECK: Supple without nuchal rigidity. No mass.  Chest: right chest dialysis cath has been removed   HEART: irregular rhythm. No murmur, rubs, gallops. LUNGS: Diminished at bases, labored and bilateral rhonchi. On nasal cannula  ABDOMEN: Soft, nontender, nondistended. Positive bowel sounds. No rebound or guarding. NO mass or HSM.  EXT:  No cyanosis, clubbing. No cord.  Edema noted over bilateral lower extremities. R>L upper extremity edema. erythema, warmth, and tenderness near right wrist seems to have improved somewhat.   : Genitalia generally unremarkable.    SKIN: warm.  Does have petechia appearing areas over upper extremities   NEURO: somnolent and difficult to awaken.  He moves his upper extremities to command.  He can wiggle his feet and move his legs from side to side but has difficulty lifting his legs off of the bed.  His lower extremity sensation appears to be intact  IV with no obvious redness or drainage    Laboratory Data    Results from last 7 days   Lab Units 02/16/19  0315 02/15/19  0545 02/14/19  0520   WBC 10*3/mm3 14.61* 17.14* 19.26*   HEMOGLOBIN g/dL 9.6* 9.9* 10.7*   HEMATOCRIT % 29.6* 30.6* 32.5*   PLATELETS 10*3/mm3 306 256 223     Results from last 7 days   Lab Units 02/16/19  0315   SODIUM mmol/L 133   POTASSIUM mmol/L 3.9   CHLORIDE mmol/L 105   CO2 mmol/L 22.0   BUN mg/dL 67*   CREATININE mg/dL 1.10   GLUCOSE mg/dL 120*   CALCIUM mg/dL 7.7*     Results from last 7 days   Lab Units 02/11/19  0637   ALK PHOS U/L 348*   BILIRUBIN mg/dL 2.6*   ALT (SGPT) U/L 17   AST (SGOT) U/L 67*         Results from last 7 days   Lab Units " 02/11/19  0637   CRP mg/dL 19.67*       Estimated Creatinine Clearance: 79.7 mL/min (by C-G formula based on SCr of 1.1 mg/dL).      Microbiology:      Radiology:  Imaging Results (last 72 hours)     Procedure Component Value Units Date/Time    XR Chest 1 View [[id number]] Updated:  02/09/19 0221    CT Abdomen Pelvis Without Contrast [[id number]] Collected:  02/08/19 1822     Updated:  02/08/19 1822    Narrative:       EXAMINATION: CT CHEST WO CONTRAST, CT ABDOMEN/PELVIS WO CONTRAST -  2/8/2019     INDICATION: Persistent cough.     TECHNIQUE: Multiple axial CT imaging is obtained of the chest, abdomen  and pelvis without the administration of oral or intravenous contrast.     The radiation dose reduction device was turned on for each scan per the  ALARA (As Low as Reasonably Achievable) protocol.     COMPARISON: There is some consolidation identified posteriorly extending  to the lung bases bilaterally suggesting infiltrates. The upper lung  fields are grossly clear. Motion artifact degrades image quality. There  are degenerative changes identified within the spine. The cardiac  chambers are enlarged. There is no pericardial effusion. No bulky hilar  or axillary lymphadenopathy. The thyroid is homogeneous. Degenerative  change is seen throughout the spine.     ABDOMEN: Motion artifact grades image quality. There is no abnormality  seen within the liver. The spleen is upper limits of normal in size.  There is gastric distention. Air fills the colon with no  evidence of  obvious obstruction. Kidneys and adrenal glands within normal limits.  The pancreas is homogeneous. No abdominal or retroperitoneal  lymphadenopathy. No abnormal mass or fluid collections identified. No  free fluid or free air.     PELVIS: Pelvic organs are unremarkable. The pelvic portion of the   gastrointestinal tract is within normal limits. No pelvic adenopathy.  Degenerative change is identified throughout the spine and pelvis.     FINDINGS:  Consolidation at the lung bases bilaterally is concerning for  airspace disease such as pneumonia. The there is gastric distention.  Air-filled loops of colon with no obvious obstruction. No other abnormal  findings are seen within the abdomen or pelvis. Degenerative changes are  seen throughout the spine and pelvis.     DICTATED:   2/8/2019  EDITED/ls :   2/8/2019              Impression:               CT Chest Without Contrast [[id number]] Collected:  02/08/19 1822     Updated:  02/08/19 1822    Narrative:       EXAMINATION: CT CHEST WO CONTRAST, CT ABDOMEN/PELVIS WO CONTRAST -  2/8/2019     INDICATION: Persistent cough.     TECHNIQUE: Multiple axial CT imaging is obtained of the chest, abdomen  and pelvis without the administration of oral or intravenous contrast.     The radiation dose reduction device was turned on for each scan per the  ALARA (As Low as Reasonably Achievable) protocol.     COMPARISON: There is some consolidation identified posteriorly extending  to the lung bases bilaterally suggesting infiltrates. The upper lung  fields are grossly clear. Motion artifact degrades image quality. There  are degenerative changes identified within the spine. The cardiac  chambers are enlarged. There is no pericardial effusion. No bulky hilar  or axillary lymphadenopathy. The thyroid is homogeneous. Degenerative  change is seen throughout the spine.     ABDOMEN: Motion artifact grades image quality. There is no abnormality  seen within the liver. The spleen is upper limits of normal in size.  There is gastric distention. Air fills the colon with no  evidence of  obvious obstruction. Kidneys and adrenal glands within normal limits.  The pancreas is homogeneous. No abdominal or retroperitoneal  lymphadenopathy. No abnormal mass or fluid collections identified. No  free fluid or free air.     PELVIS: Pelvic organs are unremarkable. The pelvic portion of the   gastrointestinal tract is within normal limits. No pelvic  adenopathy.  Degenerative change is identified throughout the spine and pelvis.     FINDINGS: Consolidation at the lung bases bilaterally is concerning for  airspace disease such as pneumonia. The there is gastric distention.  Air-filled loops of colon with no obvious obstruction. No other abnormal  findings are seen within the abdomen or pelvis. Degenerative changes are  seen throughout the spine and pelvis.     DICTATED:   2/8/2019  EDITED/ls :   2/8/2019              Impression:               CT Head Without Contrast [[id number]] Collected:  02/08/19 1816     Updated:  02/08/19 1817    Narrative:       EXAMINATION: CT HEAD WO CONTRAST - 2/8/2019     INDICATION: Mental status change, does not follow commands.     TECHNIQUE: Multiple axial CT imaging is obtained of the head from skull  base to skull vertex without the administration of intravenous contrast.     The radiation dose reduction device was turned on for each scan per the  ALARA (As Low as Reasonably Achievable) protocol.     COMPARISON: NONE     FINDINGS: There is atrophy of the brain. Some low-density area seen in  the periventricular white matter suggesting chronic small vessel  ischemic change. No hemorrhage or hydrocephalus. No mass, mass effect,  or midline shift. No abnormal extra-axial  fluid collections identified. Minimal mucosal thickening of the  maxillary sinuses and ethmoid air cells. The bony structures are  unremarkable. The mastoid air cells are patent.       Impression:       Chronic changes identified within the brain with no acute  intracranial abnormality.     DICTATED:   2/8/2019  EDITED/ls :   2/8/2019        FL C Arm During Surgery [[id number]] Collected:  02/08/19 1646     Updated:  02/08/19 1646    Narrative:       EXAMINATION: FL C ARM DURING SURGERY- 02/08/2019      INDICATION: Dialysis catheter placement     COMPARISON: NONE     FINDINGS: 4 seconds of fluoroscopy and 4 images used for right-sided  dialysis catheter placement.  Please see the procedure report for full  details.       Impression:       Fluoroscopy for dialysis catheter placement. Please see the  procedure report for full details.      D:  02/08/2019  E:  02/08/2019       XR Chest 1 View [[id number]] Collected:  02/08/19 1628     Updated:  02/08/19 1628    Narrative:       EXAMINATION: XR CHEST 1 VW-02/08/2019:      INDICATION: RIJ line.      COMPARISON: 02/08/2019.     FINDINGS: Portable chest reveals low lung volumes. Deep line catheter  identified on the right with tip in the SVC. Degenerative changes seen  within the spine. The heart is borderline enlarged. Mild prominence seen  of the pulmonary vascularity. No pleural effusion or pneumothorax.           Impression:       Prominence of the pulmonary vascularity with deep line  catheter identified on the right with tip in the SVC. No evidence of  acute parenchymal disease.     D:  02/08/2019  E:  02/08/2019             XR Chest 1 View [[id number]] Collected:  02/08/19 1243     Updated:  02/08/19 1246    Narrative:          EXAMINATION: XR CHEST 1 VW-      INDICATION: respiratory failure      COMPARISON: 01/08/2019     FINDINGS: Heart is borderline enlarged. The vasculature is cephalized.  Lung volumes are relatively low. There is mild discoid atelectasis in  both medial lung bases, but no focal disease elsewhere..           Impression:       Low lung volumes, borderline cardiomegaly and mild pulmonary  vascular congestion. Mild bibasilar discoid atelectasis, new from prior  study.     This report was finalized on 2/8/2019 12:44 PM by DR. Antione Damon MD.           KLAUDIA:  Interpretation Summary     · Estimated EF appears to be in the range of 61 - 65%.  · Left ventricular systolic function is normal.  · Moderate aortic valve regurgitation is present.  · Mild tricuspid valve regurgitation is present.  · Mild mitral valve regurgitation is present  · No evidence of a left atrial appendage thrombus was present.               Impression:   1.  MSSA bacteremia/septic shock-with associated MSSA bacteriuria following recent prostate surgery.  Although MSSA bacteriuria is generally a reflection of the bacteremia rather than the cause of the bacteremia, and his situation the MSSA bacteremia may have been a result of MSSA UTI.  He has underlying valvular heart disease but his KLAUDIA did not reveal any definite evidence of endocarditis.  He is still at high risk for early endocarditis without a definite vegetation.  He will need a very prolonged course of intravenous antibiotic therapy.  I will consider having his KLAUDIA reviewed by Dr. Mann.  2.  Septic shock-improved  3.  Acute hypoxic respiratory failure  4.  Acute renal failure/ATN-improved  5.  Lower extremity weakness/back pain-his son indicates that this is a long-standing problem with spinal stenosis but he was able to walk at home with assistance.  His legs do appear to be weaker.  We had wanted to order an MRI scan but he has a metal fragment in his brain that was identified on a CT scan prior to his admission here.  His son indicates that he underwent an MRI scan at least 2 years ago without complications and the metal fragment would have been there at that time.  If his lower extremity weakness fails to improve, we may need to proceed with an MRI or myelogram.  6.  Elevated liver enzymes  7.  MSSA UTI  8.  Right wrist swelling-observe  9.  Valvular heart disease-with moderate aortic regurgitation and mild mitral regurgitation  10.  Prostate cancer-status post robotic prostatectomy on 1/17/19  11.  Atrial fibrillation  12.  History of spinal stenosis      Recommendations:  1.  Continue high-dose cefazolin therapy  2.  Hemodialysis catheter removal-done  3.  Review of the KLAUDIA by Dr. Mann-we will try to arrange this early next week  4.  Reconsider an MRI scan of the spine or myelogram if his lower extremity weakness fails to improve.    Critically ill with high  risk for further serious morbidity and other serious sequela/mortality. I discussed with his son today.    I spent over 45 minutes on his complex situation today          Sam Smith MD  2/16/2019

## 2019-02-16 NOTE — PROGRESS NOTES
[facility] at [facility]  Progress Note       LOS: 8 days   Patient Care Team:  Tyrese Leyva MD as PCP - General (Family Medicine)  Rod Regalado MD as Consulting Physician (Cardiology)  Myles Rossi MD as Consulting Physician (Anesthesiology)  Juanito Grace Jr., MD as Consulting Physician (Urology)    Chief Complaint:  Follow up atrial fibrillation    Subjective     Improving slowly. No current complaints of CP or SOB. Has remained in NSR since KLAUDIA/ECV on 2/14/19. On Eliquis 5 mg BID starting last night. - transitioned from Heparin gtt. complains of just being tired and weak.        Review of Systems:   Pertinent positives in HPI, all others reviewed and negative.      Objective       Current Facility-Administered Medications:   •  albuterol (PROVENTIL) nebulizer solution 0.083% 2.5 mg/3mL, 2.5 mg, Nebulization, Q6H PRN, Margoth Martinez APRN, 2.5 mg at 02/08/19 1838  •  amLODIPine (NORVASC) tablet 5 mg, 5 mg, Oral, Q24H, Jamal Villar MD, 5 mg at 02/15/19 0815  •  apixaban (ELIQUIS) tablet 5 mg, 5 mg, Oral, Q12H, Rod Regalado MD, 5 mg at 02/16/19 0502  •  castor oil-balsam peru (VENELEX) ointment, , Topical, Q12H, Arielle Carolina MD  •  ceFAZolin in dextrose (ANCEF) IVPB solution 2 g, 2 g, Intravenous, Q8H, Laurie Menchaca, [facility], 2 g at 02/16/19 0436  •  DULoxetine (CYMBALTA) DR capsule 30 mg, 30 mg, Oral, Daily, Pollo Millan APRN, 30 mg at 02/15/19 0816  •  famotidine (PEPCID) tablet 20 mg, 20 mg, Oral, BID, Laurie Menchaca, RPH, 20 mg at 02/15/19 2204  •  fentaNYL citrate (PF) (SUBLIMAZE) injection 25 mcg, 25 mcg, Intravenous, Q30 Min PRN, Juana Rodriguez APRN, 25 mcg at 02/16/19 0724  •  gabapentin (NEURONTIN) capsule 600 mg, 600 mg, Oral, 4x Daily, Jamal Villar MD, 600 mg at 02/15/19 2205  •  HYDROcodone-acetaminophen (NORCO) 5-325 MG per tablet 1 tablet, 1 tablet, Oral, Q6H PRN, Jamal Villar MD, 1 tablet at 02/16/19 0507  •   "lactulose (CHRONULAC) 10 GM/15ML solution 30 g, 30 g, Oral, Q8H PRN, Jamal Villar MD, 30 g at 02/14/19 1741  •  melatonin sublingual tablet 5 mg, 5 mg, Sublingual, Nightly PRN, Pollo Millan APRN  •  metoprolol tartrate (LOPRESSOR) tablet 25 mg, 25 mg, Oral, TID, Jr Bustamante PA, 25 mg at 02/15/19 2204  •  ondansetron (ZOFRAN) injection 4 mg, 4 mg, Intravenous, Q6H PRN, Arielle Carolina MD, 4 mg at 02/12/19 1158  •  sodium chloride 0.9 % flush 3 mL, 3 mL, Intravenous, Q12H, Arielle Carolina MD, 3 mL at 02/15/19 2206  •  sodium chloride 0.9 % flush 3-10 mL, 3-10 mL, Intravenous, PRN, Arielle Carolina MD    Vital Sign Min/Max for last 24 hours  Temp  Min: 98.3 °F (36.8 °C)  Max: 99.3 °F (37.4 °C)   BP  Min: 117/62  Max: 157/70   Pulse  Min: 68  Max: 79   Resp  Min: 14  Max: 20   SpO2  Min: 94 %  Max: 99 %   Flow (L/min)  Min: 1  Max: 2   No Data Recorded     Flowsheet Rows      First Filed Value   Admission Height  170.2 cm (67\") Documented at 02/08/2019 1115   Admission Weight  117 kg (257 lb 3.2 oz) Documented at 02/08/2019 1115            Intake/Output Summary (Last 24 hours) at 2/16/2019 0922  Last data filed at 2/16/2019 0100  Gross per 24 hour   Intake 2528 ml   Output --   Net 2528 ml       Physical Exam:     General Appearance:    Alert, cooperative, in no acute distress.    Lungs:     Clear to auscultation anteriorly with poor respiratory effort.      Heart:    Regular rhythm normal S1 and S2 there is no S4 no obvious murmurs appreciated.     Chest Wall:    No abnormalities observed   Abdomen:     Normal bowel sounds, no masses, no organomegaly, soft        non-tender, non-distended, no guarding, no rebound                Tenderness, benign.     Extremities:   Moves all extremities well, 2+ edema, no cyanosis, no             redness   Pulses:   Pulses palpable and equal bilaterally   Skin:   No bleeding, bruising + purpura rash resolving        Results Review:   Results from last 7 " days   Lab Units 02/16/19  0315 02/15/19  0545 02/14/19  0520   WBC 10*3/mm3 14.61* 17.14* 19.26*   HEMOGLOBIN g/dL 9.6* 9.9* 10.7*   HEMATOCRIT % 29.6* 30.6* 32.5*   PLATELETS 10*3/mm3 306 256 223     Results from last 7 days   Lab Units 02/16/19  0315 02/15/19  0545 02/14/19  0520   SODIUM mmol/L 133 136 137   POTASSIUM mmol/L 3.9 3.3* 3.3*   CHLORIDE mmol/L 105 103 104   CO2 mmol/L 22.0 26.0 24.0   BUN mg/dL 67* 68* 72*   CREATININE mg/dL 1.10 1.28 1.26   GLUCOSE mg/dL 120* 122* 123*          Results from last 7 days   Lab Units 02/11/19  0637   TRIGLYCERIDES mg/dL 143             Results from last 7 days   Lab Units 02/13/19  1220 02/12/19  0605   PROTIME Seconds 15.6* 15.5*   INR  1.30* 1.29*   APTT seconds 32.5* 36.6           Intake/Output Summary (Last 24 hours) at 2/16/2019 0922  Last data filed at 2/16/2019 0100  Gross per 24 hour   Intake 2528 ml   Output --   Net 2528 ml       I personally viewed and interpreted the patient's EKG/Telemetry data    EKG: none for review today    Telemetry: NSR 68 -79 bpm, no AF    Ejection Fraction  No results found for: EF    Echo EF Estimated  Lab Results   Component Value Date    ECHOEFEST 70 02/09/2019         Present on Admission:  • Renal failure  • Spinal stenosis, lumbar region, with neurogenic claudication  • Dyslipidemia  • DM (diabetes mellitus), type 2 (CMS/HCC)  • Metabolic encephalopathy  • Morbid obesity due to excess calories (CMS/HCC)  • Paroxysmal atrial fibrillation (CMS/HCC)  • Sepsis (CMS/HCC)    Assessment/Plan   1. Persistent Atrial Fibrillation; likely improved.  - brief episode during HD that converted on 2/9, then sustained RVR during HD on 2/13  - s/p KLAUDIA/ECV per Dr. Brown 2/14/19  - KLAUDIA revealed EF 60-65%, mod AI, mild MR and TR, no thrombus, no vegetation or abscess  - [id number], on Eliquis 5 mg BID - watch H& H closely.   -added metoprolol this admission following HRs; on no antiarrhythmic medication.        2. Sepsis  - cont abx per ID.  Likely will need 6 weeks of IV abx d/t severe MSSA infection     3. Metabolic Encephalopathy  - resolved     4. ARF  - required HD x2 per nephrology - right IJ tunneled HD catheter removed yesterday  - renal function back to normal     5. HTN; improved as well.  - controlled, cont current regimen. Consideration of addition of AV debby agents.     6. CAD  - s/p LHC w VERONICA to OM2, otherwise nonobstructive dz 12/2012        Plan for disposition: from cardiology standpoint, can move to tele at any time.     TRINH Pacheco  02/16/19  9:22 AM      I, Adriano Li MD, personally performed the services face to face as described and documented by the above named individual. I have made any necessary edits and it is both accurate and complete 2/16/2019  10:42 AM

## 2019-02-16 NOTE — PROGRESS NOTES
"   LOS: 8 days    Patient Care Team:  Tyrese Leyva MD as PCP - General (Family Medicine)  Rod Regalado MD as Consulting Physician (Cardiology)  Myles Rossi MD as Consulting Physician (Anesthesiology)  Juanito Grace Jr., MD as Consulting Physician (Urology)    Reason For Visit:    Subjective   Patient seen/examined. No acute events overnight.       Review of Systems:    Pulm: No soa   CV:  No CP      Objective       amLODIPine 5 mg Oral Q24H   apixaban 5 mg Oral Q12H   castor oil-balsam peru  Topical Q12H   ceFAZolin 2 g Intravenous Q8H   DULoxetine 30 mg Oral Daily   famotidine 20 mg Oral BID   gabapentin 600 mg Oral 4x Daily   metoprolol tartrate 25 mg Oral TID   sodium chloride 3 mL Intravenous Q12H            Vital Signs:  Blood pressure 138/56, pulse 75, temperature 98.4 °F (36.9 °C), temperature source Axillary, resp. rate 14, height 170.2 cm (67.01\"), weight 117 kg (257 lb), SpO2 99 %.    Flowsheet Rows      First Filed Value   Admission Height  170.2 cm (67\") Documented at 02/08/2019 1115   Admission Weight  117 kg (257 lb 3.2 oz) Documented at 02/08/2019 1115          02/15 0701 - 02/16 0700  In: 3128 [P.O.:1979; I.V.:700]  Out: -     Physical Exam:    General Appearance: NAD, alert and cooperative, Ox3  Eyes: PER, conjunctivae and sclerae normal, no icterus  Lungs: respirations regular and unlabored, no crepitus, clear to auscultation  Heart/CV: regular rhythm & normal rate, no murmur, no gallop, no rub, 2+ pitting edema  Abdomen: not distended, soft, non-tender, no masses,  bowel sounds present  Skin: No rash, Warm and dry R tunneled catheter    Radiology:      Renal Imaging:        Labs:  Results from last 7 days   Lab Units 02/16/19  0315 02/15/19  0545 02/14/19  0520   WBC 10*3/mm3 14.61* 17.14* 19.26*   HEMOGLOBIN g/dL 9.6* 9.9* 10.7*   HEMATOCRIT % 29.6* 30.6* 32.5*   PLATELETS 10*3/mm3 306 256 223     Results from last 7 days   Lab Units 02/16/19  0315 02/15/19  0545 " 02/14/19  0520 02/13/19  0534  02/11/19  0637   SODIUM mmol/L 133 136 137 136   < > 132   POTASSIUM mmol/L 3.9 3.3* 3.3* 3.6   < > 3.6   CHLORIDE mmol/L 105 103 104 103   < > 100   CO2 mmol/L 22.0 26.0 24.0 23.0   < > 18.0*   BUN mg/dL 67* 68* 72* 73*   < > 81*   CREATININE mg/dL 1.10 1.28 1.26 1.38*   < > 3.05*   CALCIUM mg/dL 7.7* 8.1* 7.6* 7.5*   < > 8.2*   PHOSPHORUS mg/dL 3.8 4.0 4.6 4.7   < > 6.0*   MAGNESIUM mg/dL 2.1 1.8  --   --   --  2.2   ALBUMIN g/dL 2.56* 2.83* 2.62* 2.57*   < > 2.89*    < > = values in this interval not displayed.     Results from last 7 days   Lab Units 02/16/19  0315   GLUCOSE mg/dL 120*       Results from last 7 days   Lab Units 02/11/19  0637   ALK PHOS U/L 348*   BILIRUBIN mg/dL 2.6*   ALT (SGPT) U/L 17   AST (SGOT) U/L 67*                 Estimated Creatinine Clearance: 79.7 mL/min (by C-G formula based on SCr of 1.1 mg/dL).      Assessment       Renal failure    Spinal stenosis, lumbar region, with neurogenic claudication    Morbid obesity due to excess calories (CMS/[facility])    TANIYA on CPAP    Dyslipidemia    DM (diabetes mellitus), type 2 (CMS/HCC)    Metabolic encephalopathy    Paroxysmal atrial fibrillation (CMS/HCC)    Sepsis (CMS/HCC)            Impression:   NOE - improving, no need for RRT at this time  Anemia - stable  HTN          Recommendations:   Monitor Sodium level.   Monitor UO/BMP  Renally dose meds/abx      Sosa Koo MD  02/16/19  10:58 AM

## 2019-02-16 NOTE — PROGRESS NOTES
"Intensive Care Follow-up      LOS: 8 days     Mr. Rod Balderas, 68 y.o. male is followed for: NOE (acute kidney injury) (CMS/HCC)     Subjective - Interval History     Awake and alert  Complaining of feeling \"sore\"  Requesting IV fentanyl frequently for pain  Oxygenating adequately on room air    The patient's relevant past medical, surgical and social history were reviewed and updated in Epic as appropriate.     Objective     Infusions:     Medications:    amLODIPine 5 mg Oral Q24H   apixaban 5 mg Oral Q12H   castor oil-balsam peru  Topical Q12H   ceFAZolin 2 g Intravenous Q8H   [START ON 2/17/2019] DULoxetine 60 mg Oral Daily   famotidine 20 mg Oral BID   fentaNYL 1 patch Transdermal Q72H   furosemide 40 mg Intravenous Q12H   gabapentin 600 mg Oral 4x Daily   metoprolol tartrate 25 mg Oral TID   sodium chloride 3 mL Intravenous Q12H     Intake/Output       02/15/19 0700 - 02/16/19 0659 02/16/19 0700 - 02/17/19 0659    Intake (ml) 3128 540    Output (ml) -- 300    Net (ml) 3128 240        Vital Sign Min/Max for last 24 hours  Temp  Min: 98.4 °F (36.9 °C)  Max: 99.3 °F (37.4 °C)   BP  Min: 117/62  Max: 157/70   Pulse  Min: 68  Max: 75   Resp  Min: 14  Max: 18   SpO2  Min: 95 %  Max: 99 %   Flow (L/min)  Min: 1  Max: 1        Physical Exam:   GENERAL: Awake, no distress   HEENT: No adenopathy or thyromegaly   LUNGS: No wheezes or rhonchi   HEART: Regular rate and rhythm without murmur   GI: Soft, nontender   EXTREMITIES: Jaundiced.  Upper and lower extremity edema.   NEURO/PSYCH: Awake and alert.  Follows commands.  No overt deficit although weak    Results from last 7 days   Lab Units 02/16/19  0315 02/15/19  0545 02/14/19  0520   WBC 10*3/mm3 14.61* 17.14* 19.26*   HEMOGLOBIN g/dL 9.6* 9.9* 10.7*   PLATELETS 10*3/mm3 306 256 223     Results from last 7 days   Lab Units 02/16/19  0315 02/15/19  0545 02/14/19  0520  02/11/19  0637   SODIUM mmol/L 133 136 137   < > 132   POTASSIUM mmol/L 3.9 3.3* 3.3*   < > 3.6   CO2 " mmol/L 22.0 26.0 24.0   < > 18.0*   BUN mg/dL 67* 68* 72*   < > 81*   CREATININE mg/dL 1.10 1.28 1.26   < > 3.05*   MAGNESIUM mg/dL 2.1 1.8  --   --  2.2   PHOSPHORUS mg/dL 3.8 4.0 4.6   < > 6.0*   GLUCOSE mg/dL 120* 122* 123*   < > 151*    < > = values in this interval not displayed.     Estimated Creatinine Clearance: 79.7 mL/min (by C-G formula based on SCr of 1.1 mg/dL).              Lab Results   Component Value Date    LACTATE 0.8 02/08/2019          Images: Most recent chest x-ray from 2/13/2019 reviewed on PACS revealing no consolidation or effusions    I reviewed the patient's results and images.     Impression      Active Hospital Problems    Diagnosis   • **Renal failure   • Moderate aortic regurgitation   • Paroxysmal atrial fibrillation (CMS/HCC)     1. Echo 2-9-19  · Estimated EF = 70%.  · Left ventricular wall thickness is consistent with mild concentric hypertrophy.     • Sepsis (CMS/HCC)   • Metabolic encephalopathy   • DM (diabetes mellitus), type 2 (CMS/HCC)   • Dyslipidemia            • Spinal stenosis, lumbar region, with neurogenic claudication   • TANIYA on CPAP     1. Obstructive sleep apnea on CPAP as of 2012.         • Morbid obesity due to excess calories (CMS/HCC)     2. Morbid obesity, 5 feet 9 inches, 350 pounds, precedent 80-pound weight loss with diet and exercise in the 80s.                Plan        Appears to be improving from his sepsis and acute renal failure  Remains markedly volume overloaded  We'll attempt mild diuresis and follow-up on renal function  Switch from IV fentanyl to a fentanyl patch and oral pain medication  Increase his Cymbalta to his home dose  Mobilize with the assistance of physical therapy  Appears stable for transfer to the floor      Plan of care and goals reviewed with Multidisciplinary Team and Antibiotic Stewardship rounds   I discussed the patient's findings and my recommendations with patient and nursing staff      .     ROSALIND Bull,  MD  Pulmonary and Critical Care Medicine

## 2019-02-16 NOTE — PLAN OF CARE
Problem: Patient Care Overview  Goal: Plan of Care Review  Outcome: Ongoing (interventions implemented as appropriate)   02/16/19 0437   Coping/Psychosocial   Plan of Care Reviewed With patient   Plan of Care Review   Progress no change   OTHER   Outcome Summary Rested well this evening. Wore CPAP part of the evening. Vitals remain stable. Continues to be totally incontinent of urine. No further bowel movements but frequent flatus.        Problem: Skin Injury Risk (Adult)  Goal: Skin Health and Integrity  Outcome: Ongoing (interventions implemented as appropriate)   02/16/19 0437   Skin Injury Risk (Adult)   Skin Health and Integrity making progress toward outcome       Problem: Sepsis/Septic Shock (Adult)  Goal: Signs and Symptoms of Listed Potential Problems Will be Absent, Minimized or Managed (Sepsis/Septic Shock)  Outcome: Ongoing (interventions implemented as appropriate)   02/16/19 0437   Goal/Outcome Evaluation   Problems Assessed (Sepsis) all   Problems Present (Sepsis) undernutrition

## 2019-02-17 ENCOUNTER — APPOINTMENT (OUTPATIENT)
Dept: GENERAL RADIOLOGY | Facility: HOSPITAL | Age: 69
End: 2019-02-17

## 2019-02-17 LAB
ALBUMIN SERPL-MCNC: 2.73 G/DL (ref 3.2–4.8)
ALBUMIN/GLOB SERPL: 0.9 G/DL (ref 1.5–2.5)
ALP SERPL-CCNC: 178 U/L (ref 25–100)
ALT SERPL W P-5'-P-CCNC: 10 U/L (ref 7–40)
AMMONIA BLD-SCNC: 21 UMOL/L (ref 19–60)
ANION GAP SERPL CALCULATED.3IONS-SCNC: 9 MMOL/L (ref 3–11)
AST SERPL-CCNC: 18 U/L (ref 0–33)
BILIRUB SERPL-MCNC: 0.9 MG/DL (ref 0.3–1.2)
BUN BLD-MCNC: 58 MG/DL (ref 9–23)
BUN/CREAT SERPL: 51.3 (ref 7–25)
CA-I SERPL ISE-MCNC: 1.26 MMOL/L (ref 1.12–1.32)
CALCIUM SPEC-SCNC: 7.7 MG/DL (ref 8.7–10.4)
CHLORIDE SERPL-SCNC: 108 MMOL/L (ref 99–109)
CO2 SERPL-SCNC: 23 MMOL/L (ref 20–31)
CREAT BLD-MCNC: 1.13 MG/DL (ref 0.6–1.3)
DEPRECATED RDW RBC AUTO: 46 FL (ref 37–54)
ERYTHROCYTE [DISTWIDTH] IN BLOOD BY AUTOMATED COUNT: 16.4 % (ref 11.3–14.5)
GFR SERPL CREATININE-BSD FRML MDRD: 65 ML/MIN/1.73
GLOBULIN UR ELPH-MCNC: 3.1 GM/DL
GLUCOSE BLD-MCNC: 127 MG/DL (ref 70–100)
GLUCOSE BLDC GLUCOMTR-MCNC: 126 MG/DL (ref 70–130)
GLUCOSE BLDC GLUCOMTR-MCNC: 140 MG/DL (ref 70–130)
GLUCOSE BLDC GLUCOMTR-MCNC: 170 MG/DL (ref 70–130)
HCT VFR BLD AUTO: 30.2 % (ref 38.9–50.9)
HGB BLD-MCNC: 9.9 G/DL (ref 13.1–17.5)
MAGNESIUM SERPL-MCNC: 1.9 MG/DL (ref 1.3–2.7)
MCH RBC QN AUTO: 27 PG (ref 27–31)
MCHC RBC AUTO-ENTMCNC: 32.8 G/DL (ref 32–36)
MCV RBC AUTO: 82.3 FL (ref 80–99)
PHOSPHATE SERPL-MCNC: 4.2 MG/DL (ref 2.4–5.1)
PLATELET # BLD AUTO: 364 10*3/MM3 (ref 150–450)
PMV BLD AUTO: 9.1 FL (ref 6–12)
POTASSIUM BLD-SCNC: 4.1 MMOL/L (ref 3.5–5.5)
PROT SERPL-MCNC: 5.8 G/DL (ref 5.7–8.2)
RBC # BLD AUTO: 3.67 10*6/MM3 (ref 4.2–5.76)
SODIUM BLD-SCNC: 140 MMOL/L (ref 132–146)
WBC NRBC COR # BLD: 15.18 10*3/MM3 (ref 3.5–10.8)

## 2019-02-17 PROCEDURE — 99231 SBSQ HOSP IP/OBS SF/LOW 25: CPT | Performed by: INTERNAL MEDICINE

## 2019-02-17 PROCEDURE — 71045 X-RAY EXAM CHEST 1 VIEW: CPT

## 2019-02-17 PROCEDURE — 99232 SBSQ HOSP IP/OBS MODERATE 35: CPT | Performed by: INTERNAL MEDICINE

## 2019-02-17 PROCEDURE — 25010000002 FUROSEMIDE PER 20 MG: Performed by: INTERNAL MEDICINE

## 2019-02-17 PROCEDURE — 83735 ASSAY OF MAGNESIUM: CPT | Performed by: INTERNAL MEDICINE

## 2019-02-17 PROCEDURE — 80053 COMPREHEN METABOLIC PANEL: CPT | Performed by: INTERNAL MEDICINE

## 2019-02-17 PROCEDURE — 82962 GLUCOSE BLOOD TEST: CPT

## 2019-02-17 PROCEDURE — 85027 COMPLETE CBC AUTOMATED: CPT | Performed by: INTERNAL MEDICINE

## 2019-02-17 PROCEDURE — 82140 ASSAY OF AMMONIA: CPT | Performed by: INTERNAL MEDICINE

## 2019-02-17 PROCEDURE — 25010000003 CEFAZOLIN IN DEXTROSE 2-4 GM/100ML-% SOLUTION

## 2019-02-17 PROCEDURE — 82330 ASSAY OF CALCIUM: CPT | Performed by: INTERNAL MEDICINE

## 2019-02-17 PROCEDURE — 84100 ASSAY OF PHOSPHORUS: CPT | Performed by: INTERNAL MEDICINE

## 2019-02-17 RX ORDER — FENTANYL 25 UG/H
1 PATCH TRANSDERMAL
Status: DISCONTINUED | OUTPATIENT
Start: 2019-02-17 | End: 2019-02-18

## 2019-02-17 RX ADMIN — AMLODIPINE BESYLATE 5 MG: 5 TABLET ORAL at 08:23

## 2019-02-17 RX ADMIN — METOPROLOL TARTRATE 25 MG: 25 TABLET ORAL at 21:05

## 2019-02-17 RX ADMIN — APIXABAN 5 MG: 5 TABLET, FILM COATED ORAL at 17:20

## 2019-02-17 RX ADMIN — DULOXETINE HYDROCHLORIDE 60 MG: 60 CAPSULE, DELAYED RELEASE ORAL at 08:22

## 2019-02-17 RX ADMIN — HYDROCODONE BITARTRATE AND ACETAMINOPHEN 1 TABLET: 5; 325 TABLET ORAL at 15:48

## 2019-02-17 RX ADMIN — CEFAZOLIN SODIUM 2 G: 2 INJECTION, SOLUTION INTRAVENOUS at 12:22

## 2019-02-17 RX ADMIN — METOPROLOL TARTRATE 25 MG: 25 TABLET ORAL at 15:48

## 2019-02-17 RX ADMIN — CEFAZOLIN SODIUM 2 G: 2 INJECTION, SOLUTION INTRAVENOUS at 21:45

## 2019-02-17 RX ADMIN — METOPROLOL TARTRATE 25 MG: 25 TABLET ORAL at 08:23

## 2019-02-17 RX ADMIN — HYDROCODONE BITARTRATE AND ACETAMINOPHEN 1 TABLET: 5; 325 TABLET ORAL at 21:05

## 2019-02-17 RX ADMIN — FAMOTIDINE 20 MG: 20 TABLET, FILM COATED ORAL at 08:22

## 2019-02-17 RX ADMIN — FUROSEMIDE 40 MG: 10 INJECTION, SOLUTION INTRAMUSCULAR; INTRAVENOUS at 04:43

## 2019-02-17 RX ADMIN — GABAPENTIN 600 MG: 300 CAPSULE ORAL at 08:22

## 2019-02-17 RX ADMIN — SODIUM CHLORIDE, PRESERVATIVE FREE 3 ML: 5 INJECTION INTRAVENOUS at 21:05

## 2019-02-17 RX ADMIN — CEFAZOLIN SODIUM 2 G: 2 INJECTION, SOLUTION INTRAVENOUS at 04:43

## 2019-02-17 RX ADMIN — CASTOR OIL AND BALSAM, PERU: 788; 87 OINTMENT TOPICAL at 21:05

## 2019-02-17 RX ADMIN — FENTANYL 1 PATCH: 25 PATCH TRANSDERMAL at 12:17

## 2019-02-17 RX ADMIN — FAMOTIDINE 20 MG: 20 TABLET, FILM COATED ORAL at 21:05

## 2019-02-17 RX ADMIN — APIXABAN 5 MG: 5 TABLET, FILM COATED ORAL at 05:48

## 2019-02-17 RX ADMIN — CASTOR OIL AND BALSAM, PERU: 788; 87 OINTMENT TOPICAL at 08:23

## 2019-02-17 RX ADMIN — SODIUM CHLORIDE, PRESERVATIVE FREE 3 ML: 5 INJECTION INTRAVENOUS at 08:23

## 2019-02-17 NOTE — PROGRESS NOTES
Intensive Care Follow-up      LOS: 9 days     Mr. Rod Balderas, 68 y.o. male is followed for: NOE (acute kidney injury) (CMS/HCC)     Subjective - Interval History     Awake, no distress  Oxygenating well on room air  On no continuous infusions  Decrease edema    The patient's relevant past medical, surgical and social history were reviewed and updated in Epic as appropriate.     Objective     Infusions:     Medications:    amLODIPine 5 mg Oral Q24H   apixaban 5 mg Oral Q12H   castor oil-balsam peru  Topical Q12H   ceFAZolin 2 g Intravenous Q8H   DULoxetine 60 mg Oral Daily   famotidine 20 mg Oral BID   fentaNYL 1 patch Transdermal Q72H   gabapentin 600 mg Oral 4x Daily   metoprolol tartrate 25 mg Oral TID   sodium chloride 3 mL Intravenous Q12H     Intake/Output       02/16/19 0700 - 02/17/19 0659 02/17/19 0700 - 02/18/19 0659    Intake (ml) 1400 240    Output (ml) 400 200    Net (ml) 1000 40        Vital Sign Min/Max for last 24 hours  Temp  Min: 97.6 °F (36.4 °C)  Max: 100.2 °F (37.9 °C)   BP  Min: 137/60  Max: 173/71   Pulse  Min: 68  Max: 85   Resp  Min: 16  Max: 22   SpO2  Min: 95 %  Max: 99 %   No Data Recorded        Physical Exam:   GENERAL: Awake, no distress   HEENT: No adenopathy or thyromegaly   LUNGS: No wheezes or rhonchi   HEART: Regular rate and rhythm without murmurs   GI: Obese, soft, nontender   EXTREMITIES: Trace edema but overall improved.  No cyanosis or clubbing   NEURO/PSYCH: Awake and alert.  Responds appropriately to questions.  Remains diffusely weak    Results from last 7 days   Lab Units 02/17/19  0709 02/16/19  0315 02/15/19  0545   WBC 10*3/mm3 15.18* 14.61* 17.14*   HEMOGLOBIN g/dL 9.9* 9.6* 9.9*   PLATELETS 10*3/mm3 364 306 256     Results from last 7 days   Lab Units 02/17/19  0709 02/16/19  0315 02/15/19  0545   SODIUM mmol/L 140 133 136   POTASSIUM mmol/L 4.1 3.9 3.3*   CO2 mmol/L 23.0 22.0 26.0   BUN mg/dL 58* 67* 68*   CREATININE mg/dL 1.13 1.10 1.28   MAGNESIUM mg/dL 1.9 2.1  1.8   PHOSPHORUS mg/dL 4.2 3.8 4.0   GLUCOSE mg/dL 127* 120* 122*     Estimated Creatinine Clearance: 77.6 mL/min (by C-G formula based on SCr of 1.13 mg/dL).              Lab Results   Component Value Date    LACTATE 0.8 02/08/2019          Images: Chest x-ray with cardiomegaly but no consolidation or effusions    I reviewed the patient's results and images.     Impression      Active Hospital Problems    Diagnosis   • **Renal failure   • Moderate aortic regurgitation   • Paroxysmal atrial fibrillation (CMS/HCC)     1. Echo 2-9-19  · Estimated EF = 70%.  · Left ventricular wall thickness is consistent with mild concentric hypertrophy.     • Sepsis (CMS/HCC)   • Metabolic encephalopathy   • DM (diabetes mellitus), type 2 (CMS/HCC)   • Dyslipidemia            • Spinal stenosis, lumbar region, with neurogenic claudication   • TANIYA on CPAP     1. Obstructive sleep apnea on CPAP as of 2012.         • Morbid obesity due to excess calories (CMS/HCC)     2. Morbid obesity, 5 feet 9 inches, 350 pounds, precedent 80-pound weight loss with diet and exercise in the 80s.                Plan        Continue to follow renal function  Mobilize  Continue to work on pain control  Stable for transfer to the floor      Plan of care and goals reviewed with Multidisciplinary Team and Antibiotic Stewardship rounds   I discussed the patient's findings and my recommendations with patient, family and nursing staff      .     ROSALIND Bull MD  Pulmonary and Critical Care Medicine

## 2019-02-17 NOTE — PLAN OF CARE
Problem: Patient Care Overview  Goal: Plan of Care Review  Outcome: Ongoing (interventions implemented as appropriate)   02/17/19 1129   Coping/Psychosocial   Plan of Care Reviewed With patient;spouse   Plan of Care Review   Progress no change   OTHER   Outcome Summary Patient transfer to telemetry today. Still have incontinence of bladder and bowel requiring frequent bed changes. Skin impairment continues to be an issue dt moisture in periarea and obesity.  Incontinence appliance applied for diversion from skin.   VSS, febrile at 99.1, continue to monitor.  Patient still have intermittent confusion and letheragy, DR. Bull aware.         Problem: Skin Injury Risk (Adult)  Goal: Skin Health and Integrity  Outcome: Ongoing (interventions implemented as appropriate)      Problem: Sepsis/Septic Shock (Adult)  Goal: Signs and Symptoms of Listed Potential Problems Will be Absent, Minimized or Managed (Sepsis/Septic Shock)  Outcome: Ongoing (interventions implemented as appropriate)

## 2019-02-17 NOTE — PLAN OF CARE
Problem: Patient Care Overview  Goal: Plan of Care Review  Outcome: Ongoing (interventions implemented as appropriate)   02/17/19 2383   Coping/Psychosocial   Plan of Care Reviewed With patient;spouse;family   Plan of Care Review   Progress no change   OTHER   Outcome Summary VSS but lethargic throughout the evening. His right eye still has a slight droop. His purpura has continued to improve. His urinary incontinence is proving to be a challenge with his skin issues. We are trying various techniques to see which one is most effective. He is still edematous. The lasix does seem to be helping with his edema. He has run a slight fever this evening. Will continue to monitor,       Problem: Skin Injury Risk (Adult)  Goal: Skin Health and Integrity  Outcome: Ongoing (interventions implemented as appropriate)      Problem: Sepsis/Septic Shock (Adult)  Goal: Signs and Symptoms of Listed Potential Problems Will be Absent, Minimized or Managed (Sepsis/Septic Shock)  Outcome: Ongoing (interventions implemented as appropriate)

## 2019-02-17 NOTE — PROGRESS NOTES
[facility] at [facility]  Progress Note       LOS: 9 days   Patient Care Team:  Tyrese Leyva MD as PCP - General (Family Medicine)  Rod Regalado MD as Consulting Physician (Cardiology)  Myles Rossi MD as Consulting Physician (Anesthesiology)  Juanito Grace Jr., MD as Consulting Physician (Urology)    Chief Complaint:  Follow up atrial fibrillation    Subjective     Sitting up in bed, eating breakfast. No new complaints. No CP or SOB.  Sleeping this morning but responds to questioning.        Review of Systems:   Pertinent positives in HPI, all others reviewed and negative.      Objective       Current Facility-Administered Medications:   •  albuterol (PROVENTIL) nebulizer solution 0.083% 2.5 mg/3mL, 2.5 mg, Nebulization, Q6H PRN, Margoth Martinez APRN, 2.5 mg at 02/08/19 1838  •  amLODIPine (NORVASC) tablet 5 mg, 5 mg, Oral, Q24H, Jamal Villar MD, 5 mg at 02/16/19 0938  •  apixaban (ELIQUIS) tablet 5 mg, 5 mg, Oral, Q12H, Rod Regalado MD, 5 mg at 02/17/19 0548  •  castor oil-balsam peru (VENELEX) ointment, , Topical, Q12H, Arielle Carolina MD  •  ceFAZolin in dextrose (ANCEF) IVPB solution 2 g, 2 g, Intravenous, Q8H, Laurie Menchaca, [facility], 2 g at 02/17/19 0443  •  DULoxetine (CYMBALTA) DR capsule 60 mg, 60 mg, Oral, Daily, Sonny Bull MD  •  famotidine (PEPCID) tablet 20 mg, 20 mg, Oral, BID, Laurie Menchaca [facility], 20 mg at 02/16/19 2042  •  fentaNYL (DURAGESIC) 50 MCG/HR patch 1 patch, 1 patch, Transdermal, Q72H, Sonny Bull MD, 1 patch at 02/16/19 1611  •  gabapentin (NEURONTIN) capsule 600 mg, 600 mg, Oral, 4x Daily, Jamal Villar MD, 600 mg at 02/16/19 2041  •  HYDROcodone-acetaminophen (NORCO) 5-325 MG per tablet 1 tablet, 1 tablet, Oral, Q6H PRN, Jamal Villar MD, 1 tablet at 02/16/19 1128  •  lactulose (CHRONULAC) 10 GM/15ML solution 30 g, 30 g, Oral, Q8H PRN, Jamal Villar MD, 30 g at 02/14/19 7431  •   "melatonin sublingual tablet 5 mg, 5 mg, Sublingual, Nightly PRN, Pollo Millan, APRN  •  metoprolol tartrate (LOPRESSOR) tablet 25 mg, 25 mg, Oral, TID, Jr Bustamante PA, 25 mg at 02/16/19 2041  •  ondansetron (ZOFRAN) injection 4 mg, 4 mg, Intravenous, Q6H PRN, Arielle Carolina MD, 4 mg at 02/12/19 1158  •  sodium chloride 0.9 % flush 3 mL, 3 mL, Intravenous, Q12H, Arielle Carolina MD, 10 mL at 02/16/19 2042  •  sodium chloride 0.9 % flush 3-10 mL, 3-10 mL, Intravenous, PRN, Arielle Carolina MD    Vital Sign Min/Max for last 24 hours  Temp  Min: 97.4 °F (36.3 °C)  Max: 100.2 °F (37.9 °C)   BP  Min: 114/47  Max: 168/83   Pulse  Min: 68  Max: 81   Resp  Min: 18  Max: 22   SpO2  Min: 80 %  Max: 99 %   No Data Recorded   No Data Recorded     Flowsheet Rows      First Filed Value   Admission Height  170.2 cm (67\") Documented at 02/08/2019 1115   Admission Weight  117 kg (257 lb 3.2 oz) Documented at 02/08/2019 1115            Intake/Output Summary (Last 24 hours) at 2/17/2019 0748  Last data filed at 2/17/2019 0600  Gross per 24 hour   Intake 1400 ml   Output 400 ml   Net 1000 ml       Physical Exam:     General Appearance:    Alert, cooperative, in no acute distress.    Lungs:     Anteriorly clear to auscultation bilaterally normal respiratory effort      Heart:    Regular rate and rhythm normal S1 and S2 S4 present no new murmurs     Chest Wall:    No abnormalities observed   Abdomen:     Normal bowel sounds, no masses, no organomegaly, soft        non-tender, non-distended, no guarding, no rebound                Tenderness, no change.     Extremities:   Moves all extremities well, 1+ edema, no cyanosis, no             redness   Pulses:   Pulses palpable and equal bilaterally   Skin:   No bleeding, bruising + purpura rash resolving        Results Review:   Results from last 7 days   Lab Units 02/17/19  0709 02/16/19  0315 02/15/19  0545   WBC 10*3/mm3 15.18* 14.61* 17.14*   HEMOGLOBIN g/dL 9.9* " 9.6* 9.9*   HEMATOCRIT % 30.2* 29.6* 30.6*   PLATELETS 10*3/mm3 364 306 256     Results from last 7 days   Lab Units 02/16/19  0315 02/15/19  0545 02/14/19  0520   SODIUM mmol/L 133 136 137   POTASSIUM mmol/L 3.9 3.3* 3.3*   CHLORIDE mmol/L 105 103 104   CO2 mmol/L 22.0 26.0 24.0   BUN mg/dL 67* 68* 72*   CREATININE mg/dL 1.10 1.28 1.26   GLUCOSE mg/dL 120* 122* 123*          Results from last 7 days   Lab Units 02/11/19  0637   TRIGLYCERIDES mg/dL 143             Results from last 7 days   Lab Units 02/13/19  1220 02/12/19  0605   PROTIME Seconds 15.6* 15.5*   INR  1.30* 1.29*   APTT seconds 32.5* 36.6           Intake/Output Summary (Last 24 hours) at 2/17/2019 0748  Last data filed at 2/17/2019 0600  Gross per 24 hour   Intake 1400 ml   Output 400 ml   Net 1000 ml       I personally viewed and interpreted the patient's EKG/Telemetry data    EKG: none for review today    Telemetry: NSR 68 -81 bpm, no AF or SVT    Ejection Fraction  No results found for: EF    Echo EF Estimated  Lab Results   Component Value Date    ECHOEFEST 70 02/09/2019         Present on Admission:  • Renal failure  • Spinal stenosis, lumbar region, with neurogenic claudication  • Dyslipidemia  • DM (diabetes mellitus), type 2 (CMS/HCC)  • Metabolic encephalopathy  • Morbid obesity due to excess calories (CMS/[facility])  • Paroxysmal atrial fibrillation (CMS/HCC)  • Sepsis (CMS/HCC)  • Moderate aortic regurgitation    Assessment/Plan   1. Persistent Atrial Fibrillation;  Improved with no recurrence  - brief episode during HD that converted on 2/9, then sustained RVR during HD on 2/13  - s/p KLAUDIA/ECV per Dr. Brown 2/14/19  - KLAUDIA revealed EF 60-65%, mod AI, mild MR and TR, no thrombus, no vegetation or abscess  - [id number], on Eliquis 5 mg BID - watch H& H closely. - H & H stable  -added metoprolol this admission- HRs stable, remained in NSR; on no antiarrhythmic medication.        2. Sepsis  - cont abx per ID. Likely will need 6 weeks of IV abx d/t  severe MSSA infection     3. Metabolic Encephalopathy  - resolved     4. ARF  - required HD x2 per nephrology - right IJ tunneled HD catheter removed yesterday  - renal function back to normal     5. HTN; improved as well.  - continue to monitor     6. CAD  - s/p LHC w VERONICA to OM2, otherwise nonobstructive dz 12/2012        Plan for disposition: from cardiology standpoint, can move to tele at any time.     Electronically signed by TRINH Pacheco, 02/17/19, 7:49 AM.      I, Adriano Li MD, personally performed the services face to face as described and documented by the above named individual. I have made any necessary edits and it is both accurate and complete 2/17/2019  10:10 AM

## 2019-02-17 NOTE — PROGRESS NOTES
[facility] Progress Note      Antibiotics:  Anti-Infectives (From admission, onward)    Ordered     Dose/Rate Route Frequency Start Stop    02/14/19 1149  ceFAZolin in dextrose (ANCEF) IVPB solution 2 g     Ordering Provider:  Laurie Menchaca RPH    2 g  over 30 Minutes Intravenous Every 8 Hours 02/14/19 1300 02/22/19 1259    02/09/19 0849  aztreonam (AZACTAM) 2 g in sodium chloride 0.9 % 100 mL IVPB-MBP     Ordering Provider:  Efrem Santana MD    2 g  200 mL/hr over 30 Minutes Intravenous Once 02/09/19 1000 02/09/19 0946          CC: Patient unable    HPI  2/8/19: Patient is a 68 y.o.  Yr old male with history of prostate cancer status post robotic laparoscopic prostatectomy in mid January and was discharged on 1/17/19. The patient is currently unresponsive and no family is at bedside so history is somewhat limited to history from nursing staff and medical records. The patient was seen a few days after his discharge from his recent  Admission in January and his Christina catheter was discontinued.  About one week ago he went to the ER with complaints of back pain, abdominal pain, and confusion and a CT abdomen and pelvis was apparently okay during that time. He continued to worsen and over the weekend he was admitted to [facility].  He was found to have an initial AK I with a creatinine of 2.2. Per the micro-lab at [facility], the patient's urine cultures and 1 of 2 blood cultures from 2/5/19 grew MSSA. The patient was initially on vancomycin and Zosyn and was subsequently transitioned to daptomycin after he developed worsening renal function with a creatinine up to 5.6 and BUN went up to 94 today.  Due to lack of nephrology at [facility], the patient was transferred to [facility] today for possible dialysis.  He additionally received a dose of nafcillin prior to this transfer.  His last dose of daptomycin was yesterday per pharmacist here (who has discussed with  OSH). Since arrival, the patient has remained afebrile but he has had a leukocytosis up to 31.38.  He is anemic with a hemoglobin of 11.3 and a hematocrit of 54.1.  He is hyponatremic to a sodium of 127.  Creatinine was initially elevated 5.39 and B1 was 97 on arrival.  He additionally had elevation of his LFTs with an ALT of 43 and AST of 43, T bili of 3.0, and alkaline phosphatase of 303.  Pro-calcitonin was elevated to 7.55 lactic acid was normal at 0.8. The patient underwent CT head without contrast which did not show any acute abnormality, CT chest, abdomen and pelvis which showed consolidation of the lung bases bilaterally that is concerning for airspace disease such as pneumonia, gastric distention, and air-filled loops of colon with no obvious obstruction. Blood cultures have been ordered and a reflexive urinalysis has been ordered as well.  A transthoracic echocardiogram was ordered. The patient underwent dialysis today following right IJ dialysis catheter placement.  The infectious disease team has been consulted for antibiotic recommendations.    He has methicillin sensitive staph aureus septicemia/bacteriuria compounded by acute renal failure, acute hypoxic respiratory failure and by basilar consolidation by chest CT.  Further compounded by acute encephalopathy.    2/9/19 nursing reports A. fib/RVR, dialysis ongoing with acute renal failure and tachypneic/labored at times.  Some cough but unable to capture for culture so far. Empiric HCAP coverage added with zyvox/azactam/flagyl; unable to do MRI of spine so CT's done cervical/thoracic/lumbar spine done 2/10    2/10/19 CT scans of spine; more restful, less labored with breathing and decreased cough;  No rash.  He can wiggle toes but not lift legs off the bed.  He moves left arm better than right arm.  No other new focal pain that he will relate although interaction minimal.  No diarrhea.  No vomiting.    2/11/19: seems to have improved from mental status  standpoint slightly per his wife and son who are at bedside. Patient endorses abdominal pain but is not answering a lot of questions. tMAX 99.3f. WBC remains elevated at 20K (down from 31K on arrival). TTE was without evidence of IE but valves poorly visualized. Still getting HD.    2/12/19: Patient is starting to feel much better today.  He is responding to a lot of questions and is more alert sitting up in a bedside chair.  Breathing is improving.  No high fevers overnight.  Leukocytosis improved to 15.9 today.  He continues to urinate a small amount but remains on intermittent dialysis.    2/13/19: Feeling about the same as he was yesterday.  Still having some right wrist pain. Shortness of breath is stable. No fevers. Leukocytosis slightly worse today.    2/14/19: Patient is somnolent today after his KLAUDIA due to recent sedation. Procedure went well per his family and he is back in NSR and no signs of vegetation but procedure report is pending. No fevers. Leukocytosis slightly improved from yesterday. Family reports that the patient has had no new complaints. He was evaluated by Dr. Ro and no significant concern for septic arthritis.    2/16/19: I reviewed his complex medical records and discussed his complex situation with Dr. Jeff Smith.  His hemodialysis catheter was removed late yesterday.  He has been afebrile over the past 24 hours.  He denies abdominal pain.  He has a history of metal fragment between his frontal lobes according to his son.  This was apparently only recently identified on a CT scan prior to his admission here.  He underwent an MRI scan approximately 2 years ago without complications and the son indicates the metal fragment would have been there at that time.  He is having difficulty moving his legs but this is a chronic problem according to his son.  He was able to ambulate at home with assistance.    2/17/19:  He had a low-grade fever to 100.2 last evening.  He still has  "significant bilateral lower extremity weakness but has a history of spinal stenosis and was weak prior to admission.  He was able to ambulate with assistance prior to admission.  He denies lower extremity numbness.  He denies nausea and vomiting.  He has some mild loose stool.        PE:   /88   Pulse 77   Temp 100 °F (37.8 °C) (Axillary)   Resp 18   Ht 170.2 cm (67.01\")   Wt 117 kg (257 lb)   SpO2 95%   BMI 40.24 kg/m²     GENERAL: ill appearing, obese,  man. NAD. Lying in bed  HEENT: Normocephalic, atraumatic. No conjunctival injection. No icterus.   NECK: Supple without nuchal rigidity. No mass.  Chest: right chest dialysis cath is now out.    HEART: irregular rhythm. No murmur, rubs, gallops.  LUNGS: Diminished at bases, labored and bilateral rhonchi. On nasal cannula  ABDOMEN: Soft, nontender, nondistended. Positive bowel sounds. No rebound or guarding. NO mass or HSM.  EXT:  No cyanosis, clubbing. No cord.  Edema noted over bilateral lower extremities. R>L upper extremity edema. erythema, warmth, and tenderness near right wrist seems to have improved somewhat.   : Genitalia generally unremarkable.    SKIN:.  Petechial appearing areas over upper extremities   NEURO: He is more alert and responsive.  He has severe bilateral lower extremity weakness but he can move his legs.  He has difficulty lifting his legs off of the bed.  Sensation appears to be intact in his lower extremities.  IV with no obvious redness or drainage    Laboratory Data    Results from last 7 days   Lab Units 02/17/19  0709 02/16/19  0315 02/15/19  0545   WBC 10*3/mm3 15.18* 14.61* 17.14*   HEMOGLOBIN g/dL 9.9* 9.6* 9.9*   HEMATOCRIT % 30.2* 29.6* 30.6*   PLATELETS 10*3/mm3 364 306 256     Results from last 7 days   Lab Units 02/17/19  0709   SODIUM mmol/L 140   POTASSIUM mmol/L 4.1   CHLORIDE mmol/L 108   CO2 mmol/L 23.0   BUN mg/dL 58*   CREATININE mg/dL 1.13   GLUCOSE mg/dL 127*   CALCIUM mg/dL 7.7*     Results from " last 7 days   Lab Units 02/17/19  0709   ALK PHOS U/L 178*   BILIRUBIN mg/dL 0.9   ALT (SGPT) U/L 10   AST (SGOT) U/L 18         Results from last 7 days   Lab Units 02/11/19  0637   CRP mg/dL 19.67*       Estimated Creatinine Clearance: 77.6 mL/min (by C-G formula based on SCr of 1.13 mg/dL).      Microbiology:      Radiology:  Imaging Results (last 72 hours)     Procedure Component Value Units Date/Time    XR Chest 1 View [[id number]] Updated:  02/09/19 0221    CT Abdomen Pelvis Without Contrast [[id number]] Collected:  02/08/19 1822     Updated:  02/08/19 1822    Narrative:       EXAMINATION: CT CHEST WO CONTRAST, CT ABDOMEN/PELVIS WO CONTRAST -  2/8/2019     INDICATION: Persistent cough.     TECHNIQUE: Multiple axial CT imaging is obtained of the chest, abdomen  and pelvis without the administration of oral or intravenous contrast.     The radiation dose reduction device was turned on for each scan per the  ALARA (As Low as Reasonably Achievable) protocol.     COMPARISON: There is some consolidation identified posteriorly extending  to the lung bases bilaterally suggesting infiltrates. The upper lung  fields are grossly clear. Motion artifact degrades image quality. There  are degenerative changes identified within the spine. The cardiac  chambers are enlarged. There is no pericardial effusion. No bulky hilar  or axillary lymphadenopathy. The thyroid is homogeneous. Degenerative  change is seen throughout the spine.     ABDOMEN: Motion artifact grades image quality. There is no abnormality  seen within the liver. The spleen is upper limits of normal in size.  There is gastric distention. Air fills the colon with no  evidence of  obvious obstruction. Kidneys and adrenal glands within normal limits.  The pancreas is homogeneous. No abdominal or retroperitoneal  lymphadenopathy. No abnormal mass or fluid collections identified. No  free fluid or free air.     PELVIS: Pelvic organs are unremarkable. The pelvic portion  of the   gastrointestinal tract is within normal limits. No pelvic adenopathy.  Degenerative change is identified throughout the spine and pelvis.     FINDINGS: Consolidation at the lung bases bilaterally is concerning for  airspace disease such as pneumonia. The there is gastric distention.  Air-filled loops of colon with no obvious obstruction. No other abnormal  findings are seen within the abdomen or pelvis. Degenerative changes are  seen throughout the spine and pelvis.     DICTATED:   2/8/2019  EDITED/ls :   2/8/2019              Impression:               CT Chest Without Contrast [[id number]] Collected:  02/08/19 1822     Updated:  02/08/19 1822    Narrative:       EXAMINATION: CT CHEST WO CONTRAST, CT ABDOMEN/PELVIS WO CONTRAST -  2/8/2019     INDICATION: Persistent cough.     TECHNIQUE: Multiple axial CT imaging is obtained of the chest, abdomen  and pelvis without the administration of oral or intravenous contrast.     The radiation dose reduction device was turned on for each scan per the  ALARA (As Low as Reasonably Achievable) protocol.     COMPARISON: There is some consolidation identified posteriorly extending  to the lung bases bilaterally suggesting infiltrates. The upper lung  fields are grossly clear. Motion artifact degrades image quality. There  are degenerative changes identified within the spine. The cardiac  chambers are enlarged. There is no pericardial effusion. No bulky hilar  or axillary lymphadenopathy. The thyroid is homogeneous. Degenerative  change is seen throughout the spine.     ABDOMEN: Motion artifact grades image quality. There is no abnormality  seen within the liver. The spleen is upper limits of normal in size.  There is gastric distention. Air fills the colon with no  evidence of  obvious obstruction. Kidneys and adrenal glands within normal limits.  The pancreas is homogeneous. No abdominal or retroperitoneal  lymphadenopathy. No abnormal mass or fluid collections  identified. No  free fluid or free air.     PELVIS: Pelvic organs are unremarkable. The pelvic portion of the   gastrointestinal tract is within normal limits. No pelvic adenopathy.  Degenerative change is identified throughout the spine and pelvis.     FINDINGS: Consolidation at the lung bases bilaterally is concerning for  airspace disease such as pneumonia. The there is gastric distention.  Air-filled loops of colon with no obvious obstruction. No other abnormal  findings are seen within the abdomen or pelvis. Degenerative changes are  seen throughout the spine and pelvis.     DICTATED:   2/8/2019  EDITED/ls :   2/8/2019              Impression:               CT Head Without Contrast [[id number]] Collected:  02/08/19 1816     Updated:  02/08/19 1817    Narrative:       EXAMINATION: CT HEAD WO CONTRAST - 2/8/2019     INDICATION: Mental status change, does not follow commands.     TECHNIQUE: Multiple axial CT imaging is obtained of the head from skull  base to skull vertex without the administration of intravenous contrast.     The radiation dose reduction device was turned on for each scan per the  ALARA (As Low as Reasonably Achievable) protocol.     COMPARISON: NONE     FINDINGS: There is atrophy of the brain. Some low-density area seen in  the periventricular white matter suggesting chronic small vessel  ischemic change. No hemorrhage or hydrocephalus. No mass, mass effect,  or midline shift. No abnormal extra-axial  fluid collections identified. Minimal mucosal thickening of the  maxillary sinuses and ethmoid air cells. The bony structures are  unremarkable. The mastoid air cells are patent.       Impression:       Chronic changes identified within the brain with no acute  intracranial abnormality.     DICTATED:   2/8/2019  EDITED/ls :   2/8/2019        FL C Arm During Surgery [[id number]] Collected:  02/08/19 1646     Updated:  02/08/19 1646    Narrative:       EXAMINATION: FL C ARM DURING SURGERY- 02/08/2019       INDICATION: Dialysis catheter placement     COMPARISON: NONE     FINDINGS: 4 seconds of fluoroscopy and 4 images used for right-sided  dialysis catheter placement. Please see the procedure report for full  details.       Impression:       Fluoroscopy for dialysis catheter placement. Please see the  procedure report for full details.      D:  02/08/2019  E:  02/08/2019       XR Chest 1 View [[id number]] Collected:  02/08/19 1628     Updated:  02/08/19 1628    Narrative:       EXAMINATION: XR CHEST 1 VW-02/08/2019:      INDICATION: RIJ line.      COMPARISON: 02/08/2019.     FINDINGS: Portable chest reveals low lung volumes. Deep line catheter  identified on the right with tip in the SVC. Degenerative changes seen  within the spine. The heart is borderline enlarged. Mild prominence seen  of the pulmonary vascularity. No pleural effusion or pneumothorax.           Impression:       Prominence of the pulmonary vascularity with deep line  catheter identified on the right with tip in the SVC. No evidence of  acute parenchymal disease.     D:  02/08/2019  E:  02/08/2019             XR Chest 1 View [[id number]] Collected:  02/08/19 1243     Updated:  02/08/19 1246    Narrative:          EXAMINATION: XR CHEST 1 VW-      INDICATION: respiratory failure      COMPARISON: 01/08/2019     FINDINGS: Heart is borderline enlarged. The vasculature is cephalized.  Lung volumes are relatively low. There is mild discoid atelectasis in  both medial lung bases, but no focal disease elsewhere..           Impression:       Low lung volumes, borderline cardiomegaly and mild pulmonary  vascular congestion. Mild bibasilar discoid atelectasis, new from prior  study.     This report was finalized on 2/8/2019 12:44 PM by DR. Antione Damon MD.           KLAUDIA:  Interpretation Summary     · Estimated EF appears to be in the range of 61 - 65%.  · Left ventricular systolic function is normal.  · Moderate aortic valve regurgitation is present.  · Mild  tricuspid valve regurgitation is present.  · Mild mitral valve regurgitation is present  · No evidence of a left atrial appendage thrombus was present.        Chest x-ray of 2/17reveals no focal infiltrate-I read      Impression:   1.  MSSA bacteremia/septic shock-with associated MSSA bacteriuria following recent prostate surgery.  Although MSSA bacteriuria is generally a reflection of the bacteremia rather than the cause of the bacteremia, and his situation the MSSA bacteremia may have been a result of MSSA UTI.  He has underlying valvular heart disease but his KLAUDIA did not reveal any definite evidence of endocarditis.  He is still at high risk for early endocarditis without a definite vegetation.  He will need a very prolonged course of intravenous antibiotic therapy.  I will have his KLAUDIA reviewed by Dr. Mann-I discussed this with her today.  2.  Septic shock-improved  3.  Acute hypoxic respiratory failure-improved  4.  Acute renal failure/ATN-improved  5.  Lower extremity weakness/back pain-his son indicates that this is a long-standing problem with spinal stenosis but he was able to walk at home with assistance.  His legs do appear to be weaker.  We had wanted to order an MRI scan but he has a metal fragment in his brain that was identified on a CT scan prior to his admission here.  His son indicates that he underwent an MRI scan at least 2 years ago without complications and the metal fragment would have been there at that time.  If his lower extremity weakness fails to improve, we may need to proceed with an MRI or myelogram.  6.  Elevated liver enzymes  7.  MSSA UTI  8.  Right wrist swelling-observe  9.  Valvular heart disease-with moderate aortic regurgitation and mild mitral regurgitation  10.  Prostate cancer-status post robotic prostatectomy on 1/17/19  11.  Atrial fibrillation  12.  History of spinal stenosis      Recommendations:  1.  Continue high-dose cefazolin therapy  2.  Review of the KLAUDIA  by Dr. Mann tomorrow-I discussed this with her today   3.  Reconsider an MRI scan of the spine or myelogram if his lower extremity weakness fails to improve.  4.  PICC line placement in the near future    Critically ill but clinically improving with high risk for further serious morbidity and other serious sequela/mortality. I discussed with his wife today.  I will discuss his complex situation with Dr. Jeff Smith-he will see him tomorrow.  I spent over 35 minutes on his complex situation today          Sam Smith MD  2/17/2019

## 2019-02-17 NOTE — PROGRESS NOTES
"   LOS: 9 days    Patient Care Team:  Tyrese Leyva MD as PCP - General (Family Medicine)  Rod Regalado MD as Consulting Physician (Cardiology)  Myles Rossi MD as Consulting Physician (Anesthesiology)  Juanito Grace Jr., MD as Consulting Physician (Urology)    Reason For Visit:    Subjective   Patient seen/examined. No acute events overnight.       Review of Systems:    Pulm: No soa   CV:  No CP      Objective       amLODIPine 5 mg Oral Q24H   apixaban 5 mg Oral Q12H   castor oil-balsam peru  Topical Q12H   ceFAZolin 2 g Intravenous Q8H   DULoxetine 60 mg Oral Daily   famotidine 20 mg Oral BID   fentaNYL 1 patch Transdermal Q72H   gabapentin 600 mg Oral 4x Daily   metoprolol tartrate 25 mg Oral TID   sodium chloride 3 mL Intravenous Q12H            Vital Signs:  Blood pressure 173/71, pulse 85, temperature 100 °F (37.8 °C), temperature source Axillary, resp. rate 18, height 170.2 cm (67.01\"), weight 117 kg (257 lb), SpO2 95 %.    Flowsheet Rows      First Filed Value   Admission Height  170.2 cm (67\") Documented at 02/08/2019 1115   Admission Weight  117 kg (257 lb 3.2 oz) Documented at 02/08/2019 1115          02/16 0701 - 02/17 0700  In: 1400 [P.O.:1190; I.V.:10]  Out: 400 [Urine:400]    Physical Exam:    General Appearance: NAD, alert and cooperative, Ox3  Eyes: PER, conjunctivae and sclerae normal, no icterus  Lungs: respirations regular and unlabored, no crepitus, clear to auscultation  Heart/CV: regular rhythm & normal rate, no murmur, no gallop, no rub, 2+ pitting edema  Abdomen: not distended, soft, non-tender, no masses,  bowel sounds present  Skin: No rash, Warm and dry R tunneled catheter    Radiology:      Renal Imaging:        Labs:  Results from last 7 days   Lab Units 02/17/19  0709 02/16/19  0315 02/15/19  0545   WBC 10*3/mm3 15.18* 14.61* 17.14*   HEMOGLOBIN g/dL 9.9* 9.6* 9.9*   HEMATOCRIT % 30.2* 29.6* 30.6*   PLATELETS 10*3/mm3 364 306 256     Results from last 7 days "   Lab Units 02/17/19  0709 02/16/19  0315 02/15/19  0545 02/14/19  0520  02/11/19  0637   SODIUM mmol/L 140 133 136 137   < > 132   POTASSIUM mmol/L 4.1 3.9 3.3* 3.3*   < > 3.6   CHLORIDE mmol/L 108 105 103 104   < > 100   CO2 mmol/L 23.0 22.0 26.0 24.0   < > 18.0*   BUN mg/dL 58* 67* 68* 72*   < > 81*   CREATININE mg/dL 1.13 1.10 1.28 1.26   < > 3.05*   CALCIUM mg/dL 7.7* 7.7* 8.1* 7.6*   < > 8.2*   PHOSPHORUS mg/dL 4.2 3.8 4.0 4.6   < > 6.0*   MAGNESIUM mg/dL 1.9 2.1 1.8  --   --  2.2   ALBUMIN g/dL 2.73* 2.56* 2.83* 2.62*   < > 2.89*    < > = values in this interval not displayed.     Results from last 7 days   Lab Units 02/17/19  0709   GLUCOSE mg/dL 127*       Results from last 7 days   Lab Units 02/17/19  0709   ALK PHOS U/L 178*   BILIRUBIN mg/dL 0.9   ALT (SGPT) U/L 10   AST (SGOT) U/L 18                 Estimated Creatinine Clearance: 77.6 mL/min (by C-G formula based on SCr of 1.13 mg/dL).      Assessment       Renal failure    Spinal stenosis, lumbar region, with neurogenic claudication    Morbid obesity due to excess calories (CMS/HCC)    TANIYA on CPAP    Dyslipidemia    DM (diabetes mellitus), type 2 (CMS/HCC)    Metabolic encephalopathy    Paroxysmal atrial fibrillation (CMS/HCC)    Sepsis (CMS/HCC)    Moderate aortic regurgitation            Impression:   NOE - improving, no need for RRT at this time -resolved  Anemia - stable  HTN- stable.       Recommendations:   Continue with current regimen.   Will sign off.       Sosa Koo MD  02/17/19  9:07 AM

## 2019-02-18 ENCOUNTER — APPOINTMENT (OUTPATIENT)
Dept: MRI IMAGING | Facility: HOSPITAL | Age: 69
End: 2019-02-18

## 2019-02-18 LAB
ANION GAP SERPL CALCULATED.3IONS-SCNC: 5 MMOL/L (ref 3–11)
BUN BLD-MCNC: 47 MG/DL (ref 9–23)
BUN/CREAT SERPL: 45.2 (ref 7–25)
CALCIUM SPEC-SCNC: 8 MG/DL (ref 8.7–10.4)
CHLORIDE SERPL-SCNC: 106 MMOL/L (ref 99–109)
CO2 SERPL-SCNC: 25 MMOL/L (ref 20–31)
CREAT BLD-MCNC: 1.04 MG/DL (ref 0.6–1.3)
GFR SERPL CREATININE-BSD FRML MDRD: 71 ML/MIN/1.73
GLUCOSE BLD-MCNC: 126 MG/DL (ref 70–100)
GLUCOSE BLDC GLUCOMTR-MCNC: 141 MG/DL (ref 70–130)
GLUCOSE BLDC GLUCOMTR-MCNC: 147 MG/DL (ref 70–130)
MAGNESIUM SERPL-MCNC: 1.9 MG/DL (ref 1.3–2.7)
POTASSIUM BLD-SCNC: 4 MMOL/L (ref 3.5–5.5)
SODIUM BLD-SCNC: 136 MMOL/L (ref 132–146)

## 2019-02-18 PROCEDURE — 80048 BASIC METABOLIC PNL TOTAL CA: CPT | Performed by: INTERNAL MEDICINE

## 2019-02-18 PROCEDURE — 83735 ASSAY OF MAGNESIUM: CPT | Performed by: INTERNAL MEDICINE

## 2019-02-18 PROCEDURE — 82962 GLUCOSE BLOOD TEST: CPT

## 2019-02-18 PROCEDURE — 25010000003 CEFAZOLIN IN DEXTROSE 2-4 GM/100ML-% SOLUTION

## 2019-02-18 PROCEDURE — 93005 ELECTROCARDIOGRAM TRACING: CPT | Performed by: INTERNAL MEDICINE

## 2019-02-18 PROCEDURE — 99221 1ST HOSP IP/OBS SF/LOW 40: CPT | Performed by: PHYSICIAN ASSISTANT

## 2019-02-18 PROCEDURE — 99232 SBSQ HOSP IP/OBS MODERATE 35: CPT | Performed by: INTERNAL MEDICINE

## 2019-02-18 PROCEDURE — 63710000001 INSULIN LISPRO (HUMAN) PER 5 UNITS: Performed by: NURSE PRACTITIONER

## 2019-02-18 PROCEDURE — 93010 ELECTROCARDIOGRAM REPORT: CPT | Performed by: INTERNAL MEDICINE

## 2019-02-18 PROCEDURE — 97610 LOW FREQUENCY NON-THERMAL US: CPT | Performed by: PHYSICAL THERAPIST

## 2019-02-18 PROCEDURE — 87040 BLOOD CULTURE FOR BACTERIA: CPT | Performed by: INTERNAL MEDICINE

## 2019-02-18 RX ORDER — DEXTROSE MONOHYDRATE 25 G/50ML
25 INJECTION, SOLUTION INTRAVENOUS
Status: DISCONTINUED | OUTPATIENT
Start: 2019-02-18 | End: 2019-02-24 | Stop reason: HOSPADM

## 2019-02-18 RX ORDER — NICOTINE POLACRILEX 4 MG
15 LOZENGE BUCCAL
Status: DISCONTINUED | OUTPATIENT
Start: 2019-02-18 | End: 2019-02-24 | Stop reason: HOSPADM

## 2019-02-18 RX ADMIN — CEFAZOLIN SODIUM 2 G: 2 INJECTION, SOLUTION INTRAVENOUS at 06:00

## 2019-02-18 RX ADMIN — HYDROCODONE BITARTRATE AND ACETAMINOPHEN 1 TABLET: 5; 325 TABLET ORAL at 09:08

## 2019-02-18 RX ADMIN — GABAPENTIN 600 MG: 300 CAPSULE ORAL at 09:07

## 2019-02-18 RX ADMIN — APIXABAN 5 MG: 5 TABLET, FILM COATED ORAL at 18:05

## 2019-02-18 RX ADMIN — DULOXETINE HYDROCHLORIDE 60 MG: 60 CAPSULE, DELAYED RELEASE ORAL at 09:07

## 2019-02-18 RX ADMIN — CASTOR OIL AND BALSAM, PERU: 788; 87 OINTMENT TOPICAL at 09:08

## 2019-02-18 RX ADMIN — METOPROLOL TARTRATE 25 MG: 25 TABLET ORAL at 21:09

## 2019-02-18 RX ADMIN — FAMOTIDINE 20 MG: 20 TABLET, FILM COATED ORAL at 21:09

## 2019-02-18 RX ADMIN — AMLODIPINE BESYLATE 5 MG: 5 TABLET ORAL at 09:07

## 2019-02-18 RX ADMIN — GABAPENTIN 600 MG: 300 CAPSULE ORAL at 12:31

## 2019-02-18 RX ADMIN — METOPROLOL TARTRATE 25 MG: 25 TABLET ORAL at 09:08

## 2019-02-18 RX ADMIN — CEFAZOLIN SODIUM 2 G: 2 INJECTION, SOLUTION INTRAVENOUS at 21:09

## 2019-02-18 RX ADMIN — HYDROCODONE BITARTRATE AND ACETAMINOPHEN 1 TABLET: 5; 325 TABLET ORAL at 21:09

## 2019-02-18 RX ADMIN — CEFAZOLIN SODIUM 2 G: 2 INJECTION, SOLUTION INTRAVENOUS at 12:31

## 2019-02-18 RX ADMIN — GABAPENTIN 600 MG: 300 CAPSULE ORAL at 18:05

## 2019-02-18 RX ADMIN — CASTOR OIL AND BALSAM, PERU: 788; 87 OINTMENT TOPICAL at 21:10

## 2019-02-18 RX ADMIN — FAMOTIDINE 20 MG: 20 TABLET, FILM COATED ORAL at 09:08

## 2019-02-18 RX ADMIN — METOPROLOL TARTRATE 25 MG: 25 TABLET ORAL at 16:41

## 2019-02-18 RX ADMIN — APIXABAN 5 MG: 5 TABLET, FILM COATED ORAL at 06:00

## 2019-02-18 NOTE — PROGRESS NOTES
"IM progress note      Rod Balderas  [id number]  1950     LOS: 10 days     Attending: Arielle Carolina MD    Primary Care Provider: Tyrese Leyva MD      Chief Complaint/Reason for visit:  Sepsis    He was transferred to telemetry after about 10 days in ICU. He is followed by renal, cardiology, and infectious disease. His renal failure is resolved.     Subjective   Feels bad. Weak. Altered mental status. Pain is tolerable. No BM x3 days per wife. Still with fevers. Reports some shortness of air.  ( when seen later, he is up in chair. More alert , interactive, appetite and food intake is still low. )wy  Objective     Vital Signs    Visit Vitals  /62   Pulse 71   Temp 100.2 °F (37.9 °C) (Oral)   Resp 18   Ht 170.2 cm (67.01\")   Wt 117 kg (257 lb)   SpO2 98%   BMI 40.24 kg/m²     Temp (24hrs), Av.8 °F (37.7 °C), Min:98 °F (36.7 °C), Max:100.7 °F (38.2 °C)      Nutrition: PO    Respiratory: RA    Physical Exam:     General Appearance:    Somnolent, in no acute distress   Head:    Normocephalic, without obvious abnormality, atraumatic    Lungs:     Normal effort, symmetric chest rise, no crepitus, clear to      auscultation bilaterally             Heart:    Regular rhythm and normal rate, normal S1 and S2   Abdomen:     abd soft, healing lap sites. obese   Extremities:   No clubbing, cyanosis. +3 pitting BLE edema.    Pulses:   Pulses palpable and equal bilaterally   Skin:   No bleeding, bruising or rash   Neurologic:  Cranial nerves 2 - 12 grossly intact     Results Review:     I reviewed the patient's new clinical results.   Results from last 7 days   Lab Units 19  0709 19  0315 02/15/19  0545 19  0520   WBC 10*3/mm3 15.18* 14.61* 17.14* 19.26*   HEMOGLOBIN g/dL 9.9* 9.6* 9.9* 10.7*   HEMATOCRIT % 30.2* 29.6* 30.6* 32.5*   PLATELETS 10*3/mm3 364 306 256 223   MONOCYTES % %  --   --  4.0 5.0     Results from last 7 days   Lab Units 19  0400 19  0709 " 02/16/19  0315   SODIUM mmol/L 136 140 133   POTASSIUM mmol/L 4.0 4.1 3.9   CHLORIDE mmol/L 106 108 105   CO2 mmol/L 25.0 23.0 22.0   BUN mg/dL 47* 58* 67*   CREATININE mg/dL 1.04 1.13 1.10   CALCIUM mg/dL 8.0* 7.7* 7.7*   BILIRUBIN mg/dL  --  0.9  --    ALK PHOS U/L  --  178*  --    ALT (SGPT) U/L  --  10  --    AST (SGOT) U/L  --  18  --    GLUCOSE mg/dL 126* 127* 120*   Results for JESSE YUSUF (MRN [id number]) as of 2/18/2019 13:18   Ref. Range 2/17/2019 07:39 2/17/2019 11:41 2/17/2019 16:45   Glucose Latest Ref Range: 70 - 130 mg/dL 126 140 (H) 170 (H)     I reviewed the patient's new imaging including images and reports.    All medications reviewed.     amLODIPine 5 mg Oral Q24H   apixaban 5 mg Oral Q12H   castor oil-balsam peru  Topical Q12H   ceFAZolin 2 g Intravenous Q8H   DULoxetine 60 mg Oral Daily   famotidine 20 mg Oral BID   gabapentin 600 mg Oral 4x Daily   insulin lispro 0-7 Units Subcutaneous 4x Daily With Meals & Nightly   metoprolol tartrate 25 mg Oral TID   sodium chloride 3 mL Intravenous Q12H       Assessment/Plan     Renal failure    Spinal stenosis, lumbar region, with neurogenic claudication    Morbid obesity due to excess calories (CMS/HCC)    TANIYA on CPAP    Dyslipidemia    DM (diabetes mellitus), type 2 (CMS/HCC)    Metabolic encephalopathy    Paroxysmal atrial fibrillation (CMS/HCC)    Sepsis (CMS/HCC)    Moderate aortic regurgitation      Plan  1. Mobilize as able. PT/OT  2. Pain control-prns. dc'd fentanyl patch  3. IS-encouraged  4. DVT proph- mechs/Eliquis  5. Bowel regimen  6. Diet as tolerated. 2L FR/day  7. Monitor labs    Sepsis  [facility], Dr. Smith  - high dose cefazolin, PICC placement soon, repeat BC    Afib, HTN, HLD, aortic regurg  Cardiology, Dr. Regalado  - Eliquis 5 mg BID - watch H& H closely  - Continue home norvasc and lopressor  - Monitor BP     Renal, Dr. Koo  - signed off    TANIYA  - CPAP at night    Spinal stenosis, BLE weakness  - consult neurosx,  Books follows  pt.    DM  - Accuchecks ACHS with low dose SSI      MAYTE Phillips  02/18/19  1:43 PM     I have personally performed the evaluation on this patient. My history is consistent  with HPI obtained. My exam findings are listed above. I have personally reviewed and discussed the above formulated treatment plan with pt, wife and AH. Yaniv.

## 2019-02-18 NOTE — PLAN OF CARE
Problem: Patient Care Overview  Goal: Plan of Care Review  Outcome: Ongoing (interventions implemented as appropriate)   02/18/19 022   Coping/Psychosocial   Plan of Care Reviewed With patient;spouse   Plan of Care Review   Progress improving

## 2019-02-18 NOTE — NURSING NOTE
0254 Monitor showed V-Tach, with approx 30sec run. Upon entering the pts room, patient is pulling off gown, which he has done many times this shift. He is alert and verbal. Denies CP or SOA, /65, SR 76, SPO2 95%. Confusion noted, which patient has been confused at times this shift. Seems to be improving after Fentyl patch removed, per family request earlier today. No other changes noted.    EKG obtained, showed normal.    Yokasta TSANG with Cardiology notified, orders for BMP and Mag level now, will monitor and replace electrolytes if needed. Will see this AM.

## 2019-02-18 NOTE — PLAN OF CARE
Problem: Patient Care Overview  Goal: Plan of Care Review  Outcome: Ongoing (interventions implemented as appropriate)   02/18/19 0085   Coping/Psychosocial   Plan of Care Reviewed With patient   Plan of Care Review   Progress improving   OTHER   Outcome Summary LT IT area DTI with blanchable edges; skin intact. Rt area smaller; pt c/o SOA so only able to complete MIST to LT area. Repositioned in supine with HOB eleveated; pt with stable VS. Cont MIST 3 x wk; skin care and pressure relief prn.

## 2019-02-18 NOTE — PROGRESS NOTES
Continued Stay Note  [facility]     Patient Name: Rod Balderas  MRN: [id number]  Today's Date: 2/18/2019    Admit Date: 2/8/2019    Discharge Plan     Row Name 02/18/19 1459       Plan    Plan  Short term rehab    Patient/Family in Agreement with Plan  yes    Plan Comments  Spoke with patient and wife at bedside.  Patient is from [facility].  Independent PTA.  Not current with home health.  Patient would like short term rehab placement and will need IV antibiotics as managed by [facility].  Transfer from ICU 2/17.  Will need new PT and OT notes, but patient has pending referral to rehab at [facility].   Patient is also interested in referral to [facility].  Spoke with Abbi at [facility].  She will start reviewing patient for rehab mid-week.  Patient will not need dialysis, but will need PICC placement for IV cefzolin before transfer to short term rehab facility.  Case management will continue to follow for discharge planning needs.      Final Discharge Disposition Code  01 - home or self-care        Discharge Codes    No documentation.       Expected Discharge Date and Time     Expected Discharge Date Expected Discharge Time    Feb 18, 2019             Anabell Allison RN

## 2019-02-18 NOTE — PROGRESS NOTES
[facility]  Progress Note       LOS: 10 days   Patient Care Team:  Tyrese Leyva MD as PCP - General (Family Medicine)  Rod Regalado MD as Consulting Physician (Cardiology)  Myles Rossi MD as Consulting Physician (Anesthesiology)  Juanito Grace Jr., MD as Consulting Physician (Urology)    Chief Complaint:  Follow up atrial fibrillation    Subjective     No new events        Review of Systems:   Pertinent positives in HPI, all others reviewed and negative.      Objective       Current Facility-Administered Medications:   •  albuterol (PROVENTIL) nebulizer solution 0.083% 2.5 mg/3mL, 2.5 mg, Nebulization, Q6H PRN, Margoth Martinez APRN, 2.5 mg at 02/08/19 1838  •  amLODIPine (NORVASC) tablet 5 mg, 5 mg, Oral, Q24H, Jamal Villar MD, 5 mg at 02/17/19 0823  •  apixaban (ELIQUIS) tablet 5 mg, 5 mg, Oral, Q12H, Rod Regalado MD, 5 mg at 02/18/19 0600  •  castor oil-balsam peru (VENELEX) ointment, , Topical, Q12H, Arielle Carolina MD  •  ceFAZolin in dextrose (ANCEF) IVPB solution 2 g, 2 g, Intravenous, Q8H, Laurie Menchaca, [facility], 2 g at 02/18/19 0600  •  DULoxetine (CYMBALTA) DR capsule 60 mg, 60 mg, Oral, Daily, Sonny Bull MD, 60 mg at 02/17/19 0822  •  famotidine (PEPCID) tablet 20 mg, 20 mg, Oral, BID, Laurie Menchaca, [facility], 20 mg at 02/17/19 2105  •  fentaNYL (DURAGESIC) 25 MCG/HR patch 1 patch, 1 patch, Transdermal, Q72H, Sonny Bull MD, 1 patch at 02/17/19 1217  •  gabapentin (NEURONTIN) capsule 600 mg, 600 mg, Oral, 4x Daily, Jamal Villar MD, 600 mg at 02/17/19 0822  •  HYDROcodone-acetaminophen (NORCO) 5-325 MG per tablet 1 tablet, 1 tablet, Oral, Q6H PRN, Jamal Villar MD, 1 tablet at 02/17/19 2105  •  lactulose (CHRONULAC) 10 GM/15ML solution 30 g, 30 g, Oral, Q8H PRN, Jamal Villar MD, 30 g at 02/14/19 1741  •  melatonin sublingual tablet 5 mg, 5 mg, Sublingual, Nightly PRN, Pollo Millan,  "APRN  •  metoprolol tartrate (LOPRESSOR) tablet 25 mg, 25 mg, Oral, TID, Jr Bustamante PA, 25 mg at 02/17/19 2105  •  ondansetron (ZOFRAN) injection 4 mg, 4 mg, Intravenous, Q6H PRN, Arielle Carolina MD, 4 mg at 02/12/19 1158  •  sodium chloride 0.9 % flush 3 mL, 3 mL, Intravenous, Q12H, Arielle Carolina MD, 3 mL at 02/17/19 2105  •  sodium chloride 0.9 % flush 3-10 mL, 3-10 mL, Intravenous, PRN, Arielle Carolina MD    Vital Sign Min/Max for last 24 hours  Temp  Min: 98 °F (36.7 °C)  Max: 100.7 °F (38.2 °C)   BP  Min: 142/77  Max: 173/71   Pulse  Min: 75  Max: 87   Resp  Min: 16  Max: 21   SpO2  Min: 95 %  Max: 98 %   No Data Recorded   No Data Recorded     Flowsheet Rows      First Filed Value   Admission Height  170.2 cm (67\") Documented at 02/08/2019 1115   Admission Weight  117 kg (257 lb 3.2 oz) Documented at 02/08/2019 1115            Intake/Output Summary (Last 24 hours) at 2/18/2019 0738  Last data filed at 2/18/2019 0400  Gross per 24 hour   Intake 1200 ml   Output 600 ml   Net 600 ml       Physical Exam:     General Appearance:    Alert, cooperative, in no acute distress.    Lungs:     Anteriorly clear to auscultation bilaterally normal respiratory effort      Heart:    Regular rate and rhythm normal S1 and S2      Chest Wall:    No abnormalities observed   Abdomen:     Normal bowel sounds, no masses, no organomegaly, soft        non-tender, non-distended, no guarding, no rebound                Tenderness, no change.  obese   Extremities:   Moves all extremities well, 1+ edema, no cyanosis, no             redness   Pulses:   Pulses palpable and equal bilaterally   Skin:   No bleeding, bruising + purpura rash resolving        Results Review:   Results from last 7 days   Lab Units 02/17/19  0709 02/16/19  0315 02/15/19  0545   WBC 10*3/mm3 15.18* 14.61* 17.14*   HEMOGLOBIN g/dL 9.9* 9.6* 9.9*   HEMATOCRIT % 30.2* 29.6* 30.6*   PLATELETS 10*3/mm3 364 306 256     Results from last 7 days "   Lab Units 02/18/19  0400 02/17/19  0709 02/16/19  0315   SODIUM mmol/L 136 140 133   POTASSIUM mmol/L 4.0 4.1 3.9   CHLORIDE mmol/L 106 108 105   CO2 mmol/L 25.0 23.0 22.0   BUN mg/dL 47* 58* 67*   CREATININE mg/dL 1.04 1.13 1.10   GLUCOSE mg/dL 126* 127* 120*                      Results from last 7 days   Lab Units 02/13/19  1220 02/12/19  0605   PROTIME Seconds 15.6* 15.5*   INR  1.30* 1.29*   APTT seconds 32.5* 36.6           Intake/Output Summary (Last 24 hours) at 2/18/2019 0738  Last data filed at 2/18/2019 0400  Gross per 24 hour   Intake 1200 ml   Output 600 ml   Net 600 ml       I personally viewed and interpreted the patient's EKG/Telemetry data    EKG: none for review today    Telemetry: NSR 68 -81 bpm, no AF or SVT          Echo EF Estimated  Lab Results   Component Value Date    ECHOEFEST 70 02/09/2019         Present on Admission:  • Renal failure  • Spinal stenosis, lumbar region, with neurogenic claudication  • Dyslipidemia  • DM (diabetes mellitus), type 2 (CMS/HCC)  • Metabolic encephalopathy  • Morbid obesity due to excess calories (CMS/HCC)  • Paroxysmal atrial fibrillation (CMS/HCC)  • Sepsis (CMS/HCC)  • Moderate aortic regurgitation    Assessment/Plan   1. Persistent Atrial Fibrillation;  Improved with no recurrence  - brief episode during HD that converted on 2/9, then sustained RVR during HD on 2/13  - s/p KLAUDIA/ECV per Dr. Brown 2/14/19  - KLAUDIA revealed EF 60-65%, mod AI, mild MR and TR, no thrombus, no vegetation or abscess  - [id number] 4, on Eliquis 5 mg BID - watch H& H closely. - H & H stable  -added metoprolol this admission- HRs stable, remained in NSR; on no antiarrhythmic medication.        2. Sepsis  - cont abx per ID. Likely will need 6 weeks of IV abx d/t severe MSSA infection  Leukocytosis persists     3. Metabolic Encephalopathy  - resolved     4. ARF  - required HD x2 per nephrology - right IJ tunneled HD catheter removed yesterday  - renal function back to normal     5.  HTN; improved as well.  - continue to monitor     6. CAD  - s/p LHC w VERONICA to OM2, otherwise nonobstructive dz 12/2012        Plan for disposition: from cardiology standpoint, can move to tele at any time.            2/18/2019  7:38 AM

## 2019-02-18 NOTE — PLAN OF CARE
Problem: Patient Care Overview  Goal: Plan of Care Review  Outcome: Ongoing (interventions implemented as appropriate)   02/18/19 4974   Coping/Psychosocial   Plan of Care Reviewed With patient   Plan of Care Review   Progress improving

## 2019-02-18 NOTE — CONSULTS
Consults    Patient Care Team:  Tyrese Leyva MD as PCP - General (Family Medicine)  Rod Regalado MD as Consulting Physician (Cardiology)  Myles Rossi MD as Consulting Physician (Anesthesiology)  Juanito Grace Jr., MD as Consulting Physician (Urology)    Chief Complaint: lower extremity weakness     Subjective .     History of present illness:       Patient is a 68-year-old gentleman known to our service.  He has a history of prostate cancer.  He presented to [facility] a week and a half ago and was found to have NOE.  He was subsequently transferred to [facility] on 2/9/19.  Patient is drowsy and not alert on my exam today, his wife assists with history.  She states that since initial hospitalization he has been unable to lift his legs and has had significant weakness in his bilateral lower extremities.  Patient is able to answer some yes and no questions, but cannot give details.  The majority of his pain is located in his low back, which is a chronic problem, and right lower extremity. Patient has a left foot drop at baseline which has been present for 2 years and for which he wears a brace. According to his wife, the patient was ambulatory prior to admission. According to nursing notes, the patient is incontinent of bladder and has had some loose stools.  He denies associated numbness or saddle anesthesia.  He was last seen by Dr. Sánchez in 2016 at which time he presented with back pain and left leg pain. Over the last few years, the patient has been treating his pain conservatively with pain management, injections, and Neurontin.     Spoke with patient's nurse regarding patient's drowsiness. She states the patient was alert and oriented x2 this morning, but states he received his pain medication and neurontin prior to my interaction with him.     Review of Systems   Unable to perform ROS: Mental status change       History  Past Medical History:   Diagnosis Date   • Anxiety and depression     • Cancer (CMS/HCC)    • Chest pain    • Coronary artery disease    • Diabetes mellitus (CMS/HCC)    • Dyslipidemia    • Extremity pain    • Heart disease    • History of transfusion    • Hypertension    • Low back pain    • TANIYA on CPAP     2-3    • Osteoarthritis     Diffuse osteoarthritis.   • Panic attacks    • Prostate cancer (CMS/HCC)    ,   Past Surgical History:   Procedure Laterality Date   • CARDIAC CATHETERIZATION     • CHOLECYSTECTOMY     • COLONOSCOPY  2015   • CORONARY ANGIOPLASTY WITH STENT PLACEMENT  12/2012 December 2012:  A 3.5 x 88 Promus VERONICA stent to OM2.  Nonobstructive disease.  Otherwise normal LVEF.   • EYE SURGERY Bilateral     cataract   • INSERTION HEMODIALYSIS CATHETER N/A 2/8/2019    Procedure: HEMODIALYSIS CATHETER INSERTION RIGHT INTERNAL JUGULAR;  Surgeon: Bishnu Dailey MD;  Location:  [facility];  Service: General   • PROSTATECTOMY N/A 1/15/2019    Procedure: ROBOTIC PROSTATECTOMY WITH NODES;  Surgeon: Juanito Grace Jr., MD;  Location:  [facility];  Service: DaVinci   • RETINAL DETACHMENT SURGERY Right    ,   Family History   Problem Relation Age of Onset   • Heart disease Mother    • Thyroid disease Mother    • Heart disease Father    • Cancer Brother    ,   Social History     Tobacco Use   • Smoking status: Former Smoker   • Smokeless tobacco: Former User   Substance Use Topics   • Alcohol use: No   • Drug use: No   ,   Medications Prior to Admission   Medication Sig Dispense Refill Last Dose   • amLODIPine (NORVASC) 5 MG tablet Take 1 tablet by mouth Daily. 90 tablet 2 1/14/2019   • aspirin 81 MG chewable tablet Chew 81 mg Daily.      • DULoxetine (CYMBALTA) 60 MG capsule Take 60 mg by mouth Daily.   1/14/2019   • fluticasone (FLONASE) 50 MCG/ACT nasal spray USE 2 SPRAYS IN EACH NOSTRIL DAILY PRN  0 More than a month   • gabapentin (NEURONTIN) 600 MG tablet Take 1 tablet by mouth 4 (Four) Times a Day. 120 tablet 5 1/14/2019   • HYDROcodone-acetaminophen (NORCO)  7.5-325 MG per tablet Take 1 tablet by mouth Every 4-6  Hours As Needed for Moderate Pain. 40 tablet 0    • ibuprofen (ADVIL,MOTRIN) 800 MG tablet Take 1 tablet by mouth 3 (Three) Times a Day As Needed for Moderate Pain .      • lisinopril-hydrochlorothiazide (PRINZIDE,ZESTORETIC) 20-12.5 MG per tablet Take 1 tablet by mouth Daily. 90 tablet 2 1/14/2019   • metFORMIN (GLUCOPHAGE) 500 MG tablet Take 500 mg by mouth 2 (Two) Times a Day With Meals.   1/14/2019   , Scheduled Meds:    amLODIPine 5 mg Oral Q24H   apixaban 5 mg Oral Q12H   castor oil-balsam peru  Topical Q12H   ceFAZolin 2 g Intravenous Q8H   DULoxetine 60 mg Oral Daily   famotidine 20 mg Oral BID   gabapentin 600 mg Oral 4x Daily   insulin lispro 0-7 Units Subcutaneous 4x Daily With Meals & Nightly   metoprolol tartrate 25 mg Oral TID   sodium chloride 3 mL Intravenous Q12H   , Continuous Infusions:   , PRN Meds:  •  albuterol  •  dextrose  •  dextrose  •  glucagon (human recombinant)  •  HYDROcodone-acetaminophen  •  lactulose  •  melatonin  •  ondansetron  •  sodium chloride and Allergies:  Percocet [oxycodone-acetaminophen] and Sulfa antibiotics   SMOKING STATUS: Former smoker  Objective     Vital Signs   Temp:  [98 °F (36.7 °C)-100.7 °F (38.2 °C)] 100.2 °F (37.9 °C)  Heart Rate:  [71-87] 71  Resp:  [18-21] 18  BP: (152-159)/(62-77) 154/62  Body mass index is 40.24 kg/m².    Physical Exam:  Very drowsy, easily distracted and not alert   Patient able to answer some yes and no questions only after being asked multiple times   Difficulty following commands  Some movement/flicker of bilateral feet   Weak plantarflexion bilaterally, not able to dorsiflex  Slight movement of BLEs but not able to lift legs off the bed   Withdraws to pain BLEs   Sensation intact throughout     Assessment/Plan       Renal failure    Spinal stenosis, lumbar region, with neurogenic claudication    Morbid obesity due to excess calories (CMS/HCC)    TANIYA on CPAP    Dyslipidemia     DM (diabetes mellitus), type 2 (CMS/HCC)    Metabolic encephalopathy    Paroxysmal atrial fibrillation (CMS/HCC)    Sepsis (CMS/HCC)    Moderate aortic regurgitation     Patient was very drowsy and I was not able to get a good exam on him. Apparently he has been more alert but received pain medicine and Neurontin prior to my exam. 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    The patient is not a surgical candidate at this time due to medical comorbidities. He will eventually need a CT Myelogram when he is medically clear and once we are able to get a good baseline exam. We will be by to see the patient again tomorrow.     Rimma Pitt PA-C  02/18/19  3:05 PM

## 2019-02-18 NOTE — THERAPY WOUND CARE TREATMENT
Acute Care - Wound/Debridement Treatment Note  [facility]     Patient Name: Rod Balderas  : 1950  MRN: [id number]  Today's Date: 2019  Onset of Illness/Injury or Date of Surgery: 19   Date of Referral to PT: 19   Referring Physician: MD Aurea       Admit Date: 2019    Visit Dx:    ICD-10-CM ICD-9-CM   1. Impaired functional mobility, balance, gait, and endurance Z74.09 V49.89   2. Impaired mobility and ADLs Z74.09 799.89       Patient Active Problem List   Diagnosis   • Degenerative disc disease, lumbar   • Spinal stenosis, lumbar region, with neurogenic claudication   • Neuroforaminal stenosis of spine   • Lumbar facet arthropathy   • CAD (coronary artery disease)   • Essential hypertension   • Physical deconditioning   • Morbid obesity due to excess calories (CMS/HCC)   • TANIYA on CPAP   • Displacement of lumbar intervertebral disc   • Dyslipidemia   • Osteoarthritis   • At high risk for falls   • Prostate cancer , s/p prostatectomy ( RALP)   • DM (diabetes mellitus), type 2 (CMS/HCC)   • Leukocytosis, mild, likely reactive   • Acute postoperative pain   • Acute blood loss anemia, mild, asymptomatic   • Renal failure   • Metabolic encephalopathy   • Paroxysmal atrial fibrillation (CMS/HCC)   • Sepsis (CMS/HCC)   • Moderate aortic regurgitation           Rash 19 Left upper arm macular (Active)   Distribution regional 2019  8:00 AM   Configuration/Shape asymmetric 2019  8:00 AM   Borders diffuse;irregular 2019  8:00 AM   Characteristics crusted;dry 2019  8:00 AM   Color red 2019  8:00 AM   Care, Rash open to air 2019  8:00 AM       Rash 19 Left posterior hand macular (Active)   Distribution localized 2019  8:00 AM   Configuration/Shape asymmetric 2019  8:00 AM   Borders diffuse;irregular 2019  8:00 AM   Characteristics dry;crusted 2019  8:00 AM   Color purple;red 2019  8:00 AM   Care, Rash open to air  2/18/2019  8:00 AM       Wound 02/08/19 1519 Other (See comments) neck incision (Active)   Dressing Appearance open to air 2/18/2019  8:00 AM   Closure Adhesive closure strips 2/18/2019  8:00 AM   Base clean;dry;scab 2/18/2019  8:00 AM   Periwound intact;dry;pink;blanchable 2/17/2019  8:00 PM   Periwound Temperature warm 2/17/2019  8:00 PM   Periwound Skin Turgor soft 2/17/2019  8:00 PM   Drainage Amount none 2/18/2019  8:00 AM       Wound 02/08/19 1800 Right lower gluteal pressure injury (Active)   Dressing Appearance open to air 2/18/2019  2:45 PM   Closure None 2/18/2019  8:00 AM   Base dry;pink;maroon/purple 2/18/2019  2:45 PM   Periwound intact;dry;blanchable 2/18/2019  2:45 PM   Periwound Temperature warm 2/18/2019  8:00 AM   Periwound Skin Turgor soft 2/18/2019  2:45 PM   Drainage Amount none 2/18/2019  2:45 PM   Care, Wound cleansed with;barrier applied 2/18/2019  8:00 AM   Dressing Care, Wound open to air 2/18/2019  2:45 PM   Periwound Care, Wound topical treatment applied;barrier ointment applied 2/17/2019  6:46 PM       Wound 02/09/19 1116 Bilateral medial thigh (Active)   Dressing Appearance open to air 2/18/2019  2:45 PM   Closure None 2/18/2019  8:00 AM   Base clean;dry;maroon/purple 2/18/2019  2:45 PM   Periwound intact;dry;pink;redness;blanchable 2/18/2019  2:45 PM   Drainage Amount none 2/18/2019  2:45 PM   Care, Wound cleansed with;barrier applied 2/18/2019  8:00 AM   Dressing Care, Wound open to air 2/17/2019  8:00 PM   Periwound Care, Wound barrier ointment applied 2/17/2019  8:00 PM       Wound 02/13/19 0800 Left lower gluteal pressure injury (Active)   Dressing Appearance open to air 2/18/2019  2:45 PM   Base dry;maroon/purple 2/18/2019  2:45 PM   Periwound intact;dry;pink;redness;blanchable 2/18/2019  2:45 PM   Periwound Skin Turgor soft 2/18/2019  2:45 PM   Drainage Amount none 2/18/2019  2:45 PM   Care, Wound irrigated with;sterile normal saline;ultrasound therapy, non contact low frequency  2/18/2019  2:45 PM   Dressing Care, Wound open to air 2/17/2019  8:00 PM   Periwound Care, Wound barrier ointment applied 2/18/2019  2:45 PM       Wound 02/15/19 1800 Right upper chest puncture (Active)   Dressing Appearance dry;intact;no drainage 2/18/2019  8:00 AM   Closure SARAHI 2/18/2019  8:00 AM   Base clean;dry;pink 2/17/2019  8:00 PM   Periwound intact;dry;pink 2/18/2019  8:00 AM   Drainage Amount none 2/18/2019  8:00 AM   Dressing Care, Wound transparent film;gauze 2/18/2019  8:00 AM       Wound 02/17/19 0800 scrotum abrasion;MASD (moisture associated skin damage) (Active)   Dressing Appearance open to air 2/18/2019  8:00 AM   Closure None 2/18/2019  8:00 AM   Base moist;pink;white 2/18/2019  8:00 AM   Periwound moist;pink;redness;blanchable 2/18/2019  8:00 AM   Periwound Temperature warm 2/18/2019  8:00 AM   Periwound Skin Turgor soft 2/18/2019  8:00 AM   Edges open 2/18/2019  8:00 AM   Care, Wound cleansed with 2/18/2019  8:00 AM                       Therapy Treatment    Rehabilitation Treatment Summary     Row Name 02/18/19 1445             Treatment Time/Intention    Discipline  physical therapist  -MW      Document Type  wound care;therapy note (daily note)  -MW      Subjective Information  complains of;pain with movment  -MW      Mode of Treatment  physical therapy;individual therapy  -MW      Patient/Family Observations  Family at bedside; supportive. pt up in chair on sling   -MW      Care Plan Review  care plan/treatment goals reviewed;patient/other agree to care plan  -MW      Care Plan Review, Other Participant(s)  spouse  -MW      Patient Effort  fair  -MW      Comment  PCT assiste with lift transfer back to bed for PT wound care tx   -MW      Recorded by [MW] Miranda James, PT 02/18/19 0493      Row Name 02/18/19 1445             Cognitive Assessment/Intervention- PT/OT    Affect/Mental Status (Cognitive)  low arousal/lethargic;flat/blunted affect very sleepy   -MW      Orientation Status  (Cognition)  disoriented to;person;situation others not assessed   -MW      Recorded by [MW] Miranda James, PT 02/18/19 1718      Row Name 02/18/19 1445             Bed Mobility Assessment/Treatment    Rolling Right [place] (Bed Mobility)  dependent (less than 25% patient effort);2 person assist PCT assisted   -MW      Comment (Bed Mobility)  RLS for MIST to glut/ IT area DTI  -MW      Recorded by [MW] Miranda James, PT 02/18/19 1718      Row Name 02/18/19 1445             Functional Mobility    Functional Mobility- Comment  overhead lift from recliner to bed with assist of PCT   -MW      Recorded by [MW] Miranda James, PT 02/18/19 1718      Row Name 02/18/19 1445             Positioning and Restraints    Pre-Treatment Position  sitting in chair/recliner  -MW      Post Treatment Position  bed  -MW      In Bed  notified nsg;supine;call light within reach;heels elevated  -MW      Recorded by [MW] Miranda James, PT 02/18/19 1718      Row Name 02/18/19 1445             Pain Scale: FACES Pre/Post-Treatment    Pain: FACES Scale, Pretreatment  2-->hurts little bit  -MW      Pain: FACES Scale, Post-Treatment  2-->hurts little bit  -MW      Recorded by [MW] Miranda James, PT 02/18/19 1718      Row Name                Wound 02/08/19 1519 Other (See comments) neck incision    Wound - Properties Group Date first assessed: 02/08/19 [SM] Time first assessed: 1519 [SM] Side: Other (See comments) [SM] Location: neck [SM] Type: incision [SM] Recorded by:  [SM] Rissa Rodriguez RN 02/08/19 1519    Row Name 02/18/19 1445             Wound 02/08/19 1800 Right lower gluteal pressure injury    Wound - Properties Group Date first assessed: 02/08/19 [AB] Time first assessed: 1800 [AB] Present On Admission : yes [AB] Side: Right [AB] Orientation: lower [AB] Location: gluteal [AB] Type: pressure injury [AB] Stage, Pressure Injury: deep tissue injury [AB] Additional Comments: bilateral gluteals [MR] Recorded by:  [AB]  Addison Jasso RN 02/08/19 1855 [MR] Carmen Conner RN 02/09/19 1227    Dressing Appearance  open to air  -MW      Base  dry;pink;maroon/purple  -MW      Periwound  intact;dry;blanchable  -MW      Periwound Skin Turgor  soft  -MW      Drainage Amount  none  -MW      Dressing Care, Wound  open to air pt very lethargic, then c/o SOA. Returned to supine   -MW      Recorded by [MW] Miranda James, PT 02/18/19 1718      Row Name 02/18/19 1445             Wound 02/09/19 1116 Bilateral medial thigh    Wound - Properties Group Date first assessed: 02/09/19 [MR] Time first assessed: 1116 [MR] Present On Admission : yes [MR] Side: Bilateral [MR] Orientation: medial [MR] Location: thigh [MR] Stage, Pressure Injury: deep tissue injury [MR] Recorded by:  [MR] Carmen Conner RN 02/09/19 1228    Dressing Appearance  open to air  -MW      Base  clean;dry;maroon/purple  -MW      Periwound  intact;dry;pink;redness;blanchable  -MW      Drainage Amount  none  -MW      Dressing Care, Wound  -- z guard   -MW      Recorded by [MW] Miranda James, PT 02/18/19 1718      Row Name 02/18/19 1445             Wound 02/13/19 0800 Left lower gluteal pressure injury    Wound - Properties Group Date first assessed: 02/13/19 [JM] Time first assessed: 0800 [JM] Side: Left [JM] Orientation: lower [JM] Location: gluteal [JM] Type: pressure injury [JM] Stage, Pressure Injury: deep tissue injury [JM] Recorded by:  [JM] Ivon Eason, RN 02/13/19 1353    Dressing Appearance  open to air  -MW      Base  dry;maroon/purple  -MW      Periwound  intact;dry;pink;redness;blanchable  -MW      Periwound Skin Turgor  soft  -MW      Drainage Amount  none  -MW      Care, Wound  irrigated with;sterile normal saline;ultrasound therapy, non contact low frequency MIST x 5 min   -MW      Periwound Care, Wound  barrier ointment applied  -MW      Recorded by [MW] Miranda James, PT 02/18/19 1718      Row Name                Wound 02/15/19 1800 Right upper  chest puncture    Wound - Properties Group Date first assessed: 02/15/19 [CH] Time first assessed: 1800 [CH] Side: Right [CH] Orientation: upper [CH] Location: chest [CH] Type: puncture [CH] Additional Comments: s/p tunneled dialysis catheter [CH] Recorded by:  [CH] Taco Templeton RN 02/15/19 1822    Row Name                Wound 02/17/19 0800 scrotum abrasion;MASD (moisture associated skin damage)    Wound - Properties Group Date first assessed: 02/17/19 [CP] Time first assessed: 0800 [CP] Present On Admission : no [CP] Location: scrotum [CP] Type: abrasion;MASD (moisture associated skin damage) [CP] Recorded by:  [CP] Janine Juan RN 02/17/19 0953    Row Name 02/18/19 1445             Coping    Observed Emotional State  flat;other (see comments) sleepy  -MW      Verbalized Emotional State  acceptance  -MW      Recorded by [MW] Miranda James, PT 02/18/19 1718      Row Name 02/18/19 1445             Plan of Care Review    Plan of Care Reviewed With  patient;spouse  -MW      Recorded by [MW] Miranda James, PT 02/18/19 1718        User Key  (r) = Recorded By, (t) = Taken By, (c) = Cosigned By    Initials Name Effective Dates Discipline    MW Miranda James, PT 02/05/19 -  PT    Taco Gutierrez RN 06/16/16 -  DIALYSIS USER    Rissa Bolden RN 06/16/16 -  Nurse    Carmen Nelson RN 06/16/16 -  Nurse    Ivon Gutierrez RN 06/16/16 -  Nurse    Janine Pace RN 06/16/16 -  Nurse    Addison Jaffe RN 02/02/17 -  Nurse          Physical Therapy Education     Title: PT OT SLP Therapies (In Progress)     Topic: Physical Therapy (In Progress)     Point: Mobility training (In Progress)     Learning Progress Summary           Patient Acceptance, E,D, NR by LS at 2/15/2019  3:41 PM    Acceptance, E,D, NR by LS at 2/14/2019  2:10 PM    Acceptance, E,D, NR by LS at 2/12/2019  9:59 AM   Significant Other Acceptance, E,D, NR by LS at 2/15/2019  3:41 PM    Acceptance, E,D, NR by LUKE  at 2/14/2019  2:10 PM    Acceptance, E,D, NR by LS at 2/12/2019  9:59 AM                   Point: Home exercise program (In Progress)     Learning Progress Summary           Patient Acceptance, E,D, NR by LS at 2/15/2019  3:41 PM    Acceptance, E,D, NR by LS at 2/14/2019  2:10 PM   Significant Other Acceptance, E,D, NR by LS at 2/15/2019  3:41 PM    Acceptance, E,D, NR by LS at 2/14/2019  2:10 PM                   Point: Body mechanics (In Progress)     Learning Progress Summary           Patient Acceptance, E,D, NR by LS at 2/15/2019  3:41 PM    Acceptance, E,D, NR by LS at 2/14/2019  2:10 PM    Acceptance, E,D, NR by LS at 2/12/2019  9:59 AM   Significant Other Acceptance, E,D, NR by LS at 2/15/2019  3:41 PM    Acceptance, E,D, NR by LS at 2/14/2019  2:10 PM    Acceptance, E,D, NR by LS at 2/12/2019  9:59 AM                   Point: Precautions (In Progress)     Learning Progress Summary           Patient Acceptance, E,D, NR by LS at 2/15/2019  3:41 PM    Acceptance, E,D, NR by LS at 2/14/2019  2:10 PM    Acceptance, E,D, NR by LS at 2/12/2019  9:59 AM   Significant Other Acceptance, E,D, NR by LS at 2/15/2019  3:41 PM    Acceptance, E,D, NR by LS at 2/14/2019  2:10 PM    Acceptance, E,D, NR by LS at 2/12/2019  9:59 AM                               User Key     Initials Effective Dates Name Provider Type Discipline     06/19/15 -  Ana Akers, PT Physical Therapist PT                  PT Recommendation and Plan  Anticipated Discharge Disposition (PT): skilled nursing facility  Planned Therapy Interventions (PT Eval): balance training, bed mobility training, gait training, home exercise program, stair training, strengthening, transfer training, patient/family education  Therapy Frequency (PT Clinical Impression): daily        Progress: improving      Progress: improving  Outcome Summary: LT IT area DTI with blanchable edges; skin intact. Rt area smaller; pt c/o SOA so only able to complete MIST to LT area.  Repositioned in supine with HOB eleveated; pt with stable VS. Cont MIST 3 x wk; skin care and pressure relief prn.   Plan of Care Reviewed With: patient            Time Calculation  PT Charges     Row Name 02/18/19 1720             Time Calculation    Start Time  1445  -MW        User Key  (r) = Recorded By, (t) = Taken By, (c) = Cosigned By    Initials Name Provider Type    MW Miranda James, PT Physical Therapist         Therapy Suggested Charges     Code   Minutes Charges    22635 (CPT®) Hc Pt Neuromusc Re Education Ea 15 Min      61453 (CPT®) Hc Pt Ther Proc Ea 15 Min 7     86934 (CPT®) Hc Gait Training Ea 15 Min      62401 (CPT®) Hc Pt Therapeutic Act Ea 15 Min 10 1    76703 (CPT®) Hc Pt Manual Therapy Ea 15 Min      93623 (CPT®) Hc Pt Iontophoresis Ea 15 Min      86326 (CPT®) Hc Pt Elec Stim Ea-Per 15 Min      39851 (CPT®) Hc Pt Ultrasound Ea 15 Min      06461 (CPT®) Hc Pt Self Care/Mgmt/Train Ea 15 Min      95913 (CPT®) Hc Pt Prosthetic (S) Train Initial Encounter, Each 15 Min      08670 (CPT®) Hc Pt Orthotic(S)/Prosthetic(S) Encounter, Each 15 Min      29435 (CPT®) Hc Orthotic(S) Mgmt/Train Initial Encounter, Each 15min      Total  17 1          Therapy Charges for Today     Code Description Service Date Service Provider Modifiers Qty    [id number] HC PT [facility]U MIST 2/18/2019 Miranda James, PT GP 1            PT G-Codes  Outcome Measure Options: AM-PAC 6 Clicks Basic Mobility (PT)  AM-PAC 6 Clicks Score: 8  Score: 8        Miranda James, CURTIS  2/18/2019

## 2019-02-18 NOTE — PROGRESS NOTES
[facility] Progress Note      Antibiotics:  Anti-Infectives (From admission, onward)    Ordered     Dose/Rate Route Frequency Start Stop    02/14/19 1149  ceFAZolin in dextrose (ANCEF) IVPB solution 2 g     Ordering Provider:  Laurie Menchaca RPH    2 g  over 30 Minutes Intravenous Every 8 Hours 02/14/19 1300 02/22/19 1259    02/09/19 0849  aztreonam (AZACTAM) 2 g in sodium chloride 0.9 % 100 mL IVPB-MBP     Ordering Provider:  Efrem Santana MD    2 g  200 mL/hr over 30 Minutes Intravenous Once 02/09/19 1000 02/09/19 0946          CC: Patient unable    HPI  2/8/19: Patient is a 68 y.o.  Yr old male with history of prostate cancer status post robotic laparoscopic prostatectomy in mid January and was discharged on 1/17/19. The patient is currently unresponsive and no family is at bedside so history is somewhat limited to history from nursing staff and medical records. The patient was seen a few days after his discharge from his recent  Admission in January and his Christina catheter was discontinued.  About one week ago he went to the ER with complaints of back pain, abdominal pain, and confusion and a CT abdomen and pelvis was apparently okay during that time. He continued to worsen and over the weekend he was admitted to [facility].  He was found to have an initial AK I with a creatinine of 2.2. Per the micro-lab at [facility], the patient's urine cultures and 1 of 2 blood cultures from 2/5/19 grew MSSA. The patient was initially on vancomycin and Zosyn and was subsequently transitioned to daptomycin after he developed worsening renal function with a creatinine up to 5.6 and BUN went up to 94 today.  Due to lack of nephrology at [facility], the patient was transferred to [facility] today for possible dialysis.  He additionally received a dose of nafcillin prior to this transfer.  His last dose of daptomycin was yesterday per pharmacist here (who has discussed with  OSH). Since arrival, the patient has remained afebrile but he has had a leukocytosis up to 31.38.  He is anemic with a hemoglobin of 11.3 and a hematocrit of 54.1.  He is hyponatremic to a sodium of 127.  Creatinine was initially elevated 5.39 and B1 was 97 on arrival.  He additionally had elevation of his LFTs with an ALT of 43 and AST of 43, T bili of 3.0, and alkaline phosphatase of 303.  Pro-calcitonin was elevated to 7.55 lactic acid was normal at 0.8. The patient underwent CT head without contrast which did not show any acute abnormality, CT chest, abdomen and pelvis which showed consolidation of the lung bases bilaterally that is concerning for airspace disease such as pneumonia, gastric distention, and air-filled loops of colon with no obvious obstruction. Blood cultures have been ordered and a reflexive urinalysis has been ordered as well.  A transthoracic echocardiogram was ordered. The patient underwent dialysis today following right IJ dialysis catheter placement.  The infectious disease team has been consulted for antibiotic recommendations.    He has methicillin sensitive staph aureus septicemia/bacteriuria compounded by acute renal failure, acute hypoxic respiratory failure and by basilar consolidation by chest CT.  Further compounded by acute encephalopathy.    2/9/19 nursing reports A. fib/RVR, dialysis ongoing with acute renal failure and tachypneic/labored at times.  Some cough but unable to capture for culture so far. Empiric HCAP coverage added with zyvox/azactam/flagyl; unable to do MRI of spine so CT's done cervical/thoracic/lumbar spine done 2/10    2/10/19 CT scans of spine; more restful, less labored with breathing and decreased cough;  No rash.  He can wiggle toes but not lift legs off the bed.  He moves left arm better than right arm.  No other new focal pain that he will relate although interaction minimal.  No diarrhea.  No vomiting.    2/11/19: seems to have improved from mental status  standpoint slightly per his wife and son who are at bedside. Patient endorses abdominal pain but is not answering a lot of questions. tMAX 99.3f. WBC remains elevated at 20K (down from 31K on arrival). TTE was without evidence of IE but valves poorly visualized. Still getting HD.    2/12/19: Patient is starting to feel much better today.  He is responding to a lot of questions and is more alert sitting up in a bedside chair.  Breathing is improving.  No high fevers overnight.  Leukocytosis improved to 15.9 today.  He continues to urinate a small amount but remains on intermittent dialysis.    2/13/19: Feeling about the same as he was yesterday.  Still having some right wrist pain. Shortness of breath is stable. No fevers. Leukocytosis slightly worse today.    2/14/19: Patient is somnolent today after his KLAUDIA due to recent sedation. Procedure went well per his family and he is back in NSR and no signs of vegetation but procedure report is pending. No fevers. Leukocytosis slightly improved from yesterday. Family reports that the patient has had no new complaints. He was evaluated by Dr. Ro and no significant concern for septic arthritis.    2/16/19: I reviewed his complex medical records and discussed his complex situation with Dr. Jeff Smith.  His hemodialysis catheter was removed late yesterday.  He has been afebrile over the past 24 hours.  He denies abdominal pain.  He has a history of metal fragment between his frontal lobes according to his son.  This was apparently only recently identified on a CT scan prior to his admission here.  He underwent an MRI scan approximately 2 years ago without complications and the son indicates the metal fragment would have been there at that time.  He is having difficulty moving his legs but this is a chronic problem according to his son.  He was able to ambulate at home with assistance.    2/17/19:  He had a low-grade fever to 100.2 last evening.  He still has  "significant bilateral lower extremity weakness but has a history of spinal stenosis and was weak prior to admission.  He was able to ambulate with assistance prior to admission.  He denies lower extremity numbness.  He denies nausea and vomiting.  He has some mild loose stool.    2/18/19: The patient continued to have low-grade fevers to 100.7 over the last 24 hours.  He does still have significant bilateral lower extremity weakness and is complaining about some back pain.  No numbness.  He is also having some right wrist pain still.  Having some mild abdominal pain.  No nausea or vomiting.    PE:   /62   Pulse 73   Temp 100.2 °F (37.9 °C) (Oral)   Resp 18   Ht 170.2 cm (67.01\")   Wt 117 kg (257 lb)   SpO2 98%   BMI 40.24 kg/m²     GENERAL: ill appearing, obese,  man. NAD. Lying in bed  HEENT: Normocephalic, atraumatic. No conjunctival injection. No icterus.   NECK: Supple without nuchal rigidity. No mass.  Chest: right chest dialysis cath is now out.    HEART: irregular rhythm. No murmur, rubs, gallops.  LUNGS: Diminished at bases, labored and bilateral rhonchi. On nasal cannula  ABDOMEN: Soft, nontender, nondistended. Positive bowel sounds. No rebound or guarding. NO mass or HSM.  EXT:  No cyanosis, clubbing. No cord.  Edema noted over bilateral lower extremities. R>L upper extremity edema. erythema, warmth, and tenderness near right wrist seems to have improved somewhat.   : Genitalia generally unremarkable.    SKIN:.  Petechial appearing areas over upper extremities   NEURO: He is more alert and responsive.  He has severe bilateral lower extremity weakness but he can move his legs.  He has difficulty lifting his legs off of the bed.  Sensation appears to be intact in his lower extremities.  IV with no obvious redness or drainage    Laboratory Data    Results from last 7 days   Lab Units 02/17/19  0709 02/16/19  0315 02/15/19  0545   WBC 10*3/mm3 15.18* 14.61* 17.14*   HEMOGLOBIN g/dL 9.9* " 9.6* 9.9*   HEMATOCRIT % 30.2* 29.6* 30.6*   PLATELETS 10*3/mm3 364 306 256     Results from last 7 days   Lab Units 02/18/19  0400   SODIUM mmol/L 136   POTASSIUM mmol/L 4.0   CHLORIDE mmol/L 106   CO2 mmol/L 25.0   BUN mg/dL 47*   CREATININE mg/dL 1.04   GLUCOSE mg/dL 126*   CALCIUM mg/dL 8.0*     Results from last 7 days   Lab Units 02/17/19  0709   ALK PHOS U/L 178*   BILIRUBIN mg/dL 0.9   ALT (SGPT) U/L 10   AST (SGOT) U/L 18               Estimated Creatinine Clearance: 84.3 mL/min (by C-G formula based on SCr of 1.04 mg/dL).      Microbiology:  2/8/19 Urine culture: MSSA  2/9/19 sputum  Culture: Yeast isolated  2/8/19 blood culture ×2: No growth at 5 days    Radiology:  Imaging Results (last 72 hours)     Procedure Component Value Units Date/Time    XR Chest 1 View [[id number]] Updated:  02/09/19 0221    CT Abdomen Pelvis Without Contrast [[id number]] Collected:  02/08/19 1822     Updated:  02/08/19 1822    Narrative:       EXAMINATION: CT CHEST WO CONTRAST, CT ABDOMEN/PELVIS WO CONTRAST -  2/8/2019     INDICATION: Persistent cough.     TECHNIQUE: Multiple axial CT imaging is obtained of the chest, abdomen  and pelvis without the administration of oral or intravenous contrast.     The radiation dose reduction device was turned on for each scan per the  ALARA (As Low as Reasonably Achievable) protocol.     COMPARISON: There is some consolidation identified posteriorly extending  to the lung bases bilaterally suggesting infiltrates. The upper lung  fields are grossly clear. Motion artifact degrades image quality. There  are degenerative changes identified within the spine. The cardiac  chambers are enlarged. There is no pericardial effusion. No bulky hilar  or axillary lymphadenopathy. The thyroid is homogeneous. Degenerative  change is seen throughout the spine.     ABDOMEN: Motion artifact grades image quality. There is no abnormality  seen within the liver. The spleen is upper limits of normal in size.  There  is gastric distention. Air fills the colon with no  evidence of  obvious obstruction. Kidneys and adrenal glands within normal limits.  The pancreas is homogeneous. No abdominal or retroperitoneal  lymphadenopathy. No abnormal mass or fluid collections identified. No  free fluid or free air.     PELVIS: Pelvic organs are unremarkable. The pelvic portion of the   gastrointestinal tract is within normal limits. No pelvic adenopathy.  Degenerative change is identified throughout the spine and pelvis.     FINDINGS: Consolidation at the lung bases bilaterally is concerning for  airspace disease such as pneumonia. The there is gastric distention.  Air-filled loops of colon with no obvious obstruction. No other abnormal  findings are seen within the abdomen or pelvis. Degenerative changes are  seen throughout the spine and pelvis.     DICTATED:   2/8/2019  EDITED/ls :   2/8/2019              Impression:               CT Chest Without Contrast [[id number]] Collected:  02/08/19 1822     Updated:  02/08/19 1822    Narrative:       EXAMINATION: CT CHEST WO CONTRAST, CT ABDOMEN/PELVIS WO CONTRAST -  2/8/2019     INDICATION: Persistent cough.     TECHNIQUE: Multiple axial CT imaging is obtained of the chest, abdomen  and pelvis without the administration of oral or intravenous contrast.     The radiation dose reduction device was turned on for each scan per the  ALARA (As Low as Reasonably Achievable) protocol.     COMPARISON: There is some consolidation identified posteriorly extending  to the lung bases bilaterally suggesting infiltrates. The upper lung  fields are grossly clear. Motion artifact degrades image quality. There  are degenerative changes identified within the spine. The cardiac  chambers are enlarged. There is no pericardial effusion. No bulky hilar  or axillary lymphadenopathy. The thyroid is homogeneous. Degenerative  change is seen throughout the spine.     ABDOMEN: Motion artifact grades image quality. There is  no abnormality  seen within the liver. The spleen is upper limits of normal in size.  There is gastric distention. Air fills the colon with no  evidence of  obvious obstruction. Kidneys and adrenal glands within normal limits.  The pancreas is homogeneous. No abdominal or retroperitoneal  lymphadenopathy. No abnormal mass or fluid collections identified. No  free fluid or free air.     PELVIS: Pelvic organs are unremarkable. The pelvic portion of the   gastrointestinal tract is within normal limits. No pelvic adenopathy.  Degenerative change is identified throughout the spine and pelvis.     FINDINGS: Consolidation at the lung bases bilaterally is concerning for  airspace disease such as pneumonia. The there is gastric distention.  Air-filled loops of colon with no obvious obstruction. No other abnormal  findings are seen within the abdomen or pelvis. Degenerative changes are  seen throughout the spine and pelvis.     DICTATED:   2/8/2019  EDITED/ls :   2/8/2019              Impression:               CT Head Without Contrast [[id number]] Collected:  02/08/19 1816     Updated:  02/08/19 1817    Narrative:       EXAMINATION: CT HEAD WO CONTRAST - 2/8/2019     INDICATION: Mental status change, does not follow commands.     TECHNIQUE: Multiple axial CT imaging is obtained of the head from skull  base to skull vertex without the administration of intravenous contrast.     The radiation dose reduction device was turned on for each scan per the  ALARA (As Low as Reasonably Achievable) protocol.     COMPARISON: NONE     FINDINGS: There is atrophy of the brain. Some low-density area seen in  the periventricular white matter suggesting chronic small vessel  ischemic change. No hemorrhage or hydrocephalus. No mass, mass effect,  or midline shift. No abnormal extra-axial  fluid collections identified. Minimal mucosal thickening of the  maxillary sinuses and ethmoid air cells. The bony structures are  unremarkable. The mastoid  air cells are patent.       Impression:       Chronic changes identified within the brain with no acute  intracranial abnormality.     DICTATED:   2/8/2019  EDITED/ls :   2/8/2019        FL C Arm During Surgery [[id number]] Collected:  02/08/19 1646     Updated:  02/08/19 1646    Narrative:       EXAMINATION: FL C ARM DURING SURGERY- 02/08/2019      INDICATION: Dialysis catheter placement     COMPARISON: NONE     FINDINGS: 4 seconds of fluoroscopy and 4 images used for right-sided  dialysis catheter placement. Please see the procedure report for full  details.       Impression:       Fluoroscopy for dialysis catheter placement. Please see the  procedure report for full details.      D:  02/08/2019  E:  02/08/2019       XR Chest 1 View [[id number]] Collected:  02/08/19 1628     Updated:  02/08/19 1628    Narrative:       EXAMINATION: XR CHEST 1 VW-02/08/2019:      INDICATION: RIJ line.      COMPARISON: 02/08/2019.     FINDINGS: Portable chest reveals low lung volumes. Deep line catheter  identified on the right with tip in the SVC. Degenerative changes seen  within the spine. The heart is borderline enlarged. Mild prominence seen  of the pulmonary vascularity. No pleural effusion or pneumothorax.           Impression:       Prominence of the pulmonary vascularity with deep line  catheter identified on the right with tip in the SVC. No evidence of  acute parenchymal disease.     D:  02/08/2019  E:  02/08/2019             XR Chest 1 View [[id number]] Collected:  02/08/19 1243     Updated:  02/08/19 1246    Narrative:          EXAMINATION: XR CHEST 1 VW-      INDICATION: respiratory failure      COMPARISON: 01/08/2019     FINDINGS: Heart is borderline enlarged. The vasculature is cephalized.  Lung volumes are relatively low. There is mild discoid atelectasis in  both medial lung bases, but no focal disease elsewhere..           Impression:       Low lung volumes, borderline cardiomegaly and mild pulmonary  vascular  congestion. Mild bibasilar discoid atelectasis, new from prior  study.     This report was finalized on 2/8/2019 12:44 PM by DR. Antione Damon MD.           KLAUDIA:  Interpretation Summary     · Estimated EF appears to be in the range of 61 - 65%.  · Left ventricular systolic function is normal.  · Moderate aortic valve regurgitation is present.  · Mild tricuspid valve regurgitation is present.  · Mild mitral valve regurgitation is present  · No evidence of a left atrial appendage thrombus was present.        Chest x-ray of 2/17reveals no focal infiltrate-I read      Impression:   1.  MSSA bacteremia/septic shock-with associated MSSA bacteriuria following recent prostate surgery.  Although MSSA bacteriuria is generally a reflection of the bacteremia rather than the cause of the bacteremia, and his situation the MSSA bacteremia may have been a result of MSSA UTI.  He has underlying valvular heart disease but his KLAUDIA did not reveal any definite evidence of endocarditis.  He is still at high risk for early endocarditis without a definite vegetation.  He will need a very prolonged course of intravenous antibiotic therapy.  Dr. Sam Smith discussed the patient's KLAUDIA results with Dr. Mann will review his KLAUDIA.  2.  Septic shock-improved  3.  Acute hypoxic respiratory failure-improved  4.  Acute renal failure/ATN-improved  5.  Lower extremity weakness/back pain-his son indicates that this is a long-standing problem with spinal stenosis but he was able to walk at home with assistance.  His legs do appear to be weaker.  We had wanted to order an MRI scan but he has a metal fragment in his brain that was identified on a CT scan prior to his admission here.  His son indicates that he underwent an MRI scan at least 2 years ago without complications and the metal fragment would have been there at that time.  I do think that we should have neurosurgery evaluated the patient and Dr. Sánchez has seen the patient before.  I  would prefer if we got an MRI but if this is not possible then he may need a myelogram.  6.  Elevated liver enzymes  7.  MSSA UTI  8.  Right wrist swelling-observe  9.  Valvular heart disease-with moderate aortic regurgitation and mild mitral regurgitation  10.  Prostate cancer-status post robotic prostatectomy on 1/17/19  11.  Atrial fibrillation  12.  History of spinal stenosis      Recommendations:  1.  Continue high-dose cefazolin therapy  2.  Review of the KLAUDIA by Dr. Mann today  3.  Reconsider an MRI scan of the spine or myelogram given persistent lower extremity weakness and back pain.  4.  Consult neurosurgery ( Dr. Sánchez) for recommendations regarding imaging/concern for epidural abscess- I discussed this with Dr. Moraes today.  5.  PICC line placement in the near future  6.  Repeat blood cultures today given persistent fevers  7.  We will need to closely watch the patient for worsening abdominal pain or right wrist pain and swelling    Critically ill but clinically improving with high risk for further serious morbidity and other serious sequela/mortality. I discussed with his wife today. I discussed with Dr. Moraes today.  I spent over 35 minutes on his case today, >50% of which was spent in conference and care coordination.          Jeff Smith MD  2/18/2019

## 2019-02-19 LAB
BASOPHILS # BLD AUTO: 0.05 10*3/MM3 (ref 0–0.2)
BASOPHILS NFR BLD AUTO: 0.5 % (ref 0–1)
DEPRECATED RDW RBC AUTO: 51.6 FL (ref 37–54)
EOSINOPHIL # BLD AUTO: 0.06 10*3/MM3 (ref 0–0.3)
EOSINOPHIL NFR BLD AUTO: 0.6 % (ref 0–3)
ERYTHROCYTE [DISTWIDTH] IN BLOOD BY AUTOMATED COUNT: 16.9 % (ref 11.3–14.5)
GLUCOSE BLDC GLUCOMTR-MCNC: 128 MG/DL (ref 70–130)
GLUCOSE BLDC GLUCOMTR-MCNC: 138 MG/DL (ref 70–130)
GLUCOSE BLDC GLUCOMTR-MCNC: 149 MG/DL (ref 70–130)
GLUCOSE BLDC GLUCOMTR-MCNC: 152 MG/DL (ref 70–130)
HCT VFR BLD AUTO: 30.4 % (ref 38.9–50.9)
HGB BLD-MCNC: 9.4 G/DL (ref 13.1–17.5)
IMM GRANULOCYTES # BLD AUTO: 0.04 10*3/MM3 (ref 0–0.05)
IMM GRANULOCYTES NFR BLD AUTO: 0.4 % (ref 0–0.6)
LYMPHOCYTES # BLD AUTO: 0.79 10*3/MM3 (ref 0.6–4.8)
LYMPHOCYTES NFR BLD AUTO: 7.8 % (ref 24–44)
MCH RBC QN AUTO: 26.3 PG (ref 27–31)
MCHC RBC AUTO-ENTMCNC: 30.9 G/DL (ref 32–36)
MCV RBC AUTO: 85.2 FL (ref 80–99)
MONOCYTES # BLD AUTO: 0.88 10*3/MM3 (ref 0–1)
MONOCYTES NFR BLD AUTO: 8.7 % (ref 0–12)
NEUTROPHILS # BLD AUTO: 8.29 10*3/MM3 (ref 1.5–8.3)
NEUTROPHILS NFR BLD AUTO: 82.4 % (ref 41–71)
PLATELET # BLD AUTO: 381 10*3/MM3 (ref 150–450)
PMV BLD AUTO: 9.4 FL (ref 6–12)
RBC # BLD AUTO: 3.57 10*6/MM3 (ref 4.2–5.76)
WBC NRBC COR # BLD: 10.07 10*3/MM3 (ref 3.5–10.8)

## 2019-02-19 PROCEDURE — 99232 SBSQ HOSP IP/OBS MODERATE 35: CPT | Performed by: INTERNAL MEDICINE

## 2019-02-19 PROCEDURE — 25010000003 CEFAZOLIN IN DEXTROSE 2-4 GM/100ML-% SOLUTION

## 2019-02-19 PROCEDURE — 85025 COMPLETE CBC W/AUTO DIFF WBC: CPT | Performed by: INTERNAL MEDICINE

## 2019-02-19 PROCEDURE — 97530 THERAPEUTIC ACTIVITIES: CPT

## 2019-02-19 PROCEDURE — 99231 SBSQ HOSP IP/OBS SF/LOW 25: CPT | Performed by: PHYSICIAN ASSISTANT

## 2019-02-19 PROCEDURE — 97110 THERAPEUTIC EXERCISES: CPT

## 2019-02-19 PROCEDURE — 82962 GLUCOSE BLOOD TEST: CPT

## 2019-02-19 RX ORDER — GABAPENTIN 300 MG/1
300 CAPSULE ORAL 4 TIMES DAILY
Status: DISCONTINUED | OUTPATIENT
Start: 2019-02-19 | End: 2019-02-20

## 2019-02-19 RX ADMIN — CEFAZOLIN SODIUM 2 G: 2 INJECTION, SOLUTION INTRAVENOUS at 05:33

## 2019-02-19 RX ADMIN — METOPROLOL TARTRATE 25 MG: 25 TABLET ORAL at 20:24

## 2019-02-19 RX ADMIN — METOPROLOL TARTRATE 25 MG: 25 TABLET ORAL at 09:40

## 2019-02-19 RX ADMIN — FAMOTIDINE 20 MG: 20 TABLET, FILM COATED ORAL at 09:40

## 2019-02-19 RX ADMIN — INSULIN LISPRO 2 UNITS: 100 INJECTION, SOLUTION INTRAVENOUS; SUBCUTANEOUS at 12:34

## 2019-02-19 RX ADMIN — CASTOR OIL AND BALSAM, PERU: 788; 87 OINTMENT TOPICAL at 09:40

## 2019-02-19 RX ADMIN — AMLODIPINE BESYLATE 5 MG: 5 TABLET ORAL at 09:41

## 2019-02-19 RX ADMIN — CEFAZOLIN SODIUM 2 G: 2 INJECTION, SOLUTION INTRAVENOUS at 20:24

## 2019-02-19 RX ADMIN — CASTOR OIL AND BALSAM, PERU: 788; 87 OINTMENT TOPICAL at 20:24

## 2019-02-19 RX ADMIN — GABAPENTIN 300 MG: 300 CAPSULE ORAL at 17:15

## 2019-02-19 RX ADMIN — SODIUM CHLORIDE, PRESERVATIVE FREE 3 ML: 5 INJECTION INTRAVENOUS at 09:42

## 2019-02-19 RX ADMIN — APIXABAN 5 MG: 5 TABLET, FILM COATED ORAL at 05:33

## 2019-02-19 RX ADMIN — CEFAZOLIN SODIUM 2 G: 2 INJECTION, SOLUTION INTRAVENOUS at 12:52

## 2019-02-19 RX ADMIN — DULOXETINE HYDROCHLORIDE 60 MG: 60 CAPSULE, DELAYED RELEASE ORAL at 09:41

## 2019-02-19 RX ADMIN — FAMOTIDINE 20 MG: 20 TABLET, FILM COATED ORAL at 20:24

## 2019-02-19 RX ADMIN — APIXABAN 5 MG: 5 TABLET, FILM COATED ORAL at 17:15

## 2019-02-19 RX ADMIN — METOPROLOL TARTRATE 25 MG: 25 TABLET ORAL at 15:42

## 2019-02-19 NOTE — PROGRESS NOTES
"IM progress note      Rod Balderas  [id number]  1950     LOS: 11 days     Attending: Arielle Carolina MD    Primary Care Provider: Tyrese Leyva MD      Chief Complaint/Reason for visit:  Sepsis    He was transferred to telemetry after about 10 days in ICU. He is followed by renal, cardiology, and infectious disease. His renal failure is resolved.     Subjective   Doing better. Still low po intake per wife. No f/c/n/vom. Limited mobility. Has not had MRI, as radiology refuse to do due to hx of \"metal\"  Objective     Vital Signs    Visit Vitals  /81   Pulse 81   Temp 97.9 °F (36.6 °C) (Oral)   Resp 20   Ht 170.2 cm (67.01\")   Wt 117 kg (257 lb)   SpO2 96%   BMI 40.24 kg/m²     Temp (24hrs), Av.6 °F (37 °C), Min:97.7 °F (36.5 °C), Max:99.7 °F (37.6 °C)      Nutrition: PO    Respiratory: RA    Physical Exam:     General Appearance:    Somnolent, in no acute distress   Head:    Normocephalic, without obvious abnormality, atraumatic    Lungs:     Normal effort, symmetric chest rise, no crepitus, clear to      auscultation bilaterally             Heart:    Regular rhythm and normal rate, normal S1 and S2   Abdomen:     abd soft, healing lap sites. obese   Extremities:   No clubbing, cyanosis. ++pitting BLE edema.    Pulses:   Pulses palpable and equal bilaterally   Skin:   No bleeding, bruising or rash   Neurologic:  Cranial nerves 2 - 12 grossly intact     Results Review:     I reviewed the patient's new clinical results.   Results from last 7 days   Lab Units 19  0709 19  0315 02/15/19  0545 19  0520   WBC 10*3/mm3 15.18* 14.61* 17.14* 19.26*   HEMOGLOBIN g/dL 9.9* 9.6* 9.9* 10.7*   HEMATOCRIT % 30.2* 29.6* 30.6* 32.5*   PLATELETS 10*3/mm3 364 306 256 223   MONOCYTES % %  --   --  4.0 5.0     Results from last 7 days   Lab Units 19  0400 19  0709 19  0315   SODIUM mmol/L 136 140 133   POTASSIUM mmol/L 4.0 4.1 3.9   CHLORIDE mmol/L 106 108 105   CO2 mmol/L " 25.0 23.0 22.0   BUN mg/dL 47* 58* 67*   CREATININE mg/dL 1.04 1.13 1.10   CALCIUM mg/dL 8.0* 7.7* 7.7*   BILIRUBIN mg/dL  --  0.9  --    ALK PHOS U/L  --  178*  --    ALT (SGPT) U/L  --  10  --    AST (SGOT) U/L  --  18  --    GLUCOSE mg/dL 126* 127* 120*   Results for JESSE YUSUF (MRN [id number]) as of 2/18/2019 13:18   Ref. Range 2/17/2019 07:39 2/17/2019 11:41 2/17/2019 16:45   Glucose Latest Ref Range: 70 - 130 mg/dL 126 140 (H) 170 (H)     I reviewed the patient's new imaging including images and reports.    All medications reviewed.     amLODIPine 5 mg Oral Q24H   apixaban 5 mg Oral Q12H   castor oil-balsam peru  Topical Q12H   ceFAZolin 2 g Intravenous Q8H   DULoxetine 60 mg Oral Daily   famotidine 20 mg Oral BID   gabapentin 300 mg Oral 4x Daily   insulin lispro 0-7 Units Subcutaneous 4x Daily With Meals & Nightly   metoprolol tartrate 25 mg Oral TID   sodium chloride 3 mL Intravenous Q12H       Assessment/Plan     Renal failure, better.    Metabolic encephalopathy,improving.    Spinal stenosis, lumbar region, with neurogenic claudication    Morbid obesity due to excess calories (CMS/HCC)    TANIYA on CPAP    Dyslipidemia    DM (diabetes mellitus), type 2 (CMS/HCC)    Paroxysmal atrial fibrillation (CMS/HCC)    Sepsis (CMS/HCC)    Moderate aortic regurgitation      Plan  1. Mobilize as able. PT/OT  2. Pain control-prns. dc'd fentanyl patch  3. IS-encouraged  4. DVT proph- mechs/Eliquis  5. Bowel regimen  6. Diet as tolerated. 2L FR/day  7. Monitor labs    Sepsis/POA. Improved.  [facility], Dr. Smith  - high dose cefazolin, PICC placement soon, repeat BC    Afib, HTN, HLD, aortic regurgitation  s/p KLAUDIA/ECV per Dr. Brown 2/14/19  Cardiology, Dr. Regalado  - Eliquis 5 mg BID - watch H& H closely  - Continue home norvasc and lopressor  - Monitor BP     Renal, Dr. Koo  - signed off    TANIYA  - CPAP at night    Spinal stenosis, BLE weakness  - consult neurosx,  Books follows pt.    DM  - Accuchecks ACHS with low  dose SSI    Discussed with pt, wife, and RN.    Arielle Carolina MD  02/19/19  2:29 PM      .

## 2019-02-19 NOTE — PROGRESS NOTES
"NEUROSURGERY PROGRESS NOTE    Interval History:   Patient is more alert today but is slow to respond and word finding difficulties. He did participate in the exam a bit more today as compared to yesterday but still complained of \"being too tired\" to follow commands. His pain is better today and his wife states he has not required pain medication since yesterday morning. He states his pain is primarily located in his right hip, posterior thigh and calf, which was present prior to admission. After further questioning, he does have some new pain in his left anterior thigh. He denies numbness/tingling. Continues to have bilateral lower extremity weakness.     Vital Signs  Blood pressure 177/81, pulse 81, temperature 97.9 °F (36.6 °C), temperature source Oral, resp. rate 20, height 170.2 cm (67.01\"), weight 117 kg (257 lb), SpO2 96 %.    Physical Exam:  Awake and somewhat alert, requires frequent cues to answer questions and participate in exam   Word finding difficulties, easily distracted  Lower extremity weakness, can flicker toes and has a weak plantarflexion bilaterally   Unable to dorsiflex, unable to lift legs off bed  Distal LE weakness is greater than proximal. Patient able to weakly flex at hips and bend knees up off bed   Exam is limited by effort      Results Review:      I/O last 3 completed shifts:  In: 1434 [P.O.:1434]  Out: -   No intake/output data recorded.      ASSESSMENT/PLAN:   Patient's wife states he has had an MRI in the past. If MRI is not an option, the patient will need a CT Myelogram in the future when he is medically stable. At this time, patient is not a surgical candidate. Follow up with neurosurgery outpatient when stable/discharged for full workup. Continue with PT/OT.     Rimma Pitt PA-C  02/19/19  11:56 AM    "

## 2019-02-19 NOTE — PROGRESS NOTES
Adult Nutrition  Assessment/PES    Patient Name:  Rod Balderas  YOB: 1950  MRN: [id number]  Admit Date:  2/8/2019    Assessment Date:  2/19/2019        Reason for Assessment     Row Name 02/19/19 1402          Reason for Assessment    Reason For Assessment  follow-up protocol 30min     Diagnosis  -- per notes this adm                   Nutrition Prescription Ordered     Row Name 02/19/19 1406 02/19/19 1403       Nutrition Prescription PO    Supplement  --  Boost Glucose Control (Glucerna Shake)    Supplement Frequency  -- pt states he has been drinking supplements when he receives them. RD SPoke to  catering [employer] to assure pt receives supplements and is able to select food choices at meals.  -- 4x/d    Common Modifiers  --  Consistent Carbohydrate with 2000ml/d fluid restriction        Evaluation of Received Nutrient/Fluid Intake     Row Name 02/19/19 1407 02/19/19 1404       PO Evaluation    Number of Meals  --  4    % PO Intake  -- pt states he has not been able to consistently select food choices at meals.  38              Problem/Interventions:  Problem 1     Row Name 02/19/19 1407          Nutrition Diagnoses Problem 1    Problem 1  Inadequate Intake/Infusion     Etiology (related to)  MNT for Treatment/Condition     Signs/Symptoms (evidenced by)  PO Intake     Percent (%) intake recorded  38 %     Over number of meals  4                 Intervention Goal     Row Name 02/19/19 1408          Intervention Goal    PO  Increase intake         Nutrition Intervention     Row Name 02/19/19 1408          Nutrition Intervention    RD/Tech Action  Interview for preference;Follow Tx progress;Supplement provided;Encourage intake encourage supplement use and assure that pt receives supplements daily         Nutrition Prescription     Row Name 02/19/19 1405          Nutrition Prescription PO    PO Prescription  Begin/change supplement     Supplement Frequency  3 times a day     New PO  Prescription Ordered?  Yes         Education/Evaluation     Row Name 02/19/19 1408          Monitor/Evaluation    Monitor  Per protocol           Electronically signed by:  Suzanne Sheth MS,RD,LD  02/19/19 2:08 PM

## 2019-02-19 NOTE — PLAN OF CARE
Problem: Patient Care Overview  Goal: Plan of Care Review  Outcome: Ongoing (interventions implemented as appropriate)   02/19/19 7858   Coping/Psychosocial   Plan of Care Reviewed With patient   Plan of Care Review   Progress no change   OTHER   Outcome Summary patient having difficulty following direction for static sitting balance. Max asist to reach edge of chair and maintain balance, B LE exercises performed.

## 2019-02-19 NOTE — PLAN OF CARE
Problem: Patient Care Overview  Goal: Plan of Care Review  Outcome: Ongoing (interventions implemented as appropriate)   02/19/19 0223   Coping/Psychosocial   Plan of Care Reviewed With patient   Plan of Care Review   Progress improving

## 2019-02-19 NOTE — THERAPY TREATMENT NOTE
Acute Care - Occupational Therapy Treatment Note  [facility]     Patient Name: Rod Balderas  : 1950  MRN: [id number]  Today's Date: 2019  Onset of Illness/Injury or Date of Surgery: 19  Date of Referral to OT: 19  Referring Physician: MD Aurea    Admit Date: 2019       ICD-10-CM ICD-9-CM   1. Impaired functional mobility, balance, gait, and endurance Z74.09 V49.89   2. Impaired mobility and ADLs Z74.09 799.89     Patient Active Problem List   Diagnosis   • Degenerative disc disease, lumbar   • Spinal stenosis, lumbar region, with neurogenic claudication   • Neuroforaminal stenosis of spine   • Lumbar facet arthropathy   • CAD (coronary artery disease)   • Essential hypertension   • Physical deconditioning   • Morbid obesity due to excess calories (CMS/HCC)   • TANIYA on CPAP   • Displacement of lumbar intervertebral disc   • Dyslipidemia   • Osteoarthritis   • At high risk for falls   • Prostate cancer , s/p prostatectomy ( RALP)   • DM (diabetes mellitus), type 2 (CMS/HCC)   • Leukocytosis, mild, likely reactive   • Acute postoperative pain   • Acute blood loss anemia, mild, asymptomatic   • Renal failure   • Metabolic encephalopathy   • Paroxysmal atrial fibrillation (CMS/HCC)   • Sepsis (CMS/HCC)   • Moderate aortic regurgitation     Past Medical History:   Diagnosis Date   • Anxiety and depression    • Cancer (CMS/HCC)    • Chest pain    • Coronary artery disease    • Diabetes mellitus (CMS/HCC)    • Dyslipidemia    • Extremity pain    • Heart disease    • History of transfusion    • Hypertension    • Low back pain    • TANIYA on CPAP     2-3    • Osteoarthritis     Diffuse osteoarthritis.   • Panic attacks    • Prostate cancer (CMS/HCC)      Past Surgical History:   Procedure Laterality Date   • CARDIAC CATHETERIZATION     • CHOLECYSTECTOMY     • COLONOSCOPY     • CORONARY ANGIOPLASTY WITH STENT PLACEMENT  2012:  A 3.5 x 88 Promus VERONICA stent to OM2.   Nonobstructive disease.  Otherwise normal LVEF.   • EYE SURGERY Bilateral     cataract   • INSERTION HEMODIALYSIS CATHETER N/A 2/8/2019    Procedure: HEMODIALYSIS CATHETER INSERTION RIGHT INTERNAL JUGULAR;  Surgeon: Bishnu Dailey MD;  Location: [facility] OR;  Service: General   • PROSTATECTOMY N/A 1/15/2019    Procedure: ROBOTIC PROSTATECTOMY WITH NODES;  Surgeon: Juanito Grace Jr., MD;  Location: [facility] OR;  Service: DaVinci   • RETINAL DETACHMENT SURGERY Right        Therapy Treatment    Rehabilitation Treatment Summary     Row Name 02/19/19 1117             Treatment Time/Intention    Discipline  occupational therapist  -KF      Document Type  therapy note (daily note)  -KF      Subjective Information  complains of;pain;fatigue with movements   -KF      Mode of Treatment  individual therapy;occupational therapy  -KF      Patient/Family Observations  family present throughout, Pt supine, RN cleared, vitals stable alertness limited   -KF      Care Plan Review  evaluation/treatment results reviewed;care plan/treatment goals reviewed;risks/benefits reviewed;current/potential barriers reviewed;patient/other agree to care plan  -KF      Care Plan Review, Other Participant(s)  spouse  -KF      Patient Effort  fair  -KF      Existing Precautions/Restrictions  fall  -KF      Treatment Considerations/Comments  pain only with LE movements and decreased alertness at times  -KF      Patient Response to Treatment  tolerated  -KF      Recorded by [KF] Kaye Lyon OT 02/19/19 1149      Row Name 02/19/19 1117             Vital Signs    Pre Systolic BP Rehab  177  -KF      Pre Treatment Diastolic BP  81 RN cleared vitals stable   -KF      Pretreatment Heart Rate (beats/min)  87  -KF      Pre SpO2 (%)  96  -KF      O2 Delivery Pre Treatment  room air  -KF      Pre Patient Position  Supine  -KF      Intra Patient Position  Sitting  -KF      Post Patient Position  Sitting  -KF      Recorded by [KF] Sonali  Kaye MARRERO, OT 02/19/19 1149      Row Name 02/19/19 1117             Cognitive Assessment/Intervention    Additional Documentation  Cognitive Assessment/Intervention (Group)  -KF      Recorded by [KF] Kaye Lyon, OT 02/19/19 1149      Row Name 02/19/19 1117             Cognitive Assessment/Intervention- PT/OT    Affect/Mental Status (Cognitive)  low arousal/lethargic  -KF      Orientation Status (Cognition)  oriented to;person;place;disoriented to;time  -KF      Follows Commands (Cognition)  follows one step commands;50-74% accuracy;physical/tactile prompts required;repetition of directions required;verbal cues/prompting required  -KF      Cognitive Function (Cognitive)  attention deficit;safety deficit  -KF      Attention Deficit (Cognitive)  moderate deficit;concentration  -KF      Safety Deficit (Cognitive)  moderate deficit;insight into deficits/self awareness;awareness of need for assistance;safety precautions awareness  -KF      Cognitive Interventions (Cognitive)  occupation/activity based interventions;process/task specific training  -KF      Personal Safety Interventions  fall prevention program maintained;muscle strengthening facilitated;nonskid shoes/slippers when out of bed  -KF      Recorded by [KF] Kaye Lyon, OT 02/19/19 1149      Row Name 02/19/19 1117             Safety Issues, Functional Mobility    Safety Issues Affecting Function (Mobility)  friction/shear risk;awareness of need for assistance;safety precaution awareness;sequencing abilities  -KF      Impairments Affecting Function (Mobility)  balance;cognition;strength;endurance/activity tolerance  -KF      Recorded by [KF] Kaye Lyon, OT 02/19/19 1149      Row Name 02/19/19 1117             Bed Mobility Assessment/Treatment    Comment (Bed Mobility)  lift to chair, sling underneath Pt upon entry   -KF      Recorded by [KF] Kaye Lyon, OT 02/19/19 1149      Row Name 02/19/19 1117             Functional Mobility     Functional Mobility- Ind. Level  not tested  -KF      Functional Mobility- Comment  deferred to PT  -KF      Recorded by [KF] Kaye Lyon, OT 02/19/19 1149      Row Name 02/19/19 1117             Transfer Assessment/Treatment    Transfer Assessment/Treatment  bed-chair transfer  -KF      Comment (Transfers)  dependent transfer, deferred transfer training to PT  -KF      Recorded by [KF] Kaye Lyon, OT 02/19/19 1149      Row Name 02/19/19 1117             Bed-Chair Transfer    Bed-Chair [place] (Transfers)  dependent (less than 25% patient effort);2 person assist  -KF      Assistive Device (Bed-Chair Transfers)  mechanical lift/aid  -KF      Recorded by [KF] Kaye Lyon, OT 02/19/19 1149      Row Name 02/19/19 1117             BADL Safety/Performance    Impairments, BADL Safety/Performance  balance;cognition;endurance/activity tolerance;strength;trunk/postural control  -KF      Skilled BADL Treatment/Intervention  cognitive/safety deficit modifications  -KF      Recorded by [KF] Kaye Lyon, OT 02/19/19 1149      Row Name 02/19/19 1117             Motor Skills Assessment/Interventions    Additional Documentation  Balance (Group);Balance Interventions (Group);Therapeutic Exercise (Group);Therapeutic Exercise Interventions (Group)  -KF      Recorded by [KF] Kaye Lyon, OT 02/19/19 1149      Row Name 02/19/19 1117             Therapeutic Exercise    Therapeutic Exercise  seated, upper extremities;supine, upper extremities  -KF      Additional Documentation  Therapeutic Exercise (Row)  -KF      Recorded by [KF] Kaye Lyon, OT 02/19/19 1149      Row Name 02/19/19 1117             Upper Extremity Seated Therapeutic Exercise    Performed, Seated Upper Extremity (Therapeutic Exercise)  shoulder flexion/extension;digit flexion/extension  -KF      Exercise Type, Seated Upper Extremity (Therapeutic Exercise)  AAROM (active assistive range of motion)  -KF      Expected Outcomes,  Seated Upper Extremity (Therapeutic Exercise)  improve functional tolerance, self-care activity;improve performance, BADLs;strengthen normal movement patterns  -KF      Sets/Reps Detail, Seated Upper Extremity (Therapeutic Exercise)  1/10  -KF      Comment, Seated Upper Extremity (Therapeutic Exercise)  gentle AAROM R hand, Lshoulder limited available range completed AAROM in pain free range  -KF      Recorded by [KF] Kaye Lyon, OT 02/19/19 1149      Row Name 02/19/19 1117             Upper Extremity Supine Therapeutic Exercise    Performed, Supine Upper Extremity (Therapeutic Exercise)  elbow flexion/extension  -KF      Exercise Type, Supine Upper Extremity (Therapeutic Exercise)  AROM (active range of motion);AAROM (active assistive range of motion)  -KF      Expected Outcomes, Supine Upper Extremity (Therapeutic Exercise)  improve performance, BADLs;improve functional tolerance, self-care activity  -KF      Sets/Reps Detail, Supine Upper Extremity (Therapeutic Exercise)  1/10  -KF      Comment, Supine Upper Extremity (Therapeutic Exercise)  alternating UEs  -KF      Recorded by [KF] Kaye Lyon, OT 02/19/19 1149      Row Name 02/19/19 1117             Balance    Balance  static sitting balance  -KF      Recorded by [KF] Kaye Lyon, OT 02/19/19 1149      Row Name 02/19/19 1117             Static Sitting Balance    Level of [place] (Supported Sitting, Static Balance)  supervision  -KF      Sitting Position (Supported Sitting, Static Balance)  sitting in chair  -KF      Time Able to Maintain Position (Supported Sitting, Static Balance)  more than 5 minutes  -KF      Recorded by [KF] Kaye Lyon, OT 02/19/19 1149      Row Name 02/19/19 1117             Positioning and Restraints    Pre-Treatment Position  in bed  -KF      Post Treatment Position  chair  -KF      In Chair  notified nsg;reclined;sitting;call light within reach;encouraged to call for assist;with family/caregiver;legs  elevated;heels elevated;waffle cushion;on mechanical lift sling  -KF      Recorded by [KF] Kaye Lyon, OT 02/19/19 1149      Row Name 02/19/19 1117             Pain Assessment    Additional Documentation  Pain Scale: FACES Pre/Post-Treatment (Group)  -KF      Recorded by [KF] Kaye Lyon, OT 02/19/19 1149      Row Name 02/19/19 1117             Pain Scale: Numbers Pre/Post-Treatment    Pain Location - Orientation  generalized  -KF      Pain Location  abdomen  -KF      Pre/Post Treatment Pain Comment  tolerated  -KF      Pain Intervention(s)  Repositioned;Ambulation/increased activity  -KF      Recorded by [KF] Kaye Lyon, OT 02/19/19 1149      Row Name 02/19/19 1117             Pain Scale: FACES Pre/Post-Treatment    Pain: FACES Scale, Pretreatment  2-->hurts little bit  -KF      Pain: FACES Scale, Post-Treatment  2-->hurts little bit  -KF      Pre/Post Treatment Pain Comment  with movements, repositioning in chair c/o abd pain but the no pain after   -KF      Recorded by [KF] Kaye Lyon, OT 02/19/19 1149      Row Name                Wound 02/08/19 1519 Other (See comments) neck incision    Wound - Properties Group Date first assessed: 02/08/19 [SM] Time first assessed: 1519 [SM] Side: Other (See comments) [SM] Location: neck [SM] Type: incision [SM] Recorded by:  [SM] Rissa Rodriguez RN 02/08/19 1519    Row Name                Wound 02/08/19 1800 Right lower gluteal pressure injury    Wound - Properties Group Date first assessed: 02/08/19 [AB] Time first assessed: 1800 [AB] Present On Admission : yes [AB] Side: Right [AB] Orientation: lower [AB] Location: gluteal [AB] Type: pressure injury [AB] Stage, Pressure Injury: deep tissue injury [AB] Additional Comments: bilateral gluteals [MR] Recorded by:  [AB] Addison Jasso RN 02/08/19 1855 [MR] Carmen Conner RN 02/09/19 1227    Row Name                Wound 02/09/19 1116 Bilateral medial thigh    Wound - Properties Group Date first  assessed: 02/09/19 [MR] Time first assessed: 1116 [MR] Present On Admission : yes [MR] Side: Bilateral [MR] Orientation: medial [MR] Location: thigh [MR] Stage, Pressure Injury: deep tissue injury [MR] Recorded by:  [MR] Carmen Conner RN 02/09/19 1228    Row Name                Wound 02/13/19 0800 Left lower gluteal pressure injury    Wound - Properties Group Date first assessed: 02/13/19 [JM] Time first assessed: 0800 [JM] Side: Left [JM] Orientation: lower [JM] Location: gluteal [JM] Type: pressure injury [JM] Stage, Pressure Injury: deep tissue injury [JM] Recorded by:  [JM] Ivon Eason RN 02/13/19 1353    Row Name                Wound 02/15/19 1800 Right upper chest puncture    Wound - Properties Group Date first assessed: 02/15/19 [CH] Time first assessed: 1800 [CH] Side: Right [CH] Orientation: upper [CH] Location: chest [CH] Type: puncture [CH] Additional Comments: s/p tunneled dialysis catheter [CH] Recorded by:  [CH] Taco Templeton RN 02/15/19 1822    Row Name                Wound 02/17/19 0800 scrotum abrasion;MASD (moisture associated skin damage)    Wound - Properties Group Date first assessed: 02/17/19 [CP] Time first assessed: 0800 [CP] Present On Admission : no [CP] Location: scrotum [CP] Type: abrasion;MASD (moisture associated skin damage) [CP] Recorded by:  [CP] Janine Juan RN 02/17/19 0953    Row Name 02/19/19 1117             Outcome Summary/Treatment Plan (OT)    Daily Summary of Progress (OT)  progress towards functional goals is fair  -KF      Recorded by [KF] aKye Lyon, OT 02/19/19 1149        User Key  (r) = Recorded By, (t) = Taken By, (c) = Cosigned By    Initials Name Effective Dates Discipline    [facility] Taco Templeton RN 06/16/16 -  DIALYSIS USER    Rissa Bolden RN 06/16/16 -  Nurse    Carmen Nelson RN 06/16/16 -  Nurse    Ivon Gutierrez RN 06/16/16 -  Nurse    Janine Pace RN 06/16/16 -  Nurse    Addison Jaffe RN 02/02/17 -   Nurse    Kaye Willard, OT 04/03/18 -  OT        Rash 02/12/19 2000 Left upper arm macular (Active)   Distribution regional 2/19/2019  8:00 AM   Configuration/Shape asymmetric 2/19/2019  8:00 AM   Borders diffuse;irregular 2/19/2019  8:00 AM   Characteristics crusted;dry 2/19/2019  8:00 AM   Color red 2/19/2019  8:00 AM   Care, Rash open to air 2/19/2019  8:00 AM       Rash 02/12/19 2000 Left posterior hand macular (Active)   Distribution localized 2/19/2019  8:00 AM   Configuration/Shape asymmetric 2/19/2019  8:00 AM   Borders diffuse;irregular 2/19/2019  8:00 AM   Characteristics dry;crusted 2/19/2019  8:00 AM   Color purple;red 2/19/2019  8:00 AM   Care, Rash open to air 2/19/2019  8:00 AM       Wound 02/08/19 1519 Other (See comments) neck incision (Active)   Dressing Appearance open to air 2/19/2019  8:00 AM   Closure Adhesive closure strips 2/19/2019  8:00 AM   Base clean;dry;scab 2/19/2019  8:00 AM   Periwound intact;dry;pink;blanchable 2/19/2019  8:00 AM   Periwound Temperature warm 2/19/2019  8:00 AM   Periwound Skin Turgor soft 2/19/2019  8:00 AM   Drainage Amount none 2/19/2019  8:00 AM   Periwound Care, Wound dry periwound area maintained 2/19/2019  8:00 AM       Wound 02/08/19 1800 Right lower gluteal pressure injury (Active)   Dressing Appearance open to air 2/19/2019  8:00 AM   Closure None 2/19/2019  8:00 AM   Base dry;purple;maroon/purple 2/19/2019  8:00 AM   Periwound dry;pink;redness;blanchable 2/19/2019  8:00 AM   Periwound Temperature warm 2/19/2019  8:00 AM   Periwound Skin Turgor soft 2/19/2019  8:00 AM   Drainage Amount none 2/19/2019  8:00 AM   Care, Wound cleansed with;barrier applied 2/19/2019  8:00 AM   Dressing Care, Wound open to air 2/19/2019  8:00 AM   Periwound Care, Wound topical treatment applied;barrier ointment applied 2/19/2019  8:00 AM       Wound 02/09/19 1116 Bilateral medial thigh (Active)   Dressing Appearance open to air 2/19/2019  8:00 AM   Closure None 2/19/2019   8:00 AM   Base dry;red/granulating 2/19/2019  8:00 AM   Periwound intact;dry;redness;blanchable 2/19/2019  8:00 AM   Drainage Amount none 2/19/2019  8:00 AM   Care, Wound cleansed with;barrier applied 2/19/2019  8:00 AM   Periwound Care, Wound barrier ointment applied 2/19/2019  8:00 AM       Wound 02/13/19 0800 Left lower gluteal pressure injury (Active)   Dressing Appearance open to air 2/19/2019  8:00 AM   Base dry;maroon/purple;purple 2/19/2019  8:00 AM   Periwound intact;dry;pink;blanchable 2/19/2019  8:00 AM   Periwound Skin Turgor soft 2/18/2019  2:45 PM   Drainage Amount none 2/19/2019  8:00 AM   Care, Wound irrigated with;sterile normal saline;ultrasound therapy, non contact low frequency 2/18/2019  2:45 PM   Dressing Care, Wound open to air 2/19/2019  8:00 AM   Periwound Care, Wound barrier ointment applied 2/19/2019  8:00 AM       Wound 02/15/19 1800 Right upper chest puncture (Active)   Dressing Appearance dry;intact;no drainage 2/19/2019  8:00 AM   Closure SARAHI 2/19/2019  8:00 AM   Base clean;dry;pink 2/19/2019  8:00 AM   Periwound intact;dry;pink 2/19/2019  8:00 AM   Edges open 2/19/2019  8:00 AM   Drainage Amount none 2/19/2019  8:00 AM   Dressing Care, Wound gauze;transparent film 2/19/2019  8:00 AM   Periwound Care, Wound dry periwound area maintained 2/19/2019  8:00 AM       Wound 02/17/19 0800 scrotum abrasion;MASD (moisture associated skin damage) (Active)   Dressing Appearance open to air 2/19/2019  8:00 AM   Closure None 2/19/2019  8:00 AM   Base moist;pink;white 2/19/2019  8:00 AM   Periwound moist;pink;redness;blanchable 2/19/2019  8:00 AM   Periwound Temperature warm 2/19/2019  8:00 AM   Periwound Skin Turgor soft 2/19/2019  8:00 AM   Edges open 2/19/2019  8:00 AM   Care, Wound cleansed with 2/19/2019  8:00 AM   Periwound Care, Wound dry periwound area maintained 2/19/2019  8:00 AM       Occupational Therapy Education     Title: PT OT SLP Therapies (In Progress)     Topic: Occupational  Therapy (In Progress)     Point: ADL training (In Progress)     Description: Instruct learner(s) on proper safety adaptation and remediation techniques during self care or transfers.   Instruct in proper use of assistive devices.    Learning Progress Summary           Patient Acceptance, E, NR by CL at 2/14/2019  4:34 PM    Comment:  Pt educated on appropriate safety precautions, t/f techniques, role of OT, and benefits of therapy.    Acceptance, E, NR by CL at 2/12/2019  3:03 PM    Comment:  Pt educated on appropriate safety precautions, t/f techniques, positioning, and benefits of therapy.   Family Acceptance, E, NR by CL at 2/14/2019  4:34 PM    Comment:  Pt educated on appropriate safety precautions, t/f techniques, role of OT, and benefits of therapy.    Acceptance, E, NR by CL at 2/12/2019  3:03 PM    Comment:  Pt educated on appropriate safety precautions, t/f techniques, positioning, and benefits of therapy.                   Point: Home exercise program (Done)     Description: Instruct learner(s) on appropriate technique for monitoring, assisting and/or progressing therapeutic exercises/activities.    Learning Progress Summary           Patient Acceptance, E,TB,D, VU,DU by KF at 2/19/2019 11:17 AM    Comment:  BUE AAROM HEP    Acceptance, E, NR by CL at 2/14/2019  4:34 PM    Comment:  Pt educated on appropriate safety precautions, t/f techniques, role of OT, and benefits of therapy.   Family Acceptance, E,TB,D, VU,DU by KF at 2/19/2019 11:17 AM    Comment:  BUE AAROM HEP    Acceptance, E, NR by CL at 2/14/2019  4:34 PM    Comment:  Pt educated on appropriate safety precautions, t/f techniques, role of OT, and benefits of therapy.                   Point: Precautions (Done)     Description: Instruct learner(s) on prescribed precautions during self-care and functional transfers.    Learning Progress Summary           Patient Acceptance, E,TB,D, VU,DU by KF at 2/19/2019 11:17 AM    Comment:  BUE AAROM HEP     Acceptance, E, NR by CL at 2/14/2019  4:34 PM    Comment:  Pt educated on appropriate safety precautions, t/f techniques, role of OT, and benefits of therapy.    Acceptance, E, NR by CL at 2/12/2019  3:03 PM    Comment:  Pt educated on appropriate safety precautions, t/f techniques, positioning, and benefits of therapy.   Family Acceptance, E,TB,D, VU,DU by KF at 2/19/2019 11:17 AM    Comment:  NBA JALLOH HEP    Acceptance, E, NR by CL at 2/14/2019  4:34 PM    Comment:  Pt educated on appropriate safety precautions, t/f techniques, role of OT, and benefits of therapy.    Acceptance, E, NR by CL at 2/12/2019  3:03 PM    Comment:  Pt educated on appropriate safety precautions, t/f techniques, positioning, and benefits of therapy.                   Point: Body mechanics (Done)     Description: Instruct learner(s) on proper positioning and spine alignment during self-care, functional mobility activities and/or exercises.    Learning Progress Summary           Patient Acceptance, E,TB,D, VU,DU by KF at 2/19/2019 11:17 AM    Comment:  NBA JALLOH HEP    Acceptance, E, NR by CL at 2/14/2019  4:34 PM    Comment:  Pt educated on appropriate safety precautions, t/f techniques, role of OT, and benefits of therapy.    Acceptance, E, NR by CL at 2/12/2019  3:03 PM    Comment:  Pt educated on appropriate safety precautions, t/f techniques, positioning, and benefits of therapy.   Family Acceptance, E,TB,D, VU,DU by KF at 2/19/2019 11:17 AM    Comment:  NBA JALLOH HEP    Acceptance, E, NR by CL at 2/14/2019  4:34 PM    Comment:  Pt educated on appropriate safety precautions, t/f techniques, role of OT, and benefits of therapy.    Acceptance, E, NR by CL at 2/12/2019  3:03 PM    Comment:  Pt educated on appropriate safety precautions, t/f techniques, positioning, and benefits of therapy.                               User Key     Initials Effective Dates Name Provider Type Discipline     04/03/18 -  Cassie Reardon OT Occupational  Therapist OT     04/03/18 -  Kaye Lyon OT Occupational Therapist OT                OT Recommendation and Plan  Outcome Summary/Treatment Plan (OT)  Daily Summary of Progress (OT): progress towards functional goals is fair  Daily Summary of Progress (OT): progress towards functional goals is fair  Plan of Care Review  Plan of Care Reviewed With: family, patient  Plan of Care Reviewed With: family, patient  Outcome Summary: Pt dependent transfer to chair via lift, tolerated AAROM/AROM BUE HEP in gentle range limited by activity tolerance, pain with movements, and strength cont; cont IPOT per POC  Outcome Measures     Row Name 02/19/19 1117             How much help from another is currently needed...    Putting on and taking off regular lower body clothing?  1  -KF      Bathing (including washing, rinsing, and drying)  1  -KF      Toileting (which includes using toilet bed pan or urinal)  1  -KF      Putting on and taking off regular upper body clothing  1  -KF      Taking care of personal grooming (such as brushing teeth)  2  -KF      Eating meals  2  -KF      Score  8  -KF         Functional Assessment    Outcome Measure Options  AM-PAC 6 Clicks Daily Activity (OT)  -KF        User Key  (r) = Recorded By, (t) = Taken By, (c) = Cosigned By    Initials Name Provider Type    KF Kaye Lyon, OT Occupational Therapist           Time Calculation:   Time Calculation- OT     Row Name 02/19/19 1117             Time Calculation- OT    OT Start Time  1117  -KF      Total Timed Code Minutes- OT  23 minute(s)  -KF      OT Received On  02/19/19  -KF      OT Goal Re-Cert Due Date  02/22/19  -KF         Timed Charges    90211 - OT Therapeutic Exercise Minutes  18  -KF      21510 - OT Therapeutic Activity Minutes  5  -KF        User Key  (r) = Recorded By, (t) = Taken By, (c) = Cosigned By    Initials Name Provider Type    KF Kaye Lyon, OT Occupational Therapist           Therapy Suggested Charges      Code   Minutes Charges    93404 (CPT®) Hc Ot Neuromusc Re Education Ea 15 Min      00859 (CPT®) Hc Ot Ther Proc Ea 15 Min 18 1    65549 (CPT®) Hc Ot Therapeutic Act Ea 15 Min 5 1    92057 (CPT®) Hc Ot Manual Therapy Ea 15 Min      65819 (CPT®) Hc Ot Iontophoresis Ea 15 Min      34604 (CPT®) Hc Ot Elec Stim Ea-Per 15 Min      35663 (CPT®) Hc Ot Ultrasound Ea 15 Min      64434 (CPT®) Hc Ot Self Care/Mgmt/Train Ea 15 Min      Total  23 2        Therapy Charges for Today     Code Description Service Date Service Provider Modifiers Qty    33036959604 HC OT THER PROC EA 15 MIN 2/19/2019 Kaye Lyon, OT GO 1    93980528689 HC OT THERAPEUTIC ACT EA 15 MIN 2/19/2019 Kaye Lyon OT GO 1               Kaye Lyon OT  2/19/2019

## 2019-02-19 NOTE — PLAN OF CARE
Problem: Patient Care Overview  Goal: Plan of Care Review  Outcome: Ongoing (interventions implemented as appropriate)   02/19/19 1117   Coping/Psychosocial   Plan of Care Reviewed With family;patient   Plan of Care Review   Progress improving   OTHER   Outcome Summary Pt dependent transfer to chair via lift, tolerated AAROM/AROM BUE HEP in gentle range limited by activity tolerance, pain with movements, and strength cont; cont IPOT per POC

## 2019-02-19 NOTE — PROGRESS NOTES
[facility] Progress Note      Antibiotics:  Anti-Infectives (From admission, onward)    Ordered     Dose/Rate Route Frequency Start Stop    02/14/19 1149  ceFAZolin in dextrose (ANCEF) IVPB solution 2 g     Ordering Provider:  Laurie Menchaca RPH    2 g  over 30 Minutes Intravenous Every 8 Hours 02/14/19 1300 02/22/19 1259    02/09/19 0849  aztreonam (AZACTAM) 2 g in sodium chloride 0.9 % 100 mL IVPB-MBP     Ordering Provider:  Efrem Santana MD    2 g  200 mL/hr over 30 Minutes Intravenous Once 02/09/19 1000 02/09/19 0946          CC: Patient unable    HPI  2/8/19: Patient is a 68 y.o.  Yr old male with history of prostate cancer status post robotic laparoscopic prostatectomy in mid January and was discharged on 1/17/19. The patient is currently unresponsive and no family is at bedside so history is somewhat limited to history from nursing staff and medical records. The patient was seen a few days after his discharge from his recent  Admission in January and his Christina catheter was discontinued.  About one week ago he went to the ER with complaints of back pain, abdominal pain, and confusion and a CT abdomen and pelvis was apparently okay during that time. He continued to worsen and over the weekend he was admitted to [facility].  He was found to have an initial AK I with a creatinine of 2.2. Per the micro-lab at [facility], the patient's urine cultures and 1 of 2 blood cultures from 2/5/19 grew MSSA. The patient was initially on vancomycin and Zosyn and was subsequently transitioned to daptomycin after he developed worsening renal function with a creatinine up to 5.6 and BUN went up to 94 today.  Due to lack of nephrology at [facility], the patient was transferred to [facility] today for possible dialysis.  He additionally received a dose of nafcillin prior to this transfer.  His last dose of daptomycin was yesterday per pharmacist here (who has discussed with  OSH). Since arrival, the patient has remained afebrile but he has had a leukocytosis up to 31.38.  He is anemic with a hemoglobin of 11.3 and a hematocrit of 54.1.  He is hyponatremic to a sodium of 127.  Creatinine was initially elevated 5.39 and B1 was 97 on arrival.  He additionally had elevation of his LFTs with an ALT of 43 and AST of 43, T bili of 3.0, and alkaline phosphatase of 303.  Pro-calcitonin was elevated to 7.55 lactic acid was normal at 0.8. The patient underwent CT head without contrast which did not show any acute abnormality, CT chest, abdomen and pelvis which showed consolidation of the lung bases bilaterally that is concerning for airspace disease such as pneumonia, gastric distention, and air-filled loops of colon with no obvious obstruction. Blood cultures have been ordered and a reflexive urinalysis has been ordered as well.  A transthoracic echocardiogram was ordered. The patient underwent dialysis today following right IJ dialysis catheter placement.  The infectious disease team has been consulted for antibiotic recommendations.    He has methicillin sensitive staph aureus septicemia/bacteriuria compounded by acute renal failure, acute hypoxic respiratory failure and by basilar consolidation by chest CT.  Further compounded by acute encephalopathy.    2/9/19 nursing reports A. fib/RVR, dialysis ongoing with acute renal failure and tachypneic/labored at times.  Some cough but unable to capture for culture so far. Empiric HCAP coverage added with zyvox/azactam/flagyl; unable to do MRI of spine so CT's done cervical/thoracic/lumbar spine done 2/10    2/10/19 CT scans of spine; more restful, less labored with breathing and decreased cough;  No rash.  He can wiggle toes but not lift legs off the bed.  He moves left arm better than right arm.  No other new focal pain that he will relate although interaction minimal.  No diarrhea.  No vomiting.    2/11/19: seems to have improved from mental status  standpoint slightly per his wife and son who are at bedside. Patient endorses abdominal pain but is not answering a lot of questions. tMAX 99.3f. WBC remains elevated at 20K (down from 31K on arrival). TTE was without evidence of IE but valves poorly visualized. Still getting HD.    2/12/19: Patient is starting to feel much better today.  He is responding to a lot of questions and is more alert sitting up in a bedside chair.  Breathing is improving.  No high fevers overnight.  Leukocytosis improved to 15.9 today.  He continues to urinate a small amount but remains on intermittent dialysis.    2/13/19: Feeling about the same as he was yesterday.  Still having some right wrist pain. Shortness of breath is stable. No fevers. Leukocytosis slightly worse today.    2/14/19: Patient is somnolent today after his KLAUDIA due to recent sedation. Procedure went well per his family and he is back in NSR and no signs of vegetation but procedure report is pending. No fevers. Leukocytosis slightly improved from yesterday. Family reports that the patient has had no new complaints. He was evaluated by Dr. Ro and no significant concern for septic arthritis.    2/16/19: I reviewed his complex medical records and discussed his complex situation with Dr. Jeff Smith.  His hemodialysis catheter was removed late yesterday.  He has been afebrile over the past 24 hours.  He denies abdominal pain.  He has a history of metal fragment between his frontal lobes according to his son.  This was apparently only recently identified on a CT scan prior to his admission here.  He underwent an MRI scan approximately 2 years ago without complications and the son indicates the metal fragment would have been there at that time.  He is having difficulty moving his legs but this is a chronic problem according to his son.  He was able to ambulate at home with assistance.    2/17/19:  He had a low-grade fever to 100.2 last evening.  He still has  "significant bilateral lower extremity weakness but has a history of spinal stenosis and was weak prior to admission.  He was able to ambulate with assistance prior to admission.  He denies lower extremity numbness.  He denies nausea and vomiting.  He has some mild loose stool.    2/18/19: The patient continued to have low-grade fevers to 100.7 over the last 24 hours.  He does still have significant bilateral lower extremity weakness and is complaining about some back pain.  No numbness.  He is also having some right wrist pain still.  Having some mild abdominal pain.  No nausea or vomiting.    2/19/19: The patient continues to have significant lower extremity weakness.  No fevers overnight. No diarrhea or nausea.  Neurosurgery has evaluated the patient and he is not a good surgical candidate at this time but may need a CT myelogram in the coming days.    PE:   /81   Pulse 81   Temp 97.9 °F (36.6 °C) (Oral)   Resp 20   Ht 170.2 cm (67.01\")   Wt 117 kg (257 lb)   SpO2 96%   BMI 40.24 kg/m²     GENERAL: ill appearing, obese,  man. NAD. Lying in bed  HEENT: Normocephalic, atraumatic. No conjunctival injection. No icterus.   NECK: Supple without nuchal rigidity. No mass.  Chest: right chest dialysis cath is now out.    HEART: irregular rhythm. No murmur, rubs, gallops.  LUNGS: Diminished at bases, labored and bilateral rhonchi. On nasal cannula  ABDOMEN: Soft, nontender, nondistended. Positive bowel sounds. No rebound or guarding. NO mass or HSM.  EXT:  No cyanosis, clubbing. No cord.  Edema noted over bilateral lower extremities. R>L upper extremity edema. erythema, warmth, and tenderness near right wrist seems to have improved somewhat.   : Genitalia generally unremarkable.    SKIN:.  Petechial appearing areas over upper extremities   NEURO: He is more alert and responsive.  He has severe bilateral lower extremity weakness but he can move his legs.  He has difficulty lifting his legs off of the " bed.  Sensation appears to be intact in his lower extremities.  IV with no obvious redness or drainage    Laboratory Data    Results from last 7 days   Lab Units 02/17/19  0709 02/16/19  0315 02/15/19  0545   WBC 10*3/mm3 15.18* 14.61* 17.14*   HEMOGLOBIN g/dL 9.9* 9.6* 9.9*   HEMATOCRIT % 30.2* 29.6* 30.6*   PLATELETS 10*3/mm3 364 306 256     Results from last 7 days   Lab Units 02/18/19  0400   SODIUM mmol/L 136   POTASSIUM mmol/L 4.0   CHLORIDE mmol/L 106   CO2 mmol/L 25.0   BUN mg/dL 47*   CREATININE mg/dL 1.04   GLUCOSE mg/dL 126*   CALCIUM mg/dL 8.0*     Results from last 7 days   Lab Units 02/17/19  0709   ALK PHOS U/L 178*   BILIRUBIN mg/dL 0.9   ALT (SGPT) U/L 10   AST (SGOT) U/L 18               Estimated Creatinine Clearance: 84.3 mL/min (by C-G formula based on SCr of 1.04 mg/dL).      Microbiology:  2/8/19 Urine culture: MSSA  2/9/19 sputum  Culture: Yeast isolated  2/8/19 blood culture ×2: No growth at 5 days  2/18/19 blood culture ×2: No growth to date    Radiology:  Imaging Results (last 72 hours)     Procedure Component Value Units Date/Time    XR Chest 1 View [[id number]] Updated:  02/09/19 0221    CT Abdomen Pelvis Without Contrast [[id number]] Collected:  02/08/19 1822     Updated:  02/08/19 1822    Narrative:       EXAMINATION: CT CHEST WO CONTRAST, CT ABDOMEN/PELVIS WO CONTRAST -  2/8/2019     INDICATION: Persistent cough.     TECHNIQUE: Multiple axial CT imaging is obtained of the chest, abdomen  and pelvis without the administration of oral or intravenous contrast.     The radiation dose reduction device was turned on for each scan per the  ALARA (As Low as Reasonably Achievable) protocol.     COMPARISON: There is some consolidation identified posteriorly extending  to the lung bases bilaterally suggesting infiltrates. The upper lung  fields are grossly clear. Motion artifact degrades image quality. There  are degenerative changes identified within the spine. The cardiac  chambers are enlarged.  There is no pericardial effusion. No bulky hilar  or axillary lymphadenopathy. The thyroid is homogeneous. Degenerative  change is seen throughout the spine.     ABDOMEN: Motion artifact grades image quality. There is no abnormality  seen within the liver. The spleen is upper limits of normal in size.  There is gastric distention. Air fills the colon with no  evidence of  obvious obstruction. Kidneys and adrenal glands within normal limits.  The pancreas is homogeneous. No abdominal or retroperitoneal  lymphadenopathy. No abnormal mass or fluid collections identified. No  free fluid or free air.     PELVIS: Pelvic organs are unremarkable. The pelvic portion of the   gastrointestinal tract is within normal limits. No pelvic adenopathy.  Degenerative change is identified throughout the spine and pelvis.     FINDINGS: Consolidation at the lung bases bilaterally is concerning for  airspace disease such as pneumonia. The there is gastric distention.  Air-filled loops of colon with no obvious obstruction. No other abnormal  findings are seen within the abdomen or pelvis. Degenerative changes are  seen throughout the spine and pelvis.     DICTATED:   2/8/2019  EDITED/ls :   2/8/2019              Impression:               CT Chest Without Contrast [[id number]] Collected:  02/08/19 1822     Updated:  02/08/19 1822    Narrative:       EXAMINATION: CT CHEST WO CONTRAST, CT ABDOMEN/PELVIS WO CONTRAST -  2/8/2019     INDICATION: Persistent cough.     TECHNIQUE: Multiple axial CT imaging is obtained of the chest, abdomen  and pelvis without the administration of oral or intravenous contrast.     The radiation dose reduction device was turned on for each scan per the  ALARA (As Low as Reasonably Achievable) protocol.     COMPARISON: There is some consolidation identified posteriorly extending  to the lung bases bilaterally suggesting infiltrates. The upper lung  fields are grossly clear. Motion artifact degrades image quality.  There  are degenerative changes identified within the spine. The cardiac  chambers are enlarged. There is no pericardial effusion. No bulky hilar  or axillary lymphadenopathy. The thyroid is homogeneous. Degenerative  change is seen throughout the spine.     ABDOMEN: Motion artifact grades image quality. There is no abnormality  seen within the liver. The spleen is upper limits of normal in size.  There is gastric distention. Air fills the colon with no  evidence of  obvious obstruction. Kidneys and adrenal glands within normal limits.  The pancreas is homogeneous. No abdominal or retroperitoneal  lymphadenopathy. No abnormal mass or fluid collections identified. No  free fluid or free air.     PELVIS: Pelvic organs are unremarkable. The pelvic portion of the   gastrointestinal tract is within normal limits. No pelvic adenopathy.  Degenerative change is identified throughout the spine and pelvis.     FINDINGS: Consolidation at the lung bases bilaterally is concerning for  airspace disease such as pneumonia. The there is gastric distention.  Air-filled loops of colon with no obvious obstruction. No other abnormal  findings are seen within the abdomen or pelvis. Degenerative changes are  seen throughout the spine and pelvis.     DICTATED:   2/8/2019  EDITED/ls :   2/8/2019              Impression:               CT Head Without Contrast [[id number]] Collected:  02/08/19 1816     Updated:  02/08/19 1817    Narrative:       EXAMINATION: CT HEAD WO CONTRAST - 2/8/2019     INDICATION: Mental status change, does not follow commands.     TECHNIQUE: Multiple axial CT imaging is obtained of the head from skull  base to skull vertex without the administration of intravenous contrast.     The radiation dose reduction device was turned on for each scan per the  ALARA (As Low as Reasonably Achievable) protocol.     COMPARISON: NONE     FINDINGS: There is atrophy of the brain. Some low-density area seen in  the periventricular white  matter suggesting chronic small vessel  ischemic change. No hemorrhage or hydrocephalus. No mass, mass effect,  or midline shift. No abnormal extra-axial  fluid collections identified. Minimal mucosal thickening of the  maxillary sinuses and ethmoid air cells. The bony structures are  unremarkable. The mastoid air cells are patent.       Impression:       Chronic changes identified within the brain with no acute  intracranial abnormality.     DICTATED:   2/8/2019  EDITED/ls :   2/8/2019        FL C Arm During Surgery [[id number]] Collected:  02/08/19 1646     Updated:  02/08/19 1646    Narrative:       EXAMINATION: FL C ARM DURING SURGERY- 02/08/2019      INDICATION: Dialysis catheter placement     COMPARISON: NONE     FINDINGS: 4 seconds of fluoroscopy and 4 images used for right-sided  dialysis catheter placement. Please see the procedure report for full  details.       Impression:       Fluoroscopy for dialysis catheter placement. Please see the  procedure report for full details.      D:  02/08/2019  E:  02/08/2019       XR Chest 1 View [[id number]] Collected:  02/08/19 1628     Updated:  02/08/19 1628    Narrative:       EXAMINATION: XR CHEST 1 VW-02/08/2019:      INDICATION: RIJ line.      COMPARISON: 02/08/2019.     FINDINGS: Portable chest reveals low lung volumes. Deep line catheter  identified on the right with tip in the SVC. Degenerative changes seen  within the spine. The heart is borderline enlarged. Mild prominence seen  of the pulmonary vascularity. No pleural effusion or pneumothorax.           Impression:       Prominence of the pulmonary vascularity with deep line  catheter identified on the right with tip in the SVC. No evidence of  acute parenchymal disease.     D:  02/08/2019  E:  02/08/2019             XR Chest 1 View [[id number]] Collected:  02/08/19 1243     Updated:  02/08/19 1246    Narrative:          EXAMINATION: XR CHEST 1 VW-      INDICATION: respiratory failure      COMPARISON:  01/08/2019     FINDINGS: Heart is borderline enlarged. The vasculature is cephalized.  Lung volumes are relatively low. There is mild discoid atelectasis in  both medial lung bases, but no focal disease elsewhere..           Impression:       Low lung volumes, borderline cardiomegaly and mild pulmonary  vascular congestion. Mild bibasilar discoid atelectasis, new from prior  study.     This report was finalized on 2/8/2019 12:44 PM by DR. Antione Damon MD.           KLAUDIA:  Interpretation Summary     · Estimated EF appears to be in the range of 61 - 65%.  · Left ventricular systolic function is normal.  · Moderate aortic valve regurgitation is present.  · Mild tricuspid valve regurgitation is present.  · Mild mitral valve regurgitation is present  · No evidence of a left atrial appendage thrombus was present.            Impression:   1.  MSSA bacteremia/septic shock-with associated MSSA bacteriuria following recent prostate surgery.  Although MSSA bacteriuria is generally a reflection of the bacteremia rather than the cause of the bacteremia, and his situation the MSSA bacteremia may have been a result of MSSA UTI.  He has underlying valvular heart disease but his KLAUDIA did not reveal any definite evidence of endocarditis.  He is still at high risk for early endocarditis without a definite vegetation.  He will need a very prolonged course of intravenous antibiotic therapy. Repeat blood cultures from 2/18/19 are no growth to date.  2.  Septic shock-improved  3.  Acute hypoxic respiratory failure-improved  4.  Acute renal failure/ATN-improved  5.  Lower extremity weakness/back pain-his son indicates that this is a long-standing problem with spinal stenosis but he was able to walk at home with assistance.  His legs do appear to be weaker.  We had wanted to order an MRI scan but he has a metal fragment in his brain that was identified on a CT scan prior to his admission here.  His son indicates that he underwent an MRI scan at  least 2 years ago without complications and the metal fragment would have been there at that time. Neurosurgery has evaluated the patient and continues to follow.  May need CT myelogram soon.  6.  Elevated liver enzymes  7.  MSSA UTI  8.  Right wrist swelling-observe  9.  Valvular heart disease-with moderate aortic regurgitation and mild mitral regurgitation  10.  Prostate cancer-status post robotic prostatectomy on 1/17/19  11.  Atrial fibrillation  12.  History of spinal stenosis      Recommendations:  1.  Continue high-dose cefazolin therapy  2.  Reconsider an MRI scan of the spine or myelogram given persistent lower extremity weakness and back pain.  3.  Neurosurgery has been consult and and is following the patient for his bilateral lower extremity weakness and back pain due to concern for possible spinal abscess  4.  Repeat blood cultures from 2/18/19 are no growth to date  5.  Repeat CBC with differential today  6.  We will need to closely watch the patient for worsening abdominal pain or right wrist pain and swelling    I discussed with the patient's wife today.  The patient does have significant risk for further morbidity.            Jeff Smith MD  2/19/2019

## 2019-02-19 NOTE — PLAN OF CARE
Problem: Patient Care Overview  Goal: Plan of Care Review  Outcome: Ongoing (interventions implemented as appropriate)   02/19/19 5235   Coping/Psychosocial   Plan of Care Reviewed With patient;spouse   Plan of Care Review   Progress no change   OTHER   Outcome Summary Vital signs wnl. Oxygen level greater than 90% on room air. No complaints of pain No complaints of shortness of breath.        Problem: Skin Injury Risk (Adult)  Goal: Skin Health and Integrity  Outcome: Ongoing (interventions implemented as appropriate)      Problem: Sepsis/Septic Shock (Adult)  Goal: Signs and Symptoms of Listed Potential Problems Will be Absent, Minimized or Managed (Sepsis/Septic Shock)  Outcome: Ongoing (interventions implemented as appropriate)

## 2019-02-19 NOTE — THERAPY TREATMENT NOTE
Acute Care - Physical Therapy Treatment Note  [facility]     Patient Name: Rod Balderas  : 1950  MRN: [id number]  Today's Date: 2019  Onset of Illness/Injury or Date of Surgery: 19  Date of Referral to PT: 19  Referring Physician: MD Aurea    Admit Date: 2019    Visit Dx:    ICD-10-CM ICD-9-CM   1. Impaired functional mobility, balance, gait, and endurance Z74.09 V49.89   2. Impaired mobility and ADLs Z74.09 799.89     Patient Active Problem List   Diagnosis   • Degenerative disc disease, lumbar   • Spinal stenosis, lumbar region, with neurogenic claudication   • Neuroforaminal stenosis of spine   • Lumbar facet arthropathy   • CAD (coronary artery disease)   • Essential hypertension   • Physical deconditioning   • Morbid obesity due to excess calories (CMS/HCC)   • TANIYA on CPAP   • Displacement of lumbar intervertebral disc   • Dyslipidemia   • Osteoarthritis   • At high risk for falls   • Prostate cancer , s/p prostatectomy ( RALP)   • DM (diabetes mellitus), type 2 (CMS/HCC)   • Leukocytosis, mild, likely reactive   • Acute postoperative pain   • Acute blood loss anemia, mild, asymptomatic   • Renal failure   • Metabolic encephalopathy   • Paroxysmal atrial fibrillation (CMS/HCC)   • Sepsis (CMS/HCC)   • Moderate aortic regurgitation       Therapy Treatment    Rehabilitation Treatment Summary     Row Name 19 1300 19 1117          Treatment Time/Intention    Discipline  physical therapy assistant  -AS  occupational therapist  -KF     Document Type  therapy note (daily note)  -AS  therapy note (daily note)  -KF     Subjective Information  complains of;weakness;pain  -AS  complains of;pain;fatigue with movements   -KF     Mode of Treatment  physical therapy  -AS  individual therapy;occupational therapy  -KF     Patient/Family Observations  family at bedside, patient sitting UIC and agreed to therapy.  -AS  family present throughout, Pt supine, RN cleared, vitals stable  alertness limited   -KF     Care Plan Review  --  evaluation/treatment results reviewed;care plan/treatment goals reviewed;risks/benefits reviewed;current/potential barriers reviewed;patient/other agree to care plan  -KF     Care Plan Review, Other Participant(s)  --  spouse  -KF     Patient Effort  fair  -AS  fair  -KF     Comment  delay processsing, confusion  -AS  --     Existing Precautions/Restrictions  fall chronic L foot drop, wears AFO  -AS  fall  -KF     Treatment Considerations/Comments  pain with B LE ROM, also reported back pain with static sitting balance activity.  -AS  pain only with LE movements and decreased alertness at times  -KF     Patient Response to Treatment  --  tolerated  -KF     Recorded by [AS] Eve Mercado, PTA 02/19/19 1332 [KF] Kaye Lyon, OT 02/19/19 1149     Row Name 02/19/19 1117             Vital Signs    Pre Systolic BP Rehab  177  -KF      Pre Treatment Diastolic BP  81 RN cleared vitals stable   -KF      Pretreatment Heart Rate (beats/min)  87  -KF      Pre SpO2 (%)  96  -KF      O2 Delivery Pre Treatment  room air  -KF      Pre Patient Position  Supine  -KF      Intra Patient Position  Sitting  -KF      Post Patient Position  Sitting  -KF      Recorded by [KF] Kaye Lyon, OT 02/19/19 1149      Row Name 02/19/19 1117             Cognitive Assessment/Intervention    Additional Documentation  Cognitive Assessment/Intervention (Group)  -KF      Recorded by [KF] Kaye Lyon, OT 02/19/19 1149      Row Name 02/19/19 1300 02/19/19 1117          Cognitive Assessment/Intervention- PT/OT    Affect/Mental Status (Cognitive)  low arousal/lethargic;confused  -AS  low arousal/lethargic  -KF     Orientation Status (Cognition)  oriented to;person  -AS  oriented to;person;place;disoriented to;time  -KF     Follows Commands (Cognition)  follows one step commands;50-74% accuracy;verbal cues/prompting required;repetition of directions required  -AS  follows one step  commands;50-74% accuracy;physical/tactile prompts required;repetition of directions required;verbal cues/prompting required  -KF     Cognitive Function (Cognitive)  --  attention deficit;safety deficit  -KF     Attention Deficit (Cognitive)  --  moderate deficit;concentration  -KF     Safety Deficit (Cognitive)  moderate deficit;insight into deficits/self awareness;safety precautions follow-through/compliance  -AS  moderate deficit;insight into deficits/self awareness;awareness of need for assistance;safety precautions awareness  -KF     Cognitive Interventions (Cognitive)  --  occupation/activity based interventions;process/task specific training  -KF     Personal Safety Interventions  fall prevention program maintained;gait belt;nonskid shoes/slippers when out of bed  -AS  fall prevention program maintained;muscle strengthening facilitated;nonskid shoes/slippers when out of bed  -KF     Recorded by [AS] Eve Mercado, PTA 02/19/19 1332 [KF] Kaye Lyon, OT 02/19/19 1149     Row Name 02/19/19 1117             Safety Issues, Functional Mobility    Safety Issues Affecting Function (Mobility)  friction/shear risk;awareness of need for assistance;safety precaution awareness;sequencing abilities  -KF      Impairments Affecting Function (Mobility)  balance;cognition;strength;endurance/activity tolerance  -KF      Recorded by [KF] Kaye Lyon, OT 02/19/19 1149      Row Name 02/19/19 1300 02/19/19 1117          Bed Mobility Assessment/Treatment    Comment (Bed Mobility)  not tested, patient sitting UIC  -AS  lift to chair, sling underneath Pt upon entry   -KF     Recorded by [AS] Eve Mercado, PTA 02/19/19 1332 [KF] Kaye Lyon, OT 02/19/19 1149     Row Name 02/19/19 1117             Functional Mobility    Functional Mobility- Ind. Level  not tested  -KF      Functional Mobility- Comment  deferred to PT  -KF      Recorded by [KF] Kaye Lyon, OT 02/19/19 1149      Row Name 02/19/19  1117             Transfer Assessment/Treatment    Transfer Assessment/Treatment  bed-chair transfer  -KF      Comment (Transfers)  dependent transfer, deferred transfer training to PT  -KF      Recorded by [KF] Kaye Lyon, OT 02/19/19 1149      Row Name 02/19/19 1117             Bed-Chair Transfer    Bed-Chair [place] (Transfers)  dependent (less than 25% patient effort);2 person assist  -KF      Assistive Device (Bed-Chair Transfers)  mechanical lift/aid  -KF      Recorded by [KF] Kaye Lyon, OT 02/19/19 1149      Row Name 02/19/19 1300             Sit-Stand Transfer    Sit-Stand [place] (Transfers)  unable to assess poor sitting balance  -AS      Recorded by [AS] Eve Mercado, PTA 02/19/19 1332      Row Name 02/19/19 1117             BADL Safety/Performance    Impairments, BADL Safety/Performance  balance;cognition;endurance/activity tolerance;strength;trunk/postural control  -KF      Skilled BADL Treatment/Intervention  cognitive/safety deficit modifications  -KF      Recorded by [KF] Kaye Lyon, OT 02/19/19 1149      Row Name 02/19/19 1300 02/19/19 1117          Motor Skills Assessment/Interventions    Additional Documentation  Therapeutic Exercise (Group)  -AS  Balance (Group);Balance Interventions (Group);Therapeutic Exercise (Group);Therapeutic Exercise Interventions (Group)  -KF     Recorded by [AS] Eve Mercdao, PTA 02/19/19 1332 [KF] Kaye Lyon, OT 02/19/19 1149     Row Name 02/19/19 1300 02/19/19 1117          Therapeutic Exercise    Therapeutic Exercise  --  seated, upper extremities;supine, upper extremities  -KF     Additional Documentation  --  Therapeutic Exercise (Row)  -KF     09724 - PT Therapeutic Activity Minutes  23  -AS  --     Recorded by [AS] Eve Mercado, PTA 02/19/19 1332 [KF] Kaye Lyon, OT 02/19/19 1149     Row Name 02/19/19 1117             Upper Extremity Seated Therapeutic Exercise    Performed, Seated Upper  Extremity (Therapeutic Exercise)  shoulder flexion/extension;digit flexion/extension  -KF      Exercise Type, Seated Upper Extremity (Therapeutic Exercise)  AAROM (active assistive range of motion)  -KF      Expected Outcomes, Seated Upper Extremity (Therapeutic Exercise)  improve functional tolerance, self-care activity;improve performance, BADLs;strengthen normal movement patterns  -KF      Sets/Reps Detail, Seated Upper Extremity (Therapeutic Exercise)  1/10  -KF      Comment, Seated Upper Extremity (Therapeutic Exercise)  gentle AAROM R hand, Lshoulder limited available range completed AAROM in pain free range  -KF      Recorded by [KF] Kaye Lyon, OT 02/19/19 1149      Row Name 02/19/19 1117             Upper Extremity Supine Therapeutic Exercise    Performed, Supine Upper Extremity (Therapeutic Exercise)  elbow flexion/extension  -KF      Exercise Type, Supine Upper Extremity (Therapeutic Exercise)  AROM (active range of motion);AAROM (active assistive range of motion)  -KF      Expected Outcomes, Supine Upper Extremity (Therapeutic Exercise)  improve performance, BADLs;improve functional tolerance, self-care activity  -KF      Sets/Reps Detail, Supine Upper Extremity (Therapeutic Exercise)  1/10  -KF      Comment, Supine Upper Extremity (Therapeutic Exercise)  alternating UEs  -KF      Recorded by [KF] Kaye Lyon, OT 02/19/19 1149      Row Name 02/19/19 1300             Therapeutic Exercise    Lower Extremity (Therapeutic Exercise)  hamstring stretch, bilateral;LAQ (long arc quad), bilateral;marching while seated  -AS      Lower Extremity Range of Motion (Therapeutic Exercise)  hip abduction/adduction, bilateral;ankle dorsiflexion/plantar flexion, bilateral  -AS      Exercise Type (Therapeutic Exercise)  PROM (passive range of motion)  -AS      Position (Therapeutic Exercise)  seated;other (see comments) reclined  -AS      Sets/Reps (Therapeutic Exercise)  1/10  -AS      Recorded by [AS]  Eve Mercado, PTA 02/19/19 1332      Row Name 02/19/19 1117             Balance    Balance  static sitting balance  -KF      Recorded by [KF] Kaye Lyon, OT 02/19/19 1149      Row Name 02/19/19 1300 02/19/19 1117          Static Sitting Balance    Level of [place] (Unsupported Sitting, Static Balance)  maximal assist, 25 to 49% patient effort;2 person assist  -AS  --     Sitting Position (Unsupported Sitting, Static Balance)  -- sitting edge of chair  -AS  --     Time Able to Maintain Position (Unsupported Sitting, Static Balance)  more than 5 minutes  -AS  --     Comment (Unsupported Sitting, Static Balance)  patient having difficulty maintaining static sitting balance, delayed processing and difficulty following commands and assisting with anterior weight shifting. Patient pushes back adn cannot correct without assist.  -AS  --     Level of [place] (Supported Sitting, Static Balance)  --  supervision  -KF     Sitting Position (Supported Sitting, Static Balance)  --  sitting in chair  -KF     Time Able to Maintain Position (Supported Sitting, Static Balance)  --  more than 5 minutes  -KF     Recorded by [AS] Eve Mercado, PTA 02/19/19 1332 [KF] Kaye Lyon, OT 02/19/19 1149     Row Name 02/19/19 1300 02/19/19 1117          Positioning and Restraints    Pre-Treatment Position  sitting in chair/recliner  -AS  in bed  -KF     Post Treatment Position  chair  -AS  chair  -KF     In Chair  reclined;call light within reach;encouraged to call for assist;with family/caregiver;on mechanical lift sling  -AS  notified nsg;reclined;sitting;call light within reach;encouraged to call for assist;with family/caregiver;legs elevated;heels elevated;waffle cushion;on mechanical lift sling  -KF     Recorded by [AS] Eve Mercado, PTA 02/19/19 1332 [KF] Kaye Lyon, OT 02/19/19 1149     Row Name 02/19/19 1117             Pain Assessment    Additional Documentation  Pain Scale: FACES  Pre/Post-Treatment (Group)  -KF      Recorded by [KF] Kaye Lyon, OT 02/19/19 1149      Row Name 02/19/19 1117             Pain Scale: Numbers Pre/Post-Treatment    Pain Location - Orientation  generalized  -KF      Pain Location  abdomen  -KF      Pre/Post Treatment Pain Comment  tolerated  -KF      Pain Intervention(s)  Repositioned;Ambulation/increased activity  -KF      Recorded by [KF] Kaye Lyon, OT 02/19/19 1149      Row Name 02/19/19 1300 02/19/19 1117          Pain Scale: FACES Pre/Post-Treatment    Pain: FACES Scale, Pretreatment  4-->hurts little more  -AS  2-->hurts little bit  -KF     Pain: FACES Scale, Post-Treatment  4-->hurts little more  -AS  2-->hurts little bit  -KF     Pre/Post Treatment Pain Comment  -- back and B LE pain  -AS  with movements, repositioning in chair c/o abd pain but the no pain after   -KF     Recorded by [AS] Eve Mercado, [facility] 02/19/19 1332 [KF] Kaye Lyon, OT 02/19/19 1149     Row Name                Wound 02/08/19 1519 Other (See comments) neck incision    Wound - Properties Group Date first assessed: 02/08/19 [SM] Time first assessed: 1519 [SM] Side: Other (See comments) [SM] Location: neck [SM] Type: incision [SM] Recorded by:  [SM] Rissa Rodriguez RN 02/08/19 1519    Row Name                Wound 02/08/19 1800 Right lower gluteal pressure injury    Wound - Properties Group Date first assessed: 02/08/19 [AB] Time first assessed: 1800 [AB] Present On Admission : yes [AB] Side: Right [AB] Orientation: lower [AB] Location: gluteal [AB] Type: pressure injury [AB] Stage, Pressure Injury: deep tissue injury [AB] Additional Comments: bilateral gluteals [MR] Recorded by:  [AB] Addison Jasso RN 02/08/19 1855 [MR] Carmen Conner RN 02/09/19 1227    Row Name                Wound 02/09/19 1116 Bilateral medial thigh    Wound - Properties Group Date first assessed: 02/09/19 [MR] Time first assessed: 1116 [MR] Present On Admission : yes [MR] Side:  Bilateral [MR] Orientation: medial [MR] Location: thigh [MR] Stage, Pressure Injury: deep tissue injury [MR] Recorded by:  [MR] Carmen Conner RN 02/09/19 1228    Row Name                Wound 02/13/19 0800 Left lower gluteal pressure injury    Wound - Properties Group Date first assessed: 02/13/19 [JM] Time first assessed: 0800 [JM] Side: Left [JM] Orientation: lower [JM] Location: gluteal [JM] Type: pressure injury [JM] Stage, Pressure Injury: deep tissue injury [JM] Recorded by:  [JM] Ivon Eason RN 02/13/19 1353    Row Name                Wound 02/15/19 1800 Right upper chest puncture    Wound - Properties Group Date first assessed: 02/15/19 [CH] Time first assessed: 1800 [CH] Side: Right [CH] Orientation: upper [CH] Location: chest [CH] Type: puncture [CH] Additional Comments: s/p tunneled dialysis catheter [CH] Recorded by:  [CH] Taco Templeton RN 02/15/19 1822    Row Name                Wound 02/17/19 0800 scrotum abrasion;MASD (moisture associated skin damage)    Wound - Properties Group Date first assessed: 02/17/19 [CP] Time first assessed: 0800 [CP] Present On Admission : no [CP] Location: scrotum [CP] Type: abrasion;MASD (moisture associated skin damage) [CP] Recorded by:  [CP] Janine Juan RN 02/17/19 0953    Row Name 02/19/19 1117             Outcome Summary/Treatment Plan (OT)    Daily Summary of Progress (OT)  progress towards functional goals is fair  -KF      Recorded by [KF] Kaye Lyon, OT 02/19/19 1149        User Key  (r) = Recorded By, (t) = Taken By, (c) = Cosigned By    Initials Name Effective Dates Discipline    AS Eve Mercado, PTA 06/22/15 -  PT    Taco Gutierrez RN 06/16/16 -  DIALYSIS USER    Rissa Bolden RN 06/16/16 -  Nurse    Carmen Nelson RN 06/16/16 -  Nurse    Ivon Gutierrez RN 06/16/16 -  Nurse    Janine Pace, RN 06/16/16 -  Nurse    Addison Jaffe RN 02/02/17 -  Nurse    Kaye Willard OT 04/03/18 -  OT           Rash 02/12/19 2000 Left upper arm macular (Active)   Distribution regional 2/19/2019  8:00 AM   Configuration/Shape asymmetric 2/19/2019  8:00 AM   Borders diffuse;irregular 2/19/2019  8:00 AM   Characteristics crusted;dry 2/19/2019  8:00 AM   Color red 2/19/2019  8:00 AM   Care, Rash open to air 2/19/2019  8:00 AM       Rash 02/12/19 2000 Left posterior hand macular (Active)   Distribution localized 2/19/2019  8:00 AM   Configuration/Shape asymmetric 2/19/2019  8:00 AM   Borders diffuse;irregular 2/19/2019  8:00 AM   Characteristics dry;crusted 2/19/2019  8:00 AM   Color purple;red 2/19/2019  8:00 AM   Care, Rash open to air 2/19/2019  8:00 AM       Wound 02/08/19 1519 Other (See comments) neck incision (Active)   Dressing Appearance open to air 2/19/2019  8:00 AM   Closure Adhesive closure strips 2/19/2019  8:00 AM   Base clean;dry;scab 2/19/2019  8:00 AM   Periwound intact;dry;pink;blanchable 2/19/2019  8:00 AM   Periwound Temperature warm 2/19/2019  8:00 AM   Periwound Skin Turgor soft 2/19/2019  8:00 AM   Drainage Amount none 2/19/2019  8:00 AM   Periwound Care, Wound dry periwound area maintained 2/19/2019  8:00 AM       Wound 02/08/19 1800 Right lower gluteal pressure injury (Active)   Dressing Appearance open to air 2/19/2019  8:00 AM   Closure None 2/19/2019  8:00 AM   Base dry;purple;maroon/purple 2/19/2019  8:00 AM   Periwound dry;pink;redness;blanchable 2/19/2019  8:00 AM   Periwound Temperature warm 2/19/2019  8:00 AM   Periwound Skin Turgor soft 2/19/2019  8:00 AM   Drainage Amount none 2/19/2019  8:00 AM   Care, Wound cleansed with;barrier applied 2/19/2019  8:00 AM   Dressing Care, Wound open to air 2/19/2019  8:00 AM   Periwound Care, Wound topical treatment applied;barrier ointment applied 2/19/2019  8:00 AM       Wound 02/09/19 1116 Bilateral medial thigh (Active)   Dressing Appearance open to air 2/19/2019  8:00 AM   Closure None 2/19/2019  8:00 AM   Base dry;red/granulating 2/19/2019   8:00 AM   Periwound intact;dry;redness;blanchable 2/19/2019  8:00 AM   Drainage Amount none 2/19/2019  8:00 AM   Care, Wound cleansed with;barrier applied 2/19/2019  8:00 AM   Periwound Care, Wound barrier ointment applied 2/19/2019  8:00 AM       Wound 02/13/19 0800 Left lower gluteal pressure injury (Active)   Dressing Appearance open to air 2/19/2019  8:00 AM   Base dry;maroon/purple;purple 2/19/2019  8:00 AM   Periwound intact;dry;pink;blanchable 2/19/2019  8:00 AM   Periwound Skin Turgor soft 2/18/2019  2:45 PM   Drainage Amount none 2/19/2019  8:00 AM   Care, Wound irrigated with;sterile normal saline;ultrasound therapy, non contact low frequency 2/18/2019  2:45 PM   Dressing Care, Wound open to air 2/19/2019  8:00 AM   Periwound Care, Wound barrier ointment applied 2/19/2019  8:00 AM       Wound 02/15/19 1800 Right upper chest puncture (Active)   Dressing Appearance dry;intact;no drainage 2/19/2019  8:00 AM   Closure SARAHI 2/19/2019  8:00 AM   Base clean;dry;pink 2/19/2019  8:00 AM   Periwound intact;dry;pink 2/19/2019  8:00 AM   Edges open 2/19/2019  8:00 AM   Drainage Amount none 2/19/2019  8:00 AM   Dressing Care, Wound gauze;transparent film 2/19/2019  8:00 AM   Periwound Care, Wound dry periwound area maintained 2/19/2019  8:00 AM       Wound 02/17/19 0800 scrotum abrasion;MASD (moisture associated skin damage) (Active)   Dressing Appearance open to air 2/19/2019  8:00 AM   Closure None 2/19/2019  8:00 AM   Base moist;pink;white 2/19/2019  8:00 AM   Periwound moist;pink;redness;blanchable 2/19/2019  8:00 AM   Periwound Temperature warm 2/19/2019  8:00 AM   Periwound Skin Turgor soft 2/19/2019  8:00 AM   Edges open 2/19/2019  8:00 AM   Care, Wound cleansed with 2/19/2019  8:00 AM   Periwound Care, Wound dry periwound area maintained 2/19/2019  8:00 AM           Physical Therapy Education     Title: PT OT SLP Therapies (In Progress)     Topic: Physical Therapy (In Progress)     Point: Mobility training (In  Progress)     Learning Progress Summary           Patient Acceptance, E, NR by AS at 2/19/2019  1:32 PM    Acceptance, E,D, NR by LS at 2/15/2019  3:41 PM    Acceptance, E,D, NR by LS at 2/14/2019  2:10 PM    Acceptance, E,D, NR by LS at 2/12/2019  9:59 AM   Significant Other Acceptance, E,D, NR by LS at 2/15/2019  3:41 PM    Acceptance, E,D, NR by LS at 2/14/2019  2:10 PM    Acceptance, E,D, NR by LS at 2/12/2019  9:59 AM                   Point: Home exercise program (In Progress)     Learning Progress Summary           Patient Acceptance, E, NR by AS at 2/19/2019  1:32 PM    Acceptance, E,D, NR by LS at 2/15/2019  3:41 PM    Acceptance, E,D, NR by LS at 2/14/2019  2:10 PM   Significant Other Acceptance, E,D, NR by LS at 2/15/2019  3:41 PM    Acceptance, E,D, NR by LS at 2/14/2019  2:10 PM                   Point: Body mechanics (In Progress)     Learning Progress Summary           Patient Acceptance, E, NR by AS at 2/19/2019  1:32 PM    Acceptance, E,D, NR by LS at 2/15/2019  3:41 PM    Acceptance, E,D, NR by LS at 2/14/2019  2:10 PM    Acceptance, E,D, NR by LS at 2/12/2019  9:59 AM   Significant Other Acceptance, E,D, NR by LS at 2/15/2019  3:41 PM    Acceptance, E,D, NR by LS at 2/14/2019  2:10 PM    Acceptance, E,D, NR by LS at 2/12/2019  9:59 AM                   Point: Precautions (In Progress)     Learning Progress Summary           Patient Acceptance, E, NR by AS at 2/19/2019  1:32 PM    Acceptance, E,D, NR by LS at 2/15/2019  3:41 PM    Acceptance, E,D, NR by LS at 2/14/2019  2:10 PM    Acceptance, E,D, NR by LS at 2/12/2019  9:59 AM   Significant Other Acceptance, E,D, NR by LS at 2/15/2019  3:41 PM    Acceptance, E,D, NR by LS at 2/14/2019  2:10 PM    Acceptance, DILLAN RAZO, NR by LS at 2/12/2019  9:59 AM                               User Key     Initials Effective Dates Name Provider Type Discipline    AS 06/22/15 -  Eve Mercado, PTA Physical Therapy Assistant PT    LS 06/19/15 -  Ana Akers, PT  Physical Therapist PT                PT Recommendation and Plan     Plan of Care Reviewed With: patient  Progress: no change  Outcome Summary: patient having difficulty following direction for static sitting balance. Max asist to reach edge of chair and maintain balance, B LE exercises performed.  Outcome Measures     Row Name 02/19/19 1300 02/19/19 1117          How much help from another person do you currently need...    Turning from your back to your side while in flat bed without using bedrails?  2  -AS  --     Moving from lying on back to sitting on the side of a flat bed without bedrails?  2  -AS  --     Moving to and from a bed to a chair (including a wheelchair)?  1  -AS  --     Standing up from a chair using your arms (e.g., wheelchair, bedside chair)?  1  -AS  --     Climbing 3-5 steps with a railing?  1  -AS  --     To walk in hospital room?  1  -AS  --     AM-PAC 6 Clicks Score  8  -AS  --        How much help from another is currently needed...    Putting on and taking off regular lower body clothing?  --  1  -KF     Bathing (including washing, rinsing, and drying)  --  1  -KF     Toileting (which includes using toilet bed pan or urinal)  --  1  -KF     Putting on and taking off regular upper body clothing  --  1  -KF     Taking care of personal grooming (such as brushing teeth)  --  2  -KF     Eating meals  --  2  -KF     Score  --  8  -KF        Functional Assessment    Outcome Measure Options  AM-PAC 6 Clicks Basic Mobility (PT)  -AS  AM-PAC 6 Clicks Daily Activity (OT)  -KF       User Key  (r) = Recorded By, (t) = Taken By, (c) = Cosigned By    Initials Name Provider Type    AS Eve Mercado PTA Physical Therapy Assistant    Kaye Willard, OT Occupational Therapist         Time Calculation:   PT Charges     Row Name 02/19/19 1300             Time Calculation    Start Time  1300  -AS      PT Received On  02/19/19  -AS      PT Goal Re-Cert Due Date  02/22/19  -AS         Timed Charges     85172 - PT Therapeutic Activity Minutes  23  -AS        User Key  (r) = Recorded By, (t) = Taken By, (c) = Cosigned By    Initials Name Provider Type    AS Eve Mercado PTA Physical Therapy Assistant        Therapy Suggested Charges     Code   Minutes Charges    69402 (CPT®) Hc Pt Neuromusc Re Education Ea 15 Min      05204 (CPT®) Hc Pt Ther Proc Ea 15 Min      86747 (CPT®) Hc Gait Training Ea 15 Min      67637 (CPT®) Hc Pt Therapeutic Act Ea 15 Min 23 2    70169 (CPT®) Hc Pt Manual Therapy Ea 15 Min      65287 (CPT®) Hc Pt Iontophoresis Ea 15 Min      76430 (CPT®) Hc Pt Elec Stim Ea-Per 15 Min      04885 (CPT®) Hc Pt Ultrasound Ea 15 Min      49305 (CPT®) Hc Pt Self Care/Mgmt/Train Ea 15 Min      75061 (CPT®) Hc Pt Prosthetic (S) Train Initial Encounter, Each 15 Min      22935 (CPT®) Hc Pt Orthotic(S)/Prosthetic(S) Encounter, Each 15 Min      72469 (CPT®) Hc Orthotic(S) Mgmt/Train Initial Encounter, Each 15min      Total  23 2        Therapy Charges for Today     Code Description Service Date Service Provider Modifiers Qty    47213905035 HC PT THER SUPP EA 15 MIN 2/19/2019 Eve Mercado, PADMA GP 2    [id number] HC PT THERAPEUTIC ACT EA 15 MIN 2/19/2019 Eve Mercado PTA GP 2          PT G-Codes  Outcome Measure Options: AM-PAC 6 Clicks Basic Mobility (PT)  AM-PAC 6 Clicks Score: 8  Score: 8    Eve Mercado PTA  2/19/2019

## 2019-02-20 LAB
GLUCOSE BLDC GLUCOMTR-MCNC: 118 MG/DL (ref 70–130)
GLUCOSE BLDC GLUCOMTR-MCNC: 122 MG/DL (ref 70–130)
GLUCOSE BLDC GLUCOMTR-MCNC: 133 MG/DL (ref 70–130)
GLUCOSE BLDC GLUCOMTR-MCNC: 151 MG/DL (ref 70–130)

## 2019-02-20 PROCEDURE — C1751 CATH, INF, PER/CENT/MIDLINE: HCPCS

## 2019-02-20 PROCEDURE — 82962 GLUCOSE BLOOD TEST: CPT

## 2019-02-20 PROCEDURE — 97530 THERAPEUTIC ACTIVITIES: CPT

## 2019-02-20 PROCEDURE — 02HV33Z INSERTION OF INFUSION DEVICE INTO SUPERIOR VENA CAVA, PERCUTANEOUS APPROACH: ICD-10-PCS | Performed by: INTERNAL MEDICINE

## 2019-02-20 PROCEDURE — C1894 INTRO/SHEATH, NON-LASER: HCPCS

## 2019-02-20 PROCEDURE — 25010000003 CEFAZOLIN IN DEXTROSE 2-4 GM/100ML-% SOLUTION: Performed by: INTERNAL MEDICINE

## 2019-02-20 PROCEDURE — 25010000003 CEFAZOLIN IN DEXTROSE 2-4 GM/100ML-% SOLUTION

## 2019-02-20 PROCEDURE — 97110 THERAPEUTIC EXERCISES: CPT

## 2019-02-20 RX ORDER — SODIUM CHLORIDE 0.9 % (FLUSH) 0.9 %
10 SYRINGE (ML) INJECTION AS NEEDED
Status: DISCONTINUED | OUTPATIENT
Start: 2019-02-20 | End: 2019-02-24 | Stop reason: HOSPADM

## 2019-02-20 RX ORDER — SODIUM CHLORIDE 0.9 % (FLUSH) 0.9 %
10 SYRINGE (ML) INJECTION EVERY 12 HOURS SCHEDULED
Status: DISCONTINUED | OUTPATIENT
Start: 2019-02-20 | End: 2019-02-24 | Stop reason: HOSPADM

## 2019-02-20 RX ORDER — SODIUM CHLORIDE 0.9 % (FLUSH) 0.9 %
20 SYRINGE (ML) INJECTION AS NEEDED
Status: DISCONTINUED | OUTPATIENT
Start: 2019-02-20 | End: 2019-02-24 | Stop reason: HOSPADM

## 2019-02-20 RX ORDER — GABAPENTIN 300 MG/1
300 CAPSULE ORAL 3 TIMES DAILY
Status: DISCONTINUED | OUTPATIENT
Start: 2019-02-20 | End: 2019-02-21

## 2019-02-20 RX ADMIN — CEFAZOLIN SODIUM 2 G: 2 INJECTION, SOLUTION INTRAVENOUS at 21:16

## 2019-02-20 RX ADMIN — SODIUM CHLORIDE, PRESERVATIVE FREE 3 ML: 5 INJECTION INTRAVENOUS at 09:05

## 2019-02-20 RX ADMIN — METOPROLOL TARTRATE 25 MG: 25 TABLET ORAL at 09:05

## 2019-02-20 RX ADMIN — FAMOTIDINE 20 MG: 20 TABLET, FILM COATED ORAL at 09:05

## 2019-02-20 RX ADMIN — METOPROLOL TARTRATE 25 MG: 25 TABLET ORAL at 21:04

## 2019-02-20 RX ADMIN — DULOXETINE HYDROCHLORIDE 60 MG: 60 CAPSULE, DELAYED RELEASE ORAL at 09:05

## 2019-02-20 RX ADMIN — CEFAZOLIN SODIUM 2 G: 2 INJECTION, SOLUTION INTRAVENOUS at 12:14

## 2019-02-20 RX ADMIN — APIXABAN 5 MG: 5 TABLET, FILM COATED ORAL at 05:40

## 2019-02-20 RX ADMIN — METOPROLOL TARTRATE 25 MG: 25 TABLET ORAL at 16:35

## 2019-02-20 RX ADMIN — INSULIN LISPRO 2 UNITS: 100 INJECTION, SOLUTION INTRAVENOUS; SUBCUTANEOUS at 12:12

## 2019-02-20 RX ADMIN — CEFAZOLIN SODIUM 2 G: 2 INJECTION, SOLUTION INTRAVENOUS at 05:40

## 2019-02-20 RX ADMIN — AMLODIPINE BESYLATE 5 MG: 5 TABLET ORAL at 09:05

## 2019-02-20 RX ADMIN — CASTOR OIL AND BALSAM, PERU: 788; 87 OINTMENT TOPICAL at 09:05

## 2019-02-20 RX ADMIN — APIXABAN 5 MG: 5 TABLET, FILM COATED ORAL at 17:51

## 2019-02-20 RX ADMIN — FAMOTIDINE 20 MG: 20 TABLET, FILM COATED ORAL at 21:04

## 2019-02-20 RX ADMIN — CASTOR OIL AND BALSAM, PERU: 788; 87 OINTMENT TOPICAL at 21:04

## 2019-02-20 NOTE — PROGRESS NOTES
[facility] Progress Note      Antibiotics:  Anti-Infectives (From admission, onward)    Ordered     Dose/Rate Route Frequency Start Stop    02/14/19 1149  ceFAZolin in dextrose (ANCEF) IVPB solution 2 g     Jeff Smith MD reviewed the order on 02/20/19 0934.   Ordering Provider:  Jeff Smith MD    2 g  over 30 Minutes Intravenous Every 8 Hours 02/14/19 1300 02/28/19 0933    02/09/19 0849  aztreonam (AZACTAM) 2 g in sodium chloride 0.9 % 100 mL IVPB-MBP     Ordering Provider:  Efrem Santana MD    2 g  200 mL/hr over 30 Minutes Intravenous Once 02/09/19 1000 02/09/19 0946          CC: Patient unable    HPI  2/8/19: Patient is a 68 y.o.  Yr old male with history of prostate cancer status post robotic laparoscopic prostatectomy in mid January and was discharged on 1/17/19. The patient is currently unresponsive and no family is at bedside so history is somewhat limited to history from nursing staff and medical records. The patient was seen a few days after his discharge from his recent  Admission in January and his Christina catheter was discontinued.  About one week ago he went to the ER with complaints of back pain, abdominal pain, and confusion and a CT abdomen and pelvis was apparently okay during that time. He continued to worsen and over the weekend he was admitted to [facility].  He was found to have an initial AK I with a creatinine of 2.2. Per the micro-lab at [facility], the patient's urine cultures and 1 of 2 blood cultures from 2/5/19 grew MSSA. The patient was initially on vancomycin and Zosyn and was subsequently transitioned to daptomycin after he developed worsening renal function with a creatinine up to 5.6 and BUN went up to 94 today.  Due to lack of nephrology at [facility], the patient was transferred to [facility] today for possible dialysis.  He additionally received a dose of nafcillin prior to this transfer.  His last dose of  daptomycin was yesterday per pharmacist here (who has discussed with OSH). Since arrival, the patient has remained afebrile but he has had a leukocytosis up to 31.38.  He is anemic with a hemoglobin of 11.3 and a hematocrit of 54.1.  He is hyponatremic to a sodium of 127.  Creatinine was initially elevated 5.39 and B1 was 97 on arrival.  He additionally had elevation of his LFTs with an ALT of 43 and AST of 43, T bili of 3.0, and alkaline phosphatase of 303.  Pro-calcitonin was elevated to 7.55 lactic acid was normal at 0.8. The patient underwent CT head without contrast which did not show any acute abnormality, CT chest, abdomen and pelvis which showed consolidation of the lung bases bilaterally that is concerning for airspace disease such as pneumonia, gastric distention, and air-filled loops of colon with no obvious obstruction. Blood cultures have been ordered and a reflexive urinalysis has been ordered as well.  A transthoracic echocardiogram was ordered. The patient underwent dialysis today following right IJ dialysis catheter placement.  The infectious disease team has been consulted for antibiotic recommendations.    He has methicillin sensitive staph aureus septicemia/bacteriuria compounded by acute renal failure, acute hypoxic respiratory failure and by basilar consolidation by chest CT.  Further compounded by acute encephalopathy.    2/9/19 nursing reports A. fib/RVR, dialysis ongoing with acute renal failure and tachypneic/labored at times.  Some cough but unable to capture for culture so far. Empiric HCAP coverage added with zyvox/azactam/flagyl; unable to do MRI of spine so CT's done cervical/thoracic/lumbar spine done 2/10    2/10/19 CT scans of spine; more restful, less labored with breathing and decreased cough;  No rash.  He can wiggle toes but not lift legs off the bed.  He moves left arm better than right arm.  No other new focal pain that he will relate although interaction minimal.  No  diarrhea.  No vomiting.    2/11/19: seems to have improved from mental status standpoint slightly per his wife and son who are at bedside. Patient endorses abdominal pain but is not answering a lot of questions. tMAX 99.3f. WBC remains elevated at 20K (down from 31K on arrival). TTE was without evidence of IE but valves poorly visualized. Still getting HD.    2/12/19: Patient is starting to feel much better today.  He is responding to a lot of questions and is more alert sitting up in a bedside chair.  Breathing is improving.  No high fevers overnight.  Leukocytosis improved to 15.9 today.  He continues to urinate a small amount but remains on intermittent dialysis.    2/13/19: Feeling about the same as he was yesterday.  Still having some right wrist pain. Shortness of breath is stable. No fevers. Leukocytosis slightly worse today.    2/14/19: Patient is somnolent today after his KLAUDIA due to recent sedation. Procedure went well per his family and he is back in NSR and no signs of vegetation but procedure report is pending. No fevers. Leukocytosis slightly improved from yesterday. Family reports that the patient has had no new complaints. He was evaluated by Dr. Ro and no significant concern for septic arthritis.    2/16/19: I reviewed his complex medical records and discussed his complex situation with Dr. Jeff Smith.  His hemodialysis catheter was removed late yesterday.  He has been afebrile over the past 24 hours.  He denies abdominal pain.  He has a history of metal fragment between his frontal lobes according to his son.  This was apparently only recently identified on a CT scan prior to his admission here.  He underwent an MRI scan approximately 2 years ago without complications and the son indicates the metal fragment would have been there at that time.  He is having difficulty moving his legs but this is a chronic problem according to his son.  He was able to ambulate at home with  "assistance.    2/17/19:  He had a low-grade fever to 100.2 last evening.  He still has significant bilateral lower extremity weakness but has a history of spinal stenosis and was weak prior to admission.  He was able to ambulate with assistance prior to admission.  He denies lower extremity numbness.  He denies nausea and vomiting.  He has some mild loose stool.    2/18/19: The patient continued to have low-grade fevers to 100.7 over the last 24 hours.  He does still have significant bilateral lower extremity weakness and is complaining about some back pain.  No numbness.  He is also having some right wrist pain still.  Having some mild abdominal pain.  No nausea or vomiting.    2/19/19: The patient continues to have significant lower extremity weakness.  No fevers overnight. No diarrhea or nausea.  Neurosurgery has evaluated the patient and he is not a good surgical candidate at this time but may need a CT myelogram in the coming days.    2/20/19: patient is somnolent this morning and not responsive to questions but his wife states that this is secondary to the patient recently receiving a dose of gabapentin. No fevers. Leukocytosis improving. Still very weak in lower extremities and having some weakness in bilateral upper extremities per his wife.    PE:   /69   Pulse 91   Temp 98.2 °F (36.8 °C) (Oral)   Resp 16   Ht 170.2 cm (67.01\")   Wt 117 kg (257 lb)   SpO2 95%   BMI 40.24 kg/m²     GENERAL: ill appearing, obese,  man. NAD. Lying in bed. somnolent   HEENT: Normocephalic, atraumatic. No conjunctival injection. No icterus.   NECK: Supple without nuchal rigidity. No mass.   Chest: right chest dialysis cath is now out.    HEART: RRR. No murmur, rubs, gallops.  LUNGS: Diminished at bases, labored and bilateral rhonchi. On room air  ABDOMEN: Soft, nontender, nondistended. Positive bowel sounds. No rebound or guarding. NO mass or HSM.  EXT:  No cyanosis, clubbing. No cord.  Edema noted over " bilateral lower extremities.   : deferred  SKIN:.  No new rash noted  NEURO: He is somnolent and not responsive to questions today. Unable to assess for weakness today  PIV with no obvious redness or drainage    Laboratory Data    Results from last 7 days   Lab Units 02/19/19  1435 02/17/19  0709 02/16/19  0315   WBC 10*3/mm3 10.07 15.18* 14.61*   HEMOGLOBIN g/dL 9.4* 9.9* 9.6*   HEMATOCRIT % 30.4* 30.2* 29.6*   PLATELETS 10*3/mm3 381 364 306     Results from last 7 days   Lab Units 02/18/19  0400   SODIUM mmol/L 136   POTASSIUM mmol/L 4.0   CHLORIDE mmol/L 106   CO2 mmol/L 25.0   BUN mg/dL 47*   CREATININE mg/dL 1.04   GLUCOSE mg/dL 126*   CALCIUM mg/dL 8.0*     Results from last 7 days   Lab Units 02/17/19  0709   ALK PHOS U/L 178*   BILIRUBIN mg/dL 0.9   ALT (SGPT) U/L 10   AST (SGOT) U/L 18               Estimated Creatinine Clearance: 84.3 mL/min (by C-G formula based on SCr of 1.04 mg/dL).      Microbiology:  2/8/19 Urine culture: MSSA  2/9/19 sputum  Culture: Yeast isolated  2/8/19 blood culture ×2: No growth at 5 days  2/18/19 blood culture ×2: No growth to date    Radiology:  Imaging Results (last 72 hours)     Procedure Component Value Units Date/Time    XR Chest 1 View [[id number]] Updated:  02/09/19 0221    CT Abdomen Pelvis Without Contrast [[id number]] Collected:  02/08/19 1822     Updated:  02/08/19 1822    Narrative:       EXAMINATION: CT CHEST WO CONTRAST, CT ABDOMEN/PELVIS WO CONTRAST -  2/8/2019     INDICATION: Persistent cough.     TECHNIQUE: Multiple axial CT imaging is obtained of the chest, abdomen  and pelvis without the administration of oral or intravenous contrast.     The radiation dose reduction device was turned on for each scan per the  ALARA (As Low as Reasonably Achievable) protocol.     COMPARISON: There is some consolidation identified posteriorly extending  to the lung bases bilaterally suggesting infiltrates. The upper lung  fields are grossly clear. Motion artifact degrades  image quality. There  are degenerative changes identified within the spine. The cardiac  chambers are enlarged. There is no pericardial effusion. No bulky hilar  or axillary lymphadenopathy. The thyroid is homogeneous. Degenerative  change is seen throughout the spine.     ABDOMEN: Motion artifact grades image quality. There is no abnormality  seen within the liver. The spleen is upper limits of normal in size.  There is gastric distention. Air fills the colon with no  evidence of  obvious obstruction. Kidneys and adrenal glands within normal limits.  The pancreas is homogeneous. No abdominal or retroperitoneal  lymphadenopathy. No abnormal mass or fluid collections identified. No  free fluid or free air.     PELVIS: Pelvic organs are unremarkable. The pelvic portion of the   gastrointestinal tract is within normal limits. No pelvic adenopathy.  Degenerative change is identified throughout the spine and pelvis.     FINDINGS: Consolidation at the lung bases bilaterally is concerning for  airspace disease such as pneumonia. The there is gastric distention.  Air-filled loops of colon with no obvious obstruction. No other abnormal  findings are seen within the abdomen or pelvis. Degenerative changes are  seen throughout the spine and pelvis.     DICTATED:   2/8/2019  EDITED/ls :   2/8/2019              Impression:               CT Chest Without Contrast [[id number]] Collected:  02/08/19 1822     Updated:  02/08/19 1822    Narrative:       EXAMINATION: CT CHEST WO CONTRAST, CT ABDOMEN/PELVIS WO CONTRAST -  2/8/2019     INDICATION: Persistent cough.     TECHNIQUE: Multiple axial CT imaging is obtained of the chest, abdomen  and pelvis without the administration of oral or intravenous contrast.     The radiation dose reduction device was turned on for each scan per the  ALARA (As Low as Reasonably Achievable) protocol.     COMPARISON: There is some consolidation identified posteriorly extending  to the lung bases  bilaterally suggesting infiltrates. The upper lung  fields are grossly clear. Motion artifact degrades image quality. There  are degenerative changes identified within the spine. The cardiac  chambers are enlarged. There is no pericardial effusion. No bulky hilar  or axillary lymphadenopathy. The thyroid is homogeneous. Degenerative  change is seen throughout the spine.     ABDOMEN: Motion artifact grades image quality. There is no abnormality  seen within the liver. The spleen is upper limits of normal in size.  There is gastric distention. Air fills the colon with no  evidence of  obvious obstruction. Kidneys and adrenal glands within normal limits.  The pancreas is homogeneous. No abdominal or retroperitoneal  lymphadenopathy. No abnormal mass or fluid collections identified. No  free fluid or free air.     PELVIS: Pelvic organs are unremarkable. The pelvic portion of the   gastrointestinal tract is within normal limits. No pelvic adenopathy.  Degenerative change is identified throughout the spine and pelvis.     FINDINGS: Consolidation at the lung bases bilaterally is concerning for  airspace disease such as pneumonia. The there is gastric distention.  Air-filled loops of colon with no obvious obstruction. No other abnormal  findings are seen within the abdomen or pelvis. Degenerative changes are  seen throughout the spine and pelvis.     DICTATED:   2/8/2019  EDITED/ls :   2/8/2019              Impression:               CT Head Without Contrast [[id number]] Collected:  02/08/19 1816     Updated:  02/08/19 1817    Narrative:       EXAMINATION: CT HEAD WO CONTRAST - 2/8/2019     INDICATION: Mental status change, does not follow commands.     TECHNIQUE: Multiple axial CT imaging is obtained of the head from skull  base to skull vertex without the administration of intravenous contrast.     The radiation dose reduction device was turned on for each scan per the  ALARA (As Low as Reasonably Achievable) protocol.      COMPARISON: NONE     FINDINGS: There is atrophy of the brain. Some low-density area seen in  the periventricular white matter suggesting chronic small vessel  ischemic change. No hemorrhage or hydrocephalus. No mass, mass effect,  or midline shift. No abnormal extra-axial  fluid collections identified. Minimal mucosal thickening of the  maxillary sinuses and ethmoid air cells. The bony structures are  unremarkable. The mastoid air cells are patent.       Impression:       Chronic changes identified within the brain with no acute  intracranial abnormality.     DICTATED:   2/8/2019  EDITED/ls :   2/8/2019        FL C Arm During Surgery [[id number]] Collected:  02/08/19 1646     Updated:  02/08/19 1646    Narrative:       EXAMINATION: FL C ARM DURING SURGERY- 02/08/2019      INDICATION: Dialysis catheter placement     COMPARISON: NONE     FINDINGS: 4 seconds of fluoroscopy and 4 images used for right-sided  dialysis catheter placement. Please see the procedure report for full  details.       Impression:       Fluoroscopy for dialysis catheter placement. Please see the  procedure report for full details.      D:  02/08/2019  E:  02/08/2019       XR Chest 1 View [[id number]] Collected:  02/08/19 1628     Updated:  02/08/19 1628    Narrative:       EXAMINATION: XR CHEST 1 VW-02/08/2019:      INDICATION: RIJ line.      COMPARISON: 02/08/2019.     FINDINGS: Portable chest reveals low lung volumes. Deep line catheter  identified on the right with tip in the SVC. Degenerative changes seen  within the spine. The heart is borderline enlarged. Mild prominence seen  of the pulmonary vascularity. No pleural effusion or pneumothorax.           Impression:       Prominence of the pulmonary vascularity with deep line  catheter identified on the right with tip in the SVC. No evidence of  acute parenchymal disease.     D:  02/08/2019  E:  02/08/2019             XR Chest 1 View [[id number]] Collected:  02/08/19 1243     Updated:   02/08/19 1246    Narrative:          EXAMINATION: XR CHEST 1 VW-      INDICATION: respiratory failure      COMPARISON: 01/08/2019     FINDINGS: Heart is borderline enlarged. The vasculature is cephalized.  Lung volumes are relatively low. There is mild discoid atelectasis in  both medial lung bases, but no focal disease elsewhere..           Impression:       Low lung volumes, borderline cardiomegaly and mild pulmonary  vascular congestion. Mild bibasilar discoid atelectasis, new from prior  study.     This report was finalized on 2/8/2019 12:44 PM by DR. Antione Damon MD.           KLAUDIA:  Interpretation Summary     · Estimated EF appears to be in the range of 61 - 65%.  · Left ventricular systolic function is normal.  · Moderate aortic valve regurgitation is present.  · Mild tricuspid valve regurgitation is present.  · Mild mitral valve regurgitation is present  · No evidence of a left atrial appendage thrombus was present.            Impression:   1.  MSSA bacteremia/septic shock-with associated MSSA bacteriuria following recent prostate surgery.  Although MSSA bacteriuria is generally a reflection of the bacteremia rather than the cause of the bacteremia, and his situation the MSSA bacteremia may have been a result of MSSA UTI.  He has underlying valvular heart disease but his KLAUDIA did not reveal any definite evidence of endocarditis.  He is still at high risk for early endocarditis without a definite vegetation.  He will need a very prolonged course of intravenous antibiotic therapy. Repeat blood cultures from 2/18/19 are no growth to date. Leukocytosis improved  2.  Septic shock-improved  3.  Acute hypoxic respiratory failure-improved  4.  Acute renal failure/ATN-improved  5.  Lower extremity weakness/back pain-his son indicates that this is a long-standing problem with spinal stenosis but he was able to walk at home with assistance.  His legs do appear to be weaker.  We had wanted to order an MRI scan but he has a  metal fragment in his brain that was identified on a CT scan prior to his admission here.  His son indicates that he underwent an MRI scan at least 2 years ago without complications and the metal fragment would have been there at that time. Neurosurgery has evaluated the patient and patient is not currently a surgical candidate and they have recommended a CT myelogram as an outpatient. He is not a candidate for an MRI due to the metal fragment in his brain and I have discussed this with Dr. Damon today. An indium scan could show inflammation but there was not a lot of inflammatory changes on CT scan and this would not change surgical or antibiotic plans so I have explained to his wife that this is likely not worth putting the patient through. He is going to receive 6 weeks of IV antibiotics regardless.  6.  Elevated liver enzymes  7.  MSSA UTI  8.  Right wrist swelling-observe  9.  Valvular heart disease-with moderate aortic regurgitation and mild mitral regurgitation  10.  Prostate cancer-status post robotic prostatectomy on 1/17/19  11.  Atrial fibrillation  12.  History of spinal stenosis      Recommendations:    UM/KEN:  1.  Continue cefazolin 2g IV q8h. Anticipated stop date is 3/22/19 (6 weeks total)   2.  Neurosurgery has been consult and and is following the patient for his bilateral lower extremity weakness and back pain due to concern for possible spinal abscess. He is not a surgical candidate per neurosurgery and there are plans for follow up with neurosurgery on outpatient basis and possible CT myelogram.   3.  Repeat blood cultures from 2/18/19 are no growth to date  4.  He will need weekly cbc with diff, bmp, esr, crp while on IV antibiotics. These labs will need to be faxed to [phone]  5. PICC line placement ordered  6. He will likely need rehab after discharge and [facility] may be a good place to have this done. If he does go to [facility], then one of my partners could follow him  there.  7. I will tentatively schedule the patient for follow up with me in clinic in 2 weeks (although this may not be necessary if he does go to [facility] for rehab and my partners can follow him there)    I am ok with his discharge after his PICC line is placed and he is cleared by the primary team.    I discussed with the patient's wife today.  The patient does have significant risk for further morbidity.            Jeff Smith MD  2/20/2019

## 2019-02-20 NOTE — THERAPY TREATMENT NOTE
Acute Care - Occupational Therapy Treatment Note  [facility]     Patient Name: Rod Balderas  : 1950  MRN: [id number]  Today's Date: 2019  Onset of Illness/Injury or Date of Surgery: 19  Date of Referral to OT: 19  Referring Physician: MD Aurea    Admit Date: 2019       ICD-10-CM ICD-9-CM   1. Impaired functional mobility, balance, gait, and endurance Z74.09 V49.89   2. Impaired mobility and ADLs Z74.09 799.89     Patient Active Problem List   Diagnosis   • Degenerative disc disease, lumbar   • Spinal stenosis, lumbar region, with neurogenic claudication   • Neuroforaminal stenosis of spine   • Lumbar facet arthropathy   • CAD (coronary artery disease)   • Essential hypertension   • Physical deconditioning   • Morbid obesity due to excess calories (CMS/HCC)   • TANIYA on CPAP   • Displacement of lumbar intervertebral disc   • Dyslipidemia   • Osteoarthritis   • At high risk for falls   • Prostate cancer , s/p prostatectomy ( RALP)   • DM (diabetes mellitus), type 2 (CMS/HCC)   • Leukocytosis, mild, likely reactive   • Acute postoperative pain   • Acute blood loss anemia, mild, asymptomatic   • Renal failure   • Metabolic encephalopathy   • Paroxysmal atrial fibrillation (CMS/HCC)   • Sepsis (CMS/HCC)   • Moderate aortic regurgitation     Past Medical History:   Diagnosis Date   • Anxiety and depression    • Cancer (CMS/HCC)    • Chest pain    • Coronary artery disease    • Diabetes mellitus (CMS/HCC)    • Dyslipidemia    • Extremity pain    • Heart disease    • History of transfusion    • Hypertension    • Low back pain    • TANIYA on CPAP     2-3    • Osteoarthritis     Diffuse osteoarthritis.   • Panic attacks    • Prostate cancer (CMS/HCC)      Past Surgical History:   Procedure Laterality Date   • CARDIAC CATHETERIZATION     • CHOLECYSTECTOMY     • COLONOSCOPY     • CORONARY ANGIOPLASTY WITH STENT PLACEMENT  2012:  A 3.5 x 88 Promus VERONICA stent to OM2.   Nonobstructive disease.  Otherwise normal LVEF.   • EYE SURGERY Bilateral     cataract   • INSERTION HEMODIALYSIS CATHETER N/A 2/8/2019    Procedure: HEMODIALYSIS CATHETER INSERTION RIGHT INTERNAL JUGULAR;  Surgeon: Bishnu Dailey MD;  Location: On license of [facility] OR;  Service: General   • PROSTATECTOMY N/A 1/15/2019    Procedure: ROBOTIC PROSTATECTOMY WITH NODES;  Surgeon: Juanito Grace Jr., MD;  Location: On license of [facility] OR;  Service: DaVinci   • RETINAL DETACHMENT SURGERY Right        Therapy Treatment    Rehabilitation Treatment Summary     Row Name 02/20/19 1507             Treatment Time/Intention    Discipline  occupational therapist  -KF      Document Type  therapy note (daily note)  -KF      Subjective Information  complains of;weakness;fatigue;pain  -KF      Mode of Treatment  individual therapy;occupational therapy  -KF      Patient/Family Observations  family present throughout, IV intact throughout, specialty bed, tele, RA  -KF      Care Plan Review  evaluation/treatment results reviewed;care plan/treatment goals reviewed;risks/benefits reviewed  -KF      Care Plan Review, Other Participant(s)  spouse  -KF      Patient Effort  fair  -KF      Existing Precautions/Restrictions  fall;other (see comments) chronic foot drop with AFO  -KF      Patient Response to Treatment  tolerated, pain with movements   -KF      Recorded by [KF] Kaye Lyon, OT 02/20/19 6822      Row Name 02/20/19 150             Vital Signs    Pre Systolic BP Rehab  173  -KF      Pre Treatment Diastolic BP  73 RN cleared vitals stable   -KF      Posttreatment Heart Rate (beats/min)  77  -KF      Pre SpO2 (%)  95  -KF      O2 Delivery Pre Treatment  room air  -KF      Post SpO2 (%)  98  -KF      O2 Delivery Post Treatment  room air  -KF      Pre Patient Position  Supine  -KF      Intra Patient Position  Side Lying  -KF      Post Patient Position  Sitting  -KF      Recorded by [KF] Kaye Lyon, OT 02/20/19 1191      Row Name  02/20/19 1507             Cognitive Assessment/Intervention    Additional Documentation  Cognitive Assessment/Intervention (Group)  -KF      Recorded by [KF] Kaye Lyon OT 02/20/19 1539      Row Name 02/20/19 1507             Cognitive Assessment/Intervention- PT/OT    Affect/Mental Status (Cognitive)  low arousal/lethargic  -KF      Orientation Status (Cognition)  oriented to;person  -KF      Follows Commands (Cognition)  follows one step commands;50-74% accuracy;repetition of directions required;verbal cues/prompting required;physical/tactile prompts required;delayed response/completion  -KF      Cognitive Function (Cognitive)  attention deficit;safety deficit  -KF      Attention Deficit (Cognitive)  moderate deficit;concentration;focused/sustained attention  -KF      Safety Deficit (Cognitive)  moderate deficit;insight into deficits/self awareness;judgment;awareness of need for assistance;safety precautions awareness  -KF      Cognitive Interventions (Cognitive)  occupation/activity based interventions;process/task specific training  -KF      Personal Safety Interventions  fall prevention program maintained;gait belt;muscle strengthening facilitated;nonskid shoes/slippers when out of bed  -KF      Recorded by [KF] Kaye Lyon OT 02/20/19 1539      Row Name 02/20/19 1507             Safety Issues, Functional Mobility    Safety Issues Affecting Function (Mobility)  friction/shear risk;awareness of need for assistance;insight into deficits/self awareness;safety precaution awareness;sequencing abilities  -KF      Impairments Affecting Function (Mobility)  balance;cognition;strength;postural/trunk control  -KF      Comment, Safety Issues/Impairments (Mobility)  limited alertness  -KF      Recorded by [KF] Kaye Lyon OT 02/20/19 1539      Row Name 02/20/19 1507             Bed Mobility Assessment/Treatment    Bed Mobility Assessment/Treatment  rolling left;rolling right  -KF      Rolling Left  [place] (Bed Mobility)  maximum assist (25% patient effort);2 person assist;verbal cues  -KF      Rolling Right [place] (Bed Mobility)  maximum assist (25% patient effort);2 person assist;verbal cues  -KF      Assistive Device (Bed Mobility)  bed rails;draw sheet;head of bed elevated;mechanical lift/aid  -KF      Recorded by [KF] Kaye Lyon, OT 02/20/19 1539      Row Name 02/20/19 1507             Functional Mobility    Functional Mobility- Ind. Level  not tested  -KF      Recorded by [KF] Kaye Lyon, OT 02/20/19 1539      Row Name 02/20/19 1507             Transfer Assessment/Treatment    Transfer Assessment/Treatment  bed-chair transfer  -KF      Comment (Transfers)  dependent transfer  -KF      Recorded by [KF] Kaye Lyon, OT 02/20/19 1539      Row Name 02/20/19 1507             Bed-Chair Transfer    Bed-Chair [place] (Transfers)  dependent (less than 25% patient effort);2 person assist;verbal cues  -KF      Assistive Device (Bed-Chair Transfers)  mechanical lift/aid  -KF      Recorded by [KF] Kaye Lyon, OT 02/20/19 1539      Row Name 02/20/19 1507             ADL Assessment/Intervention    BADL Assessment/Intervention  feeding  -KF      Recorded by [KF] Kaye Lyon, OT 02/20/19 1539      Row Name 02/20/19 1507             Self-Feeding Assessment/Training    [place] Level (Feeding)  liquids to mouth;minimum assist (75% patient effort);verbal cues  -KF      Self-Feeding Assess/Train, Spillage Amount  minimal  -KF      Position (Self-Feeding)  supported sitting  -KF      Comment (Feeding)  provided set up of cup in hand, ed provided to family to encourage Pt to participate more in self-feeding, min A to bring straw to mouth holding cup in R hand  -KF      Recorded by [KF] Kaye Lyon, OT 02/20/19 1539      Row Name 02/20/19 1507             BADL Safety/Performance    Impairments, BADL Safety/Performance  balance;cognition;endurance/activity  tolerance;range of motion;strength;trunk/postural control;grasp/prehension  -KF      Skilled BADL Treatment/Intervention  cognitive/safety deficit modifications  -KF      Recorded by [KF] Kaye Lyon, OT 02/20/19 1539      Row Name 02/20/19 1507             Motor Skills Assessment/Interventions    Additional Documentation  Balance (Group);Balance Interventions (Group);Therapeutic Exercise (Group);Therapeutic Exercise Interventions (Group)  -KF      Recorded by [KF] Kaye Lyon OT 02/20/19 1539      Row Name 02/20/19 1507             Therapeutic Exercise    Therapeutic Exercise  seated, upper extremities  -KF      Additional Documentation  Therapeutic Exercise (Row)  -KF      98928 - OT Therapeutic Activity Minutes  4  -KF      Recorded by [KF] Kaye Lyon OT 02/20/19 1539      Row Name 02/20/19 1507             Upper Extremity Seated Therapeutic Exercise    Performed, Seated Upper Extremity (Therapeutic Exercise)  shoulder flexion/extension;elbow flexion/extension;digit flexion/extension  -KF      Exercise Type, Seated Upper Extremity (Therapeutic Exercise)  AAROM (active assistive range of motion);AROM (active range of motion)  -KF      Expected Outcomes, Seated Upper Extremity (Therapeutic Exercise)  improve functional tolerance, self-care activity;improve manipulation of objects, self care activity;improve performance, BADLs;improve performance, reaching for objects  -KF      Sets/Reps Detail, Seated Upper Extremity (Therapeutic Exercise)  1/10  -KF      Comment, Seated Upper Extremity (Therapeutic Exercise)  more participation with all movements but req constant cues for alertness  -KF      Recorded by [KF] Kaye Lyon OT 02/20/19 1539      Row Name 02/20/19 1501             Positioning and Restraints    Pre-Treatment Position  in bed  -KF      Post Treatment Position  chair  -KF      In Chair  notified nsg;reclined;sitting;call light within reach;encouraged to call for assist;with  family/caregiver;legs elevated;on mechanical lift sling;waffle cushion RN waveariane exit  -KF      Recorded by [KF] Kaye Lyon, OT 02/20/19 1539      Row Name 02/20/19 1507             Pain Assessment    Additional Documentation  Pain Scale: FACES Pre/Post-Treatment (Group)  -KF      Recorded by [KF] Kaye Lyon, OT 02/20/19 1539      Row Name 02/20/19 1507             Pain Scale: Numbers Pre/Post-Treatment    Pain Location - Orientation  generalized  -KF      Pain Location  perineum  -KF      Pre/Post Treatment Pain Comment  tolerated  -KF      Pain Intervention(s)  Repositioned;Ambulation/increased activity  -KF      Recorded by [KF] Kaye Lyon, OT 02/20/19 1539      Row Name 02/20/19 1507             Pain Scale: FACES Pre/Post-Treatment    Pain: FACES Scale, Pretreatment  2-->hurts little bit  -KF      Pain: FACES Scale, Post-Treatment  2-->hurts little bit  -KF      Recorded by [KF] Kaye Lyon, OT 02/20/19 1539      Row Name                Wound 02/08/19 1519 Other (See comments) neck incision    Wound - Properties Group Date first assessed: 02/08/19 [SM] Time first assessed: 1519 [SM] Side: Other (See comments) [SM] Location: neck [SM] Type: incision [SM] Recorded by:  [SM] Rissa Rodriguez RN 02/08/19 1519    Row Name                Wound 02/08/19 1800 Right lower gluteal pressure injury    Wound - Properties Group Date first assessed: 02/08/19 [AB] Time first assessed: 1800 [AB] Present On Admission : yes [AB] Side: Right [AB] Orientation: lower [AB] Location: gluteal [AB] Type: pressure injury [AB] Stage, Pressure Injury: deep tissue injury [AB] Additional Comments: bilateral gluteals [MR] Recorded by:  [AB] Addison Jasso RN 02/08/19 1855 [MR] Carmen Conner RN 02/09/19 1227    Row Name                Wound 02/09/19 1116 Bilateral medial thigh    Wound - Properties Group Date first assessed: 02/09/19 [MR] Time first assessed: 1116 [MR] Present On Admission : yes [MR] Side:  Bilateral [MR] Orientation: medial [MR] Location: thigh [MR] Stage, Pressure Injury: deep tissue injury [MR] Recorded by:  [MR] Carmen Conner RN 02/09/19 1228    Row Name                Wound 02/13/19 0800 Left lower gluteal pressure injury    Wound - Properties Group Date first assessed: 02/13/19 [JM] Time first assessed: 0800 [JM] Side: Left [JM] Orientation: lower [JM] Location: gluteal [JM] Type: pressure injury [JM] Stage, Pressure Injury: deep tissue injury [JM] Recorded by:  [JM] Ivon Eason RN 02/13/19 1353    Row Name                Wound 02/15/19 1800 Right upper chest puncture    Wound - Properties Group Date first assessed: 02/15/19 [CH] Time first assessed: 1800 [CH] Side: Right [CH] Orientation: upper [CH] Location: chest [CH] Type: puncture [CH] Additional Comments: s/p tunneled dialysis catheter [CH] Recorded by:  [CH] Taco Templeton RN 02/15/19 1822    Row Name                Wound 02/17/19 0800 scrotum abrasion;MASD (moisture associated skin damage)    Wound - Properties Group Date first assessed: 02/17/19 [CP] Time first assessed: 0800 [CP] Present On Admission : no [CP] Location: scrotum [CP] Type: abrasion;MASD (moisture associated skin damage) [CP] Recorded by:  [CP] Janine Juan RN 02/17/19 0953    Row Name 02/20/19 1507             Plan of Care Review    Plan of Care Reviewed With  patient;spouse  -KF      Recorded by [KF] Kaye Lyon, OT 02/20/19 1539      Row Name 02/20/19 1507             Outcome Summary/Treatment Plan (OT)    Daily Summary of Progress (OT)  progress towards functional goals is fair  -KF      Recorded by [KF] Kaye Lyon, OT 02/20/19 1531        User Key  (r) = Recorded By, (t) = Taken By, (c) = Cosigned By    Initials Name Effective Dates Discipline    [facility] Taco Templeton RN 06/16/16 -  DIALYSIS USER    Rissa Bolden RN 06/16/16 -  Nurse    Carmen Nelson RN 06/16/16 -  Nurse    Ivon Gutierrez RN 06/16/16 -  Nurse    CP  Janine Juan, RN 06/16/16 -  Nurse    Addison Jaffe, RN 02/02/17 -  Nurse    Kaye Willard, OT 04/03/18 -  OT        Rash 02/12/19 2000 Left upper arm macular (Active)   Distribution regional 2/20/2019  8:00 AM   Configuration/Shape asymmetric 2/20/2019  8:00 AM   Borders diffuse;irregular 2/20/2019  8:00 AM   Characteristics crusted;dry 2/20/2019  8:00 AM   Color red 2/20/2019  8:00 AM   Care, Rash open to air 2/20/2019  8:00 AM       Rash 02/12/19 2000 Left posterior hand macular (Active)   Distribution localized 2/20/2019  8:00 AM   Configuration/Shape asymmetric 2/20/2019  8:00 AM   Borders diffuse;irregular 2/20/2019  8:00 AM   Characteristics dry;crusted 2/20/2019  8:00 AM   Color purple;red 2/20/2019  8:00 AM   Care, Rash open to air 2/20/2019  8:00 AM       Wound 02/08/19 1519 Other (See comments) neck incision (Active)   Dressing Appearance open to air 2/20/2019  8:00 AM   Closure Adhesive closure strips 2/20/2019  8:00 AM   Base clean;dry;scab 2/20/2019  8:00 AM   Periwound intact;dry;pink;blanchable 2/20/2019  8:00 AM   Periwound Temperature warm 2/20/2019  8:00 AM   Periwound Skin Turgor soft 2/20/2019  8:00 AM   Drainage Amount none 2/20/2019  8:00 AM   Periwound Care, Wound dry periwound area maintained 2/20/2019  8:00 AM       Wound 02/08/19 1800 Right lower gluteal pressure injury (Active)   Dressing Appearance open to air 2/20/2019 12:10 PM   Closure None 2/20/2019 12:10 PM   Base dry;purple;maroon/purple 2/20/2019 12:10 PM   Periwound dry;pink;redness;blanchable 2/20/2019 12:10 PM   Periwound Temperature warm 2/20/2019  8:00 AM   Periwound Skin Turgor soft 2/20/2019  8:00 AM   Drainage Amount none 2/20/2019  8:00 AM   Care, Wound barrier applied 2/20/2019 12:10 PM   Dressing Care, Wound open to air 2/20/2019 12:10 PM   Periwound Care, Wound dry periwound area maintained;topical treatment applied;barrier film applied 2/20/2019 12:10 PM       Wound 02/09/19 1116 Bilateral medial  thigh (Active)   Dressing Appearance open to air 2/20/2019 12:10 PM   Closure None 2/20/2019 12:10 PM   Base dry;red/granulating 2/20/2019 12:10 PM   Periwound intact;dry;redness;blanchable 2/20/2019 12:10 PM   Drainage Amount none 2/20/2019  8:00 AM   Care, Wound cleansed with 2/20/2019  8:00 AM   Periwound Care, Wound barrier ointment applied 2/20/2019  8:00 AM       Wound 02/13/19 0800 Left lower gluteal pressure injury (Active)   Dressing Appearance open to air 2/20/2019 12:10 PM   Base dry;maroon/purple;purple 2/20/2019 12:10 PM   Periwound intact;dry;pink;blanchable 2/20/2019 12:10 PM   Drainage Amount none 2/20/2019  8:00 AM   Dressing Care, Wound open to air 2/20/2019 12:10 PM       Wound 02/15/19 1800 Right upper chest puncture (Active)   Dressing Appearance dry;intact;no drainage 2/20/2019  8:00 AM   Closure SARAHI 2/20/2019  8:00 AM   Base clean;dry;pink 2/20/2019  8:00 AM   Periwound intact;dry;pink 2/20/2019  8:00 AM   Edges open 2/20/2019  8:00 AM   Drainage Amount none 2/20/2019  8:00 AM   Dressing Care, Wound transparent film 2/20/2019  8:00 AM   Periwound Care, Wound dry periwound area maintained 2/20/2019  8:00 AM       Wound 02/17/19 0800 scrotum abrasion;MASD (moisture associated skin damage) (Active)   Dressing Appearance open to air 2/20/2019 12:10 PM   Closure None 2/20/2019  8:00 AM   Base moist;pink;white 2/20/2019 12:10 PM   Periwound moist;pink;redness;blanchable 2/20/2019 12:10 PM   Periwound Temperature warm 2/20/2019  8:00 AM   Periwound Skin Turgor soft 2/20/2019  8:00 AM   Edges open 2/20/2019  8:00 AM   Care, Wound cleansed with 2/20/2019  8:00 AM   Periwound Care, Wound dry periwound area maintained 2/20/2019 12:10 PM       Occupational Therapy Education     Title: PT OT SLP Therapies (In Progress)     Topic: Occupational Therapy (Done)     Point: ADL training (Done)     Description: Instruct learner(s) on proper safety adaptation and remediation techniques during self care or  transfers.   Instruct in proper use of assistive devices.    Learning Progress Summary           Patient Acceptance, E,TB,D, VU,DU,NR by KF at 2/20/2019  3:07 PM    Comment:  Purpose of skilled OT services, BUE AAROM/AROM HEP, bed mobility seq, importance of Pt completing self-feeding tasks    Acceptance, E, NR by CL at 2/14/2019  4:34 PM    Comment:  Pt educated on appropriate safety precautions, t/f techniques, role of OT, and benefits of therapy.    Acceptance, E, NR by CL at 2/12/2019  3:03 PM    Comment:  Pt educated on appropriate safety precautions, t/f techniques, positioning, and benefits of therapy.   Family Acceptance, E,TB,D, VU,DU,NR by KF at 2/20/2019  3:07 PM    Comment:  Purpose of skilled OT services, BUE AAROM/AROM HEP, bed mobility seq, importance of Pt completing self-feeding tasks    Acceptance, E, NR by CL at 2/14/2019  4:34 PM    Comment:  Pt educated on appropriate safety precautions, t/f techniques, role of OT, and benefits of therapy.    Acceptance, E, NR by CL at 2/12/2019  3:03 PM    Comment:  Pt educated on appropriate safety precautions, t/f techniques, positioning, and benefits of therapy.                   Point: Home exercise program (Done)     Description: Instruct learner(s) on appropriate technique for monitoring, assisting and/or progressing therapeutic exercises/activities.    Learning Progress Summary           Patient Acceptance, E,TB,D, VU,DU,NR by KF at 2/20/2019  3:07 PM    Comment:  Purpose of skilled OT services, BUE AAROM/AROM HEP, bed mobility seq, importance of Pt completing self-feeding tasks    Acceptance, E,TB,D, VU,DU by KF at 2/19/2019 11:17 AM    Comment:  BUE AAROM HEP    Acceptance, E, NR by CL at 2/14/2019  4:34 PM    Comment:  Pt educated on appropriate safety precautions, t/f techniques, role of OT, and benefits of therapy.   Family Acceptance, E,TB,D, VU,DU,NR by KF at 2/20/2019  3:07 PM    Comment:  Purpose of skilled OT services, BUE AAROM/AROM HEP, bed  mobility seq, importance of Pt completing self-feeding tasks    Acceptance, E,TB,D, VU,DU by KF at 2/19/2019 11:17 AM    Comment:  BUE AAROM HEP    Acceptance, E, NR by CL at 2/14/2019  4:34 PM    Comment:  Pt educated on appropriate safety precautions, t/f techniques, role of OT, and benefits of therapy.                   Point: Precautions (Done)     Description: Instruct learner(s) on prescribed precautions during self-care and functional transfers.    Learning Progress Summary           Patient Acceptance, E,TB,D, VU,DU,NR by KF at 2/20/2019  3:07 PM    Comment:  Purpose of skilled OT services, BUE AAROM/AROM HEP, bed mobility seq, importance of Pt completing self-feeding tasks    Acceptance, E,TB,D, VU,DU by KF at 2/19/2019 11:17 AM    Comment:  BUE AAROM HEP    Acceptance, E, NR by CL at 2/14/2019  4:34 PM    Comment:  Pt educated on appropriate safety precautions, t/f techniques, role of OT, and benefits of therapy.    Acceptance, E, NR by CL at 2/12/2019  3:03 PM    Comment:  Pt educated on appropriate safety precautions, t/f techniques, positioning, and benefits of therapy.   Family Acceptance, E,TB,D, VU,DU,NR by KF at 2/20/2019  3:07 PM    Comment:  Purpose of skilled OT services, BUE AAROM/AROM HEP, bed mobility seq, importance of Pt completing self-feeding tasks    Acceptance, E,TB,D, VU,DU by KF at 2/19/2019 11:17 AM    Comment:  BUE AAROM HEP    Acceptance, E, NR by CL at 2/14/2019  4:34 PM    Comment:  Pt educated on appropriate safety precautions, t/f techniques, role of OT, and benefits of therapy.    Acceptance, E, NR by CL at 2/12/2019  3:03 PM    Comment:  Pt educated on appropriate safety precautions, t/f techniques, positioning, and benefits of therapy.                   Point: Body mechanics (Done)     Description: Instruct learner(s) on proper positioning and spine alignment during self-care, functional mobility activities and/or exercises.    Learning Progress Summary           Patient  Acceptance, E,TB,D, VU,DU,NR by KF at 2/20/2019  3:07 PM    Comment:  Purpose of skilled OT services, BUE AAROM/AROM HEP, bed mobility seq, importance of Pt completing self-feeding tasks    Acceptance, E,TB,D, VU,DU by KF at 2/19/2019 11:17 AM    Comment:  BUE AAROM HEP    Acceptance, E, NR by CL at 2/14/2019  4:34 PM    Comment:  Pt educated on appropriate safety precautions, t/f techniques, role of OT, and benefits of therapy.    Acceptance, E, NR by CL at 2/12/2019  3:03 PM    Comment:  Pt educated on appropriate safety precautions, t/f techniques, positioning, and benefits of therapy.   Family Acceptance, E,TB,D, VU,DU,NR by KF at 2/20/2019  3:07 PM    Comment:  Purpose of skilled OT services, BUE AAROM/AROM HEP, bed mobility seq, importance of Pt completing self-feeding tasks    Acceptance, E,TB,D, VU,DU by KF at 2/19/2019 11:17 AM    Comment:  BUE AAROM HEP    Acceptance, E, NR by CL at 2/14/2019  4:34 PM    Comment:  Pt educated on appropriate safety precautions, t/f techniques, role of OT, and benefits of therapy.    Acceptance, E, NR by CL at 2/12/2019  3:03 PM    Comment:  Pt educated on appropriate safety precautions, t/f techniques, positioning, and benefits of therapy.                               User Key     Initials Effective Dates Name Provider Type Discipline     04/03/18 -  Cassie Reardon OT Occupational Therapist OT     04/03/18 -  Kaye Lyon OT Occupational Therapist OT                OT Recommendation and Plan  Outcome Summary/Treatment Plan (OT)  Daily Summary of Progress (OT): progress towards functional goals is fair  Daily Summary of Progress (OT): progress towards functional goals is fair  Plan of Care Review  Plan of Care Reviewed With: patient, spouse  Plan of Care Reviewed With: patient, spouse  Outcome Summary: Gradual progress in AAROM/AROM during BUE HEP in sitting with cues to maintain alertness throughout, dependent bed mobility and bed to chair transfer, cont IPOT per  POC   Outcome Measures     Row Name 02/20/19 1507 02/19/19 1300 02/19/19 1117       How much help from another person do you currently need...    Turning from your back to your side while in flat bed without using bedrails?  --  2  -AS  --    Moving from lying on back to sitting on the side of a flat bed without bedrails?  --  2  -AS  --    Moving to and from a bed to a chair (including a wheelchair)?  --  1  -AS  --    Standing up from a chair using your arms (e.g., wheelchair, bedside chair)?  --  1  -AS  --    Climbing 3-5 steps with a railing?  --  1  -AS  --    To walk in hospital room?  --  1  -AS  --    AM-PAC 6 Clicks Score  --  8  -AS  --       How much help from another is currently needed...    Putting on and taking off regular lower body clothing?  1  -KF  --  1  -KF    Bathing (including washing, rinsing, and drying)  1  -KF  --  1  -KF    Toileting (which includes using toilet bed pan or urinal)  1  -KF  --  1  -KF    Putting on and taking off regular upper body clothing  2  -KF  --  1  -KF    Taking care of personal grooming (such as brushing teeth)  2  -KF  --  2  -KF    Eating meals  2  -KF  --  2  -KF    Score  9  -KF  --  8  -KF       Functional Assessment    Outcome Measure Options  AM-PAC 6 Clicks Daily Activity (OT)  -KF  AM-PAC 6 Clicks Basic Mobility (PT)  -AS  AM-PAC 6 Clicks Daily Activity (OT)  -KF      User Key  (r) = Recorded By, (t) = Taken By, (c) = Cosigned By    Initials Name Provider Type    AS Eve Mercado, PTA Physical Therapy Assistant    KF Kaye Lyon OT Occupational Therapist           Time Calculation:   Time Calculation- OT     Row Name 02/20/19 1507             Time Calculation- OT    OT Start Time  1507  -KF      Total Timed Code Minutes- OT  23 minute(s)  -KF      OT Received On  02/20/19  -KF      OT Goal Re-Cert Due Date  02/22/19  -KF         Timed Charges    26068 - OT Therapeutic Exercise Minutes  17  -KF      90696 - OT Therapeutic Activity Minutes  4   -KF      71613 - OT Self Care/Mgmt Minutes  2  -KF        User Key  (r) = Recorded By, (t) = Taken By, (c) = Cosigned By    Initials Name Provider Type    Kaye Willard OT Occupational Therapist           Therapy Suggested Charges     Code   Minutes Charges    87063 (CPT®) Hc Ot Neuromusc Re Education Ea 15 Min      97111 (CPT®) Hc Ot Ther Proc Ea 15 Min 17 1    69136 (CPT®) Hc Ot Therapeutic Act Ea 15 Min 4 1    67903 (CPT®) Hc Ot Manual Therapy Ea 15 Min      99244 (CPT®) Hc Ot Iontophoresis Ea 15 Min      21692 (CPT®) Hc Ot Elec Stim Ea-Per 15 Min      81014 (CPT®) Hc Ot Ultrasound Ea 15 Min      12106 (CPT®) Hc Ot Self Care/Mgmt/Train Ea 15 Min 2     Total  23 2        Therapy Charges for Today     Code Description Service Date Service Provider Modifiers Qty    43009932326 HC OT THER PROC EA 15 MIN 2/19/2019 Kaye Lyon OT GO 1    38688965028 HC OT THERAPEUTIC ACT EA 15 MIN 2/19/2019 Kaye Lyon OT GO 1    05022207478 HC OT THER PROC EA 15 MIN 2/20/2019 Kaye Lyon OT GO 1    19729751167 HC OT THERAPEUTIC ACT EA 15 MIN 2/20/2019 Kaye Lyon OT GO 1    68110726138 HC OT THER SUPP EA 15 MIN 2/20/2019 Kaye Lyon, OT GO 1               Kaye Lyon OT  2/20/2019

## 2019-02-20 NOTE — PLAN OF CARE
Problem: Patient Care Overview  Goal: Plan of Care Review  Outcome: Ongoing (interventions implemented as appropriate)   02/20/19 5791   Coping/Psychosocial   Plan of Care Reviewed With patient;spouse   Plan of Care Review   Progress improving   OTHER   Outcome Summary Gradual progress in AAROM/AROM during BUE HEP in sitting with cues to maintain alertness throughout, dependent bed mobility and bed to chair transfer, cont IPOT per POC

## 2019-02-20 NOTE — PROGRESS NOTES
"IM progress note      Rod Balderas  [id number]  1950     LOS: 12 days     Attending: Arielle Carolina MD    Primary Care Provider: Tyrese Leyva MD      Chief Complaint/Reason for visit:  Sepsis    He was transferred to telemetry after about 10 days in ICU. He is followed by renal, cardiology, and infectious disease. His renal failure is resolved.     Subjective   Doing ok. Minimal verbal response, mumbles and whispers. Denies pain or nausea by shaking his head no. Limited mobility, can hardly move his feet. Has not had MRI, as radiology refuse to do due to hx of \"metal\". Tolerated a muffin for breakfast.    ( Seen later in his room, helped to eat dinner by his wife. C/o pain in his legs. )wy    Objective     Vital Signs    Visit Vitals  /69   Pulse 91   Temp 98.2 °F (36.8 °C) (Oral)   Resp 16   Ht 170.2 cm (67.01\")   Wt 117 kg (257 lb)   SpO2 95%   BMI 40.24 kg/m²     Temp (24hrs), Av °F (36.7 °C), Min:97.8 °F (36.6 °C), Max:98.2 °F (36.8 °C)      Nutrition: PO    Respiratory: RA    Physical Exam:     General Appearance:    Somnolent, in no acute distress   Head:    Normocephalic, without obvious abnormality, atraumatic    Lungs:     Normal effort, symmetric chest rise, no crepitus, clear to      auscultation bilaterally             Heart:    Regular rhythm and normal rate, normal S1 and S2   Abdomen:     abd soft, healing lap sites. obese   Extremities:   No clubbing, cyanosis. +pitting BLE edema.    Pulses:   Pulses palpable and equal bilaterally   Skin:   No bleeding, bruising or rash   Neurologic:  Cranial nerves 2 - 12 grossly intact     Results Review:     I reviewed the patient's new clinical results.   Results from last 7 days   Lab Units 19  1435 19  0709 19  0315 02/15/19  0545 19  0520   WBC 10*3/mm3 10.07 15.18* 14.61* 17.14* 19.26*   HEMOGLOBIN g/dL 9.4* 9.9* 9.6* 9.9* 10.7*   HEMATOCRIT % 30.4* 30.2* 29.6* 30.6* 32.5*   PLATELETS 10*3/mm3 381 364 " 306 256 223   MONOCYTES % %  --   --   --  4.0 5.0     Results from last 7 days   Lab Units 02/18/19  0400 02/17/19  0709 02/16/19  0315   SODIUM mmol/L 136 140 133   POTASSIUM mmol/L 4.0 4.1 3.9   CHLORIDE mmol/L 106 108 105   CO2 mmol/L 25.0 23.0 22.0   BUN mg/dL 47* 58* 67*   CREATININE mg/dL 1.04 1.13 1.10   CALCIUM mg/dL 8.0* 7.7* 7.7*   BILIRUBIN mg/dL  --  0.9  --    ALK PHOS U/L  --  178*  --    ALT (SGPT) U/L  --  10  --    AST (SGOT) U/L  --  18  --    GLUCOSE mg/dL 126* 127* 120*   Results for JESSE YUSUF (MRN [id number]) as of 2/20/2019 12:06   Ref. Range 2/19/2019 16:17 2/19/2019 20:25 2/20/2019 07:15   Glucose Latest Ref Range: 70 - 130 mg/dL 138 (H) 149 (H) 133 (H)     I reviewed the patient's new imaging including images and reports.    All medications reviewed.     amLODIPine 5 mg Oral Q24H   apixaban 5 mg Oral Q12H   castor oil-balsam peru  Topical Q12H   ceFAZolin 2 g Intravenous Q8H   DULoxetine 60 mg Oral Daily   famotidine 20 mg Oral BID   gabapentin 300 mg Oral TID   insulin lispro 0-7 Units Subcutaneous 4x Daily With Meals & Nightly   metoprolol tartrate 25 mg Oral TID   sodium chloride 10 mL Intravenous Q12H   sodium chloride 3 mL Intravenous Q12H       Assessment/Plan     Renal failure, resolved.    Metabolic encephalopathy,improving.    Spinal stenosis, lumbar region, with neurogenic claudication    Morbid obesity due to excess calories (CMS/HCC)    TANIYA on CPAP    Dyslipidemia    DM (diabetes mellitus), type 2 (CMS/HCC)    Paroxysmal atrial fibrillation , c/p carioversion.    Sepsis (CMS/HCC)    Moderate aortic regurgitation      Plan  1. Mobilize as able. PT/OT  2. Pain control-prns.   3. IS-encouraged  4. DVT proph- mechs/Eliquis  5. Bowel regimen  6. Diet as tolerated. 2L FR/day  7. Monitor labs  8. CM to work on rehab placement    Sepsis/POA. Improved.  [facility], Dr. Smith  - high dose cefazolin, PICC placement, follow BC    Afib, HTN, HLD, aortic regurgitation  s/p KLAUDIA/ECV per   Kevin 2/14/19  Cardiology, Dr. Regalado  - Eliquis 5 mg BID - watch H& H closely  - Continue home norvasc and lopressor  - Monitor BP     Renal, Dr. Koo  - signed off    TANIYA  - CPAP at night    Spinal stenosis, BLE weakness  - consult neurosx, Dr. Mayers follows pt.  Per Neurosx: ((At this time, patient is not a surgical candidate. Follow up with neurosurgery outpatient when stable/discharged for full workup.))    DM  - Accuchecks ACHS with low dose SSI      MAYTE Phillips  02/20/19  12:04 PM   I have personally performed the evaluation on this patient. My history is consistent  with HPI obtained. My exam findings are listed above. I have personally reviewed and discussed the above formulated treatment plan with pt , wife and . [email or web address].     .

## 2019-02-20 NOTE — PROGRESS NOTES
Continued Stay Note  [facility]     Patient Name: Rod Balderas  MRN: [id number]  Today's Date: 2/20/2019    Admit Date: 2/8/2019    Discharge Plan     Row Name 02/20/19 1435       Plan    Plan  Short term rehab    Patient/Family in Agreement with Plan  yes    Plan Comments  Spoke with patient and his wife at bedside.  Patient states that he is no longer interested in [facility] for rehab, and would like to consider [facility] for rehab.  Spoke with patient about referral to [facility].  Patient and wife are also still interested in [facility] if he cannot go to [facility].  Spoke with Alicia at [facility].  She will review patient for rehab.  Spoke with Abbi at the [facility].  She will also review patient for rehab.  Patient will need PICC placement before transfer and will need 6 weeks of antibiotics until 3/22/19.  [facility] are aware of antibiotics therapy.  Case management will continue to follow patient for discharge planning needs.      Final Discharge Disposition Code  03 - skilled nursing facility (SNF)        Discharge Codes    No documentation.       Expected Discharge Date and Time     Expected Discharge Date Expected Discharge Time    Feb 18, 2019             Anabell Allison RN

## 2019-02-20 NOTE — PLAN OF CARE
Problem: Patient Care Overview  Goal: Plan of Care Review  Outcome: Ongoing (interventions implemented as appropriate)   02/20/19 1540 02/20/19 1811   Coping/Psychosocial   Plan of Care Reviewed With patient;spouse --    Plan of Care Review   Progress --  no change   OTHER   Outcome Summary --  Vital signs wnl. Oxygen level greater than 90% on room air. No complaints of pain or shortness of breath. Awaiting rehab.        Problem: Skin Injury Risk (Adult)  Goal: Skin Health and Integrity  Outcome: Ongoing (interventions implemented as appropriate)      Problem: Sepsis/Septic Shock (Adult)  Goal: Signs and Symptoms of Listed Potential Problems Will be Absent, Minimized or Managed (Sepsis/Septic Shock)  Outcome: Ongoing (interventions implemented as appropriate)

## 2019-02-20 NOTE — PLAN OF CARE
Problem: Patient Care Overview  Goal: Plan of Care Review  Outcome: Ongoing (interventions implemented as appropriate)   02/20/19 0412   Coping/Psychosocial   Plan of Care Reviewed With patient   Plan of Care Review   Progress improving

## 2019-02-21 LAB
ARTERIAL PATENCY WRIST A: ABNORMAL
ATMOSPHERIC PRESS: ABNORMAL MMHG
BASE EXCESS BLDA CALC-SCNC: 1.7 MMOL/L (ref 0–2)
BDY SITE: ABNORMAL
BODY TEMPERATURE: 37 C
CO2 BLDA-SCNC: 24.8 MMOL/L (ref 23–27)
COHGB MFR BLD: 1.5 % (ref 0–2)
GLUCOSE BLDC GLUCOMTR-MCNC: 114 MG/DL (ref 70–130)
GLUCOSE BLDC GLUCOMTR-MCNC: 128 MG/DL (ref 70–130)
GLUCOSE BLDC GLUCOMTR-MCNC: 173 MG/DL (ref 70–130)
GLUCOSE BLDC GLUCOMTR-MCNC: 177 MG/DL (ref 70–130)
HCO3 BLDA-SCNC: 23.9 MMOL/L (ref 20–26)
HCT VFR BLD CALC: 28.3 %
HGB BLDA-MCNC: 9.2 G/DL (ref 13.5–17.5)
HOROWITZ INDEX BLD+IHG-RTO: 21 %
METHGB BLD QL: 1 % (ref 0–1.5)
MODALITY: ABNORMAL
NOTE: ABNORMAL
OXYHGB MFR BLDV: 93.4 % (ref 94–99)
PCO2 BLDA: 28.3 MM HG
PCO2 TEMP ADJ BLD: 28.3 MM HG (ref 35–48)
PH BLDA: 7.54 PH UNITS (ref 7.35–7.45)
PH, TEMP CORRECTED: 7.54 PH UNITS
PO2 BLDA: 69 MM HG (ref 83–108)
PO2 TEMP ADJ BLD: 69 MM HG (ref 83–108)
VENTILATOR MODE: ABNORMAL

## 2019-02-21 PROCEDURE — 99232 SBSQ HOSP IP/OBS MODERATE 35: CPT | Performed by: PHYSICIAN ASSISTANT

## 2019-02-21 PROCEDURE — 82962 GLUCOSE BLOOD TEST: CPT

## 2019-02-21 PROCEDURE — 97110 THERAPEUTIC EXERCISES: CPT

## 2019-02-21 PROCEDURE — 25010000003 CEFAZOLIN IN DEXTROSE 2-4 GM/100ML-% SOLUTION: Performed by: INTERNAL MEDICINE

## 2019-02-21 PROCEDURE — 97530 THERAPEUTIC ACTIVITIES: CPT

## 2019-02-21 PROCEDURE — 82805 BLOOD GASES W/O2 SATURATION: CPT

## 2019-02-21 PROCEDURE — 97535 SELF CARE MNGMENT TRAINING: CPT

## 2019-02-21 PROCEDURE — 36600 WITHDRAWAL OF ARTERIAL BLOOD: CPT

## 2019-02-21 RX ORDER — ACETAMINOPHEN 325 MG/1
650 TABLET ORAL EVERY 6 HOURS PRN
Status: DISCONTINUED | OUTPATIENT
Start: 2019-02-21 | End: 2019-02-24 | Stop reason: HOSPADM

## 2019-02-21 RX ORDER — GABAPENTIN 100 MG/1
200 CAPSULE ORAL EVERY 12 HOURS SCHEDULED
Status: DISCONTINUED | OUTPATIENT
Start: 2019-02-21 | End: 2019-02-22

## 2019-02-21 RX ADMIN — INSULIN LISPRO 2 UNITS: 100 INJECTION, SOLUTION INTRAVENOUS; SUBCUTANEOUS at 18:06

## 2019-02-21 RX ADMIN — DULOXETINE HYDROCHLORIDE 60 MG: 60 CAPSULE, DELAYED RELEASE ORAL at 08:39

## 2019-02-21 RX ADMIN — CEFAZOLIN SODIUM 2 G: 2 INJECTION, SOLUTION INTRAVENOUS at 12:48

## 2019-02-21 RX ADMIN — FAMOTIDINE 20 MG: 20 TABLET, FILM COATED ORAL at 08:39

## 2019-02-21 RX ADMIN — AMLODIPINE BESYLATE 5 MG: 5 TABLET ORAL at 08:39

## 2019-02-21 RX ADMIN — FAMOTIDINE 20 MG: 20 TABLET, FILM COATED ORAL at 20:08

## 2019-02-21 RX ADMIN — CEFAZOLIN SODIUM 2 G: 2 INJECTION, SOLUTION INTRAVENOUS at 06:21

## 2019-02-21 RX ADMIN — CEFAZOLIN SODIUM 2 G: 2 INJECTION, SOLUTION INTRAVENOUS at 20:30

## 2019-02-21 RX ADMIN — APIXABAN 5 MG: 5 TABLET, FILM COATED ORAL at 06:21

## 2019-02-21 RX ADMIN — ACETAMINOPHEN 650 MG: 325 TABLET ORAL at 10:28

## 2019-02-21 RX ADMIN — DICLOFENAC 2 G: 10 GEL TOPICAL at 20:10

## 2019-02-21 RX ADMIN — GABAPENTIN 200 MG: 100 CAPSULE ORAL at 20:08

## 2019-02-21 RX ADMIN — CASTOR OIL AND BALSAM, PERU: 788; 87 OINTMENT TOPICAL at 20:09

## 2019-02-21 RX ADMIN — METOPROLOL TARTRATE 25 MG: 25 TABLET ORAL at 08:39

## 2019-02-21 RX ADMIN — APIXABAN 5 MG: 5 TABLET, FILM COATED ORAL at 18:06

## 2019-02-21 RX ADMIN — METOPROLOL TARTRATE 25 MG: 25 TABLET ORAL at 20:08

## 2019-02-21 RX ADMIN — INSULIN LISPRO 2 UNITS: 100 INJECTION, SOLUTION INTRAVENOUS; SUBCUTANEOUS at 08:40

## 2019-02-21 RX ADMIN — METOPROLOL TARTRATE 25 MG: 25 TABLET ORAL at 16:07

## 2019-02-21 RX ADMIN — CASTOR OIL AND BALSAM, PERU: 788; 87 OINTMENT TOPICAL at 08:42

## 2019-02-21 RX ADMIN — ACETAMINOPHEN 650 MG: 325 TABLET ORAL at 16:06

## 2019-02-21 RX ADMIN — SODIUM CHLORIDE, PRESERVATIVE FREE 10 ML: 5 INJECTION INTRAVENOUS at 08:40

## 2019-02-21 NOTE — PROGRESS NOTES
[facility] Progress Note      Antibiotics:  Anti-Infectives (From admission, onward)    Ordered     Dose/Rate Route Frequency Start Stop    02/14/19 1149  ceFAZolin in dextrose (ANCEF) IVPB solution 2 g     Jeff Smith MD reviewed the order on 02/20/19 0934.   Ordering Provider:  Jeff Smith MD    2 g  over 30 Minutes Intravenous Every 8 Hours 02/14/19 1300 02/28/19 0933    02/09/19 0849  aztreonam (AZACTAM) 2 g in sodium chloride 0.9 % 100 mL IVPB-MBP     Ordering Provider:  Efrem Santana MD    2 g  200 mL/hr over 30 Minutes Intravenous Once 02/09/19 1000 02/09/19 0946          CC: Patient unable    HPI  2/8/19: Patient is a 68 y.o.  Yr old male with history of prostate cancer status post robotic laparoscopic prostatectomy in mid January and was discharged on 1/17/19. The patient is currently unresponsive and no family is at bedside so history is somewhat limited to history from nursing staff and medical records. The patient was seen a few days after his discharge from his recent  Admission in January and his Christina catheter was discontinued.  About one week ago he went to the ER with complaints of back pain, abdominal pain, and confusion and a CT abdomen and pelvis was apparently okay during that time. He continued to worsen and over the weekend he was admitted to [facility].  He was found to have an initial AK I with a creatinine of 2.2. Per the micro-lab at [facility], the patient's urine cultures and 1 of 2 blood cultures from 2/5/19 grew MSSA. The patient was initially on vancomycin and Zosyn and was subsequently transitioned to daptomycin after he developed worsening renal function with a creatinine up to 5.6 and BUN went up to 94 today.  Due to lack of nephrology at [facility], the patient was transferred to [facility] today for possible dialysis.  He additionally received a dose of nafcillin prior to this transfer.  His last dose of  daptomycin was yesterday per pharmacist here (who has discussed with OSH). Since arrival, the patient has remained afebrile but he has had a leukocytosis up to 31.38.  He is anemic with a hemoglobin of 11.3 and a hematocrit of 54.1.  He is hyponatremic to a sodium of 127.  Creatinine was initially elevated 5.39 and B1 was 97 on arrival.  He additionally had elevation of his LFTs with an ALT of 43 and AST of 43, T bili of 3.0, and alkaline phosphatase of 303.  Pro-calcitonin was elevated to 7.55 lactic acid was normal at 0.8. The patient underwent CT head without contrast which did not show any acute abnormality, CT chest, abdomen and pelvis which showed consolidation of the lung bases bilaterally that is concerning for airspace disease such as pneumonia, gastric distention, and air-filled loops of colon with no obvious obstruction. Blood cultures have been ordered and a reflexive urinalysis has been ordered as well.  A transthoracic echocardiogram was ordered. The patient underwent dialysis today following right IJ dialysis catheter placement.  The infectious disease team has been consulted for antibiotic recommendations.    He has methicillin sensitive staph aureus septicemia/bacteriuria compounded by acute renal failure, acute hypoxic respiratory failure and by basilar consolidation by chest CT.  Further compounded by acute encephalopathy.    2/9/19 nursing reports A. fib/RVR, dialysis ongoing with acute renal failure and tachypneic/labored at times.  Some cough but unable to capture for culture so far. Empiric HCAP coverage added with zyvox/azactam/flagyl; unable to do MRI of spine so CT's done cervical/thoracic/lumbar spine done 2/10    2/10/19 CT scans of spine; more restful, less labored with breathing and decreased cough;  No rash.  He can wiggle toes but not lift legs off the bed.  He moves left arm better than right arm.  No other new focal pain that he will relate although interaction minimal.  No  diarrhea.  No vomiting.    2/11/19: seems to have improved from mental status standpoint slightly per his wife and son who are at bedside. Patient endorses abdominal pain but is not answering a lot of questions. tMAX 99.3f. WBC remains elevated at 20K (down from 31K on arrival). TTE was without evidence of IE but valves poorly visualized. Still getting HD.    2/12/19: Patient is starting to feel much better today.  He is responding to a lot of questions and is more alert sitting up in a bedside chair.  Breathing is improving.  No high fevers overnight.  Leukocytosis improved to 15.9 today.  He continues to urinate a small amount but remains on intermittent dialysis.    2/13/19: Feeling about the same as he was yesterday.  Still having some right wrist pain. Shortness of breath is stable. No fevers. Leukocytosis slightly worse today.    2/14/19: Patient is somnolent today after his KLAUDIA due to recent sedation. Procedure went well per his family and he is back in NSR and no signs of vegetation but procedure report is pending. No fevers. Leukocytosis slightly improved from yesterday. Family reports that the patient has had no new complaints. He was evaluated by Dr. Ro and no significant concern for septic arthritis.    2/16/19: I reviewed his complex medical records and discussed his complex situation with Dr. Jeff Smith.  His hemodialysis catheter was removed late yesterday.  He has been afebrile over the past 24 hours.  He denies abdominal pain.  He has a history of metal fragment between his frontal lobes according to his son.  This was apparently only recently identified on a CT scan prior to his admission here.  He underwent an MRI scan approximately 2 years ago without complications and the son indicates the metal fragment would have been there at that time.  He is having difficulty moving his legs but this is a chronic problem according to his son.  He was able to ambulate at home with  "assistance.    2/17/19:  He had a low-grade fever to 100.2 last evening.  He still has significant bilateral lower extremity weakness but has a history of spinal stenosis and was weak prior to admission.  He was able to ambulate with assistance prior to admission.  He denies lower extremity numbness.  He denies nausea and vomiting.  He has some mild loose stool.    2/18/19: The patient continued to have low-grade fevers to 100.7 over the last 24 hours.  He does still have significant bilateral lower extremity weakness and is complaining about some back pain.  No numbness.  He is also having some right wrist pain still.  Having some mild abdominal pain.  No nausea or vomiting.    2/19/19: The patient continues to have significant lower extremity weakness.  No fevers overnight. No diarrhea or nausea.  Neurosurgery has evaluated the patient and he is not a good surgical candidate at this time but may need a CT myelogram in the coming days.    2/20/19: patient is somnolent this morning and not responsive to questions but his wife states that this is secondary to the patient recently receiving a dose of gabapentin. No fevers. Leukocytosis improving. Still very weak in lower extremities and having some weakness in bilateral upper extremities per his wife.    2/21/19: Patient is more alert today.  Still having significant weakness of his bilateral lower extremities.  Neurosurgery has ordered a nerve conduction study to be done at bedside today.  Case management is working on placement in rehabilitation.  No fevers.  He is tolerating the antibiotics well without any adverse side effects.  Denies any diarrhea or nausea.    PE:   /69   Pulse 82   Temp 98.4 °F (36.9 °C) (Oral)   Resp 18   Ht 170.2 cm (67.01\")   Wt 117 kg (257 lb)   SpO2 94%   BMI 40.24 kg/m²     GENERAL: ill appearing, obese,  man. NAD. Lying in bed. More alert  HEENT: Normocephalic, atraumatic. No conjunctival injection. No icterus. "   NECK: Supple without nuchal rigidity. No mass.   HEART: RRR. No murmur, rubs, gallops.  LUNGS: Diminished at bases, labored and bilateral rhonchi. On room air  ABDOMEN: Soft, nontender, nondistended. Positive bowel sounds. No rebound or guarding. NO mass or HSM.  EXT:  No cyanosis, clubbing. No cord.  Edema noted over bilateral lower extremities.   : deferred  SKIN:.  No new rash noted  NEURO: awake, alert, and oriented ×4.  Severe bilateral lower extremity weakness.  Right upper extremity PICC line site is without any erythema    Laboratory Data    Results from last 7 days   Lab Units 02/19/19  1435 02/17/19  0709 02/16/19  0315   WBC 10*3/mm3 10.07 15.18* 14.61*   HEMOGLOBIN g/dL 9.4* 9.9* 9.6*   HEMATOCRIT % 30.4* 30.2* 29.6*   PLATELETS 10*3/mm3 381 364 306     Results from last 7 days   Lab Units 02/18/19  0400   SODIUM mmol/L 136   POTASSIUM mmol/L 4.0   CHLORIDE mmol/L 106   CO2 mmol/L 25.0   BUN mg/dL 47*   CREATININE mg/dL 1.04   GLUCOSE mg/dL 126*   CALCIUM mg/dL 8.0*     Results from last 7 days   Lab Units 02/17/19  0709   ALK PHOS U/L 178*   BILIRUBIN mg/dL 0.9   ALT (SGPT) U/L 10   AST (SGOT) U/L 18               Estimated Creatinine Clearance: 84.3 mL/min (by C-G formula based on SCr of 1.04 mg/dL).      Microbiology:  2/8/19 Urine culture: MSSA  2/9/19 sputum  Culture: Yeast isolated  2/8/19 blood culture ×2: No growth at 5 days  2/18/19 blood culture ×2: No growth to date    Radiology:  Imaging Results (last 72 hours)     Procedure Component Value Units Date/Time    XR Chest 1 View [[id number]] Updated:  02/09/19 0221    CT Abdomen Pelvis Without Contrast [[id number]] Collected:  02/08/19 1822     Updated:  02/08/19 1822    Narrative:       EXAMINATION: CT CHEST WO CONTRAST, CT ABDOMEN/PELVIS WO CONTRAST -  2/8/2019     INDICATION: Persistent cough.     TECHNIQUE: Multiple axial CT imaging is obtained of the chest, abdomen  and pelvis without the administration of oral or intravenous contrast.      The radiation dose reduction device was turned on for each scan per the  ALARA (As Low as Reasonably Achievable) protocol.     COMPARISON: There is some consolidation identified posteriorly extending  to the lung bases bilaterally suggesting infiltrates. The upper lung  fields are grossly clear. Motion artifact degrades image quality. There  are degenerative changes identified within the spine. The cardiac  chambers are enlarged. There is no pericardial effusion. No bulky hilar  or axillary lymphadenopathy. The thyroid is homogeneous. Degenerative  change is seen throughout the spine.     ABDOMEN: Motion artifact grades image quality. There is no abnormality  seen within the liver. The spleen is upper limits of normal in size.  There is gastric distention. Air fills the colon with no  evidence of  obvious obstruction. Kidneys and adrenal glands within normal limits.  The pancreas is homogeneous. No abdominal or retroperitoneal  lymphadenopathy. No abnormal mass or fluid collections identified. No  free fluid or free air.     PELVIS: Pelvic organs are unremarkable. The pelvic portion of the   gastrointestinal tract is within normal limits. No pelvic adenopathy.  Degenerative change is identified throughout the spine and pelvis.     FINDINGS: Consolidation at the lung bases bilaterally is concerning for  airspace disease such as pneumonia. The there is gastric distention.  Air-filled loops of colon with no obvious obstruction. No other abnormal  findings are seen within the abdomen or pelvis. Degenerative changes are  seen throughout the spine and pelvis.     DICTATED:   2/8/2019  EDITED/ls :   2/8/2019              Impression:               CT Chest Without Contrast [[id number]] Collected:  02/08/19 1822     Updated:  02/08/19 1822    Narrative:       EXAMINATION: CT CHEST WO CONTRAST, CT ABDOMEN/PELVIS WO CONTRAST -  2/8/2019     INDICATION: Persistent cough.     TECHNIQUE: Multiple axial CT imaging is obtained of  the chest, abdomen  and pelvis without the administration of oral or intravenous contrast.     The radiation dose reduction device was turned on for each scan per the  ALARA (As Low as Reasonably Achievable) protocol.     COMPARISON: There is some consolidation identified posteriorly extending  to the lung bases bilaterally suggesting infiltrates. The upper lung  fields are grossly clear. Motion artifact degrades image quality. There  are degenerative changes identified within the spine. The cardiac  chambers are enlarged. There is no pericardial effusion. No bulky hilar  or axillary lymphadenopathy. The thyroid is homogeneous. Degenerative  change is seen throughout the spine.     ABDOMEN: Motion artifact grades image quality. There is no abnormality  seen within the liver. The spleen is upper limits of normal in size.  There is gastric distention. Air fills the colon with no  evidence of  obvious obstruction. Kidneys and adrenal glands within normal limits.  The pancreas is homogeneous. No abdominal or retroperitoneal  lymphadenopathy. No abnormal mass or fluid collections identified. No  free fluid or free air.     PELVIS: Pelvic organs are unremarkable. The pelvic portion of the   gastrointestinal tract is within normal limits. No pelvic adenopathy.  Degenerative change is identified throughout the spine and pelvis.     FINDINGS: Consolidation at the lung bases bilaterally is concerning for  airspace disease such as pneumonia. The there is gastric distention.  Air-filled loops of colon with no obvious obstruction. No other abnormal  findings are seen within the abdomen or pelvis. Degenerative changes are  seen throughout the spine and pelvis.     DICTATED:   2/8/2019  EDITED/ls :   2/8/2019              Impression:               CT Head Without Contrast [[id number]] Collected:  02/08/19 1816     Updated:  02/08/19 1817    Narrative:       EXAMINATION: CT HEAD WO CONTRAST - 2/8/2019     INDICATION: Mental status  change, does not follow commands.     TECHNIQUE: Multiple axial CT imaging is obtained of the head from skull  base to skull vertex without the administration of intravenous contrast.     The radiation dose reduction device was turned on for each scan per the  ALARA (As Low as Reasonably Achievable) protocol.     COMPARISON: NONE     FINDINGS: There is atrophy of the brain. Some low-density area seen in  the periventricular white matter suggesting chronic small vessel  ischemic change. No hemorrhage or hydrocephalus. No mass, mass effect,  or midline shift. No abnormal extra-axial  fluid collections identified. Minimal mucosal thickening of the  maxillary sinuses and ethmoid air cells. The bony structures are  unremarkable. The mastoid air cells are patent.       Impression:       Chronic changes identified within the brain with no acute  intracranial abnormality.     DICTATED:   2/8/2019  EDITED/ls :   2/8/2019        FL C Arm During Surgery [[id number]] Collected:  02/08/19 1646     Updated:  02/08/19 1646    Narrative:       EXAMINATION: FL C ARM DURING SURGERY- 02/08/2019      INDICATION: Dialysis catheter placement     COMPARISON: NONE     FINDINGS: 4 seconds of fluoroscopy and 4 images used for right-sided  dialysis catheter placement. Please see the procedure report for full  details.       Impression:       Fluoroscopy for dialysis catheter placement. Please see the  procedure report for full details.      D:  02/08/2019  E:  02/08/2019       XR Chest 1 View [[id number]] Collected:  02/08/19 1628     Updated:  02/08/19 1628    Narrative:       EXAMINATION: XR CHEST 1 VW-02/08/2019:      INDICATION: RIJ line.      COMPARISON: 02/08/2019.     FINDINGS: Portable chest reveals low lung volumes. Deep line catheter  identified on the right with tip in the SVC. Degenerative changes seen  within the spine. The heart is borderline enlarged. Mild prominence seen  of the pulmonary vascularity. No pleural effusion or  pneumothorax.           Impression:       Prominence of the pulmonary vascularity with deep line  catheter identified on the right with tip in the SVC. No evidence of  acute parenchymal disease.     D:  02/08/2019  E:  02/08/2019             XR Chest 1 View [[id number]] Collected:  02/08/19 1243     Updated:  02/08/19 1246    Narrative:          EXAMINATION: XR CHEST 1 VW-      INDICATION: respiratory failure      COMPARISON: 01/08/2019     FINDINGS: Heart is borderline enlarged. The vasculature is cephalized.  Lung volumes are relatively low. There is mild discoid atelectasis in  both medial lung bases, but no focal disease elsewhere..           Impression:       Low lung volumes, borderline cardiomegaly and mild pulmonary  vascular congestion. Mild bibasilar discoid atelectasis, new from prior  study.     This report was finalized on 2/8/2019 12:44 PM by DR. Antione Damon MD.           KLAUDIA:  Interpretation Summary     · Estimated EF appears to be in the range of 61 - 65%.  · Left ventricular systolic function is normal.  · Moderate aortic valve regurgitation is present.  · Mild tricuspid valve regurgitation is present.  · Mild mitral valve regurgitation is present  · No evidence of a left atrial appendage thrombus was present.            Impression:   1.  MSSA bacteremia/septic shock-with associated MSSA bacteriuria following recent prostate surgery.  Although MSSA bacteriuria is generally a reflection of the bacteremia rather than the cause of the bacteremia, and his situation the MSSA bacteremia may have been a result of MSSA UTI.  He has underlying valvular heart disease but his KLAUDIA did not reveal any definite evidence of endocarditis.  He is still at high risk for early endocarditis without a definite vegetation.  He will need a very prolonged course of intravenous antibiotic therapy. Repeat blood cultures from 2/18/19 are no growth to date. Leukocytosis improved  2.  Septic shock-improved  3.  Acute hypoxic  respiratory failure-improved  4.  Acute renal failure/ATN-improved  5.  Lower extremity weakness/back pain-his son indicates that this is a long-standing problem with spinal stenosis but he was able to walk at home with assistance.  His legs do appear to be weaker.  We had wanted to order an MRI scan but he has a metal fragment in his brain that was identified on a CT scan prior to his admission here.  His son indicates that he underwent an MRI scan at least 2 years ago without complications and the metal fragment would have been there at that time. Neurosurgery has evaluated the patient and patient is not currently a surgical candidate and they have recommended a CT myelogram as an outpatient. He is not a candidate for an MRI due to the metal fragment in his brain and I have discussed this with Dr. Damon today. An indium scan could show inflammation but there was not a lot of inflammatory changes on CT scan and this would not change surgical or antibiotic plans so I have explained to his wife that this is likely not worth putting the patient through this. Neurosurgery has ordered EMG/ nerve conduction study to be done at bedside today. He is going to receive 6 weeks of IV antibiotics regardless.  6.  Elevated liver enzymes  7.  MSSA UTI  8.  Right wrist swelling-observe  9.  Valvular heart disease-with moderate aortic regurgitation and mild mitral regurgitation  10.  Prostate cancer-status post robotic prostatectomy on 1/17/19  11.  Atrial fibrillation  12.  History of spinal stenosis      Recommendations:    UM/KEN:  1.  Continue cefazolin 2g IV q8h. Anticipated stop date is 3/22/19 (6 weeks total)   2.  Neurosurgery has been consult and and is following the patient for his bilateral lower extremity weakness and back pain due to concern for possible spinal abscess. He is not a surgical candidate per neurosurgery and there are plans for follow up with neurosurgery on outpatient basis and possible CT myelogram.   3.   Repeat blood cultures from 2/18/19 are no growth to date  4.  He will need weekly cbc with diff, bmp, esr, crp while on IV antibiotics. These labs will need to be faxed to [phone]  5. S/p PICC line placement. He will need weekly PICC line dressing changes  6. He will likely need rehab after discharge and [facility] may be a good place to have this done. If he does go to [facility], then one of my partners could follow him there.  7. I will tentatively schedule the patient for follow up with me in clinic in 2 weeks (although this may not be necessary if he does go to [facility] for rehab and my partners can follow him there)  8. I will follow-up the results of his EMG/nerve conduction study    I am ok with his discharge as soon as rehab placement has been arranged. I discussed with  today.     I discussed with the patient's wife again today.  The patient does have significant risk for further morbidity.            Jeff Smith MD  2/21/2019

## 2019-02-21 NOTE — PROGRESS NOTES
"NEUROSURGERY PROGRESS NOTE    Interval History:   Patient's wife states the patient has been more confused over the last day or 2.  He is working with OT this morning, and is not participating in their evaluation/treatment.  He and his wife state that he is not currently having pain, however he continues to have lower extremity weakness and has started to show some upper extremity weakness, refusing to use his upper extremities.  It is difficult to ascertain if the patient is having pain or new symptoms secondary to confusion.    Vital Signs  Blood pressure 161/69, pulse 82, temperature 98.4 °F (36.9 °C), temperature source Oral, resp. rate 18, height 170.2 cm (67.01\"), weight 117 kg (257 lb), SpO2 94 %.    Physical Exam:  Awake, confused and not oriented with word finding difficulties   Continues to have lower extremity weakness with plantarflexion bilaterally  Grimaces in pain with dorsiflexion of bilateral lower extremities, left greater than right  Moves upper extremities to command, slow and weak movements  Exam is limited by effort and lack of participation     Results Review:    I/O last 3 completed shifts:  In: 2129 [P.O.:1020; I.V.:909; IV Piggyback:200]  Out: -   No intake/output data recorded.      Assessment/Plan:   Will obtain EMG/NCV of bilateral upper and lower extremities to evaluate for polyneuropathy of critical illness  Further recommendations to follow  Continue with PT/OT    Rimma Pitt PA-C  02/21/19  10:42 AM    "

## 2019-02-21 NOTE — THERAPY TREATMENT NOTE
Acute Care - Occupational Therapy Treatment Note  [facility]     Patient Name: Rod Balderas  : 1950  MRN: [id number]  Today's Date: 2019  Onset of Illness/Injury or Date of Surgery: 19  Date of Referral to OT: 19  Referring Physician: MD Aurea    Admit Date: 2019       ICD-10-CM ICD-9-CM   1. Impaired functional mobility, balance, gait, and endurance Z74.09 V49.89   2. Impaired mobility and ADLs Z74.09 799.89     Patient Active Problem List   Diagnosis   • Degenerative disc disease, lumbar   • Spinal stenosis, lumbar region, with neurogenic claudication   • Neuroforaminal stenosis of spine   • Lumbar facet arthropathy   • CAD (coronary artery disease)   • Essential hypertension   • Physical deconditioning   • Morbid obesity due to excess calories (CMS/HCC)   • TANIYA on CPAP   • Displacement of lumbar intervertebral disc   • Dyslipidemia   • Osteoarthritis   • At high risk for falls   • Prostate cancer , s/p prostatectomy ( RALP)   • DM (diabetes mellitus), type 2 (CMS/HCC)   • Leukocytosis, mild, likely reactive   • Acute postoperative pain   • Acute blood loss anemia, mild, asymptomatic   • Renal failure   • Metabolic encephalopathy   • Paroxysmal atrial fibrillation (CMS/HCC)   • Sepsis (CMS/HCC)   • Moderate aortic regurgitation     Past Medical History:   Diagnosis Date   • Anxiety and depression    • Cancer (CMS/HCC)    • Chest pain    • Coronary artery disease    • Diabetes mellitus (CMS/HCC)    • Dyslipidemia    • Extremity pain    • Heart disease    • History of transfusion    • Hypertension    • Low back pain    • TANIYA on CPAP     2-3    • Osteoarthritis     Diffuse osteoarthritis.   • Panic attacks    • Prostate cancer (CMS/HCC)      Past Surgical History:   Procedure Laterality Date   • CARDIAC CATHETERIZATION     • CHOLECYSTECTOMY     • COLONOSCOPY     • CORONARY ANGIOPLASTY WITH STENT PLACEMENT  2012:  A 3.5 x 88 Promus VERONICA stent to OM2.   Nonobstructive disease.  Otherwise normal LVEF.   • EYE SURGERY Bilateral     cataract   • INSERTION HEMODIALYSIS CATHETER N/A 2/8/2019    Procedure: HEMODIALYSIS CATHETER INSERTION RIGHT INTERNAL JUGULAR;  Surgeon: Bishnu Dailey MD;  Location: [facility] OR;  Service: General   • PROSTATECTOMY N/A 1/15/2019    Procedure: ROBOTIC PROSTATECTOMY WITH NODES;  Surgeon: Juanito Grace Jr., MD;  Location:  [facility];  Service: DaVinci   • RETINAL DETACHMENT SURGERY Right        Therapy Treatment    Rehabilitation Treatment Summary     Row Name 02/21/19 1517 02/21/19 1035 02/21/19 1031       Treatment Time/Intention    Discipline  occupational therapist  -KF  --  -  physical therapist  -    Document Type  therapy note (daily note)  -KF  --  therapy note (daily note)  -[facility]    Subjective Information  complains of;weakness;fatigue;pain  -KF  --  complains of;pain;fatigue  -2    Mode of Treatment  individual therapy;occupational therapy  -KF  --  individual therapy  -2    Patient/Family Observations  wife present throughout, Pt increased alertness compared to prior sessions   -KF  --  wife present. pt supine in specialty bed  -2    Care Plan Review  evaluation/treatment results reviewed;care plan/treatment goals reviewed;risks/benefits reviewed;current/potential barriers reviewed;patient/other agree to care plan  -KF  --  care plan/treatment goals reviewed;risks/benefits reviewed;current/potential barriers reviewed;patient/other agree to care plan  -2    Care Plan Review, Other Participant(s)  spouse  -KF  --  spouse  -SJ2    Therapy Frequency (PT Clinical Impression)  --  --  daily  -SJ2    Patient Effort  adequate  -KF  --  fair  -SJ2    Comment  --  --  delayed processing, confusion  -SJ2    Existing Precautions/Restrictions  fall  -KF  --  fall  -SJ2    Treatment Considerations/Comments  generalized pain with mobility but improve AROM in UEs today  -KF  --  generalized pain with all mobility  -2     Patient Response to Treatment  tolerated  -KF  --  uriel, required encouragement for participation throughout  -SJ2    Recorded by [KF] Kaye Lyon, OT 02/21/19 1555 [SJ] Raquel Rico, PT 02/21/19 1145 [SJ] Raquel Rico, PT 02/21/19 1145  [SJ2] Raquel Rico, PT 02/21/19 1139    Row Name 02/21/19 1517 02/21/19 1031          Vital Signs    Pre Systolic BP Rehab  -- RN cleared vitals stable   -KF  161  -SJ     Pre Treatment Diastolic BP  --  69  -SJ     Pretreatment Heart Rate (beats/min)  --  77  -SJ     Pre SpO2 (%)  95  -KF  94  -SJ     O2 Delivery Pre Treatment  room air  -KF  room air  -SJ     Post SpO2 (%)  96  -KF  98  -SJ     O2 Delivery Post Treatment  room air  -KF  room air  -SJ     Pre Patient Position  Sitting  -KF  --     Intra Patient Position  Side Lying  -KF  --     Post Patient Position  Supine  -KF  --     Recorded by [KF] Kaye Lyon, OT 02/21/19 1555 [SJ] Raquel Rico, PT 02/21/19 1139     Row Name 02/21/19 1517             Cognitive Assessment/Intervention    Additional Documentation  Cognitive Assessment/Intervention (Group)  -KF      Recorded by [KF] Kaye Lyon, OT 02/21/19 1555      Row Name 02/21/19 1517 02/21/19 1031          Cognitive Assessment/Intervention- PT/OT    Affect/Mental Status (Cognitive)  low arousal/lethargic  -KF  low arousal/lethargic  -SJ     Orientation Status (Cognition)  oriented to;person;place  -KF  oriented to;person;place  -SJ     Follows Commands (Cognition)  follows one step commands;50-74% accuracy;repetition of directions required;verbal cues/prompting required;physical/tactile prompts required  -KF  follows one step commands;50-74% accuracy;repetition of directions required;verbal cues/prompting required;physical/tactile prompts required;delayed response/completion  -SJ     Cognitive Function (Cognitive)  attention deficit;safety deficit  -KF  attention deficit;safety deficit  -SJ     Attention Deficit (Cognitive)  mild  deficit;concentration  -KF  --     Safety Deficit (Cognitive)  moderate deficit;ability to follow commands;judgment;insight into deficits/self awareness;safety precautions awareness  -KF  ability to follow commands;moderate deficit;awareness of need for assistance;insight into deficits/self awareness  -SJ     Cognitive Interventions (Cognitive)  occupation/activity based interventions;process/task specific training  -KF  --     Personal Safety Interventions  fall prevention program maintained;muscle strengthening facilitated;nonskid shoes/slippers when out of bed  -KF  fall prevention program maintained;gait belt;muscle strengthening facilitated;nonskid shoes/slippers when out of bed  -SJ     Recorded by [KF] Kaye Lyon, OT 02/21/19 1555 [SJ] Raquel Rico, PT 02/21/19 1139     Row Name 02/21/19 1517 02/21/19 1031          Safety Issues, Functional Mobility    Safety Issues Affecting Function (Mobility)  ability to follow commands;awareness of need for assistance;friction/shear risk;safety precaution awareness;judgment  -KF  ability to follow commands;awareness of need for assistance;friction/shear risk;insight into deficits/self awareness;positioning of assistive device;sequencing abilities  -SJ     Impairments Affecting Function (Mobility)  balance;cognition;endurance/activity tolerance;range of motion (ROM);pain;strength  -KF  balance;cognition;strength;postural/trunk control  -SJ     Recorded by [KF] Kaye Lyon, OT 02/21/19 1555 [SJ] Raquel Rico, PT 02/21/19 1139     Row Name 02/21/19 1517 02/21/19 1031          Bed Mobility Assessment/Treatment    Bed Mobility Assessment/Treatment  rolling left;rolling right  -KF  --     Rolling Left [place] (Bed Mobility)  maximum assist (25% patient effort);2 person assist  -KF  dependent (less than 25% patient effort);2 person assist;verbal cues  -SJ     Rolling Right [place] (Bed Mobility)  maximum assist (25% patient effort);2 person  assist;verbal cues  -KF  dependent (less than 25% patient effort);2 person assist;verbal cues  -SJ     Bed Mobility, Safety Issues  decreased use of legs for bridging/pushing;decreased use of arms for pushing/pulling;impaired trunk control for bed mobility  -KF  cognitive deficits limit understanding;decreased use of arms for pushing/pulling;decreased use of legs for bridging/pushing;impaired trunk control for bed mobility  -SJ     Assistive Device (Bed Mobility)  bed rails;head of bed elevated;draw sheet;mechanical lift/aid  -KF  bed rails;draw sheet;head of bed elevated;mechanical lift/aid  -SJ     Comment (Bed Mobility)  lift to return to supine, max A x2 rolling R and L able to hold in sidelying demo improvement   -KF  max cues and encouragement needed, c/o pain throughout mobility  -SJ     Recorded by [KF] Kaye Lyon, OT 02/21/19 1555 [SJ] Raquel Rico, PT 02/21/19 1139     Row Name 02/21/19 1517             Functional Mobility    Functional Mobility- Ind. Level  not tested  -KF      Recorded by [KF] Kaye Lyon, OT 02/21/19 1555      Row Name 02/21/19 1517 02/21/19 1031          Transfer Assessment/Treatment    Transfer Assessment/Treatment  chair-bed transfer  -KF  --     Comment (Transfers)  --  [facility] lift to recliner. Sit <> stand deferred due to decreased alertness, weakness, pain  -SJ     Recorded by [KF] Kaye Lyon, OT 02/21/19 1555 [SJ] Raquel Rico, PT 02/21/19 1139     Row Name 02/21/19 1031             Bed-Chair Transfer    Bed-Chair [place] (Transfers)  dependent (less than 25% patient effort);2 person assist;verbal cues  -SJ      Assistive Device (Bed-Chair Transfers)  mechanical lift/aid  -SJ      Recorded by [SJ] Raquel Rico, PT 02/21/19 1139      Row Name 02/21/19 1517             Chair-Bed Transfer    Chair-Bed [place] (Transfers)  dependent (less than 25% patient effort);2 person assist  -KF      Assistive Device (Chair-Bed Transfers)  mechanical  lift/aid  -KF      Recorded by [KF] Kaye Lyon, OT 02/21/19 1555      Row Name 02/21/19 1031             Sit-Stand Transfer    Sit-Stand [place] (Transfers)  unable to assess  -SJ      Recorded by [SJ] Raquel Rico, PT 02/21/19 1139      Row Name 02/21/19 1031             Gait/Stairs Assessment/Training    [place] Level (Gait)  unable to assess  -SJ      Recorded by [SJ] Raquel Rico, PT 02/21/19 1139      Row Name 02/21/19 1517             ADL Assessment/Intervention    61476 - OT Self Care/Mgmt Minutes  7  -KF      BADL Assessment/Intervention  toileting  -KF      Recorded by [KF] Kaye Lyon, OT 02/21/19 1555      Row Name 02/21/19 1517             Toileting Assessment/Training    [place] Level (Toileting)  adjust/manage clothing;perform perineal hygiene;dependent (less than 25% patient effort);two person assist required  -KF      Toileting Position  supine  -KF      Comment (Toileting)  soiled upon return to bed, rolled and completed bed mobility to allow for julio hygiene dependent   -KF      Recorded by [KF] Kaye Lyon, OT 02/21/19 1555      Row Name 02/21/19 1511             BADL Safety/Performance    Impairments, BADL Safety/Performance  balance;cognition;endurance/activity tolerance;strength;trunk/postural control;range of motion;pain  -KF      Cognitive Impairments, BADL Safety/Performance  judgment;insight into deficits/self awareness;awareness, need for assistance;safety precaution awareness  -KF      Skilled BADL Treatment/Intervention  BADL process/adaptation training;cognitive/safety deficit modifications  -KF      Recorded by [KF] Kaye Lyon, OT 02/21/19 1555      Row Name 02/21/19 1518             Motor Skills Assessment/Interventions    Additional Documentation  Balance (Group);Balance Interventions (Group);Therapeutic Exercise (Group);Therapeutic Exercise Interventions (Group)  -KF      Recorded by [KF] Kaye Lyon, OT 02/21/19 1559       Row Name 02/21/19 1517 02/21/19 1031          Therapeutic Exercise    Therapeutic Exercise  seated, upper extremities  -KF  --     Additional Documentation  Therapeutic Exercise (Row)  -KF  --     08441 - PT Therapeutic Activity Minutes  --  31  -SJ     12543 - OT Therapeutic Exercise Minutes  12  -KF  --     50515 - OT Therapeutic Activity Minutes  6  -KF  --     Recorded by [KF] Kaye Lyon, OT 02/21/19 1555 [SJ] Raquel Rico, PT 02/21/19 1145     Row Name 02/21/19 1517             Upper Extremity Seated Therapeutic Exercise    Performed, Seated Upper Extremity (Therapeutic Exercise)  shoulder flexion/extension;shoulder horizontal abduction/adduction;shoulder external/internal rotation;elbow flexion/extension;wrist flexion/extension;wrist radial/ulnar deviation;digit flexion/extension  -KF      Exercise Type, Seated Upper Extremity (Therapeutic Exercise)  AAROM (active assistive range of motion);AROM (active range of motion)  -KF      Expected Outcomes, Seated Upper Extremity (Therapeutic Exercise)  improve functional tolerance, self-care activity;improve performance, BADLs  -KF      Sets/Reps Detail, Seated Upper Extremity (Therapeutic Exercise)  1/10  -KF      Comment, Seated Upper Extremity (Therapeutic Exercise)  increased participation in all movements improving to AROM only YAIR WISE still AAROM for shoulder flex  -KF      Recorded by [KF] Kaye Lyon, OT 02/21/19 1555      Row Name 02/21/19 1031             Lower Extremity Seated Therapeutic Exercise    Performed, Seated Lower Extremity (Therapeutic Exercise)  hip flexion/extension;LAQ (long arc quad), knee extension;ankle dorsiflexion/plantarflexion  -SJ      Exercise Type, Seated Lower Extremity (Therapeutic Exercise)  AAROM (active assistive range of motion)  -SJ      Sets/Reps Detail, Seated Lower Extremity (Therapeutic Exercise)  10  -SJ      Comment, Seated Lower Extremity (Therapeutic Exercise)  constant cues  -SJ      Recorded by  [SJ] Raquel Rico, PT 02/21/19 1139      Row Name 02/21/19 1517             Balance    Balance  static sitting balance  -KF      Recorded by [KF] Kaye Lyon, OT 02/21/19 1555      Row Name 02/21/19 1517 02/21/19 1031          Static Sitting Balance    Level of [place] (Unsupported Sitting, Static Balance)  maximal assist, 25 to 49% patient effort  -KF  maximal assist, 25 to 49% patient effort;2 person assist  -SJ     Sitting Position (Unsupported Sitting, Static Balance)  sitting in chair  -KF  sitting in chair unsupported in chair  -SJ     Time Able to Maintain Position (Unsupported Sitting, Static Balance)  2 to 3 minutes  -KF  more than 5 minutes  -SJ     Comment (Unsupported Sitting, Static Balance)  sitting balance feet flat on floor, trunk flex tA  -KF  difficulty staying awake  -SJ     Recorded by [KF] Kaye Lyon, OT 02/21/19 1555 [SJ] Raquel Rico, PT 02/21/19 1139     Row Name 02/21/19 1517 02/21/19 1031          Positioning and Restraints    Pre-Treatment Position  sitting in chair/recliner  -KF  in bed  -SJ     Post Treatment Position  bed  -KF  chair  -SJ     In Bed  notified nsg;supine;fowlers;call light within reach;encouraged to call for assist;side rails up x3;with family/caregiver;legs elevated  -KF  --     In Chair  --  notified nsg;reclined;call light within reach;encouraged to call for assist;with family/caregiver;exit alarm on;waffle cushion;on mechanical lift sling;heels elevated  -SJ     Recorded by [KF] Kaye Lyon, OT 02/21/19 1555 [SJ] Raquel Rico, PT 02/21/19 1139     Row Name 02/21/19 1517             Pain Assessment    Additional Documentation  Pain Scale: FACES Pre/Post-Treatment (Group)  -KF      Recorded by [KF] Kaye Lyon, OT 02/21/19 1555      Row Name 02/21/19 1517 02/21/19 1031          Pain Scale: Numbers Pre/Post-Treatment    Pain Location - Orientation  generalized  -KF  generalized  -SJ     Pain Location  abdomen  -KF  --      Pre/Post Treatment Pain Comment  tolerated  -KF  uriel  -SJ     Pain Intervention(s)  Repositioned;Ambulation/increased activity  -KF  Repositioned;Ambulation/increased activity  -SJ     Recorded by [KF] Kaye Lyon, OT 02/21/19 1555 [SJ] Raquel Rico, PT 02/21/19 1139     Row Name 02/21/19 1517             Pain Scale: FACES Pre/Post-Treatment    Pain: FACES Scale, Pretreatment  4-->hurts little more  -KF      Pain: FACES Scale, Post-Treatment  2-->hurts little bit  -KF      Pre/Post Treatment Pain Comment  with movements pain increased but then resolved with rest  -KF      Recorded by [KF] Kaye Lyon, OT 02/21/19 1555      Row Name 02/21/19 1031             Pain Scale 2: FACES Pre/Post-Treatment    Pain 2: FACES Scale, Pretreatment  4-->hurts little more  -SJ      Pain 2: FACES Scale, Post-Treatment  4-->hurts little more  -SJ      Pain Location 2 - Orientation  generalized  -SJ      Recorded by [SJ] Raquel Rico, PT 02/21/19 1139      Row Name                Wound 02/08/19 1519 Other (See comments) neck incision    Wound - Properties Group Date first assessed: 02/08/19 [SM] Time first assessed: 1519 [SM] Side: Other (See comments) [SM] Location: neck [SM] Type: incision [SM] Recorded by:  [SM] Rissa Rodriguez RN 02/08/19 1519    Row Name                Wound 02/08/19 1800 Right lower gluteal pressure injury    Wound - Properties Group Date first assessed: 02/08/19 [AB] Time first assessed: 1800 [AB] Present On Admission : yes [AB] Side: Right [AB] Orientation: lower [AB] Location: gluteal [AB] Type: pressure injury [AB] Stage, Pressure Injury: deep tissue injury [AB] Additional Comments: bilateral gluteals [MR] Recorded by:  [AB] Addison Jasso RN 02/08/19 1855 [MR] Carmen Conner RN 02/09/19 1227    Row Name                Wound 02/09/19 1116 Bilateral medial thigh    Wound - Properties Group Date first assessed: 02/09/19 [MR] Time first assessed: 1116 [MR] Present On Admission : yes  [MR] Side: Bilateral [MR] Orientation: medial [MR] Location: thigh [MR] Stage, Pressure Injury: deep tissue injury [MR] Recorded by:  [MR] Ubaldo Peg, RN 02/09/19 1228    Row Name                Wound 02/13/19 0800 Left lower gluteal pressure injury    Wound - Properties Group Date first assessed: 02/13/19 [JM] Time first assessed: 0800 [JM] Side: Left [JM] Orientation: lower [JM] Location: gluteal [JM] Type: pressure injury [JM] Stage, Pressure Injury: deep tissue injury [JM] Recorded by:  [JM] Ivon Eason RN 02/13/19 1353    Row Name                Wound 02/15/19 1800 Right upper chest puncture    Wound - Properties Group Date first assessed: 02/15/19 [CH] Time first assessed: 1800 [CH] Side: Right [CH] Orientation: upper [CH] Location: chest [CH] Type: puncture [CH] Additional Comments: s/p tunneled dialysis catheter [CH] Recorded by:  [CH] Taco Templeton RN 02/15/19 1822    Row Name                Wound 02/17/19 0800 scrotum abrasion;MASD (moisture associated skin damage)    Wound - Properties Group Date first assessed: 02/17/19 [CP] Time first assessed: 0800 [CP] Present On Admission : no [CP] Location: scrotum [CP] Type: abrasion;MASD (moisture associated skin damage) [CP] Recorded by:  [CP] Janine Juan RN 02/17/19 0953    Row Name 02/21/19 1031             Coping    Observed Emotional State  quiet  -SJ      Verbalized Emotional State  sadness  -SJ      Recorded by [SJ] Raquel Rico, PT 02/21/19 1139      Row Name 02/21/19 1517 02/21/19 1031          Plan of Care Review    Plan of Care Reviewed With  patient;spouse  -KF  patient;spouse  -SJ     Recorded by [KF] Kaye Lyon, OT 02/21/19 1555 [SJ] Raquel Rico, PT 02/21/19 1139     Row Name 02/21/19 1517             Outcome Summary/Treatment Plan (OT)    Daily Summary of Progress (OT)  progress towards functional goals is fair  -KF      Recorded by [KF] Kaye Lyon, OT 02/21/19 1555      Row Name 02/21/19 1034              Outcome Summary/Treatment Plan (PT)    Daily Summary of Progress (PT)  progress toward functional goals is gradual  -SJ      Recorded by [SJ] Chris Ricohanie, PT 02/21/19 1139        User Key  (r) = Recorded By, (t) = Taken By, (c) = Cosigned By    Initials Name Effective Dates Discipline    SJ Raquel Rico, PT 06/19/15 -  PT    Taco Gutierrez, RN 06/16/16 -  DIALYSIS USER    Rissa Bolden, RN 06/16/16 -  Nurse    Carmen Nelson RN 06/16/16 -  Nurse    Ivon Gutierrez RN 06/16/16 -  Nurse    Janine Pace, RN 06/16/16 -  Nurse    Addison Jaffe, RN 02/02/17 -  Nurse    KF Kaye Lyon, OT 04/03/18 -  OT        Rash 02/12/19 2000 Left upper arm macular (Active)   Distribution regional 2/20/2019  9:04 PM   Configuration/Shape asymmetric 2/20/2019  9:04 PM   Borders irregular 2/20/2019  9:04 PM   Characteristics dry 2/20/2019  9:04 PM   Color red 2/20/2019  9:04 PM   Care, Rash open to air 2/20/2019  9:04 PM       Rash 02/12/19 2000 Left posterior hand macular (Active)   Distribution regional 2/20/2019  9:04 PM   Configuration/Shape asymmetric 2/20/2019  9:04 PM   Borders irregular 2/20/2019  9:04 PM   Characteristics dry 2/20/2019  9:04 PM   Color purple;red 2/20/2019  9:04 PM   Care, Rash open to air 2/20/2019  9:04 PM       Wound 02/08/19 1519 Other (See comments) neck incision (Active)   Dressing Appearance open to air 2/20/2019  9:04 PM   Base clean;dry;scab 2/20/2019  9:04 PM   Periwound intact;dry;pink;blanchable 2/20/2019  9:04 PM   Drainage Amount none 2/20/2019  9:04 PM       Wound 02/08/19 1800 Right lower gluteal pressure injury (Active)   Dressing Appearance open to air 2/20/2019  9:04 PM   Base dry;purple;maroon/purple 2/20/2019  9:04 PM   Periwound dry;pink;blanchable 2/20/2019  9:04 PM   Drainage Amount none 2/20/2019  9:04 PM       Wound 02/09/19 1116 Bilateral medial thigh (Active)   Dressing Appearance open to air 2/20/2019  9:04 PM   Closure None 2/20/2019   9:04 PM   Base dry;red/granulating 2/20/2019  9:04 PM   Periwound intact;dry;redness;blanchable 2/20/2019  9:04 PM   Drainage Amount none 2/20/2019  9:04 PM       Wound 02/13/19 0800 Left lower gluteal pressure injury (Active)   Dressing Appearance open to air 2/20/2019  9:04 PM   Base dry;maroon/purple;purple 2/20/2019  9:04 PM   Periwound intact;dry;pink;blanchable 2/20/2019  9:04 PM   Drainage Amount none 2/20/2019  9:04 PM       Wound 02/15/19 1800 Right upper chest puncture (Active)   Dressing Appearance dry;intact;no drainage 2/20/2019  9:04 PM   Closure SARAHI 2/20/2019  9:04 PM   Drainage Amount none 2/20/2019  9:04 PM       Wound 02/17/19 0800 scrotum abrasion;MASD (moisture associated skin damage) (Active)   Dressing Appearance open to air 2/20/2019  9:04 PM   Closure None 2/20/2019  9:04 PM   Base moist;pink 2/20/2019  9:04 PM   Periwound moist;pink;redness;blanchable 2/20/2019  9:04 PM       Occupational Therapy Education     Title: PT OT SLP Therapies (In Progress)     Topic: Occupational Therapy (Done)     Point: ADL training (Done)     Description: Instruct learner(s) on proper safety adaptation and remediation techniques during self care or transfers.   Instruct in proper use of assistive devices.    Learning Progress Summary           Patient Acceptance, E,TB,D, VU,DU,NR by KF at 2/21/2019  3:17 PM    Comment:  Pt participation in ADL tasks and bed mobility seq, BUE ther ex HEP    Acceptance, E,TB,D, VU,DU,NR by KF at 2/20/2019  3:07 PM    Comment:  Purpose of skilled OT services, BUE AAROM/AROM HEP, bed mobility seq, importance of Pt completing self-feeding tasks    Acceptance, E, NR by CL at 2/14/2019  4:34 PM    Comment:  Pt educated on appropriate safety precautions, t/f techniques, role of OT, and benefits of therapy.    Acceptance, E, NR by CL at 2/12/2019  3:03 PM    Comment:  Pt educated on appropriate safety precautions, t/f techniques, positioning, and benefits of therapy.   Family  Acceptance, E,TB,D, VU,DU,NR by KF at 2/21/2019  3:17 PM    Comment:  Pt participation in ADL tasks and bed mobility seq, BUE ther ex HEP    Acceptance, E,TB,D, VU,DU,NR by KF at 2/20/2019  3:07 PM    Comment:  Purpose of skilled OT services, BUE AAROM/AROM HEP, bed mobility seq, importance of Pt completing self-feeding tasks    Acceptance, E, NR by CL at 2/14/2019  4:34 PM    Comment:  Pt educated on appropriate safety precautions, t/f techniques, role of OT, and benefits of therapy.    Acceptance, E, NR by CL at 2/12/2019  3:03 PM    Comment:  Pt educated on appropriate safety precautions, t/f techniques, positioning, and benefits of therapy.                   Point: Home exercise program (Done)     Description: Instruct learner(s) on appropriate technique for monitoring, assisting and/or progressing therapeutic exercises/activities.    Learning Progress Summary           Patient Acceptance, E,TB,D, VU,DU,NR by KF at 2/21/2019  3:17 PM    Comment:  Pt participation in ADL tasks and bed mobility seq, BUE ther ex HEP    Acceptance, E,TB,D, VU,DU,NR by KF at 2/20/2019  3:07 PM    Comment:  Purpose of skilled OT services, BUE AAROM/AROM HEP, bed mobility seq, importance of Pt completing self-feeding tasks    Acceptance, E,TB,D, VU,DU by KF at 2/19/2019 11:17 AM    Comment:  BUE AAROM HEP    Acceptance, E, NR by CL at 2/14/2019  4:34 PM    Comment:  Pt educated on appropriate safety precautions, t/f techniques, role of OT, and benefits of therapy.   Family Acceptance, E,TB,D, VU,DU,NR by KF at 2/21/2019  3:17 PM    Comment:  Pt participation in ADL tasks and bed mobility seq, BUE ther ex HEP    Acceptance, E,TB,D, VU,DU,NR by KF at 2/20/2019  3:07 PM    Comment:  Purpose of skilled OT services, BUE AAROM/AROM HEP, bed mobility seq, importance of Pt completing self-feeding tasks    Acceptance, E,TB,D, VU,DU by KF at 2/19/2019 11:17 AM    Comment:  BUE AAROM HEP    Acceptance, E, NR by CL at 2/14/2019  4:34 PM     Comment:  Pt educated on appropriate safety precautions, t/f techniques, role of OT, and benefits of therapy.                   Point: Precautions (Done)     Description: Instruct learner(s) on prescribed precautions during self-care and functional transfers.    Learning Progress Summary           Patient Acceptance, E,TB,D, VU,DU,NR by KF at 2/21/2019  3:17 PM    Comment:  Pt participation in ADL tasks and bed mobility seq, BUE ther ex HEP    Acceptance, E,TB,D, VU,DU,NR by KF at 2/20/2019  3:07 PM    Comment:  Purpose of skilled OT services, BUE AAROM/AROM HEP, bed mobility seq, importance of Pt completing self-feeding tasks    Acceptance, E,TB,D, VU,DU by KF at 2/19/2019 11:17 AM    Comment:  BUE AAROM HEP    Acceptance, E, NR by CL at 2/14/2019  4:34 PM    Comment:  Pt educated on appropriate safety precautions, t/f techniques, role of OT, and benefits of therapy.    Acceptance, E, NR by CL at 2/12/2019  3:03 PM    Comment:  Pt educated on appropriate safety precautions, t/f techniques, positioning, and benefits of therapy.   Family Acceptance, E,TB,D, VU,DU,NR by KF at 2/21/2019  3:17 PM    Comment:  Pt participation in ADL tasks and bed mobility seq, BUE ther ex HEP    Acceptance, E,TB,D, VU,DU,NR by KF at 2/20/2019  3:07 PM    Comment:  Purpose of skilled OT services, BUE AAROM/AROM HEP, bed mobility seq, importance of Pt completing self-feeding tasks    Acceptance, E,TB,D, VU,DU by KF at 2/19/2019 11:17 AM    Comment:  BUE AAROM HEP    Acceptance, E, NR by CL at 2/14/2019  4:34 PM    Comment:  Pt educated on appropriate safety precautions, t/f techniques, role of OT, and benefits of therapy.    Acceptance, E, NR by CL at 2/12/2019  3:03 PM    Comment:  Pt educated on appropriate safety precautions, t/f techniques, positioning, and benefits of therapy.                   Point: Body mechanics (Done)     Description: Instruct learner(s) on proper positioning and spine alignment during self-care, functional  mobility activities and/or exercises.    Learning Progress Summary           Patient Acceptance, E,TB,D, VU,DU,NR by KF at 2/21/2019  3:17 PM    Comment:  Pt participation in ADL tasks and bed mobility seq, BUE ther ex HEP    Acceptance, E,TB,D, VU,DU,NR by KF at 2/20/2019  3:07 PM    Comment:  Purpose of skilled OT services, BUE AAROM/AROM HEP, bed mobility seq, importance of Pt completing self-feeding tasks    Acceptance, E,TB,D, VU,DU by KF at 2/19/2019 11:17 AM    Comment:  BUE AAROM HEP    Acceptance, E, NR by CL at 2/14/2019  4:34 PM    Comment:  Pt educated on appropriate safety precautions, t/f techniques, role of OT, and benefits of therapy.    Acceptance, E, NR by CL at 2/12/2019  3:03 PM    Comment:  Pt educated on appropriate safety precautions, t/f techniques, positioning, and benefits of therapy.   Family Acceptance, E,TB,D, VU,DU,NR by KF at 2/21/2019  3:17 PM    Comment:  Pt participation in ADL tasks and bed mobility seq, BUE ther ex HEP    Acceptance, E,TB,D, VU,DU,NR by KF at 2/20/2019  3:07 PM    Comment:  Purpose of skilled OT services, BUE AAROM/AROM HEP, bed mobility seq, importance of Pt completing self-feeding tasks    Acceptance, E,TB,D, VU,DU by KF at 2/19/2019 11:17 AM    Comment:  BUE AAROM HEP    Acceptance, E, NR by CL at 2/14/2019  4:34 PM    Comment:  Pt educated on appropriate safety precautions, t/f techniques, role of OT, and benefits of therapy.    Acceptance, E, NR by CL at 2/12/2019  3:03 PM    Comment:  Pt educated on appropriate safety precautions, t/f techniques, positioning, and benefits of therapy.                               User Key     Initials Effective Dates Name Provider Type Discipline     04/03/18 -  Cassie Reardon, OT Occupational Therapist OT     04/03/18 -  Kaye Lyon OT Occupational Therapist OT                OT Recommendation and Plan  Outcome Summary/Treatment Plan (OT)  Daily Summary of Progress (OT): progress towards functional goals is  fair  Daily Summary of Progress (OT): progress towards functional goals is fair  Plan of Care Review  Plan of Care Reviewed With: patient, spouse  Plan of Care Reviewed With: patient, spouse  Outcome Summary: Pt improved with BUE ther ex today tolerance sitting in chair, tolerated sitting LEs unreclined with balance TA in sitting, cues throughout for alertness and participation, dependent toileting tasks, dependent chair to bed transfer; cont IPOT per POC  Outcome Measures     Row Name 02/21/19 1517 02/21/19 1031 02/20/19 1507       How much help from another person do you currently need...    Turning from your back to your side while in flat bed without using bedrails?  --  1  -SJ  --    Moving from lying on back to sitting on the side of a flat bed without bedrails?  --  1  -SJ  --    Moving to and from a bed to a chair (including a wheelchair)?  --  1  -SJ  --    Standing up from a chair using your arms (e.g., wheelchair, bedside chair)?  --  1  -SJ  --    Climbing 3-5 steps with a railing?  --  1  -SJ  --    To walk in hospital room?  --  1  -SJ  --    AM-PAC 6 Clicks Score  --  6  -SJ  --       How much help from another is currently needed...    Putting on and taking off regular lower body clothing?  1  -KF  --  1  -KF    Bathing (including washing, rinsing, and drying)  1  -KF  --  1  -KF    Toileting (which includes using toilet bed pan or urinal)  1  -KF  --  1  -KF    Putting on and taking off regular upper body clothing  2  -KF  --  2  -KF    Taking care of personal grooming (such as brushing teeth)  2  -KF  --  2  -KF    Eating meals  3  -KF  --  2  -KF    Score  10  -KF  --  9  -KF       Functional Assessment    Outcome Measure Options  AM-PAC 6 Clicks Daily Activity (OT)  -KF  AM-PAC 6 Clicks Basic Mobility (PT)  -SJ  AM-PAC 6 Clicks Daily Activity (OT)  -KF    Row Name 02/19/19 1300 02/19/19 1117          How much help from another person do you currently need...    Turning from your back to your side  while in flat bed without using bedrails?  2  -AS  --     Moving from lying on back to sitting on the side of a flat bed without bedrails?  2  -AS  --     Moving to and from a bed to a chair (including a wheelchair)?  1  -AS  --     Standing up from a chair using your arms (e.g., wheelchair, bedside chair)?  1  -AS  --     Climbing 3-5 steps with a railing?  1  -AS  --     To walk in hospital room?  1  -AS  --     AM-PAC 6 Clicks Score  8  -AS  --        How much help from another is currently needed...    Putting on and taking off regular lower body clothing?  --  1  -KF     Bathing (including washing, rinsing, and drying)  --  1  -KF     Toileting (which includes using toilet bed pan or urinal)  --  1  -KF     Putting on and taking off regular upper body clothing  --  1  -KF     Taking care of personal grooming (such as brushing teeth)  --  2  -KF     Eating meals  --  2  -KF     Score  --  8  -KF        Functional Assessment    Outcome Measure Options  AM-PAC 6 Clicks Basic Mobility (PT)  -AS  AM-PAC 6 Clicks Daily Activity (OT)  -KF       User Key  (r) = Recorded By, (t) = Taken By, (c) = Cosigned By    Initials Name Provider Type    Raquel Mccracken, PT Physical Therapist    AS Eve Mercado, PTA Physical Therapy Assistant    Kaye Willard, OT Occupational Therapist           Time Calculation:   Time Calculation- OT     Row Name 02/21/19 1517             Time Calculation- OT    OT Start Time  1517  -KF      Total Timed Code Minutes- OT  25 minute(s)  -KF      OT Received On  02/21/19  -KF      OT Goal Re-Cert Due Date  02/22/19  -         Timed Charges    60045 - OT Therapeutic Exercise Minutes  12  -KF      61714 - OT Therapeutic Activity Minutes  6  -KF      65182 - OT Self Care/Mgmt Minutes  7  -KF        User Key  (r) = Recorded By, (t) = Taken By, (c) = Cosigned By    Initials Name Provider Type    Kaye Willard OT Occupational Therapist           Therapy Suggested Charges      Code   Minutes Charges    38150 (CPT®) Hc Ot Neuromusc Re Education Ea 15 Min      59972 (CPT®) Hc Ot Ther Proc Ea 15 Min 12 1    68898 (CPT®) Hc Ot Therapeutic Act Ea 15 Min 6     18088 (CPT®) Hc Ot Manual Therapy Ea 15 Min      38199 (CPT®) Hc Ot Iontophoresis Ea 15 Min      57465 (CPT®) Hc Ot Elec Stim Ea-Per 15 Min      75295 (CPT®) Hc Ot Ultrasound Ea 15 Min      26508 (CPT®) Hc Ot Self Care/Mgmt/Train Ea 15 Min 7 1    Total  25 2        Therapy Charges for Today     Code Description Service Date Service Provider Modifiers Qty    25853036732 HC OT THER PROC EA 15 MIN 2/20/2019 Kaye Lyon OT GO 1    44864487471 HC OT THERAPEUTIC ACT EA 15 MIN 2/20/2019 Kaye Lyon, OT GO 1    11845583867 HC OT THER SUPP EA 15 MIN 2/20/2019 Kaye Lyon OT GO 1    44387381883 HC OT THER PROC EA 15 MIN 2/21/2019 Kaye Lyon OT GO 1    29619620995 HC OT SELF CARE/MGMT/TRAIN EA 15 MIN 2/21/2019 Kaye Lyon OT GO 1               Kaye Lyon OT  2/21/2019

## 2019-02-21 NOTE — PROGRESS NOTES
"IM progress note      Rod Balderas  [id number]  1950     LOS: 13 days     Attending: Arielle Carolina MD    Primary Care Provider: Tyrese Leyva MD      Chief Complaint/Reason for visit:  Sepsis    He was transferred to telemetry after about 10 days in ICU. He is followed by renal, cardiology, and infectious disease. His renal failure is resolved.     Subjective   Still with drowsiness. Slow responses. Remembered my name. Neurontin held this am.   Marginal compliance with CPAP.    Objective     Vital Signs    Visit Vitals  /69   Pulse 82   Temp 98.4 °F (36.9 °C) (Oral)   Resp 18   Ht 170.2 cm (67.01\")   Wt 117 kg (257 lb)   SpO2 94%   BMI 40.24 kg/m²     Temp (24hrs), Av.9 °F (36.6 °C), Min:97.4 °F (36.3 °C), Max:98.4 °F (36.9 °C)    Nutrition: PO    Respiratory: RA    Physical Exam:     General Appearance:    Somnolent, in no acute distress   Head:    Normocephalic, without obvious abnormality, atraumatic    Lungs:     Normal effort, symmetric chest rise, no crepitus, clear to      auscultation bilaterally             Heart:    Regular rhythm and normal rate, normal S1 and S2   Abdomen:     Soft, healing lap sites. obese   Extremities:   No clubbing, cyanosis. +pitting BLE edema.    Pulses:   Pulses palpable and equal bilaterally   Skin:   No bleeding, bruising or rash   Neurologic:  Cranial nerves 2 - 12 grossly intact     Results Review:     I reviewed the patient's new clinical results.   Results from last 7 days   Lab Units 19  1435 19  0709 19  0315 02/15/19  0545   WBC 10*3/mm3 10.07 15.18* 14.61* 17.14*   HEMOGLOBIN g/dL 9.4* 9.9* 9.6* 9.9*   HEMATOCRIT % 30.4* 30.2* 29.6* 30.6*   PLATELETS 10*3/mm3 381 364 306 256   MONOCYTES % %  --   --   --  4.0     Results from last 7 days   Lab Units 19  0400 19  0709 19  0315   SODIUM mmol/L 136 140 133   POTASSIUM mmol/L 4.0 4.1 3.9   CHLORIDE mmol/L 106 108 105   CO2 mmol/L 25.0 23.0 22.0   BUN mg/dL " 47* 58* 67*   CREATININE mg/dL 1.04 1.13 1.10   CALCIUM mg/dL 8.0* 7.7* 7.7*   BILIRUBIN mg/dL  --  0.9  --    ALK PHOS U/L  --  178*  --    ALT (SGPT) U/L  --  10  --    AST (SGOT) U/L  --  18  --    GLUCOSE mg/dL 126* 127* 120*      Results for JESSE YSUUF (MRN [id number]) as of 2/21/2019 10:48   Ref. Range 2/20/2019 12:04 2/20/2019 16:39 2/20/2019 21:02 2/21/2019 07:02   Glucose Latest Ref Range: 70 - 130 mg/dL 151 (H) 122 118 173 (H)     I reviewed the patient's new imaging including images and reports.    All medications reviewed.     amLODIPine 5 mg Oral Q24H   apixaban 5 mg Oral Q12H   castor oil-balsam peru  Topical Q12H   ceFAZolin 2 g Intravenous Q8H   DULoxetine 60 mg Oral Daily   famotidine 20 mg Oral BID   gabapentin 200 mg Oral Q12H   insulin lispro 0-7 Units Subcutaneous 4x Daily With Meals & Nightly   metoprolol tartrate 25 mg Oral TID   sodium chloride 10 mL Intravenous Q12H   sodium chloride 3 mL Intravenous Q12H       Assessment/Plan     Renal failure, resolved.    Metabolic encephalopathy,improving.    Spinal stenosis, lumbar region, with neurogenic claudication    Morbid obesity due to excess calories (CMS/HCC)    TANIYA on CPAP    Dyslipidemia    DM (diabetes mellitus), type 2 (CMS/HCC)    Paroxysmal atrial fibrillation , c/p carioversion.    Sepsis (CMS/HCC)    Moderate aortic regurgitation      Plan  1. Mobilize as able. PT/OT  2. Pain control-prns. ( discussed minimizing narcotics)wy  3. IS-encouraged  4. DVT proph- mechs/Eliquis  5. Bowel regimen  6. Diet as tolerated. 2L FR/day  7. Monitor labs  8. CM to work on rehab placement    Sepsis/POA. Improved.  [facility], Dr. Smith  - high dose cefazolin, PICC placement, follow BC    Afib, HTN, HLD, aortic regurgitation  s/p KLAUDIA/ECV per Dr. Brown 2/14/19  Cardiology, Dr. Regalado  - Eliquis 5 mg BID - watch H& H closely  - Continue home norvasc and lopressor  - Monitor BP     Renal, Dr. Koo  - signed off    TANIYA  - CPAP at night    Continue to taper  Neurontin due to lethargy.  Check ABGs.    Spinal stenosis, BLE weakness  - consult neurosx, Dr. Mayers follows pt.  Per Neurosx: ((At this time, patient is not a surgical candidate. Follow up with neurosurgery outpatient when stable/discharged for full workup.))    DM  - Accuchecks ACHS with low dose SSI    Discussed with pt, wife, and RN.    Arielle Carolina MD  02/21/19  10:43 AM         .

## 2019-02-21 NOTE — THERAPY TREATMENT NOTE
Acute Care - Physical Therapy Treatment Note  [facility]     Patient Name: Rod Balderas  : 1950  MRN: [id number]  Today's Date: 2019  Onset of Illness/Injury or Date of Surgery: 19  Date of Referral to PT: 19  Referring Physician: MD Aurea    Admit Date: 2019    Visit Dx:    ICD-10-CM ICD-9-CM   1. Impaired functional mobility, balance, gait, and endurance Z74.09 V49.89   2. Impaired mobility and ADLs Z74.09 799.89     Patient Active Problem List   Diagnosis   • Degenerative disc disease, lumbar   • Spinal stenosis, lumbar region, with neurogenic claudication   • Neuroforaminal stenosis of spine   • Lumbar facet arthropathy   • CAD (coronary artery disease)   • Essential hypertension   • Physical deconditioning   • Morbid obesity due to excess calories (CMS/HCC)   • TANIYA on CPAP   • Displacement of lumbar intervertebral disc   • Dyslipidemia   • Osteoarthritis   • At high risk for falls   • Prostate cancer , s/p prostatectomy ( RALP)   • DM (diabetes mellitus), type 2 (CMS/HCC)   • Leukocytosis, mild, likely reactive   • Acute postoperative pain   • Acute blood loss anemia, mild, asymptomatic   • Renal failure   • Metabolic encephalopathy   • Paroxysmal atrial fibrillation (CMS/HCC)   • Sepsis (CMS/HCC)   • Moderate aortic regurgitation       Therapy Treatment    Rehabilitation Treatment Summary     Row Name 19 1035 19 1031          Treatment Time/Intention    Discipline  --  -  physical therapist  -     Document Type  --  therapy note (daily note)  -2     Subjective Information  --  complains of;pain;fatigue  -[facility]     Mode of Treatment  --  individual therapy  -[facility]     Patient/Family Observations  --  wife present. pt supine in specialty bed  -[facility]     Care Plan Review  --  care plan/treatment goals reviewed;risks/benefits reviewed;current/potential barriers reviewed;patient/other agree to care plan  -[facility]     Care Plan Review, Other Participant(s)  --  spouse   -SJ2     Therapy Frequency (PT Clinical Impression)  --  daily  -SJ2     Patient Effort  --  fair  -SJ2     Comment  --  delayed processing, confusion  -SJ2     Existing Precautions/Restrictions  --  fall  -SJ2     Treatment Considerations/Comments  --  generalized pain with all mobility  -SJ2     Patient Response to Treatment  --  uriel, required encouragement for participation throughout  -SJ2     Recorded by [SJ] Raquel Rico, PT 02/21/19 1145 [SJ] Raquel Rico, PT 02/21/19 1145  [SJ2] Raquel Rico, PT 02/21/19 1139     Row Name 02/21/19 1031             Vital Signs    Pre Systolic BP Rehab  161  -SJ      Pre Treatment Diastolic BP  69  -SJ      Pretreatment Heart Rate (beats/min)  77  -SJ      Pre SpO2 (%)  94  -SJ      O2 Delivery Pre Treatment  room air  -SJ      Post SpO2 (%)  98  -SJ      O2 Delivery Post Treatment  room air  -SJ      Recorded by [SJ] Raquel Rico, PT 02/21/19 1139      Row Name 02/21/19 1031             Cognitive Assessment/Intervention- PT/OT    Affect/Mental Status (Cognitive)  low arousal/lethargic  -SJ      Orientation Status (Cognition)  oriented to;person;place  -SJ      Follows Commands (Cognition)  follows one step commands;50-74% accuracy;repetition of directions required;verbal cues/prompting required;physical/tactile prompts required;delayed response/completion  -      Cognitive Function (Cognitive)  attention deficit;safety deficit  -SJ      Safety Deficit (Cognitive)  ability to follow commands;moderate deficit;awareness of need for assistance;insight into deficits/self awareness  -      Personal Safety Interventions  fall prevention program maintained;gait belt;muscle strengthening facilitated;nonskid shoes/slippers when out of bed  -SJ      Recorded by [SJ] Raquel Rico, PT 02/21/19 1139      Row Name 02/21/19 1031             Safety Issues, Functional Mobility    Safety Issues Affecting Function (Mobility)  ability to follow commands;awareness of need  for assistance;friction/shear risk;insight into deficits/self awareness;positioning of assistive device;sequencing abilities  -SJ      Impairments Affecting Function (Mobility)  balance;cognition;strength;postural/trunk control  -SJ      Recorded by [SJ] Raquel Rico, PT 02/21/19 1139      Row Name 02/21/19 1031             Bed Mobility Assessment/Treatment    Rolling Left Glendo (Bed Mobility)  dependent (less than 25% patient effort);2 person assist;verbal cues  -SJ      Rolling Right Glendo (Bed Mobility)  dependent (less than 25% patient effort);2 person assist;verbal cues  -SJ      Bed Mobility, Safety Issues  cognitive deficits limit understanding;decreased use of arms for pushing/pulling;decreased use of legs for bridging/pushing;impaired trunk control for bed mobility  -SJ      Assistive Device (Bed Mobility)  bed rails;draw sheet;head of bed elevated;mechanical lift/aid  -SJ      Comment (Bed Mobility)  max cues and encouragement needed, c/o pain throughout mobility  -SJ      Recorded by [TREVIN] Raquel Rico, PT 02/21/19 1139      Row Name 02/21/19 1031             Transfer Assessment/Treatment    Comment (Transfers)  [facility] lift to recliner. Sit <> stand deferred due to decreased alertness, weakness, pain  -SJ      Recorded by [SJ] Raquel Rico, PT 02/21/19 1139      Row Name 02/21/19 1031             Bed-Chair Transfer    Bed-Chair Glendo (Transfers)  dependent (less than 25% patient effort);2 person assist;verbal cues  -SJ      Assistive Device (Bed-Chair Transfers)  mechanical lift/aid  -SJ      Recorded by [TREVIN] Raquel Rico, PT 02/21/19 1139      Row Name 02/21/19 1031             Sit-Stand Transfer    Sit-Stand Glendo (Transfers)  unable to assess  -SJ      Recorded by [TREVIN] Raquel Rico, PT 02/21/19 1139      Row Name 02/21/19 1031             Gait/Stairs Assessment/Training    Glendo Level (Gait)  unable to assess  -SJ      Recorded by [SJ] Trisha  Raquel, PT 02/21/19 1139      Row Name 02/21/19 1031             Therapeutic Exercise    65517 - PT Therapeutic Activity Minutes  31  -SJ      Recorded by [SJ] Raquel Rico, PT 02/21/19 1145      Row Name 02/21/19 1031             Lower Extremity Seated Therapeutic Exercise    Performed, Seated Lower Extremity (Therapeutic Exercise)  hip flexion/extension;LAQ (long arc quad), knee extension;ankle dorsiflexion/plantarflexion  -SJ      Exercise Type, Seated Lower Extremity (Therapeutic Exercise)  AAROM (active assistive range of motion)  -SJ      Sets/Reps Detail, Seated Lower Extremity (Therapeutic Exercise)  10  -SJ      Comment, Seated Lower Extremity (Therapeutic Exercise)  constant cues  -SJ      Recorded by [SJ] Raquel Rico, PT 02/21/19 1139      Row Name 02/21/19 1031             Static Sitting Balance    Level of Becker (Unsupported Sitting, Static Balance)  maximal assist, 25 to 49% patient effort;2 person assist  -SJ      Sitting Position (Unsupported Sitting, Static Balance)  sitting in chair unsupported in chair  -SJ      Time Able to Maintain Position (Unsupported Sitting, Static Balance)  more than 5 minutes  -SJ      Comment (Unsupported Sitting, Static Balance)  difficulty staying awake  -SJ      Recorded by [SJ] Raquel Rico, PT 02/21/19 1139      Row Name 02/21/19 1031             Positioning and Restraints    Pre-Treatment Position  in bed  -SJ      Post Treatment Position  chair  -SJ      In Chair  notified nsg;reclined;call light within reach;encouraged to call for assist;with family/caregiver;exit alarm on;waffle cushion;on mechanical lift sling;heels elevated  -SJ      Recorded by [SJ] Raquel Rico, PT 02/21/19 1139      Row Name 02/21/19 1031             Pain Scale: Numbers Pre/Post-Treatment    Pain Location - Orientation  generalized  -SJ      Pre/Post Treatment Pain Comment  uriel  -SJ      Pain Intervention(s)  Repositioned;Ambulation/increased activity  -SJ       Recorded by [SJ] Raquel Rico, PT 02/21/19 1139      Row Name 02/21/19 1031             Pain Scale 2: FACES Pre/Post-Treatment    Pain 2: FACES Scale, Pretreatment  4-->hurts little more  -SJ      Pain 2: FACES Scale, Post-Treatment  4-->hurts little more  -SJ      Pain Location 2 - Orientation  generalized  -SJ      Recorded by [SJ] Raquel Rico, PT 02/21/19 1139      Row Name                Wound 02/08/19 1519 Other (See comments) neck incision    Wound - Properties Group Date first assessed: 02/08/19 [SM] Time first assessed: 1519 [SM] Side: Other (See comments) [SM] Location: neck [SM] Type: incision [SM] Recorded by:  [SM] Rissa Rodriguez RN 02/08/19 1519    Row Name                Wound 02/08/19 1800 Right lower gluteal pressure injury    Wound - Properties Group Date first assessed: 02/08/19 [AB] Time first assessed: 1800 [AB] Present On Admission : yes [AB] Side: Right [AB] Orientation: lower [AB] Location: gluteal [AB] Type: pressure injury [AB] Stage, Pressure Injury: deep tissue injury [AB] Additional Comments: bilateral gluteals [MR] Recorded by:  [AB] Addison Jasso RN 02/08/19 1855 [MR] Carmen Conner RN 02/09/19 1227    Row Name                Wound 02/09/19 1116 Bilateral medial thigh    Wound - Properties Group Date first assessed: 02/09/19 [MR] Time first assessed: 1116 [MR] Present On Admission : yes [MR] Side: Bilateral [MR] Orientation: medial [MR] Location: thigh [MR] Stage, Pressure Injury: deep tissue injury [MR] Recorded by:  [MR] Carmen Conner RN 02/09/19 1228    Row Name                Wound 02/13/19 0800 Left lower gluteal pressure injury    Wound - Properties Group Date first assessed: 02/13/19 [JM] Time first assessed: 0800 [JM] Side: Left [JM] Orientation: lower [JM] Location: gluteal [JM] Type: pressure injury [JM] Stage, Pressure Injury: deep tissue injury [JM] Recorded by:  [JM] Ivon Eason RN 02/13/19 135    Row Name                Wound 02/15/19 1800 Right  upper chest puncture    Wound - Properties Group Date first assessed: 02/15/19 [CH] Time first assessed: 1800 [CH] Side: Right [CH] Orientation: upper [CH] Location: chest [CH] Type: puncture [CH] Additional Comments: s/p tunneled dialysis catheter [CH] Recorded by:  [CH] Taco Templeton RN 02/15/19 1822    Row Name                Wound 02/17/19 0800 scrotum abrasion;MASD (moisture associated skin damage)    Wound - Properties Group Date first assessed: 02/17/19 [CP] Time first assessed: 0800 [CP] Present On Admission : no [CP] Location: scrotum [CP] Type: abrasion;MASD (moisture associated skin damage) [CP] Recorded by:  [CP] Janine Juan RN 02/17/19 0953    Row Name 02/21/19 1031             Coping    Observed Emotional State  quiet  -SJ      Verbalized Emotional State  sadness  -SJ      Recorded by [SJ] Raquel Rico, PT 02/21/19 1139      Row Name 02/21/19 1031             Plan of Care Review    Plan of Care Reviewed With  patient;spouse  -SJ      Recorded by [SJ] Raquel Rico, PT 02/21/19 1139      Row Name 02/21/19 1031             Outcome Summary/Treatment Plan (PT)    Daily Summary of Progress (PT)  progress toward functional goals is gradual  -SJ      Recorded by [TREVIN] Raquel Rico, PT 02/21/19 1139        User Key  (r) = Recorded By, (t) = Taken By, (c) = Cosigned By    Initials Name Effective Dates Discipline    SJ Raquel Rico, PT 06/19/15 -  PT    Taco Gutierrez RN 06/16/16 -  DIALYSIS USER    Rissa Bolden RN 06/16/16 -  Nurse    Carmen Nelson RN 06/16/16 -  Nurse    Ivon Gutierrez RN 06/16/16 -  Nurse    Janine Pace RN 06/16/16 -  Nurse    Addison Jaffe RN 02/02/17 -  Nurse          Rash 02/12/19 2000 Left upper arm macular (Active)   Distribution regional 2/20/2019  9:04 PM   Configuration/Shape asymmetric 2/20/2019  9:04 PM   Borders irregular 2/20/2019  9:04 PM   Characteristics dry 2/20/2019  9:04 PM   Color red 2/20/2019  9:04 PM    Care, Rash open to air 2/20/2019  9:04 PM       Rash 02/12/19 2000 Left posterior hand macular (Active)   Distribution regional 2/20/2019  9:04 PM   Configuration/Shape asymmetric 2/20/2019  9:04 PM   Borders irregular 2/20/2019  9:04 PM   Characteristics dry 2/20/2019  9:04 PM   Color purple;red 2/20/2019  9:04 PM   Care, Rash open to air 2/20/2019  9:04 PM       Wound 02/08/19 1519 Other (See comments) neck incision (Active)   Dressing Appearance open to air 2/20/2019  9:04 PM   Base clean;dry;scab 2/20/2019  9:04 PM   Periwound intact;dry;pink;blanchable 2/20/2019  9:04 PM   Drainage Amount none 2/20/2019  9:04 PM       Wound 02/08/19 1800 Right lower gluteal pressure injury (Active)   Dressing Appearance open to air 2/20/2019  9:04 PM   Closure None 2/20/2019 12:10 PM   Base dry;purple;maroon/purple 2/20/2019  9:04 PM   Periwound dry;pink;blanchable 2/20/2019  9:04 PM   Drainage Amount none 2/20/2019  9:04 PM   Care, Wound barrier applied 2/20/2019 12:10 PM   Dressing Care, Wound open to air 2/20/2019 12:10 PM   Periwound Care, Wound dry periwound area maintained;topical treatment applied;barrier film applied 2/20/2019 12:10 PM       Wound 02/09/19 1116 Bilateral medial thigh (Active)   Dressing Appearance open to air 2/20/2019  9:04 PM   Closure None 2/20/2019  9:04 PM   Base dry;red/granulating 2/20/2019  9:04 PM   Periwound intact;dry;redness;blanchable 2/20/2019  9:04 PM   Drainage Amount none 2/20/2019  9:04 PM       Wound 02/13/19 0800 Left lower gluteal pressure injury (Active)   Dressing Appearance open to air 2/20/2019  9:04 PM   Base dry;maroon/purple;purple 2/20/2019  9:04 PM   Periwound intact;dry;pink;blanchable 2/20/2019  9:04 PM   Drainage Amount none 2/20/2019  9:04 PM   Dressing Care, Wound open to air 2/20/2019 12:10 PM       Wound 02/15/19 1800 Right upper chest puncture (Active)   Dressing Appearance dry;intact;no drainage 2/20/2019  9:04 PM   Closure SARAHI 2/20/2019  9:04 PM   Drainage  Amount none 2/20/2019  9:04 PM       Wound 02/17/19 0800 scrotum abrasion;MASD (moisture associated skin damage) (Active)   Dressing Appearance open to air 2/20/2019  9:04 PM   Closure None 2/20/2019  9:04 PM   Base moist;pink 2/20/2019  9:04 PM   Periwound moist;pink;redness;blanchable 2/20/2019  9:04 PM   Periwound Care, Wound dry periwound area maintained 2/20/2019 12:10 PM           Physical Therapy Education     Title: PT OT SLP Therapies (In Progress)     Topic: Physical Therapy (In Progress)     Point: Mobility training (In Progress)     Learning Progress Summary           Patient Acceptance, E,D, NR by TREVIN at 2/21/2019 11:44 AM    Acceptance, E, NR by AS at 2/19/2019  1:32 PM    Acceptance, E,D, NR by LS at 2/15/2019  3:41 PM    Acceptance, E,D, NR by LS at 2/14/2019  2:10 PM    Acceptance, E,D, NR by LS at 2/12/2019  9:59 AM   Significant Other Acceptance, E,D, NR by TREVIN at 2/21/2019 11:44 AM    Acceptance, E,D, NR by LS at 2/15/2019  3:41 PM    Acceptance, E,D, NR by LS at 2/14/2019  2:10 PM    Acceptance, E,D, NR by LS at 2/12/2019  9:59 AM                   Point: Home exercise program (In Progress)     Learning Progress Summary           Patient Acceptance, E,D, NR by TREVIN at 2/21/2019 11:44 AM    Acceptance, E, NR by AS at 2/19/2019  1:32 PM    Acceptance, E,D, NR by LS at 2/15/2019  3:41 PM    Acceptance, E,D, NR by LS at 2/14/2019  2:10 PM   Significant Other Acceptance, E,D, NR by TREVIN at 2/21/2019 11:44 AM    Acceptance, E,D, NR by LS at 2/15/2019  3:41 PM    Acceptance, E,D, NR by LS at 2/14/2019  2:10 PM                   Point: Body mechanics (In Progress)     Learning Progress Summary           Patient Acceptance, E,D, NR by TREVIN at 2/21/2019 11:44 AM    Acceptance, E, NR by AS at 2/19/2019  1:32 PM    Acceptance, E,D, NR by LS at 2/15/2019  3:41 PM    Acceptance, E,D, NR by LS at 2/14/2019  2:10 PM    Acceptance, E,D, NR by LS at 2/12/2019  9:59 AM   Significant Other Acceptance, E,D, NR by TREVIN at  2/21/2019 11:44 AM    Acceptance, E,D, NR by LS at 2/15/2019  3:41 PM    Acceptance, E,D, NR by LS at 2/14/2019  2:10 PM    Acceptance, E,D, NR by LS at 2/12/2019  9:59 AM                   Point: Precautions (In Progress)     Learning Progress Summary           Patient Acceptance, E,D, NR by  at 2/21/2019 11:44 AM    Acceptance, E, NR by AS at 2/19/2019  1:32 PM    Acceptance, E,D, NR by LS at 2/15/2019  3:41 PM    Acceptance, E,D, NR by LS at 2/14/2019  2:10 PM    Acceptance, E,D, NR by LS at 2/12/2019  9:59 AM   Significant Other Acceptance, E,D, NR by  at 2/21/2019 11:44 AM    Acceptance, E,D, NR by LS at 2/15/2019  3:41 PM    Acceptance, E,D, NR by LS at 2/14/2019  2:10 PM    Acceptance, E,D, NR by LS at 2/12/2019  9:59 AM                               User Key     Initials Effective Dates Name Provider Type Discipline     06/19/15 -  Raquel Rico, PT Physical Therapist PT    AS 06/22/15 -  Eve Mercado, PTA Physical Therapy Assistant PT     06/19/15 -  Ana Akers PT Physical Therapist PT                PT Recommendation and Plan  Therapy Frequency (PT Clinical Impression): daily  Outcome Summary/Treatment Plan (PT)  Daily Summary of Progress (PT): progress toward functional goals is gradual  Plan of Care Reviewed With: patient  Progress: declining  Outcome Summary: Pt t/f to recliner with mechanical lift, then performed sitting balance activities and LE therex in chair. Pt req max cues and encouragement throughout. Pt limited by pain, cognitive status, lethargy. Continue POC as uriel.  Outcome Measures     Row Name 02/21/19 1031 02/20/19 1507 02/19/19 1300       How much help from another person do you currently need...    Turning from your back to your side while in flat bed without using bedrails?  1  -SJ  --  2  -AS    Moving from lying on back to sitting on the side of a flat bed without bedrails?  1  -SJ  --  2  -AS    Moving to and from a bed to a chair (including a wheelchair)?  1   -SJ  --  1  -AS    Standing up from a chair using your arms (e.g., wheelchair, bedside chair)?  1  -SJ  --  1  -AS    Climbing 3-5 steps with a railing?  1  -SJ  --  1  -AS    To walk in hospital room?  1  -SJ  --  1  -AS    AM-PAC 6 Clicks Score  6  -SJ  --  8  -AS       How much help from another is currently needed...    Putting on and taking off regular lower body clothing?  --  1  -KF  --    Bathing (including washing, rinsing, and drying)  --  1  -KF  --    Toileting (which includes using toilet bed pan or urinal)  --  1  -KF  --    Putting on and taking off regular upper body clothing  --  2  -KF  --    Taking care of personal grooming (such as brushing teeth)  --  2  -KF  --    Eating meals  --  2  -KF  --    Score  --  9  -KF  --       Functional Assessment    Outcome Measure Options  AM-PAC 6 Clicks Basic Mobility (PT)  -  AM-PAC 6 Clicks Daily Activity (OT)  -KF  AM-PAC 6 Clicks Basic Mobility (PT)  -AS    Row Name 02/19/19 1117             How much help from another is currently needed...    Putting on and taking off regular lower body clothing?  1  -KF      Bathing (including washing, rinsing, and drying)  1  -KF      Toileting (which includes using toilet bed pan or urinal)  1  -KF      Putting on and taking off regular upper body clothing  1  -KF      Taking care of personal grooming (such as brushing teeth)  2  -KF      Eating meals  2  -KF      Score  8  -KF         Functional Assessment    Outcome Measure Options  AM-PAC 6 Clicks Daily Activity (OT)  -        User Key  (r) = Recorded By, (t) = Taken By, (c) = Cosigned By    Initials Name Provider Type    Raquel Mccracken, PT Physical Therapist    AS Eve Mercado, PTA Physical Therapy Assistant    KF Kaye Lyon, OT Occupational Therapist         Time Calculation:   PT Charges     Row Name 02/21/19 1144 02/21/19 1031          Time Calculation    Start Time  1031  -  --     PT Received On  02/21/19  -  --     PT Goal Re-Cert  Due Date  02/22/19  -  --        Time Calculation- PT    Total Timed Code Minutes- PT  31 minute(s)  -SJ  --        Timed Charges    20839 - PT Therapeutic Activity Minutes  --  31  -SJ       User Key  (r) = Recorded By, (t) = Taken By, (c) = Cosigned By    Initials Name Provider Type     Raquel Rico, PT Physical Therapist        Therapy Suggested Charges     Code   Minutes Charges    35690 (CPT®) Hc Pt Neuromusc Re Education Ea 15 Min      84741 (CPT®) Hc Pt Ther Proc Ea 15 Min      13675 (CPT®) Hc Gait Training Ea 15 Min      30145 (CPT®) Hc Pt Therapeutic Act Ea 15 Min 31 2    62528 (CPT®) Hc Pt Manual Therapy Ea 15 Min      53292 (CPT®) Hc Pt Iontophoresis Ea 15 Min      21211 (CPT®) Hc Pt Elec Stim Ea-Per 15 Min      93273 (CPT®) Hc Pt Ultrasound Ea 15 Min      01291 (CPT®) Hc Pt Self Care/Mgmt/Train Ea 15 Min      80950 (CPT®) Hc Pt Prosthetic (S) Train Initial Encounter, Each 15 Min      54669 (CPT®) Hc Pt Orthotic(S)/Prosthetic(S) Encounter, Each 15 Min      44318 (CPT®) Hc Orthotic(S) Mgmt/Train Initial Encounter, Each 15min      Total  31 2        Therapy Charges for Today     Code Description Service Date Service Provider Modifiers Qty    27189105831 HC PT THERAPEUTIC ACT EA 15 MIN 2/21/2019 Raquel Rico, PT GP 2    10113958209 HC PT THER SUPP EA 15 MIN 2/21/2019 Raquel Rico, PT GP 2          PT G-Codes  Outcome Measure Options: AM-PAC 6 Clicks Basic Mobility (PT)  AM-PAC 6 Clicks Score: 6  Score: 9    Raquel Rico PT  2/21/2019

## 2019-02-21 NOTE — PLAN OF CARE
Problem: Patient Care Overview  Goal: Plan of Care Review  Outcome: Ongoing (interventions implemented as appropriate)   02/21/19 9944   Coping/Psychosocial   Plan of Care Reviewed With patient;spouse   Plan of Care Review   Progress improving   OTHER   Outcome Summary Pt improved with BUE ther ex today tolerance sitting in chair, tolerated sitting LEs unreclined with balance TA in sitting, cues throughout for alertness and participation, dependent toileting tasks, dependent chair to bed transfer; cont IPOT per POC

## 2019-02-21 NOTE — PLAN OF CARE
Problem: Patient Care Overview  Goal: Plan of Care Review  Outcome: Ongoing (interventions implemented as appropriate)   02/21/19 1143   Coping/Psychosocial   Plan of Care Reviewed With patient   Plan of Care Review   Progress declining   OTHER   Outcome Summary Pt t/f to recliner with mechanical lift, then performed sitting balance activities and LE therex in chair. Pt req max cues and encouragement throughout. Pt limited by pain, cognitive status, lethargy. Continue POC as uriel.

## 2019-02-22 ENCOUNTER — APPOINTMENT (OUTPATIENT)
Dept: NEUROLOGY | Facility: HOSPITAL | Age: 69
End: 2019-02-22

## 2019-02-22 LAB
ANION GAP SERPL CALCULATED.3IONS-SCNC: 6 MMOL/L (ref 3–11)
BUN BLD-MCNC: 23 MG/DL (ref 9–23)
BUN/CREAT SERPL: 31.1 (ref 7–25)
CALCIUM SPEC-SCNC: 8.1 MG/DL (ref 8.7–10.4)
CHLORIDE SERPL-SCNC: 109 MMOL/L (ref 99–109)
CO2 SERPL-SCNC: 23 MMOL/L (ref 20–31)
CREAT BLD-MCNC: 0.74 MG/DL (ref 0.6–1.3)
DEPRECATED RDW RBC AUTO: 50.1 FL (ref 37–54)
ERYTHROCYTE [DISTWIDTH] IN BLOOD BY AUTOMATED COUNT: 16.3 % (ref 11.3–14.5)
GFR SERPL CREATININE-BSD FRML MDRD: 105 ML/MIN/1.73
GLUCOSE BLD-MCNC: 106 MG/DL (ref 70–100)
GLUCOSE BLDC GLUCOMTR-MCNC: 125 MG/DL (ref 70–130)
GLUCOSE BLDC GLUCOMTR-MCNC: 126 MG/DL (ref 70–130)
GLUCOSE BLDC GLUCOMTR-MCNC: 130 MG/DL (ref 70–130)
GLUCOSE BLDC GLUCOMTR-MCNC: 182 MG/DL (ref 70–130)
HCT VFR BLD AUTO: 27.7 % (ref 38.9–50.9)
HGB BLD-MCNC: 8.5 G/DL (ref 13.1–17.5)
MCH RBC QN AUTO: 25.9 PG (ref 27–31)
MCHC RBC AUTO-ENTMCNC: 30.7 G/DL (ref 32–36)
MCV RBC AUTO: 84.5 FL (ref 80–99)
PLATELET # BLD AUTO: 343 10*3/MM3 (ref 150–450)
PMV BLD AUTO: 9.4 FL (ref 6–12)
POTASSIUM BLD-SCNC: 3.3 MMOL/L (ref 3.5–5.5)
RBC # BLD AUTO: 3.28 10*6/MM3 (ref 4.2–5.76)
SODIUM BLD-SCNC: 138 MMOL/L (ref 132–146)
WBC NRBC COR # BLD: 7.14 10*3/MM3 (ref 3.5–10.8)

## 2019-02-22 PROCEDURE — 80048 BASIC METABOLIC PNL TOTAL CA: CPT | Performed by: INTERNAL MEDICINE

## 2019-02-22 PROCEDURE — 97610 LOW FREQUENCY NON-THERMAL US: CPT | Performed by: PHYSICAL THERAPIST

## 2019-02-22 PROCEDURE — 99024 POSTOP FOLLOW-UP VISIT: CPT | Performed by: PHYSICIAN ASSISTANT

## 2019-02-22 PROCEDURE — 99223 1ST HOSP IP/OBS HIGH 75: CPT | Performed by: PSYCHIATRY & NEUROLOGY

## 2019-02-22 PROCEDURE — 25010000003 CEFAZOLIN IN DEXTROSE 2-4 GM/100ML-% SOLUTION: Performed by: INTERNAL MEDICINE

## 2019-02-22 PROCEDURE — 85027 COMPLETE CBC AUTOMATED: CPT | Performed by: INTERNAL MEDICINE

## 2019-02-22 PROCEDURE — 95913 NRV CNDJ TEST 13/> STUDIES: CPT

## 2019-02-22 PROCEDURE — 82962 GLUCOSE BLOOD TEST: CPT

## 2019-02-22 PROCEDURE — 95886 MUSC TEST DONE W/N TEST COMP: CPT

## 2019-02-22 RX ORDER — FAMOTIDINE 20 MG/1
20 TABLET, FILM COATED ORAL 2 TIMES DAILY
Start: 2019-02-22

## 2019-02-22 RX ORDER — CASTOR OIL AND BALSAM, PERU 788; 87 MG/G; MG/G
OINTMENT TOPICAL
Start: 2019-02-22 | End: 2019-04-12

## 2019-02-22 RX ORDER — GABAPENTIN 100 MG/1
200 CAPSULE ORAL EVERY 12 HOURS SCHEDULED
Start: 2019-02-22 | End: 2019-02-23 | Stop reason: HOSPADM

## 2019-02-22 RX ORDER — ONDANSETRON 2 MG/ML
4 INJECTION INTRAMUSCULAR; INTRAVENOUS EVERY 6 HOURS PRN
Status: ON HOLD
Start: 2019-02-22 | End: 2019-07-05

## 2019-02-22 RX ORDER — POTASSIUM CHLORIDE 750 MG/1
40 CAPSULE, EXTENDED RELEASE ORAL ONCE
Status: COMPLETED | OUTPATIENT
Start: 2019-02-22 | End: 2019-02-22

## 2019-02-22 RX ORDER — CEFAZOLIN SODIUM 2 G/100ML
2 INJECTION, SOLUTION INTRAVENOUS EVERY 8 HOURS
Qty: 1800 ML | Refills: 0
Start: 2019-02-22 | End: 2019-02-28

## 2019-02-22 RX ORDER — NICOTINE POLACRILEX 4 MG
15 LOZENGE BUCCAL
Start: 2019-02-22 | End: 2019-04-12

## 2019-02-22 RX ORDER — GABAPENTIN 400 MG/1
400 CAPSULE ORAL NIGHTLY
Status: DISCONTINUED | OUTPATIENT
Start: 2019-02-22 | End: 2019-02-24 | Stop reason: HOSPADM

## 2019-02-22 RX ORDER — DEXTROSE MONOHYDRATE 25 G/50ML
25 INJECTION, SOLUTION INTRAVENOUS
Start: 2019-02-22 | End: 2019-04-12

## 2019-02-22 RX ORDER — LACTULOSE 10 G/15ML
30 SOLUTION ORAL EVERY 8 HOURS PRN
Start: 2019-02-22 | End: 2019-12-04

## 2019-02-22 RX ORDER — FOLIC ACID/VIT B COMPLEX AND C 0.8 MG
1 TABLET ORAL DAILY
Status: DISCONTINUED | OUTPATIENT
Start: 2019-02-22 | End: 2019-02-24 | Stop reason: HOSPADM

## 2019-02-22 RX ORDER — ALBUTEROL SULFATE 2.5 MG/3ML
2.5 SOLUTION RESPIRATORY (INHALATION) EVERY 6 HOURS PRN
Refills: 12
Start: 2019-02-22 | End: 2019-12-04

## 2019-02-22 RX ORDER — GABAPENTIN 100 MG/1
200 CAPSULE ORAL DAILY
Status: DISCONTINUED | OUTPATIENT
Start: 2019-02-23 | End: 2019-02-24 | Stop reason: HOSPADM

## 2019-02-22 RX ORDER — ACETAMINOPHEN 325 MG/1
650 TABLET ORAL EVERY 6 HOURS PRN
Start: 2019-02-22 | End: 2022-07-18 | Stop reason: ALTCHOICE

## 2019-02-22 RX ADMIN — CEFAZOLIN SODIUM 2 G: 2 INJECTION, SOLUTION INTRAVENOUS at 21:02

## 2019-02-22 RX ADMIN — FAMOTIDINE 20 MG: 20 TABLET, FILM COATED ORAL at 08:07

## 2019-02-22 RX ADMIN — INSULIN LISPRO 2 UNITS: 100 INJECTION, SOLUTION INTRAVENOUS; SUBCUTANEOUS at 08:08

## 2019-02-22 RX ADMIN — SODIUM CHLORIDE, PRESERVATIVE FREE 10 ML: 5 INJECTION INTRAVENOUS at 08:14

## 2019-02-22 RX ADMIN — FAMOTIDINE 20 MG: 20 TABLET, FILM COATED ORAL at 21:01

## 2019-02-22 RX ADMIN — METOPROLOL TARTRATE 25 MG: 25 TABLET ORAL at 08:08

## 2019-02-22 RX ADMIN — CASTOR OIL AND BALSAM, PERU: 788; 87 OINTMENT TOPICAL at 08:09

## 2019-02-22 RX ADMIN — POTASSIUM CHLORIDE 40 MEQ: 750 CAPSULE, EXTENDED RELEASE ORAL at 13:53

## 2019-02-22 RX ADMIN — METOPROLOL TARTRATE 25 MG: 25 TABLET ORAL at 21:01

## 2019-02-22 RX ADMIN — SODIUM CHLORIDE, PRESERVATIVE FREE 10 ML: 5 INJECTION INTRAVENOUS at 08:09

## 2019-02-22 RX ADMIN — DULOXETINE HYDROCHLORIDE 60 MG: 60 CAPSULE, DELAYED RELEASE ORAL at 08:07

## 2019-02-22 RX ADMIN — ACETAMINOPHEN 650 MG: 325 TABLET ORAL at 21:01

## 2019-02-22 RX ADMIN — Medication 1 TABLET: at 20:56

## 2019-02-22 RX ADMIN — DICLOFENAC 2 G: 10 GEL TOPICAL at 21:08

## 2019-02-22 RX ADMIN — CEFAZOLIN SODIUM 2 G: 2 INJECTION, SOLUTION INTRAVENOUS at 05:33

## 2019-02-22 RX ADMIN — SODIUM CHLORIDE, PRESERVATIVE FREE 10 ML: 5 INJECTION INTRAVENOUS at 21:02

## 2019-02-22 RX ADMIN — METOPROLOL TARTRATE 25 MG: 25 TABLET ORAL at 17:08

## 2019-02-22 RX ADMIN — DICLOFENAC 2 G: 10 GEL TOPICAL at 08:09

## 2019-02-22 RX ADMIN — CEFAZOLIN SODIUM 2 G: 2 INJECTION, SOLUTION INTRAVENOUS at 13:53

## 2019-02-22 RX ADMIN — Medication 5 MG: at 21:10

## 2019-02-22 RX ADMIN — CASTOR OIL AND BALSAM, PERU: 788; 87 OINTMENT TOPICAL at 21:08

## 2019-02-22 RX ADMIN — APIXABAN 5 MG: 5 TABLET, FILM COATED ORAL at 05:33

## 2019-02-22 RX ADMIN — GABAPENTIN 400 MG: 400 CAPSULE ORAL at 21:01

## 2019-02-22 RX ADMIN — APIXABAN 5 MG: 5 TABLET, FILM COATED ORAL at 17:08

## 2019-02-22 RX ADMIN — GABAPENTIN 200 MG: 100 CAPSULE ORAL at 08:07

## 2019-02-22 RX ADMIN — AMLODIPINE BESYLATE 5 MG: 5 TABLET ORAL at 08:07

## 2019-02-22 NOTE — PROGRESS NOTES
Continued Stay Note  [facility]     Patient Name: Rod Balderas  MRN: [id number]  Today's Date: 2/22/2019    Admit Date: 2/8/2019    Discharge Plan     Row Name 02/22/19 1302       Plan    Plan  [facility] pending medical readiness    Patient/Family in Agreement with Plan  yes    Plan Comments  Per Abbi with [facility], they cannot accommodate q8 hour IV antibiotics.     Per Duyen with [facility], they can accommodate this and can accept patient to an acute rehab bed when he is medically ready. CM discussed this with patient and wife and they are agreeable to transfer to [facility]. CM will arrange ambulance transport once transfer date is known. Majo Mccullough RN [phone]    Update @ 4837: Per Duyen with [facility], she has sent patient's clinicals for [facility] to review - will need [facility] MD to sign off prior to offering bed to patient. They have concerns about patient's ability to complete 3 hours of therapy daily. Majo Mccullough RN [phone]    Update @ 1535: Still awaiting determination from [facility]. Spoke with patient & wife regarding backup plan in case [facility] cannot accept. They are agreeable to referrals being made to [facility] swing bed unit (left VM for Evelia in Case Management there at P: [phone] to see if they can accommodate his needs), [facility] (called referral to Bianca), and [facility] (called referral to Jina). Awaiting bed offer. Patient is medically ready for transfer per Dr. TANVIR Mccullough RN [phone]    Final Discharge Disposition Code  62 - inpatient rehab facility        Discharge Codes    No documentation.       Expected Discharge Date and Time     Expected Discharge Date Expected Discharge Time    Feb 23, 2019             Majo Mccullough RN

## 2019-02-22 NOTE — PROGRESS NOTES
"IM progress note      Rod Balderas  [id number]  1950     LOS: 14 days     Attending: Arielle Carolina MD    Primary Care Provider: Tyrese Leyva MD      Chief Complaint/Reason for visit:  Sepsis    He was transferred to telemetry after about 10 days in ICU. He is followed by renal, cardiology, and infectious disease. His renal failure is resolved.     Subjective   Feels better today.  Taking better p.o. diet.  No nausea or vomiting.  More alert and interactive.  Marginal compliance with CPAP.    Objective     Vital Signs    Visit Vitals  /69   Pulse 75   Temp 99 °F (37.2 °C) (Oral)   Resp 18   Ht 170.2 cm (67.01\")   Wt 117 kg (257 lb)   SpO2 97%   BMI 40.24 kg/m²     Temp (24hrs), Av.5 °F (36.9 °C), Min:98.1 °F (36.7 °C), Max:99 °F (37.2 °C)    Nutrition: PO    Respiratory: RA    Physical Exam:     General Appearance:    Somnolent, in no acute distress   Head:    Normocephalic, without obvious abnormality, atraumatic    Lungs:     Normal effort, symmetric chest rise, no crepitus, clear to      auscultation bilaterally             Heart:    Regular rhythm and normal rate, normal S1 and S2   Abdomen:     Soft, healing lap sites. obese   Extremities:   No clubbing, cyanosis. +pitting BLE edema.    Pulses:   Pulses palpable and equal bilaterally   Skin:   No bleeding, bruising or rash   Neurologic:  Cranial nerves 2 - 12 grossly intact     Results Review:     I reviewed the patient's new clinical results.   Results from last 7 days   Lab Units 19  0442 19  1435 19  0709   WBC 10*3/mm3 7.14 10.07 15.18*   HEMOGLOBIN g/dL 8.5* 9.4* 9.9*   HEMATOCRIT % 27.7* 30.4* 30.2*   PLATELETS 10*3/mm3 343 381 364     Results from last 7 days   Lab Units 19  0442 19  0400 19  0709   SODIUM mmol/L 138 136 140   POTASSIUM mmol/L 3.3* 4.0 4.1   CHLORIDE mmol/L 109 106 108   CO2 mmol/L 23.0 25.0 23.0   BUN mg/dL 23 47* 58*   CREATININE mg/dL 0.74 1.04 1.13   CALCIUM mg/dL " 8.1* 8.0* 7.7*   BILIRUBIN mg/dL  --   --  0.9   ALK PHOS U/L  --   --  178*   ALT (SGPT) U/L  --   --  10   AST (SGOT) U/L  --   --  18   GLUCOSE mg/dL 106* 126* 127*       Results for JESSE YUSUF (MRN [id number]) as of 2/22/2019 16:12   Ref. Range 2/21/2019 11:00   pH, Arterial Latest Ref Range: 7.350 - 7.450 pH units 7.536 (H)   pH, Temp Corrected Latest Units: pH Units 7.536   pCO2, Arterial Latest Units: mm Hg 28.3   pO2, Arterial Latest Ref Range: 83.0 - 108.0 mm Hg 69.0 (L)   pO2, Temperature Corrected Latest Ref Range: 83 - 108 mm Hg 69.0 (L)   HCO3, Arterial Latest Ref Range: 20.0 - 26.0 mmol/L 23.9   Base Excess, Arterial Latest Ref Range: 0.0 - 2.0 mmol/L 1.7   CO2 Content Latest Ref Range: 23 - 27 mmol/L 24.8   Carboxyhemoglobin Latest Ref Range: 0 - 2 % 1.5   Methemoglobin Latest Ref Range: 0.00 - 1.50 % 1.00   Oxyhemoglobin Latest Ref Range: 94 - 99 % 93.4 (L)   Hematocrit, Blood Gas Latest Units: % 28.3   Hemoglobin, Blood Gas Latest Ref Range: 13.5 - 17.5 g/dL 9.2 (L)   Site Unknown Left Radial   Sam's Test Unknown N/A   Modality Unknown Room Air   FIO2 Latest Units: % 21       NCS:  IMPRESSION:     Extremely challenging study     NCS/EMG shows generalized sensori-motor neuropathy, severe, axonal, affecting legs more so than arms     Median neuropathy at the wrist on the left, moderate (incidental finding)     Clinical note: 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    All medications reviewed.     amLODIPine 5 mg Oral Q24H   apixaban 5 mg Oral Q12H   castor oil-balsam peru  Topical Q12H   ceFAZolin 2 g Intravenous Q8H   diclofenac 2 g Topical BID   DULoxetine 60 mg Oral Daily   famotidine 20 mg Oral BID   gabapentin 200 mg Oral Q12H   insulin lispro 0-7 Units  Subcutaneous 4x Daily With Meals & Nightly   metoprolol tartrate 25 mg Oral TID   sodium chloride 10 mL Intravenous Q12H   sodium chloride 3 mL Intravenous Q12H       Assessment/Plan     Renal failure, resolved.    Metabolic encephalopathy,improving.    Spinal stenosis, lumbar region, with neurogenic claudication    Morbid obesity due to excess calories (CMS/HCC)    TANIYA on CPAP    Dyslipidemia    DM (diabetes mellitus), type 2 (CMS/HCC)    Paroxysmal atrial fibrillation , c/p carioversion.    Sepsis (CMS/HCC)    Moderate aortic regurgitation    Hypokalemia, 2/22/2019, replaced    Plan  1. Mobilize as able. PT/OT  2. Pain control-prns. ( discussed minimizing narcotics)wy  3. IS-encouraged  4. DVT proph- mechs/Eliquis  5. Bowel regimen  6. Diet as tolerated. 2L FR/day  7. Monitor labs  8. CM to work on rehab placement    Sepsis/POA. Improved.  [facility], Dr. Smith  - high dose cefazolin, PICC placement, follow BC    Afib, HTN, HLD, aortic regurgitation  s/p KLAUDIA/ECV per Dr. Brown 2/14/19  Cardiology, Dr. Regalado  - Eliquis 5 mg BID - watch H& H closely  - Continue home norvasc and lopressor  - Monitor BP     Renal, Dr. Koo  - signed off    TANIYA  - CPAP at night    Continue to taper Neurontin due to lethargy. Keep Current dose and watch.       Spinal stenosis, BLE weakness  - consult neurosx, Dr. Mayers follows pt.  Per Neurosx: ((At this time, patient is not a surgical candidate. Follow up with neurosurgery outpatient when stable/discharged for full workup.))    DM  - Accuchecks ACHS with low dose SSI    Discussed with pt, wife, and RN.  Await disposition , [facility] if possible, if not, a SNF that can manage needs.    Arielle Carolina MD  02/22/19  4:10 PM         .

## 2019-02-22 NOTE — THERAPY WOUND CARE TREATMENT
Acute Care - Wound/Debridement Treatment Note  [facility]     Patient Name: Rod Balderas  : 1950  MRN: [id number]  Today's Date: 2019  Onset of Illness/Injury or Date of Surgery: 19   Date of Referral to PT: 19   Referring Physician: MD Aurea       Admit Date: 2019    Visit Dx:    ICD-10-CM ICD-9-CM   1. Impaired functional mobility, balance, gait, and endurance Z74.09 V49.89   2. Impaired mobility and ADLs Z74.09 799.89       Patient Active Problem List   Diagnosis   • Degenerative disc disease, lumbar   • Spinal stenosis, lumbar region, with neurogenic claudication   • Neuroforaminal stenosis of spine   • Lumbar facet arthropathy   • CAD (coronary artery disease)   • Essential hypertension   • Physical deconditioning   • Morbid obesity due to excess calories (CMS/HCC)   • TANIYA on CPAP   • Displacement of lumbar intervertebral disc   • Dyslipidemia   • Osteoarthritis   • At high risk for falls   • Prostate cancer , s/p prostatectomy ( [facility])   • DM (diabetes mellitus), type 2 (CMS/HCC)   • Leukocytosis, mild, likely reactive   • Acute postoperative pain   • Acute blood loss anemia, mild, asymptomatic   • Renal failure   • Metabolic encephalopathy   • Paroxysmal atrial fibrillation (CMS/HCC)   • Sepsis (CMS/HCC)   • Moderate aortic regurgitation           Rash 19 Left upper arm macular (Active)   Distribution regional 2019  8:07 AM   Configuration/Shape asymmetric 2019  8:07 AM   Borders irregular 2019  8:07 AM   Characteristics dry 2019  8:07 AM   Color pink 2019  8:07 AM   Care, Rash open to air 2019  8:07 AM       Rash 19 Left posterior hand macular (Active)   Distribution regional 2019  8:07 AM   Configuration/Shape asymmetric 2019  8:07 AM   Borders irregular 2019  8:07 AM   Characteristics dry 2019  8:07 AM   Color red 2019  8:07 AM   Care, Rash open to air 2019  8:07 AM       Wound 19 1519  Other (See comments) neck incision (Active)   Dressing Appearance open to air 2/22/2019  8:07 AM   Base clean;dry;scab 2/22/2019  8:07 AM   Periwound intact;dry;pink;blanchable 2/22/2019  8:07 AM   Drainage Amount none 2/22/2019  8:07 AM       Wound 02/08/19 1800 Right lower gluteal pressure injury (Active)   Dressing Appearance open to air 2/22/2019 11:05 AM   Base clean;dry;pink 2/22/2019 11:05 AM   Periwound intact;dry;pink;blanchable 2/22/2019 11:05 AM   Drainage Amount none 2/22/2019 11:05 AM   Care, Wound irrigated with;sterile normal saline;ultrasound therapy, non contact low frequency 2/22/2019 11:05 AM   Dressing Care, Wound open to air 2/22/2019 11:05 AM   Periwound Care, Wound barrier ointment applied 2/22/2019 11:05 AM       Wound 02/09/19 1116 Bilateral medial thigh (Active)   Dressing Appearance open to air 2/22/2019  8:07 AM   Closure None 2/22/2019  8:07 AM   Base dry;red/granulating 2/22/2019  8:07 AM   Periwound intact;dry;redness;blanchable 2/22/2019  8:07 AM   Drainage Amount none 2/22/2019  8:07 AM   Care, Wound cleansed with 2/22/2019  8:07 AM   Periwound Care, Wound barrier ointment applied 2/22/2019  8:07 AM       Wound 02/13/19 0800 Left lower gluteal pressure injury (Active)   Dressing Appearance open to air 2/22/2019 11:05 AM   Base maroon/purple;pink;moist 2/22/2019 11:05 AM   Periwound intact;pink 2/22/2019 11:05 AM   Drainage Amount none 2/22/2019 11:05 AM   Care, Wound irrigated with;sterile normal saline;ultrasound therapy, non contact low frequency 2/22/2019 11:05 AM   Dressing Care, Wound open to air 2/22/2019 11:05 AM   Periwound Care, Wound barrier ointment applied 2/22/2019 11:05 AM       Wound 02/15/19 1800 Right upper chest puncture (Active)   Dressing Appearance dry;intact;no drainage 2/22/2019  8:07 AM   Closure SARAHI 2/22/2019  8:07 AM   Drainage Amount none 2/22/2019  8:07 AM       Wound 02/17/19 0800 scrotum abrasion;MASD (moisture associated skin damage) (Active)   Dressing  Appearance open to air 2/22/2019  8:07 AM   Closure None 2/22/2019  8:07 AM   Base moist;pink 2/22/2019  8:07 AM   Periwound moist;pink;redness;blanchable 2/22/2019  8:07 AM   Care, Wound cleansed with 2/22/2019  8:07 AM         WOUND DEBRIDEMENT                  Therapy Treatment    Rehabilitation Treatment Summary     Row Name 02/22/19 1105             Treatment Time/Intention    Discipline  physical therapist  -MW      Document Type  wound care;therapy note (daily note)  -MW      Subjective Information  no complaints requested bedpan after tx   -MW      Mode of Treatment  physical therapy;individual therapy  -MW      Patient/Family Observations  pt resting in bed; family at bedside   -MW      Care Plan Review  care plan/treatment goals reviewed;patient/other agree to care plan  -MW      Comment  PCT assisting with rolling   -MW      Recorded by [MW] Miranda James, PT 02/22/19 1142      Row Name 02/22/19 1109             Cognitive Assessment/Intervention- PT/OT    Affect/Mental Status (Cognitive)  low arousal/lethargic;WFL  -MW      Orientation Status (Cognition)  oriented to;person;situation  -MW      Follows Commands (Cognition)  follows one step commands;75-90% accuracy  -MW      Recorded by [MW] Miranda James, PT 02/22/19 1142      Row Name 02/22/19 1100             Bed Mobility Assessment/Treatment    Rolling Left Crawford (Bed Mobility)  maximum assist (25% patient effort);2 person assist  -MW      Rolling Right Crawford (Bed Mobility)  maximum assist (25% patient effort);2 person assist  -MW      Assistive Device (Bed Mobility)  draw sheet;bed rails  -MW      Recorded by [MW] Miranda James, PT 02/22/19 1142      Row Name 02/22/19 1107             Positioning and Restraints    Pre-Treatment Position  in bed  -MW      Post Treatment Position  bed  -MW      In Bed  call light within reach pt on bedpan; PCT and RN aware   -MW      Recorded by [MW] Miranda James, PT 02/22/19 1142      Row  Name 02/22/19 1105             Pain Scale: FACES Pre/Post-Treatment    Pain: FACES Scale, Pretreatment  2-->hurts little bit  -MW      Pain: FACES Scale, Post-Treatment  2-->hurts little bit  -MW      Pre/Post Treatment Pain Comment  increased discomfort with rolling   -MW      Recorded by [MW] Miranda James, PT 02/22/19 1142      Row Name                Wound 02/08/19 1519 Other (See comments) neck incision    Wound - Properties Group Date first assessed: 02/08/19 [SM] Time first assessed: 1519 [SM] Side: Other (See comments) [SM] Location: neck [SM] Type: incision [SM] Recorded by:  [SM] Rissa Rodriguez RN 02/08/19 1519    Row Name 02/22/19 1105             Wound 02/08/19 1800 Right lower gluteal pressure injury    Wound - Properties Group Date first assessed: 02/08/19 [AB] Time first assessed: 1800 [AB] Present On Admission : yes [AB] Side: Right [AB] Orientation: lower [AB] Location: gluteal [AB] Type: pressure injury [AB] Stage, Pressure Injury: deep tissue injury [AB] Additional Comments: bilateral gluteals [MR] Recorded by:  [AB] Addison Jasso RN 02/08/19 1855 [MR] Carmen Conner, RN 02/09/19 1227    Dressing Appearance  open to air  -MW      Base  clean;dry;pink  -MW      Periwound  intact;dry;pink;blanchable  -MW      Drainage Amount  none  -MW      Care, Wound  irrigated with;sterile normal saline;ultrasound therapy, non contact low frequency 2.5 min MIST   -MW      Dressing Care, Wound  open to air venolex   -MW      Periwound Care, Wound  barrier ointment applied  -MW      Recorded by [MW] Miranda James, PT 02/22/19 1142      Row Name                Wound 02/09/19 1116 Bilateral medial thigh    Wound - Properties Group Date first assessed: 02/09/19 [MR] Time first assessed: 1116 [MR] Present On Admission : yes [MR] Side: Bilateral [MR] Orientation: medial [MR] Location: thigh [MR] Stage, Pressure Injury: deep tissue injury [MR] Recorded by:  [MR] Carmen Conner, RN 02/09/19 1228    Row  Name 02/22/19 1105             Wound 02/13/19 0800 Left lower gluteal pressure injury    Wound - Properties Group Date first assessed: 02/13/19 [JM] Time first assessed: 0800 [JM] Side: Left [JM] Orientation: lower [JM] Location: gluteal [JM] Type: pressure injury [JM] Stage, Pressure Injury: deep tissue injury [JM] Recorded by:  [JM] Ivon Eason RN 02/13/19 1353    Dressing Appearance  open to air  -MW      Base  maroon/purple;pink;moist  -MW      Periwound  intact;pink  -MW      Drainage Amount  none  -MW      Care, Wound  irrigated with;sterile normal saline;ultrasound therapy, non contact low frequency MIST x 5 min i  -MW      Dressing Care, Wound  open to air  -MW      Periwound Care, Wound  barrier ointment applied Venolex   -MW      Recorded by [MW] Miranda James, PT 02/22/19 1142      Row Name                Wound 02/15/19 1800 Right upper chest puncture    Wound - Properties Group Date first assessed: 02/15/19 [CH] Time first assessed: 1800 [CH] Side: Right [CH] Orientation: upper [CH] Location: chest [CH] Type: puncture [CH] Additional Comments: s/p tunneled dialysis catheter [CH] Recorded by:  [CH] Taco Templeton RN 02/15/19 1822    Row Name                Wound 02/17/19 0800 scrotum abrasion;MASD (moisture associated skin damage)    Wound - Properties Group Date first assessed: 02/17/19 [CP] Time first assessed: 0800 [CP] Present On Admission : no [CP] Location: scrotum [CP] Type: abrasion;MASD (moisture associated skin damage) [CP] Recorded by:  [CP] Janine Juan RN 02/17/19 0953    Row Name 02/22/19 1105             Coping    Observed Emotional State  calm;cooperative  -MW      Verbalized Emotional State  acceptance  -MW      Recorded by [MW] Miranda James, PT 02/22/19 1142        User Key  (r) = Recorded By, (t) = Taken By, (c) = Cosigned By    Initials Name Effective Dates Discipline    MW Miranda James, PT 02/05/19 -  PT    CH Taco Templeton RN 06/16/16 -  DIALYSIS USER     Rissa Bolden RN 06/16/16 -  Nurse    MR Avinaon, Peg, RN 06/16/16 -  Nurse    Ivon Gutierrez RN 06/16/16 -  Nurse    Janine Pace RN 06/16/16 -  Nurse    Addison Jaffe RN 02/02/17 -  Nurse          Physical Therapy Education     Title: PT OT SLP Therapies (In Progress)     Topic: Physical Therapy (In Progress)     Point: Mobility training (In Progress)     Learning Progress Summary           Patient Acceptance, E,D, NR by  at 2/21/2019 11:44 AM    Acceptance, E, NR by AS at 2/19/2019  1:32 PM    Acceptance, E,D, NR by LS at 2/15/2019  3:41 PM    Acceptance, E,D, NR by LS at 2/14/2019  2:10 PM    Acceptance, E,D, NR by LS at 2/12/2019  9:59 AM   Significant Other Acceptance, E,D, NR by  at 2/21/2019 11:44 AM    Acceptance, E,D, NR by LS at 2/15/2019  3:41 PM    Acceptance, E,D, NR by LS at 2/14/2019  2:10 PM    Acceptance, E,D, NR by LS at 2/12/2019  9:59 AM                   Point: Home exercise program (In Progress)     Learning Progress Summary           Patient Acceptance, E,D, NR by  at 2/21/2019 11:44 AM    Acceptance, E, NR by AS at 2/19/2019  1:32 PM    Acceptance, E,D, NR by LS at 2/15/2019  3:41 PM    Acceptance, E,D, NR by LS at 2/14/2019  2:10 PM   Significant Other Acceptance, E,D, NR by  at 2/21/2019 11:44 AM    Acceptance, E,D, NR by LS at 2/15/2019  3:41 PM    Acceptance, E,D, NR by LS at 2/14/2019  2:10 PM                   Point: Body mechanics (In Progress)     Learning Progress Summary           Patient Acceptance, E,D, NR by  at 2/21/2019 11:44 AM    Acceptance, E, NR by AS at 2/19/2019  1:32 PM    Acceptance, E,D, NR by LS at 2/15/2019  3:41 PM    Acceptance, E,D, NR by LS at 2/14/2019  2:10 PM    Acceptance, E,D, NR by LS at 2/12/2019  9:59 AM   Significant Other Acceptance, E,D, NR by TREVIN at 2/21/2019 11:44 AM    Acceptance, E,D, NR by LS at 2/15/2019  3:41 PM    Acceptance, E,D, NR by LS at 2/14/2019  2:10 PM    Acceptance, E,D, NR by LS at 2/12/2019   9:59 AM                   Point: Precautions (In Progress)     Learning Progress Summary           Patient Acceptance, E,D, NR by  at 2/21/2019 11:44 AM    Acceptance, E, NR by AS at 2/19/2019  1:32 PM    Acceptance, E,D, NR by LS at 2/15/2019  3:41 PM    Acceptance, E,D, NR by LS at 2/14/2019  2:10 PM    Acceptance, E,D, NR by LS at 2/12/2019  9:59 AM   Significant Other Acceptance, E,D, NR by  at 2/21/2019 11:44 AM    Acceptance, E,D, NR by LS at 2/15/2019  3:41 PM    Acceptance, E,D, NR by LS at 2/14/2019  2:10 PM    Acceptance, E,D, NR by LS at 2/12/2019  9:59 AM                               User Key     Initials Effective Dates Name Provider Type Discipline     06/19/15 -  Raquel Rico, PT Physical Therapist PT    AS 06/22/15 -  Eve Mercado, PTA Physical Therapy Assistant PT     06/19/15 -  Ana Akers, PT Physical Therapist PT                  PT Recommendation and Plan  Anticipated Discharge Disposition (PT): skilled nursing facility  Planned Therapy Interventions (PT Eval): balance training, bed mobility training, gait training, home exercise program, stair training, strengthening, transfer training, patient/family education  Therapy Frequency (PT Clinical Impression): daily        Progress: improving      Progress: improving  Outcome Summary: RT IT area DTPI now pink and partially blanchable; LT IT DTPI remains maroon/ purple/ red. Cont MIST LT IT DTPI; MWF. COnt Venolex to protect area.   Plan of Care Reviewed With: patient    Outcome Measures     Row Name 02/21/19 1517 02/21/19 1031 02/20/19 1507       How much help from another person do you currently need...    Turning from your back to your side while in flat bed without using bedrails?  --  1  -SJ  --    Moving from lying on back to sitting on the side of a flat bed without bedrails?  --  1  -SJ  --    Moving to and from a bed to a chair (including a wheelchair)?  --  1  -SJ  --    Standing up from a chair using your arms  (e.g., wheelchair, bedside chair)?  --  1  -SJ  --    Climbing 3-5 steps with a railing?  --  1  -SJ  --    To walk in hospital room?  --  1  -SJ  --    AM-PAC 6 Clicks Score  --  6  -SJ  --       How much help from another is currently needed...    Putting on and taking off regular lower body clothing?  1  -KF  --  1  -KF    Bathing (including washing, rinsing, and drying)  1  -KF  --  1  -KF    Toileting (which includes using toilet bed pan or urinal)  1  -KF  --  1  -KF    Putting on and taking off regular upper body clothing  2  -KF  --  2  -KF    Taking care of personal grooming (such as brushing teeth)  2  -KF  --  2  -KF    Eating meals  3  -KF  --  2  -KF    Score  10  -KF  --  9  -KF       Functional Assessment    Outcome Measure Options  AM-PAC 6 Clicks Daily Activity (OT)  -KF  AM-PAC 6 Clicks Basic Mobility (PT)  -  AM-[facility] 6 Clicks Daily Activity (OT)  -    Row Name 02/19/19 1300             How much help from another person do you currently need...    Turning from your back to your side while in flat bed without using bedrails?  2  -AS      Moving from lying on back to sitting on the side of a flat bed without bedrails?  2  -AS      Moving to and from a bed to a chair (including a wheelchair)?  1  -AS      Standing up from a chair using your arms (e.g., wheelchair, bedside chair)?  1  -AS      Climbing 3-5 steps with a railing?  1  -AS      To walk in hospital room?  1  -AS      AM-PAC 6 Clicks Score  8  -AS         Functional Assessment    Outcome Measure Options  -[facility] 6 Clicks Basic Mobility (PT)  -AS        User Key  (r) = Recorded By, (t) = Taken By, (c) = Cosigned By    Initials Name Provider Type    Raquel Mccracken, PT Physical Therapist    AS Eve Mercado, PTA Physical Therapy Assistant    KF Kaye Lyon, OT Occupational Therapist              Time Calculation  PT Charges     Row Name 02/22/19 1144             Time Calculation    Start Time  1105  -MW        User Key  (r)  = Recorded By, (t) = Taken By, (c) = Cosigned By    Initials Name Provider Type    Miranda Delgadillo, PT Physical Therapist         Therapy Suggested Charges     Code   Minutes Charges    66064 (CPT®) Hc Pt Neuromusc Re Education Ea 15 Min      90295 (CPT®) Hc Pt Ther Proc Ea 15 Min      47009 (CPT®) Hc Gait Training Ea 15 Min      94409 (CPT®) Hc Pt Therapeutic Act Ea 15 Min 31 2    20812 (CPT®) Hc Pt Manual Therapy Ea 15 Min      28973 (CPT®) Hc Pt Iontophoresis Ea 15 Min      01958 (CPT®) Hc Pt Elec Stim Ea-Per 15 Min      55702 (CPT®) Hc Pt Ultrasound Ea 15 Min      54148 (CPT®) Hc Pt Self Care/Mgmt/Train Ea 15 Min      17544 (CPT®) Hc Pt Prosthetic (S) Train Initial Encounter, Each 15 Min      17668 (CPT®) Hc Pt Orthotic(S)/Prosthetic(S) Encounter, Each 15 Min      43603 (CPT®) Hc Orthotic(S) Mgmt/Train Initial Encounter, Each 15min      Total  31 2          Therapy Charges for Today     Code Description Service Date Service Provider Modifiers Qty    69793041992 HC PT NLFU MIST 2/22/2019 Miranda James, PT GP 1            PT G-Codes  Outcome Measure Options: AM-PAC 6 Clicks Daily Activity (OT)  AM-PAC 6 Clicks Score: 6  Score: 10        Miranda James, PT  2/22/2019

## 2019-02-22 NOTE — PROGRESS NOTES
Patient's EMG/NCV demonstrated findings consistent with polyneuropathy of critical illness. Neurology has been consulted.   We will discontinue daily visits, but can see the patient as needed.

## 2019-02-22 NOTE — CONSULTS
Neurology    Referring provider:   Arielle Carolina MD  [address] [place] [address]    Reason for Consultation: Critical illness neuropathy    Chief complaint: Knee pain and weakness    History of present illness: 68-year-old man seen at the request of Dr. Carolina for evaluation of neuropathy.    Patient has been hospitalized since the eighth for methicillin sensitive Streptococcus septicemia associated with urosepsis.    He apparently was in the ICU for a number of days and was clearly critically ill.    He has comorbidities which include spinal stenosis which causes pain with walking, pre-existing foot drop on the left with peroneal neuropathy.    From September to December he had a 50 pound weight loss from with this issue 350-300 pounds.    With this illness he is continued to lose weight.    He has recently been diagnosed with diabetes which triggered his desire to lose weight.    Had no difficulty swallowing or any episodes of double vision.            Review of Systems: All systems reviewed and other than the present illness is unremarkable    Home meds:   Medications Prior to Admission   Medication Sig Dispense Refill Last Dose   • amLODIPine (NORVASC) 5 MG tablet Take 1 tablet by mouth Daily. 90 tablet 2 1/14/2019   • aspirin 81 MG chewable tablet Chew 81 mg Daily.      • DULoxetine (CYMBALTA) 60 MG capsule Take 60 mg by mouth Daily.   1/14/2019   • fluticasone (FLONASE) 50 MCG/ACT nasal spray USE 2 SPRAYS IN EACH NOSTRIL DAILY PRN  0 More than a month   • gabapentin (NEURONTIN) 600 MG tablet Take 1 tablet by mouth 4 (Four) Times a Day. 120 tablet 5 1/14/2019   • HYDROcodone-acetaminophen (NORCO) 7.5-325 MG per tablet Take 1 tablet by mouth Every 4-6  Hours As Needed for Moderate Pain. 40 tablet 0    • ibuprofen (ADVIL,MOTRIN) 800 MG tablet Take 1 tablet by mouth 3 (Three) Times a Day As Needed for Moderate Pain .      • lisinopril-hydrochlorothiazide (PRINZIDE,ZESTORETIC) 20-12.5 MG  "per tablet Take 1 tablet by mouth Daily. 90 tablet 2 1/14/2019   • metFORMIN (GLUCOPHAGE) 500 MG tablet Take 500 mg by mouth 2 (Two) Times a Day With Meals.   1/14/2019       History  Past Medical History:   Diagnosis Date   • Anxiety and depression    • Cancer (CMS/HCC)    • Chest pain    • Coronary artery disease    • Diabetes mellitus (CMS/HCC)    • Dyslipidemia    • Extremity pain    • Heart disease    • History of transfusion    • Hypertension    • Low back pain    • TANIYA on CPAP     2-3    • Osteoarthritis     Diffuse osteoarthritis.   • Panic attacks    • Prostate cancer (CMS/HCC)    ,   Past Surgical History:   Procedure Laterality Date   • CARDIAC CATHETERIZATION     • CHOLECYSTECTOMY     • COLONOSCOPY  2015   • CORONARY ANGIOPLASTY WITH STENT PLACEMENT  12/2012 December 2012:  A 3.5 x 88 Promus VERONICA stent to OM2.  Nonobstructive disease.  Otherwise normal LVEF.   • EYE SURGERY Bilateral     cataract   • INSERTION HEMODIALYSIS CATHETER N/A 2/8/2019    Procedure: HEMODIALYSIS CATHETER INSERTION RIGHT INTERNAL JUGULAR;  Surgeon: Bishnu Dailey MD;  Location:  [facility];  Service: General   • PROSTATECTOMY N/A 1/15/2019    Procedure: ROBOTIC PROSTATECTOMY WITH NODES;  Surgeon: Juanito Grace Jr., MD;  Location:  [facility];  Service: DaVinci   • RETINAL DETACHMENT SURGERY Right    ,   Family History   Problem Relation Age of Onset   • Heart disease Mother    • Thyroid disease Mother    • Heart disease Father    • Cancer Brother    ,   Social History     Tobacco Use   • Smoking status: Former Smoker   • Smokeless tobacco: Former User   Substance Use Topics   • Alcohol use: No   • Drug use: No    and Allergies:  Percocet [oxycodone-acetaminophen] and Sulfa antibiotics,    Vital Signs   Blood pressure 159/68, pulse 73, temperature 99.6 °F (37.6 °C), temperature source Oral, resp. rate 18, height 170.2 cm (67.01\"), weight 117 kg (257 lb), SpO2 96 %.  Body mass index is 40.24 kg/m².    Physical " Exam:   General: Morbidly male              Head: No signs of trauma              Neck: No bruit              Resp: Normal breath sounds               Cor: Rhythm              Extr: 1+ edema              Skin: Warm and dry              Neuro: Mentally alert and oriented with normal memory attention and concentration.    Speech is articulate with no word finding problems.    Find fibula movements are done slowly but accurately.    Cranial nerves show benign fundi full eye movements normal pupils.    Facial movement and sensation are normal.    Palate elevates normally tongue protrudes normally.    Reflexes are 1+ and equal in the upper extremities.    The patient declined to be tapped on the knees because of pain.    Motor testing shows 4-/5 strength in all muscles in the upper extremity.    Lower extremities showed no strength in his hip extensors.    He can hold his knees up against gravity but winces in pain.    He has a mild bilateral foot drop weakness of the extensors of the toe on both sides.  He has decreased sensation to light touch and vibration in his hands and feet.    Additionally he has decreased sensation on the dorsum of his foot bilaterally compared to the sole of his foot.        Results Review: EMG nerve conduction study shows severe generalized axonal neuropathy    Labs:  Lab Results (last 72 hours)     Procedure Component Value Units Date/Time    POC Glucose Once [[id number]]  (Normal) Collected:  02/22/19 1656    Specimen:  Blood Updated:  02/22/19 1658     Glucose 130 mg/dL     Blood Culture - Blood, Hand, Right [[id number]] Collected:  02/18/19 1125    Specimen:  Blood from Hand, Right Updated:  02/22/19 1245     Blood Culture No growth at 4 days    Blood Culture - Blood, Arm, Right [[id number]] Collected:  02/18/19 1127    Specimen:  Blood from Arm, Right Updated:  02/22/19 1245     Blood Culture No growth at 4 days    POC Glucose Once [[id number]]  (Normal) Collected:  02/22/19 1109    Specimen:   Blood Updated:  02/22/19 1117     Glucose 126 mg/dL     POC Glucose Once [[id number]]  (Abnormal) Collected:  02/22/19 0734    Specimen:  Blood Updated:  02/22/19 0736     Glucose 182 mg/dL     Basic Metabolic Panel [[id number]]  (Abnormal) Collected:  02/22/19 0442    Specimen:  Blood Updated:  02/22/19 0619     Glucose 106 mg/dL      BUN 23 mg/dL      Creatinine 0.74 mg/dL      Sodium 138 mmol/L      Potassium 3.3 mmol/L      Chloride 109 mmol/L      CO2 23.0 mmol/L      Calcium 8.1 mg/dL      eGFR Non African Amer 105 mL/min/1.73      BUN/Creatinine Ratio 31.1     Anion Gap 6.0 mmol/L     Narrative:       [employer] Guidelines    Stage     Description        GFR  1         Normal or High     90+  2         Mild decrease      60-89  3         Moderate decrease  30-59  4         Severe decrease    15-29  5         Kidney failure     <15    The MDRD GFR formula is only valid for adults with stable renal function between ages 18 and 70.    CBC (No Diff) [[id number]]  (Abnormal) Collected:  02/22/19 0442    Specimen:  Blood Updated:  02/22/19 0602     WBC 7.14 10*3/mm3      RBC 3.28 10*6/mm3      Hemoglobin 8.5 g/dL      Hematocrit 27.7 %      MCV 84.5 fL      MCH 25.9 pg      MCHC 30.7 g/dL      RDW 16.3 %      RDW-SD 50.1 fl      MPV 9.4 fL      Platelets 343 10*3/mm3     POC Glucose Once [[id number]]  (Normal) Collected:  02/21/19 2010    Specimen:  Blood Updated:  02/21/19 2013     Glucose 128 mg/dL     POC Glucose Once [[id number]]  (Abnormal) Collected:  02/21/19 1652    Specimen:  Blood Updated:  02/21/19 1656     Glucose 177 mg/dL     POC Glucose Once [[id number]]  (Normal) Collected:  02/21/19 1207    Specimen:  Blood Updated:  02/21/19 1215     Glucose 114 mg/dL     Blood Gas, Arterial With Co-Ox [[id number]]  (Abnormal) Collected:  02/21/19 1100    Specimen:  Arterial Blood Updated:  02/21/19 1105     Site Left Radial     Sam's Test N/A     pH, Arterial 7.536 pH units      Comment: 83 Value  above reference range        pCO2, Arterial 28.3 mm Hg      Comment: 84 Value below reference range        pO2, Arterial 69.0 mm Hg      Comment: 84 Value below reference range        HCO3, Arterial 23.9 mmol/L      Base Excess, Arterial 1.7 mmol/L      Hemoglobin, Blood Gas 9.2 g/dL      Comment: 84 Value below reference range        Hematocrit, Blood Gas 28.3 %      Oxyhemoglobin 93.4 %      Comment: 84 Value below reference range        Methemoglobin 1.00 %      Carboxyhemoglobin 1.5 %      CO2 Content 24.8 mmol/L      Temperature 37.0 C      Barometric Pressure for Blood Gas -- mmHg      Comment: N/A        Modality Room Air     FIO2 21 %      Ventilator Mode        Comment: Meter: [id number]     :  [id number]        Note --     pH, Temp Corrected 7.536 pH Units      pCO2, Temperature Corrected 28.3 mm Hg      pO2, Temperature Corrected 69.0 mm Hg     POC Glucose Once [[id number]]  (Abnormal) Collected:  02/21/19 0702    Specimen:  Blood Updated:  02/21/19 0703     Glucose 173 mg/dL     POC Glucose Once [[id number]]  (Normal) Collected:  02/20/19 2102    Specimen:  Blood Updated:  02/20/19 2104     Glucose 118 mg/dL     POC Glucose Once [[id number]]  (Normal) Collected:  02/20/19 1639    Specimen:  Blood Updated:  02/20/19 1702     Glucose 122 mg/dL     POC Glucose Once [[id number]]  (Abnormal) Collected:  02/20/19 1204    Specimen:  Blood Updated:  02/20/19 1214     Glucose 151 mg/dL     POC Glucose Once [[id number]]  (Abnormal) Collected:  02/20/19 0715    Specimen:  Blood Updated:  02/20/19 0726     Glucose 133 mg/dL     POC Glucose Once [[id number]]  (Abnormal) Collected:  02/19/19 2025    Specimen:  Blood Updated:  02/19/19 2031     Glucose 149 mg/dL           Rads:  Imaging Results (last 72 hours)     ** No results found for the last 72 hours. **            Assessment: Mixed peripheral neuropathy severe.  Clear-cut history supporting nutritional neuropathy and critical illness neuropathy.   Questionable diabetic neuropathy underlying this.    History of the left foot drop, presumably peroneal neuropathy.    Degenerative arthritis both knees.    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    History of spinal stenosis       Plan:    High potency B vitamins daily.    Trial of diclofenac gel on his knees 4 times daily as needed.    Resume gabapentin 200 mg morning and 400 mg at bedtime.        Comment:   The diclofenac does not help his knee pain we can consider lidocaine patches to his knees to facilitate his ambulation and muscle rebuilding.    I discussed the patients findings and my recommendations with patient and family      Luiz Walker MD  02/22/19  6:49 PM

## 2019-02-22 NOTE — PLAN OF CARE
Problem: Patient Care Overview  Goal: Plan of Care Review  Outcome: Ongoing (interventions implemented as appropriate)   02/22/19 1142   Coping/Psychosocial   Plan of Care Reviewed With patient   Plan of Care Review   Progress improving   OTHER   Outcome Summary RT IT area DTPI now pink and partially blanchable; LT IT DTPI remains maroon/ purple/ red. Cont MIST LT IT DTPI; MWF. COnt Venolex to protect area.

## 2019-02-22 NOTE — PROGRESS NOTES
Adult Nutrition  Assessment/PES    Patient Name:  Rod Balderas  YOB: 1950  MRN: [id number]  Admit Date:  2/8/2019    Assessment Date:  2/22/2019        Reason for Assessment     Row Name 02/22/19 1520          Reason for Assessment    Reason For Assessment  follow-up protocol     Diagnosis  other (see comments) Per notes this admission             Labs/Tests/Procedures/Meds     Row Name 02/22/19 1520          Labs/Procedures/Meds    Lab Results Reviewed  reviewed        Diagnostic Tests/Procedures    Diagnostic Test/Procedure Reviewed  reviewed        Medications    Pertinent Medications Reviewed  reviewed             Nutrition Prescription Ordered     Row Name 02/22/19 1521          Nutrition Prescription PO    Current PO Diet  Regular     Fluid Consistency  Thin     Supplement  Boost Glucose Control (Glucerna Shake)     Supplement Frequency  3 times a day     Common Modifiers  Consistent Carbohydrate     Other Modifiers  Other (comment) Fluid restriction 2000 mL/day         Evaluation of Received Nutrient/Fluid Intake     Row Name 02/22/19 1522          PO Evaluation    Number of Meals  7     % PO Intake  46               Problem/Interventions:  Problem 1     Row Name 02/22/19 1523          Nutrition Diagnoses Problem 1    Problem 1  Inadequate Intake/Infusion     Etiology (related to)  MNT for Treatment/Condition     Signs/Symptoms (evidenced by)  PO Intake     Percent (%) intake recorded  46 %     Over number of meals  7                 Intervention Goal     Row Name 02/22/19 1523          Intervention Goal    General  Nutrition support treatment     PO  Increase intake         Nutrition Intervention     Row Name 02/22/19 1523          Nutrition Intervention    RD/Tech Action  Follow Tx progress;Care plan reviewd;Interview for preference;Encourage intake Pt states he has been receiving supplements ordered and CA's are taking meal orders.            Education/Evaluation     Row Name 02/22/19 1526           Monitor/Evaluation    Monitor  Per protocol;PO intake;Supplement intake;Pertinent labs;Weight           Electronically signed by:  Raquel Jauregui RD  02/22/19 3:25 PM   25 min

## 2019-02-23 LAB
BACTERIA SPEC AEROBE CULT: NORMAL
BACTERIA SPEC AEROBE CULT: NORMAL
GLUCOSE BLDC GLUCOMTR-MCNC: 124 MG/DL (ref 70–130)
GLUCOSE BLDC GLUCOMTR-MCNC: 128 MG/DL (ref 70–130)
GLUCOSE BLDC GLUCOMTR-MCNC: 135 MG/DL (ref 70–130)
GLUCOSE BLDC GLUCOMTR-MCNC: 147 MG/DL (ref 70–130)
POTASSIUM BLD-SCNC: 3.8 MMOL/L (ref 3.5–5.5)

## 2019-02-23 PROCEDURE — 84132 ASSAY OF SERUM POTASSIUM: CPT | Performed by: INTERNAL MEDICINE

## 2019-02-23 PROCEDURE — 82962 GLUCOSE BLOOD TEST: CPT

## 2019-02-23 PROCEDURE — 25010000003 CEFAZOLIN IN DEXTROSE 2-4 GM/100ML-% SOLUTION: Performed by: INTERNAL MEDICINE

## 2019-02-23 RX ORDER — GABAPENTIN 400 MG/1
400 CAPSULE ORAL NIGHTLY
Start: 2019-02-23 | End: 2019-03-21

## 2019-02-23 RX ORDER — FOLIC ACID/VIT B COMPLEX AND C 0.8 MG
1 TABLET ORAL DAILY
Qty: 30 TABLET
Start: 2019-02-24 | End: 2019-04-12

## 2019-02-23 RX ORDER — GABAPENTIN 100 MG/1
200 CAPSULE ORAL DAILY
Start: 2019-02-24 | End: 2019-03-21

## 2019-02-23 RX ADMIN — DULOXETINE HYDROCHLORIDE 60 MG: 60 CAPSULE, DELAYED RELEASE ORAL at 09:00

## 2019-02-23 RX ADMIN — SODIUM CHLORIDE, PRESERVATIVE FREE 10 ML: 5 INJECTION INTRAVENOUS at 09:02

## 2019-02-23 RX ADMIN — ACETAMINOPHEN 650 MG: 325 TABLET ORAL at 12:27

## 2019-02-23 RX ADMIN — FAMOTIDINE 20 MG: 20 TABLET, FILM COATED ORAL at 20:36

## 2019-02-23 RX ADMIN — DICLOFENAC 2 G: 10 GEL TOPICAL at 20:38

## 2019-02-23 RX ADMIN — GABAPENTIN 400 MG: 400 CAPSULE ORAL at 20:35

## 2019-02-23 RX ADMIN — DICLOFENAC 2 G: 10 GEL TOPICAL at 12:24

## 2019-02-23 RX ADMIN — SODIUM CHLORIDE, PRESERVATIVE FREE 10 ML: 5 INJECTION INTRAVENOUS at 09:00

## 2019-02-23 RX ADMIN — APIXABAN 5 MG: 5 TABLET, FILM COATED ORAL at 20:35

## 2019-02-23 RX ADMIN — DICLOFENAC 2 G: 10 GEL TOPICAL at 09:01

## 2019-02-23 RX ADMIN — CASTOR OIL AND BALSAM, PERU: 788; 87 OINTMENT TOPICAL at 09:01

## 2019-02-23 RX ADMIN — SODIUM CHLORIDE, PRESERVATIVE FREE 10 ML: 5 INJECTION INTRAVENOUS at 20:38

## 2019-02-23 RX ADMIN — DICLOFENAC 2 G: 10 GEL TOPICAL at 17:27

## 2019-02-23 RX ADMIN — METOPROLOL TARTRATE 25 MG: 25 TABLET ORAL at 20:35

## 2019-02-23 RX ADMIN — METOPROLOL TARTRATE 25 MG: 25 TABLET ORAL at 16:54

## 2019-02-23 RX ADMIN — GABAPENTIN 200 MG: 100 CAPSULE ORAL at 08:59

## 2019-02-23 RX ADMIN — AMLODIPINE BESYLATE 5 MG: 5 TABLET ORAL at 09:00

## 2019-02-23 RX ADMIN — METOPROLOL TARTRATE 25 MG: 25 TABLET ORAL at 09:00

## 2019-02-23 RX ADMIN — CASTOR OIL AND BALSAM, PERU: 788; 87 OINTMENT TOPICAL at 20:38

## 2019-02-23 RX ADMIN — CEFAZOLIN SODIUM 2 G: 2 INJECTION, SOLUTION INTRAVENOUS at 04:46

## 2019-02-23 RX ADMIN — Medication 1 TABLET: at 12:27

## 2019-02-23 RX ADMIN — CEFAZOLIN SODIUM 2 G: 2 INJECTION, SOLUTION INTRAVENOUS at 20:36

## 2019-02-23 RX ADMIN — FAMOTIDINE 20 MG: 20 TABLET, FILM COATED ORAL at 08:59

## 2019-02-23 RX ADMIN — DICLOFENAC 2 G: 10 GEL TOPICAL at 20:39

## 2019-02-23 RX ADMIN — CEFAZOLIN SODIUM 2 G: 2 INJECTION, SOLUTION INTRAVENOUS at 12:26

## 2019-02-23 RX ADMIN — APIXABAN 5 MG: 5 TABLET, FILM COATED ORAL at 09:00

## 2019-02-23 NOTE — PROGRESS NOTES
[facility] Progress Note      Antibiotics:  Anti-Infectives (From admission, onward)    Ordered     Dose/Rate Route Frequency Start Stop    02/22/19 1343  ceFAZolin in dextrose (ANCEF) 2-4 GM/100ML-% solution IVPB     Ordering Provider:  Arielle Carolina MD    2 g  over 30 Minutes Intravenous Every 8 Hours 02/22/19 0000 02/28/19 2359    02/14/19 1149  ceFAZolin in dextrose (ANCEF) IVPB solution 2 g     Jeff Smith MD reviewed the order on 02/20/19 0934.   Ordering Provider:  Jeff Smith MD    2 g  over 30 Minutes Intravenous Every 8 Hours 02/14/19 1300 02/28/19 0933    02/09/19 0849  aztreonam (AZACTAM) 2 g in sodium chloride 0.9 % 100 mL IVPB-MBP     Ordering Provider:  Efrem Santana MD    2 g  200 mL/hr over 30 Minutes Intravenous Once 02/09/19 1000 02/09/19 0946          CC: Patient unable    HPI  2/8/19: Patient is a 68 y.o.  Yr old male with history of prostate cancer status post robotic laparoscopic prostatectomy in mid January and was discharged on 1/17/19. The patient is currently unresponsive and no family is at bedside so history is somewhat limited to history from nursing staff and medical records. The patient was seen a few days after his discharge from his recent  Admission in January and his Christina catheter was discontinued.  About one week ago he went to the ER with complaints of back pain, abdominal pain, and confusion and a CT abdomen and pelvis was apparently okay during that time. He continued to worsen and over the weekend he was admitted to [facility].  He was found to have an initial AK I with a creatinine of 2.2. Per the micro-lab at [facility], the patient's urine cultures and 1 of 2 blood cultures from 2/5/19 grew MSSA. The patient was initially on vancomycin and Zosyn and was subsequently transitioned to daptomycin after he developed worsening renal function with a creatinine up to 5.6 and BUN went up to 94 today.  Due to lack of nephrology at  [facility], the patient was transferred to [facility] today for possible dialysis.  He additionally received a dose of nafcillin prior to this transfer.  His last dose of daptomycin was yesterday per pharmacist here (who has discussed with OSH). Since arrival, the patient has remained afebrile but he has had a leukocytosis up to 31.38.  He is anemic with a hemoglobin of 11.3 and a hematocrit of 54.1.  He is hyponatremic to a sodium of 127.  Creatinine was initially elevated 5.39 and B1 was 97 on arrival.  He additionally had elevation of his LFTs with an ALT of 43 and AST of 43, T bili of 3.0, and alkaline phosphatase of 303.  Pro-calcitonin was elevated to 7.55 lactic acid was normal at 0.8. The patient underwent CT head without contrast which did not show any acute abnormality, CT chest, abdomen and pelvis which showed consolidation of the lung bases bilaterally that is concerning for airspace disease such as pneumonia, gastric distention, and air-filled loops of colon with no obvious obstruction. Blood cultures have been ordered and a reflexive urinalysis has been ordered as well.  A transthoracic echocardiogram was ordered. The patient underwent dialysis today following right IJ dialysis catheter placement.  The infectious disease team has been consulted for antibiotic recommendations.    He has methicillin sensitive staph aureus septicemia/bacteriuria compounded by acute renal failure, acute hypoxic respiratory failure and by basilar consolidation by chest CT.  Further compounded by acute encephalopathy.    2/9/19 nursing reports A. fib/RVR, dialysis ongoing with acute renal failure and tachypneic/labored at times.  Some cough but unable to capture for culture so far. Empiric HCAP coverage added with zyvox/azactam/flagyl; unable to do MRI of spine so CT's done cervical/thoracic/lumbar spine done 2/10    2/10/19 CT scans of spine; more restful, less labored with breathing and  decreased cough;  No rash.  He can wiggle toes but not lift legs off the bed.  He moves left arm better than right arm.  No other new focal pain that he will relate although interaction minimal.  No diarrhea.  No vomiting.    2/11/19: seems to have improved from mental status standpoint slightly per his wife and son who are at bedside. Patient endorses abdominal pain but is not answering a lot of questions. tMAX 99.3f. WBC remains elevated at 20K (down from 31K on arrival). TTE was without evidence of IE but valves poorly visualized. Still getting HD.    2/12/19: Patient is starting to feel much better today.  He is responding to a lot of questions and is more alert sitting up in a bedside chair.  Breathing is improving.  No high fevers overnight.  Leukocytosis improved to 15.9 today.  He continues to urinate a small amount but remains on intermittent dialysis.    2/13/19: Feeling about the same as he was yesterday.  Still having some right wrist pain. Shortness of breath is stable. No fevers. Leukocytosis slightly worse today.    2/14/19: Patient is somnolent today after his KLAUDIA due to recent sedation. Procedure went well per his family and he is back in NSR and no signs of vegetation but procedure report is pending. No fevers. Leukocytosis slightly improved from yesterday. Family reports that the patient has had no new complaints. He was evaluated by Dr. Ro and no significant concern for septic arthritis.    2/16/19: I reviewed his complex medical records and discussed his complex situation with Dr. Jeff Smith.  His hemodialysis catheter was removed late yesterday.  He has been afebrile over the past 24 hours.  He denies abdominal pain.  He has a history of metal fragment between his frontal lobes according to his son.  This was apparently only recently identified on a CT scan prior to his admission here.  He underwent an MRI scan approximately 2 years ago without complications and the son indicates the  metal fragment would have been there at that time.  He is having difficulty moving his legs but this is a chronic problem according to his son.  He was able to ambulate at home with assistance.    2/17/19:  He had a low-grade fever to 100.2 last evening.  He still has significant bilateral lower extremity weakness but has a history of spinal stenosis and was weak prior to admission.  He was able to ambulate with assistance prior to admission.  He denies lower extremity numbness.  He denies nausea and vomiting.  He has some mild loose stool.    2/18/19: The patient continued to have low-grade fevers to 100.7 over the last 24 hours.  He does still have significant bilateral lower extremity weakness and is complaining about some back pain.  No numbness.  He is also having some right wrist pain still.  Having some mild abdominal pain.  No nausea or vomiting.    2/19/19: The patient continues to have significant lower extremity weakness.  No fevers overnight. No diarrhea or nausea.  Neurosurgery has evaluated the patient and he is not a good surgical candidate at this time but may need a CT myelogram in the coming days.    2/20/19: patient is somnolent this morning and not responsive to questions but his wife states that this is secondary to the patient recently receiving a dose of gabapentin. No fevers. Leukocytosis improving. Still very weak in lower extremities and having some weakness in bilateral upper extremities per his wife.    2/21/19: Patient is more alert today.  Still having significant weakness of his bilateral lower extremities.  Neurosurgery has ordered a nerve conduction study to be done at bedside today.  Case management is working on placement in rehabilitation.  No fevers.  He is tolerating the antibiotics well without any adverse side effects.  Denies any diarrhea or nausea.    2/23/19: the patient is doing okay today.  He feels like his weakness has improved somewhat over the last couple of days  "especially in his arms.  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  Dr. 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  Tmax over the last 24 hours was 100.0°F.  White blood cell count remains normal at 7.14.  Blood cultures from 2/18 remain no growth to date.  He denies any shortness of breath.  Only having an occasional loose bowel movement and no severe watery diarrhea.  No tenderness or redness at his PICC line site.    PE:   /76 (BP Location: Right arm, Patient Position: Lying)   Pulse 70   Temp 98.8 °F (37.1 °C) (Axillary)   Resp 18   Ht 170.2 cm (67.01\")   Wt 117 kg (257 lb)   SpO2 95%   BMI 40.24 kg/m²     GENERAL: ill appearing, obese,  man. NAD. On nightly nasal CPAP  HEENT: Normocephalic, atraumatic. No conjunctival injection. No icterus.   NECK: Supple without nuchal rigidity. No mass.   HEART: RRR. No murmur, rubs, gallops.  LUNGS: clear to auscultation anteriorly.  Currently on nasal CPAP. Non-labored breathing  ABDOMEN: Soft, nontender, nondistended. Positive bowel sounds. No rebound or guarding. NO mass or HSM.  EXT:  No cyanosis, clubbing. No cord.  Edema noted over bilateral lower extremities.   : deferred  SKIN:.  No new rash noted. Right upper extremity PICC line site is without any erythema or induration  NEURO: awake, alert, and oriented ×4.  Severe bilateral lower extremity weakness but able to move both feet still  Right upper extremity PICC line site is without any erythema    Laboratory Data    Results from last 7 days   Lab Units 02/22/19  0442 02/19/19  1435 02/17/19  0709   WBC 10*3/mm3 7.14 10.07 15.18*   HEMOGLOBIN g/dL 8.5* 9.4* 9.9*   HEMATOCRIT % 27.7* 30.4* 30.2*   PLATELETS 10*3/mm3 343 381 364     Results from last 7 days   Lab Units 02/22/19  0442   SODIUM mmol/L 138   POTASSIUM mmol/L 3.3*   CHLORIDE mmol/L 109   CO2 mmol/L " 23.0   BUN mg/dL 23   CREATININE mg/dL 0.74   GLUCOSE mg/dL 106*   CALCIUM mg/dL 8.1*     Results from last 7 days   Lab Units 02/17/19  0709   ALK PHOS U/L 178*   BILIRUBIN mg/dL 0.9   ALT (SGPT) U/L 10   AST (SGOT) U/L 18               Estimated Creatinine Clearance: 109.6 mL/min (by C-G formula based on SCr of 0.74 mg/dL).      Microbiology:  2/8/19 Urine culture: MSSA  2/9/19 sputum  Culture: Yeast isolated  2/8/19 blood culture ×2: No growth at 5 days  2/18/19 blood culture ×2: No growth to date    Radiology:  Imaging Results (last 72 hours)     Procedure Component Value Units Date/Time    XR Chest 1 View [[id number]] Updated:  02/09/19 0221    CT Abdomen Pelvis Without Contrast [[id number]] Collected:  02/08/19 1822     Updated:  02/08/19 1822    Narrative:       EXAMINATION: CT CHEST WO CONTRAST, CT ABDOMEN/PELVIS WO CONTRAST -  2/8/2019     INDICATION: Persistent cough.     TECHNIQUE: Multiple axial CT imaging is obtained of the chest, abdomen  and pelvis without the administration of oral or intravenous contrast.     The radiation dose reduction device was turned on for each scan per the  ALARA (As Low as Reasonably Achievable) protocol.     COMPARISON: There is some consolidation identified posteriorly extending  to the lung bases bilaterally suggesting infiltrates. The upper lung  fields are grossly clear. Motion artifact degrades image quality. There  are degenerative changes identified within the spine. The cardiac  chambers are enlarged. There is no pericardial effusion. No bulky hilar  or axillary lymphadenopathy. The thyroid is homogeneous. Degenerative  change is seen throughout the spine.     ABDOMEN: Motion artifact grades image quality. There is no abnormality  seen within the liver. The spleen is upper limits of normal in size.  There is gastric distention. Air fills the colon with no  evidence of  obvious obstruction. Kidneys and adrenal glands within normal limits.  The pancreas is homogeneous.  No abdominal or retroperitoneal  lymphadenopathy. No abnormal mass or fluid collections identified. No  free fluid or free air.     PELVIS: Pelvic organs are unremarkable. The pelvic portion of the   gastrointestinal tract is within normal limits. No pelvic adenopathy.  Degenerative change is identified throughout the spine and pelvis.     FINDINGS: Consolidation at the lung bases bilaterally is concerning for  airspace disease such as pneumonia. The there is gastric distention.  Air-filled loops of colon with no obvious obstruction. No other abnormal  findings are seen within the abdomen or pelvis. Degenerative changes are  seen throughout the spine and pelvis.     DICTATED:   2/8/2019  EDITED/ls :   2/8/2019              Impression:               CT Chest Without Contrast [[id number]] Collected:  02/08/19 1822     Updated:  02/08/19 1822    Narrative:       EXAMINATION: CT CHEST WO CONTRAST, CT ABDOMEN/PELVIS WO CONTRAST -  2/8/2019     INDICATION: Persistent cough.     TECHNIQUE: Multiple axial CT imaging is obtained of the chest, abdomen  and pelvis without the administration of oral or intravenous contrast.     The radiation dose reduction device was turned on for each scan per the  ALARA (As Low as Reasonably Achievable) protocol.     COMPARISON: There is some consolidation identified posteriorly extending  to the lung bases bilaterally suggesting infiltrates. The upper lung  fields are grossly clear. Motion artifact degrades image quality. There  are degenerative changes identified within the spine. The cardiac  chambers are enlarged. There is no pericardial effusion. No bulky hilar  or axillary lymphadenopathy. The thyroid is homogeneous. Degenerative  change is seen throughout the spine.     ABDOMEN: Motion artifact grades image quality. There is no abnormality  seen within the liver. The spleen is upper limits of normal in size.  There is gastric distention. Air fills the colon with no  evidence of  obvious  obstruction. Kidneys and adrenal glands within normal limits.  The pancreas is homogeneous. No abdominal or retroperitoneal  lymphadenopathy. No abnormal mass or fluid collections identified. No  free fluid or free air.     PELVIS: Pelvic organs are unremarkable. The pelvic portion of the   gastrointestinal tract is within normal limits. No pelvic adenopathy.  Degenerative change is identified throughout the spine and pelvis.     FINDINGS: Consolidation at the lung bases bilaterally is concerning for  airspace disease such as pneumonia. The there is gastric distention.  Air-filled loops of colon with no obvious obstruction. No other abnormal  findings are seen within the abdomen or pelvis. Degenerative changes are  seen throughout the spine and pelvis.     DICTATED:   2/8/2019  EDITED/ls :   2/8/2019              Impression:               CT Head Without Contrast [[id number]] Collected:  02/08/19 1816     Updated:  02/08/19 1817    Narrative:       EXAMINATION: CT HEAD WO CONTRAST - 2/8/2019     INDICATION: Mental status change, does not follow commands.     TECHNIQUE: Multiple axial CT imaging is obtained of the head from skull  base to skull vertex without the administration of intravenous contrast.     The radiation dose reduction device was turned on for each scan per the  ALARA (As Low as Reasonably Achievable) protocol.     COMPARISON: NONE     FINDINGS: There is atrophy of the brain. Some low-density area seen in  the periventricular white matter suggesting chronic small vessel  ischemic change. No hemorrhage or hydrocephalus. No mass, mass effect,  or midline shift. No abnormal extra-axial  fluid collections identified. Minimal mucosal thickening of the  maxillary sinuses and ethmoid air cells. The bony structures are  unremarkable. The mastoid air cells are patent.       Impression:       Chronic changes identified within the brain with no acute  intracranial abnormality.     DICTATED:   2/8/2019  EDITED/ls  :   2/8/2019        FL C Arm During Surgery [[id number]] Collected:  02/08/19 1646     Updated:  02/08/19 1646    Narrative:       EXAMINATION: FL C ARM DURING SURGERY- 02/08/2019      INDICATION: Dialysis catheter placement     COMPARISON: NONE     FINDINGS: 4 seconds of fluoroscopy and 4 images used for right-sided  dialysis catheter placement. Please see the procedure report for full  details.       Impression:       Fluoroscopy for dialysis catheter placement. Please see the  procedure report for full details.      D:  02/08/2019  E:  02/08/2019       XR Chest 1 View [[id number]] Collected:  02/08/19 1628     Updated:  02/08/19 1628    Narrative:       EXAMINATION: XR CHEST 1 VW-02/08/2019:      INDICATION: RIJ line.      COMPARISON: 02/08/2019.     FINDINGS: Portable chest reveals low lung volumes. Deep line catheter  identified on the right with tip in the SVC. Degenerative changes seen  within the spine. The heart is borderline enlarged. Mild prominence seen  of the pulmonary vascularity. No pleural effusion or pneumothorax.           Impression:       Prominence of the pulmonary vascularity with deep line  catheter identified on the right with tip in the SVC. No evidence of  acute parenchymal disease.     D:  02/08/2019  E:  02/08/2019             XR Chest 1 View [[id number]] Collected:  02/08/19 1243     Updated:  02/08/19 1246    Narrative:          EXAMINATION: XR CHEST 1 VW-      INDICATION: respiratory failure      COMPARISON: 01/08/2019     FINDINGS: Heart is borderline enlarged. The vasculature is cephalized.  Lung volumes are relatively low. There is mild discoid atelectasis in  both medial lung bases, but no focal disease elsewhere..           Impression:       Low lung volumes, borderline cardiomegaly and mild pulmonary  vascular congestion. Mild bibasilar discoid atelectasis, new from prior  study.     This report was finalized on 2/8/2019 12:44 PM by DR. Antione Damon MD.           KLAUDIA:  Interpretation  Summary     · Estimated EF appears to be in the range of 61 - 65%.  · Left ventricular systolic function is normal.  · Moderate aortic valve regurgitation is present.  · Mild tricuspid valve regurgitation is present.  · Mild mitral valve regurgitation is present  · No evidence of a left atrial appendage thrombus was present.   3         Impression:   1.  MSSA bacteremia/septic shock-with associated MSSA bacteriuria following recent prostate surgery.  Although MSSA bacteriuria is generally a reflection of the bacteremia rather than the cause of the bacteremia, and his situation the MSSA bacteremia may have been a result of MSSA UTI.  He has underlying valvular heart disease but his KLAUDIA did not reveal any definite evidence of endocarditis.  He is still at high risk for early endocarditis without a definite vegetation.  He will need a very prolonged course of intravenous antibiotic therapy. Repeat blood cultures from 2/18/19 are no growth to date. Leukocytosis improved. Did have a low-grade temperature in the last 24 hours but no persistent fevers.  2.  Septic shock-improved  3.  Acute hypoxic respiratory failure-improved  4.  Acute renal failure/ATN-improved  5.  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 Less concerned about spinal   6.  Elevated liver enzymes  7.  MSSA UTI  8.  Right wrist swelling-observe  9.  Valvular heart disease-with moderate aortic regurgitation and mild mitral regurgitation  10.  Prostate cancer-status post robotic prostatectomy on 1/17/19  11.  Atrial fibrillation  12.  History of spinal stenosis      Recommendations:    UM/KEN:  1.  Continue cefazolin 2g IV q8h. Anticipated stop date is 3/22/19 (6 weeks total)   2.  He will need weekly cbc with diff, bmp, esr, crp while on IV antibiotics. These labs will need to be faxed to [phone]  3. S/p PICC line placement. He will need weekly PICC line dressing changes  4. He will need rehab after  discharge and [facility] may be a good place to have this done. If he does go to [facility], then one of my partners could follow him there. Case management is looking into this.  5. I will tentatively schedule the patient for follow up with me in clinic in 2 weeks (although this may not be necessary if he does go to [facility] for rehab and my partners can follow him there)    I am ok with his discharge as soon as rehab placement has been arranged. I discussed his complex medical situation with his wife today.                Jeff Smith MD  2/23/2019

## 2019-02-23 NOTE — DISCHARGE SUMMARY
Patient Name: Rod Balderas  MRN: [id number]  : 1950  DOS: 2019    Attending: Arielle Carolina MD    Primary Care Provider: Tyrese Leyva MD    Date of Admission:.2019 11:02 AM    Date of Discharge:  2019    Discharge Diagnosis:    S/ p Renal failure, acute, required hemodialysis, resolved with subsequent hemodialysis catheter   removal.    MSSA bacteremia, status post septic shock.  Anticipated antibiotic stop date is 3/22/2019    Metabolic encephalopathy    Spinal stenosis, lumbar region, with neurogenic claudication    Mixed severe peripheral polyneuropathy    Morbid obesity due to excess calories      TANIYA on CPAP    Dyslipidemia    DM (diabetes mellitus), type 2      Paroxysmal atrial fibrillation, status post KLAUDIA and DC cardioversion    Moderate aortic regurgitation    Status post recent robotic prostatectomy    Status post acute hypoxic respiratory failure, improved.    Status post PICC line placement    Severe debility      Consultations included:  1..  Nephrology consult with [facility].    2. critical care team consult during patient's stay in the intensive care unit.  3.  Neurosurgery consult for spinal stenosis.  4.  Infectious disease consult Jeff Blake MD.  JOANA JOHNSTON  5.  Neurology consult.  Luiz Walker MD orthopedic surgery consult Juanito Ro MD, for right wrist swelling  6.  Cardiology consult Tyrese Brown MD  7. urology consult Juanito roger Junior, MD.  Next number surgery consult Bishnu Rajput MD for hemodialysis catheter placement    Hospital Course    At admit:    Patient is a 68 y.o. male who is very pleasant and who is known to me from recent hospitalization mid January for prostatectomy due to prostate cancer.  At that time he was admitted on 1/15/19 underwent robotic laparoscopic prostatectomy, tolerated surgery well, progressed well during the 2 days he spent in the hospital with subsequent ambulation, return of  "bowel function, tolerance of by mouth diet, subsequently HAYDE drain removal and discharged home.  Following his discharge he did well for several days eventually seen Dr. Sanjay Black about a week after discharge with removal of his Christina catheter that was placed into during surgery.  2 or 3 days after that he started feeling weak and fatigued, a urine culture was ordered through Dr. Sanjay Black's office.  A week ago he was seen in the ER with complaints of back pain and some abdominal discomfort and he was starting to be somewhat confused according to his family.  A CT scan of the head was done at the time, the family believes that was noncontrast CT and was nonrevealing.  His overall condition worsened over the weekend eventually was admitted to [facility] on Monday on 2/5/19.  At that time he was diagnosed with acute kidney injury with creatinine of 2.2 and with urinary tract infection with reported he and his original urine culture growing \"staph\".  His workup during his stay at [facility] included a renal ultrasound yesterday showing no hydronephrosis and a CT scan done prior to that the report of which I do not have.  At [facility] he did have hematuria and had a 3-way Christina catheter placed, received continuous bladder irrigation, hematuria apparently did improve.  We were contacted due to his worsening renal failure and worsening mental status, his creatinine has crept over the last 3 days to a 5.6 with a BUN of 94 today.  Upon arrival, he is lethargic, awakens briefly and nonstress to talk but unable to verbalize legible speech.  Family reported no fever or chills and no nausea or vomiting.  His appetite has declined significantly over course of a few days.      After admit:    Upon evaluation at the time of admit he was felt to be critically ill and was moved to the intensive care unit where he spent several days managed for multiple problems.    He had sepsis with multiorgan system " dysfunction, blood cultures and urine cultures were positive for MSSA from transferring facility.  He was started on antibiotics under the direction of Dr. Blake with [facility], he improved gradually from that standpoint throughout his stay.  With resolution of his renal dysfunction/renal failure he had a PICC line placed with extended antibiotic treatment plan.    With regard to his acute kidney failure he had a rising creatinine from 2.2 to above 5, his BUN was close to 100 at the time of admission.  An emergent placement of hemodialysis catheter took place, he received hemodialysis treatment under the [facility] of [facility] for the first few days, his renal function subsequently showed good recovery and he had his hemodialysis catheter removed since then.  His renal function has been stable, nephrology team has signed off since.    Patient was seen by the cardiology team due to paroxysmal atrial fibrillation in the setting of acute illness.  He has known coronary artery disease with prior drug-eluting stent to OM 2 otherwise nonobstructive disease from 2012.  He did require starting anticoagulation with Eliquis during his stay, H&H has been stable since, he required cardioversion once.    KLAUDIA/ECV per Dr. Brown on 2/14/2019 showed an EF of 60-65% with moderate aortic insufficiency and mild mitral regurgitation and tricuspid regurgitation with no thrombus, vegetation, or abscess.  He has done well since on metoprolol.    He was seen by neurosurgery due to known spinal stenosis and lower extremity weakness and pain.  Further workup with an MRI was not feasible due to patient's prior history of having metal in his body.  CT myelogram was postponed until overall clinical improvement.  An EMG with nerve conduction study was done on 2/22/2019.  I copied the results below, it does show mixed peripheral polyneuropathy that is severe.  Patient was seen by Dr. Walker with  neurology who recommended the dosing of Neurontin noted below, patient has been chronically on Neurontin however doses were decreased during his stay here due to his lethargy and encephalopathy.  We have carefully started the patient on diclofenac gel on his knees to help with his pain and mobility.  His renal function will need to be monitored closely.    Earlier in his stay he had right wrist pain and swelling for which she was evaluated by orthopedic surgery.  It did not seem likely that he has septic arthritis in that joint.  I believe a bedside aspirate was attempted at that time as well.    During his stay he was seen by rehab services including physical therapy and occupational therapy.  He was seen by wound care therapy as well for right IT area of deep tissue pressure injury as well as left IT deep tissue pressure injury, recommendations are noted  ( Outcome Summary: RT IT area DTPI now pink and partially blanchable; LT IT DTPI remains maroon/ purple/ red. Cont MIST LT IT DTPI; MWF. COnt Venolex to protect area.   Plan of Care Reviewed With: patient) this is from PT note on 2/22/2019  .      Procedures Performed  Procedure(s):    HEMODIALYSIS CATHETER INSERTION RIGHT INTERNAL JUGULAR/this has since been removed on 2/15/2019      PICC line placement:      Pertinent Test Results:    I reviewed the patient's new clinical results.   Results from last 7 days   Lab Units 02/22/19  0442 02/19/19  1435   WBC 10*3/mm3 7.14 10.07   HEMOGLOBIN g/dL 8.5* 9.4*   HEMATOCRIT % 27.7* 30.4*   PLATELETS 10*3/mm3 343 381     Results from last 7 days   Lab Units 02/23/19  0816 02/22/19  0442 02/18/19  0400   SODIUM mmol/L  --  138 136   POTASSIUM mmol/L 3.8 3.3* 4.0   CHLORIDE mmol/L  --  109 106   CO2 mmol/L  --  23.0 25.0   BUN mg/dL  --  23 47*   CREATININE mg/dL  --  0.74 1.04   CALCIUM mg/dL  --  8.1* 8.0*   GLUCOSE mg/dL  --  106* 126*     I reviewed the patient's new imaging including images and reports.    EMG & Nerve  Conduction Test   Order: [id number]   Status:  Final result   Visible to patient:  No (Not Released)   Details     Reading Physician Reading Date Result Priority   Jeff Henry MD 2/22/2019 Routine      Narrative & Impression     Indication:     Progressive weakness, possible critical illness neuropathy     Clinical:     68 y.o.male who has a history of lumbar spinal stenosis.  He was admitted initially to [facility] on 2/9 with acute kidney injury.  He has had MSSA bacteremia and septic shock along with acute hypoxic respiratory failure.  He has developed profound weakness in both legs and is beginning to develop weakness in both arms.  Critical illness neuropathy is suspected.           Nerve Conduction Studies:     All studies are performed at a skin temperature of 34°C or greater.  Sensory latencies are calculated to waveform peak.  Motor latencies were calculated to waveform onset.     No response was obtained from the right or left sural sensory nerve     No response was obtained from the right or left peroneal motor nerve, either recording at the EDB or the tibialis anterior     No response was obtained from the right or left posterior tibial motor nerve     No motor F wave responses were seen in the right or left peroneal or right or left posterior tibial motor nerves     H reflexes were not obtainable     Median sensory latency on the left is prolonged at 3.6 ms with a mildly reduced amplitude of 12 µV     Ulnar sensory latency on the left is normal at 2.1 ms with a markedly reduced amplitude of 0.6 µV     Median motor latency on the left is prolonged at 5.6 ms with a reduced amplitude of 3.8 mV.  Conduction velocity is 36 m/s     Right median sensory response was unobtainable     Right ulnar sensory response was unobtainable     Right median motor latency is normal at 3.4 ms and amplitude is markedly reduced at 0.4 mV     Left ulnar motor study shows a reduced conduction velocity of 49 m/s, and a  reduced amplitude with more proximal stimulation.  Distal latency is normal at 2.1 ms     Amplitude of the right ulnar motor nerve is reduced at 2.0 mV with a normal distal latency of 2.9 ms     The above findings suggest a severe axonal neuropathy, with perhaps superimposed median neuropathy at the wrist on the left of moderate degree     Electromyogram:     Examination of the left leg showed decreased recruitment in the medial gastrocnemius, soleus, and adductor christine, and changes of ongoing denervation in the medial gastrocnemius     Needle exam of the right leg showed decreased recruitment in the medial gastrocnemius, posterior tibialis, soleus, and abductor, and subtle changes of chronic denervation in the posterior tibialis, soleus, and adductor christine.  Changes of ongoing denervation are seen in the tibialis anterior.     Needle examination of the left arm showed decreased recruitment in the deltoid, EDC, and FDI, with changes of chronic denervation in the EDC and FDI.     Needle examination of the right arm was unremarkable     IMPRESSION:     Extremely challenging study     NCS/EMG shows generalized sensori-motor neuropathy, severe, axonal, affecting legs more so than arms     Median neuropathy at the wrist on the left, moderate (incidental finding)     Clinical note: These findings would be consistent with a critical illness polyneuropathy     Due to the severity of the patient's neuropathy, it is impossible to state whether any underlying radicular component is present              Please see additional scanned report for specific details of amplitudes, conduction velocities, and distal latencies     This report is transcribed using the Dragon dictation system.  Despite the best of intentions, unexpected transcription errors may be present.                Last Resulted: 02/22/19 09:14                Discharge Assessment:    Vital Signs  Visit Vitals  /69 (BP Location: Left arm, Patient Position:  "Lying)   Pulse 79   Temp 99.2 °F (37.3 °C) (Axillary)   Resp 20   Ht 170.2 cm (67.01\")   Wt 117 kg (257 lb)   SpO2 97%   BMI 40.24 kg/m²     Temp (24hrs), Av.4 °F (36.9 °C), Min:97.8 °F (36.6 °C), Max:99.2 °F (37.3 °C)    When seen this morning patient is asleep, according to his wife and his nurse patient did not sleep much last night finally fell asleep earlier this morning.  Has not been able to use his CPAP as recommended, has pulled the mask off repeatedly.  Lungs are clear to auscultation, decreased at the bases.  No wheezes or rales.  Heart is regular with S1-S2 no gallops.  Abdomen is soft and obese, nontender nondistended.  Extremity exam with bilateral lower extremity edema.     Discharge Disposition: [facility].    Discharge Medications     Discharge Medications      New Medications      Instructions Start Date   acetaminophen 325 MG tablet  Commonly known as:  TYLENOL   650 mg, Oral, Every 6 Hours PRN      albuterol (2.5 MG/3ML) 0.083% nebulizer solution  Commonly known as:  PROVENTIL   2.5 mg, Nebulization, Every 6 Hours PRN      apixaban 5 MG tablet tablet  Commonly known as:  ELIQUIS   5 mg, Oral, Every 12 Hours Scheduled      b complex-vitamin c-folic acid 0.8 MG tablet tablet   1 tablet, Oral, Daily      castor oil-balsam peru ointment   Apply to affected area      ceFAZolin in dextrose 2-4 GM/100ML-% solution IVPB  Commonly known as:  ANCEF   2 g, Intravenous, Every 8 Hours      dextrose 40 % gel  Commonly known as:  GLUTOSE   15 g, Oral, Every 15 Minutes PRN      dextrose 50 % solution  Commonly known as:  D50W   25 g, Intravenous, Every 15 Minutes PRN      diclofenac 1 % gel gel  Commonly known as:  VOLTAREN   2 g, Topical, 2 Times Daily      diclofenac 1 % gel gel  Commonly known as:  VOLTAREN   2 g, Topical, 4 Times Daily      famotidine 20 MG tablet  Commonly known as:  PEPCID   20 mg, Oral, 2 Times Daily      gabapentin 400 MG capsule  Commonly known as:  NEURONTIN  Replaces:  gabapentin 600 MG " tablet   400 mg, Oral, Nightly      gabapentin 100 MG capsule  Commonly known as:  NEURONTIN   200 mg, Oral, Daily      glucagon (human recombinant) 1 MG injection  Commonly known as:  GLUCAGEN DIAGNOSTIC   1 mg, Subcutaneous, As Needed      insulin lispro 100 UNIT/ML injection  Commonly known as:  humaLOG   0-7 Units, Subcutaneous, 4 Times Daily With Meals & Nightly      lactulose 10 GM/15ML solution  Commonly known as:  CHRONULAC   30 g, Oral, Every 8 Hours PRN      melatonin 5 MG sublingual tablet sublingual tablet   5 mg, Sublingual, Nightly PRN      metoprolol tartrate 25 MG tablet  Commonly known as:  LOPRESSOR   25 mg, Oral, 3 Times Daily      ondansetron 4 MG/2ML injection  Commonly known as:  ZOFRAN   4 mg, Intravenous, Every 6 Hours PRN         Continue These Medications      Instructions Start Date   amLODIPine 5 MG tablet  Commonly known as:  NORVASC   5 mg, Oral, Daily      aspirin 81 MG chewable tablet   81 mg, Oral, Daily      DULoxetine 60 MG capsule  Commonly known as:  CYMBALTA   60 mg, Oral, Daily      fluticasone 50 MCG/ACT nasal spray  Commonly known as:  FLONASE   USE 2 SPRAYS IN EACH NOSTRIL DAILY PRN         Stop These Medications    gabapentin 600 MG tablet  Commonly known as:  NEURONTIN  Replaced by:  gabapentin 400 MG capsule     HYDROcodone-acetaminophen 7.5-325 MG per tablet  Commonly known as:  NORCO     ibuprofen 800 MG tablet  Commonly known as:  ADVIL,MOTRIN     lisinopril-hydrochlorothiazide 20-12.5 MG per tablet  Commonly known as:  PRINZIDE,ZESTORETIC     metFORMIN 500 MG tablet  Commonly known as:  GLUCOPHAGE            Discharge Diet: Regular diet, consistent carbohydrate, fluid restriction 2000 mL/day.  Offer dietary supplement boost glucose control 3 times daily. Vanilla flavor.    Activity at Discharge:   Mobilize as able.     Follow-up Appointments  ID.  to follow at [facility].  PCP upon discharge.  Dr.Slabaugh byers as scheduled.  Cardiology per their orders.    Patient  will need to use CPAP with sleep    Discharge took over 30 min  Discussed with pt's wife and RN on day of discharge.     Arielle Carolina MD  02/24/19  8:37 AM

## 2019-02-23 NOTE — PLAN OF CARE
"Problem: Patient Care Overview  Goal: Plan of Care Review  Outcome: Ongoing (interventions implemented as appropriate)   02/23/19 9724   Coping/Psychosocial   Plan of Care Reviewed With patient;family   OTHER   Outcome Summary PT VSS on room air. Minimal complaints of pain. Pt has not required sliding scale insulin today and reports feeling \"much better\". Ambulance scheduled for 1300 tomorrow for transfer [facility] rehab.        Problem: Skin Injury Risk (Adult)  Goal: Skin Health and Integrity  Outcome: Ongoing (interventions implemented as appropriate)      Problem: Sepsis/Septic Shock (Adult)  Goal: Signs and Symptoms of Listed Potential Problems Will be Absent, Minimized or Managed (Sepsis/Septic Shock)  Outcome: Ongoing (interventions implemented as appropriate)        "

## 2019-02-23 NOTE — PROGRESS NOTES
Continued Stay Note  [facility]     Patient Name: Rod Balderas  MRN: [id number]  Today's Date: 2/23/2019    Admit Date: 2/8/2019    Discharge Plan     Row Name 02/23/19 0954       Plan    Plan  [facility]    Patient/Family in Agreement with Plan  --    Plan Comments  Spoke with Duyen at [facility].  Pt has been offered a bed on Sunday if medically ready.  Unit will be determined on date of DC.   has tentatively arranged an ambulance for Sunday at 1300.  Will cont to follow.    Final Discharge Disposition Code  62 - inpatient rehab facility        Discharge Codes    No documentation.       Expected Discharge Date and Time     Expected Discharge Date Expected Discharge Time    Feb 23, 2019             Raquel Henry

## 2019-02-23 NOTE — PROGRESS NOTES
"IM progress note      Rod Balderas  [id number]  1950     LOS: 15 days     Attending: Arielle Carolina MD    Primary Care Provider: Tyrese Leyva MD      Chief Complaint/Reason for visit:  Sepsis    He was transferred to telemetry after about 10 days in ICU. He is followed by renal, cardiology, and infectious disease. His renal failure is resolved.     Subjective   Had a decent morning. Was up in chair , ate a breakfast, helped with mobility. No sob/ n/vom.     Objective     Vital Signs    Visit Vitals  /72   Pulse 67   Temp 98.6 °F (37 °C) (Oral)   Resp 20   Ht 170.2 cm (67.01\")   Wt 117 kg (257 lb)   SpO2 95%   BMI 40.24 kg/m²     Temp (24hrs), Av.3 °F (37.4 °C), Min:98.6 °F (37 °C), Max:100 °F (37.8 °C)    Nutrition: PO    Respiratory: RA    Physical Exam:     General Appearance:    Somnolent, in no acute distress   Head:    Normocephalic, without obvious abnormality, atraumatic    Lungs:     Normal effort, symmetric chest rise, no crepitus, clear to      auscultation bilaterally             Heart:    Regular rhythm and normal rate, normal S1 and S2   Abdomen:     Soft, healing lap sites. obese   Extremities:   No clubbing, cyanosis. + pitting BLE edema.    Pulses:   Pulses palpable and equal bilaterally   Skin:   No bleeding, bruising or rash   Neurologic:  Cranial nerves 2 - 12 grossly intact     Results Review:     I reviewed the patient's new clinical results.   Results from last 7 days   Lab Units 19  0442 19  1435 19  0709   WBC 10*3/mm3 7.14 10.07 15.18*   HEMOGLOBIN g/dL 8.5* 9.4* 9.9*   HEMATOCRIT % 27.7* 30.4* 30.2*   PLATELETS 10*3/mm3 343 381 364     Results from last 7 days   Lab Units 19  0816 19  0442 19  0400 19  0709   SODIUM mmol/L  --  138 136 140   POTASSIUM mmol/L 3.8 3.3* 4.0 4.1   CHLORIDE mmol/L  --  109 106 108   CO2 mmol/L  --  23.0 25.0 23.0   BUN mg/dL  --  23 47* 58*   CREATININE mg/dL  --  0.74 1.04 1.13   CALCIUM " mg/dL  --  8.1* 8.0* 7.7*   BILIRUBIN mg/dL  --   --   --  0.9   ALK PHOS U/L  --   --   --  178*   ALT (SGPT) U/L  --   --   --  10   AST (SGOT) U/L  --   --   --  18   GLUCOSE mg/dL  --  106* 126* 127*       Results for JESSE YUSUF (MRN [id number]) as of 2/22/2019 16:12   Ref. Range 2/21/2019 11:00   pH, Arterial Latest Ref Range: 7.350 - 7.450 pH units 7.536 (H)   pH, Temp Corrected Latest Units: pH Units 7.536   pCO2, Arterial Latest Units: mm Hg 28.3   pO2, Arterial Latest Ref Range: 83.0 - 108.0 mm Hg 69.0 (L)   pO2, Temperature Corrected Latest Ref Range: 83 - 108 mm Hg 69.0 (L)   HCO3, Arterial Latest Ref Range: 20.0 - 26.0 mmol/L 23.9   Base Excess, Arterial Latest Ref Range: 0.0 - 2.0 mmol/L 1.7   CO2 Content Latest Ref Range: 23 - 27 mmol/L 24.8   Carboxyhemoglobin Latest Ref Range: 0 - 2 % 1.5   Methemoglobin Latest Ref Range: 0.00 - 1.50 % 1.00   Oxyhemoglobin Latest Ref Range: 94 - 99 % 93.4 (L)   Hematocrit, Blood Gas Latest Units: % 28.3   Hemoglobin, Blood Gas Latest Ref Range: 13.5 - 17.5 g/dL 9.2 (L)   Site Unknown Left Radial   Sam's Test Unknown N/A   Modality Unknown Room Air   FIO2 Latest Units: % 21       NCS:  IMPRESSION:     Extremely challenging study     NCS/EMG shows generalized sensori-motor neuropathy, severe, axonal, affecting legs more so than arms     Median neuropathy at the wrist on the left, moderate (incidental finding)     Clinical note: 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    All medications reviewed.     amLODIPine 5 mg Oral Q24H   apixaban 5 mg Oral Q12H   b complex-vitamin c-folic acid 1 tablet Oral Daily   castor oil-balsam peru  Topical Q12H   ceFAZolin 2 g Intravenous Q8H   diclofenac 2 g Topical BID   diclofenac 2 g Topical 4x  Daily   DULoxetine 60 mg Oral Daily   famotidine 20 mg Oral BID   gabapentin 200 mg Oral Daily   gabapentin 400 mg Oral Nightly   insulin lispro 0-7 Units Subcutaneous 4x Daily With Meals & Nightly   metoprolol tartrate 25 mg Oral TID   sodium chloride 10 mL Intravenous Q12H   sodium chloride 3 mL Intravenous Q12H       Assessment/Plan     Renal failure, resolved.    Metabolic encephalopathy,improving.    Spinal stenosis, lumbar region, with neurogenic claudication    Morbid obesity due to excess calories (CMS/[facility])    TANIYA on CPAP    Dyslipidemia    DM (diabetes mellitus), type 2 (CMS/HCC)    Paroxysmal atrial fibrillation , c/p carioversion.    Sepsis (CMS/HCC)    Moderate aortic regurgitation    Hypokalemia, 2/22/2019, replaced    Severe mixed peripheral neuropathy.    Plan  1. Mobilize as able. PT/OT  2. Pain control-prns. ( discussed minimizing narcotics)wy  3. IS-encouraged  4. DVT proph- mechs/Eliquis  5. Bowel regimen  6. Diet as tolerated. 2L FR/day  7. Monitor labs  8. CM to work on rehab placement    Sepsis/POA. Improved.  [facility], Dr. Smith  - high dose cefazolin, PICC placement, follow BC    Afib, HTN, HLD, aortic regurgitation  s/p KLAUDIA/ECV per Dr. Brown 2/14/19  Cardiology, Dr. Regalado  - Eliquis 5 mg BID - watch H& H closely  - Continue home norvasc and lopressor  - Monitor BP     Renal, Dr. Koo  - signed off    TANIYA  - CPAP at night    Continue to taper Neurontin due to lethargy. Keep Current dose and watch. ( adjustment by  noted/ discussed with pt, and wife)       Spinal stenosis, BLE weakness  - consult neurosx, Dr. Mayers follows pt.  Per Neurosx: ((At this time, patient is not a surgical candidate. Follow up with neurosurgery outpatient when stable/discharged for full workup.))    DM  - Accuchecks ACHS with low dose SSI    Discussed with pt, wife, and RN.      [facility] offered a bed tomorrow, we should be able to discharge. Transportation set for 1 PM.     Arielle Carolina  MD  02/23/19  12:33 PM         .

## 2019-02-24 VITALS
WEIGHT: 257 LBS | SYSTOLIC BLOOD PRESSURE: 157 MMHG | RESPIRATION RATE: 19 BRPM | HEIGHT: 67 IN | BODY MASS INDEX: 40.34 KG/M2 | DIASTOLIC BLOOD PRESSURE: 69 MMHG | TEMPERATURE: 99.2 F | OXYGEN SATURATION: 95 % | HEART RATE: 75 BPM

## 2019-02-24 LAB
GLUCOSE BLDC GLUCOMTR-MCNC: 123 MG/DL (ref 70–130)
GLUCOSE BLDC GLUCOMTR-MCNC: 145 MG/DL (ref 70–130)

## 2019-02-24 PROCEDURE — 25010000003 CEFAZOLIN IN DEXTROSE 2-4 GM/100ML-% SOLUTION: Performed by: INTERNAL MEDICINE

## 2019-02-24 PROCEDURE — 82962 GLUCOSE BLOOD TEST: CPT

## 2019-02-24 PROCEDURE — 97610 LOW FREQUENCY NON-THERMAL US: CPT

## 2019-02-24 RX ADMIN — DULOXETINE HYDROCHLORIDE 60 MG: 60 CAPSULE, DELAYED RELEASE ORAL at 09:37

## 2019-02-24 RX ADMIN — FAMOTIDINE 20 MG: 20 TABLET, FILM COATED ORAL at 09:37

## 2019-02-24 RX ADMIN — AMLODIPINE BESYLATE 5 MG: 5 TABLET ORAL at 09:37

## 2019-02-24 RX ADMIN — DICLOFENAC 2 G: 10 GEL TOPICAL at 12:11

## 2019-02-24 RX ADMIN — APIXABAN 5 MG: 5 TABLET, FILM COATED ORAL at 09:37

## 2019-02-24 RX ADMIN — CEFAZOLIN SODIUM 2 G: 2 INJECTION, SOLUTION INTRAVENOUS at 12:11

## 2019-02-24 RX ADMIN — GABAPENTIN 200 MG: 100 CAPSULE ORAL at 09:37

## 2019-02-24 RX ADMIN — CEFAZOLIN SODIUM 2 G: 2 INJECTION, SOLUTION INTRAVENOUS at 06:33

## 2019-02-24 RX ADMIN — METOPROLOL TARTRATE 25 MG: 25 TABLET ORAL at 09:38

## 2019-02-24 RX ADMIN — Medication 1 TABLET: at 09:37

## 2019-02-24 RX ADMIN — DICLOFENAC 2 G: 10 GEL TOPICAL at 09:38

## 2019-02-24 RX ADMIN — SODIUM CHLORIDE, PRESERVATIVE FREE 10 ML: 5 INJECTION INTRAVENOUS at 09:40

## 2019-02-24 NOTE — PLAN OF CARE
Problem: Patient Care Overview  Goal: Plan of Care Review  Outcome: Outcome(s) achieved Date Met: 02/24/19 02/24/19 1000   Coping/Psychosocial   Plan of Care Reviewed With patient   OTHER   Outcome Summary B gluteal DTPI areas now resolved with all areas noted to be blanchable at this point and no open areas of skin noted.

## 2019-02-24 NOTE — PROGRESS NOTES
Case Management Discharge Note    Final Note: Spoke with Duyen at [facility].  Bed available today on Pulmonary Unit.  RN to call report to [phone].  DC summary has been faxed.  [facility] to transport at 1300.    Destination - Selection Complete      Service Provider Request Status Selected Services Address Phone Number Fax Number    [facility] Selected Inpatient Rehabilitation [address] [id number] [phone]      Durable Medical Equipment      No service has been selected for the patient.      Dialysis/Infusion      No service has been selected for the patient.      Home Medical Care      No service has been selected for the patient.      Community Resources      No service has been selected for the patient.        Ambulance: [facility]    Final Discharge Disposition Code: 62 - inpatient rehab facility

## 2019-02-24 NOTE — DISCHARGE PLACEMENT REQUEST
"NATALIE ALLEN, RN    [phone]            Jesse Balderas (68 y.o. Male)     Date of Birth Social Security Number Address Home Phone MRN    1950  [address] [address] [phone] [id number]    [employer] Marital Status          Unknown        Admission Date Admission Type Admitting Provider Attending Provider Department, Room/Bed    2/8/19 Urgent Arielle Carolina MD Yaacoubagha, [place], MD [facility] 4H, S465/1    Discharge Date Discharge Disposition Discharge Destination                       Attending Provider:  Arielle Carolina MD    Allergies:  Percocet [Oxycodone-acetaminophen], Sulfa Antibiotics    Isolation:  None   Infection:  None   Code Status:  CPR    Ht:  170.2 cm (67.01\")   Wt:  117 kg (257 lb)    Admission Cmt:  None   Principal Problem:  Renal failure [N17.9]                 Active Insurance as of 2/8/2019     Primary Coverage     Payor Plan Insurance Group Employer/Plan Group    MEDICARE MEDICARE A & B      Payor Plan Address Payor Plan Phone Number Payor Plan Fax Number Effective Dates    [address] [phone]  12/1/2016 - None Entered    [facility] [id number]       Subscriber Name Subscriber Birth Date Member ID       ANDREYJESSE NOLASCO 1950 [id number]           Secondary Coverage     Payor Plan Insurance Group Employer/Plan Group    [employer] [place] [employer] [place]      Payor Plan Address Payor Plan Phone Number Payor Plan Fax Number Effective Dates    [employer] [place] МАРИНАZA [phone]  12/1/2016 - None Entered    [place] [id number]       Subscriber Name Subscriber Birth Date Member ID       JESSE BALDERAS 1950 [id number]                 Emergency Contacts      (Rel.) Home Phone Work Phone Mobile Phone    AndreyIveth (Spouse) [phone] -- --    JASKARAN BALDERAS (Son) [phone] -- --               Discharge Summary      Arielle Carolina MD at 2/23/2019  1:03 PM          Patient Name: " Rod Balderas  MRN: [id number]  : 1950  DOS: 2019    Attending: Arielle Carolina MD    Primary Care Provider: Tyrese Leyva MD    Date of Admission:.2019 11:02 AM    Date of Discharge:  2019    Discharge Diagnosis:    S/ p Renal failure, acute, required hemodialysis, resolved with subsequent hemodialysis catheter   removal.    MSSA bacteremia, status post septic shock.  Anticipated antibiotic stop date is 3/22/2019    Metabolic encephalopathy    Spinal stenosis, lumbar region, with neurogenic claudication    Mixed severe peripheral polyneuropathy    Morbid obesity due to excess calories      TANIYA on CPAP    Dyslipidemia    DM (diabetes mellitus), type 2      Paroxysmal atrial fibrillation, status post KLAUDIA and DC cardioversion    Moderate aortic regurgitation    Status post recent robotic prostatectomy    Status post acute hypoxic respiratory failure, improved.    Status post PICC line placement    Severe debility      Consultations included:  1..  Nephrology consult with [facility].    2. critical care team consult during patient's stay in the intensive care unit.  3.  Neurosurgery consult for spinal stenosis.  4.  Infectious disease consult Jeff Blake MD.  JOANA JOHNSTON  5.  Neurology consult.  Luiz Walker MD orthopedic surgery consult Juanito Ro MD, for right wrist swelling  6.  Cardiology consult Tyrese Brown MD  7. urology consult Juanito roger Junior, MD.  Next number surgery consult Bishnu Rajput MD for hemodialysis catheter placement    Hospital Course    At admit:    Patient is a 68 y.o. male who is very pleasant and who is known to me from recent hospitalization mid January for prostatectomy due to prostate cancer.  At that time he was admitted on 1/15/19 underwent robotic laparoscopic prostatectomy, tolerated surgery well, progressed well during the 2 days he spent in the hospital with subsequent ambulation, return of bowel function,  "tolerance of by mouth diet, subsequently HAYDE drain removal and discharged home.  Following his discharge he did well for several days eventually seen Dr. Sanjay Black about a week after discharge with removal of his Christina catheter that was placed into during surgery.  2 or 3 days after that he started feeling weak and fatigued, a urine culture was ordered through Dr. Sanjay Black's office.  A week ago he was seen in the ER with complaints of back pain and some abdominal discomfort and he was starting to be somewhat confused according to his family.  A CT scan of the head was done at the time, the family believes that was noncontrast CT and was nonrevealing.  His overall condition worsened over the weekend eventually was admitted to [facility] on Monday on 2/5/19.  At that time he was diagnosed with acute kidney injury with creatinine of 2.2 and with urinary tract infection with reported he and his original urine culture growing \"staph\".  His workup during his stay at [facility] included a renal ultrasound yesterday showing no hydronephrosis and a CT scan done prior to that the report of which I do not have.  At [facility] he did have hematuria and had a 3-way Christina catheter placed, received continuous bladder irrigation, hematuria apparently did improve.  We were contacted due to his worsening renal failure and worsening mental status, his creatinine has crept over the last 3 days to a 5.6 with a BUN of 94 today.  Upon arrival, he is lethargic, awakens briefly and nonstress to talk but unable to verbalize legible speech.  Family reported no fever or chills and no nausea or vomiting.  His appetite has declined significantly over course of a few days.      After admit:    Upon evaluation at the time of admit he was felt to be critically ill and was moved to the intensive care unit where he spent several days managed for multiple problems.    He had sepsis with multiorgan system dysfunction, " blood cultures and urine cultures were positive for MSSA from transferring facility.  He was started on antibiotics under the direction of Dr. Blake with [facility], he improved gradually from that standpoint throughout his stay.  With resolution of his renal dysfunction/renal failure he had a PICC line placed with extended antibiotic treatment plan.    With regard to his acute kidney failure he had a rising creatinine from 2.2 to above 5, his BUN was close to 100 at the time of admission.  An emergent placement of hemodialysis catheter took place, he received hemodialysis treatment [facility] for the first few days, his renal function subsequently showed good recovery and he had his hemodialysis catheter removed since then.  His renal function has been stable, nephrology team has signed off since.    Patient was seen by the cardiology team due to paroxysmal atrial fibrillation in the setting of acute illness.  He has known coronary artery disease with prior drug-eluting stent to OM 2 otherwise nonobstructive disease from 2012.  He did require starting anticoagulation with Eliquis during his stay, H&H has been stable since, he required cardioversion once.    KLAUDIA/ECV per Dr. Brown on 2/14/2019 showed an EF of 60-65% with moderate aortic insufficiency and mild mitral regurgitation and tricuspid regurgitation with no thrombus, vegetation, or abscess.  He has done well since on metoprolol.    He was seen by neurosurgery due to known spinal stenosis and lower extremity weakness and pain.  Further workup with an MRI was not feasible due to patient's prior history of having metal in his body.  CT myelogram was postponed until overall clinical improvement.  An EMG with nerve conduction study was done on 2/22/2019.  I copied the results below, it does show mixed peripheral polyneuropathy that is severe.  Patient was seen by Dr. Walker with neurology who  recommended the dosing of Neurontin noted below, patient has been chronically on Neurontin however doses were decreased during his stay here due to his lethargy and encephalopathy.  We have carefully started the patient on diclofenac gel on his knees to help with his pain and mobility.  His renal function will need to be monitored closely.    Earlier in his stay he had right wrist pain and swelling for which she was evaluated by orthopedic surgery.  It did not seem likely that he has septic arthritis in that joint.  I believe a bedside aspirate was attempted at that time as well.    During his stay he was seen by rehab services including physical therapy and occupational therapy.  He was seen by wound care therapy as well for right IT area of deep tissue pressure injury as well as left IT deep tissue pressure injury, recommendations are noted  ( Outcome Summary: RT IT area DTPI now pink and partially blanchable; LT IT DTPI remains maroon/ purple/ red. Cont MIST LT IT DTPI; MWF. COnt Venolex to protect area.   Plan of Care Reviewed With: patient) this is from PT note on 2/22/2019  .      Procedures Performed  Procedure(s):    HEMODIALYSIS CATHETER INSERTION RIGHT INTERNAL JUGULAR/this has since been removed on 2/15/2019      PICC line placement:      Pertinent Test Results:    I reviewed the patient's new clinical results.   Results from last 7 days   Lab Units 02/22/19  0442 02/19/19  1435 02/17/19  0709   WBC 10*3/mm3 7.14 10.07 15.18*   HEMOGLOBIN g/dL 8.5* 9.4* 9.9*   HEMATOCRIT % 27.7* 30.4* 30.2*   PLATELETS 10*3/mm3 343 381 364     Results from last 7 days   Lab Units 02/23/19  0816 02/22/19  0442 02/18/19  0400 02/17/19  0709   SODIUM mmol/L  --  138 136 140   POTASSIUM mmol/L 3.8 3.3* 4.0 4.1   CHLORIDE mmol/L  --  109 106 108   CO2 mmol/L  --  23.0 25.0 23.0   BUN mg/dL  --  23 47* 58*   CREATININE mg/dL  --  0.74 1.04 1.13   CALCIUM mg/dL  --  8.1* 8.0* 7.7*   BILIRUBIN mg/dL  --   --   --  0.9   ALK PHOS  U/L  --   --   --  178*   ALT (SGPT) U/L  --   --   --  10   AST (SGOT) U/L  --   --   --  18   GLUCOSE mg/dL  --  106* 126* 127*     I reviewed the patient's new imaging including images and reports.    EMG & Nerve Conduction Test   Order: [id number]   Status:  Final result   Visible to patient:  No (Not Released)   Details     Reading Physician Reading Date Result Priority   Jeff Henry MD 2/22/2019 Routine      Narrative & Impression     Indication:     Progressive weakness, possible critical illness neuropathy     Clinical:     68 y.o.male who has a history of lumbar spinal stenosis.  He was admitted initially to [facility] on 2/9 with acute kidney injury.  He has had MSSA bacteremia and septic shock along with acute hypoxic respiratory failure.  He has developed profound weakness in both legs and is beginning to develop weakness in both arms.  Critical illness neuropathy is suspected.           Nerve Conduction Studies:     All studies are performed at a skin temperature of 34°C or greater.  Sensory latencies are calculated to waveform peak.  Motor latencies were calculated to waveform onset.     No response was obtained from the right or left sural sensory nerve     No response was obtained from the right or left peroneal motor nerve, either recording at the EDB or the tibialis anterior     No response was obtained from the right or left posterior tibial motor nerve     No motor F wave responses were seen in the right or left peroneal or right or left posterior tibial motor nerves     H reflexes were not obtainable     Median sensory latency on the left is prolonged at 3.6 ms with a mildly reduced amplitude of 12 µV     Ulnar sensory latency on the left is normal at 2.1 ms with a markedly reduced amplitude of 0.6 µV     Median motor latency on the left is prolonged at 5.6 ms with a reduced amplitude of 3.8 mV.  Conduction velocity is 36 m/s     Right median sensory response was unobtainable     Right  ulnar sensory response was unobtainable     Right median motor latency is normal at 3.4 ms and amplitude is markedly reduced at 0.4 mV     Left ulnar motor study shows a reduced conduction velocity of 49 m/s, and a reduced amplitude with more proximal stimulation.  Distal latency is normal at 2.1 ms     Amplitude of the right ulnar motor nerve is reduced at 2.0 mV with a normal distal latency of 2.9 ms     The above findings suggest a severe axonal neuropathy, with perhaps superimposed median neuropathy at the wrist on the left of moderate degree     Electromyogram:     Examination of the left leg showed decreased recruitment in the medial gastrocnemius, soleus, and adductor christine, and changes of ongoing denervation in the medial gastrocnemius     Needle exam of the right leg showed decreased recruitment in the medial gastrocnemius, posterior tibialis, soleus, and abductor, and subtle changes of chronic denervation in the posterior tibialis, soleus, and adductor christine.  Changes of ongoing denervation are seen in the tibialis anterior.     Needle examination of the left arm showed decreased recruitment in the deltoid, EDC, and FDI, with changes of chronic denervation in the EDC and FDI.     Needle examination of the right arm was unremarkable     IMPRESSION:     Extremely challenging study     NCS/EMG shows generalized sensori-motor neuropathy, severe, axonal, affecting legs more so than arms     Median neuropathy at the wrist on the left, moderate (incidental finding)     Clinical note: These findings would be consistent with a critical illness polyneuropathy     Due to the severity of the patient's neuropathy, it is impossible to state whether any underlying radicular component is present              Please see additional scanned report for specific details of amplitudes, conduction velocities, and distal latencies     This report is transcribed using the Dragon dictation system.  Despite the best of intentions,  "unexpected transcription errors may be present.                Last Resulted: 19 09:14                Discharge Assessment:    Vital Signs  Visit Vitals  /72   Pulse 66   Temp 98.6 °F (37 °C) (Oral)   Resp 20   Ht 170.2 cm (67.01\")   Wt 117 kg (257 lb)   SpO2 95%   BMI 40.24 kg/m²     Temp (24hrs), Av.3 °F (37.4 °C), Min:98.6 °F (37 °C), Max:100 °F (37.8 °C)    When seen this morning patient is asleep, according to his wife and his nurse patient did not sleep much last night finally fell asleep earlier this morning.  Has not been able to use his CPAP as recommended, has pulled the mask off repeatedly.  Lungs are clear to auscultation, decreased at the bases.  No wheezes or rales.  Heart is regular with S1-S2 no gallops.  Abdomen is soft and obese, nontender nondistended.  Extremity exam with bilateral lower extremity edema.     Discharge Disposition: [facility].    Discharge Medications     Discharge Medications      New Medications      Instructions Start Date   acetaminophen 325 MG tablet  Commonly known as:  TYLENOL   650 mg, Oral, Every 6 Hours PRN      albuterol (2.5 MG/3ML) 0.083% nebulizer solution  Commonly known as:  PROVENTIL   2.5 mg, Nebulization, Every 6 Hours PRN      apixaban 5 MG tablet tablet  Commonly known as:  ELIQUIS   5 mg, Oral, Every 12 Hours Scheduled      b complex-vitamin c-folic acid 0.8 MG tablet tablet   1 tablet, Oral, Daily      castor oil-balsam peru ointment   Apply to affected area      ceFAZolin in dextrose 2-4 GM/100ML-% solution IVPB  Commonly known as:  ANCEF   2 g, Intravenous, Every 8 Hours      dextrose 40 % gel  Commonly known as:  GLUTOSE   15 g, Oral, Every 15 Minutes PRN      dextrose 50 % solution  Commonly known as:  D50W   25 g, Intravenous, Every 15 Minutes PRN      diclofenac 1 % gel gel  Commonly known as:  VOLTAREN   2 g, Topical, 2 Times Daily      diclofenac 1 % gel gel  Commonly known as:  VOLTAREN   2 g, Topical, 4 Times Daily      famotidine 20 MG " tablet  Commonly known as:  PEPCID   20 mg, Oral, 2 Times Daily      gabapentin 400 MG capsule  Commonly known as:  NEURONTIN  Replaces:  gabapentin 600 MG tablet   400 mg, Oral, Nightly      gabapentin 100 MG capsule  Commonly known as:  NEURONTIN   200 mg, Oral, Daily      glucagon (human recombinant) 1 MG injection  Commonly known as:  GLUCAGEN DIAGNOSTIC   1 mg, Subcutaneous, As Needed      insulin lispro 100 UNIT/ML injection  Commonly known as:  humaLOG   0-7 Units, Subcutaneous, 4 Times Daily With Meals & Nightly      lactulose 10 GM/15ML solution  Commonly known as:  CHRONULAC   30 g, Oral, Every 8 Hours PRN      melatonin 5 MG sublingual tablet sublingual tablet   5 mg, Sublingual, Nightly PRN      metoprolol tartrate 25 MG tablet  Commonly known as:  LOPRESSOR   25 mg, Oral, 3 Times Daily      ondansetron 4 MG/2ML injection  Commonly known as:  ZOFRAN   4 mg, Intravenous, Every 6 Hours PRN         Continue These Medications      Instructions Start Date   amLODIPine 5 MG tablet  Commonly known as:  NORVASC   5 mg, Oral, Daily      aspirin 81 MG chewable tablet   81 mg, Oral, Daily      DULoxetine 60 MG capsule  Commonly known as:  CYMBALTA   60 mg, Oral, Daily      fluticasone 50 MCG/ACT nasal spray  Commonly known as:  FLONASE   USE 2 SPRAYS IN EACH NOSTRIL DAILY PRN         Stop These Medications    gabapentin 600 MG tablet  Commonly known as:  NEURONTIN  Replaced by:  gabapentin 400 MG capsule     HYDROcodone-acetaminophen 7.5-325 MG per tablet  Commonly known as:  NORCO     ibuprofen 800 MG tablet  Commonly known as:  ADVIL,MOTRIN     lisinopril-hydrochlorothiazide 20-12.5 MG per tablet  Commonly known as:  PRINZIDE,ZESTORETIC     metFORMIN 500 MG tablet  Commonly known as:  GLUCOPHAGE            Discharge Diet: Regular diet, consistent carbohydrate, fluid restriction 2000 mL/day.  Offer dietary supplement boost glucose control 3 times daily. Vanilla flavor.    Activity at Discharge:   Mobilize as able.      Follow-up Appointments  ID.  to follow at [facility].  PCP upon discharge.  Dr.Slabaugh byers as scheduled.  Cardiology per their orders.    Patient will need to use CPAP with sleep    Discharge took over 30 min  Discussed with pt's wife and RN on day of discharge.     Arielle Carolina MD  02/23/19  1:03 PM            Electronically signed by Arielle Carolina MD at 2/24/2019  8:35 AM

## 2019-02-24 NOTE — THERAPY DISCHARGE NOTE
[id number] - Wound/Debridement Treatment Note/Discharge   Merle     Patient Name: Rod Balderas  : 1950  MRN: [id number]  Today's Date: 2019   Onset of Illness/Injury or Date of Surgery: 19  Date of Referral to PT: 19  Referring Physician: MD Aurea       Admit Date: 2019    Visit Dx:    ICD-10-CM ICD-9-CM   1. Impaired functional mobility, balance, gait, and endurance Z74.09 V49.89   2. Impaired mobility and ADLs Z74.09 799.89       Patient Active Problem List   Diagnosis   • Degenerative disc disease, lumbar   • Spinal stenosis, lumbar region, with neurogenic claudication   • Neuroforaminal stenosis of spine   • Lumbar facet arthropathy   • CAD (coronary artery disease)   • Essential hypertension   • Physical deconditioning   • Morbid obesity due to excess calories (CMS/HCC)   • TANIYA on CPAP   • Displacement of lumbar intervertebral disc   • Dyslipidemia   • Osteoarthritis   • At high risk for falls   • Prostate cancer , s/p prostatectomy ( [facility])   • DM (diabetes mellitus), type 2 (CMS/HCC)   • Leukocytosis, mild, likely reactive   • Acute postoperative pain   • Acute blood loss anemia, mild, asymptomatic   • Renal failure   • Metabolic encephalopathy   • Paroxysmal atrial fibrillation (CMS/HCC)   • Sepsis (CMS/HCC)   • Moderate aortic regurgitation                 WOUND DEBRIDEMENT              Rash 19 Left upper arm macular (Active)   Distribution regional 2019  8:35 PM   Configuration/Shape asymmetric 2019  8:35 PM   Borders irregular 2019  8:35 PM   Characteristics dry 2019  8:35 PM   Color pink 2019  8:35 PM       Rash 19 Left posterior hand macular (Active)   Distribution regional 2019  8:35 PM   Configuration/Shape asymmetric 2019  8:35 PM   Borders irregular 2019  8:35 PM   Characteristics dry 2019  8:35 PM   Color red 2019  8:35 PM       Wound 19 1519 Other (See comments) neck  incision (Active)   Dressing Appearance open to air 2/24/2019  9:37 AM   Base scab 2/23/2019  8:35 PM   Periwound Skin Turgor soft 2/23/2019  8:35 PM   Drainage Amount none 2/24/2019  9:37 AM       Wound 02/08/19 1800 Right lower gluteal pressure injury (Active)   Dressing Appearance open to air 2/24/2019 10:00 AM   Base dry;pink 2/24/2019 10:00 AM   Periwound dry;pink;blanchable 2/24/2019 10:00 AM   Periwound Temperature warm 2/24/2019 10:00 AM   Periwound Skin Turgor soft 2/24/2019 10:00 AM   Drainage Amount none 2/24/2019 10:00 AM   Care, Wound irrigated with;sterile normal saline 2/24/2019 10:00 AM       Wound 02/09/19 1116 Bilateral medial thigh (Active)   Dressing Appearance open to air 2/24/2019  9:37 AM   Closure None 2/23/2019  8:35 PM   Base dry;red/granulating 2/23/2019  8:35 PM   Periwound intact;dry;redness;blanchable 2/23/2019  8:35 PM   Drainage Amount none 2/24/2019  9:37 AM       Wound 02/13/19 0800 Left lower gluteal pressure injury (Active)   Dressing Appearance open to air 2/24/2019 10:00 AM   Closure Approximated 2/23/2019  8:35 PM   Base dry;pink 2/24/2019 10:00 AM   Periwound intact;dry;pink;blanchable 2/24/2019 10:00 AM   Periwound Skin Turgor soft 2/24/2019 10:00 AM   Edges open 2/23/2019  8:35 PM   Drainage Amount none 2/24/2019 10:00 AM   Care, Wound irrigated with;sterile normal saline;ultrasound therapy, non contact low frequency 2/24/2019 10:00 AM   Dressing Care, Wound open to air 2/24/2019 10:00 AM   Periwound Care, Wound barrier ointment applied 2/24/2019 10:00 AM       Wound 02/15/19 1800 Right upper chest puncture (Active)   Dressing Appearance open to air 2/24/2019  9:37 AM   Closure Approximated 2/23/2019  8:35 PM   Drainage Amount none 2/24/2019  9:37 AM       Wound 02/17/19 0800 scrotum abrasion;MASD (moisture associated skin damage) (Active)   Dressing Appearance open to air 2/24/2019  9:37 AM   Closure None 2/23/2019  8:35 PM   Base moist;pink 2/23/2019  8:35 PM   Periwound  moist;pink;redness;blanchable 2/23/2019  8:35 PM   Periwound Skin Turgor soft 2/23/2019  8:35 PM   Care, Wound barrier applied 2/24/2019  9:37 AM       Therapy Treatment    Rehabilitation Treatment Summary     Row Name 02/24/19 1000             Treatment Time/Intention    Discipline  physical therapist  -      Document Type  wound care;therapy note (daily note)  -      Subjective Information  complains of;weakness;fatigue;pain  -      Mode of Treatment  physical therapy  -      Recorded by [] Efrem Tran, PT 02/24/19 1305      Row Name 02/24/19 1000             Positioning and Restraints    Pre-Treatment Position  in bed  -      Post Treatment Position  bed  -      In Bed  supine;call light within reach  -      Recorded by [MF] Efrem Tran, PT 02/24/19 1305      Row Name 02/24/19 1000             Pain Assessment    Additional Documentation  Pain Scale: FACES Pre/Post-Treatment (Group)  -      Recorded by [MF] Efrem Tran, PT 02/24/19 1305      Row Name 02/24/19 1000             Pain Scale: FACES Pre/Post-Treatment    Pain: FACES Scale, Pretreatment  2-->hurts little bit  -      Pain: FACES Scale, Post-Treatment  2-->hurts little bit  -      Recorded by [MF] Efrem Tran, PT 02/24/19 1305      Row Name                Wound 02/08/19 1519 Other (See comments) neck incision    Wound - Properties Group Date first assessed: 02/08/19 [SM] Time first assessed: 1519 [SM] Side: Other (See comments) [SM] Location: neck [SM] Type: incision [SM] Recorded by:  [SM] Rissa Rodriguez RN 02/08/19 1519    Row Name 02/24/19 1000             Wound 02/08/19 1800 Right lower gluteal pressure injury    Wound - Properties Group Date first assessed: 02/08/19 [AB] Time first assessed: 1800 [AB] Present On Admission : yes [AB] Side: Right [AB] Orientation: lower [AB] Location: gluteal [AB] Type: pressure injury [AB] Stage, Pressure Injury: deep tissue injury [AB] Additional Comments: bilateral  gluteals [MR] Recorded by:  [AB] Addison Jasso RN 02/08/19 1855 [MR] Carmen Conner RN 02/09/19 1227    Dressing Appearance  open to air  -MF      Base  dry;pink  -MF      Periwound  dry;pink;blanchable  -MF      Periwound Temperature  warm  -MF      Periwound Skin Turgor  soft  -MF      Drainage Amount  none  -MF      Care, Wound  irrigated with;sterile normal saline  -MF      Recorded by [MF] Efrem Tran, PT 02/24/19 1305      Row Name                Wound 02/09/19 1116 Bilateral medial thigh    Wound - Properties Group Date first assessed: 02/09/19 [MR] Time first assessed: 1116 [MR] Present On Admission : yes [MR] Side: Bilateral [MR] Orientation: medial [MR] Location: thigh [MR] Stage, Pressure Injury: deep tissue injury [MR] Recorded by:  [MR] Carmen Conner RN 02/09/19 1228    Row Name 02/24/19 1000             Wound 02/13/19 0800 Left lower gluteal pressure injury    Wound - Properties Group Date first assessed: 02/13/19 [JM] Time first assessed: 0800 [JM] Side: Left [JM] Orientation: lower [JM] Location: gluteal [JM] Type: pressure injury [JM] Stage, Pressure Injury: deep tissue injury [JM] Recorded by:  [JM] Ivon Eason RN 02/13/19 1353    Dressing Appearance  open to air  -MF      Base  dry;pink  -MF      Periwound  intact;dry;pink;blanchable  -MF      Periwound Skin Turgor  soft  -MF      Drainage Amount  none  -MF      Care, Wound  irrigated with;sterile normal saline;ultrasound therapy, non contact low frequency mist x 5 min  -MF      Dressing Care, Wound  open to air  -MF      Periwound Care, Wound  barrier ointment applied venelex  -MF      Recorded by [MF] Efrem Tran, PT 02/24/19 1305      Row Name                Wound 02/15/19 1800 Right upper chest puncture    Wound - Properties Group Date first assessed: 02/15/19 [CH] Time first assessed: 1800 [CH] Side: Right [CH] Orientation: upper [CH] Location: chest [CH] Type: puncture [CH] Additional Comments: s/p tunneled  dialysis catheter [CH] Recorded by:  [CH] Taco Templeton RN 02/15/19 1822    Row Name                Wound 02/17/19 0800 scrotum abrasion;MASD (moisture associated skin damage)    Wound - Properties Group Date first assessed: 02/17/19 [CP] Time first assessed: 0800 [CP] Present On Admission : no [CP] Location: scrotum [CP] Type: abrasion;MASD (moisture associated skin damage) [CP] Recorded by:  [CP] Janine Juan, RN 02/17/19 0953    Row Name 02/24/19 1000             Coping    Observed Emotional State  accepting;calm;cooperative  -MF      Verbalized Emotional State  acceptance  -MF      Recorded by [MF] Efrem Tran, PT 02/24/19 1305      Row Name 02/24/19 1000             Plan of Care Review    Plan of Care Reviewed With  patient  -MF      Recorded by [MF] Efrem Tran, PT 02/24/19 1305        User Key  (r) = Recorded By, (t) = Taken By, (c) = Cosigned By    Initials Name Effective Dates Discipline     Efrem Tran, PT 06/19/15 -  PT    [facility] Taco Templeton, HOLLY 06/16/16 -  DIALYSIS USER    Rissa Bolden RN 06/16/16 -  Nurse    Carmen Nelson RN 06/16/16 -  Nurse    Ivon Gutierrez RN 06/16/16 -  Nurse    Janine Pace RN 06/16/16 -  Nurse    Addison Jaffe RN 02/02/17 -  Nurse                Physical Therapy Education     Title: PT OT SLP Therapies (In Progress)     Topic: Physical Therapy (In Progress)     Point: Mobility training (In Progress)     Learning Progress Summary           Patient Acceptance, E,D, NR by TREVIN at 2/21/2019 11:44 AM    Acceptance, E, NR by AS at 2/19/2019  1:32 PM    Acceptance, E,D, NR by LUKE at 2/15/2019  3:41 PM    Acceptance, E,D, NR by LUKE at 2/14/2019  2:10 PM    Acceptance, E,D, NR by LUKE at 2/12/2019  9:59 AM   Significant Other Acceptance, E,D, NR by TREVIN at 2/21/2019 11:44 AM    Acceptance, E,D, NR by LUKE at 2/15/2019  3:41 PM    Acceptance, DILLAN RAZO NR by LUKE at 2/14/2019  2:10 PM    Acceptance, DILLAN RAZO NR by LUKE at 2/12/2019  9:59 AM                    Point: Home exercise program (In Progress)     Learning Progress Summary           Patient Acceptance, E,D, NR by SJ at 2/21/2019 11:44 AM    Acceptance, E, NR by AS at 2/19/2019  1:32 PM    Acceptance, E,D, NR by LS at 2/15/2019  3:41 PM    Acceptance, E,D, NR by LS at 2/14/2019  2:10 PM   Significant Other Acceptance, E,D, NR by SJ at 2/21/2019 11:44 AM    Acceptance, E,D, NR by LS at 2/15/2019  3:41 PM    Acceptance, E,D, NR by LS at 2/14/2019  2:10 PM                   Point: Body mechanics (In Progress)     Learning Progress Summary           Patient Acceptance, E,D, NR by SJ at 2/21/2019 11:44 AM    Acceptance, E, NR by AS at 2/19/2019  1:32 PM    Acceptance, E,D, NR by LS at 2/15/2019  3:41 PM    Acceptance, E,D, NR by LS at 2/14/2019  2:10 PM    Acceptance, E,D, NR by LS at 2/12/2019  9:59 AM   Significant Other Acceptance, E,D, NR by SJ at 2/21/2019 11:44 AM    Acceptance, E,D, NR by LS at 2/15/2019  3:41 PM    Acceptance, E,D, NR by LS at 2/14/2019  2:10 PM    Acceptance, E,D, NR by LS at 2/12/2019  9:59 AM                   Point: Precautions (In Progress)     Learning Progress Summary           Patient Acceptance, E,D, NR by SJ at 2/21/2019 11:44 AM    Acceptance, E, NR by AS at 2/19/2019  1:32 PM    Acceptance, E,D, NR by LS at 2/15/2019  3:41 PM    Acceptance, E,D, NR by LS at 2/14/2019  2:10 PM    Acceptance, E,D, NR by LS at 2/12/2019  9:59 AM   Significant Other Acceptance, E,D, NR by SJ at 2/21/2019 11:44 AM    Acceptance, E,D, NR by LS at 2/15/2019  3:41 PM    Acceptance, E,D, NR by LS at 2/14/2019  2:10 PM    Acceptance, E,D, NR by LS at 2/12/2019  9:59 AM                               User Key     Initials Effective Dates Name Provider Type Discipline    SJ 06/19/15 -  Raquel Rico, PT Physical Therapist PT    AS 06/22/15 -  Eve Mercado, PTA Physical Therapy Assistant PT    LS 06/19/15 -  Ana Akers, CURTIS Physical Therapist PT                      PT Recommendation and  Plan  Anticipated Discharge Disposition (PT): inpatient rehabilitation facility  Planned Therapy Interventions (PT Eval): balance training, bed mobility training, gait training, home exercise program, stair training, strengthening, transfer training, patient/family education  Therapy Frequency (PT Clinical Impression): daily    Plan of Care Reviewed With: patient   Outcome Summary: B gluteal DTPI areas now resolved with all areas noted to be blanchable at this point and no open areas of skin noted.    Outcome Summary/Treatment Plan (PT)  Anticipated Discharge Disposition (PT): inpatient rehabilitation facility      Outcome Measures     Row Name 02/21/19 1517             How much help from another is currently needed...    Putting on and taking off regular lower body clothing?  1  -KF      Bathing (including washing, rinsing, and drying)  1  -KF      Toileting (which includes using toilet bed pan or urinal)  1  -KF      Putting on and taking off regular upper body clothing  2  -KF      Taking care of personal grooming (such as brushing teeth)  2  -KF      Eating meals  3  -KF      Score  10  -KF         Functional Assessment    Outcome Measure Options  AM-PAC 6 Clicks Daily Activity (OT)  -KF        User Key  (r) = Recorded By, (t) = Taken By, (c) = Cosigned By    Initials Name Provider Type    Kaye Willard, OT Occupational Therapist            Time Calculation  PT Charges     Row Name 02/24/19 1000             Time Calculation    Start Time  1000  -        User Key  (r) = Recorded By, (t) = Taken By, (c) = Cosigned By    Initials Name Provider Type    Efrem Gonzalez, PT Physical Therapist        Therapy Suggested Charges     Code   Minutes Charges    51535 (CPT®) Hc Pt Neuromusc Re Education Ea 15 Min      25774 (CPT®) Hc Pt Ther Proc Ea 15 Min      76940 (CPT®) Hc Gait Training Ea 15 Min      01472 (CPT®) Hc Pt Therapeutic Act Ea 15 Min 31 2    31714 (CPT®) Hc Pt Manual Therapy Ea 15 Min       61021 (CPT®) Hc Pt Iontophoresis Ea 15 Min      90955 (CPT®) Hc Pt Elec Stim Ea-Per 15 Min      83801 (CPT®) Hc Pt Ultrasound Ea 15 Min      84943 (CPT®) Hc Pt Self Care/Mgmt/Train Ea 15 Min      89614 (CPT®) Hc Pt Prosthetic (S) Train Initial Encounter, Each 15 Min      02747 (CPT®) Hc Pt Orthotic(S)/Prosthetic(S) Encounter, Each 15 Min      85780 (CPT®) Hc Orthotic(S) Mgmt/Train Initial Encounter, Each 15min      Total  31 2          Therapy Charges for Today     Code Description Service Date Service Provider Modifiers Qty    [id number] HC PT [facility] MIST 2/24/2019 Efrem Tran, PT GP 1            PT G-Codes  Outcome Measure Options: AM-PAC 6 Clicks Daily Activity (OT)  AM-PAC 6 Clicks Score: 6  Score: 10      PT Discharge Summary  Anticipated Discharge Disposition (PT): inpatient rehabilitation facility  Reason for Discharge: All goals achieved  Outcomes Achieved: Able to achieve all goals within established timeline  Discharge Destination: Inpatient rehabilitation facility        Efrem Tran, PT  2/24/2019

## 2019-02-25 NOTE — PROGRESS NOTES
[facility] Progress Note      Antibiotics:  Anti-Infectives (From admission, onward)    Ordered     Dose/Rate Route Frequency Start Stop    02/22/19 1343  ceFAZolin in dextrose (ANCEF) 2-4 GM/100ML-% solution IVPB     Ordering Provider:  Arielle Carolina MD    2 g  over 30 Minutes Intravenous Every 8 Hours 02/22/19 0000 02/28/19 2359    02/09/19 0849  aztreonam (AZACTAM) 2 g in sodium chloride 0.9 % 100 mL IVPB-MBP     Ordering Provider:  Efrem Santana MD    2 g  200 mL/hr over 30 Minutes Intravenous Once 02/09/19 1000 02/09/19 0946          CC: Patient unable    HPI  2/8/19: Patient is a 68 y.o.  Yr old male with history of prostate cancer status post robotic laparoscopic prostatectomy in mid January and was discharged on 1/17/19. The patient is currently unresponsive and no family is at bedside so history is somewhat limited to history from nursing staff and medical records. The patient was seen a few days after his discharge from his recent  Admission in January and his Christina catheter was discontinued.  About one week ago he went to the ER with complaints of back pain, abdominal pain, and confusion and a CT abdomen and pelvis was apparently okay during that time. He continued to worsen and over the weekend he was admitted to [facility].  He was found to have an initial AK I with a creatinine of 2.2. Per the micro-lab at [facility], the patient's urine cultures and 1 of 2 blood cultures from 2/5/19 grew MSSA. The patient was initially on vancomycin and Zosyn and was subsequently transitioned to daptomycin after he developed worsening renal function with a creatinine up to 5.6 and BUN went up to 94 today.  Due to lack of nephrology at [facility], the patient was transferred to [facility] today for possible dialysis.  He additionally received a dose of nafcillin prior to this transfer.  His last dose of daptomycin was yesterday per pharmacist here (who has  discussed with OSH). Since arrival, the patient has remained afebrile but he has had a leukocytosis up to 31.38.  He is anemic with a hemoglobin of 11.3 and a hematocrit of 54.1.  He is hyponatremic to a sodium of 127.  Creatinine was initially elevated 5.39 and B1 was 97 on arrival.  He additionally had elevation of his LFTs with an ALT of 43 and AST of 43, T bili of 3.0, and alkaline phosphatase of 303.  Pro-calcitonin was elevated to 7.55 lactic acid was normal at 0.8. The patient underwent CT head without contrast which did not show any acute abnormality, CT chest, abdomen and pelvis which showed consolidation of the lung bases bilaterally that is concerning for airspace disease such as pneumonia, gastric distention, and air-filled loops of colon with no obvious obstruction. Blood cultures have been ordered and a reflexive urinalysis has been ordered as well.  A transthoracic echocardiogram was ordered. The patient underwent dialysis today following right IJ dialysis catheter placement.  The infectious disease team has been consulted for antibiotic recommendations.    He has methicillin sensitive staph aureus septicemia/bacteriuria compounded by acute renal failure, acute hypoxic respiratory failure and by basilar consolidation by chest CT.  Further compounded by acute encephalopathy.    2/9/19 nursing reports A. fib/RVR, dialysis ongoing with acute renal failure and tachypneic/labored at times.  Some cough but unable to capture for culture so far. Empiric HCAP coverage added with zyvox/azactam/flagyl; unable to do MRI of spine so CT's done cervical/thoracic/lumbar spine done 2/10    2/10/19 CT scans of spine; more restful, less labored with breathing and decreased cough;  No rash.  He can wiggle toes but not lift legs off the bed.  He moves left arm better than right arm.  No other new focal pain that he will relate although interaction minimal.  No diarrhea.  No vomiting.    2/11/19: seems to have improved  from mental status standpoint slightly per his wife and son who are at bedside. Patient endorses abdominal pain but is not answering a lot of questions. tMAX 99.3f. WBC remains elevated at 20K (down from 31K on arrival). TTE was without evidence of IE but valves poorly visualized. Still getting HD.    2/12/19: Patient is starting to feel much better today.  He is responding to a lot of questions and is more alert sitting up in a bedside chair.  Breathing is improving.  No high fevers overnight.  Leukocytosis improved to 15.9 today.  He continues to urinate a small amount but remains on intermittent dialysis.    2/13/19: Feeling about the same as he was yesterday.  Still having some right wrist pain. Shortness of breath is stable. No fevers. Leukocytosis slightly worse today.    2/14/19: Patient is somnolent today after his KLAUDIA due to recent sedation. Procedure went well per his family and he is back in NSR and no signs of vegetation but procedure report is pending. No fevers. Leukocytosis slightly improved from yesterday. Family reports that the patient has had no new complaints. He was evaluated by Dr. Ro and no significant concern for septic arthritis.    2/16/19: I reviewed his complex medical records and discussed his complex situation with Dr. Jeff Smith.  His hemodialysis catheter was removed late yesterday.  He has been afebrile over the past 24 hours.  He denies abdominal pain.  He has a history of metal fragment between his frontal lobes according to his son.  This was apparently only recently identified on a CT scan prior to his admission here.  He underwent an MRI scan approximately 2 years ago without complications and the son indicates the metal fragment would have been there at that time.  He is having difficulty moving his legs but this is a chronic problem according to his son.  He was able to ambulate at home with assistance.    2/17/19:  He had a low-grade fever to 100.2 last evening.  He  still has significant bilateral lower extremity weakness but has a history of spinal stenosis and was weak prior to admission.  He was able to ambulate with assistance prior to admission.  He denies lower extremity numbness.  He denies nausea and vomiting.  He has some mild loose stool.    2/18/19: The patient continued to have low-grade fevers to 100.7 over the last 24 hours.  He does still have significant bilateral lower extremity weakness and is complaining about some back pain.  No numbness.  He is also having some right wrist pain still.  Having some mild abdominal pain.  No nausea or vomiting.    2/19/19: The patient continues to have significant lower extremity weakness.  No fevers overnight. No diarrhea or nausea.  Neurosurgery has evaluated the patient and he is not a good surgical candidate at this time but may need a CT myelogram in the coming days.    2/20/19: patient is somnolent this morning and not responsive to questions but his wife states that this is secondary to the patient recently receiving a dose of gabapentin. No fevers. Leukocytosis improving. Still very weak in lower extremities and having some weakness in bilateral upper extremities per his wife.    2/21/19: Patient is more alert today.  Still having significant weakness of his bilateral lower extremities.  Neurosurgery has ordered a nerve conduction study to be done at bedside today.  Case management is working on placement in rehabilitation.  No fevers.  He is tolerating the antibiotics well without any adverse side effects.  Denies any diarrhea or nausea.    2/23/19: the patient is doing okay today.  He feels like his weakness has improved somewhat over the last couple of days especially in his arms.  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  Dr. 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  Tmax over  "the last 24 hours was 100.0°F.  White blood cell count remains normal at 7.14.  Blood cultures from 2/18 remain no growth to date.  He denies any shortness of breath.  Only having an occasional loose bowel movement and no severe watery diarrhea.  No tenderness or redness at his PICC line site.    2/24/19: doing ok today. Energy level improved and weakness is slowly improving. Ready to go to rehab facility. Low grade temps to 100.0F but no high fevers. Having some right upper extremity swelling but no wrist pain. No tenderness near picc line site. No shortness of breath. Tolerating the abx well without ADRs.    PE:   /69 (BP Location: Left arm, Patient Position: Lying)   Pulse 75   Temp 99.2 °F (37.3 °C) (Axillary)   Resp 19   Ht 170.2 cm (67.01\")   Wt 117 kg (257 lb)   SpO2 95%   BMI 40.24 kg/m²     GENERAL: obese,  man. NAD.   HEENT: Normocephalic, atraumatic. No conjunctival injection. No icterus.   NECK: Supple without nuchal rigidity. No mass.   HEART: RRR. No murmur, rubs, gallops.  LUNGS: clear to auscultation anteriorly.   Non-labored breathing on room air  ABDOMEN: Soft, nontender, nondistended. Positive bowel sounds. No rebound or guarding. NO mass or HSM.  EXT:  No cyanosis, clubbing. No cord.  Edema noted over bilateral lower extremities. L>R upper extremity edema.  No effusion noted over right wrist and able to flex and extend his right wrist without any pain.  : deferred  SKIN:.  No new rash noted. Right upper extremity PICC line site is without any erythema or induration  NEURO: awake, alert, and oriented ×4.  Severe bilateral lower extremity weakness but able to move both feet still      Laboratory Data    Results from last 7 days   Lab Units 02/22/19  0442 02/19/19  1435   WBC 10*3/mm3 7.14 10.07   HEMOGLOBIN g/dL 8.5* 9.4*   HEMATOCRIT % 27.7* 30.4*   PLATELETS 10*3/mm3 343 381     Results from last 7 days   Lab Units 02/23/19  0816 02/22/19  0442   SODIUM mmol/L  --  138 "   POTASSIUM mmol/L 3.8 3.3*   CHLORIDE mmol/L  --  109   CO2 mmol/L  --  23.0   BUN mg/dL  --  23   CREATININE mg/dL  --  0.74   GLUCOSE mg/dL  --  106*   CALCIUM mg/dL  --  8.1*                   Estimated Creatinine Clearance: 109.6 mL/min (by C-G formula based on SCr of 0.74 mg/dL).      Microbiology:  2/8/19 Urine culture: MSSA  2/9/19 sputum  Culture: Yeast isolated  2/8/19 blood culture ×2: No growth at 5 days  2/18/19 blood culture ×2: No growth to date    Radiology:  Imaging Results (last 72 hours)     Procedure Component Value Units Date/Time    XR Chest 1 View [[id number]] Updated:  02/09/19 0221    CT Abdomen Pelvis Without Contrast [[id number]] Collected:  02/08/19 1822     Updated:  02/08/19 1822    Narrative:       EXAMINATION: CT CHEST WO CONTRAST, CT ABDOMEN/PELVIS WO CONTRAST -  2/8/2019     INDICATION: Persistent cough.     TECHNIQUE: Multiple axial CT imaging is obtained of the chest, abdomen  and pelvis without the administration of oral or intravenous contrast.     The radiation dose reduction device was turned on for each scan per the  ALARA (As Low as Reasonably Achievable) protocol.     COMPARISON: There is some consolidation identified posteriorly extending  to the lung bases bilaterally suggesting infiltrates. The upper lung  fields are grossly clear. Motion artifact degrades image quality. There  are degenerative changes identified within the spine. The cardiac  chambers are enlarged. There is no pericardial effusion. No bulky hilar  or axillary lymphadenopathy. The thyroid is homogeneous. Degenerative  change is seen throughout the spine.     ABDOMEN: Motion artifact grades image quality. There is no abnormality  seen within the liver. The spleen is upper limits of normal in size.  There is gastric distention. Air fills the colon with no  evidence of  obvious obstruction. Kidneys and adrenal glands within normal limits.  The pancreas is homogeneous. No abdominal or  retroperitoneal  lymphadenopathy. No abnormal mass or fluid collections identified. No  free fluid or free air.     PELVIS: Pelvic organs are unremarkable. The pelvic portion of the   gastrointestinal tract is within normal limits. No pelvic adenopathy.  Degenerative change is identified throughout the spine and pelvis.     FINDINGS: Consolidation at the lung bases bilaterally is concerning for  airspace disease such as pneumonia. The there is gastric distention.  Air-filled loops of colon with no obvious obstruction. No other abnormal  findings are seen within the abdomen or pelvis. Degenerative changes are  seen throughout the spine and pelvis.     DICTATED:   2/8/2019  EDITED/ls :   2/8/2019              Impression:               CT Chest Without Contrast [[id number]] Collected:  02/08/19 1822     Updated:  02/08/19 1822    Narrative:       EXAMINATION: CT CHEST WO CONTRAST, CT ABDOMEN/PELVIS WO CONTRAST -  2/8/2019     INDICATION: Persistent cough.     TECHNIQUE: Multiple axial CT imaging is obtained of the chest, abdomen  and pelvis without the administration of oral or intravenous contrast.     The radiation dose reduction device was turned on for each scan per the  ALARA (As Low as Reasonably Achievable) protocol.     COMPARISON: There is some consolidation identified posteriorly extending  to the lung bases bilaterally suggesting infiltrates. The upper lung  fields are grossly clear. Motion artifact degrades image quality. There  are degenerative changes identified within the spine. The cardiac  chambers are enlarged. There is no pericardial effusion. No bulky hilar  or axillary lymphadenopathy. The thyroid is homogeneous. Degenerative  change is seen throughout the spine.     ABDOMEN: Motion artifact grades image quality. There is no abnormality  seen within the liver. The spleen is upper limits of normal in size.  There is gastric distention. Air fills the colon with no  evidence of  obvious obstruction.  Kidneys and adrenal glands within normal limits.  The pancreas is homogeneous. No abdominal or retroperitoneal  lymphadenopathy. No abnormal mass or fluid collections identified. No  free fluid or free air.     PELVIS: Pelvic organs are unremarkable. The pelvic portion of the   gastrointestinal tract is within normal limits. No pelvic adenopathy.  Degenerative change is identified throughout the spine and pelvis.     FINDINGS: Consolidation at the lung bases bilaterally is concerning for  airspace disease such as pneumonia. The there is gastric distention.  Air-filled loops of colon with no obvious obstruction. No other abnormal  findings are seen within the abdomen or pelvis. Degenerative changes are  seen throughout the spine and pelvis.     DICTATED:   2/8/2019  EDITED/ls :   2/8/2019              Impression:               CT Head Without Contrast [[id number]] Collected:  02/08/19 1816     Updated:  02/08/19 1817    Narrative:       EXAMINATION: CT HEAD WO CONTRAST - 2/8/2019     INDICATION: Mental status change, does not follow commands.     TECHNIQUE: Multiple axial CT imaging is obtained of the head from skull  base to skull vertex without the administration of intravenous contrast.     The radiation dose reduction device was turned on for each scan per the  ALARA (As Low as Reasonably Achievable) protocol.     COMPARISON: NONE     FINDINGS: There is atrophy of the brain. Some low-density area seen in  the periventricular white matter suggesting chronic small vessel  ischemic change. No hemorrhage or hydrocephalus. No mass, mass effect,  or midline shift. No abnormal extra-axial  fluid collections identified. Minimal mucosal thickening of the  maxillary sinuses and ethmoid air cells. The bony structures are  unremarkable. The mastoid air cells are patent.       Impression:       Chronic changes identified within the brain with no acute  intracranial abnormality.     DICTATED:   2/8/2019  EDITED/ls :   2/8/2019         FL C Arm During Surgery [[id number]] Collected:  02/08/19 1646     Updated:  02/08/19 1646    Narrative:       EXAMINATION: FL C ARM DURING SURGERY- 02/08/2019      INDICATION: Dialysis catheter placement     COMPARISON: NONE     FINDINGS: 4 seconds of fluoroscopy and 4 images used for right-sided  dialysis catheter placement. Please see the procedure report for full  details.       Impression:       Fluoroscopy for dialysis catheter placement. Please see the  procedure report for full details.      D:  02/08/2019  E:  02/08/2019       XR Chest 1 View [[id number]] Collected:  02/08/19 1628     Updated:  02/08/19 1628    Narrative:       EXAMINATION: XR CHEST 1 VW-02/08/2019:      INDICATION: RIJ line.      COMPARISON: 02/08/2019.     FINDINGS: Portable chest reveals low lung volumes. Deep line catheter  identified on the right with tip in the SVC. Degenerative changes seen  within the spine. The heart is borderline enlarged. Mild prominence seen  of the pulmonary vascularity. No pleural effusion or pneumothorax.           Impression:       Prominence of the pulmonary vascularity with deep line  catheter identified on the right with tip in the SVC. No evidence of  acute parenchymal disease.     D:  02/08/2019  E:  02/08/2019             XR Chest 1 View [[id number]] Collected:  02/08/19 1243     Updated:  02/08/19 1246    Narrative:          EXAMINATION: XR CHEST 1 VW-      INDICATION: respiratory failure      COMPARISON: 01/08/2019     FINDINGS: Heart is borderline enlarged. The vasculature is cephalized.  Lung volumes are relatively low. There is mild discoid atelectasis in  both medial lung bases, but no focal disease elsewhere..           Impression:       Low lung volumes, borderline cardiomegaly and mild pulmonary  vascular congestion. Mild bibasilar discoid atelectasis, new from prior  study.     This report was finalized on 2/8/2019 12:44 PM by DR. Antione Damon MD.           KLAUDIA:  Interpretation Summary      · Estimated EF appears to be in the range of 61 - 65%.  · Left ventricular systolic function is normal.  · Moderate aortic valve regurgitation is present.  · Mild tricuspid valve regurgitation is present.  · Mild mitral valve regurgitation is present  · No evidence of a left atrial appendage thrombus was present.   3         Impression:   1.  MSSA bacteremia/septic shock-with associated MSSA bacteriuria following recent prostate surgery.  Although MSSA bacteriuria is generally a reflection of the bacteremia rather than the cause of the bacteremia, and his situation the MSSA bacteremia may have been a result of MSSA UTI.  He has underlying valvular heart disease but his KLAUDIA did not reveal any definite evidence of endocarditis.  He is still at high risk for early endocarditis without a definite vegetation.  He will need a very prolonged course of intravenous antibiotic therapy. Repeat blood cultures from 2/18/19 are no growth to date. Leukocytosis improved. Did have a low-grade temperature in the last 24 hours but no persistent fevers.  2.  Septic shock-improved  3.  Acute hypoxic respiratory failure-improved  4.  Acute renal failure/ATN-improved  5.  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 Less concerned about spinal   6.  Elevated liver enzymes  7.  MSSA UTI  8.  Right wrist swelling-observe  9.  Valvular heart disease-with moderate aortic regurgitation and mild mitral regurgitation  10.  Prostate cancer-status post robotic prostatectomy on 1/17/19  11.  Atrial fibrillation  12.  History of spinal stenosis      Recommendations:    [facility]:  1.  Continue cefazolin 2g IV q8h. Anticipated stop date is 3/22/19 (6 weeks total)   2.  He will need weekly cbc with diff, bmp, esr, crp while on IV antibiotics. These labs will need to be faxed to [phone]  3. S/p PICC line placement. He will need weekly PICC line dressing changes  4. Plan is transfer to [facility]  rehab today. One of my partners can follow him while he is there    I am ok with his discharge today              Jeff Smith MD  2/24/2019

## 2019-03-13 RX ORDER — TRAMADOL HYDROCHLORIDE 50 MG/1
50 TABLET ORAL EVERY 8 HOURS PRN
Qty: 21 TABLET | Refills: 0 | OUTPATIENT
Start: 2019-03-13

## 2019-03-13 RX ORDER — GABAPENTIN 100 MG/1
100 CAPSULE ORAL DAILY
Qty: 7 CAPSULE | Refills: 0 | OUTPATIENT
Start: 2019-03-13

## 2019-03-13 RX ORDER — GABAPENTIN 100 MG/1
400 CAPSULE ORAL NIGHTLY
Qty: 7 CAPSULE | Refills: 0 | Status: CANCELLED | OUTPATIENT
Start: 2019-03-13

## 2019-03-13 RX ORDER — GABAPENTIN 400 MG/1
400 CAPSULE ORAL NIGHTLY
Qty: 7 CAPSULE | Refills: 0 | OUTPATIENT
Start: 2019-03-13

## 2019-03-18 ENCOUNTER — TRANSCRIBE ORDERS (OUTPATIENT)
Dept: LAB | Facility: HOSPITAL | Age: 69
End: 2019-03-18

## 2019-03-18 ENCOUNTER — LAB (OUTPATIENT)
Dept: LAB | Facility: HOSPITAL | Age: 69
End: 2019-03-18

## 2019-03-18 DIAGNOSIS — C61 MALIGNANT NEOPLASM OF PROSTATE (HCC): ICD-10-CM

## 2019-03-18 DIAGNOSIS — E11.8 TYPE 2 DIABETES MELLITUS WITH COMPLICATION, UNSPECIFIED WHETHER LONG TERM INSULIN USE: ICD-10-CM

## 2019-03-18 DIAGNOSIS — I48.0 PAROXYSMAL ATRIAL FIBRILLATION (HCC): ICD-10-CM

## 2019-03-18 DIAGNOSIS — I48.0 PAROXYSMAL ATRIAL FIBRILLATION (HCC): Primary | ICD-10-CM

## 2019-03-18 DIAGNOSIS — E66.01 MORBID OBESITY (HCC): ICD-10-CM

## 2019-03-18 DIAGNOSIS — I10 ESSENTIAL HYPERTENSION, MALIGNANT: ICD-10-CM

## 2019-03-18 LAB
ANION GAP SERPL CALCULATED.3IONS-SCNC: 8 MMOL/L (ref 3–11)
BASOPHILS # BLD AUTO: 0.07 10*3/MM3 (ref 0–0.2)
BASOPHILS NFR BLD AUTO: 0.6 % (ref 0–1)
BUN BLD-MCNC: 37 MG/DL (ref 9–23)
BUN/CREAT SERPL: 34.3 (ref 7–25)
CALCIUM SPEC-SCNC: 9.4 MG/DL (ref 8.7–10.4)
CHLORIDE SERPL-SCNC: 95 MMOL/L (ref 99–109)
CO2 SERPL-SCNC: 27 MMOL/L (ref 20–31)
CREAT BLD-MCNC: 1.08 MG/DL (ref 0.6–1.3)
CRP SERPL-MCNC: 12.68 MG/DL (ref 0–1)
DEPRECATED RDW RBC AUTO: 48.1 FL (ref 37–54)
EOSINOPHIL # BLD AUTO: 0.36 10*3/MM3 (ref 0–0.3)
EOSINOPHIL NFR BLD AUTO: 3.1 % (ref 0–3)
ERYTHROCYTE [DISTWIDTH] IN BLOOD BY AUTOMATED COUNT: 15.9 % (ref 11.3–14.5)
ERYTHROCYTE [SEDIMENTATION RATE] IN BLOOD: 69 MM/HR (ref 0–20)
GFR SERPL CREATININE-BSD FRML MDRD: 68 ML/MIN/1.73
GLUCOSE BLD-MCNC: 120 MG/DL (ref 70–100)
HCT VFR BLD AUTO: 29.8 % (ref 38.9–50.9)
HGB BLD-MCNC: 9.1 G/DL (ref 13.1–17.5)
IMM GRANULOCYTES # BLD AUTO: 0.03 10*3/MM3 (ref 0–0.05)
IMM GRANULOCYTES NFR BLD AUTO: 0.3 % (ref 0–0.6)
LYMPHOCYTES # BLD AUTO: 1.72 10*3/MM3 (ref 0.6–4.8)
LYMPHOCYTES NFR BLD AUTO: 15 % (ref 24–44)
MCH RBC QN AUTO: 25.1 PG (ref 27–31)
MCHC RBC AUTO-ENTMCNC: 30.5 G/DL (ref 32–36)
MCV RBC AUTO: 82.1 FL (ref 80–99)
MONOCYTES # BLD AUTO: 1.03 10*3/MM3 (ref 0–1)
MONOCYTES NFR BLD AUTO: 9 % (ref 0–12)
NEUTROPHILS # BLD AUTO: 8.25 10*3/MM3 (ref 1.5–8.3)
NEUTROPHILS NFR BLD AUTO: 72.3 % (ref 41–71)
PLATELET # BLD AUTO: 536 10*3/MM3 (ref 150–450)
PMV BLD AUTO: 9.2 FL (ref 6–12)
POTASSIUM BLD-SCNC: 5.8 MMOL/L (ref 3.5–5.5)
RBC # BLD AUTO: 3.63 10*6/MM3 (ref 4.2–5.76)
SODIUM BLD-SCNC: 130 MMOL/L (ref 132–146)
WBC NRBC COR # BLD: 11.43 10*3/MM3 (ref 3.5–10.8)

## 2019-03-18 PROCEDURE — 86140 C-REACTIVE PROTEIN: CPT

## 2019-03-18 PROCEDURE — 85652 RBC SED RATE AUTOMATED: CPT

## 2019-03-18 PROCEDURE — 36415 COLL VENOUS BLD VENIPUNCTURE: CPT

## 2019-03-18 PROCEDURE — 85025 COMPLETE CBC W/AUTO DIFF WBC: CPT

## 2019-03-18 PROCEDURE — 80048 BASIC METABOLIC PNL TOTAL CA: CPT

## 2019-03-21 ENCOUNTER — NURSING HOME (OUTPATIENT)
Dept: INTERNAL MEDICINE | Facility: CLINIC | Age: 69
End: 2019-03-21

## 2019-03-21 VITALS
WEIGHT: 278 LBS | HEART RATE: 64 BPM | BODY MASS INDEX: 43.53 KG/M2 | RESPIRATION RATE: 18 BRPM | DIASTOLIC BLOOD PRESSURE: 72 MMHG | SYSTOLIC BLOOD PRESSURE: 148 MMHG | TEMPERATURE: 98.3 F

## 2019-03-21 DIAGNOSIS — E11.42 TYPE 2 DIABETES MELLITUS WITH DIABETIC POLYNEUROPATHY, WITH LONG-TERM CURRENT USE OF INSULIN (HCC): ICD-10-CM

## 2019-03-21 DIAGNOSIS — I10 ESSENTIAL HYPERTENSION: ICD-10-CM

## 2019-03-21 DIAGNOSIS — M48.062 SPINAL STENOSIS, LUMBAR REGION, WITH NEUROGENIC CLAUDICATION: ICD-10-CM

## 2019-03-21 DIAGNOSIS — Z79.4 TYPE 2 DIABETES MELLITUS WITH DIABETIC POLYNEUROPATHY, WITH LONG-TERM CURRENT USE OF INSULIN (HCC): ICD-10-CM

## 2019-03-21 DIAGNOSIS — I25.10 CORONARY ARTERY DISEASE INVOLVING NATIVE HEART WITHOUT ANGINA PECTORIS, UNSPECIFIED VESSEL OR LESION TYPE: ICD-10-CM

## 2019-03-21 DIAGNOSIS — I48.0 PAROXYSMAL ATRIAL FIBRILLATION (HCC): ICD-10-CM

## 2019-03-21 DIAGNOSIS — I35.1 MODERATE AORTIC REGURGITATION: Primary | ICD-10-CM

## 2019-03-21 PROCEDURE — 99305 1ST NF CARE MODERATE MDM 35: CPT | Performed by: INTERNAL MEDICINE

## 2019-03-21 RX ORDER — GABAPENTIN 100 MG/1
100 CAPSULE ORAL NIGHTLY
Qty: 30 CAPSULE | Refills: 5 | Status: SHIPPED | OUTPATIENT
Start: 2019-03-21 | End: 2019-04-19 | Stop reason: SDUPTHER

## 2019-03-21 RX ORDER — GABAPENTIN 400 MG/1
400 CAPSULE ORAL NIGHTLY
Qty: 30 CAPSULE | Refills: 5 | Status: SHIPPED | OUTPATIENT
Start: 2019-03-21 | End: 2019-04-12

## 2019-03-21 RX ORDER — TRAMADOL HYDROCHLORIDE 50 MG/1
50 TABLET ORAL EVERY 6 HOURS PRN
Qty: 90 TABLET | Refills: 0 | Status: SHIPPED | OUTPATIENT
Start: 2019-03-21 | End: 2019-06-13 | Stop reason: SDUPTHER

## 2019-03-25 ENCOUNTER — TRANSCRIBE ORDERS (OUTPATIENT)
Dept: LAB | Facility: HOSPITAL | Age: 69
End: 2019-03-25

## 2019-03-25 ENCOUNTER — LAB (OUTPATIENT)
Dept: LAB | Facility: HOSPITAL | Age: 69
End: 2019-03-25

## 2019-03-25 ENCOUNTER — NURSING HOME (OUTPATIENT)
Dept: FAMILY MEDICINE CLINIC | Facility: CLINIC | Age: 69
End: 2019-03-25

## 2019-03-25 DIAGNOSIS — E11.8 TYPE 2 DIABETES MELLITUS WITH COMPLICATION, UNSPECIFIED WHETHER LONG TERM INSULIN USE: ICD-10-CM

## 2019-03-25 DIAGNOSIS — I10 ESSENTIAL HYPERTENSION, BENIGN: ICD-10-CM

## 2019-03-25 DIAGNOSIS — C61 MALIGNANT NEOPLASM OF PROSTATE (HCC): ICD-10-CM

## 2019-03-25 DIAGNOSIS — M51.36 DEGENERATIVE DISC DISEASE, LUMBAR: ICD-10-CM

## 2019-03-25 DIAGNOSIS — E66.01 MORBID OBESITY (HCC): ICD-10-CM

## 2019-03-25 DIAGNOSIS — E11.42 TYPE 2 DIABETES MELLITUS WITH DIABETIC POLYNEUROPATHY, WITH LONG-TERM CURRENT USE OF INSULIN (HCC): ICD-10-CM

## 2019-03-25 DIAGNOSIS — I48.0 PAROXYSMAL ATRIAL FIBRILLATION (HCC): ICD-10-CM

## 2019-03-25 DIAGNOSIS — D64.89 OTHER SPECIFIED ANEMIAS: ICD-10-CM

## 2019-03-25 DIAGNOSIS — A41.01 METHICILLIN SUSCEPTIBLE STAPHYLOCOCCUS AUREUS SEPTICEMIA (HCC): ICD-10-CM

## 2019-03-25 DIAGNOSIS — I10 ESSENTIAL HYPERTENSION: ICD-10-CM

## 2019-03-25 DIAGNOSIS — Z79.4 TYPE 2 DIABETES MELLITUS WITH DIABETIC POLYNEUROPATHY, WITH LONG-TERM CURRENT USE OF INSULIN (HCC): ICD-10-CM

## 2019-03-25 DIAGNOSIS — G62.9 PERIPHERAL NERVE DISORDER: Primary | ICD-10-CM

## 2019-03-25 DIAGNOSIS — M48.062 SPINAL STENOSIS, LUMBAR REGION, WITH NEUROGENIC CLAUDICATION: ICD-10-CM

## 2019-03-25 DIAGNOSIS — M51.26 DISPLACEMENT OF LUMBAR INTERVERTEBRAL DISC: ICD-10-CM

## 2019-03-25 DIAGNOSIS — G62.9 PERIPHERAL NERVE DISORDER: ICD-10-CM

## 2019-03-25 PROBLEM — A41.9 SEPSIS: Status: RESOLVED | Noted: 2019-02-13 | Resolved: 2019-03-25

## 2019-03-25 LAB
ANION GAP SERPL CALCULATED.3IONS-SCNC: 9 MMOL/L (ref 3–11)
BASOPHILS # BLD AUTO: 0.05 10*3/MM3 (ref 0–0.2)
BASOPHILS NFR BLD AUTO: 0.5 % (ref 0–1)
BUN BLD-MCNC: 46 MG/DL (ref 9–23)
BUN/CREAT SERPL: 46.9 (ref 7–25)
CALCIUM SPEC-SCNC: 9.3 MG/DL (ref 8.7–10.4)
CHLORIDE SERPL-SCNC: 96 MMOL/L (ref 99–109)
CO2 SERPL-SCNC: 25 MMOL/L (ref 20–31)
CREAT BLD-MCNC: 0.98 MG/DL (ref 0.6–1.3)
CRP SERPL-MCNC: 10.16 MG/DL (ref 0–1)
DEPRECATED RDW RBC AUTO: 45.8 FL (ref 37–54)
EOSINOPHIL # BLD AUTO: 0.53 10*3/MM3 (ref 0–0.3)
EOSINOPHIL NFR BLD AUTO: 5.8 % (ref 0–3)
ERYTHROCYTE [DISTWIDTH] IN BLOOD BY AUTOMATED COUNT: 15.5 % (ref 11.3–14.5)
ERYTHROCYTE [SEDIMENTATION RATE] IN BLOOD: >130 MM/HR (ref 0–20)
GFR SERPL CREATININE-BSD FRML MDRD: 76 ML/MIN/1.73
GLUCOSE BLD-MCNC: 108 MG/DL (ref 70–100)
HCT VFR BLD AUTO: 29.7 % (ref 38.9–50.9)
HGB BLD-MCNC: 9.2 G/DL (ref 13.1–17.5)
IMM GRANULOCYTES # BLD AUTO: 0.04 10*3/MM3 (ref 0–0.05)
IMM GRANULOCYTES NFR BLD AUTO: 0.4 % (ref 0–0.6)
LYMPHOCYTES # BLD AUTO: 1.11 10*3/MM3 (ref 0.6–4.8)
LYMPHOCYTES NFR BLD AUTO: 12.1 % (ref 24–44)
MCH RBC QN AUTO: 24.7 PG (ref 27–31)
MCHC RBC AUTO-ENTMCNC: 31 G/DL (ref 32–36)
MCV RBC AUTO: 79.8 FL (ref 80–99)
MONOCYTES # BLD AUTO: 0.81 10*3/MM3 (ref 0–1)
MONOCYTES NFR BLD AUTO: 8.8 % (ref 0–12)
NEUTROPHILS # BLD AUTO: 6.69 10*3/MM3 (ref 1.5–8.3)
NEUTROPHILS NFR BLD AUTO: 72.8 % (ref 41–71)
PLATELET # BLD AUTO: 442 10*3/MM3 (ref 150–450)
PMV BLD AUTO: 9 FL (ref 6–12)
POTASSIUM BLD-SCNC: 5.4 MMOL/L (ref 3.5–5.5)
RBC # BLD AUTO: 3.72 10*6/MM3 (ref 4.2–5.76)
SODIUM BLD-SCNC: 130 MMOL/L (ref 132–146)
WBC NRBC COR # BLD: 9.19 10*3/MM3 (ref 3.5–10.8)

## 2019-03-25 PROCEDURE — 85025 COMPLETE CBC W/AUTO DIFF WBC: CPT

## 2019-03-25 PROCEDURE — 36415 COLL VENOUS BLD VENIPUNCTURE: CPT

## 2019-03-25 PROCEDURE — 80048 BASIC METABOLIC PNL TOTAL CA: CPT

## 2019-03-25 PROCEDURE — 86140 C-REACTIVE PROTEIN: CPT

## 2019-03-25 PROCEDURE — 99309 SBSQ NF CARE MODERATE MDM 30: CPT | Performed by: NURSE PRACTITIONER

## 2019-03-25 NOTE — PROGRESS NOTES
Nursing Home Follow Up Note      Vish Botello DO []   MAYTE Taylor [x]  [address] [facility], [place] [address]  Phone: [phone]  Fax: [phone] Jaxson Holly MD []    Ad Campbell DO []   [address] [place] [address]  Phone: [phone]  Fax: [phone]     PATIENT NAME: Rod Balderas                                                                          YOB: 1950           DATE OF SERVICE: 03/25/2019  FACILITY:  [][place]   [] [place]   [] [facility]   [x] [facility]   [] Other ______________________________________________________________________      CHIEF COMPLAINT:  Patient requests increase in his Neurontin       HISTORY OF PRESENT ILLNESS:   Patient has complaints of increased pain and Neuropathy, r/t his DM and lumbar spinal stenosis and DDD. He was taking Neurontin 600 mg PO QID, at home, but  since his hospital admission, he is taking 100 mg Q day and 400 mg QHS. Patient would like his Neurontin increased.    Management of chronic conditions: Afib,  HTN, hx prostate ca    PAST MEDICAL & SURGICAL HISTORY:   Past Medical History:   Diagnosis Date   • Anxiety and depression    • Cancer (CMS/HCC)    • Chest pain    • Coronary artery disease    • Diabetes mellitus (CMS/HCC)    • Dyslipidemia    • Extremity pain    • Heart disease    • History of transfusion    • Hypertension    • Low back pain    • TANIYA on CPAP     2-3    • Osteoarthritis     Diffuse osteoarthritis.   • Panic attacks    • Prostate cancer (CMS/HCC)       Past Surgical History:   Procedure Laterality Date   • CARDIAC CATHETERIZATION     • CHOLECYSTECTOMY     • COLONOSCOPY  2015   • CORONARY ANGIOPLASTY WITH STENT PLACEMENT  12/2012 December 2012:  A 3.5 x 88 Promus VERONICA stent to OM2.  Nonobstructive disease.  Otherwise normal LVEF.   • EYE SURGERY Bilateral     cataract   • INSERTION HEMODIALYSIS CATHETER N/A 2/8/2019    Procedure: HEMODIALYSIS CATHETER INSERTION RIGHT INTERNAL JUGULAR;   Surgeon: Bishnu Dailey MD;  Location:  [facility];  Service: General   • PROSTATECTOMY N/A 1/15/2019    Procedure: ROBOTIC PROSTATECTOMY WITH NODES;  Surgeon: Juanito Grace Jr., MD;  Location:  [facility];  Service: DaVinci   • RETINAL DETACHMENT SURGERY Right          MEDICATIONS:  I have reviewed and reconciled the patients medication list in the patients chart at the skilled nursing facility today.      ALLERGIES:    Allergies   Allergen Reactions   • Percocet [Oxycodone-Acetaminophen] Anxiety   • Sulfa Antibiotics Hives         SOCIAL HISTORY:    Social History     Socioeconomic History   • Marital status:      Spouse name: Not on file   • Number of children: Not on file   • Years of education: Not on file   • Highest education level: Not on file   Tobacco Use   • Smoking status: Former Smoker   • Smokeless tobacco: Former User   Substance and Sexual Activity   • Alcohol use: No   • Drug use: No   • Sexual activity: Defer       FAMILY HISTORY:    Family History   Problem Relation Age of Onset   • Heart disease Mother    • Thyroid disease Mother    • Heart disease Father    • Cancer Brother        REVIEW OF SYSTEMS:    Review of Systems   Constitutional: Negative for activity change, appetite change, chills, diaphoresis, fatigue, fever, unexpected weight gain and unexpected weight loss.   HENT: Negative for congestion, ear pain, mouth sores, nosebleeds, postnasal drip, rhinorrhea, sinus pressure, sneezing, sore throat and trouble swallowing.    Eyes: Negative for blurred vision, pain, discharge, redness, itching and visual disturbance.   Respiratory: Negative for apnea, cough, choking, chest tightness, shortness of breath, wheezing and stridor.    Cardiovascular: Negative for chest pain, palpitations and leg swelling.   Gastrointestinal: Negative for abdominal distention, abdominal pain, blood in stool, constipation, diarrhea, nausea, vomiting, GERD and indigestion.   Endocrine: Negative for  polydipsia and polyuria.   Genitourinary: Negative for urinary incontinence, decreased urine volume, difficulty urinating, dysuria, flank pain, frequency, hematuria and urgency.   Musculoskeletal: Positive for arthralgias and back pain. Negative for gait problem, joint swelling and myalgias.   Skin: Negative for color change, dry skin, rash and skin lesions.   Allergic/Immunologic: Negative for environmental allergies.   Neurological: Positive for numbness. Negative for dizziness, seizures, speech difficulty, weakness, memory problem and confusion.   Psychiatric/Behavioral: Negative for behavioral problems, dysphoric mood, hallucinations, sleep disturbance and depressed mood. The patient is not nervous/anxious.          PHYSICAL EXAMINATION:   VITAL SIGNS: BP: 130/62    HR: 64    RR: 20   Temp: 98.2   Weight: 267 lbs     Physical Exam   Constitutional: He appears well-developed and well-nourished.   HENT:   Head: Normocephalic.   Right Ear: External ear normal.   Left Ear: External ear normal.   Nose: Nose normal.   Eyes: Conjunctivae are normal.   Neck: Normal range of motion.   Cardiovascular: Normal rate, regular rhythm, normal heart sounds and intact distal pulses.   Pulmonary/Chest: Effort normal and breath sounds normal. No respiratory distress. He has no wheezes. He has no rales.   Abdominal: Soft. Bowel sounds are normal. He exhibits no distension and no mass. There is no tenderness. No hernia.   Musculoskeletal: He exhibits no edema.   NROM all major joints   Neurological: He is alert.   Skin: Skin is warm and dry. No rash noted.   Psychiatric: He has a normal mood and affect. His behavior is normal.   Nursing note and vitals reviewed.      RECORDS REVIEW:   I have reviewed and interpreted the following lab test results  obtained at the time of the visit today. BUN 38   Crt: 1.2    GFR 60  Hgb: 8.8   Hct: 26.7     ASSESSMENT     Diagnoses and all orders for this visit:    Type 2 diabetes mellitus with  diabetic polyneuropathy, with long-term current use of insulin (CMS/[facility])    Spinal stenosis, lumbar region, with neurogenic claudication    Degenerative disc disease, lumbar    Displacement of lumbar intervertebral disc    Essential hypertension    Paroxysmal atrial fibrillation (CMS/[facility])        PLAN  DM controlled with no reports of cyclical HG. A1C 5.1 on 3/19. Stop Neurontin 100 mg PO daily and 400 mg PO QHS, and started Neurontin 600 mg PO TID, for better control of his chronic pain and Neuropathy.    BP at goal on current meds.     Atrial fib controlled on current medications.    Nursing staff to continue to assist patient with ADL's.     Patient and nursing staff was encouraged to keep me informed of any acute changes, lack of improvement, or any new concerning symptoms.    [x]  Discussed Patient in detail with nursing/staff, addressed all needs today.     [x]  Plan of Care Reviewed   []  PT/OT Reviewed   [x]  Order Changes  []  Discharge Plans Reviewed  []  Advance Directive on file with Nursing Home.   []  POA on file with Nursing Home.   [x]  Code Status listed: [x]  Full Code   []  DNR         Raquel Lidna, MAYTE.

## 2019-03-25 NOTE — PROGRESS NOTES
Nursing Home Progress Note        Vish Botello DO ?  MAYTE Taylor ?  [address] [facility], [place] [address]  Phone: [phone]  Fax: [phone] Jaxson Holly MD ?  Ad Campbell DO [x]   [address] [place] [address]  Phone: [phone]  Fax: [phone]     PATIENT NAME: Rod Balderas                                                                          YOB: 1950           DATE OF SERVICE: 03/21/2019  FACILITY:  [] [facility]   [] [place]   [] [facility]   [x] [facility]   [] Other ______________________________________________________________________     CHIEF COMPLAINT:  Long-term medical management patient is a 68-year-old male      HISTORY OF PRESENT ILLNESS:  Patient is a 68-year-old white male with a history of A. fib, aortic regurgitation, essential hypertension and type 2 diabetes who presents as a transfer from [facility] after a complicated UTI following prostate surgery in January.  Hospitalization was complicated further with MSSA bacteremia with septic shock and acute kidney injury.  Patient had gradual improvement in medical status and was eventually able to transfer to the [facility] for rehab and strengthening.  Upon evaluation this morning, patient was pleasant and had no significant complaints or concerns.  Nursing staff also shared patient has been pleasant.  He was eating and drinking well but remained very weak.         PAST MEDICAL & SURGICAL HISTORY:   Past Medical History:   Diagnosis Date   • Anxiety and depression    • Cancer (CMS/HCC)    • Chest pain    • Coronary artery disease    • Diabetes mellitus (CMS/HCC)    • Dyslipidemia    • Extremity pain    • Heart disease    • History of transfusion    • Hypertension    • Low back pain    • TANIYA on CPAP     2-3    • Osteoarthritis     Diffuse osteoarthritis.   • Panic attacks    • Prostate cancer (CMS/HCC)       Past Surgical History:   Procedure Laterality Date   •  CARDIAC CATHETERIZATION     • CHOLECYSTECTOMY     • COLONOSCOPY  2015   • CORONARY ANGIOPLASTY WITH STENT PLACEMENT  12/2012 December 2012:  A 3.5 x 88 Promus VERONICA stent to OM2.  Nonobstructive disease.  Otherwise normal LVEF.   • EYE SURGERY Bilateral     cataract   • INSERTION HEMODIALYSIS CATHETER N/A 2/8/2019    Procedure: HEMODIALYSIS CATHETER INSERTION RIGHT INTERNAL JUGULAR;  Surgeon: Bishnu Dailey MD;  Location:  [facility];  Service: General   • PROSTATECTOMY N/A 1/15/2019    Procedure: ROBOTIC PROSTATECTOMY WITH NODES;  Surgeon: Juanito Grace Jr., MD;  Location:  [facility];  Service: DaVinci   • RETINAL DETACHMENT SURGERY Right          MEDICATIONS:  I have reviewed and reconciled the patients medication list in the patients chart at the skilled nursing facility today.      ALLERGIES:  Allergies   Allergen Reactions   • Percocet [Oxycodone-Acetaminophen] Anxiety   • Sulfa Antibiotics Hives         SOCIAL HISTORY:  Social History     Socioeconomic History   • Marital status:      Spouse name: Not on file   • Number of children: Not on file   • Years of education: Not on file   • Highest education level: Not on file   Tobacco Use   • Smoking status: Former Smoker   • Smokeless tobacco: Former User   Substance and Sexual Activity   • Alcohol use: No   • Drug use: No   • Sexual activity: Defer       FAMILY HISTORY:  Family History   Problem Relation Age of Onset   • Heart disease Mother    • Thyroid disease Mother    • Heart disease Father    • Cancer Brother        REVIEW OF SYSTEMS:  Review of Systems   Constitutional: Negative for chills, fatigue and fever.   HENT: Negative for congestion, ear pain, rhinorrhea, sinus pressure and sore throat.    Eyes: Negative for visual disturbance.   Respiratory: Negative for cough, chest tightness, shortness of breath and wheezing.    Cardiovascular: Negative for chest pain, palpitations and leg swelling.   Gastrointestinal: Negative for abdominal  pain, blood in stool, constipation, diarrhea, nausea and vomiting.   Endocrine: Negative for polydipsia and polyuria.   Genitourinary: Negative for dysuria and hematuria.   Musculoskeletal: Negative for arthralgias and back pain.   Skin: Negative for rash.   Neurological: Negative for dizziness, light-headedness, numbness and headaches.   Psychiatric/Behavioral: Negative for dysphoric mood and sleep disturbance. The patient is not nervous/anxious.           PHYSICAL EXAMINATION:     VITAL SIGNS:  /72   Pulse 64   Temp 98.3 °F (36.8 °C)   Resp 18   Wt 126 kg (278 lb)   BMI 43.53 kg/m²     Physical Exam   Constitutional: He is oriented to person, place, and time. He appears well-developed and well-nourished.   HENT:   Head: Normocephalic and atraumatic.   Mouth/Throat: Oropharynx is clear and moist. No oropharyngeal exudate.   Eyes: Conjunctivae and EOM are normal. Pupils are equal, round, and reactive to light. No scleral icterus.   Neck: Normal range of motion. Neck supple. No thyromegaly present.   Cardiovascular: Normal rate, regular rhythm and normal heart sounds. Exam reveals no gallop and no friction rub.   No murmur heard.  Pulmonary/Chest: Effort normal and breath sounds normal. No respiratory distress. He has no wheezes.   Abdominal: Soft. Bowel sounds are normal. He exhibits no distension. There is no tenderness.   Musculoskeletal: Normal range of motion.   Lymphadenopathy:     He has no cervical adenopathy.   Neurological: He is alert and oriented to person, place, and time.   Skin: Skin is warm and dry. No rash noted.   Psychiatric: He has a normal mood and affect. His behavior is normal.   Nursing note and vitals reviewed.      RECORDS REVIEW:   Discharge summary-   [facility] 3/12/19  ASSESSMENT     Diagnoses and all orders for this visit:    Moderate aortic regurgitation    Paroxysmal atrial fibrillation (CMS/HCC)    Coronary artery disease involving native heart without angina  pectoris, unspecified vessel or lesion type    Essential hypertension    Type 2 diabetes mellitus with diabetic polyneuropathy, with long-term current use of insulin (CMS/[facility])    Spinal stenosis, lumbar region, with neurogenic claudication        PLAN    1.  Debility/critical illness related to neuropathy  -Continue physical therapy and rehab.    2.  Moderate aortic regurgitation/paroxysmal A. fib  -   Stable on current medications.    3.  Coronary artery disease  -Stable on current medications    4.  Type 2 diabetes mellitus  -Continue monitoring glucose levels.  Stable at this time.    5.  Chronic kidney disease/borderline hyperkalemia  -Labs were reviewed and indicated potassium of 6.0 sodium 130.  Repeat BMP in 1 week.  Encourage patient to continue drinking fluids    [x]  Discussed Patient in detail with nursing/staff, addressed all needs today.     [x]  Plan of Care Reviewed   [x]  PT/OT Reviewed   []  Order Changes  []  Discharge Plans Reviewed  [x]  Advance Directive on file with Nursing Home.   [x]  POA on file with Nursing Home.    [x]  Code Status listed and reviewed.          Ad Campbell DO.  3/25/2019

## 2019-03-28 ENCOUNTER — NURSING HOME (OUTPATIENT)
Dept: FAMILY MEDICINE CLINIC | Facility: CLINIC | Age: 69
End: 2019-03-28

## 2019-03-28 DIAGNOSIS — E87.1 HYPONATREMIA: ICD-10-CM

## 2019-03-28 DIAGNOSIS — M48.062 SPINAL STENOSIS, LUMBAR REGION, WITH NEUROGENIC CLAUDICATION: ICD-10-CM

## 2019-03-28 DIAGNOSIS — M25.562 CHRONIC PAIN OF LEFT KNEE: ICD-10-CM

## 2019-03-28 DIAGNOSIS — E86.0 DEHYDRATION: ICD-10-CM

## 2019-03-28 DIAGNOSIS — M51.36 DEGENERATIVE DISC DISEASE, LUMBAR: ICD-10-CM

## 2019-03-28 DIAGNOSIS — T78.40XA ALLERGIC REACTION, INITIAL ENCOUNTER: ICD-10-CM

## 2019-03-28 DIAGNOSIS — G62.9 POLYNEUROPATHY: ICD-10-CM

## 2019-03-28 DIAGNOSIS — M23.8X2 CREPITUS OF JOINT OF LEFT KNEE: ICD-10-CM

## 2019-03-28 DIAGNOSIS — G89.29 CHRONIC PAIN OF LEFT KNEE: ICD-10-CM

## 2019-03-28 PROCEDURE — 99309 SBSQ NF CARE MODERATE MDM 30: CPT | Performed by: NURSE PRACTITIONER

## 2019-03-28 NOTE — PROGRESS NOTES
Nursing Home Follow Up Note      Vish Botello DO []   MAYTE Taylor [x]  [address] [place] [address]  Phone: [phone]  Fax: [phone] Jaxson Holly MD []    Ad Campbell DO []   [address] [place] [address]  Phone: [phone]  Fax: [phone]     PATIENT NAME: Rod Balderas                                                                          YOB: 1950           DATE OF SERVICE: 03/28/2019  FACILITY:  [][facility] [facility]   [] [facility]   [] [facility]   [x] [facility]   [] Other ______________________________________________________________________      CHIEF COMPLAINT:  Leg pain,  previous allergic reaction to Cefazolin, left knee pain, dehydration and hyponatremia.       HISTORY OF PRESENT ILLNESS:   Patient has complaints of increased pain and Neuropathy, r/t his DM and lumbar spinal stenosis.  Patients Neurontin was increased to 600 mg PO TID two days ago at last visit ,so will monitor.     Patient was started on IV Cefazolin 2G Q8H X7 days, by infectious disease, for a complicated UTI. Following administration the patient began flushing all over his body, his blood pressure increased, and he became confused. He was then switched to IV Rocephin and developed a resolution of symptoms.     Patient has had chronic left knee pain related to  over the years. He states that it has gotten worse since his recent hospitalization. It pops and cracks when he is moving it, and the pain is aching and worse than previously. He would like to get an Xray.    During his hospitalization, patient was septic and went into ARF.  He was placed on a 1.5L fluid restriction. His ARF had resolved at his time of d/c from the hospital and kidney function was normal. He was put on Bumex at the hospital, for LE edema. His fluid restriction and Bumex, have continued in LTC facility and he  has continued to have both dehydration and hyponatremia. He dehydration and  hyponatremia have improved some since last visit, due to his Bumex being held.    PAST MEDICAL & SURGICAL HISTORY:   Past Medical History:   Diagnosis Date   • Anxiety and depression    • Cancer (CMS/HCC)    • Chest pain    • Coronary artery disease    • Diabetes mellitus (CMS/HCC)    • Dyslipidemia    • Extremity pain    • Heart disease    • History of transfusion    • Hypertension    • Low back pain    • TANIYA on CPAP     2-3    • Osteoarthritis     Diffuse osteoarthritis.   • Panic attacks    • Prostate cancer (CMS/HCC)       Past Surgical History:   Procedure Laterality Date   • CARDIAC CATHETERIZATION     • CHOLECYSTECTOMY     • COLONOSCOPY  2015   • CORONARY ANGIOPLASTY WITH STENT PLACEMENT  12/2012 December 2012:  A 3.5 x 88 Promus VERONICA stent to OM2.  Nonobstructive disease.  Otherwise normal LVEF.   • EYE SURGERY Bilateral     cataract   • INSERTION HEMODIALYSIS CATHETER N/A 2/8/2019    Procedure: HEMODIALYSIS CATHETER INSERTION RIGHT INTERNAL JUGULAR;  Surgeon: Bishnu Dailey MD;  Location: [facility] OR;  Service: General   • PROSTATECTOMY N/A 1/15/2019    Procedure: ROBOTIC PROSTATECTOMY WITH NODES;  Surgeon: Juanito Grace Jr., MD;  Location: [facility] OR;  Service: DaVinci   • RETINAL DETACHMENT SURGERY Right          MEDICATIONS:  I have reviewed and reconciled the patients medication list in the patients chart at the skilled nursing facility today.      ALLERGIES:    Allergies   Allergen Reactions   • Percocet [Oxycodone-Acetaminophen] Anxiety   • Cefazolin Confusion   • Sulfa Antibiotics Hives         SOCIAL HISTORY:    Social History     Socioeconomic History   • Marital status:      Spouse name: Not on file   • Number of children: Not on file   • Years of education: Not on file   • Highest education level: Not on file   Tobacco Use   • Smoking status: Former Smoker   • Smokeless tobacco: Former User   Substance and Sexual Activity   • Alcohol use: No   • Drug use: No   • Sexual  activity: Defer       FAMILY HISTORY:    Family History   Problem Relation Age of Onset   • Heart disease Mother    • Thyroid disease Mother    • Heart disease Father    • Cancer Brother        REVIEW OF SYSTEMS:    Review of Systems   Constitutional: Negative for activity change, appetite change, chills, diaphoresis, fatigue, fever, unexpected weight gain and unexpected weight loss.   HENT: Negative for congestion, ear pain, mouth sores, nosebleeds, postnasal drip, rhinorrhea, sinus pressure, sneezing, sore throat and trouble swallowing.    Eyes: Negative for blurred vision, pain, discharge, redness, itching and visual disturbance.   Respiratory: Negative for apnea, cough, choking, chest tightness, shortness of breath, wheezing and stridor.    Cardiovascular: Negative for chest pain, palpitations and leg swelling.   Gastrointestinal: Negative for abdominal distention, abdominal pain, blood in stool, constipation, diarrhea, nausea, vomiting, GERD and indigestion.   Endocrine: Negative for polydipsia and polyuria.   Genitourinary: Negative for urinary incontinence, decreased urine volume, difficulty urinating, dysuria, flank pain, frequency, hematuria and urgency.   Musculoskeletal: Positive for arthralgias, back pain and gait problem. Negative for joint swelling and myalgias.        Saji leg pain   Skin: Negative for color change, dry skin, rash and skin lesions.   Allergic/Immunologic: Negative for environmental allergies.   Neurological: Positive for numbness. Negative for dizziness, seizures, speech difficulty, weakness, memory problem and confusion.   Psychiatric/Behavioral: Negative for behavioral problems, dysphoric mood, hallucinations, sleep disturbance and depressed mood. The patient is not nervous/anxious.          PHYSICAL EXAMINATION:   VITAL SIGNS: BP: 138/64    HR: 60    RR: 20   Temp: 98.2   Weight: 260 lbs     Physical Exam   Constitutional: He appears well-developed and well-nourished.   HENT:    Head: Normocephalic.   Right Ear: External ear normal.   Left Ear: External ear normal.   Nose: Nose normal.   Eyes: Conjunctivae are normal.   Neck: Normal range of motion.   Cardiovascular: Normal rate, regular rhythm, normal heart sounds and intact distal pulses.   No edema noted in LE   Pulmonary/Chest: Effort normal and breath sounds normal. No respiratory distress. He has no wheezes. He has no rales.   Abdominal: Soft. Bowel sounds are normal. He exhibits no distension and no mass. There is no tenderness. No hernia.   Musculoskeletal: He exhibits no edema.        Left knee: He exhibits decreased range of motion (crepitus). He exhibits no swelling and no effusion.        Lumbar back: He exhibits decreased range of motion, tenderness and pain.   LE weakness, very limited movement   Neurological: He is alert. He displays abnormal reflex.   LE weakness   Skin: Skin is warm and dry. No rash noted.   Psychiatric: He has a normal mood and affect. His behavior is normal.   Nursing note and vitals reviewed.      RECORDS REVIEW:   I have reviewed and interpreted the following lab test results  obtained at the time of the visit today. 3/26/19: K+ 5.6 (improved from 6.0),  BUN 54   Crt: 0.9   GFR  84   ALT  74   Hgb: 8.4  Hct: 25.5    ASSESSMENT     Diagnoses and all orders for this visit:    Spinal stenosis, lumbar region, with neurogenic claudication    Degenerative disc disease, lumbar    Polyneuropathy    Allergic reaction, initial encounter    Chronic pain of left knee    Crepitus of joint of left knee    Dehydration    Hyponatremia        PLAN  Patient was started on Neurontin 600 mg PO TID, for better control of his chronic pain and Neuropathy at his las visit 2 days ago. This has improved, will monitor. Patient is scheduled for a Myelogram today to evaluate possible spinal abscess.     Updated patients allergies to include Cefazolin.He will continue to follow ID for management of this.    Get X-ray of left  knee related to increased pain and crepitus.     Check an ADH level in the AM, continue to hold Bumex doses until lab results return. Continue 1.5L fluid restriction, and get a BMP on 4/1. If ADH normal, will remove fluid restriction.    Nursing staff to continue to assist patient with ADL's.     Patient and nursing staff was encouraged to keep me informed of any acute changes, lack of improvement, or any new concerning symptoms.    [x]  Discussed Patient in detail with nursing/staff, addressed all needs today.     [x]  Plan of Care Reviewed   []  PT/OT Reviewed   [x]  Order Changes  []  Discharge Plans Reviewed  []  Advance Directive on file with Nursing Home.   []  POA on file with Nursing Home.   [x]  Code Status listed: [x]  Full Code   []  DNR         Raquel Linda, MAYTE.

## 2019-04-01 ENCOUNTER — LAB (OUTPATIENT)
Dept: LAB | Facility: HOSPITAL | Age: 69
End: 2019-04-01

## 2019-04-01 ENCOUNTER — TRANSCRIBE ORDERS (OUTPATIENT)
Dept: LAB | Facility: HOSPITAL | Age: 69
End: 2019-04-01

## 2019-04-01 ENCOUNTER — NURSING HOME (OUTPATIENT)
Dept: FAMILY MEDICINE CLINIC | Facility: CLINIC | Age: 69
End: 2019-04-01

## 2019-04-01 DIAGNOSIS — G62.9 PERIPHERAL NERVE DISORDER: Primary | ICD-10-CM

## 2019-04-01 DIAGNOSIS — M48.062 SPINAL STENOSIS, LUMBAR REGION, WITH NEUROGENIC CLAUDICATION: ICD-10-CM

## 2019-04-01 DIAGNOSIS — E86.0 DEHYDRATION: ICD-10-CM

## 2019-04-01 DIAGNOSIS — E66.01 MORBID OBESITY (HCC): ICD-10-CM

## 2019-04-01 DIAGNOSIS — R53.81 PHYSICAL DECONDITIONING: ICD-10-CM

## 2019-04-01 DIAGNOSIS — G62.9 PERIPHERAL NERVE DISORDER: ICD-10-CM

## 2019-04-01 DIAGNOSIS — M17.12 ARTHRITIS OF LEFT KNEE: ICD-10-CM

## 2019-04-01 DIAGNOSIS — E87.1 HYPONATREMIA: ICD-10-CM

## 2019-04-01 LAB
ANION GAP SERPL CALCULATED.3IONS-SCNC: 6 MMOL/L (ref 3–11)
BASOPHILS # BLD AUTO: 0.11 10*3/MM3 (ref 0–0.2)
BASOPHILS NFR BLD AUTO: 1.5 % (ref 0–1)
BUN BLD-MCNC: 24 MG/DL (ref 9–23)
BUN/CREAT SERPL: 39.3 (ref 7–25)
CALCIUM SPEC-SCNC: 8.9 MG/DL (ref 8.7–10.4)
CHLORIDE SERPL-SCNC: 104 MMOL/L (ref 99–109)
CO2 SERPL-SCNC: 25 MMOL/L (ref 20–31)
CREAT BLD-MCNC: 0.61 MG/DL (ref 0.6–1.3)
CRP SERPL-MCNC: 9.62 MG/DL (ref 0–1)
DEPRECATED RDW RBC AUTO: 46 FL (ref 37–54)
EOSINOPHIL # BLD AUTO: 0.69 10*3/MM3 (ref 0–0.3)
EOSINOPHIL NFR BLD AUTO: 9.3 % (ref 0–3)
ERYTHROCYTE [DISTWIDTH] IN BLOOD BY AUTOMATED COUNT: 15.5 % (ref 11.3–14.5)
ERYTHROCYTE [SEDIMENTATION RATE] IN BLOOD: 46 MM/HR (ref 0–20)
GFR SERPL CREATININE-BSD FRML MDRD: 132 ML/MIN/1.73
GLUCOSE BLD-MCNC: 101 MG/DL (ref 70–100)
HCT VFR BLD AUTO: 26.4 % (ref 38.9–50.9)
HGB BLD-MCNC: 8.1 G/DL (ref 13.1–17.5)
IMM GRANULOCYTES # BLD AUTO: 0.02 10*3/MM3 (ref 0–0.05)
IMM GRANULOCYTES NFR BLD AUTO: 0.3 % (ref 0–0.6)
LYMPHOCYTES # BLD AUTO: 1.04 10*3/MM3 (ref 0.6–4.8)
LYMPHOCYTES NFR BLD AUTO: 14 % (ref 24–44)
MCH RBC QN AUTO: 24.8 PG (ref 27–31)
MCHC RBC AUTO-ENTMCNC: 30.7 G/DL (ref 32–36)
MCV RBC AUTO: 80.7 FL (ref 80–99)
MONOCYTES # BLD AUTO: 0.61 10*3/MM3 (ref 0–1)
MONOCYTES NFR BLD AUTO: 8.2 % (ref 0–12)
NEUTROPHILS # BLD AUTO: 4.96 10*3/MM3 (ref 1.5–8.3)
NEUTROPHILS NFR BLD AUTO: 67 % (ref 41–71)
PLATELET # BLD AUTO: 274 10*3/MM3 (ref 150–450)
PMV BLD AUTO: 8.5 FL (ref 6–12)
POTASSIUM BLD-SCNC: 4.6 MMOL/L (ref 3.5–5.5)
RBC # BLD AUTO: 3.27 10*6/MM3 (ref 4.2–5.76)
SODIUM BLD-SCNC: 135 MMOL/L (ref 132–146)
WBC NRBC COR # BLD: 7.41 10*3/MM3 (ref 3.5–10.8)

## 2019-04-01 PROCEDURE — 86140 C-REACTIVE PROTEIN: CPT | Performed by: INTERNAL MEDICINE

## 2019-04-01 PROCEDURE — 85025 COMPLETE CBC W/AUTO DIFF WBC: CPT

## 2019-04-01 PROCEDURE — 80048 BASIC METABOLIC PNL TOTAL CA: CPT

## 2019-04-01 PROCEDURE — 99309 SBSQ NF CARE MODERATE MDM 30: CPT | Performed by: NURSE PRACTITIONER

## 2019-04-01 PROCEDURE — 85652 RBC SED RATE AUTOMATED: CPT

## 2019-04-01 PROCEDURE — 36415 COLL VENOUS BLD VENIPUNCTURE: CPT | Performed by: INTERNAL MEDICINE

## 2019-04-01 NOTE — PROGRESS NOTES
Nursing Home Follow Up Note      Vish Botello DO []   MAYTE Taylor [x]  [address] [facility], [place] [address]  Phone: [phone]  Fax: [phone] Jaxson Holly MD []    Ad Campbell DO []   [address] [place] [address]  Phone: [phone]  Fax: [phone]     PATIENT NAME: Rod Balderas                                                                          YOB: 1950           DATE OF SERVICE: 04/01/2019  FACILITY:  [][place]   [] [place]   [] [facility]   [x] [facility]   [] Other ______________________________________________________________________      CHIEF COMPLAINT:  F/U on dehydration and hyponatremia.     F/u left knee Xray      HISTORY OF PRESENT ILLNESS:    During his hospitalization, patient was septic and went into ARF.  He was placed on a 1.5L fluid restriction. His ARF had resolved at his time of d/c from the hospital and kidney function was normal. He was put on Bumex at the hospital, for LE edema. His fluid restriction and Bumex, have continued in LTC facility and he  has continued to have both dehydration and hyponatremia. He dehydration and hyponatremia have improved some since last visit, due to his Bumex being held. Na still at 129, BUN has improved to 27. Lab did not draw ADH that was ordered, but are going to tomorrow.      Patient has had chronic left knee pain related to  over the years. He felt it had gotten worse since his recent hospitalization. It was popping,cracking and aching more than previously. Xray of knee reported arthritis only.    He had his Myelogram last week, for further evaluation of his back pain and LE Neuropathy, and returns to  today, for results          PAST MEDICAL & SURGICAL HISTORY:   Past Medical History:   Diagnosis Date   • Anxiety and depression    • Cancer (CMS/HCC)    • Chest pain    • Coronary artery disease    • Diabetes mellitus (CMS/HCC)    • Dyslipidemia    • Extremity pain    • Heart  disease    • History of transfusion    • Hypertension    • Low back pain    • TANIYA on CPAP     2-3    • Osteoarthritis     Diffuse osteoarthritis.   • Panic attacks    • Prostate cancer (CMS/HCC)       Past Surgical History:   Procedure Laterality Date   • CARDIAC CATHETERIZATION     • CHOLECYSTECTOMY     • COLONOSCOPY  2015   • CORONARY ANGIOPLASTY WITH STENT PLACEMENT  12/2012 December 2012:  A 3.5 x 88 Promus VERONICA stent to OM2.  Nonobstructive disease.  Otherwise normal LVEF.   • EYE SURGERY Bilateral     cataract   • INSERTION HEMODIALYSIS CATHETER N/A 2/8/2019    Procedure: HEMODIALYSIS CATHETER INSERTION RIGHT INTERNAL JUGULAR;  Surgeon: Bishnu Dailey MD;  Location:  [facility];  Service: General   • PROSTATECTOMY N/A 1/15/2019    Procedure: ROBOTIC PROSTATECTOMY WITH NODES;  Surgeon: Juanito Grace Jr., MD;  Location:  [facility];  Service: DaVinci   • RETINAL DETACHMENT SURGERY Right          MEDICATIONS:  I have reviewed and reconciled the patients medication list in the patients chart at the skilled nursing facility today.      ALLERGIES:    Allergies   Allergen Reactions   • Percocet [Oxycodone-Acetaminophen] Anxiety   • Cefazolin Confusion   • Sulfa Antibiotics Hives         SOCIAL HISTORY:    Social History     Socioeconomic History   • Marital status:      Spouse name: Not on file   • Number of children: Not on file   • Years of education: Not on file   • Highest education level: Not on file   Tobacco Use   • Smoking status: Former Smoker   • Smokeless tobacco: Former User   Substance and Sexual Activity   • Alcohol use: No   • Drug use: No   • Sexual activity: Defer       FAMILY HISTORY:    Family History   Problem Relation Age of Onset   • Heart disease Mother    • Thyroid disease Mother    • Heart disease Father    • Cancer Brother        REVIEW OF SYSTEMS:    Review of Systems   Constitutional: Negative for activity change, appetite change, chills, diaphoresis, fatigue, fever,  unexpected weight gain and unexpected weight loss.   HENT: Negative for congestion, ear pain, mouth sores, nosebleeds, postnasal drip, rhinorrhea, sinus pressure, sneezing, sore throat and trouble swallowing.    Eyes: Negative for blurred vision, pain, discharge, redness, itching and visual disturbance.   Respiratory: Negative for apnea, cough, choking, chest tightness, shortness of breath, wheezing and stridor.    Cardiovascular: Negative for chest pain, palpitations and leg swelling.   Gastrointestinal: Negative for abdominal distention, abdominal pain, blood in stool, constipation, diarrhea, nausea, vomiting, GERD and indigestion.   Endocrine: Negative for polydipsia and polyuria.   Genitourinary: Negative for urinary incontinence, decreased urine volume, difficulty urinating, dysuria, flank pain, frequency, hematuria and urgency.   Musculoskeletal: Positive for arthralgias, back pain and gait problem. Negative for joint swelling and myalgias.        Saji leg pain   Skin: Negative for color change, dry skin, rash and skin lesions.   Allergic/Immunologic: Negative for environmental allergies.   Neurological: Positive for numbness. Negative for dizziness, seizures, speech difficulty, weakness, memory problem and confusion.   Psychiatric/Behavioral: Negative for behavioral problems, dysphoric mood, hallucinations, sleep disturbance and depressed mood. The patient is not nervous/anxious.      Exam re-performed and the findings are unchanged.    PHYSICAL EXAMINATION:   VITAL SIGNS: BP: 146/68   HR: 94    RR:   Temp: 97.8   Weight: 260 lbs     Physical Exam   Constitutional: He appears well-developed and well-nourished.   HENT:   Head: Normocephalic.   Right Ear: External ear normal.   Left Ear: External ear normal.   Nose: Nose normal.   Eyes: Conjunctivae are normal.   Neck: Normal range of motion.   Cardiovascular: Normal rate, regular rhythm, normal heart sounds and intact distal pulses.   No edema noted in LE    Pulmonary/Chest: Effort normal and breath sounds normal. No respiratory distress. He has no wheezes. He has no rales.   Abdominal: Soft. Bowel sounds are normal. He exhibits no distension and no mass. There is no tenderness. No hernia.   Musculoskeletal: He exhibits no edema.        Left knee: He exhibits decreased range of motion (crepitus). He exhibits no swelling and no effusion.        Lumbar back: He exhibits decreased range of motion, tenderness and pain.   LE weakness, very limited movement   Neurological: He is alert. He displays abnormal reflex.   LE weakness   Skin: Skin is warm and dry. No rash noted.   Psychiatric: He has a normal mood and affect. His behavior is normal.   Nursing note and vitals reviewed.    Exam re-performed and the findings are unchanged.      RECORDS REVIEW:   I have reviewed and interpreted the following lab test results  obtained at the time of the visit today. 3/26/19:   ALT  74   Hgb: 8.4  Hct: 25.5 4/1 Na 129     ASSESSMENT     Diagnoses and all orders for this visit:    Hyponatremia    Dehydration    Arthritis of left knee    Spinal stenosis, lumbar region, with neurogenic claudication    Physical deconditioning        PLAN  Hyponatremia is stable at 129, dehydration much improved, BUN 27. Check an ADH level in the AM, continue to hold Bumex doses until lab results return. Continue 1.5L fluid restriction. If ADH normal, will remove fluid restriction.    X-ray of left knee, related to increased pain and crepitus, showed only normal arthritic changes.     Patient follows up with ID today, and is hopeful he will have his PICC line removed and that his Myelogram results are good. Will continue to follow.    Nursing staff to continue to assist patient with ADL's.     Patient and nursing staff was encouraged to keep me informed of any acute changes, lack of improvement, or any new concerning symptoms.    [x]  Discussed Patient in detail with nursing/staff, addressed all needs  today.     [x]  Plan of Care Reviewed   []  PT/OT Reviewed   [x]  Order Changes  []  Discharge Plans Reviewed  []  Advance Directive on file with Nursing Home.   []  POA on file with Nursing Home.   [x]  Code Status listed: [x]  Full Code   []  DNR

## 2019-04-04 ENCOUNTER — NURSING HOME (OUTPATIENT)
Dept: INTERNAL MEDICINE | Facility: CLINIC | Age: 69
End: 2019-04-04

## 2019-04-04 VITALS
DIASTOLIC BLOOD PRESSURE: 84 MMHG | SYSTOLIC BLOOD PRESSURE: 142 MMHG | HEIGHT: 69 IN | HEART RATE: 68 BPM | TEMPERATURE: 97.5 F | WEIGHT: 266 LBS | BODY MASS INDEX: 39.4 KG/M2

## 2019-04-04 DIAGNOSIS — I48.0 PAROXYSMAL ATRIAL FIBRILLATION (HCC): ICD-10-CM

## 2019-04-04 DIAGNOSIS — D50.0 BLOOD LOSS ANEMIA: Primary | ICD-10-CM

## 2019-04-04 DIAGNOSIS — Z99.89 OSA ON CPAP: ICD-10-CM

## 2019-04-04 DIAGNOSIS — I10 ESSENTIAL HYPERTENSION: ICD-10-CM

## 2019-04-04 DIAGNOSIS — E11.42 TYPE 2 DIABETES MELLITUS WITH DIABETIC POLYNEUROPATHY, WITH LONG-TERM CURRENT USE OF INSULIN (HCC): ICD-10-CM

## 2019-04-04 DIAGNOSIS — Z79.4 TYPE 2 DIABETES MELLITUS WITH DIABETIC POLYNEUROPATHY, WITH LONG-TERM CURRENT USE OF INSULIN (HCC): ICD-10-CM

## 2019-04-04 DIAGNOSIS — M48.062 SPINAL STENOSIS, LUMBAR REGION, WITH NEUROGENIC CLAUDICATION: ICD-10-CM

## 2019-04-04 DIAGNOSIS — I25.10 CORONARY ARTERY DISEASE INVOLVING NATIVE HEART WITHOUT ANGINA PECTORIS, UNSPECIFIED VESSEL OR LESION TYPE: ICD-10-CM

## 2019-04-04 DIAGNOSIS — G47.33 OSA ON CPAP: ICD-10-CM

## 2019-04-04 DIAGNOSIS — I35.1 MODERATE AORTIC REGURGITATION: ICD-10-CM

## 2019-04-04 PROCEDURE — 99309 SBSQ NF CARE MODERATE MDM 30: CPT | Performed by: INTERNAL MEDICINE

## 2019-04-06 ENCOUNTER — LAB REQUISITION (OUTPATIENT)
Dept: LAB | Facility: HOSPITAL | Age: 69
End: 2019-04-06

## 2019-04-06 DIAGNOSIS — Z00.00 ROUTINE GENERAL MEDICAL EXAMINATION AT A HEALTH CARE FACILITY: ICD-10-CM

## 2019-04-06 LAB
BASOPHILS # BLD AUTO: 0.08 10*3/MM3 (ref 0–0.2)
BASOPHILS NFR BLD AUTO: 1 % (ref 0–1)
DEPRECATED RDW RBC AUTO: 47.4 FL (ref 37–54)
EOSINOPHIL # BLD AUTO: 0.5 10*3/MM3 (ref 0–0.3)
EOSINOPHIL NFR BLD AUTO: 6.2 % (ref 0–3)
ERYTHROCYTE [DISTWIDTH] IN BLOOD BY AUTOMATED COUNT: 15.9 % (ref 11.3–14.5)
FERRITIN SERPL-MCNC: 334 NG/ML (ref 22–322)
HCT VFR BLD AUTO: 24.4 % (ref 38.9–50.9)
HGB BLD-MCNC: 7.4 G/DL (ref 13.1–17.5)
IMM GRANULOCYTES # BLD AUTO: 0.02 10*3/MM3 (ref 0–0.05)
IMM GRANULOCYTES NFR BLD AUTO: 0.2 % (ref 0–0.6)
IRON 24H UR-MRATE: 14 MCG/DL (ref 50–175)
IRON SATN MFR SERPL: 7 % (ref 20–50)
LYMPHOCYTES # BLD AUTO: 1.54 10*3/MM3 (ref 0.6–4.8)
LYMPHOCYTES NFR BLD AUTO: 19.1 % (ref 24–44)
MCH RBC QN AUTO: 24.4 PG (ref 27–31)
MCHC RBC AUTO-ENTMCNC: 30.3 G/DL (ref 32–36)
MCV RBC AUTO: 80.5 FL (ref 80–99)
MONOCYTES # BLD AUTO: 0.63 10*3/MM3 (ref 0–1)
MONOCYTES NFR BLD AUTO: 7.8 % (ref 0–12)
NEUTROPHILS # BLD AUTO: 5.32 10*3/MM3 (ref 1.5–8.3)
NEUTROPHILS NFR BLD AUTO: 65.9 % (ref 41–71)
PLATELET # BLD AUTO: 285 10*3/MM3 (ref 150–450)
PMV BLD AUTO: 8.5 FL (ref 6–12)
RBC # BLD AUTO: 3.03 10*6/MM3 (ref 4.2–5.76)
TIBC SERPL-MCNC: 201 MCG/DL (ref 250–450)
WBC NRBC COR # BLD: 8.07 10*3/MM3 (ref 3.5–10.8)

## 2019-04-06 PROCEDURE — 83540 ASSAY OF IRON: CPT

## 2019-04-06 PROCEDURE — 82728 ASSAY OF FERRITIN: CPT

## 2019-04-06 PROCEDURE — 85025 COMPLETE CBC W/AUTO DIFF WBC: CPT

## 2019-04-06 PROCEDURE — 83550 IRON BINDING TEST: CPT

## 2019-04-08 ENCOUNTER — NURSING HOME (OUTPATIENT)
Dept: FAMILY MEDICINE CLINIC | Facility: CLINIC | Age: 69
End: 2019-04-08

## 2019-04-08 DIAGNOSIS — D50.8 OTHER IRON DEFICIENCY ANEMIA: ICD-10-CM

## 2019-04-08 DIAGNOSIS — B99.9 INFECTION: ICD-10-CM

## 2019-04-08 DIAGNOSIS — E86.0 DEHYDRATION: ICD-10-CM

## 2019-04-08 DIAGNOSIS — R29.898 BILATERAL LEG WEAKNESS: ICD-10-CM

## 2019-04-08 DIAGNOSIS — M48.062 SPINAL STENOSIS, LUMBAR REGION, WITH NEUROGENIC CLAUDICATION: ICD-10-CM

## 2019-04-08 DIAGNOSIS — E87.1 HYPONATREMIA: ICD-10-CM

## 2019-04-08 PROCEDURE — 99309 SBSQ NF CARE MODERATE MDM 30: CPT | Performed by: NURSE PRACTITIONER

## 2019-04-08 RX ORDER — DIPHENHYDRAMINE HCL 25 MG
25 CAPSULE ORAL ONCE
Status: CANCELLED | OUTPATIENT
Start: 2019-04-08 | End: 2019-04-08

## 2019-04-08 RX ORDER — SODIUM CHLORIDE 9 MG/ML
250 INJECTION, SOLUTION INTRAVENOUS AS NEEDED
Status: CANCELLED | OUTPATIENT
Start: 2019-04-08

## 2019-04-08 NOTE — PROGRESS NOTES
Nursing Home Progress Note        Vish Botello,  ?  Raquel Linda, APRN ?  [address] [facility], [place] [address]  Phone: [phone]  Fax: [phone] Jaxson Holly MD ?  Ad Campbell DO [x]   [address] [place] [address]  Phone: [phone]  Fax: [phone]     PATIENT NAME: Rod Balderas                                                                          YOB: 1950           DATE OF SERVICE: 04/04/2019  FACILITY:  [] [place]   [] [place]   [] [facility]   [x] [facility]   [] Other ______________________________________________________________________     CHIEF COMPLAINT:  Long-term medical management patient is a 68-year-old male      HISTORY OF PRESENT ILLNESS:  Nurses share concerns of Hb which has dropped down to 7.4.  Patient is asymptomatic, denies signs of bleeding at this time. He is comfortable eating lunch today. Mood and behaviors remain stable.  He does share that when he moves or tries to get up he gets very fatigued.       PAST MEDICAL & SURGICAL HISTORY:   Past Medical History:   Diagnosis Date   • Anxiety and depression    • Cancer (CMS/HCC)    • Chest pain    • Coronary artery disease    • Diabetes mellitus (CMS/HCC)    • Dyslipidemia    • Extremity pain    • Heart disease    • History of transfusion    • Hypertension    • Low back pain    • TANIYA on CPAP     2-3    • Osteoarthritis     Diffuse osteoarthritis.   • Panic attacks    • Prostate cancer (CMS/HCC)       Past Surgical History:   Procedure Laterality Date   • CARDIAC CATHETERIZATION     • CHOLECYSTECTOMY     • COLONOSCOPY  2015   • CORONARY ANGIOPLASTY WITH STENT PLACEMENT  12/2012 December 2012:  A 3.5 x 88 Promus VERONICA stent to OM2.  Nonobstructive disease.  Otherwise normal LVEF.   • EYE SURGERY Bilateral     cataract   • INSERTION HEMODIALYSIS CATHETER N/A 2/8/2019    Procedure: HEMODIALYSIS CATHETER INSERTION RIGHT INTERNAL JUGULAR;  Surgeon: Bishnu Dailey MD;  Location:   [facility];  Service: General   • PROSTATECTOMY N/A 1/15/2019    Procedure: ROBOTIC PROSTATECTOMY WITH NODES;  Surgeon: Juanito Grace Jr., MD;  Location: [facility];  Service: DaVinci   • RETINAL DETACHMENT SURGERY Right          MEDICATIONS:  I have reviewed and reconciled the patients medication list in the patients chart at the skilled nursing facility today.      ALLERGIES:  Allergies   Allergen Reactions   • Percocet [Oxycodone-Acetaminophen] Anxiety   • Cefazolin Confusion   • Sulfa Antibiotics Hives         SOCIAL HISTORY:  Social History     Socioeconomic History   • Marital status:      Spouse name: Not on file   • Number of children: Not on file   • Years of education: Not on file   • Highest education level: Not on file   Tobacco Use   • Smoking status: Former Smoker   • Smokeless tobacco: Former User   Substance and Sexual Activity   • Alcohol use: No   • Drug use: No   • Sexual activity: Defer       FAMILY HISTORY:  Family History   Problem Relation Age of Onset   • Heart disease Mother    • Thyroid disease Mother    • Heart disease Father    • Cancer Brother        REVIEW OF SYSTEMS:  Review of Systems   Constitutional: Negative for chills, fatigue and fever.   HENT: Negative for congestion, ear pain, rhinorrhea, sinus pressure and sore throat.    Eyes: Negative for visual disturbance.   Respiratory: Negative for cough, chest tightness, shortness of breath and wheezing.    Cardiovascular: Negative for chest pain, palpitations and leg swelling.   Gastrointestinal: Negative for abdominal pain, blood in stool, constipation, diarrhea, nausea and vomiting.   Endocrine: Negative for polydipsia and polyuria.   Genitourinary: Negative for dysuria and hematuria.   Musculoskeletal: Negative for arthralgias and back pain.   Skin: Negative for rash.   Neurological: Negative for dizziness, light-headedness, numbness and headaches.   Psychiatric/Behavioral: Negative for dysphoric mood and sleep  "disturbance. The patient is not nervous/anxious.           PHYSICAL EXAMINATION:     VITAL SIGNS:  /84   Pulse 68   Temp 97.5 °F (36.4 °C)   Ht 175 cm (68.9\")   Wt 121 kg (266 lb)   BMI 39.40 kg/m²     Physical Exam   Constitutional: He is oriented to person, place, and time. He appears well-developed and well-nourished.   HENT:   Head: Normocephalic and atraumatic.   Mouth/Throat: Oropharynx is clear and moist. No oropharyngeal exudate.   Eyes: Conjunctivae and EOM are normal. Pupils are equal, round, and reactive to light. No scleral icterus.   Neck: Normal range of motion. Neck supple. No thyromegaly present.   Cardiovascular: Normal rate and regular rhythm. Exam reveals no gallop and no friction rub.   Murmur heard.  Pulmonary/Chest: Effort normal and breath sounds normal. No respiratory distress. He has no wheezes.   Abdominal: Soft. Bowel sounds are normal. He exhibits no distension. There is no tenderness.   Musculoskeletal: Normal range of motion.   Lymphadenopathy:     He has no cervical adenopathy.   Neurological: He is alert and oriented to person, place, and time.   Skin: Skin is warm and dry. No rash noted.   Psychiatric: He has a normal mood and affect. His behavior is normal.   Nursing note and vitals reviewed.      RECORDS REVIEW:   CBC - hb 7.4    ASSESSMENT     Diagnoses and all orders for this visit:    Blood loss anemia  -     Hold Transfusion and Notify Physician; Standing  -     Nursing communication: Transfusion Reaction Management; Standing  -     Verify Informed Consent for Transfusion; Standing  -     Transfuse RBC, 1 Units Infuse Each Unit Over: 2H; Standing  -     sodium chloride 0.9 % infusion 250 mL  -     Type and screen; Future  -     Prepare RBC, 2 Units; Standing  -     diphenhydrAMINE (BENADRYL) capsule 25 mg    Moderate aortic regurgitation    Paroxysmal atrial fibrillation (CMS/HCC)    Coronary artery disease involving native heart without angina pectoris, unspecified " vessel or lesion type    Essential hypertension    Type 2 diabetes mellitus with diabetic polyneuropathy, with long-term current use of insulin (CMS/[facility])    Spinal stenosis, lumbar region, with neurogenic claudication    TANIYA on CPAP        PLAN    Blood loss anemia,, Hb 7.4   - will arrange for transfusion of blood, FOBT has been ordered but is pending results.   - iron levels are low, PO iron was started.      Debility/critical illness related to neuropathy  -Continue physical therapy and rehab.    Moderate aortic regurgitation/paroxysmal A. fib  -   Stable on current medications.    Coronary artery disease  -Stable on current medications    Type 2 diabetes mellitus  -Continue monitoring glucose levels.  Stable at this time.     Chronic kidney disease/borderline hyperkalemia  -Labs were reviewed and indicated potassium of 6.0 sodium 130.  Repeat BMP in 1 week.  Encourage patient to continue drinking fluids    [x]  Discussed Patient in detail with nursing/staff, addressed all needs today.     [x]  Plan of Care Reviewed   [x]  PT/OT Reviewed   [x]  Order Changes  []  Discharge Plans Reviewed  [x]  Advance Directive on file with Nursing Home.   [x]  POA on file with Nursing Home.    [x]  Code Status listed and reviewed.          Ad Campbell DO.  4/8/2019

## 2019-04-08 NOTE — PROGRESS NOTES
Nursing Home Follow Up Note      Vish Botello DO []   MAYTE Taylor [x]  [address] [place] [address]  Phone: [phone]  Fax: [phone] Jaxson Holly MD []    Ad Campbell DO []   [address] [place] [address]  Phone: [phone]  Fax: [phone]     PATIENT NAME: Rod Balderas                                                                          YOB: 1950           DATE OF SERVICE: 04/08/2019  FACILITY:  [][place]   [] [place]   [] [facility]   [x] [facility]   [] Other ______________________________________________________________________      CHIEF COMPLAINT:  F/U on anemia and myelogram.    F/u dehydration      HISTORY OF PRESENT ILLNESS:   Patient had anemia on his prior lab results with a hemoglobin of 7.4. He then had iron studies completed which showed iron deficiency. Po Ferrous sulfate started, 325 mg bid, with Vitamin C 250 mg bid.  FOB was ordered but the patient has not had another bowel movement. He has taken several bowel stimulants, and took Lactulose last night, but has not had a bowel movement yet.     He had his myelogram completed last week, for further evaluation of his suspected infection and BLE weakness, but there are no records in his chart in [facility], or at the [facility]. The patient states he had the study completed at [facility]. He has an appointment with Neurology on 4/24 to discuss his results. At his last ID appointment, he was told that his WBC count has improved, but his Sed rate is still elevated, so there are still concerns for infection.  They did not send any records to the [facility]. His next appointment with ID is tomorrow.      F/u on labs for dehydration and hyponatremia.    PAST MEDICAL & SURGICAL HISTORY:   Past Medical History:   Diagnosis Date   • Anxiety and depression    • Cancer (CMS/HCC)    • Chest pain    • Coronary artery disease    • Diabetes mellitus (CMS/HCC)    • Dyslipidemia    • Extremity pain     • Heart disease    • History of transfusion    • Hypertension    • Low back pain    • TANIYA on CPAP     2-3    • Osteoarthritis     Diffuse osteoarthritis.   • Panic attacks    • Prostate cancer (CMS/HCC)       Past Surgical History:   Procedure Laterality Date   • CARDIAC CATHETERIZATION     • CHOLECYSTECTOMY     • COLONOSCOPY  2015   • CORONARY ANGIOPLASTY WITH STENT PLACEMENT  12/2012 December 2012:  A 3.5 x 88 Promus VERONICA stent to OM2.  Nonobstructive disease.  Otherwise normal LVEF.   • EYE SURGERY Bilateral     cataract   • INSERTION HEMODIALYSIS CATHETER N/A 2/8/2019    Procedure: HEMODIALYSIS CATHETER INSERTION RIGHT INTERNAL JUGULAR;  Surgeon: Bishnu Dailey MD;  Location:  [facility];  Service: General   • PROSTATECTOMY N/A 1/15/2019    Procedure: ROBOTIC PROSTATECTOMY WITH NODES;  Surgeon: Juanito Grace Jr., MD;  Location:  [facility];  Service: DaVinci   • RETINAL DETACHMENT SURGERY Right          MEDICATIONS:  I have reviewed and reconciled the patients medication list in the patients chart at the skilled nursing facility today.      ALLERGIES:    Allergies   Allergen Reactions   • Percocet [Oxycodone-Acetaminophen] Anxiety   • Cefazolin Confusion   • Sulfa Antibiotics Hives         SOCIAL HISTORY:    Social History     Socioeconomic History   • Marital status:      Spouse name: Not on file   • Number of children: Not on file   • Years of education: Not on file   • Highest education level: Not on file   Tobacco Use   • Smoking status: Former Smoker   • Smokeless tobacco: Former User   Substance and Sexual Activity   • Alcohol use: No   • Drug use: No   • Sexual activity: Defer       FAMILY HISTORY:    Family History   Problem Relation Age of Onset   • Heart disease Mother    • Thyroid disease Mother    • Heart disease Father    • Cancer Brother        REVIEW OF SYSTEMS:    Review of Systems   Constitutional: Negative for activity change, appetite change, chills, diaphoresis,  fatigue, fever, unexpected weight gain and unexpected weight loss.   HENT: Negative for congestion, ear pain, mouth sores, nosebleeds, postnasal drip, rhinorrhea, sinus pressure, sneezing, sore throat and trouble swallowing.    Eyes: Negative for blurred vision, pain, discharge, redness, itching and visual disturbance.   Respiratory: Negative for apnea, cough, choking, chest tightness, shortness of breath, wheezing and stridor.    Cardiovascular: Negative for chest pain, palpitations and leg swelling.   Gastrointestinal: Negative for abdominal distention, abdominal pain, blood in stool, constipation, diarrhea, nausea, vomiting, GERD and indigestion.   Endocrine: Negative for polydipsia and polyuria.   Genitourinary: Negative for urinary incontinence, decreased urine volume, difficulty urinating, dysuria, flank pain, frequency, hematuria and urgency.   Musculoskeletal: Positive for arthralgias, back pain and gait problem. Negative for joint swelling and myalgias.        Saji leg pain   Skin: Negative for color change, dry skin, rash and skin lesions.   Allergic/Immunologic: Negative for environmental allergies.   Neurological: Positive for weakness and numbness. Negative for dizziness, seizures, speech difficulty, memory problem and confusion.        BLE numbness and weakness   Psychiatric/Behavioral: Negative for behavioral problems, dysphoric mood, hallucinations, sleep disturbance and depressed mood. The patient is not nervous/anxious.      Exam re-performed and the findings are unchanged.    PHYSICAL EXAMINATION:   VITAL SIGNS: BP: 120/70   HR: 70    RR: 18  O2: 95%  Temp: 97.9   Weight: 266 lbs     Physical Exam   Constitutional: He appears well-developed and well-nourished.   HENT:   Head: Normocephalic.   Right Ear: External ear normal.   Left Ear: External ear normal.   Nose: Nose normal.   Eyes: Conjunctivae are normal.   Neck: Normal range of motion.   Cardiovascular: Normal rate, regular rhythm, normal  heart sounds and intact distal pulses.   No edema noted in LE   Pulmonary/Chest: Effort normal and breath sounds normal. No respiratory distress. He has no wheezes. He has no rales.   Abdominal: Soft. Bowel sounds are normal. He exhibits no distension and no mass. There is no tenderness. No hernia.   Musculoskeletal: He exhibits no edema.        Left knee: He exhibits decreased range of motion (crepitus). He exhibits no swelling and no effusion.        Lumbar back: He exhibits decreased range of motion, tenderness and pain.   LE weakness, very limited movement   Neurological: He is alert. He displays abnormal reflex.   LE weakness   Skin: Skin is warm and dry. No rash noted.   Psychiatric: He has a normal mood and affect. His behavior is normal.   Nursing note and vitals reviewed.    Exam re-performed and the findings are unchanged.      RECORDS REVIEW:   I have reviewed and interpreted the following lab test results  obtained at the time of the visit today. 3/26/19:   ALT  74   Hgb: 8.4  Hct: 25.5 4/1 Na 129. Hgb 7.4 on 4/4/19.  Iron: 14,  TIBC: 201  Iron Saturation 7, on 4/5/19. ADH: 2.0 4/5/19.     ASSESSMENT     Diagnoses and all orders for this visit:    Other iron deficiency anemia    Spinal stenosis, lumbar region, with neurogenic claudication    Bilateral leg weakness    Infection    Hyponatremia    Dehydration        PLAN  Hgb 7.4 with labs on 4/4/19. Iron was 14 and  TIBC 201.   Ferrous sulfate 325 mg PO daily started, on 4/4/19, for iron deficiency anemia. Start Vitamin C 250 mg PO daily to be taken with the Ferrous sulfate. Awaiting FOB results. Staff to give Lactulose 30 ml bid, until patient has BM.     Patient saw ID, Dr Poole, on 4/1/14, for continued infection of unknown source. He had a Myelogram, for concern infection is in his spine, causing his lumbago and LE symptoms. Advised staff to get records of Myelogram results and ID appointments. He will follow up with ID tomorrow. He has  appointment with Neuro on 4/24, to discuss Myelogram.     Hyponatremia and dehydration have improved since stopping Bumex. ADH 2.0 with labs 4/5. GFR and Creat normal on 4/4. Fluid restriction stopped on 4/4/19. Will check BMP Friday.    Nursing staff to continue to assist patient with ADL's.     Patient and nursing staff was encouraged to keep me informed of any acute changes, lack of improvement, or any new concerning symptoms.    [x]  Discussed Patient in detail with nursing/staff, addressed all needs today.     [x]  Plan of Care Reviewed   []  PT/OT Reviewed   [x]  Order Changes  []  Discharge Plans Reviewed  [x]  Advance Directive on file with Nursing Home.   [x]  POA on file with Nursing Home.   [x]  Code Status listed: [x]  Full Code   []  DNR

## 2019-04-09 ENCOUNTER — LAB (OUTPATIENT)
Dept: LAB | Facility: HOSPITAL | Age: 69
End: 2019-04-09

## 2019-04-09 ENCOUNTER — TRANSCRIBE ORDERS (OUTPATIENT)
Dept: LAB | Facility: HOSPITAL | Age: 69
End: 2019-04-09

## 2019-04-09 DIAGNOSIS — E66.01 MORBID OBESITY (HCC): ICD-10-CM

## 2019-04-09 DIAGNOSIS — I10 ESSENTIAL HYPERTENSION, MALIGNANT: ICD-10-CM

## 2019-04-09 DIAGNOSIS — C61 MALIGNANT NEOPLASM OF PROSTATE (HCC): ICD-10-CM

## 2019-04-09 DIAGNOSIS — A41.01 METHICILLIN SUSCEPTIBLE STAPHYLOCOCCUS AUREUS SEPTICEMIA (HCC): ICD-10-CM

## 2019-04-09 DIAGNOSIS — G62.9 PERIPHERAL NERVE DISORDER: Primary | ICD-10-CM

## 2019-04-09 DIAGNOSIS — D64.89 OTHER SPECIFIED ANEMIAS: ICD-10-CM

## 2019-04-09 DIAGNOSIS — I48.0 PAROXYSMAL ATRIAL FIBRILLATION (HCC): ICD-10-CM

## 2019-04-09 DIAGNOSIS — G62.9 PERIPHERAL NERVE DISORDER: ICD-10-CM

## 2019-04-09 LAB
BASOPHILS # BLD AUTO: 0.14 10*3/MM3 (ref 0–0.2)
BASOPHILS NFR BLD AUTO: 1.3 % (ref 0–1.5)
CRP SERPL-MCNC: 5.6 MG/DL (ref 0–0.5)
DEPRECATED RDW RBC AUTO: 47.9 FL (ref 37–54)
EOSINOPHIL # BLD AUTO: 0.93 10*3/MM3 (ref 0–0.4)
EOSINOPHIL NFR BLD AUTO: 8.5 % (ref 0.3–6.2)
ERYTHROCYTE [DISTWIDTH] IN BLOOD BY AUTOMATED COUNT: 16 % (ref 12.3–15.4)
ERYTHROCYTE [SEDIMENTATION RATE] IN BLOOD: 92 MM/HR (ref 0–20)
HCT VFR BLD AUTO: 30.1 % (ref 37.5–51)
HGB BLD-MCNC: 8.8 G/DL (ref 13–17.7)
IMM GRANULOCYTES # BLD AUTO: 0.05 10*3/MM3 (ref 0–0.05)
IMM GRANULOCYTES NFR BLD AUTO: 0.5 % (ref 0–0.5)
LYMPHOCYTES # BLD AUTO: 1.68 10*3/MM3 (ref 0.7–3.1)
LYMPHOCYTES NFR BLD AUTO: 15.4 % (ref 19.6–45.3)
MCH RBC QN AUTO: 23.8 PG (ref 26.6–33)
MCHC RBC AUTO-ENTMCNC: 29.2 G/DL (ref 31.5–35.7)
MCV RBC AUTO: 81.6 FL (ref 79–97)
MONOCYTES # BLD AUTO: 0.86 10*3/MM3 (ref 0.1–0.9)
MONOCYTES NFR BLD AUTO: 7.9 % (ref 5–12)
NEUTROPHILS # BLD AUTO: 7.32 10*3/MM3 (ref 1.4–7)
NEUTROPHILS NFR BLD AUTO: 66.9 % (ref 42.7–76)
PLATELET # BLD AUTO: 438 10*3/MM3 (ref 140–450)
PMV BLD AUTO: 8.8 FL (ref 6–12)
RBC # BLD AUTO: 3.69 10*6/MM3 (ref 4.14–5.8)
WBC NRBC COR # BLD: 10.93 10*3/MM3 (ref 3.4–10.8)

## 2019-04-09 PROCEDURE — 85025 COMPLETE CBC W/AUTO DIFF WBC: CPT

## 2019-04-09 PROCEDURE — 86140 C-REACTIVE PROTEIN: CPT

## 2019-04-09 PROCEDURE — 36415 COLL VENOUS BLD VENIPUNCTURE: CPT

## 2019-04-10 DIAGNOSIS — D50.0 BLOOD LOSS ANEMIA: ICD-10-CM

## 2019-04-10 RX ORDER — FUROSEMIDE 10 MG/ML
20 INJECTION INTRAMUSCULAR; INTRAVENOUS ONCE
Status: CANCELLED | OUTPATIENT
Start: 2019-04-10 | End: 2019-04-10

## 2019-04-10 RX ORDER — DIPHENHYDRAMINE HCL 25 MG
25 CAPSULE ORAL ONCE
Status: CANCELLED | OUTPATIENT
Start: 2019-04-10 | End: 2019-04-10

## 2019-04-10 RX ORDER — SODIUM CHLORIDE 9 MG/ML
250 INJECTION, SOLUTION INTRAVENOUS AS NEEDED
Status: CANCELLED | OUTPATIENT
Start: 2019-04-10

## 2019-04-11 ENCOUNTER — HOSPITAL ENCOUNTER (OUTPATIENT)
Dept: INFUSION THERAPY | Facility: HOSPITAL | Age: 69
Setting detail: INFUSION SERIES
Discharge: HOME | End: 2019-04-11

## 2019-04-11 ENCOUNTER — NURSING HOME (OUTPATIENT)
Dept: FAMILY MEDICINE CLINIC | Facility: CLINIC | Age: 69
End: 2019-04-11

## 2019-04-11 VITALS
SYSTOLIC BLOOD PRESSURE: 145 MMHG | RESPIRATION RATE: 16 BRPM | WEIGHT: 264 LBS | HEIGHT: 69 IN | OXYGEN SATURATION: 99 % | BODY MASS INDEX: 39.1 KG/M2 | TEMPERATURE: 98.1 F | DIASTOLIC BLOOD PRESSURE: 78 MMHG | HEART RATE: 62 BPM

## 2019-04-11 DIAGNOSIS — D50.9 IRON DEFICIENCY ANEMIA, UNSPECIFIED IRON DEFICIENCY ANEMIA TYPE: ICD-10-CM

## 2019-04-11 DIAGNOSIS — E86.0 DEHYDRATION: ICD-10-CM

## 2019-04-11 DIAGNOSIS — R19.5 FECAL OCCULT BLOOD TEST POSITIVE: ICD-10-CM

## 2019-04-11 DIAGNOSIS — E87.1 HYPONATREMIA: ICD-10-CM

## 2019-04-11 DIAGNOSIS — B99.9 INFECTION: ICD-10-CM

## 2019-04-11 DIAGNOSIS — D50.0 BLOOD LOSS ANEMIA: ICD-10-CM

## 2019-04-11 DIAGNOSIS — D50.0 BLOOD LOSS ANEMIA: Primary | ICD-10-CM

## 2019-04-11 LAB
ABO GROUP BLD: NORMAL
ABO GROUP BLD: NORMAL
BLD GP AB SCN SERPL QL: NEGATIVE
RH BLD: NEGATIVE
RH BLD: NEGATIVE
T&S EXPIRATION DATE: NORMAL

## 2019-04-11 PROCEDURE — 86901 BLOOD TYPING SEROLOGIC RH(D): CPT

## 2019-04-11 PROCEDURE — 99309 SBSQ NF CARE MODERATE MDM 30: CPT | Performed by: NURSE PRACTITIONER

## 2019-04-11 PROCEDURE — 86901 BLOOD TYPING SEROLOGIC RH(D): CPT | Performed by: INTERNAL MEDICINE

## 2019-04-11 PROCEDURE — 86850 RBC ANTIBODY SCREEN: CPT | Performed by: INTERNAL MEDICINE

## 2019-04-11 PROCEDURE — 86920 COMPATIBILITY TEST SPIN: CPT

## 2019-04-11 PROCEDURE — 86900 BLOOD TYPING SEROLOGIC ABO: CPT

## 2019-04-11 PROCEDURE — 36415 COLL VENOUS BLD VENIPUNCTURE: CPT | Performed by: INTERNAL MEDICINE

## 2019-04-11 PROCEDURE — 86900 BLOOD TYPING SEROLOGIC ABO: CPT | Performed by: INTERNAL MEDICINE

## 2019-04-11 PROCEDURE — 36415 COLL VENOUS BLD VENIPUNCTURE: CPT

## 2019-04-11 NOTE — CODE DOCUMENTATION
lab draw for the second person retype done per RN  Lab here for type and cross  Discussed with family regard possible antibodies with multiple blood transfusions

## 2019-04-11 NOTE — PROGRESS NOTES
"Nursing Home Follow Up Note      Vish Botello DO []   MAYTE Taylor [x]  [address] [facility] [address] [facility], [place] [address]  Phone: [phone]  Fax: [phone] Jaxson Holly MD []    Ad Campbell DO []   [address] [place] [address]  Phone: [phone]  Fax: [phone]     PATIENT NAME: Rod Balderas                                                                          YOB: 1950           DATE OF SERVICE: 04/11/2019  FACILITY:  [][place]   [] [place]   [] [facility]   [x] [facility]   [] Other ______________________________________________________________________      \" I personally performed the service documented in this note. The ancillary staff only assisted in transcribing the note and was not involved in any other part of rendering this service\".    CHIEF COMPLAINT:  F/U on anemia       HISTORY OF PRESENT ILLNESS:   Patient had anemia on his prior lab results with a hemoglobin of 7.4. He then had iron studies completed which showed iron deficiency. Po Ferrous sulfate was started, 325 mg bid, with Vitamin C 250 mg bid.  FOB was positive, and most recent hemoglobin was 7.6. Patient also reports he has been feeling dizzy and tired lately.    PAST MEDICAL & SURGICAL HISTORY:   Past Medical History:   Diagnosis Date   • Anemia    • Anxiety and depression    • Atrial fibrillation (CMS/HCC)    • Cancer (CMS/HCC)    • Chest pain    • Coronary artery disease    • Decubital ulcer     right side buttox   • Diabetes mellitus (CMS/HCC)    • Dyslipidemia    • Extremity pain    • Heart disease    • History of transfusion    • Hypertension    • Low back pain    • TANIYA on CPAP     2-3    • Osteoarthritis     Diffuse osteoarthritis.   • Panic attacks    • Prostate cancer (CMS/HCC)    • Sleep apnea     cpap   • Spinal stenosis       Past Surgical History:   Procedure Laterality Date   • CARDIAC CATHETERIZATION     • CHOLECYSTECTOMY     • COLONOSCOPY  2015   • CORONARY ANGIOPLASTY WITH " STENT PLACEMENT  12/2012 December 2012:  A 3.5 x 88 Promus VERONICA stent to OM2.  Nonobstructive disease.  Otherwise normal LVEF.   • EYE SURGERY Bilateral     cataract   • INSERTION HEMODIALYSIS CATHETER N/A 2/8/2019    Procedure: HEMODIALYSIS CATHETER INSERTION RIGHT INTERNAL JUGULAR;  Surgeon: Bishnu Dailey MD;  Location: [facility] OR;  Service: General   • PROSTATECTOMY N/A 1/15/2019    Procedure: ROBOTIC PROSTATECTOMY WITH NODES;  Surgeon: Juanito Grace Jr., MD;  Location: [facility] OR;  Service: DaVinci   • RETINAL DETACHMENT SURGERY Right          MEDICATIONS:  I have reviewed and reconciled the patients medication list in the patients chart at the skilled nursing facility today.      ALLERGIES:    Allergies   Allergen Reactions   • Percocet [Oxycodone-Acetaminophen] Anxiety   • Cefazolin Confusion   • Latex, Natural Rubber Itching   • Sulfa Antibiotics Hives         SOCIAL HISTORY:    Social History     Socioeconomic History   • Marital status:      Spouse name: Not on file   • Number of children: Not on file   • Years of education: Not on file   • Highest education level: Not on file   Tobacco Use   • Smoking status: Former Smoker   • Smokeless tobacco: Former User   Substance and Sexual Activity   • Alcohol use: No   • Drug use: No   • Sexual activity: Defer       FAMILY HISTORY:    Family History   Problem Relation Age of Onset   • Heart disease Mother    • Thyroid disease Mother    • Heart disease Father    • Cancer Brother        REVIEW OF SYSTEMS:    Review of Systems   Constitutional: Positive for fatigue. Negative for activity change, appetite change, chills, diaphoresis, fever, unexpected weight gain and unexpected weight loss.   HENT: Negative for congestion, ear pain, mouth sores, nosebleeds, postnasal drip, rhinorrhea, sinus pressure, sneezing, sore throat and trouble swallowing.    Eyes: Negative for blurred vision, pain, discharge, redness, itching and visual disturbance.    Respiratory: Negative for apnea, cough, choking, chest tightness, shortness of breath, wheezing and stridor.    Cardiovascular: Negative for chest pain, palpitations and leg swelling.   Gastrointestinal: Negative for abdominal distention, abdominal pain, blood in stool, constipation, diarrhea, nausea, vomiting, GERD and indigestion.   Endocrine: Negative for polydipsia and polyuria.   Genitourinary: Negative for urinary incontinence, decreased urine volume, difficulty urinating, dysuria, flank pain, frequency, hematuria and urgency.   Musculoskeletal: Positive for arthralgias, back pain and gait problem. Negative for joint swelling and myalgias.        Saji leg pain   Skin: Negative for color change, dry skin, rash and skin lesions.   Allergic/Immunologic: Negative for environmental allergies.   Neurological: Positive for dizziness, weakness and numbness. Negative for seizures, speech difficulty, memory problem and confusion.        BLE numbness and weakness   Psychiatric/Behavioral: Negative for behavioral problems, dysphoric mood, hallucinations, sleep disturbance and depressed mood. The patient is not nervous/anxious.      Exam re-performed and the findings are unchanged.    PHYSICAL EXAMINATION:   VITAL SIGNS: BP: 110/56   HR: 64    RR: 18  O2: 95%  Temp: 99   Weight: 274     Physical Exam   Constitutional: He appears well-developed and well-nourished.   HENT:   Head: Normocephalic.   Right Ear: External ear normal.   Left Ear: External ear normal.   Nose: Nose normal.   Eyes: Conjunctivae are normal.   Neck: Normal range of motion.   Cardiovascular: Normal rate, regular rhythm, normal heart sounds and intact distal pulses.   No edema noted in LE   Pulmonary/Chest: Effort normal and breath sounds normal. No respiratory distress. He has no wheezes. He has no rales.   Abdominal: Soft. Bowel sounds are normal. He exhibits no distension and no mass. There is no tenderness. No hernia.   Musculoskeletal: He exhibits  no edema.        Left knee: He exhibits decreased range of motion (crepitus). He exhibits no swelling and no effusion.        Lumbar back: He exhibits decreased range of motion, tenderness and pain.   LE weakness, very limited movement   Neurological: He is alert. He displays abnormal reflex.   LE weakness   Skin: Skin is warm and dry. No rash noted.   Psychiatric: He has a normal mood and affect. His behavior is normal.   Nursing note and vitals reviewed.    Exam re-performed and the findings are unchanged.      RECORDS REVIEW:   I have reviewed and interpreted the following lab test results  obtained at the time of the visit today.  4/9  BUN 18, Crt: 0.6      Hgb: 7.6  Hct: 23.7   Plts: 341   Na: 133        ASSESSMENT     Diagnoses and all orders for this visit:    Iron deficiency anemia, unspecified iron deficiency anemia type    Fecal occult blood test positive    Infection    Hyponatremia    Dehydration        PLAN  Hgb 7.6 and fecal occult blood positive. Patient is going for a type and cross today and will be sent out tomorrow,4/12, to the outpatient transfusion center,  for transfusion of 2 units of packed red blood cells for anemia.     Patient saw ID, Dr Poole, on 4/9, for continued infection of unknown source. They discontinued his PICC line, and started him on Doxycycline 100 mg PO BID for 2 weeks. He then has a follow up appointment with ID in 2 weeks.     Hyponatremia and dehydration have continued to improve since stopping Bumex and fluid restriction. Will continue to monitor.    Nursing staff to continue to assist patient with ADL's.     Patient and nursing staff was encouraged to keep me informed of any acute changes, lack of improvement, or any new concerning symptoms.    [x]  Discussed Patient in detail with nursing/staff, addressed all needs today.     [x]  Plan of Care Reviewed   []  PT/OT Reviewed   [x]  Order Changes  []  Discharge Plans Reviewed  [x]  Advance Directive on file with  Nursing Home.   [x]  POA on file with Nursing Home.   [x]  Code Status listed: [x]  Full Code   []  DNR

## 2019-04-12 ENCOUNTER — HOSPITAL ENCOUNTER (OUTPATIENT)
Dept: INFUSION THERAPY | Facility: HOSPITAL | Age: 69
Setting detail: INFUSION SERIES
Discharge: HOME | End: 2019-04-12

## 2019-04-12 VITALS
OXYGEN SATURATION: 98 % | RESPIRATION RATE: 18 BRPM | HEART RATE: 70 BPM | TEMPERATURE: 98.6 F | SYSTOLIC BLOOD PRESSURE: 135 MMHG | DIASTOLIC BLOOD PRESSURE: 77 MMHG

## 2019-04-12 DIAGNOSIS — D50.0 BLOOD LOSS ANEMIA: ICD-10-CM

## 2019-04-12 PROBLEM — D50.9 IRON DEFICIENCY ANEMIA: Status: ACTIVE | Noted: 2019-04-12

## 2019-04-12 PROCEDURE — 25010000002 FUROSEMIDE PER 20 MG: Performed by: INTERNAL MEDICINE

## 2019-04-12 PROCEDURE — 36430 TRANSFUSION BLD/BLD COMPNT: CPT

## 2019-04-12 PROCEDURE — P9040 RBC LEUKOREDUCED IRRADIATED: HCPCS

## 2019-04-12 PROCEDURE — 96374 THER/PROPH/DIAG INJ IV PUSH: CPT

## 2019-04-12 PROCEDURE — 63710000001 DIPHENHYDRAMINE PER 50 MG: Performed by: INTERNAL MEDICINE

## 2019-04-12 PROCEDURE — 86900 BLOOD TYPING SEROLOGIC ABO: CPT

## 2019-04-12 PROCEDURE — P9016 RBC LEUKOCYTES REDUCED: HCPCS

## 2019-04-12 RX ORDER — BISACODYL 10 MG
10 SUPPOSITORY, RECTAL RECTAL DAILY
COMMUNITY
End: 2019-12-04

## 2019-04-12 RX ORDER — HYDRALAZINE HYDROCHLORIDE 25 MG/1
25 TABLET, FILM COATED ORAL 3 TIMES DAILY
COMMUNITY

## 2019-04-12 RX ORDER — METOPROLOL SUCCINATE 100 MG/1
100 TABLET, EXTENDED RELEASE ORAL DAILY
COMMUNITY

## 2019-04-12 RX ORDER — SALIVA STIMULANT COMB. NO.7
GEL (GRAM) MUCOUS MEMBRANE AS NEEDED
COMMUNITY
End: 2019-12-04

## 2019-04-12 RX ORDER — POLYETHYLENE GLYCOL 3350 17 G/17G
17 POWDER, FOR SOLUTION ORAL DAILY
COMMUNITY
End: 2019-12-04

## 2019-04-12 RX ORDER — DIPHENHYDRAMINE HCL 25 MG
25 CAPSULE ORAL ONCE
Status: COMPLETED | OUTPATIENT
Start: 2019-04-12 | End: 2019-04-12

## 2019-04-12 RX ORDER — LISINOPRIL 20 MG/1
20 TABLET ORAL 2 TIMES DAILY
COMMUNITY

## 2019-04-12 RX ORDER — FUROSEMIDE 10 MG/ML
20 INJECTION INTRAMUSCULAR; INTRAVENOUS ONCE
Status: COMPLETED | OUTPATIENT
Start: 2019-04-12 | End: 2019-04-12

## 2019-04-12 RX ORDER — SODIUM CHLORIDE 9 MG/ML
250 INJECTION, SOLUTION INTRAVENOUS AS NEEDED
Status: DISCONTINUED | OUTPATIENT
Start: 2019-04-12 | End: 2019-04-14 | Stop reason: HOSPADM

## 2019-04-12 RX ORDER — MULTIVIT WITH MINERALS/LUTEIN
250 TABLET ORAL DAILY
COMMUNITY

## 2019-04-12 RX ORDER — METOPROLOL SUCCINATE 50 MG/1
50 TABLET, EXTENDED RELEASE ORAL DAILY
COMMUNITY
End: 2019-12-04

## 2019-04-12 RX ORDER — BISACODYL 5 MG/1
5 TABLET, DELAYED RELEASE ORAL DAILY PRN
COMMUNITY
End: 2019-12-04

## 2019-04-12 RX ORDER — FERROUS SULFATE 325(65) MG
325 TABLET ORAL
COMMUNITY

## 2019-04-12 RX ORDER — FOLIC ACID 1 MG/1
1 TABLET ORAL DAILY
COMMUNITY

## 2019-04-12 RX ORDER — LANOLIN ALCOHOL/MO/W.PET/CERES
1000 CREAM (GRAM) TOPICAL DAILY
COMMUNITY

## 2019-04-12 RX ORDER — UBIDECARENONE 100 MG
200 CAPSULE ORAL 2 TIMES DAILY
COMMUNITY
End: 2022-07-18 | Stop reason: ALTCHOICE

## 2019-04-12 RX ORDER — DOXYCYCLINE HYCLATE 100 MG/1
100 CAPSULE ORAL 2 TIMES DAILY
COMMUNITY
End: 2019-12-04

## 2019-04-12 RX ADMIN — DIPHENHYDRAMINE HYDROCHLORIDE 25 MG: 25 CAPSULE ORAL at 10:16

## 2019-04-12 RX ADMIN — SODIUM CHLORIDE 250 ML: 9 INJECTION, SOLUTION INTRAVENOUS at 09:00

## 2019-04-12 RX ADMIN — FUROSEMIDE 20 MG: 10 INJECTION, SOLUTION INTRAMUSCULAR; INTRAVENOUS at 12:12

## 2019-04-13 LAB
ABO + RH BLD: NORMAL
ABO + RH BLD: NORMAL
BH BB BLOOD EXPIRATION DATE: NORMAL
BH BB BLOOD EXPIRATION DATE: NORMAL
BH BB BLOOD TYPE BARCODE: 9500
BH BB BLOOD TYPE BARCODE: 9500
BH BB DISPENSE STATUS: NORMAL
BH BB DISPENSE STATUS: NORMAL
BH BB PRODUCT CODE: NORMAL
BH BB PRODUCT CODE: NORMAL
BH BB UNIT NUMBER: NORMAL
BH BB UNIT NUMBER: NORMAL
CROSSMATCH INTERPRETATION: NORMAL
CROSSMATCH INTERPRETATION: NORMAL
UNIT  ABO: NORMAL
UNIT  ABO: NORMAL
UNIT  RH: NORMAL
UNIT  RH: NORMAL

## 2019-04-18 ENCOUNTER — NURSING HOME (OUTPATIENT)
Dept: INTERNAL MEDICINE | Facility: CLINIC | Age: 69
End: 2019-04-18

## 2019-04-18 VITALS
RESPIRATION RATE: 18 BRPM | DIASTOLIC BLOOD PRESSURE: 72 MMHG | HEART RATE: 70 BPM | WEIGHT: 274 LBS | BODY MASS INDEX: 40.46 KG/M2 | TEMPERATURE: 98 F | SYSTOLIC BLOOD PRESSURE: 136 MMHG

## 2019-04-18 DIAGNOSIS — I25.10 CORONARY ARTERY DISEASE INVOLVING NATIVE HEART WITHOUT ANGINA PECTORIS, UNSPECIFIED VESSEL OR LESION TYPE: ICD-10-CM

## 2019-04-18 DIAGNOSIS — E11.42 TYPE 2 DIABETES MELLITUS WITH DIABETIC POLYNEUROPATHY, WITH LONG-TERM CURRENT USE OF INSULIN (HCC): ICD-10-CM

## 2019-04-18 DIAGNOSIS — I48.0 PAROXYSMAL ATRIAL FIBRILLATION (HCC): ICD-10-CM

## 2019-04-18 DIAGNOSIS — I35.1 MODERATE AORTIC REGURGITATION: ICD-10-CM

## 2019-04-18 DIAGNOSIS — G47.33 OSA ON CPAP: ICD-10-CM

## 2019-04-18 DIAGNOSIS — Z99.89 OSA ON CPAP: ICD-10-CM

## 2019-04-18 DIAGNOSIS — D50.0 BLOOD LOSS ANEMIA: Primary | ICD-10-CM

## 2019-04-18 DIAGNOSIS — M48.062 SPINAL STENOSIS, LUMBAR REGION, WITH NEUROGENIC CLAUDICATION: ICD-10-CM

## 2019-04-18 DIAGNOSIS — I10 ESSENTIAL HYPERTENSION: ICD-10-CM

## 2019-04-18 DIAGNOSIS — Z79.4 TYPE 2 DIABETES MELLITUS WITH DIABETIC POLYNEUROPATHY, WITH LONG-TERM CURRENT USE OF INSULIN (HCC): ICD-10-CM

## 2019-04-18 PROCEDURE — 99309 SBSQ NF CARE MODERATE MDM 30: CPT | Performed by: INTERNAL MEDICINE

## 2019-04-19 DIAGNOSIS — G89.4 CHRONIC PAIN SYNDROME: Primary | ICD-10-CM

## 2019-04-19 RX ORDER — GABAPENTIN 600 MG/1
600 TABLET ORAL 3 TIMES DAILY PRN
Qty: 90 TABLET | Refills: 5 | Status: SHIPPED | OUTPATIENT
Start: 2019-04-19 | End: 2019-05-30

## 2019-04-19 RX ORDER — GABAPENTIN 600 MG/1
600 TABLET ORAL 3 TIMES DAILY PRN
Qty: 90 TABLET | Refills: 5 | Status: SHIPPED | OUTPATIENT
Start: 2019-04-19 | End: 2019-04-19

## 2019-04-19 RX ORDER — GABAPENTIN 100 MG/1
600 CAPSULE ORAL NIGHTLY
Qty: 30 CAPSULE | Refills: 2 | Status: SHIPPED | OUTPATIENT
Start: 2019-04-19 | End: 2019-04-19

## 2019-04-22 NOTE — PROGRESS NOTES
I    Nursing Home Progress Note        Vish Botello DO ?  Raquel Linda, APRN ?  [address] [facility], [place] [address]  Phone: [phone]  Fax: [phone] Jaxson Holly MD ?  Ad Campbell DO [x]   [address] [place] [address]  Phone: [phone]  Fax: [phone]     PATIENT NAME: Rod Balderas                                                                          YOB: 1950           DATE OF SERVICE: 04/18/2019  FACILITY:  [] [place]   [] [place]   [] [facility]   [x] [facility]   [] Other ______________________________________________________________________     CHIEF COMPLAINT:  Long-term medical management patient is a 68-year-old male      HISTORY OF PRESENT ILLNESS:  Patient had blood transfusion recently and has had some more energy recently.  FOBT was positive.  He shares that he had a colonoscopy several years ago at [facility] and needed a follow up colonoscopy for polyps which were found.       PAST MEDICAL & SURGICAL HISTORY:   Past Medical History:   Diagnosis Date   • Anemia    • Anxiety and depression    • Atrial fibrillation (CMS/HCC)    • Cancer (CMS/HCC)    • Chest pain    • Coronary artery disease    • Decubital ulcer     right side buttox   • Diabetes mellitus (CMS/HCC)    • Dyslipidemia    • Extremity pain    • Heart disease    • History of transfusion    • Hypertension    • Low back pain    • TANIYA on CPAP     2-3    • Osteoarthritis     Diffuse osteoarthritis.   • Panic attacks    • Prostate cancer (CMS/HCC)    • Sleep apnea     cpap   • Spinal stenosis       Past Surgical History:   Procedure Laterality Date   • CARDIAC CATHETERIZATION     • CHOLECYSTECTOMY     • COLONOSCOPY  2015   • CORONARY ANGIOPLASTY WITH STENT PLACEMENT  12/2012 December 2012:  A 3.5 x 88 Promus VERONICA stent to OM2.  Nonobstructive disease.  Otherwise normal LVEF.   • EYE SURGERY Bilateral     cataract   • INSERTION HEMODIALYSIS CATHETER N/A 2/8/2019    Procedure: HEMODIALYSIS  CATHETER INSERTION RIGHT INTERNAL JUGULAR;  Surgeon: Bishnu Dailey MD;  Location:  [facility];  Service: General   • PROSTATECTOMY N/A 1/15/2019    Procedure: ROBOTIC PROSTATECTOMY WITH NODES;  Surgeon: Juanito Grace Jr., MD;  Location:  [facility];  Service: DaVinci   • RETINAL DETACHMENT SURGERY Right          MEDICATIONS:  I have reviewed and reconciled the patients medication list in the patients chart at the skilled nursing facility today.      ALLERGIES:  Allergies   Allergen Reactions   • Percocet [Oxycodone-Acetaminophen] Anxiety   • Cefazolin Confusion   • Latex, Natural Rubber Itching   • Sulfa Antibiotics Hives         SOCIAL HISTORY:  Social History     Socioeconomic History   • Marital status:      Spouse name: Not on file   • Number of children: Not on file   • Years of education: Not on file   • Highest education level: Not on file   Tobacco Use   • Smoking status: Former Smoker   • Smokeless tobacco: Former User   Substance and Sexual Activity   • Alcohol use: No   • Drug use: No   • Sexual activity: Defer       FAMILY HISTORY:  Family History   Problem Relation Age of Onset   • Heart disease Mother    • Thyroid disease Mother    • Heart disease Father    • Cancer Brother        REVIEW OF SYSTEMS:  Review of Systems   Constitutional: Negative for chills, fatigue and fever.   HENT: Negative for congestion, ear pain, rhinorrhea, sinus pressure and sore throat.    Eyes: Negative for visual disturbance.   Respiratory: Negative for cough, chest tightness, shortness of breath and wheezing.    Cardiovascular: Negative for chest pain, palpitations and leg swelling.   Gastrointestinal: Negative for abdominal pain, blood in stool, constipation, diarrhea, nausea and vomiting.   Endocrine: Negative for polydipsia and polyuria.   Genitourinary: Negative for dysuria and hematuria.   Musculoskeletal: Negative for arthralgias and back pain.   Skin: Negative for rash.   Neurological: Negative for  dizziness, light-headedness, numbness and headaches.   Psychiatric/Behavioral: Negative for dysphoric mood and sleep disturbance. The patient is not nervous/anxious.           PHYSICAL EXAMINATION:     VITAL SIGNS:  /72   Pulse 70   Temp 98 °F (36.7 °C)   Resp 18   Wt 124 kg (274 lb)   BMI 40.46 kg/m²     Physical Exam   Constitutional: He is oriented to person, place, and time. He appears well-developed and well-nourished.   HENT:   Head: Normocephalic and atraumatic.   Mouth/Throat: Oropharynx is clear and moist. No oropharyngeal exudate.   Eyes: Conjunctivae and EOM are normal. Pupils are equal, round, and reactive to light. No scleral icterus.   Neck: Normal range of motion. Neck supple. No thyromegaly present.   Cardiovascular: Normal rate and regular rhythm. Exam reveals no gallop and no friction rub.   Murmur heard.  Pulmonary/Chest: Effort normal and breath sounds normal. No respiratory distress. He has no wheezes.   Abdominal: Soft. Bowel sounds are normal. He exhibits no distension. There is no tenderness.   Musculoskeletal: Normal range of motion.   Lymphadenopathy:     He has no cervical adenopathy.   Neurological: He is alert and oriented to person, place, and time.   Skin: Skin is warm and dry. No rash noted.   Psychiatric: He has a normal mood and affect. His behavior is normal.   Nursing note and vitals reviewed.      RECORDS REVIEW:   4/17/19 CBC and CMP, improving values following transfusion.    ASSESSMENT     Diagnoses and all orders for this visit:    Blood loss anemia    Moderate aortic regurgitation    Paroxysmal atrial fibrillation (CMS/HCC)    Type 2 diabetes mellitus with diabetic polyneuropathy, with long-term current use of insulin (CMS/[facility])    Coronary artery disease involving native heart without angina pectoris, unspecified vessel or lesion type    Essential hypertension    Spinal stenosis, lumbar region, with neurogenic claudication    TANIYA on CPAP        PLAN    Blood loss  anemia,  - improved following transfusion.  Recommend GI consult for positive FOBT with significant blood loss anemia.      Debility/critical illness related to neuropathy  -Continue physical therapy and rehab.    Moderate aortic regurgitation/paroxysmal A. fib  -   Stable on current medications.    Coronary artery disease  -Stable on current medications    Type 2 diabetes mellitus  -Continue monitoring glucose levels.  Stable at this time.     Chronic kidney disease/borderline hyperkalemia  -stable.     [x]  Discussed Patient in detail with nursing/staff, addressed all needs today.     [x]  Plan of Care Reviewed   [x]  PT/OT Reviewed   [x]  Order Changes  []  Discharge Plans Reviewed  [x]  Advance Directive on file with Nursing Home.   [x]  POA on file with Nursing Home.    [x]  Code Status listed and reviewed.          Ad Campbell DO.  4/22/2019

## 2019-04-26 ENCOUNTER — LAB (OUTPATIENT)
Dept: LAB | Facility: HOSPITAL | Age: 69
End: 2019-04-26

## 2019-04-26 ENCOUNTER — TRANSCRIBE ORDERS (OUTPATIENT)
Dept: LAB | Facility: HOSPITAL | Age: 69
End: 2019-04-26

## 2019-04-26 DIAGNOSIS — D64.89 OTHER SPECIFIED ANEMIAS: ICD-10-CM

## 2019-04-26 DIAGNOSIS — A41.01 METHICILLIN SUSCEPTIBLE STAPHYLOCOCCUS AUREUS SEPTICEMIA (HCC): ICD-10-CM

## 2019-04-26 DIAGNOSIS — I10 ESSENTIAL HYPERTENSION, MALIGNANT: ICD-10-CM

## 2019-04-26 DIAGNOSIS — I48.0 PAROXYSMAL ATRIAL FIBRILLATION (HCC): ICD-10-CM

## 2019-04-26 DIAGNOSIS — G62.9 PERIPHERAL NERVE DISORDER: ICD-10-CM

## 2019-04-26 DIAGNOSIS — E13.8 DIABETES MELLITUS OF OTHER TYPE WITH COMPLICATION, UNSPECIFIED WHETHER LONG TERM INSULIN USE: ICD-10-CM

## 2019-04-26 DIAGNOSIS — E66.01 MORBID OBESITY (HCC): ICD-10-CM

## 2019-04-26 DIAGNOSIS — C61 MALIGNANT NEOPLASM OF PROSTATE (HCC): ICD-10-CM

## 2019-04-26 DIAGNOSIS — E13.8 DIABETES MELLITUS OF OTHER TYPE WITH COMPLICATION, UNSPECIFIED WHETHER LONG TERM INSULIN USE: Primary | ICD-10-CM

## 2019-04-26 LAB
BASOPHILS # BLD AUTO: 0.06 10*3/MM3 (ref 0–0.2)
BASOPHILS NFR BLD AUTO: 0.6 % (ref 0–1.5)
CRP SERPL-MCNC: 3.77 MG/DL (ref 0–0.5)
DEPRECATED RDW RBC AUTO: 48.9 FL (ref 37–54)
EOSINOPHIL # BLD AUTO: 0.5 10*3/MM3 (ref 0–0.4)
EOSINOPHIL NFR BLD AUTO: 4.7 % (ref 0.3–6.2)
ERYTHROCYTE [DISTWIDTH] IN BLOOD BY AUTOMATED COUNT: 16.3 % (ref 12.3–15.4)
ERYTHROCYTE [SEDIMENTATION RATE] IN BLOOD: 67 MM/HR (ref 0–20)
HCT VFR BLD AUTO: 34.4 % (ref 37.5–51)
HGB BLD-MCNC: 10.6 G/DL (ref 13–17.7)
IMM GRANULOCYTES # BLD AUTO: 0.03 10*3/MM3 (ref 0–0.05)
IMM GRANULOCYTES NFR BLD AUTO: 0.3 % (ref 0–0.5)
LYMPHOCYTES # BLD AUTO: 1.52 10*3/MM3 (ref 0.7–3.1)
LYMPHOCYTES NFR BLD AUTO: 14.2 % (ref 19.6–45.3)
MCH RBC QN AUTO: 25.1 PG (ref 26.6–33)
MCHC RBC AUTO-ENTMCNC: 30.8 G/DL (ref 31.5–35.7)
MCV RBC AUTO: 81.5 FL (ref 79–97)
MONOCYTES # BLD AUTO: 0.85 10*3/MM3 (ref 0.1–0.9)
MONOCYTES NFR BLD AUTO: 7.9 % (ref 5–12)
NEUTROPHILS # BLD AUTO: 7.77 10*3/MM3 (ref 1.7–7)
NEUTROPHILS NFR BLD AUTO: 72.6 % (ref 42.7–76)
PLATELET # BLD AUTO: 357 10*3/MM3 (ref 140–450)
PMV BLD AUTO: 9.2 FL (ref 6–12)
RBC # BLD AUTO: 4.22 10*6/MM3 (ref 4.14–5.8)
WBC NRBC COR # BLD: 10.7 10*3/MM3 (ref 3.4–10.8)

## 2019-04-26 PROCEDURE — 85025 COMPLETE CBC W/AUTO DIFF WBC: CPT

## 2019-04-26 PROCEDURE — 36415 COLL VENOUS BLD VENIPUNCTURE: CPT

## 2019-04-26 PROCEDURE — 86140 C-REACTIVE PROTEIN: CPT

## 2019-05-16 ENCOUNTER — NURSING HOME (OUTPATIENT)
Dept: INTERNAL MEDICINE | Facility: CLINIC | Age: 69
End: 2019-05-16

## 2019-05-16 VITALS
SYSTOLIC BLOOD PRESSURE: 134 MMHG | WEIGHT: 280 LBS | TEMPERATURE: 97.9 F | BODY MASS INDEX: 41.35 KG/M2 | OXYGEN SATURATION: 97 % | HEART RATE: 68 BPM | DIASTOLIC BLOOD PRESSURE: 56 MMHG

## 2019-05-16 DIAGNOSIS — E11.42 TYPE 2 DIABETES MELLITUS WITH DIABETIC POLYNEUROPATHY, WITH LONG-TERM CURRENT USE OF INSULIN (HCC): ICD-10-CM

## 2019-05-16 DIAGNOSIS — Z99.89 OSA ON CPAP: ICD-10-CM

## 2019-05-16 DIAGNOSIS — G89.4 CHRONIC PAIN SYNDROME: Primary | ICD-10-CM

## 2019-05-16 DIAGNOSIS — I48.0 PAROXYSMAL ATRIAL FIBRILLATION (HCC): ICD-10-CM

## 2019-05-16 DIAGNOSIS — I10 ESSENTIAL HYPERTENSION: ICD-10-CM

## 2019-05-16 DIAGNOSIS — G47.33 OSA ON CPAP: ICD-10-CM

## 2019-05-16 DIAGNOSIS — Z79.4 TYPE 2 DIABETES MELLITUS WITH DIABETIC POLYNEUROPATHY, WITH LONG-TERM CURRENT USE OF INSULIN (HCC): ICD-10-CM

## 2019-05-16 DIAGNOSIS — M48.062 SPINAL STENOSIS, LUMBAR REGION, WITH NEUROGENIC CLAUDICATION: ICD-10-CM

## 2019-05-16 DIAGNOSIS — I25.10 CORONARY ARTERY DISEASE INVOLVING NATIVE HEART WITHOUT ANGINA PECTORIS, UNSPECIFIED VESSEL OR LESION TYPE: ICD-10-CM

## 2019-05-16 DIAGNOSIS — D50.0 BLOOD LOSS ANEMIA: ICD-10-CM

## 2019-05-16 DIAGNOSIS — I35.1 MODERATE AORTIC REGURGITATION: ICD-10-CM

## 2019-05-16 DIAGNOSIS — M17.12 PRIMARY OSTEOARTHRITIS OF LEFT KNEE: ICD-10-CM

## 2019-05-16 PROCEDURE — 99309 SBSQ NF CARE MODERATE MDM 30: CPT | Performed by: INTERNAL MEDICINE

## 2019-05-17 ENCOUNTER — LAB REQUISITION (OUTPATIENT)
Dept: LAB | Facility: HOSPITAL | Age: 69
End: 2019-05-17

## 2019-05-17 DIAGNOSIS — R19.5 OTHER FECAL ABNORMALITIES: ICD-10-CM

## 2019-05-17 PROCEDURE — 88305 TISSUE EXAM BY PATHOLOGIST: CPT | Performed by: COLON & RECTAL SURGERY

## 2019-05-20 LAB
CYTO UR: NORMAL
LAB AP CASE REPORT: NORMAL
LAB AP CLINICAL INFORMATION: NORMAL
PATH REPORT.FINAL DX SPEC: NORMAL
PATH REPORT.GROSS SPEC: NORMAL

## 2019-05-21 ENCOUNTER — OFFICE VISIT (OUTPATIENT)
Dept: ORTHOPEDIC SURGERY | Facility: CLINIC | Age: 69
End: 2019-05-21

## 2019-05-21 VITALS — HEIGHT: 69 IN | WEIGHT: 280 LBS | RESPIRATION RATE: 18 BRPM | BODY MASS INDEX: 41.47 KG/M2

## 2019-05-21 DIAGNOSIS — M48.062 SPINAL STENOSIS OF LUMBAR REGION WITH NEUROGENIC CLAUDICATION: ICD-10-CM

## 2019-05-21 DIAGNOSIS — M25.562 ARTHRALGIA OF LEFT KNEE: Primary | ICD-10-CM

## 2019-05-21 DIAGNOSIS — M17.10 ARTHRITIS OF KNEE: ICD-10-CM

## 2019-05-21 PROCEDURE — 99204 OFFICE O/P NEW MOD 45 MIN: CPT | Performed by: ORTHOPAEDIC SURGERY

## 2019-05-21 PROCEDURE — 20610 DRAIN/INJ JOINT/BURSA W/O US: CPT | Performed by: ORTHOPAEDIC SURGERY

## 2019-05-21 RX ORDER — LIDOCAINE HYDROCHLORIDE 10 MG/ML
2 INJECTION, SOLUTION INFILTRATION; PERINEURAL
Status: COMPLETED | OUTPATIENT
Start: 2019-05-21 | End: 2019-05-21

## 2019-05-21 RX ORDER — TRIAMCINOLONE ACETONIDE 40 MG/ML
40 INJECTION, SUSPENSION INTRA-ARTICULAR; INTRAMUSCULAR
Status: COMPLETED | OUTPATIENT
Start: 2019-05-21 | End: 2019-05-21

## 2019-05-21 RX ADMIN — LIDOCAINE HYDROCHLORIDE 2 ML: 10 INJECTION, SOLUTION INFILTRATION; PERINEURAL at 14:56

## 2019-05-21 RX ADMIN — TRIAMCINOLONE ACETONIDE 40 MG: 40 INJECTION, SUSPENSION INTRA-ARTICULAR; INTRAMUSCULAR at 14:56
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
## 2019-05-22 NOTE — PROGRESS NOTES
I    Nursing Home Progress Note        Vish Botello DO ?  Raquel Linda, APRN ?  [address] [facility], [place] [address]  Phone: [phone]  Fax: [phone] Jaxson Holly MD ?  Ad Campbell DO [x]   [address] [place] [address]  Phone: [phone]  Fax: [phone]     PATIENT NAME: Rod Balderas                                                                          YOB: 1950           DATE OF SERVICE: 05/16/2019  FACILITY:  [] [place]   [] [place]   [] [facility]   [x] [facility]   [] Other ______________________________________________________________________     CHIEF COMPLAINT:  Left knee pain      HISTORY OF PRESENT ILLNESS:  Anemia seems to be gradually improving.  Since patient's transfusion, his energy levels have improved.  There are concerns about neuropathy.  Patient was seen by neurology and lab work was done for further testing.  Physical therapy shares concerns about left knee osteoarthritis which is leading to limited ability to ambulate and stand.  Patient was supposed to see an orthopedic physician however after his acute illness, this was not addressed.      PAST MEDICAL & SURGICAL HISTORY:   Past Medical History:   Diagnosis Date   • Anemia    • Anxiety and depression    • Atrial fibrillation (CMS/HCC)    • Cancer (CMS/HCC)    • Chest pain    • Coronary artery disease    • Decubital ulcer     right side buttox   • Diabetes mellitus (CMS/HCC)    • Dyslipidemia    • Extremity pain    • Heart disease    • History of transfusion    • Hypertension    • Low back pain    • TANIYA on CPAP     2-3    • Osteoarthritis     Diffuse osteoarthritis.   • Panic attacks    • Prostate cancer (CMS/HCC)    • Sleep apnea     cpap   • Spinal stenosis       Past Surgical History:   Procedure Laterality Date   • CARDIAC CATHETERIZATION     • CHOLECYSTECTOMY     • COLONOSCOPY  2015   • CORONARY ANGIOPLASTY WITH STENT PLACEMENT  12/2012 December 2012:  A 3.5 x 88 Promus VERONICA  stent to OM2.  Nonobstructive disease.  Otherwise normal LVEF.   • EYE SURGERY Bilateral     cataract   • INSERTION HEMODIALYSIS CATHETER N/A 2/8/2019    Procedure: HEMODIALYSIS CATHETER INSERTION RIGHT INTERNAL JUGULAR;  Surgeon: Bishnu Dailey MD;  Location:  [facility];  Service: General   • PROSTATECTOMY N/A 1/15/2019    Procedure: ROBOTIC PROSTATECTOMY WITH NODES;  Surgeon: Juanito Grace Jr., MD;  Location:  [facility];  Service: DaVinci   • RETINAL DETACHMENT SURGERY Right          MEDICATIONS:  I have reviewed and reconciled the patients medication list in the patients chart at the skilled nursing facility today.      ALLERGIES:  Allergies   Allergen Reactions   • Percocet [Oxycodone-Acetaminophen] Anxiety   • Cefazolin Confusion   • Latex Hives   • Latex, Natural Rubber Itching   • Sulfa Antibiotics Hives   • Oxycodone-Acetaminophen Anxiety         SOCIAL HISTORY:  Social History     Socioeconomic History   • Marital status:      Spouse name: Not on file   • Number of children: Not on file   • Years of education: Not on file   • Highest education level: Not on file   Tobacco Use   • Smoking status: Former Smoker   • Smokeless tobacco: Former User   Substance and Sexual Activity   • Alcohol use: No   • Drug use: No   • Sexual activity: Defer       FAMILY HISTORY:  Family History   Problem Relation Age of Onset   • Heart disease Mother    • Thyroid disease Mother    • Heart disease Father    • Cancer Brother        REVIEW OF SYSTEMS:  Review of Systems   Constitutional: Negative for chills, fatigue and fever.   HENT: Negative for congestion, ear pain, rhinorrhea, sinus pressure and sore throat.    Eyes: Negative for visual disturbance.   Respiratory: Negative for cough, chest tightness, shortness of breath and wheezing.    Cardiovascular: Negative for chest pain, palpitations and leg swelling.   Gastrointestinal: Negative for abdominal pain, blood in stool, constipation, diarrhea, nausea  and vomiting.   Endocrine: Negative for polydipsia and polyuria.   Genitourinary: Negative for dysuria and hematuria.   Musculoskeletal: Negative for arthralgias and back pain.   Skin: Negative for rash.   Neurological: Negative for dizziness, light-headedness, numbness and headaches.   Psychiatric/Behavioral: Negative for dysphoric mood and sleep disturbance. The patient is not nervous/anxious.           PHYSICAL EXAMINATION:     VITAL SIGNS:  /56   Pulse 68   Temp 97.9 °F (36.6 °C)   Wt 127 kg (280 lb)   SpO2 97%   BMI 41.35 kg/m²     Physical Exam   Constitutional: He is oriented to person, place, and time. He appears well-developed and well-nourished.   HENT:   Head: Normocephalic and atraumatic.   Mouth/Throat: Oropharynx is clear and moist. No oropharyngeal exudate.   Eyes: Conjunctivae and EOM are normal. Pupils are equal, round, and reactive to light. No scleral icterus.   Neck: Normal range of motion. Neck supple. No thyromegaly present.   Cardiovascular: Normal rate and regular rhythm. Exam reveals no gallop and no friction rub.   Murmur heard.  Pulmonary/Chest: Effort normal and breath sounds normal. No respiratory distress. He has no wheezes.   Abdominal: Soft. Bowel sounds are normal. He exhibits no distension. There is no tenderness.   Musculoskeletal: Normal range of motion.   Lymphadenopathy:     He has no cervical adenopathy.   Neurological: He is alert and oriented to person, place, and time.   Skin: Skin is warm and dry. No rash noted.   Psychiatric: He has a normal mood and affect. His behavior is normal.   Nursing note and vitals reviewed.      RECORDS REVIEW:   NA    ASSESSMENT     Diagnoses and all orders for this visit:    Chronic pain syndrome    Blood loss anemia    Moderate aortic regurgitation    Paroxysmal atrial fibrillation (CMS/HCC)    Type 2 diabetes mellitus with diabetic polyneuropathy, with long-term current use of insulin (CMS/HCC)    Coronary artery disease involving  native heart without angina pectoris, unspecified vessel or lesion type    Essential hypertension    Spinal stenosis, lumbar region, with neurogenic claudication    TANIYA on CPAP    Primary osteoarthritis of left knee        PLAN    Left knee osteoarthritis  -  Orthopedic referral placed.  Patient may benefit from knee injection.    Neuropathy  -  This may be a cause of patient's general weakness and inability to stand since his surgery.  Neurology has ordered labs to evaluate.  Awaiting results.  Patient to follow-up later this month.    Blood loss anemia,  - improved following transfusion.      Debility/critical illness related to neuropathy  -Continue physical therapy and rehab.    Moderate aortic regurgitation/paroxysmal A. fib  -   Stable on current medications.    Coronary artery disease  -Stable on current medications    Type 2 diabetes mellitus  -Continue monitoring glucose levels.  Stable at this time.     Chronic kidney disease/borderline hyperkalemia  -stable.     [x]  Discussed Patient in detail with nursing/staff, addressed all needs today.     [x]  Plan of Care Reviewed   [x]  PT/OT Reviewed   [x]  Order Changes  []  Discharge Plans Reviewed  [x]  Advance Directive on file with Nursing Home.   [x]  POA on file with Nursing Home.    [x]  Code Status listed and reviewed.          Ad Campbell DO.  5/22/2019

## 2019-05-30 ENCOUNTER — NURSING HOME (OUTPATIENT)
Dept: FAMILY MEDICINE CLINIC | Facility: CLINIC | Age: 69
End: 2019-05-30

## 2019-05-30 DIAGNOSIS — E11.42 TYPE 2 DIABETES MELLITUS WITH DIABETIC POLYNEUROPATHY, WITH LONG-TERM CURRENT USE OF INSULIN (HCC): ICD-10-CM

## 2019-05-30 DIAGNOSIS — B02.23 POST-HERPETIC POLYNEUROPATHY: Primary | ICD-10-CM

## 2019-05-30 DIAGNOSIS — Z79.4 TYPE 2 DIABETES MELLITUS WITH DIABETIC POLYNEUROPATHY, WITH LONG-TERM CURRENT USE OF INSULIN (HCC): ICD-10-CM

## 2019-05-30 DIAGNOSIS — R53.81 PHYSICAL DECONDITIONING: ICD-10-CM

## 2019-05-30 DIAGNOSIS — D50.9 IRON DEFICIENCY ANEMIA, UNSPECIFIED IRON DEFICIENCY ANEMIA TYPE: ICD-10-CM

## 2019-05-30 DIAGNOSIS — M51.36 DEGENERATIVE DISC DISEASE, LUMBAR: ICD-10-CM

## 2019-05-30 PROCEDURE — 99309 SBSQ NF CARE MODERATE MDM 30: CPT | Performed by: NURSE PRACTITIONER

## 2019-05-30 RX ORDER — GABAPENTIN 800 MG/1
800 TABLET ORAL 3 TIMES DAILY
Qty: 90 TABLET | Refills: 2 | Status: SHIPPED | OUTPATIENT
Start: 2019-05-30 | End: 2019-06-29

## 2019-05-30 NOTE — PROGRESS NOTES
"Nursing Home Follow Up Note      Vish Botello DO []   MAY[facility] Taylor [x]  [address] [place] [address]  Phone: [phone]  Fax: [phone] Jaxson Holly MD []    Ad Campbell DO []   [address] [place] [address]  Phone: [phone]  Fax: [phone]     PATIENT NAME: Rod Balderas                                                                          YOB: 1950           DATE OF SERVICE: 05/30/2019  FACILITY:  [][facility]   [] [facility]   [] [facility]   [x] [facility]   [] Other ______________________________________________________________________      \" I personally performed the service documented in this note. The ancillary staff only assisted in transcribing the note and was not involved in any other part of rendering this service\".    CHIEF COMPLAINT:  Shingles    HISTORY OF PRESENT ILLNESS:   Patient diagnosed with Shingles on 5/25/19 and was started on Valtrex 1000 mg tid for 7 days. He has rash in right groin and on right calf area. He has c/o burning, with the rash.    PAST MEDICAL & SURGICAL HISTORY:   Past Medical History:   Diagnosis Date   • Anemia    • Anxiety and depression    • Atrial fibrillation (CMS/HCC)    • Cancer (CMS/HCC)    • Chest pain    • Coronary artery disease    • Decubital ulcer     right side buttox   • Diabetes mellitus (CMS/HCC)    • Dyslipidemia    • Extremity pain    • Heart disease    • History of transfusion    • Hypertension    • Low back pain    • TANIYA on CPAP     2-3    • Osteoarthritis     Diffuse osteoarthritis.   • Panic attacks    • Prostate cancer (CMS/HCC)    • Sleep apnea     cpap   • Spinal stenosis       Past Surgical History:   Procedure Laterality Date   • CARDIAC CATHETERIZATION     • CHOLECYSTECTOMY     • COLONOSCOPY  2015   • CORONARY ANGIOPLASTY WITH STENT PLACEMENT  12/2012 December 2012:  A 3.5 x 88 Promus VERONICA stent to OM2.  Nonobstructive disease.  Otherwise normal LVEF.   • EYE SURGERY Bilateral  "    cataract   • INSERTION HEMODIALYSIS CATHETER N/A 2/8/2019    Procedure: HEMODIALYSIS CATHETER INSERTION RIGHT INTERNAL JUGULAR;  Surgeon: Bishnu Dailey MD;  Location:  [facility];  Service: General   • PROSTATECTOMY N/A 1/15/2019    Procedure: ROBOTIC PROSTATECTOMY WITH NODES;  Surgeon: Juanito Grace Jr., MD;  Location:  [facility];  Service: DaVinci   • RETINAL DETACHMENT SURGERY Right          MEDICATIONS:  I have reviewed and reconciled the patients medication list in the patients chart at the skilled nursing facility today.      ALLERGIES:    Allergies   Allergen Reactions   • Percocet [Oxycodone-Acetaminophen] Anxiety   • Cefazolin Confusion   • Latex Hives   • Latex, Natural Rubber Itching   • Sulfa Antibiotics Hives   • Oxycodone-Acetaminophen Anxiety         SOCIAL HISTORY:    Social History     Socioeconomic History   • Marital status:      Spouse name: Not on file   • Number of children: Not on file   • Years of education: Not on file   • Highest education level: Not on file   Tobacco Use   • Smoking status: Former Smoker   • Smokeless tobacco: Former User   Substance and Sexual Activity   • Alcohol use: No   • Drug use: No   • Sexual activity: Defer       FAMILY HISTORY:    Family History   Problem Relation Age of Onset   • Heart disease Mother    • Thyroid disease Mother    • Heart disease Father    • Cancer Brother        REVIEW OF SYSTEMS:    Review of Systems   Constitutional: Positive for fatigue. Negative for activity change, appetite change, chills, diaphoresis, fever, unexpected weight gain and unexpected weight loss.   HENT: Negative for congestion, ear pain, mouth sores, nosebleeds, postnasal drip, rhinorrhea, sinus pressure, sneezing, sore throat and trouble swallowing.    Eyes: Negative for blurred vision, pain, discharge, redness, itching and visual disturbance.   Respiratory: Negative for apnea, cough, choking, chest tightness, shortness of breath, wheezing and  stridor.    Cardiovascular: Negative for chest pain, palpitations and leg swelling.   Gastrointestinal: Negative for abdominal distention, abdominal pain, blood in stool, constipation, diarrhea, nausea, vomiting, GERD and indigestion.   Endocrine: Negative for polydipsia and polyuria.   Genitourinary: Negative for urinary incontinence, decreased urine volume, difficulty urinating, dysuria, flank pain, frequency, hematuria and urgency.   Musculoskeletal: Positive for arthralgias, back pain and gait problem. Negative for joint swelling and myalgias.        Saji leg pain   Skin: Positive for skin lesions (vesicular lesions noted right LE). Negative for color change, dry skin and rash.   Allergic/Immunologic: Negative for environmental allergies.   Neurological: Positive for dizziness, weakness and numbness. Negative for seizures, speech difficulty, memory problem and confusion.        BLE numbness and weakness   Psychiatric/Behavioral: Negative for behavioral problems, dysphoric mood, hallucinations, sleep disturbance and depressed mood. The patient is not nervous/anxious.      Exam re-performed and the findings are noted.    PHYSICAL EXAMINATION:   VITAL SIGNS: BP: 128/66   HR: 70    RR: 20  O2: 97%  Temp: 98.2   Weight: 280     Physical Exam   Constitutional: He appears well-developed and well-nourished.   HENT:   Head: Normocephalic.   Right Ear: External ear normal.   Left Ear: External ear normal.   Nose: Nose normal.   Eyes: Conjunctivae are normal.   Neck: Normal range of motion.   Cardiovascular: Normal rate, regular rhythm, normal heart sounds and intact distal pulses.   No edema noted in LE   Pulmonary/Chest: Effort normal and breath sounds normal. No respiratory distress. He has no wheezes. He has no rales.   Abdominal: Soft. Bowel sounds are normal. He exhibits no distension and no mass. There is no tenderness. No hernia.   Musculoskeletal: He exhibits no edema.        Left knee: He exhibits decreased range  of motion (crepitus). He exhibits no swelling and no effusion.        Lumbar back: He exhibits decreased range of motion, tenderness and pain.   LE weakness, very limited movement   Neurological: He is alert. He displays abnormal reflex.   LE weakness   Skin: Skin is warm and dry. Rash noted. Rash is vesicular (Vesicular rash in right groin and right calf area).   Psychiatric: He has a normal mood and affect. His behavior is normal.   Nursing note and vitals reviewed.    Exam re-performed and the findings are noted.      RECORDS REVIEW:   I have reviewed and interpreted the following lab test results  obtained at the time of the visit today.  5/28:  BUN 30, Crt: 0.6      Hgb: 10.8  Hct: 34.2      ASSESSMENT     Diagnoses and all orders for this visit:    Post-herpetic polyneuropathy  -     gabapentin (NEURONTIN) 800 MG tablet; Take 1 tablet by mouth 3 (Three) Times a Day for 30 days.    Type 2 diabetes mellitus with diabetic polyneuropathy, with long-term current use of insulin (CMS/[facility])  -     gabapentin (NEURONTIN) 800 MG tablet; Take 1 tablet by mouth 3 (Three) Times a Day for 30 days.    Degenerative disc disease, lumbar  -     gabapentin (NEURONTIN) 800 MG tablet; Take 1 tablet by mouth 3 (Three) Times a Day for 30 days.    Iron deficiency anemia, unspecified iron deficiency anemia type    Physical deconditioning        PLAN    Patient takes Gabapentin for treatment of his DPN and back pain. It was increased today, to 800 mg tid, for better control of his post herpetic pain. Continue Valtrex until complete.     Diabetes controlled with no reports of hyper/hypoglycemia.     MYLES stable.  Hgb 10.8 Hct 34.2.    Nursing staff to continue to assist patient with ADL's.     Patient and nursing staff was encouraged to keep me informed of any acute changes, lack of improvement, or any new concerning symptoms.    [x]  Discussed Patient in detail with nursing/staff, addressed all needs today.     [x]  Plan of Care  Reviewed   []  PT/OT Reviewed   [x]  Order Changes  []  Discharge Plans Reviewed  [x]  Advance Directive on file with Nursing Home.   [x]  POA on file with Nursing Home.   [x]  Code Status listed: [x]  Full Code   []  DNR

## 2019-05-31 ENCOUNTER — LAB (OUTPATIENT)
Dept: LAB | Facility: HOSPITAL | Age: 69
End: 2019-05-31

## 2019-05-31 ENCOUNTER — OFFICE VISIT (OUTPATIENT)
Dept: CARDIOLOGY | Facility: CLINIC | Age: 69
End: 2019-05-31

## 2019-05-31 ENCOUNTER — TRANSCRIBE ORDERS (OUTPATIENT)
Dept: LAB | Facility: HOSPITAL | Age: 69
End: 2019-05-31

## 2019-05-31 VITALS
HEART RATE: 70 BPM | OXYGEN SATURATION: 98 % | BODY MASS INDEX: 40.73 KG/M2 | WEIGHT: 275 LBS | HEIGHT: 69 IN | DIASTOLIC BLOOD PRESSURE: 70 MMHG | SYSTOLIC BLOOD PRESSURE: 140 MMHG

## 2019-05-31 DIAGNOSIS — E11.8 TYPE 2 DIABETES MELLITUS WITH COMPLICATION, UNSPECIFIED WHETHER LONG TERM INSULIN USE: ICD-10-CM

## 2019-05-31 DIAGNOSIS — I35.1 MODERATE AORTIC REGURGITATION: ICD-10-CM

## 2019-05-31 DIAGNOSIS — G62.9 PERIPHERAL NERVE DISORDER: ICD-10-CM

## 2019-05-31 DIAGNOSIS — D64.89 OTHER SPECIFIED ANEMIAS: ICD-10-CM

## 2019-05-31 DIAGNOSIS — C61 MALIGNANT NEOPLASM OF PROSTATE (HCC): ICD-10-CM

## 2019-05-31 DIAGNOSIS — I48.0 PAROXYSMAL ATRIAL FIBRILLATION (HCC): ICD-10-CM

## 2019-05-31 DIAGNOSIS — A41.01 METHICILLIN SUSCEPTIBLE STAPHYLOCOCCUS AUREUS SEPTICEMIA (HCC): ICD-10-CM

## 2019-05-31 DIAGNOSIS — G62.9 PERIPHERAL NERVE DISORDER: Primary | ICD-10-CM

## 2019-05-31 DIAGNOSIS — I25.10 CORONARY ARTERY DISEASE INVOLVING NATIVE HEART WITHOUT ANGINA PECTORIS, UNSPECIFIED VESSEL OR LESION TYPE: Primary | ICD-10-CM

## 2019-05-31 DIAGNOSIS — I10 ESSENTIAL HYPERTENSION: ICD-10-CM

## 2019-05-31 LAB
BASOPHILS # BLD AUTO: 0.04 10*3/MM3 (ref 0–0.2)
BASOPHILS NFR BLD AUTO: 0.4 % (ref 0–1.5)
CRP SERPL-MCNC: 0.73 MG/DL (ref 0–0.5)
DEPRECATED RDW RBC AUTO: 53 FL (ref 37–54)
EOSINOPHIL # BLD AUTO: 0.28 10*3/MM3 (ref 0–0.4)
EOSINOPHIL NFR BLD AUTO: 3.1 % (ref 0.3–6.2)
ERYTHROCYTE [DISTWIDTH] IN BLOOD BY AUTOMATED COUNT: 17.9 % (ref 12.3–15.4)
ERYTHROCYTE [SEDIMENTATION RATE] IN BLOOD: 52 MM/HR (ref 0–20)
HCT VFR BLD AUTO: 39.8 % (ref 37.5–51)
HGB BLD-MCNC: 12.6 G/DL (ref 13–17.7)
IMM GRANULOCYTES # BLD AUTO: 0.02 10*3/MM3 (ref 0–0.05)
IMM GRANULOCYTES NFR BLD AUTO: 0.2 % (ref 0–0.5)
LYMPHOCYTES # BLD AUTO: 1.75 10*3/MM3 (ref 0.7–3.1)
LYMPHOCYTES NFR BLD AUTO: 19.1 % (ref 19.6–45.3)
MCH RBC QN AUTO: 25.9 PG (ref 26.6–33)
MCHC RBC AUTO-ENTMCNC: 31.7 G/DL (ref 31.5–35.7)
MCV RBC AUTO: 81.7 FL (ref 79–97)
MONOCYTES # BLD AUTO: 0.43 10*3/MM3 (ref 0.1–0.9)
MONOCYTES NFR BLD AUTO: 4.7 % (ref 5–12)
NEUTROPHILS # BLD AUTO: 6.65 10*3/MM3 (ref 1.7–7)
NEUTROPHILS NFR BLD AUTO: 72.7 % (ref 42.7–76)
PLATELET # BLD AUTO: 279 10*3/MM3 (ref 140–450)
PMV BLD AUTO: 9.5 FL (ref 6–12)
RBC # BLD AUTO: 4.87 10*6/MM3 (ref 4.14–5.8)
WBC NRBC COR # BLD: 9.15 10*3/MM3 (ref 3.4–10.8)

## 2019-05-31 PROCEDURE — 99214 OFFICE O/P EST MOD 30 MIN: CPT | Performed by: INTERNAL MEDICINE

## 2019-05-31 PROCEDURE — 85025 COMPLETE CBC W/AUTO DIFF WBC: CPT

## 2019-05-31 PROCEDURE — 36415 COLL VENOUS BLD VENIPUNCTURE: CPT

## 2019-05-31 PROCEDURE — 86140 C-REACTIVE PROTEIN: CPT

## 2019-05-31 PROCEDURE — 85652 RBC SED RATE AUTOMATED: CPT

## 2019-05-31 RX ORDER — VALACYCLOVIR HYDROCHLORIDE 1 G/1
1000 TABLET, FILM COATED ORAL 3 TIMES DAILY
COMMUNITY
End: 2019-12-04

## 2019-05-31 RX ORDER — DOCUSATE SODIUM 100 MG/1
100 CAPSULE, LIQUID FILLED ORAL 2 TIMES DAILY
COMMUNITY
End: 2022-07-18 | Stop reason: ALTCHOICE

## 2019-05-31 NOTE — PROGRESS NOTES
[facility]  Office Progress Note  Rod Balderas  1950  [phone]      Visit Date: 5/31/2019      PCP: Tyrese Leyva  [address] [place] [address]    IDENTIFICATION: A 68 y.o. male former  from [facility]    Cc fu cad    PROBLEM LIST:   1. PAF   a. Eliquis  2. CAD           a. No prior ischemic evaluation.  b. December 2012: A 3.5 x 88 Promus VERONICA stent to OM2. Nonobstructive disease. Otherwise normal LVEF.  3. Valvular heart disease  a. 12/15 echo: Mild LVH, normal EF, mild AR.  b. 2/14/2019 KLAUDIA: EF 61 to 65%, moderate AI, mild TR, mild MR, no evidence of L AAA thrombus  4. Dyslipidemia , November 2012:   a. LDL 90, total 132, HDL 38, triglycerides 50.  5. Diffuse osteoarthritis.  6. Morbid obesity, 5 feet 9 inches, 350 pounds, precedent 80-pound weight loss with diet and exercise in the 80s.  7. Obstructive sleep apnea on CPAP as of 2012.    Allergies  Allergies   Allergen Reactions   • Percocet [Oxycodone-Acetaminophen] Anxiety   • Cefazolin Confusion   • Latex Hives   • Latex, Natural Rubber Itching   • Sulfa Antibiotics Hives   • Oxycodone-Acetaminophen Anxiety       Current Medications    Current Outpatient Medications:   •  acetaminophen (TYLENOL) 325 MG tablet, Take 2 tablets by mouth Every 6 (Six) Hours As Needed for Mild Pain ., Disp: , Rfl:   •  albuterol (PROVENTIL) (2.5 MG/3ML) 0.083% nebulizer solution, Take 2.5 mg by nebulization Every 6 (Six) Hours As Needed for Wheezing. (Patient taking differently: Take 1.25 mg by nebulization Every 6 (Six) Hours As Needed for Wheezing.), Disp: , Rfl: 12  •  apixaban (ELIQUIS) 5 MG tablet tablet, Take 1 tablet by mouth Every 12 (Twelve) Hours., Disp: 60 tablet, Rfl:   •  aspirin 81 MG chewable tablet, Chew 81 mg Daily., Disp: , Rfl:   •  bisacodyl (DULCOLAX) 10 MG suppository, Insert 10 mg into the rectum Daily., Disp: , Rfl:   •  bisacodyl (DULCOLAX) 5 MG EC tablet, Take 5 mg by mouth Daily As  Needed for Constipation., Disp: , Rfl:   •  coenzyme Q10 100 MG capsule, Take 200 mg by mouth Daily., Disp: , Rfl:   •  docusate sodium (COLACE) 100 MG capsule, Take 100 mg by mouth 2 (Two) Times a Day., Disp: , Rfl:   •  doxycycline (VIBRAMYCIN) 100 MG capsule, Take 100 mg by mouth 2 (Two) Times a Day. Until April 26th 2019, Disp: , Rfl:   •  DULoxetine (CYMBALTA) 60 MG capsule, Take 60 mg by mouth Daily., Disp: , Rfl:   •  famotidine (PEPCID) 20 MG tablet, Take 1 tablet by mouth 2 (Two) Times a Day., Disp: , Rfl:   •  ferrous sulfate 325 (65 FE) MG tablet, Take 325 mg by mouth Daily With Breakfast., Disp: , Rfl:   •  fluticasone (FLONASE) 50 MCG/ACT nasal spray, USE 2 SPRAYS IN EACH NOSTRIL DAILY PRN, Disp: , Rfl: 0  •  folic acid (FOLVITE) 1 MG tablet, Take 1 mg by mouth Daily., Disp: , Rfl:   •  gabapentin (NEURONTIN) 800 MG tablet, Take 1 tablet by mouth 3 (Three) Times a Day for 30 days., Disp: 90 tablet, Rfl: 2  •  hydrALAZINE (APRESOLINE) 25 MG tablet, Take 25 mg by mouth 3 (Three) Times a Day., Disp: , Rfl:   •  insulin lispro (humaLOG) 100 UNIT/ML injection, Inject 0-7 Units under the skin into the appropriate area as directed 4 (Four) Times a Day With Meals & at Bedtime., Disp: , Rfl: 12  •  insulin lispro (humaLOG) 100 UNIT/ML injection, Inject 0-9 Units under the skin into the appropriate area as directed 4 (Four) Times a Day With Meals & at Bedtime., Disp: , Rfl: 12  •  Lactobacillus-Inulin (CULTUREE DIGESTIVE HEALTH PO), Take  by mouth., Disp: , Rfl:   •  lactulose (CHRONULAC) 10 GM/15ML solution, Take 45 mL by mouth Every 8 (Eight) Hours As Needed (Constipation)., Disp: , Rfl:   •  lisinopril (PRINIVIL,ZESTRIL) 20 MG tablet, Take 20 mg by mouth Daily., Disp: , Rfl:   •  MAGNESIUM CITRATE PO, Take 1 tablet by mouth Daily., Disp: , Rfl:   •  melatonin 5 MG sublingual tablet sublingual tablet, Place 1 tablet under the tongue At Night As Needed (sleep). (Patient taking differently: Place 6 mg under the  tongue At Night As Needed (sleep).), Disp: , Rfl:   •  metoprolol succinate XL (TOPROL-XL) 100 MG 24 hr tablet, Take 100 mg by mouth Daily., Disp: , Rfl:   •  metoprolol succinate XL (TOPROL-XL) 50 MG 24 hr tablet, Take 50 mg by mouth Daily., Disp: , Rfl:   •  ondansetron (ZOFRAN) 4 MG/2ML injection, Infuse 2 mL into a venous catheter Every 6 (Six) Hours As Needed for Nausea or Vomiting., Disp: , Rfl:   •  polyethylene glycol (MIRALAX) packet, Take 17 g by mouth Daily., Disp: , Rfl:   •  Skin Protectants, Misc. (BIBI PROTECT EX), Apply  topically., Disp: , Rfl:   •  traMADol (ULTRAM) 50 MG tablet, Take 1 tablet by mouth Every 6 (Six) Hours As Needed for Moderate Pain ., Disp: 90 tablet, Rfl: 0  •  valACYclovir (VALTREX) 1000 MG tablet, Take 1,000 mg by mouth 3 (Three) Times a Day., Disp: , Rfl:   •  vitamin B-12 (CYANOCOBALAMIN) 1000 MCG tablet, Take 1,000 mcg by mouth Daily., Disp: , Rfl:   •  vitamin C (ASCORBIC ACID) 250 MG tablet, Take 250 mg by mouth Daily., Disp: , Rfl:   •  diclofenac (VOLTAREN) 1 % gel gel, Apply 4 g topically to the appropriate area as directed 4 (Four) Times a Day As Needed., Disp: , Rfl:   •  DOCUSATE CALCIUM PO, Take 100 mg by mouth., Disp: , Rfl:   •  dry mouth gel (BIOTENE ORALBALANCE) gel, Apply  to the mouth or throat As Needed for Dry Mouth., Disp: , Rfl:       History of Present Illness   The patient was hospitalized in February for sepsis with MSSA bacteremia and acute renal failure requiring emergent dialysis.  His renal function recovered.  He developed A. fib with RVR and was started on Eliquis.  Underwent ECV on 2/14/2019. He was also followed w neuro for new neuropathy and is continuing PT. He just had a follow up w ID and today is his last dose of Po abx. He has been at [facility], and he will either be going home w  or going to [facility], this is tbd.     Pt denies any chest pain, orthopnea, PND, palpitations, lower extremity edema, or claudication. BPs and  "HR have been stable. He has not been able to stand or walk, neurology does not think he will be able to regain this.  BGs have been acceptable.         ROS:  All systems have been reviewed and are negative with the exception of those mentioned in the HPI.    OBJECTIVE:  Vitals:    05/31/19 1322   BP: 140/70   BP Location: Left arm   Patient Position: Sitting   Pulse: 70   SpO2: 98%   Weight: 125 kg (275 lb)   Height: 175.3 cm (69\")     Physical Exam   Constitutional: He appears well-developed and well-nourished.   Neck: Normal range of motion. Neck supple. No hepatojugular reflux and no JVD present. Carotid bruit is not present. No tracheal deviation present. No thyromegaly present.   Cardiovascular: Normal rate, regular rhythm, S1 normal, S2 normal, intact distal pulses and normal pulses. PMI is not displaced. Exam reveals no gallop, no distant heart sounds, no friction rub, no midsystolic click and no opening snap.   No murmur heard.  Pulses:       Radial pulses are 2+ on the right side, and 2+ on the left side.        Dorsalis pedis pulses are 2+ on the right side, and 2+ on the left side.        Posterior tibial pulses are 2+ on the right side, and 2+ on the left side.   Pulmonary/Chest: Effort normal and breath sounds normal. He has no wheezes. He has no rales.   Abdominal: Soft. Bowel sounds are normal. He exhibits no mass. There is no tenderness. There is no guarding.       Diagnostic Data:  Procedures      ASSESSMENT:   Diagnosis Plan   1. Coronary artery disease involving native heart without angina pectoris, unspecified vessel or lesion type     2. Paroxysmal atrial fibrillation (CMS/HCC)     3. Essential hypertension     4. Moderate aortic regurgitation         PLAN:  1. Medically managed with ASA, lisinopril  2. Continue anticoagulation with Eliquis, continue metoprolol.    3. BP controlled, continue antihypertensives    F/u in 6 months in Tyrese Coffman MD, thank you for referring " Mr. Balderas for evaluation.  I have forwarded my electronically generated recommendations to you for review.  Please do not hesitate to call with any questions.     Scribed for Rod Regalado MD by Cecilia Wallace PA-C. 5/31/2019  3:01 PM  I, Rod Regalado MD, personally performed the services described in this documentation as scribed by the above named individual in my presence, and it is both accurate and complete.  5/31/2019  3:01 PM    Rod Regalado MD, FACC

## 2019-06-11 ENCOUNTER — OFFICE VISIT (OUTPATIENT)
Dept: NEUROSURGERY | Facility: CLINIC | Age: 69
End: 2019-06-11

## 2019-06-11 VITALS — WEIGHT: 275 LBS | BODY MASS INDEX: 40.73 KG/M2 | HEIGHT: 69 IN

## 2019-06-11 DIAGNOSIS — M48.061 SPINAL STENOSIS OF LUMBAR REGION, UNSPECIFIED WHETHER NEUROGENIC CLAUDICATION PRESENT: ICD-10-CM

## 2019-06-11 DIAGNOSIS — M50.30 DEGENERATIVE CERVICAL DISC: ICD-10-CM

## 2019-06-11 DIAGNOSIS — M48.02 CERVICAL STENOSIS OF SPINAL CANAL: Primary | ICD-10-CM

## 2019-06-11 DIAGNOSIS — M51.36 DEGENERATIVE LUMBAR DISC: ICD-10-CM

## 2019-06-11 PROCEDURE — 99203 OFFICE O/P NEW LOW 30 MIN: CPT | Performed by: NEUROLOGICAL SURGERY

## 2019-06-11 NOTE — PROGRESS NOTES
Subjective   Patient ID: Rod Balderas is a 68 y.o. male is being seen for consultation today at the request of Tyrese Hernandez MD  Chief Complaint: Back pain and leg weakness    History of Present Illness: The patient is a 68-year-old man who went into the hospital to have prostate surgery in January, apparently had some form of staphylococcal infection, and ended up profoundly weak in his arms and legs and has been interpreted to have a neuropathy.  He has had back pain for about 4 years.  He has had difficulty with dorsiflexion of his left foot preceding the January events.  He has been in a nursing home for the past 5 months.  He has a metal fragment in his head or skull which has been recognized and therefore MRI scans will not be done, even though about 4 5 years ago he had an MRI scan of his lumbar spine with this metallic fragment present and there was no adverse consequence.  He is transported by wheelchair.  He has to lift his right leg with his arm using a sling over the leg because of weakness of hip flexion on that side, but he is able to elevate his left thigh with intrinsic strength.    Review of Radiographic Studies:  Cervical lumbar CTs were reviewed and no specific diagnosis can be made, but I do believe there is significant stenosis of his lower lumbar spine.  I cannot comment on any stenosis potentially in his cervical spine.    The following portions of the patient's history were reviewed, updated as appropriate and approved: allergies, current medications, past family history, past medical history, past social history, past surgical history, review of systems and problem list.    Review of Systems   Constitutional: Negative for activity change, appetite change, chills, diaphoresis, fatigue, fever and unexpected weight change.   HENT: Negative for congestion, dental problem, drooling, ear discharge, ear pain, facial swelling, hearing loss, mouth sores, nosebleeds, postnasal drip, rhinorrhea,  sinus pressure, sneezing, sore throat, tinnitus, trouble swallowing and voice change.    Eyes: Negative for photophobia, pain, discharge, redness, itching and visual disturbance.   Respiratory: Negative for apnea, cough, choking, chest tightness, shortness of breath, wheezing and stridor.    Cardiovascular: Negative for chest pain, palpitations and leg swelling.   Gastrointestinal: Negative for abdominal distention, abdominal pain, anal bleeding, blood in stool, constipation, diarrhea, nausea, rectal pain and vomiting.   Endocrine: Negative for cold intolerance, heat intolerance, polydipsia, polyphagia and polyuria.   Genitourinary: Negative for decreased urine volume, difficulty urinating, dysuria, enuresis, flank pain, frequency, genital sores, hematuria and urgency.   Musculoskeletal: Positive for arthralgias, back pain and myalgias. Negative for gait problem, joint swelling, neck pain and neck stiffness.   Skin: Negative for color change, pallor, rash and wound.   Allergic/Immunologic: Negative for environmental allergies, food allergies and immunocompromised state.   Neurological: Positive for dizziness and headaches. Negative for tremors, seizures, syncope, facial asymmetry, speech difficulty, weakness, light-headedness and numbness.   Hematological: Negative for adenopathy. Does not bruise/bleed easily.   Psychiatric/Behavioral: Negative for agitation, behavioral problems, confusion, decreased concentration, dysphoric mood, hallucinations, self-injury, sleep disturbance and suicidal ideas. The patient is not nervous/anxious and is not hyperactive.        Objective     NEUROLOGICAL EXAMINATION:      MENTAL STATUS:  Alert and oriented.  Speech intact.  Recent and remote memory intact.      CRANIAL NERVES:  Cranial nerve II:  Visual fields are full.  Cranial nerves III, IV and VI:  PERRLADC.  Extraocular movements are intact.  Nystagmus is not present.  Cranial nerve V:  Facial sensation is intact.  Cranial  nerve VII:  Muscles of facial expression reveal no asymmetry.  Cranial nerve VIII:  Hearing is intact.  Cranial nerves IX and X:  Palate elevates symmetrically.  Cranial nerve XI:  Shoulder shrug is intact.  Cranial nerve XII:  Tongue is midline without evidence of atrophy or fasciculation.    MUSCULOSKELETAL:  SLR [negative. Toro's test negative.]    MOTOR: Profound weakness of dorsiflexion in both feet.  Weakness of plantarflexion in the right foot, but some plantarflexion present in the left foot.  Cannot extend the knee on the left or the right against gravity.  Cannot flex the right hip against gravity but can on the left.  Can elevate both arms above the head.  Has subjective weakness of  of both hands.    SENSATION: Subjective numbness of both lower extremities.    REFLEXES:  DTR mildly hyperactive in biceps and triceps bilaterally.  Absent DTR in both lower extremities.      Assessment   Unexplained quadriplegia, now improving.  Rule out cervical stenosis.  Rule out lumbar stenosis.       Plan   Cervical and lumbar myelogram.       Rod Cummings MD

## 2019-06-13 ENCOUNTER — NURSING HOME (OUTPATIENT)
Dept: INTERNAL MEDICINE | Facility: CLINIC | Age: 69
End: 2019-06-13

## 2019-06-13 VITALS
DIASTOLIC BLOOD PRESSURE: 64 MMHG | SYSTOLIC BLOOD PRESSURE: 130 MMHG | RESPIRATION RATE: 16 BRPM | TEMPERATURE: 98 F | HEART RATE: 68 BPM | WEIGHT: 273 LBS | OXYGEN SATURATION: 98 % | BODY MASS INDEX: 40.32 KG/M2

## 2019-06-13 DIAGNOSIS — E11.42 TYPE 2 DIABETES MELLITUS WITH DIABETIC POLYNEUROPATHY, WITH LONG-TERM CURRENT USE OF INSULIN (HCC): ICD-10-CM

## 2019-06-13 DIAGNOSIS — I25.10 CORONARY ARTERY DISEASE INVOLVING NATIVE HEART WITHOUT ANGINA PECTORIS, UNSPECIFIED VESSEL OR LESION TYPE: ICD-10-CM

## 2019-06-13 DIAGNOSIS — Z99.89 OSA ON CPAP: ICD-10-CM

## 2019-06-13 DIAGNOSIS — G89.4 CHRONIC PAIN SYNDROME: Primary | ICD-10-CM

## 2019-06-13 DIAGNOSIS — Z79.4 TYPE 2 DIABETES MELLITUS WITH DIABETIC POLYNEUROPATHY, WITH LONG-TERM CURRENT USE OF INSULIN (HCC): ICD-10-CM

## 2019-06-13 DIAGNOSIS — M17.12 PRIMARY OSTEOARTHRITIS OF LEFT KNEE: ICD-10-CM

## 2019-06-13 DIAGNOSIS — D50.0 BLOOD LOSS ANEMIA: ICD-10-CM

## 2019-06-13 DIAGNOSIS — M48.062 SPINAL STENOSIS, LUMBAR REGION, WITH NEUROGENIC CLAUDICATION: ICD-10-CM

## 2019-06-13 DIAGNOSIS — G47.33 OSA ON CPAP: ICD-10-CM

## 2019-06-13 DIAGNOSIS — I35.1 MODERATE AORTIC REGURGITATION: ICD-10-CM

## 2019-06-13 DIAGNOSIS — I48.0 PAROXYSMAL ATRIAL FIBRILLATION (HCC): ICD-10-CM

## 2019-06-13 DIAGNOSIS — I10 ESSENTIAL HYPERTENSION: ICD-10-CM

## 2019-06-13 PROCEDURE — 99315 NF DSCHRG MGMT 30 MIN/LESS: CPT | Performed by: INTERNAL MEDICINE

## 2019-06-13 RX ORDER — TRAMADOL HYDROCHLORIDE 50 MG/1
50 TABLET ORAL EVERY 6 HOURS PRN
Qty: 90 TABLET | Refills: 5 | Status: SHIPPED | OUTPATIENT
Start: 2019-06-13 | End: 2022-07-18 | Stop reason: ALTCHOICE

## 2019-06-17 ENCOUNTER — TELEPHONE (OUTPATIENT)
Dept: CARDIOLOGY | Facility: CLINIC | Age: 69
End: 2019-06-17

## 2019-06-18 NOTE — PROGRESS NOTES
I    Nursing Home Progress Note        Vish Botello,  ?  Raquel Linda, APRN ?  [address] [facility], [place] [address]  Phone: [phone]  Fax: [phone] Jaxson Holly MD ?  Ad Campbell DO [x]   [address] [place] [address]  Phone: [phone]  Fax: [phone]     PATIENT NAME: Rod Balderas                                                                          YOB: 1950           DATE OF SERVICE: 06/13/2019   FACILITY:  [] [facility]   [] [facility]   [] [facility]   [x] [facility]   [] Other ______________________________________________________________________     CHIEF COMPLAINT:  Lower extremity weakness      HISTORY OF PRESENT ILLNESS:  Patient is overall condition continues to improve.  Patient has no new complaints or concerns.  He continues to work with physical therapy and has gradual improvement in function.  Patient anticipates discharge to home next week.  He is willing to have home health and physical therapy assist.  Patient did recently see neurosurgery however because of lower extremity weakness is still unclear.      PAST MEDICAL & SURGICAL HISTORY:   Past Medical History:   Diagnosis Date   • Anemia    • Anxiety and depression    • Atrial fibrillation (CMS/HCC)    • Cancer (CMS/HCC)    • Chest pain    • Coronary artery disease    • Decubital ulcer     right side buttox   • Diabetes mellitus (CMS/HCC)    • Dyslipidemia    • Extremity pain    • Heart disease    • History of transfusion    • Hypertension    • Low back pain    • TANIYA on CPAP     2-3    • Osteoarthritis     Diffuse osteoarthritis.   • Panic attacks    • Prostate cancer (CMS/HCC)    • Sleep apnea     cpap   • Spinal stenosis       Past Surgical History:   Procedure Laterality Date   • CARDIAC CATHETERIZATION     • CHOLECYSTECTOMY     • COLONOSCOPY  2015   • CORONARY ANGIOPLASTY WITH STENT PLACEMENT  12/2012 December 2012:  A 3.5 x 88 Promus VERONICA stent to OM2.  Nonobstructive disease.   Otherwise normal LVEF.   • EYE SURGERY Bilateral     cataract   • INSERTION HEMODIALYSIS CATHETER N/A 2019    Procedure: HEMODIALYSIS CATHETER INSERTION RIGHT INTERNAL JUGULAR;  Surgeon: Bishnu Dailey MD;  Location: [facility] OR;  Service: General   • PROSTATECTOMY N/A 1/15/2019    Procedure: ROBOTIC PROSTATECTOMY WITH NODES;  Surgeon: Juanito Grace Jr., MD;  Location: [facility] OR;  Service: DaVinci   • RETINAL DETACHMENT SURGERY Right          MEDICATIONS:  I have reviewed and reconciled the patients medication list in the patients chart at the [facility] nursing [facility] today.      ALLERGIES:  Allergies   Allergen Reactions   • Percocet [Oxycodone-Acetaminophen] Anxiety   • Cefazolin Confusion   • Latex Hives   • Latex, Natural Rubber Itching   • Sulfa Antibiotics Hives   • Oxycodone-Acetaminophen Anxiety         SOCIAL HISTORY:  Social History     Socioeconomic History   • Marital status:      Spouse name: Not on file   • Number of children: Not on file   • Years of education: Not on file   • Highest education level: Not on file   Tobacco Use   • Smoking status: Former Smoker     Types: Cigarettes     Last attempt to quit: 1984     Years since quittin.0   • Smokeless tobacco: Former User     Types: Chew     Quit date: 1989   Substance and Sexual Activity   • Alcohol use: No   • Drug use: No   • Sexual activity: Defer       FAMILY HISTORY:  Family History   Problem Relation Age of Onset   • Heart disease Mother    • Thyroid disease Mother    • Heart disease Father    • Cancer Brother        REVIEW OF SYSTEMS:  Review of Systems   Constitutional: Negative for chills, fatigue and fever.   HENT: Negative for congestion, ear pain, rhinorrhea, sinus pressure and sore throat.    Eyes: Negative for visual disturbance.   Respiratory: Negative for cough, chest tightness, shortness of breath and wheezing.    Cardiovascular: Negative for chest pain, palpitations and leg swelling.    Gastrointestinal: Negative for abdominal pain, blood in stool, constipation, diarrhea, nausea and vomiting.   Endocrine: Negative for polydipsia and polyuria.   Genitourinary: Negative for dysuria and hematuria.   Musculoskeletal: Negative for arthralgias and back pain.   Skin: Negative for rash.   Neurological: Negative for dizziness, light-headedness, numbness and headaches.   Psychiatric/Behavioral: Negative for dysphoric mood and sleep disturbance. The patient is not nervous/anxious.           PHYSICAL EXAMINATION:     VITAL SIGNS:  /64   Pulse 68   Temp 98 °F (36.7 °C)   Resp 16   Wt 124 kg (273 lb)   SpO2 98%   BMI 40.32 kg/m²     Physical Exam   Constitutional: He is oriented to person, place, and time. He appears well-developed and well-nourished.   HENT:   Head: Normocephalic and atraumatic.   Mouth/Throat: Oropharynx is clear and moist. No oropharyngeal exudate.   Eyes: Conjunctivae and EOM are normal. Pupils are equal, round, and reactive to light. No scleral icterus.   Neck: Normal range of motion. Neck supple. No thyromegaly present.   Cardiovascular: Normal rate and regular rhythm. Exam reveals no gallop and no friction rub.   Murmur heard.  Pulmonary/Chest: Effort normal and breath sounds normal. No respiratory distress. He has no wheezes.   Abdominal: Soft. Bowel sounds are normal. He exhibits no distension. There is no tenderness.   Musculoskeletal: Normal range of motion.   Lymphadenopathy:     He has no cervical adenopathy.   Neurological: He is alert and oriented to person, place, and time.   Skin: Skin is warm and dry. No rash noted.   Psychiatric: He has a normal mood and affect. His behavior is normal.   Nursing note and vitals reviewed.      RECORDS REVIEW:   NA    ASSESSMENT     Diagnoses and all orders for this visit:    Chronic pain syndrome    Blood loss anemia    Moderate aortic regurgitation    Paroxysmal atrial fibrillation (CMS/HCC)    Type 2 diabetes mellitus with  diabetic polyneuropathy, with long-term current use of insulin (CMS/[facility])    Coronary artery disease involving native heart without angina pectoris, unspecified vessel or lesion type    Essential hypertension    Spinal stenosis, lumbar region, with neurogenic claudication    TANIYA on CPAP    Primary osteoarthritis of left knee        PLAN    Left knee osteoarthritis  - Following with orthopedics    Neuropathy  - Pending work-up with neurosurgery this can be followed up as outpatient.    Blood loss anemia,  - improved following transfusion.      Debility/critical illness related to neuropathy  -Continue physical therapy and rehab.    Moderate aortic regurgitation/paroxysmal A. fib  -   Stable on current medications.    Coronary artery disease  -Stable on current medications    Type 2 diabetes mellitus  -Continue monitoring glucose levels.  Stable at this time.     Chronic kidney disease/borderline hyperkalemia  -stable.     Discharge medications were reviewed.  Current medications are appropriate.  Patient will need to discharge home with home health and physical therapy for continued strengthening.    [x]  Discussed Patient in detail with nursing/staff, addressed all needs today.     [x]  Plan of Care Reviewed   [x]  PT/OT Reviewed   [x]  Order Changes  [x]  Discharge Plans Reviewed  [x]  Advance Directive on file with Nursing Home.   [x]  POA on file with Nursing Home.    [x]  Code Status listed and reviewed.          Ad Campbell DO.  6/18/2019

## 2019-06-19 PROBLEM — M50.30 DEGENERATIVE CERVICAL DISC: Status: ACTIVE | Noted: 2019-06-19

## 2019-06-19 PROBLEM — M48.02 CERVICAL STENOSIS OF SPINAL CANAL: Status: ACTIVE | Noted: 2019-06-19

## 2019-07-05 ENCOUNTER — HOSPITAL ENCOUNTER (OUTPATIENT)
Facility: HOSPITAL | Age: 69
Setting detail: HOSPITAL OUTPATIENT SURGERY
Discharge: HOME | End: 2019-07-05
Attending: RADIOLOGY | Admitting: RADIOLOGY

## 2019-07-05 ENCOUNTER — APPOINTMENT (OUTPATIENT)
Dept: CT IMAGING | Facility: HOSPITAL | Age: 69
End: 2019-07-05

## 2019-07-05 VITALS
DIASTOLIC BLOOD PRESSURE: 68 MMHG | BODY MASS INDEX: 41.68 KG/M2 | WEIGHT: 281.4 LBS | OXYGEN SATURATION: 98 % | SYSTOLIC BLOOD PRESSURE: 129 MMHG | RESPIRATION RATE: 16 BRPM | TEMPERATURE: 98 F | HEIGHT: 69 IN | HEART RATE: 56 BPM

## 2019-07-05 DIAGNOSIS — M50.30 DEGENERATIVE CERVICAL DISC: ICD-10-CM

## 2019-07-05 DIAGNOSIS — M48.02 CERVICAL STENOSIS OF SPINAL CANAL: ICD-10-CM

## 2019-07-05 LAB — GLUCOSE BLDC GLUCOMTR-MCNC: 102 MG/DL (ref 70–130)

## 2019-07-05 PROCEDURE — 62305 MYELOGRAPHY LUMBAR INJECTION: CPT | Performed by: RADIOLOGY

## 2019-07-05 PROCEDURE — 82962 GLUCOSE BLOOD TEST: CPT

## 2019-07-05 PROCEDURE — 72132 CT LUMBAR SPINE W/DYE: CPT

## 2019-07-05 PROCEDURE — 0 IOPAMIDOL 41 % SOLUTION: Performed by: RADIOLOGY

## 2019-07-05 PROCEDURE — 72126 CT NECK SPINE W/DYE: CPT

## 2019-07-05 PROCEDURE — 25010000002 IOPAMIDOL 61 % SOLUTION: Performed by: RADIOLOGY

## 2019-07-05 RX ORDER — LIDOCAINE HYDROCHLORIDE 10 MG/ML
INJECTION, SOLUTION EPIDURAL; INFILTRATION; INTRACAUDAL; PERINEURAL AS NEEDED
Status: DISCONTINUED | OUTPATIENT
Start: 2019-07-05 | End: 2019-07-05 | Stop reason: HOSPADM

## 2019-07-16 ENCOUNTER — OFFICE VISIT (OUTPATIENT)
Dept: NEUROSURGERY | Facility: CLINIC | Age: 69
End: 2019-07-16

## 2019-07-16 VITALS — HEIGHT: 69 IN | BODY MASS INDEX: 41.62 KG/M2 | WEIGHT: 281 LBS

## 2019-07-16 DIAGNOSIS — M48.062 SPINAL STENOSIS, LUMBAR REGION, WITH NEUROGENIC CLAUDICATION: ICD-10-CM

## 2019-07-16 DIAGNOSIS — M51.36 DEGENERATIVE DISC DISEASE, LUMBAR: Primary | ICD-10-CM

## 2019-07-16 PROCEDURE — 99213 OFFICE O/P EST LOW 20 MIN: CPT | Performed by: NEUROLOGICAL SURGERY

## 2019-07-16 NOTE — PROGRESS NOTES
Subjective   Rod Balderas is a 68 y.o. male who presents for follow up of back pain and leg weakness.     The patient is a 68-year-old man who entered the hospital for prostate surgery last January, developed some form of staphylococcal infection, became profoundly weak in arms and legs interpreted as a neuropathy.  He has been slowly improving.  Prior to the onset of this generalized weakness he had weakness of dorsiflexion of his left foot, and now has his most profound weakness in the right foot.    Cervical and lumbar myelogram were done to rule out any myelopathy as the cause.  The cervical myelogram is normal.  The lumbar myelogram shows contrast feels down to the L3-4 level and then tails off with nonfilling of the arachnoid below that level.  There appears to be an epidural or intradural vacuum phenomenon behind the upper L4 body.  There appears to be an elongation of his spinal cord down to the upper L3 level.  It is unclear whether this vacuum phenomenon could represent a fatty component of a thickened filum and tethered spinal cord.    The following portions of the patient's history were reviewed, updated as appropriate and approved: allergies, current medications, past family history, past medical history, past social history, past surgical history, review of systems and problem list.     Review of Systems   Constitutional: Negative for activity change, appetite change, chills, diaphoresis, fatigue, fever and unexpected weight change.   HENT: Negative for congestion, dental problem, drooling, ear discharge, ear pain, facial swelling, hearing loss, mouth sores, nosebleeds, postnasal drip, rhinorrhea, sinus pressure, sneezing, sore throat, tinnitus, trouble swallowing and voice change.    Eyes: Negative for photophobia, pain, discharge, redness, itching and visual disturbance.   Respiratory: Negative for apnea, cough, choking, chest tightness, shortness of breath, wheezing and stridor.    Cardiovascular:  Negative for chest pain, palpitations and leg swelling.   Gastrointestinal: Negative for abdominal distention, abdominal pain, anal bleeding, blood in stool, constipation, diarrhea, nausea, rectal pain and vomiting.   Endocrine: Negative for cold intolerance, heat intolerance, polydipsia, polyphagia and polyuria.   Genitourinary: Negative for decreased urine volume, difficulty urinating, dysuria, enuresis, flank pain, frequency, genital sores, hematuria and urgency.   Musculoskeletal: Positive for arthralgias, back pain and myalgias. Negative for gait problem, joint swelling, neck pain and neck stiffness.   Skin: Negative for color change, pallor, rash and wound.   Allergic/Immunologic: Negative for environmental allergies, food allergies and immunocompromised state.   Neurological: Positive for dizziness and headaches. Negative for tremors, seizures, syncope, facial asymmetry, speech difficulty, weakness, light-headedness and numbness.   Hematological: Negative for adenopathy. Does not bruise/bleed easily.   Psychiatric/Behavioral: Negative for agitation, behavioral problems, confusion, decreased concentration, dysphoric mood, hallucinations, self-injury, sleep disturbance and suicidal ideas. The patient is not nervous/anxious and is not hyperactive.        Objective    NEUROLOGICAL EXAMINATION:    Examined in a wheelchair.  Marked weakness of both lower extremities with profound weakness of dorsiflexion bilaterally weakness of plantarflexion on the right, some plantarflexion now on the left.  Cannot extend the knee on the left or right against gravity.  Nevertheless his symptoms of weakness seem to be slowly improving and are better than they were a month ago when I first saw him.  He has absent reflexes in the lower extremities.    Assessment   Profound generalized weakness, no evidence of cervical myelopathy that I can determine from myelography although a transverse myelitis is possible as the cause.  Ramses  Barré is another potential cause which would be associated with loss of reflexes in the lower extremities.  He also has an unexplained nonfilling of the arachnoid/dura below the L3-4 level, with a spinal cord elongation to L3 and a possible thickened filum and tethered cord below that.  The weakness in his left foot prior to his staphylococcal infection may well be due to the findings on myelography.       Plan   Follow-up in 3 months to see how much further improvement he has in his generalized weakness.  At some point we may determine that laminectomy from L3-L5 and possible intradural exploration for tethered spinal cord would be advisable but at this point I recommend waiting for this neurologic condition to reach its maximum improvement in view of the ongoing week by week change.       Rod Cummings MD

## 2019-07-19 ENCOUNTER — TELEPHONE (OUTPATIENT)
Dept: CARDIOLOGY | Facility: CLINIC | Age: 69
End: 2019-07-19

## 2019-10-15 ENCOUNTER — OFFICE VISIT (OUTPATIENT)
Dept: NEUROSURGERY | Facility: CLINIC | Age: 69
End: 2019-10-15

## 2019-10-15 VITALS — BODY MASS INDEX: 41.62 KG/M2 | HEIGHT: 69 IN | WEIGHT: 281 LBS

## 2019-10-15 DIAGNOSIS — G61.0 GUILLAIN BARRÉ SYNDROME (HCC): Primary | ICD-10-CM

## 2019-10-15 DIAGNOSIS — M54.16 LUMBAR RADICULOPATHY: ICD-10-CM

## 2019-10-15 PROCEDURE — 99213 OFFICE O/P EST LOW 20 MIN: CPT | Performed by: NEUROLOGICAL SURGERY

## 2019-10-15 NOTE — PROGRESS NOTES
Subjective   Rod Balderas is a 68 y.o. male who presents for follow up of leg weakness.     Mr. Balderas went into the hospital for prostate surgery last January, developed a staphylococcal infection, and became profoundly weak in the arms and legs interpreted as some form of neuropathy.  He slowly improved after that.  He was profoundly weak in his left deltoid and that has recovered remarkably.  The weakness in his lower extremities has been more profound in his upper extremities.  He has now been able to stand for about a minute and 30 seconds.  He still uses a wheelchair.    Lumbar myelogram showed nonfilling of the arachnoid below with the L3-4 level with question of an elongation of his spinal cord to upper L3 and unclear whether this could represent a tethered cord syndrome.  Nevertheless the profound weakness involving upper and lower extremities that came on rapidly is not at all characteristic of a tethered cord or even lumbar cauda equina syndrome.    The following portions of the patient's history were reviewed, updated as appropriate and approved: allergies, current medications, past family history, past medical history, past social history, past surgical history, review of systems and problem list.     Review of Systems   Constitutional: Negative for activity change, appetite change, chills, diaphoresis, fatigue, fever and unexpected weight change.   HENT: Negative for congestion, dental problem, drooling, ear discharge, ear pain, facial swelling, hearing loss, mouth sores, nosebleeds, postnasal drip, rhinorrhea, sinus pressure, sneezing, sore throat, tinnitus, trouble swallowing and voice change.    Eyes: Negative for photophobia, pain, discharge, redness, itching and visual disturbance.   Respiratory: Negative for apnea, cough, choking, chest tightness, shortness of breath, wheezing and stridor.    Cardiovascular: Negative for chest pain, palpitations and leg swelling.   Gastrointestinal: Negative for  abdominal distention, abdominal pain, anal bleeding, blood in stool, constipation, diarrhea, nausea, rectal pain and vomiting.   Endocrine: Negative for cold intolerance, heat intolerance, polydipsia, polyphagia and polyuria.   Genitourinary: Negative for decreased urine volume, difficulty urinating, dysuria, enuresis, flank pain, frequency, genital sores, hematuria and urgency.   Musculoskeletal: Positive for arthralgias, back pain and myalgias. Negative for gait problem, joint swelling, neck pain and neck stiffness.   Skin: Negative for color change, pallor, rash and wound.   Allergic/Immunologic: Negative for environmental allergies, food allergies and immunocompromised state.   Neurological: Positive for dizziness and headaches. Negative for tremors, seizures, syncope, facial asymmetry, speech difficulty, weakness, light-headedness and numbness.   Hematological: Negative for adenopathy. Does not bruise/bleed easily.   Psychiatric/Behavioral: Negative for agitation, behavioral problems, confusion, decreased concentration, dysphoric mood, hallucinations, self-injury, sleep disturbance and suicidal ideas. The patient is not nervous/anxious and is not hyperactive.        Objective    NEUROLOGICAL EXAMINATION:    Still has dorsiflexion weakness bilaterally with absent reflexes in the lower extremities.    Assessment   Probable after effect of Guillain-Barré syndrome, based on history.  I do not believe the lumbar myelographic findings contribute to his current neurologic picture.  I do not see evidence of need for additional studies or surgery on the lumbar abnormality in view of his continuing slow improvement.       Plan   No additional follow-up necessary in neurosurgery clinic.       Rod Cummings MD

## 2019-12-03 NOTE — PROGRESS NOTES
[facility]  Office Progress Note  Rod Balderas  1950      Visit Date: 12/04/19    PCP: Tyrese Leyva  [address] [place] [address]    IDENTIFICATION: A 68 y.o. male  former  from [facility]      PROBLEM LIST:   1. PAF   a. W MSSA bacteremia and ARF  b. S/p ECV 2/2019  c. Eliquis initiated   2. CAD           a. No prior ischemic evaluation.  b. December 2012: A 3.5 x 88 Promus VERONICA stent to OM2. Nonobstructive disease. Otherwise normal LVEF.  3. Valvular heart disease  a. 12/15 echo: Mild LVH, normal EF, mild AR.  b. 2/14/2019 KLAUDIA: EF 61 to 65%, moderate AI, mild TR, mild MR, no evidence of L AAA thrombus  4. Dyslipidemia , November 2012:   a. LDL 90, total 132, HDL 38, triglycerides 50.  5. LE vascular w/u Dr. Mcelroy 2019  6. Guillan [place]?  a. Following staph infection following prostate surgery  b. W/o per Dr. Sánchez 2019  7. MSSA bacteremia w ARF requiring temporary HD 2/19  8. Diffuse osteoarthritis.  9. Morbid obesity, 5 feet 9 inches, 350 pounds, precedent 80-pound weight loss with diet and exercise in the 80s.  10. Obstructive sleep apnea on CPAP as of 2012.      Chief Complaint   Patient presents with   • Coronary Artery Disease       Allergies  Allergies   Allergen Reactions   • Percocet [Oxycodone-Acetaminophen] Anxiety   • Cefazolin Confusion   • Latex Hives   • Latex, Natural Rubber Itching   • Sulfa Antibiotics Hives   • Oxycodone-Acetaminophen Anxiety       Current Medications    Current Outpatient Medications:   •  acetaminophen (TYLENOL) 325 MG tablet, Take 2 tablets by mouth Every 6 (Six) Hours As Needed for Mild Pain ., Disp: , Rfl:   •  apixaban (ELIQUIS) 5 MG tablet tablet, Take 1 tablet by mouth Every 12 (Twelve) Hours., Disp: 60 tablet, Rfl:   •  aspirin 81 MG chewable tablet, Chew 81 mg Daily., Disp: , Rfl:   •  atorvastatin (LIPITOR) 10 MG tablet, Take 10 mg by mouth Daily., Disp: , Rfl:   •  coenzyme Q10 100 MG capsule, Take  "200 mg by mouth 2 (Two) Times a Day., Disp: , Rfl:   •  docusate sodium (COLACE) 100 MG capsule, Take 100 mg by mouth 2 (Two) Times a Day., Disp: , Rfl:   •  DULoxetine (CYMBALTA) 60 MG capsule, Take 60 mg by mouth Daily., Disp: , Rfl:   •  famotidine (PEPCID) 20 MG tablet, Take 1 tablet by mouth 2 (Two) Times a Day., Disp: , Rfl:   •  ferrous sulfate 325 (65 FE) MG tablet, Take 325 mg by mouth Daily With Breakfast., Disp: , Rfl:   •  folic acid (FOLVITE) 1 MG tablet, Take 1 mg by mouth Daily., Disp: , Rfl:   •  gabapentin (NEURONTIN) 800 MG tablet, Take 800 mg by mouth 3 (Three) Times a Day., Disp: , Rfl:   •  hydrALAZINE (APRESOLINE) 25 MG tablet, Take 25 mg by mouth 3 (Three) Times a Day., Disp: , Rfl:   •  lisinopril (PRINIVIL,ZESTRIL) 20 MG tablet, Take 20 mg by mouth 2 (Two) Times a Day., Disp: , Rfl:   •  metoprolol succinate XL (TOPROL-XL) 100 MG 24 hr tablet, Take 100 mg by mouth Daily., Disp: , Rfl:   •  traMADol (ULTRAM) 50 MG tablet, Take 1 tablet by mouth Every 6 (Six) Hours As Needed for Moderate Pain ., Disp: 90 tablet, Rfl: 5  •  vitamin B-12 (CYANOCOBALAMIN) 1000 MCG tablet, Take 1,000 mcg by mouth Daily., Disp: , Rfl:   •  vitamin C (ASCORBIC ACID) 250 MG tablet, Take 250 mg by mouth Daily., Disp: , Rfl:       History of Present Illness   Rod Balderas is a 68 y.o. year old male here for follow up. Has neuro function has not improved, Dr. Cummings suspects Guillan-[place]. Is in wheelchair, unable to stand for longer than ~60 seconds. BP has been controlled. HR has been controlled. Is on statin.  Pt denies any chest pain, dyspnea, orthopnea, PND, palpitations, lower extremity edema, or claudication. Concerned about cost of Eliquis.       OBJECTIVE:  Vitals:    12/04/19 1356   BP: 124/66   BP Location: Right arm   Patient Position: Sitting   Pulse: 60   SpO2: 97%   Weight: 136 kg (300 lb)   Height: 175.3 cm (69\")     Physical Exam   Constitutional: He is oriented to person, place, and time. He appears " well-developed and well-nourished. No distress.   Morbidly obese   Cardiovascular: Normal rate, regular rhythm, normal heart sounds and intact distal pulses.   No murmur heard.  Pulses:       Radial pulses are 2+ on the right side, and 2+ on the left side.        Dorsalis pedis pulses are 2+ on the right side, and 2+ on the left side.        Posterior tibial pulses are 2+ on the right side, and 2+ on the left side.   Pulmonary/Chest: Effort normal and breath sounds normal. He has no wheezes. He has no rales.   Abdominal: Soft. Bowel sounds are normal. There is no tenderness. There is no guarding.   Musculoskeletal: He exhibits no edema or tenderness.   Wheelchair, R arm brace   Neurological: He is alert and oriented to person, place, and time.   Skin: Skin is warm and dry. No rash noted.   Psychiatric: He has a normal mood and affect.   Nursing note and vitals reviewed.      Diagnostic Data:    ECG 12 Lead  Date/Time: 12/4/2019 2:56 PM  Performed by: Rod Regalado MD  Authorized by: Rod Regalado MD   Comparison: compared with previous ECG from 2/28/2019  Similar to previous ECG  Rhythm: sinus bradycardia  BPM: 52    Clinical impression: non-specific ECG  Comments: Normal except for rate              ASSESSMENT:   Diagnosis Plan   1. Paroxysmal atrial fibrillation (CMS/HCC)     2. Coronary artery disease involving native heart without angina pectoris, unspecified vessel or lesion type     3. Essential hypertension     4. Mixed hyperlipidemia         PLAN:  1. A fib during hospitalization in February, started on Eliquis. Continue this. Samples afforded.   2. Patient is stable with no anginal equivalent symptoms. Continue medical management.   3. Patient has acceptable BP. Continue current medical management. Counseled to regularly check BP at home with goal averaging <130/80.   4. Continue statin therapy w Tyrese Yusuf MD, thank you for referring Mr. Balderas for evaluation.  I have  forwarded my electronically generated recommendations to you for review.  Please do not hesitate to call with any questions.    Scribed for Rod Regalado MD by Cecilia Wallace PA-C. 12/4/2019  3:18 PM   I, Rod Regalado MD, personally performed the services described in this documentation as scribed by the above named individual in my presence, and it is both accurate and complete.  12/4/2019  3:38 PM  Rod Regalado MD, FACC

## 2019-12-04 ENCOUNTER — OFFICE VISIT (OUTPATIENT)
Dept: CARDIOLOGY | Facility: CLINIC | Age: 69
End: 2019-12-04

## 2019-12-04 VITALS
OXYGEN SATURATION: 97 % | SYSTOLIC BLOOD PRESSURE: 124 MMHG | BODY MASS INDEX: 44.43 KG/M2 | HEIGHT: 69 IN | WEIGHT: 300 LBS | DIASTOLIC BLOOD PRESSURE: 66 MMHG | HEART RATE: 60 BPM

## 2019-12-04 DIAGNOSIS — I25.10 CORONARY ARTERY DISEASE INVOLVING NATIVE HEART WITHOUT ANGINA PECTORIS, UNSPECIFIED VESSEL OR LESION TYPE: ICD-10-CM

## 2019-12-04 DIAGNOSIS — I10 ESSENTIAL HYPERTENSION: ICD-10-CM

## 2019-12-04 DIAGNOSIS — E78.2 MIXED HYPERLIPIDEMIA: ICD-10-CM

## 2019-12-04 DIAGNOSIS — I48.0 PAROXYSMAL ATRIAL FIBRILLATION (HCC): Primary | ICD-10-CM

## 2019-12-04 PROCEDURE — 93000 ELECTROCARDIOGRAM COMPLETE: CPT | Performed by: INTERNAL MEDICINE

## 2019-12-04 PROCEDURE — 99214 OFFICE O/P EST MOD 30 MIN: CPT | Performed by: INTERNAL MEDICINE

## 2019-12-04 RX ORDER — ATORVASTATIN CALCIUM 10 MG/1
10 TABLET, FILM COATED ORAL DAILY
COMMUNITY

## 2019-12-04 RX ORDER — GABAPENTIN 800 MG/1
800 TABLET ORAL 3 TIMES DAILY
COMMUNITY

## 2020-02-01 NOTE — PLAN OF CARE
Problem: Patient Care Overview  Goal: Plan of Care Review  Outcome: Ongoing (interventions implemented as appropriate)   02/12/19 8540   Coping/Psychosocial   Plan of Care Reviewed With patient;spouse   Plan of Care Review   Progress improving   OTHER   Outcome Summary Spouse present at bedside throughout shift. Diltiazem gtt titrated to 5mg/hr, still in A-fib. Purpuric rash assessed by Dr. Villar, CBC drawn, plt 132, no further orders will continue to monitor. Home dose Amlodipine 5mg daily restarted. Up to chair with sling. Pain relived with PRN Norco. Diet changed clears to regular renal, continue 1500ml restriction. HD scheduled for tomorrow. UOP 825ml.        Problem: Skin Injury Risk (Adult)  Goal: Skin Health and Integrity  Outcome: Ongoing (interventions implemented as appropriate)      Problem: Fall Risk (Adult)  Goal: Absence of Fall  Outcome: Ongoing (interventions implemented as appropriate)      Problem: Confusion, Acute (Adult)  Goal: Cognitive/Functional Impairments Minimized  Outcome: Ongoing (interventions implemented as appropriate)      Problem: Sepsis/Septic Shock (Adult)  Goal: Signs and Symptoms of Listed Potential Problems Will be Absent, Minimized or Managed (Sepsis/Septic Shock)  Outcome: Ongoing (interventions implemented as appropriate)         Bed 16

## 2020-06-17 ENCOUNTER — OFFICE VISIT (OUTPATIENT)
Dept: CARDIOLOGY | Facility: CLINIC | Age: 70
End: 2020-06-17

## 2020-06-17 VITALS
OXYGEN SATURATION: 98 % | HEIGHT: 69 IN | DIASTOLIC BLOOD PRESSURE: 72 MMHG | BODY MASS INDEX: 44.43 KG/M2 | SYSTOLIC BLOOD PRESSURE: 130 MMHG | WEIGHT: 300 LBS | TEMPERATURE: 97.3 F | HEART RATE: 53 BPM

## 2020-06-17 DIAGNOSIS — I25.10 CORONARY ARTERY DISEASE INVOLVING NATIVE CORONARY ARTERY OF NATIVE HEART WITHOUT ANGINA PECTORIS: ICD-10-CM

## 2020-06-17 DIAGNOSIS — E78.2 MIXED HYPERLIPIDEMIA: ICD-10-CM

## 2020-06-17 DIAGNOSIS — I10 ESSENTIAL HYPERTENSION: ICD-10-CM

## 2020-06-17 DIAGNOSIS — I48.0 PAROXYSMAL ATRIAL FIBRILLATION (HCC): Primary | ICD-10-CM

## 2020-06-17 PROCEDURE — 93000 ELECTROCARDIOGRAM COMPLETE: CPT | Performed by: INTERNAL MEDICINE

## 2020-06-17 PROCEDURE — 99213 OFFICE O/P EST LOW 20 MIN: CPT | Performed by: INTERNAL MEDICINE

## 2020-06-17 NOTE — PROGRESS NOTES
[facility] at [facility]  Office Progress Note  Rod Balderas  1950      Visit Date: 06/17/20    PCP: Provider, No Known  [facility]  [facility] [address]    IDENTIFICATION: A 69 y.o. male  former  from [facility]      PROBLEM LIST:   1. PAF   a. W MSSA bacteremia and ARF  b. S/p ECV 2/2019  c. Eliquis initiated   2. CAD           a. No prior ischemic evaluation.  b. December 2012: A 3.5 x 88 Promus VERONICA stent to OM2. Nonobstructive disease. Otherwise normal LVEF.  3. Valvular heart disease  a. 12/15 echo: Mild LVH, normal EF, mild AR.  b. 2/14/2019 KLAUDIA: EF 61 to 65%, moderate AI, mild TR, mild MR, no evidence of L AAA thrombus  4. Dyslipidemia , November 2012:   a. LDL 90, total 132, HDL 38, triglycerides 50.  5. LE vascular w/u Dr. Mcelroy 2019  6. Guillan [place]?  a. Following staph infection following prostate surgery  b. W/o per Dr. Sánchez 2019  7. MSSA bacteremia w ARF requiring temporary HD 2/19  8. Diffuse osteoarthritis.  9. Morbid obesity, 5 feet 9 inches, 350 pounds, precedent 80-pound weight loss with diet and exercise in the 80s.  10. Obstructive sleep apnea on CPAP as of 2012.      Chief Complaint   Patient presents with   • Coronary Artery Disease       Allergies  Allergies   Allergen Reactions   • Percocet [Oxycodone-Acetaminophen] Anxiety   • Cefazolin Confusion   • Latex Hives   • Latex, Natural Rubber Itching   • Sulfa Antibiotics Hives   • Oxycodone-Acetaminophen Anxiety       Current Medications    Current Outpatient Medications:   •  acetaminophen (TYLENOL) 325 MG tablet, Take 2 tablets by mouth Every 6 (Six) Hours As Needed for Mild Pain ., Disp: , Rfl:   •  apixaban (ELIQUIS) 5 MG tablet tablet, Take 1 tablet by mouth Every 12 (Twelve) Hours. (Patient taking differently: Take 2.5 mg by mouth Every 12 (Twelve) Hours. Indications: Atrial Fibrillation), Disp: 60 tablet, Rfl:   •  aspirin 81 MG chewable tablet, Chew 81 mg Daily., Disp: , Rfl:   •   "atorvastatin (LIPITOR) 10 MG tablet, Take 10 mg by mouth Daily., Disp: , Rfl:   •  coenzyme Q10 100 MG capsule, Take 200 mg by mouth 2 (Two) Times a Day., Disp: , Rfl:   •  docusate sodium (COLACE) 100 MG capsule, Take 100 mg by mouth 2 (Two) Times a Day., Disp: , Rfl:   •  DULoxetine (CYMBALTA) 60 MG capsule, Take 60 mg by mouth Daily., Disp: , Rfl:   •  famotidine (PEPCID) 20 MG tablet, Take 1 tablet by mouth 2 (Two) Times a Day., Disp: , Rfl:   •  ferrous sulfate 325 (65 FE) MG tablet, Take 325 mg by mouth Daily With Breakfast., Disp: , Rfl:   •  folic acid (FOLVITE) 1 MG tablet, Take 1 mg by mouth Daily., Disp: , Rfl:   •  gabapentin (NEURONTIN) 800 MG tablet, Take 800 mg by mouth 3 (Three) Times a Day., Disp: , Rfl:   •  hydrALAZINE (APRESOLINE) 25 MG tablet, Take 25 mg by mouth 3 (Three) Times a Day., Disp: , Rfl:   •  lisinopril (PRINIVIL,ZESTRIL) 20 MG tablet, Take 20 mg by mouth 2 (Two) Times a Day., Disp: , Rfl:   •  metoprolol succinate XL (TOPROL-XL) 100 MG 24 hr tablet, Take 100 mg by mouth Daily., Disp: , Rfl:   •  traMADol (ULTRAM) 50 MG tablet, Take 1 tablet by mouth Every 6 (Six) Hours As Needed for Moderate Pain ., Disp: 90 tablet, Rfl: 5  •  vitamin B-12 (CYANOCOBALAMIN) 1000 MCG tablet, Take 1,000 mcg by mouth Daily., Disp: , Rfl:   •  vitamin C (ASCORBIC ACID) 250 MG tablet, Take 250 mg by mouth Daily., Disp: , Rfl:       History of Present Illness   Rod Balderas is a 69 y.o. year old male here for follow up.  He is largely been on COVID home arrest.  He states that his legs are strengthening with physical therapy.  He has had no recurrence of palpitations or chest symptoms    OBJECTIVE:  Vitals:    06/17/20 1439   BP: 130/72   BP Location: Right arm   Patient Position: Sitting   Pulse: 53   Temp: 97.3 °F (36.3 °C)   SpO2: 98%   Weight: 136 kg (300 lb)   Height: 175.3 cm (69\")     Physical Exam   Constitutional: He is oriented to person, place, and time. He appears well-developed and " well-nourished. No distress.   Morbidly obese   Cardiovascular: Normal rate, regular rhythm, normal heart sounds and intact distal pulses.   No murmur heard.  Pulses:       Radial pulses are 2+ on the right side, and 2+ on the left side.        Dorsalis pedis pulses are 2+ on the right side, and 2+ on the left side.        Posterior tibial pulses are 2+ on the right side, and 2+ on the left side.   Pulmonary/Chest: Effort normal and breath sounds normal. He has no wheezes. He has no rales.   Abdominal: Soft. Bowel sounds are normal. There is no tenderness. There is no guarding.   Musculoskeletal: He exhibits no edema or tenderness.   Wheelchair   Neurological: He is alert and oriented to person, place, and time.   Skin: Skin is warm and dry. No rash noted.   Psychiatric: He has a normal mood and affect.   Nursing note and vitals reviewed.      Diagnostic Data:    ECG 12 Lead  Date/Time: 6/17/2020 3:36 PM  Performed by: Rod Regalado MD  Authorized by: Rod Regalado MD   Comparison: compared with previous ECG from 12/4/2019  Rhythm: sinus bradycardia  BPM: 53    Clinical impression: abnormal EKG              ASSESSMENT:   Diagnosis Plan   1. Paroxysmal atrial fibrillation (CMS/HCC)     2. Essential hypertension     3. Mixed hyperlipidemia     4. Coronary artery disease involving native coronary artery of native heart without angina pectoris         PLAN:  1. Paroxysmal A fib . Continue NOAC. Samples afforded.   2. Patient is stable with no anginal equivalent symptoms. Continue medical management.   3. Patient has acceptable BP. Continue current medical management. Counseled to regularly check BP at home with goal averaging <130/80.   4. Continue statin therapy w atorvastatin      Provider, No Known, thank you for referring Mr. Balderas for evaluation.  I have forwarded my electronically generated recommendations to you for review.  Please do not hesitate to call with any questions.     Rod Regalado MD   6/17/2020  15:38    6/17/2020  15:38  Rod Regalado MD, FACC

## 2021-02-25 DIAGNOSIS — Z23 IMMUNIZATION DUE: ICD-10-CM

## 2021-06-21 ENCOUNTER — OFFICE VISIT (OUTPATIENT)
Dept: CARDIOLOGY | Facility: CLINIC | Age: 71
End: 2021-06-21

## 2021-06-21 VITALS
WEIGHT: 315 LBS | BODY MASS INDEX: 46.65 KG/M2 | DIASTOLIC BLOOD PRESSURE: 62 MMHG | HEART RATE: 63 BPM | SYSTOLIC BLOOD PRESSURE: 122 MMHG | OXYGEN SATURATION: 96 % | HEIGHT: 69 IN

## 2021-06-21 DIAGNOSIS — I10 ESSENTIAL HYPERTENSION: ICD-10-CM

## 2021-06-21 DIAGNOSIS — E78.2 MIXED HYPERLIPIDEMIA: ICD-10-CM

## 2021-06-21 DIAGNOSIS — I25.10 CORONARY ARTERY DISEASE INVOLVING NATIVE CORONARY ARTERY OF NATIVE HEART WITHOUT ANGINA PECTORIS: ICD-10-CM

## 2021-06-21 DIAGNOSIS — I48.0 PAROXYSMAL ATRIAL FIBRILLATION (HCC): Primary | ICD-10-CM

## 2021-06-21 PROCEDURE — 99214 OFFICE O/P EST MOD 30 MIN: CPT | Performed by: INTERNAL MEDICINE

## 2021-06-21 RX ORDER — LORATADINE 10 MG/1
10 TABLET ORAL DAILY
COMMUNITY
End: 2022-07-18

## 2021-06-21 RX ORDER — AMLODIPINE BESYLATE 5 MG/1
2.5 TABLET ORAL DAILY
COMMUNITY
Start: 2021-03-29

## 2021-06-21 RX ORDER — OMEPRAZOLE 20 MG/1
20 CAPSULE, DELAYED RELEASE ORAL EVERY MORNING
COMMUNITY
Start: 2021-03-23

## 2021-06-21 NOTE — PROGRESS NOTES
[facility] at [facility]  Office Progress Note  Rod Balderas  1950      Visit Date: 06/21/21    PCP: Zayra Dyson, APRN  [address] [facility] [address]    IDENTIFICATION: A 70 y.o. male  former  from [facility]      PROBLEM LIST:   1. PAF   a. W MSSA bacteremia and ARF  b. S/p ECV 2/2019  c. Eliquis initiated   2. CAD           a. No prior ischemic evaluation.  b. December 2012: A 3.5 x 88 Promus VERONICA stent to OM2. Nonobstructive disease. Otherwise normal LVEF.  3. Valvular heart disease  a. 12/15 echo: Mild LVH, normal EF, mild AR.  b. 2/14/2019 KLAUDIA: EF 61 to 65%, moderate AI, mild TR, mild MR, no evidence of ALCIRA thrombus  4. Dyslipidemia , November 2012:   a. LDL 90, total 132, HDL 38, triglycerides 50.  5. LE vascular w/u Dr. Mcelroy 2019  6. Guillan [place]?  a. Following staph infection following prostate surgery  b. W/o per Dr. Sánchez 2019  7. MSSA bacteremia w ARF requiring temporary HD 2/19  8. Diffuse osteoarthritis.  9. Morbid obesity, 5 feet 9 inches, 350 pounds, precedent 80-pound weight loss with diet and exercise in the 80s.  10. Obstructive sleep apnea on CPAP as of 2012.  11. Prostate ca 1/19 robotic sx -[facility]      Chief Complaint   Patient presents with   • Coronary Artery Disease       Allergies  Allergies   Allergen Reactions   • Percocet [Oxycodone-Acetaminophen] Anxiety   • Cefazolin Confusion   • Latex Hives   • Latex, Natural Rubber Itching   • Sulfa Antibiotics Hives   • Oxycodone-Acetaminophen Anxiety       Current Medications    Current Outpatient Medications:   •  acetaminophen (TYLENOL) 325 MG tablet, Take 2 tablets by mouth Every 6 (Six) Hours As Needed for Mild Pain ., Disp: , Rfl:   •  amLODIPine (NORVASC) 5 MG tablet, Take 5 mg by mouth Daily. for blood pressure, Disp: , Rfl:   •  apixaban (ELIQUIS) 5 MG tablet tablet, Take 1 tablet by mouth Every 12 (Twelve) Hours. (Patient taking differently: Take 2.5 mg by mouth Every 12  (Twelve) Hours. Indications: Atrial Fibrillation), Disp: 60 tablet, Rfl:   •  aspirin 81 MG chewable tablet, Chew 81 mg Daily., Disp: , Rfl:   •  atorvastatin (LIPITOR) 10 MG tablet, Take 10 mg by mouth Daily., Disp: , Rfl:   •  coenzyme Q10 100 MG capsule, Take 200 mg by mouth 2 (Two) Times a Day., Disp: , Rfl:   •  docusate sodium (COLACE) 100 MG capsule, Take 100 mg by mouth 2 (Two) Times a Day., Disp: , Rfl:   •  DULoxetine (CYMBALTA) 60 MG capsule, Take 60 mg by mouth Daily., Disp: , Rfl:   •  famotidine (PEPCID) 20 MG tablet, Take 1 tablet by mouth 2 (Two) Times a Day., Disp: , Rfl:   •  ferrous sulfate 325 (65 FE) MG tablet, Take 325 mg by mouth Daily With Breakfast., Disp: , Rfl:   •  folic acid (FOLVITE) 1 MG tablet, Take 1 mg by mouth Daily., Disp: , Rfl:   •  gabapentin (NEURONTIN) 800 MG tablet, Take 800 mg by mouth 3 (Three) Times a Day., Disp: , Rfl:   •  hydrALAZINE (APRESOLINE) 25 MG tablet, Take 25 mg by mouth 3 (Three) Times a Day., Disp: , Rfl:   •  lisinopril (PRINIVIL,ZESTRIL) 20 MG tablet, Take 20 mg by mouth 2 (Two) Times a Day., Disp: , Rfl:   •  loratadine (CLARITIN) 10 MG tablet, Take 10 mg by mouth Daily., Disp: , Rfl:   •  metFORMIN (GLUCOPHAGE) 1000 MG tablet, Take 1,000 mg by mouth 2 (Two) Times a Day With Meals., Disp: , Rfl:   •  metoprolol succinate XL (TOPROL-XL) 100 MG 24 hr tablet, Take 100 mg by mouth Daily., Disp: , Rfl:   •  omeprazole (priLOSEC) 20 MG capsule, Take 20 mg by mouth Every Morning., Disp: , Rfl:   •  traMADol (ULTRAM) 50 MG tablet, Take 1 tablet by mouth Every 6 (Six) Hours As Needed for Moderate Pain ., Disp: 90 tablet, Rfl: 5  •  vitamin B-12 (CYANOCOBALAMIN) 1000 MCG tablet, Take 1,000 mcg by mouth Daily., Disp: , Rfl:   •  vitamin C (ASCORBIC ACID) 250 MG tablet, Take 250 mg by mouth Daily., Disp: , Rfl:       History of Present Illness   Rod Balderas is a 70 y.o. year old male here for follow up.  Cardiac issues he is compliant with his medication.  He  "has had noted progressive lower extremity swelling    OBJECTIVE:  Vitals:    06/21/21 1435   BP: 122/62   BP Location: Right arm   Patient Position: Sitting   Pulse: 63   SpO2: 96%   Weight: (!) 161 kg (355 lb)   Height: 175.3 cm (69\")     Physical Exam  Vitals and nursing note reviewed.   Constitutional:       General: He is not in acute distress.     Appearance: He is well-developed.      Comments: Morbidly obese   Cardiovascular:      Rate and Rhythm: Normal rate and regular rhythm.      Pulses: Intact distal pulses.           Radial pulses are 2+ on the right side and 2+ on the left side.        Dorsalis pedis pulses are 2+ on the right side and 2+ on the left side.        Posterior tibial pulses are 2+ on the right side and 2+ on the left side.      Heart sounds: Normal heart sounds. No murmur heard.     Pulmonary:      Effort: Pulmonary effort is normal.      Breath sounds: Normal breath sounds. No wheezing or rales.   Abdominal:      General: Bowel sounds are normal.      Palpations: Abdomen is soft.      Tenderness: There is no abdominal tenderness. There is no guarding.   Musculoskeletal:         General: Swelling present. No tenderness.      Comments: Wheelchair   Skin:     General: Skin is warm and dry.      Findings: No rash.   Neurological:      Mental Status: He is alert and oriented to person, place, and time.         Diagnostic Data:  Procedures      ASSESSMENT:   Diagnosis Plan   1. Paroxysmal atrial fibrillation (CMS/HCC)     2. Coronary artery disease involving native coronary artery of native heart without angina pectoris     3. Essential hypertension     4. Mixed hyperlipidemia         PLAN:  1. Paroxysmal A fib . Continue NOAC. Samples afforded.   2. Patient is stable with no anginal equivalent symptoms. Continue medical management.   3. Patient has acceptable BP. Continue current medical management. Counseled to regularly check BP at home with goal averaging <130/80.  I will decrease " amlodipine to 2.5 with potential need to discontinue altogether if he is having a swelling side effect of dihydropyridine class  4. Continue statin therapy w atorvastatin      Zayra Dyson APRN, thank you for referring Mr. Balderas for evaluation.  I have forwarded my electronically generated recommendations to you for review.  Please do not hesitate to call with any questions.     Rod Regalado MD  6/21/2021  14:41 EDT    6/21/2021  14:41 EDT  Rod Regalado MD, FACC

## 2022-07-12 ENCOUNTER — DOCUMENTATION (OUTPATIENT)
Dept: CARDIOLOGY | Facility: CLINIC | Age: 72
End: 2022-07-12

## 2022-07-12 NOTE — PROGRESS NOTES
Fax received 6/16/2022 - Ptosis repair needed with Dr. Leobardo Houser  Phone [phone]  Fax - [phone]    Will need to hold [employer]  DONALD Appt in June 2022 cancelled due to +COVID.  Will address at FU, not seen since 6/2021

## 2022-07-17 NOTE — PROGRESS NOTES
[facility]  Office Progress Note  Rod Balderas  1950  [address] [facility] [address]       Visit Date: 07/18/22    PCP: Zayra Dyson, MAYTE  [address] [facility] [address]    IDENTIFICATION: A 71 y.o. male former  from [facility]    PROBLEM LIST:   1. PAF   a. W MSSA bacteremia and ARF  b. S/p ECV 2/2019  c. Eliquis initiated   2. CAD           a. No prior ischemic evaluation.  b. December 2012: A 3.5 x 88 Promus VERONICA stent to OM2. Nonobstructive disease. Otherwise normal LVEF.  3. Valvular heart disease  a. 12/15 echo: Mild LVH, normal EF, mild AR.  b. 2/14/2019 KLAUDIA: EF 61 to 65%, moderate AI, mild TR, mild MR, no evidence of ALCIRA thrombus  4. Dyslipidemia , November 2012:   a. LDL 90, total 132, HDL 38, triglycerides 50.  5. LE vascular w/u Dr. Mcelroy 2019  6. Guillan [place]?  a. Following staph infection following prostate surgery  b. W/o per Dr. Sánchez 2019  7. MSSA bacteremia w ARF requiring temporary HD 2/19  8. Diffuse osteoarthritis.  9. Morbid obesity, 5 feet 9 inches, 350 pounds, precedent 80-pound weight loss with diet and exercise in the 80s.  10. Obstructive sleep apnea on CPAP as of 2012.  [facility]  12. June 2022 Covid positive      CC:   Chief Complaint   Patient presents with   • surgery clearance      Right eye        Allergies  Allergies   Allergen Reactions   • Percocet [Oxycodone-Acetaminophen] Anxiety   • Cefazolin Confusion   • Latex Hives   • Latex, Natural Rubber Itching   • Sulfa Antibiotics Hives   • Oxycodone-Acetaminophen Anxiety       Current Medications    Current Outpatient Medications:   •  amLODIPine (NORVASC) 5 MG tablet, Take 2.5 mg by mouth Daily. for blood pressure, Disp: , Rfl:   •  apixaban (ELIQUIS) 5 MG tablet tablet, Take 1 tablet by mouth Every 12 (Twelve) Hours. (Patient taking differently: Take 2.5 mg by mouth Every 12 (Twelve) Hours. Indications:  Atrial Fibrillation), Disp: 60 tablet, Rfl:   •  aspirin 81 MG chewable tablet, Chew 81 mg Daily., Disp: , Rfl:   •  atorvastatin (LIPITOR) 10 MG tablet, Take 10 mg by mouth Daily., Disp: , Rfl:   •  DULoxetine (CYMBALTA) 60 MG capsule, Take 60 mg by mouth Daily., Disp: , Rfl:   •  famotidine (PEPCID) 20 MG tablet, Take 1 tablet by mouth 2 (Two) Times a Day., Disp: , Rfl:   •  ferrous sulfate 325 (65 FE) MG tablet, Take 325 mg by mouth Daily With Breakfast., Disp: , Rfl:   •  folic acid (FOLVITE) 1 MG tablet, Take 1 mg by mouth Daily., Disp: , Rfl:   •  furosemide (LASIX) 20 MG tablet, Take 20 mg by mouth Daily., Disp: , Rfl:   •  gabapentin (NEURONTIN) 800 MG tablet, Take 800 mg by mouth 3 (Three) Times a Day., Disp: , Rfl:   •  hydrALAZINE (APRESOLINE) 25 MG tablet, Take 25 mg by mouth 3 (Three) Times a Day., Disp: , Rfl:   •  lisinopril (PRINIVIL,ZESTRIL) 20 MG tablet, Take 20 mg by mouth 2 (Two) Times a Day., Disp: , Rfl:   •  metFORMIN (GLUCOPHAGE) 1000 MG tablet, Take 1,000 mg by mouth 2 (Two) Times a Day With Meals., Disp: , Rfl:   •  metoprolol succinate XL (TOPROL-XL) 100 MG 24 hr tablet, Take 100 mg by mouth Daily., Disp: , Rfl:   •  omeprazole (priLOSEC) 20 MG capsule, Take 20 mg by mouth Every Morning., Disp: , Rfl:   •  vitamin B-12 (CYANOCOBALAMIN) 1000 MCG tablet, Take 1,000 mcg by mouth Daily., Disp: , Rfl:   •  vitamin C (ASCORBIC ACID) 250 MG tablet, Take 250 mg by mouth Daily., Disp: , Rfl:       History of Present Illness   Rod Balderas is a 71 y.o. year old male here for follow up.    Pt denies any  dyspnea, dyspnea on exertion, orthopnea, PND, palpitations, lower extremity edema, or claudication.  Occasional chest fullness for which is unprovoked.  He states he does get some chest irritation when he uses his arms to aggressively with his wheelchair.  Needs clearance for upcoming blepharoplasty    OBJECTIVE:  Vitals:    07/18/22 1348   BP: 132/64   BP Location: Right arm   Patient Position:  "Sitting   Pulse: 65   SpO2: 95%   Weight: (!) 154 kg (340 lb)   Height: 175.3 cm (69\")     Body mass index is 50.21 kg/m².    Constitutional:       Appearance: Healthy appearance. Not in distress.   Neck:      Vascular: No JVR. JVD normal.   Pulmonary:      Effort: Pulmonary effort is normal.      Breath sounds: Normal breath sounds. No wheezing. No rhonchi. No rales.   Chest:      Chest wall: Not tender to palpatation.   Cardiovascular:      PMI at left midclavicular line. Normal rate. Regular rhythm. Normal S1. Normal S2.      Murmurs: There is no murmur.      No gallop. No click. No rub.   Pulses:     Intact distal pulses.   Edema:     Peripheral edema absent.   Abdominal:      General: Bowel sounds are normal.      Palpations: Abdomen is soft.      Tenderness: There is no abdominal tenderness.   Musculoskeletal: Normal range of motion.         General: No tenderness. Skin:     General: Skin is warm and dry.   Neurological:      General: No focal deficit present.      Mental Status: Alert and oriented to person, place and time.         Diagnostic Data:    ECG 12 Lead    Date/Time: 7/18/2022 2:17 PM  Performed by: Rod Regalado MD  Authorized by: Rod Regalado MD   Previous ECG: no previous ECG available  Rhythm: sinus rhythm  BPM: 63    Clinical impression: non-specific ECG              ASSESSMENT:   Diagnosis Plan   1. Coronary artery disease involving native coronary artery of native heart without angina pectoris     2. Primary hypertension     3. Mixed hyperlipidemia     4. Paroxysmal atrial fibrillation (HCC)         PLAN:  Acceptable cardiac risk for blepharoplasty.  He needs to hold Eliquis greater than 48 hours    CAD post remote PCI and stenting preserved LV function no angina crescendo pattern continue GDMT with low threshold for ischemic evaluation with any change of symptoms    Hypertension controlled current hydralazine amlodipine lisinopril metoprolol    Mixed dyslipidemia controlled on statin " therapy    Paroxysmal A. fib [id number] continued systemic anticoagulation with acceptable hold for surgery      Rod Regalado MD, FACC

## 2022-07-18 ENCOUNTER — OFFICE VISIT (OUTPATIENT)
Dept: CARDIOLOGY | Facility: CLINIC | Age: 72
End: 2022-07-18

## 2022-07-18 VITALS
SYSTOLIC BLOOD PRESSURE: 132 MMHG | HEART RATE: 65 BPM | OXYGEN SATURATION: 95 % | BODY MASS INDEX: 46.65 KG/M2 | DIASTOLIC BLOOD PRESSURE: 64 MMHG | HEIGHT: 69 IN | WEIGHT: 315 LBS

## 2022-07-18 DIAGNOSIS — E78.2 MIXED HYPERLIPIDEMIA: ICD-10-CM

## 2022-07-18 DIAGNOSIS — I10 PRIMARY HYPERTENSION: ICD-10-CM

## 2022-07-18 DIAGNOSIS — I25.10 CORONARY ARTERY DISEASE INVOLVING NATIVE CORONARY ARTERY OF NATIVE HEART WITHOUT ANGINA PECTORIS: Primary | ICD-10-CM

## 2022-07-18 DIAGNOSIS — I48.0 PAROXYSMAL ATRIAL FIBRILLATION: ICD-10-CM

## 2022-07-18 PROCEDURE — 93000 ELECTROCARDIOGRAM COMPLETE: CPT | Performed by: INTERNAL MEDICINE

## 2022-07-18 PROCEDURE — 99214 OFFICE O/P EST MOD 30 MIN: CPT | Performed by: INTERNAL MEDICINE

## 2022-07-18 RX ORDER — FUROSEMIDE 20 MG/1
20 TABLET ORAL DAILY
COMMUNITY

## 2022-07-21 ENCOUNTER — TELEPHONE (OUTPATIENT)
Dept: CARDIOLOGY | Facility: CLINIC | Age: 72
End: 2022-07-21

## 2022-07-21 NOTE — TELEPHONE ENCOUNTER
Moi from Dr. Houser office calling requesting status of cardiac clearance for upcoming procedure. Per J NEHEMIAH, pt low risk to proceed. Letter faxed to [phone].

## 2023-05-12 ENCOUNTER — TELEPHONE (OUTPATIENT)
Dept: CARDIOLOGY | Facility: CLINIC | Age: 73
End: 2023-05-12
Payer: MEDICARE

## 2023-05-12 NOTE — TELEPHONE ENCOUNTER
Fax received from Dr. Youngblood.   Pt needs cardiac risk assessment and clearance to hold Eliquis prior to planned circumcision under general anesthesia.    Last appt 7/18/2022 -   Spoke with pt - he denies CP, dyspnea, palpitations, presyncope or syncope.  Compliant with medications including ASA (CAD), Eliquis (PAF)  Last EKG 7/18/2022  Will need to hold Eliquis 48 hours prior    Labs requested from PCP - none on file since 2019 5/2023 labs received. Normal renal function.     Will review with DONALD  Ok   Needs to be at a hospital not out surgical center     Letter sent to Rylie

## 2023-08-01 ENCOUNTER — TELEPHONE (OUTPATIENT)
Dept: CARDIOLOGY | Facility: CLINIC | Age: 73
End: 2023-08-01
Payer: MEDICARE

## 2023-08-28 ENCOUNTER — OFFICE VISIT (OUTPATIENT)
Dept: CARDIOLOGY | Facility: CLINIC | Age: 73
End: 2023-08-28
Payer: MEDICARE

## 2023-08-28 VITALS
WEIGHT: 315 LBS | DIASTOLIC BLOOD PRESSURE: 72 MMHG | HEART RATE: 84 BPM | OXYGEN SATURATION: 95 % | SYSTOLIC BLOOD PRESSURE: 118 MMHG | BODY MASS INDEX: 45.1 KG/M2 | HEIGHT: 70 IN

## 2023-08-28 DIAGNOSIS — I10 PRIMARY HYPERTENSION: ICD-10-CM

## 2023-08-28 DIAGNOSIS — E78.2 MIXED HYPERLIPIDEMIA: ICD-10-CM

## 2023-08-28 DIAGNOSIS — I25.10 CORONARY ARTERY DISEASE INVOLVING NATIVE CORONARY ARTERY OF NATIVE HEART WITHOUT ANGINA PECTORIS: Primary | ICD-10-CM

## 2023-08-28 DIAGNOSIS — I48.19 PERSISTENT ATRIAL FIBRILLATION: ICD-10-CM

## 2023-08-28 PROCEDURE — 3078F DIAST BP <80 MM HG: CPT | Performed by: INTERNAL MEDICINE

## 2023-08-28 PROCEDURE — 99214 OFFICE O/P EST MOD 30 MIN: CPT | Performed by: INTERNAL MEDICINE

## 2023-08-28 PROCEDURE — 3074F SYST BP LT 130 MM HG: CPT | Performed by: INTERNAL MEDICINE

## 2023-08-28 NOTE — PROGRESS NOTES
[facility] Cardiology  Office Progress Note  Rod Balderas  1950  [address] [facility] [address]       Visit Date: 08/28/23    PCP: Zayra Dyson APRN  [address] [place] [address]    IDENTIFICATION: A 72 y.o. male former  from [facility]    PROBLEM LIST:   PAF   W MSSA bacteremia and ARF  S/p ECV 2/2019  Eliquis initiated   7/23 48 hr holter 69/102/176 100% afib  CAD           December 2012: A 3.5 x 88 Promus VERONICA stent to OM2. Nonobstructive disease. Otherwise normal LVEF.  Valvular heart disease  12/15 echo: Mild LVH, normal EF, mild AR.  2/14/2019 KLAUDIA: EF 61 to 65%, moderate AI, mild TR, mild MR, no evidence of ACLIRA thrombus  6/23 echo OSH EF 45%   Dyslipidemia , November 2012:   LDL 90, total 132, HDL 38, triglycerides 50.  LE vascular w/u Dr. Mcelroy 2019  [place]?  Following staph infection following prostate surgery  W/o per Dr. Sánchez 2019  MSSA bacteremia w ARF requiring temporary HD 2/19  Morbid obesity, 5 feet 9 inches, 350 pounds, precedent 80-pound weight loss with diet and exercise in the 80s.  Obstructive sleep apnea on CPAP as of 2012.  Prostate ca 1/19 robotic sx -[facility]  June 2022 Covid positive      CC:   Chief Complaint   Patient presents with    Coronary Artery Disease       Allergies  Allergies   Allergen Reactions    Percocet [Oxycodone-Acetaminophen] Anxiety    Cefazolin Confusion    Latex Hives    Latex, Natural Rubber Itching    Sulfa Antibiotics Hives    Oxycodone-Acetaminophen Anxiety       Current Medications    Current Outpatient Medications:     amLODIPine (NORVASC) 2.5 MG tablet, Take 1 tablet by mouth Daily. for blood pressure, Disp: , Rfl:     apixaban (ELIQUIS) 2.5 MG tablet tablet, Take 1 tablet by mouth 2 (Two) Times a Day., Disp: , Rfl:     aspirin 81 MG chewable tablet, Chew 1 tablet Daily., Disp: , Rfl:     atorvastatin (LIPITOR) 10 MG tablet, Take 1 tablet by mouth Daily., Disp: , Rfl:      Depo-Testosterone 200 MG/ML injection, 1 (One) Time Per Week., Disp: , Rfl:     DULoxetine (CYMBALTA) 60 MG capsule, Take 1 capsule by mouth Daily., Disp: , Rfl:     famotidine (PEPCID) 20 MG tablet, Take 1 tablet by mouth 2 (Two) Times a Day., Disp: , Rfl:     ferrous sulfate 325 (65 FE) MG tablet, Take 1 tablet by mouth Daily With Breakfast., Disp: , Rfl:     folic acid (FOLVITE) 1 MG tablet, Take 1 tablet by mouth Daily., Disp: , Rfl:     furosemide (LASIX) 20 MG tablet, Take 1 tablet by mouth Daily., Disp: , Rfl:     gabapentin (NEURONTIN) 800 MG tablet, Take 1 tablet by mouth 3 (Three) Times a Day., Disp: , Rfl:     hydrALAZINE (APRESOLINE) 25 MG tablet, Take 1 tablet by mouth 3 (Three) Times a Day., Disp: , Rfl:     hydrOXYzine (ATARAX) 25 MG tablet, Take 1 tablet by mouth 3 (Three) Times a Day As Needed for Itching. 0.5 tablet bid prn, Disp: , Rfl:     lisinopril (PRINIVIL,ZESTRIL) 20 MG tablet, Take 1 tablet by mouth 2 (Two) Times a Day., Disp: , Rfl:     Loratadine 10 MG capsule, Take 1 capsule by mouth Daily., Disp: , Rfl:     metFORMIN (GLUCOPHAGE) 1000 MG tablet, Take 1 tablet by mouth 2 (Two) Times a Day With Meals., Disp: , Rfl:     metoprolol succinate XL (TOPROL-XL) 25 MG 24 hr tablet, Take 1 tablet by mouth Daily., Disp: , Rfl:     omeprazole (priLOSEC) 20 MG capsule, Take 1 capsule by mouth Every Morning., Disp: , Rfl:     spironolactone (ALDACTONE) 25 MG tablet, Take 1 tablet by mouth Daily., Disp: , Rfl:     Vibegron 75 MG tablet, Take 1 tablet by mouth Daily., Disp: , Rfl:     vitamin B-12 (CYANOCOBALAMIN) 1000 MCG tablet, Take 1 tablet by mouth Daily., Disp: , Rfl:     vitamin C (ASCORBIC ACID) 250 MG tablet, Take 1 tablet by mouth Daily., Disp: , Rfl:     apixaban (ELIQUIS) 5 MG tablet tablet, Take 1 tablet by mouth Every 12 (Twelve) Hours. (Patient not taking: Reported on 8/28/2023), Disp: 60 tablet, Rfl:     Ozempic, 0.25 or 0.5 MG/DOSE, 2 MG/3ML solution pen-injector, Inject 0.25 mg  "under the skin into the appropriate area as directed 1 (One) Time Per Week. (Patient not taking: Reported on 8/28/2023), Disp: , Rfl:       History of Present Illness   Rod Balderas is a 72 y.o. year old male here for follow up.  Improved his shortness of breath after discontinuation of bladder spasm medication and discontinuation of amlodipine.    OBJECTIVE:  Vitals:    08/28/23 1429   BP: 118/72   BP Location: Right arm   Patient Position: Sitting   Pulse: 84   SpO2: 95%   Weight: (!) 147 kg (324 lb)   Height: 177.8 cm (70\")       Body mass index is 46.49 kg/mý.    Constitutional:       Appearance: Healthy appearance. Not in distress.   Neck:      Vascular: No JVR. JVD normal.   Pulmonary:      Effort: Pulmonary effort is normal.      Breath sounds: Normal breath sounds. No wheezing. No rhonchi. No rales.   Chest:      Chest wall: Not tender to palpatation.   Cardiovascular:      PMI at left midclavicular line. Normal rate. Irregularly irregular rhythm. Normal S1. Normal S2.       Murmurs: There is no murmur.      No gallop.  No click. No rub.   Pulses:     Intact distal pulses.   Edema:     Peripheral edema present.  Abdominal:      General: Bowel sounds are normal.      Palpations: Abdomen is soft.      Tenderness: There is no abdominal tenderness.   Musculoskeletal: Normal range of motion.         General: No tenderness. Skin:     General: Skin is warm and dry.   Neurological:      General: No focal deficit present.      Mental Status: Alert and oriented to person, place and time.       Diagnostic Data:  Procedures      ASSESSMENT:   Diagnosis Plan   1. Coronary artery disease involving native coronary artery of native heart without angina pectoris        2. Primary hypertension        3. Mixed hyperlipidemia        4. Persistent atrial fibrillation                PLAN:      CAD post remote PCI and stenting preserved LV function     Hypertension controlled current hydralazine  lisinopril metoprolol and " addition of spironolactone.     Mixed dyslipidemia controlled on statin therapy    Persistent A. fib [id number] continued systemic anticoagulation and rate control    Rod Regalado MD, [facility]

## 2023-09-27 ENCOUNTER — OFFICE VISIT (OUTPATIENT)
Dept: PULMONOLOGY | Facility: CLINIC | Age: 73
End: 2023-09-27
Payer: MEDICARE

## 2023-09-27 VITALS
HEIGHT: 69 IN | HEART RATE: 93 BPM | BODY MASS INDEX: 46.65 KG/M2 | DIASTOLIC BLOOD PRESSURE: 64 MMHG | RESPIRATION RATE: 18 BRPM | SYSTOLIC BLOOD PRESSURE: 120 MMHG | OXYGEN SATURATION: 98 % | WEIGHT: 315 LBS

## 2023-09-27 DIAGNOSIS — R06.83 SNORING: ICD-10-CM

## 2023-09-27 DIAGNOSIS — E66.01 MORBID OBESITY, UNSPECIFIED OBESITY TYPE: ICD-10-CM

## 2023-09-27 DIAGNOSIS — G47.19 EXCESSIVE DAYTIME SLEEPINESS: ICD-10-CM

## 2023-09-27 DIAGNOSIS — G47.33 OBSTRUCTIVE SLEEP APNEA: Primary | ICD-10-CM

## 2023-09-27 RX ORDER — OXYBUTYNIN CHLORIDE 10 MG/1
10 TABLET, EXTENDED RELEASE ORAL DAILY
COMMUNITY

## 2023-09-27 RX ORDER — METOPROLOL SUCCINATE 100 MG/1
TABLET, EXTENDED RELEASE ORAL
COMMUNITY
Start: 2023-09-19

## 2023-09-27 NOTE — PROGRESS NOTES
CONSULT NOTE    Requested by:   Zayra Dyson APRN Prather, Barbara A, APRN      Chief Complaint   Patient presents with    Shortness of Breath    Consult       Subjective:  Rod Balderas is a 72 y.o. male.   Patient comes in today for consultation because of history of obstructive sleep apnea and shortness of breath.    The patient says that he went to the emergency room a few months ago and was found to have decompensated congestive heart failure.  Around that time, he was having significant shortness of breath and was apparently told that the PFTs, that he had performed, were abnormal.    The patient is a non smoker.     He denies any ongoing shortness of breath although does have lower extremity swelling and feels that since his hospitalization, he has improved overall.    He has been on CPAP for the past 15 years and is extremely compliant with using his CPAP device.    He actually recalls having orthopnea 2-3 months ago.      The following portions of the patient's history were reviewed and updated as appropriate: allergies, current medications, past family history, past medical history, past social history, and past surgical history.    Review of Systems   HENT:  Negative for sinus pressure, sneezing and sore throat.    Respiratory:  Positive for shortness of breath. Negative for cough, chest tightness and wheezing.    Cardiovascular:  Positive for leg swelling. Negative for palpitations.   Psychiatric/Behavioral:  Negative for sleep disturbance.    All other systems reviewed and are negative.    Past Medical History:   Diagnosis Date    Anemia     Anxiety and depression     Atrial fibrillation     Cancer     Chest pain     Coronary artery disease     Decubital ulcer     right side buttox    Diabetes mellitus     Dyslipidemia     Extremity pain     Heart disease     History of transfusion     Hypertension     Low back pain     TANIYA on CPAP     2-3     Osteoarthritis     Diffuse osteoarthritis.     "Panic attacks     Prostate cancer     Sleep apnea     cpap    Spinal stenosis        Social History     Tobacco Use    Smoking status: Former     Types: Cigarettes     Quit date: 1984     Years since quittin.3    Smokeless tobacco: Former     Types: Chew     Quit date: 1989   Substance Use Topics    Alcohol use: No         Objective:  Visit Vitals  /64   Pulse 93   Resp 18   Ht 175.3 cm (69\") Comment: pt reported   Wt (!) 149 kg (328 lb) Comment: pt reported   SpO2 98%   BMI 48.44 kg/m²       Physical Exam  Vitals reviewed.   Constitutional:       Appearance: He is well-developed.   HENT:      Head: Atraumatic.      Mouth/Throat:      Mouth: Mucous membranes are moist.      Comments: Oropharynx was crowded.  Eyes:      Pupils: Pupils are equal, round, and reactive to light.   Neck:      Thyroid: No thyromegaly.      Vascular: No JVD.      Trachea: No tracheal deviation.      Comments: Increased neck circumference noted.  Cardiovascular:      Rate and Rhythm: Normal rate and regular rhythm.   Pulmonary:      Effort: Pulmonary effort is normal. No respiratory distress.      Breath sounds: Normal breath sounds. No wheezing.   Musculoskeletal:      Right lower leg: No edema.      Left lower leg: No edema.      Comments: Used a wheelchair.     Skin:     General: Skin is warm and dry.   Neurological:      Mental Status: He is alert and oriented to person, place, and time.   Psychiatric:         Mood and Affect: Mood normal.         Behavior: Behavior normal.         Assessment/Plan:  Diagnoses and all orders for this visit:    1. Obstructive sleep apnea (Primary)    2. Snoring    3. Excessive daytime sleepiness    4. Morbid obesity, unspecified obesity type        Return if symptoms worsen or fail to improve.    DISCUSSION(if any):  Last chest x-ray was reviewed personally and the results were shared with the patient.  Images reviewed personally.   Results for orders placed during the hospital " encounter of 02/08/19    XR Chest 1 View    Narrative  EXAMINATION: XR CHEST 1 VW - 2/17/2019    INDICATION:  Z74.09-Other reduced mobility. Respiratory distress.    TECHNIQUE: Single frontal view of the chest.    COMPARISONS: 2/13/2019    FINDINGS: Dialysis catheter has been removed from the right IJ. Lungs  are without focal abnormality. No pleural effusion or pneumothorax.  Cardiomediastinal silhouette is within normal limits.    Impression  No acute abnormality.    DICTATED:   2/17/2019  EDITED/ls :   2/17/2019    This report was finalized on 2/17/2019 2:50 PM by Richard Handy.    Last CT scan results was reviewed in great detail with the patient.  Results for orders placed during the hospital encounter of 02/08/19    CT Chest Without Contrast    Narrative  EXAMINATION: CT CHEST WO CONTRAST, CT ABDOMEN/PELVIS WO CONTRAST -  2/8/2019    INDICATION: Persistent cough.    TECHNIQUE: Multiple axial CT imaging is obtained of the chest, abdomen  and pelvis without the administration of oral or intravenous contrast.    The radiation dose reduction device was turned on for each scan per the  ALARA (As Low as Reasonably Achievable) protocol.    COMPARISON: There is some consolidation identified posteriorly extending  to the lung bases bilaterally suggesting infiltrates. The upper lung  fields are grossly clear. Motion artifact degrades image quality. There  are degenerative changes identified within the spine. The cardiac  chambers are enlarged. There is no pericardial effusion. No bulky hilar  or axillary lymphadenopathy. The thyroid is homogeneous. Degenerative  change is seen throughout the spine.    ABDOMEN: Motion artifact grades image quality. There is no abnormality  seen within the liver. The spleen is upper limits of normal in size.  There is gastric distention. Air fills the colon with no  evidence of  obvious obstruction. Kidneys and adrenal glands within normal limits.  The pancreas is homogeneous. No abdominal  or retroperitoneal  lymphadenopathy. No abnormal mass or fluid collections identified. No  free fluid or free air.    PELVIS: Pelvic organs are unremarkable. The pelvic portion of the  gastrointestinal tract is within normal limits. No pelvic adenopathy.  Degenerative change is identified throughout the spine and pelvis.    FINDINGS: Consolidation at the lung bases bilaterally is concerning for  airspace disease such as pneumonia. The there is gastric distention.  Air-filled loops of colon with no obvious obstruction. No other abnormal  findings are seen within the abdomen or pelvis. Degenerative changes are  seen throughout the spine and pelvis.    DICTATED:   2/8/2019  EDITED/ls :   2/8/2019    Impression  This report was finalized on 2/9/2019 10:01 AM by Dr. Radha Muhammad MD.        I also reviewed his last echocardiogram and shared the results with him.   Results for orders placed during the hospital encounter of 02/08/19    Adult Transesophageal Echo (KLAUDIA) W/ Cont if Necessary Per Protocol    Interpretation Summary  · Estimated EF appears to be in the range of 61 - 65%.  · Left ventricular systolic function is normal.  · Moderate aortic valve regurgitation is present.  · Mild tricuspid valve regurgitation is present.  · Mild mitral valve regurgitation is present  · No evidence of a left atrial appendage thrombus was present.      Adult Transthoracic Echo Limited W/ Cont if Necessary Per Protocol    Interpretation Summary  · Estimated EF = 70%.  · Left ventricular wall thickness is consistent with mild concentric hypertrophy.  · Incidental atrial fibrillation noted.  · Poor valvular echoes , technically limited.      ===========================  ===========================    PFTs were reviewed.  Performed at outside facility.  PFTs did not suggest significant obstruction although there was suggestion of restriction but unfortunately plethysmography was not performed.  His volume adjusted DLCO was  normal.    Laboratory workup also showed   Lab Results   Component Value Date    HGB 12.6 (L) 05/31/2019    HGB 10.6 (L) 04/26/2019    HGB 8.8 (L) 04/09/2019   ,   Lab Results   Component Value Date    HCT 39.8 05/31/2019    HCT 34.4 (L) 04/26/2019    HCT 30.1 (L) 04/09/2019       Lab Results   Component Value Date    EOSABS 0.28 05/31/2019    EOSABS 0.50 (H) 04/26/2019    EOSABS 0.93 (H) 04/09/2019    & Laboratory workup also showed   Lab Results   Component Value Date    CO2 25.0 04/01/2019   ,   Lab Results   Component Value Date    HGBA1C 5.70 (H) 01/08/2019   ,  Laboratory workup also showed   Lab Results   Component Value Date    PHART 7.536 (H) 02/21/2019    HTF7CQP 28.3 02/21/2019    PO2ART 69.0 (L) 02/21/2019    HGBBG 9.2 (L) 02/21/2019    CARBOXYHGB 1.5 02/21/2019     ===========================  ===========================    The patient's shortness of breath was likely due to decompensated congestive heart failure.      He does have truncal obesity which may have caused PFTs to be suggestive of restriction    He does not have any ongoing symptoms and exercise capacity is limited because he uses a wheelchair for mobility.    This, combined with the fact that he had decompensated heart failure, suggests CHF as the most likely explanation for shortness of breath.    I told the patient that no further work-up is needed at this time but if he has any worsening symptoms, he will need to be considered for full PFTs with plethysmography.    His most recent CT scan was performed on 11 July 2023 and it did not show any lung parenchymal abnormality which once again rules out primary lung disease as a possible etiology for restriction.    As already dictated above, if he has any further symptoms, he will need to have repeat full PFTs and if they remain abnormal, that he can be referred back to our clinic.    As far as sleep apnea is concerned, the patient is extremely compliant, according to the patient and his  family member and have encouraged him to stay compliant with his CPAP.    I educated him on the possibility of obtaining a new CPAP device every 5 years and he will discuss this further with prescribing physician.      Dictated utilizing Dragon dictation.    This document was electronically signed by Sosa Reyes MD on 09/27/23 at 14:37 EDT

## 2023-10-02 ENCOUNTER — PATIENT ROUNDING (BHMG ONLY) (OUTPATIENT)
Dept: PULMONOLOGY | Facility: CLINIC | Age: 73
End: 2023-10-02
Payer: MEDICARE

## 2023-10-24 NOTE — PROGRESS NOTES
[facility]  Progress Note       LOS: 11 days   Patient Care Team:  Tyrese Leyva MD as PCP - General (Family Medicine)  Rod Regalado MD as Consulting Physician (Cardiology)  Myles Rossi MD as Consulting Physician (Anesthesiology)  Juanito Grace Jr., MD as Consulting Physician (Urology)    Chief Complaint:  Follow up atrial fibrillation    Subjective      HPI  No new cardiac events  Wife in room    Review of Systems:   Pertinent positives in HPI, all others reviewed and negative.    Objective       Current Facility-Administered Medications:   •  albuterol (PROVENTIL) nebulizer solution 0.083% 2.5 mg/3mL, 2.5 mg, Nebulization, Q6H PRN, Margoth Martinez APRN, 2.5 mg at 02/08/19 1838  •  amLODIPine (NORVASC) tablet 5 mg, 5 mg, Oral, Q24H, Jamal Villar MD, 5 mg at 02/18/19 0907  •  apixaban (ELIQUIS) tablet 5 mg, 5 mg, Oral, Q12H, Rod Regalado MD, 5 mg at 02/19/19 0533  •  castor oil-balsam peru (VENELEX) ointment, , Topical, Q12H, Arielle Carolina MD  •  ceFAZolin in dextrose (ANCEF) IVPB solution 2 g, 2 g, Intravenous, Q8H, Laurie Menchaca, [facility], 2 g at 02/19/19 0533  •  dextrose (D50W) 25 g/ 50mL Intravenous Solution 25 g, 25 g, Intravenous, Q15 Min PRN, Polina Mckinney APRN  •  dextrose (GLUTOSE) oral gel 15 g, 15 g, Oral, Q15 Min PRN, Polina Mckinney APRN  •  DULoxetine (CYMBALTA) DR capsule 60 mg, 60 mg, Oral, Daily, Sonny Bull MD, 60 mg at 02/18/19 0907  •  famotidine (PEPCID) tablet 20 mg, 20 mg, Oral, BID, Laurie Menchaca, [facility], 20 mg at 02/18/19 2109  •  gabapentin (NEURONTIN) capsule 600 mg, 600 mg, Oral, 4x Daily, Jamal Villar MD, 600 mg at 02/18/19 1800  •  glucagon (human recombinant) (GLUCAGEN DIAGNOSTIC) injection 1 mg, 1 mg, Subcutaneous, PRN, Polina Mckinney APRN  •  HYDROcodone-acetaminophen (NORCO) 5-325 MG per tablet 1 tablet, 1 tablet, Oral, Q6H PRN, Jamal Villar MD, 1 tablet at 02/18/19  ENT often manages vestibular migraines. Otherwise schedule an appointment for med recommendations.   "2109  •  insulin lispro (humaLOG) injection 0-7 Units, 0-7 Units, Subcutaneous, 4x Daily With Meals & Nightly, Polina Mckinney, MAYTE  •  lactulose (CHRONULAC) 10 GM/15ML solution 30 g, 30 g, Oral, Q8H PRN, Jamal iVllar MD, 30 g at 02/14/19 1741  •  melatonin sublingual tablet 5 mg, 5 mg, Sublingual, Nightly PRN, Pollo Millan, APRN  •  metoprolol tartrate (LOPRESSOR) tablet 25 mg, 25 mg, Oral, TID, Jr Bustamante PA, 25 mg at 02/18/19 2109  •  ondansetron (ZOFRAN) injection 4 mg, 4 mg, Intravenous, Q6H PRN, Arielle Carolina MD, 4 mg at 02/12/19 1158  •  sodium chloride 0.9 % flush 3 mL, 3 mL, Intravenous, Q12H, Arielle Carolina MD, 3 mL at 02/17/19 2105  •  sodium chloride 0.9 % flush 3-10 mL, 3-10 mL, Intravenous, PRN, Arielle Carolina MD    Vital Sign Min/Max for last 24 hours  Temp  Min: 97.7 °F (36.5 °C)  Max: 99.7 °F (37.6 °C)   BP  Min: 154/68  Max: 161/88   Pulse  Min: 71  Max: 86   Resp  Min: 18  Max: 20   SpO2  Min: 94 %  Max: 98 %   No Data Recorded   No Data Recorded     Flowsheet Rows      First Filed Value   Admission Height  170.2 cm (67\") Documented at 02/08/2019 1115   Admission Weight  117 kg (257 lb 3.2 oz) Documented at 02/08/2019 1115            Intake/Output Summary (Last 24 hours) at 2/19/2019 0756  Last data filed at 2/18/2019 1230  Gross per 24 hour   Intake 714 ml   Output --   Net 714 ml       Physical Exam:     General Appearance:    Alert, cooperative, in no acute distress.    Lungs:     Anteriorly clear to auscultation bilaterally normal respiratory effort      Heart:    Regular rate and rhythm normal S1 and S2      Chest Wall:    No abnormalities observed   Abdomen:     Normal bowel sounds, no masses, no organomegaly, soft        non-tender, non-distended, no guarding, no rebound                Tenderness, no change.  obese   Extremities:   Moves all extremities well, 1+ edema, no cyanosis, no             redness   Pulses:   Pulses palpable and equal " bilaterally   Skin:   No bleeding, bruising + purpura rash resolving        Results Review:   Results from last 7 days   Lab Units 02/17/19  0709 02/16/19  0315 02/15/19  0545   WBC 10*3/mm3 15.18* 14.61* 17.14*   HEMOGLOBIN g/dL 9.9* 9.6* 9.9*   HEMATOCRIT % 30.2* 29.6* 30.6*   PLATELETS 10*3/mm3 364 306 256     Results from last 7 days   Lab Units 02/18/19  0400 02/17/19  0709 02/16/19  0315   SODIUM mmol/L 136 140 133   POTASSIUM mmol/L 4.0 4.1 3.9   CHLORIDE mmol/L 106 108 105   CO2 mmol/L 25.0 23.0 22.0   BUN mg/dL 47* 58* 67*   CREATININE mg/dL 1.04 1.13 1.10   GLUCOSE mg/dL 126* 127* 120*                      Results from last 7 days   Lab Units 02/13/19  1220   PROTIME Seconds 15.6*   INR  1.30*   APTT seconds 32.5*           Intake/Output Summary (Last 24 hours) at 2/19/2019 0756  Last data filed at 2/18/2019 1230  Gross per 24 hour   Intake 714 ml   Output --   Net 714 ml       I personally viewed and interpreted the patient's EKG/Telemetry data    EKG: none for review today    Telemetry: NSR 88      Echo EF Estimated  Lab Results   Component Value Date    ECHOEFEST 70 02/09/2019         Present on Admission:  • Renal failure  • Spinal stenosis, lumbar region, with neurogenic claudication  • Dyslipidemia  • DM (diabetes mellitus), type 2 (CMS/HCC)  • Metabolic encephalopathy  • Morbid obesity due to excess calories (CMS/HCC)  • Paroxysmal atrial fibrillation (CMS/HCC)  • Sepsis (CMS/HCC)  • Moderate aortic regurgitation    Assessment/Plan   Persistent Atrial Fibrillation;  Improved with no recurrence  - brief episode during HD that converted on 2/9, then sustained RVR during HD on 2/13  - s/p KLAUDIA/ECV per Dr. Brown 2/14/19  - KLAUDIA revealed EF 60-65%, mod AI, mild MR and TR, no thrombus, no vegetation or abscess  - [id number], on Eliquis 5 mg BID - watch H& H closely. - H & H stable  -added metoprolol this admission- HRs stable, remained in NSR; on no antiarrhythmic medication.     Sepsis  - cont abx per  ID. Likely will need 6 weeks of IV abx d/t severe MSSA infection  - Leukocytosis persists     Metabolic Encephalopathy  - improving  -global picture- will need intensive inpt rehab as is quite debilitated physically      ARF  - required HD x2 per nephrology   - right IJ tunneled HD catheter removed   - renal function back to normal     HTN; improved as well.  - continue to monitor     CAD  - s/p LHC w VERONICA to OM2, otherwise nonobstructive dz 12/2012    Electronically signed by Cecilia Wallace PA-C, 02/19/19, 7:54 AM.  Ikalani md, personally performed the services described in this documentation as scribed by the above named individual in my presence, and it is both accurate and complete.  2/19/2019  10:25 AM

## 2023-11-03 NOTE — PROGRESS NOTES
INTENSIVIST   PROGRESS NOTE     Hospital:  LOS: 7 days      S     Mr. Rod Balderas, 68 y.o. male is followed for:      Sepsis with MODS    T2DM    As an Intensivist, we provide an integrated approach to the ICU patient and family, medical management of comorbid conditions, including but not limited to electrolytes, glycemic control, organ dysfunction, lead interdisciplinary rounds and coordinate the care with all other services, including those from other specialists.     Interval History:  More alert.  Doing well.  Recovering renal function.     The patient's relevant past medical, surgical and social history were reviewed and updated in Epic as appropriate.     ROS:   Constitutional: Negative for fever.   Respiratory: Negative for dyspnea.   Cardiovascular: Negative for chest pain.   Gastrointestinal: Negative for  nausea, vomiting and diarrhea.        O     Vitals:  Temp: 99.3 °F (37.4 °C) (02/15/19 1600) Temp  Min: 98.3 °F (36.8 °C)  Max: 99.3 °F (37.4 °C)   BP: 140/73 (02/15/19 1616) BP  Min: 111/52  Max: 161/72   Pulse: 74 (02/15/19 1616) Pulse  Min: 68  Max: 80   Resp: 18 (02/15/19 1600) Resp  Min: 14  Max: 20   SpO2: 96 % (02/15/19 1600) SpO2  Min: 93 %  Max: 98 %   Device: room air (02/15/19 1600)    Flow Rate: 2 (02/15/19 1400) Flow (L/min)  Min: 2  Max: 2       Intake & Output (last 3 days)       02/12 0701 - 02/13 0700 02/13 0701 - 02/14 0700 02/14 0701 - 02/15 0700 02/15 0701 - 02/16 0700    P.O.  1400    I.V. (mL/kg) 483.9 (4.1) 410.9 (3.5) 529.5 (4.5) 449 (3.8)    Other        IV Piggyback 560 411 410 348    Total Intake(mL/kg) 1543.9 (13.2) 1361.9 (11.6) 2219.5 (19) 2197 (18.8)    Urine (mL/kg/hr) 1275 (0.5) 3700 (1.3) 1110 (0.4)     Other        Stool  0 0     Total Output 1275 3700 1110     Net +268.9 -2338.1 +1109.5 +2197            Urine Unmeasured Occurrence   8 x 5 x    Stool Unmeasured Occurrence  1 x 5 x 4 x          Physical Examination  Telemetry:  Rhythm: normal sinus rhythm  (02/15/19 1400)  Atrial Rhythm: supraventricular tachycardia(rare, short runs of PSVT) (02/14/19 1731)      Constitutional:  No acute distress.   Cardiovascular: RRR. Normal heart sounds.  No murmurs, gallop or rub.   Respiratory: No respiratory distress. Normal respiratory effort.  Rhonchi.    Abdominal:  Soft. No masses. Non-tender. No distension. No HSM.   Extremities: No digital cyanosis. No clubbing.  (+) edema.   Neurological:   Awake. Follows commands.  Best Eye Response: 4-->(E4) spontaneous (02/15/19 1400)  Best Motor Response: 6-->(M6) obeys commands (02/15/19 1400)  Best Verbal Response: 5-->(V5) oriented (02/15/19 1400)  Colmar Coma Scale Score: 15 (02/15/19 1400)   Lines/Drains/Airways: RIJ Tunneled Dyalisis Catheter       Hematology:  Results from last 7 days   Lab Units 02/15/19  0545 02/14/19  0520 02/13/19  1220   WBC 10*3/mm3 17.14* 19.26* 20.36*   HEMOGLOBIN g/dL 9.9* 10.7* 10.5*   MCV fL 82.0 80.2 80.4   PLATELETS 10*3/mm3 256 223 170       Chemistry:  Estimated Creatinine Clearance: 68.5 mL/min (by C-G formula based on SCr of 1.28 mg/dL).    Results from last 7 days   Lab Units 02/15/19  0545 02/14/19  0520 02/13/19  0534   SODIUM mmol/L 136 137 136   POTASSIUM mmol/L 3.3* 3.3* 3.6   CHLORIDE mmol/L 103 104 103   CO2 mmol/L 26.0 24.0 23.0   ANION GAP mmol/L 7.0 9.0 10.0   GLUCOSE mg/dL 122* 123* 149*   CALCIUM mg/dL 8.1* 7.6* 7.5*     Results from last 7 days   Lab Units 02/15/19  0545 02/14/19  0520 02/13/19  0534   BUN mg/dL 68* 72* 73*   CREATININE mg/dL 1.28 1.26 1.38*     Results from last 7 days   Lab Units 02/15/19  0545 02/14/19  0520 02/13/19  0534  02/11/19  0637  02/09/19  0510   MAGNESIUM mg/dL 1.8  --   --   --  2.2  --  2.3   PHOSPHORUS mg/dL 4.0 4.6 4.7   < > 6.0*   < > 6.4*    < > = values in this interval not displayed.       Lab Results   Lab Value Date/Time    URINECX >100,000 CFU/mL Staphylococcus aureus (A) 02/08/2019 1826     Images:  No radiology results for the last  day    Echo:  Results for orders placed during the hospital encounter of 02/08/19   Adult Transesophageal Echo (KLAUDIA) W/ Cont if Necessary Per Protocol    Narrative · Estimated EF appears to be in the range of 61 - 65%.  · Left ventricular systolic function is normal.  · Moderate aortic valve regurgitation is present.  · Mild tricuspid valve regurgitation is present.  · Mild mitral valve regurgitation is present  · No evidence of a left atrial appendage thrombus was present.        Results: Reviewed.  I reviewed the patient's new laboratory and imaging results.  I independently reviewed the patient's new images.    Medications: Reviewed.    Assessment/Plan   A / P     68 y.o.male, admitted on 2/8/2019 with Acute renal failure (ARF) (CMS/[facility]) [N17.9]: Transferred from [facility] where he was admitted on 2/5/19    1. Sepsis with MODS  1. PCT decreasing since admission.    Lab Results   Lab Value Date/Time    PROCALCITO 0.95 (H) 02/14/2019 0520    PROCALCITO 4.77 (H) 02/10/2019 0340    PROCALCITO 7.55 (H) 02/08/2019 1240       2. BC positive and Urine Cx positive for MSSA at [facility] (2/5/2019)  3. Urine Cx ([facility] 2/8/19): Staph aureus  4. Renal failure  1. NOE - now on Hemodialysis.  1. Cr went from 2.2 (on 2/6) to 5.6 (on 2/8) and 1.26 on 02/14/19  2. He seems to be recovering renal function, and may not need more dialysis.  2. R/o Medications: NSAIDs and/or ACEI.  3. S/p Robotic Laparoscopic Prostatectomy 1/15/19 (due to Prostate cancer)  4. Hematuria on admission to [facility] [place], operated by [facility]  5. Encephalopathy  1. Improved  2. Most likely Toxic metabolic  6. Respiratory failure  1. TANIYA on BiPAP at home  2. Bibasilar consolidation as per CT Scan (2/8/19)  7. Leukocytosis.  8. Liver  1. Elevated ALKP, Bilirubin and mild elevation of transaminases  9. R wrist aspiration per Ortho on 2/13/2019 basically a dry tap, (2 attempts).  2. Purpuric type rash upper extremities  1. Only mild Thrombocytopenia  3. CAD s/p stents  2012  4. P AFib, brief episode on 2/9/209, requiring Diltiazem and converted to NSR, then back on AFIB requiring cardioversion on 02/14/19   1. KLAUDIA no intracardiac vegetation or abscess. EF 60-65%  5. Dyslipidemia  6. Obesity III. Body mass index is 40.24 kg/m².   7. Antibiotics: Cefazolin} Per ID  8. PMH: HTN  9. PMH: Spinal stenosis with neurogenic claudication  10. T2DM    Results from last 7 days   Lab Units 02/15/19  1623 02/15/19  1135 02/15/19  0730 02/14/19 2019 02/14/19  1620 02/14/19  1123   GLUCOSE mg/dL 137* 127 106 173* 142* 103     Lab Results   Lab Value Date/Time    HGBA1C 5.70 (H) 01/08/2019 1524       Nutrition Support: Patient isn't on Tube Feeding   Modulars: Patient doesn't have any tube feeding modular orders   Diet: Diet Regular; Daily Fluid Restriction, Consistent Carbohydrate; Other; 2,500   Advance Directives: Code Status and Medical Interventions:   Ordered at: 02/08/19 1208     Code Status:    CPR     Medical Interventions (Level of Support Prior to Arrest):    Full          Assessment / Plan:    1. Improving.  2. Transition heparin gtt to APIxaban as per Cardiology.  3. May use his home CPAP  4. Replace K, Mg.  5. Remove Dialysis catheter TODAy.  6. Discussed with Dr. Arielle Carolina (Hospitalist), on 2/14/19. Continue ICU care. He thinks, with all his medical problems, not quite ready for the [facility].  7. Disposition: Keep in ICU.       Plan of care and goals reviewed during interdisciplinary rounds.  Level of Risk is High due to:  illness with threat to life or bodily function.   I discussed the patient's findings and my recommendations with patient    Jamal Villar MD, FACP, FCCP, CNSC  Intensive Care Medicine, Nutrition Support and Pulmonary Medicine      Name band;

## 2024-11-18 ENCOUNTER — OFFICE VISIT (OUTPATIENT)
Dept: CARDIOLOGY | Facility: CLINIC | Age: 74
End: 2024-11-18
Payer: MEDICARE

## 2024-11-18 VITALS
HEIGHT: 69 IN | HEART RATE: 72 BPM | OXYGEN SATURATION: 99 % | DIASTOLIC BLOOD PRESSURE: 64 MMHG | SYSTOLIC BLOOD PRESSURE: 126 MMHG | WEIGHT: 315 LBS | BODY MASS INDEX: 46.65 KG/M2

## 2024-11-18 DIAGNOSIS — I20.89 ANGINA AT REST: Primary | ICD-10-CM

## 2024-11-18 DIAGNOSIS — I10 PRIMARY HYPERTENSION: ICD-10-CM

## 2024-11-18 DIAGNOSIS — E78.2 MIXED HYPERLIPIDEMIA: ICD-10-CM

## 2024-11-18 DIAGNOSIS — I48.21 PERMANENT ATRIAL FIBRILLATION: ICD-10-CM

## 2024-11-18 DIAGNOSIS — I20.9 ANGINA PECTORIS: ICD-10-CM

## 2024-11-18 PROCEDURE — 3074F SYST BP LT 130 MM HG: CPT | Performed by: INTERNAL MEDICINE

## 2024-11-18 PROCEDURE — 3078F DIAST BP <80 MM HG: CPT | Performed by: INTERNAL MEDICINE

## 2024-11-18 PROCEDURE — 99214 OFFICE O/P EST MOD 30 MIN: CPT | Performed by: INTERNAL MEDICINE

## 2024-11-18 RX ORDER — SEMAGLUTIDE 0.68 MG/ML
INJECTION, SOLUTION SUBCUTANEOUS
COMMUNITY
Start: 2024-10-28

## 2024-11-18 RX ORDER — NITROGLYCERIN 0.4 MG/1
TABLET SUBLINGUAL
COMMUNITY
Start: 2024-11-07

## 2024-11-18 NOTE — PROGRESS NOTES
[facility]  Office Progress Note  Rod Balderas  1950  [address] [place] [address]       Visit Date: 11/18/24    PCP: Zayra Dyson APRN  [address] [facility] [address] [address]    IDENTIFICATION: A 73 y.o. male former  from [facility]    PROBLEM LIST:   PAF   W MSSA bacteremia and ARF  S/p ECV 2/2019  Eliquis initiated   7/23 48 hr holter 69/102/176 100% afib  CAD           December 2012: A 3.5 x 88 Promus VERONICA stent to OM2. Nonobstructive disease. Otherwise normal LVEF.  Valvular heart disease  12/15 echo: Mild LVH, normal EF, mild AR.  2/14/2019 KLAUDIA: EF 61 to 65%, moderate AI, mild TR, mild MR, no evidence of ALCIRA thrombus  6/23 echo OSH EF 45%   Dyslipidemia , November 2012:   LDL 90, total 132, HDL 38, triglycerides 50.  LE vascular w/u Dr. Mcelroy 2019  Guillan Keenes?  Following staph infection following prostate surgery  W/o per Dr. Sánchez 2019  MSSA bacteremia w ARF requiring temporary HD 2/19  Morbid obesity, 5 feet 9 inches, 350 pounds, precedent 80-pound weight loss with diet and exercise in the 80s.  Obstructive sleep apnea on CPAP as of 2012.  Prostate ca 1/19 robotic sx -[facility]  June 2022 Covid positive      CC:   Chief Complaint   Patient presents with    Coronary Artery Disease       Allergies  Allergies   Allergen Reactions    Percocet [Oxycodone-Acetaminophen] Anxiety    Cefazolin Confusion    Latex Hives    Latex, Natural Rubber Itching    Sulfa Antibiotics Hives    Oxycodone-Acetaminophen Anxiety       Current Medications    Current Outpatient Medications:     atorvastatin (LIPITOR) 10 MG tablet, Take 1 tablet by mouth Daily., Disp: , Rfl:     famotidine (PEPCID) 20 MG tablet, Take 1 tablet by mouth 2 (Two) Times a Day., Disp: , Rfl:     ferrous sulfate 325 (65 FE) MG tablet, Take 1 tablet by mouth Daily With Breakfast., Disp: , Rfl:     folic acid (FOLVITE) 1 MG tablet, Take 1 tablet by mouth Daily., Disp: ,  Rfl:     furosemide (LASIX) 20 MG tablet, Take 1 tablet by mouth Daily., Disp: , Rfl:     hydrALAZINE (APRESOLINE) 25 MG tablet, Take 1 tablet by mouth 3 (Three) Times a Day., Disp: , Rfl:     lisinopril (PRINIVIL,ZESTRIL) 20 MG tablet, Take 1 tablet by mouth 2 (Two) Times a Day., Disp: , Rfl:     metoprolol succinate XL (TOPROL-XL) 100 MG 24 hr tablet, TAKE ONE TABLET BY MOUTH IN THE MORNING FOR 90 DAYS, Disp: , Rfl:     nitroglycerin (NITROSTAT) 0.4 MG SL tablet, DISSOLVE 1 TABLET AT THE 1ST SIGN OF ATTACK, MAY REPEAT EVERY 5 MINUTES UNTIL RELIEF, IF PAIN PERSISTS AFTER 3 TABLETS IN 15 MIN CALL DR., Disp: , Rfl:     omeprazole (priLOSEC) 20 MG capsule, Take 1 capsule by mouth Every Morning., Disp: , Rfl:     oxybutynin XL (DITROPAN-XL) 10 MG 24 hr tablet, Take 1 tablet by mouth Daily., Disp: , Rfl:     Ozempic, 0.25 or 0.5 MG/DOSE, 2 MG/3ML solution pen-injector, INJECT 0.25 MG X4 WEEKS, THEN INCREASE TO 0.5 MG X4 WEEKS SUBCUTANEOUS ONCE A WEEK 28 DAYS, Disp: , Rfl:     spironolactone (ALDACTONE) 25 MG tablet, Take 1 tablet by mouth Daily., Disp: , Rfl:     vitamin B-12 (CYANOCOBALAMIN) 1000 MCG tablet, Take 1 tablet by mouth Daily., Disp: , Rfl:     vitamin C (ASCORBIC ACID) 250 MG tablet, Take 1 tablet by mouth Daily., Disp: , Rfl:     apixaban (ELIQUIS) 2.5 MG tablet tablet, Take 1 tablet by mouth 2 (Two) Times a Day., Disp: , Rfl:     aspirin 81 MG chewable tablet, Chew 1 tablet Daily., Disp: , Rfl:     Depo-Testosterone 200 MG/ML injection, 1 (One) Time Per Week. (Patient not taking: Reported on 9/27/2023), Disp: , Rfl:     DULoxetine (CYMBALTA) 60 MG capsule, Take 1 capsule by mouth Daily., Disp: , Rfl:     gabapentin (NEURONTIN) 800 MG tablet, Take 1 tablet by mouth 3 (Three) Times a Day., Disp: , Rfl:     hydrOXYzine (ATARAX) 25 MG tablet, Take 1 tablet by mouth 3 (Three) Times a Day As Needed for Itching. 0.5 tablet bid prn, Disp: , Rfl:     Loratadine 10 MG capsule, Take 1 capsule by mouth Daily., Disp:  ", Rfl:     metFORMIN (GLUCOPHAGE) 1000 MG tablet, Take 1 tablet by mouth 2 (Two) Times a Day With Meals., Disp: , Rfl:       History of Present Illness   Rod Balderas is a 73 y.o. year old male here for follow up.  He has no substernal chest discomfort pressure sensation he cannot correlate to any fast heart rates.  He did take nitroglycerin on 1 occasion and states he had presyncope thereafter.  As he has no energy and is largely only transferring from wheelchair to bed    OBJECTIVE:  Vitals:    11/18/24 1556   BP: 126/64   BP Location: Right arm   Patient Position: Sitting   Cuff Size: Large Adult   Pulse: 72   SpO2: 99%   Weight: (!) 150 kg (330 lb)   Height: 175.3 cm (69\")         Body mass index is 48.73 kg/m².    Constitutional:       Appearance: Healthy appearance. Not in distress.   Neck:      Vascular: No JVR. JVD normal.   Pulmonary:      Effort: Pulmonary effort is normal.      Breath sounds: Normal breath sounds. No wheezing. No rhonchi. No rales.   Chest:      Chest wall: Not tender to palpatation.   Cardiovascular:      PMI at left midclavicular line. Normal rate. Irregularly irregular rhythm. Normal S1. Normal S2.       Murmurs: There is no murmur.      No gallop.  No click. No rub.   Pulses:     Intact distal pulses.   Edema:     Peripheral edema present.  Abdominal:      General: Bowel sounds are normal.      Palpations: Abdomen is soft.      Tenderness: There is no abdominal tenderness.   Musculoskeletal: Normal range of motion.         General: No tenderness. Skin:     General: Skin is warm and dry.   Neurological:      General: No focal deficit present.      Mental Status: Alert and oriented to person, place and time.         Diagnostic Data:  Procedures      ASSESSMENT:   Diagnosis Plan   1. Angina at rest  Stress Test With Myocardial Perfusion (1 Day)      2. Angina pectoris        3. Primary hypertension        4. Mixed hyperlipidemia        5. Permanent atrial fibrillation      "             PLAN:      Angina no recent ischemic evaluation will document Lexiscan    Hypertension controlled current hydralazine  lisinopril metoprolol and addition of spironolactone.     Mixed dyslipidemia controlled on statin therapy    Persistent A. fib [id number] 2 continued systemic anticoagulation and rate control    Rod Regalado MD, FACC

## 2024-12-18 ENCOUNTER — HOSPITAL ENCOUNTER (OUTPATIENT)
Dept: CARDIOLOGY | Facility: HOSPITAL | Age: 74
Discharge: HOME | End: 2024-12-18
Admitting: INTERNAL MEDICINE
Payer: MEDICARE

## 2024-12-18 VITALS — HEIGHT: 69 IN | WEIGHT: 315 LBS | BODY MASS INDEX: 46.65 KG/M2

## 2024-12-18 DIAGNOSIS — I20.89 ANGINA AT REST: ICD-10-CM

## 2024-12-18 PROCEDURE — 78452 HT MUSCLE IMAGE SPECT MULT: CPT

## 2024-12-18 PROCEDURE — A9500 TC99M SESTAMIBI: HCPCS | Performed by: INTERNAL MEDICINE

## 2024-12-18 PROCEDURE — 93017 CV STRESS TEST TRACING ONLY: CPT

## 2024-12-18 PROCEDURE — 34310000005 TECHNETIUM SESTAMIBI: Performed by: INTERNAL MEDICINE

## 2024-12-18 RX ORDER — REGADENOSON 0.08 MG/ML
0.4 INJECTION, SOLUTION INTRAVENOUS ONCE
Status: DISCONTINUED | OUTPATIENT
Start: 2024-12-18 | End: 2024-12-18

## 2024-12-18 RX ADMIN — TECHNETIUM TC 99M SESTAMIBI 1 DOSE: 1 INJECTION INTRAVENOUS at 09:06

## 2024-12-19 LAB
BH CV REST NUCLEAR ISOTOPE DOSE: 9.5 MCI
BH CV STRESS COMMENTS STAGE 1: NORMAL
BH CV STRESS DOSE REGADENOSON STAGE 1: 0.4
BH CV STRESS DURATION MIN STAGE 1: 1
BH CV STRESS DURATION MIN STAGE 2: 1
BH CV STRESS DURATION MIN STAGE 3: 1
BH CV STRESS DURATION MIN STAGE 4: 1
BH CV STRESS DURATION SEC STAGE 1: 0
BH CV STRESS DURATION SEC STAGE 2: 0
BH CV STRESS DURATION SEC STAGE 3: 0
BH CV STRESS DURATION SEC STAGE 4: 0
BH CV STRESS PROTOCOL 1: NORMAL
BH CV STRESS STAGE 1: 1
BH CV STRESS STAGE 2: 2
BH CV STRESS STAGE 3: 3
BH CV STRESS STAGE 4: 4
MAXIMAL PREDICTED HEART RATE: 147 BPM
STRESS BASELINE BP: NORMAL MMHG
STRESS BASELINE HR: 102 BPM
STRESS O2 SAT REST: 96 %
STRESS TARGET HR: 125 BPM

## 2024-12-26 DIAGNOSIS — I25.10 CORONARY ARTERY DISEASE INVOLVING NATIVE CORONARY ARTERY OF NATIVE HEART WITHOUT ANGINA PECTORIS: Primary | ICD-10-CM

## 2025-01-20 ENCOUNTER — HOSPITAL ENCOUNTER (OUTPATIENT)
Dept: CARDIOLOGY | Facility: HOSPITAL | Age: 75
Discharge: HOME | End: 2025-01-20
Payer: MEDICARE

## 2025-01-28 ENCOUNTER — HOSPITAL ENCOUNTER (OUTPATIENT)
Facility: HOSPITAL | Age: 75
Discharge: HOME | End: 2025-01-28
Payer: MEDICARE

## 2025-01-28 DIAGNOSIS — I25.10 CORONARY ARTERY DISEASE INVOLVING NATIVE CORONARY ARTERY OF NATIVE HEART WITHOUT ANGINA PECTORIS: ICD-10-CM

## 2025-01-28 LAB
BH CV REST NUCLEAR ISOTOPE DOSE: 9.6 MCI
BH CV STRESS BP STAGE 2: NORMAL
BH CV STRESS BP STAGE 4: NORMAL
BH CV STRESS COMMENTS STAGE 1: NORMAL
BH CV STRESS DOSE REGADENOSON STAGE 1: 0.4
BH CV STRESS DURATION MIN STAGE 1: 1
BH CV STRESS DURATION MIN STAGE 2: 1
BH CV STRESS DURATION MIN STAGE 3: 1
BH CV STRESS DURATION MIN STAGE 4: 1
BH CV STRESS DURATION SEC STAGE 1: 10
BH CV STRESS DURATION SEC STAGE 2: 0
BH CV STRESS HR STAGE 1: 106
BH CV STRESS HR STAGE 2: 115
BH CV STRESS HR STAGE 3: 105
BH CV STRESS HR STAGE 4: 102
BH CV STRESS NUCLEAR ISOTOPE DOSE: 31.9 MCI
BH CV STRESS O2 STAGE 1: 96
BH CV STRESS O2 STAGE 2: 97
BH CV STRESS O2 STAGE 3: 98
BH CV STRESS O2 STAGE 4: 98
BH CV STRESS PROTOCOL 1: NORMAL
BH CV STRESS RECOVERY BP: NORMAL MMHG
BH CV STRESS RECOVERY HR: 108 BPM
BH CV STRESS RECOVERY O2: 98 %
BH CV STRESS STAGE 1: 1
BH CV STRESS STAGE 2: 2
BH CV STRESS STAGE 3: 3
BH CV STRESS STAGE 4: 4
MAXIMAL PREDICTED HEART RATE: 147 BPM
PERCENT MAX PREDICTED HR: 92.52 %
SPECT HRT GATED+EF W RNC IV: 44 %
STRESS BASELINE BP: NORMAL MMHG
STRESS BASELINE HR: 108 BPM
STRESS O2 SAT REST: 96 %
STRESS PERCENT HR: 109 %
STRESS POST ESTIMATED WORKLOAD: 1 METS
STRESS POST EXERCISE DUR MIN: 4 MIN
STRESS POST EXERCISE DUR SEC: 0 SEC
STRESS POST O2 SAT PEAK: 97 %
STRESS POST PEAK BP: NORMAL MMHG
STRESS POST PEAK HR: 136 BPM
STRESS TARGET HR: 125 BPM

## 2025-01-28 PROCEDURE — 78452 HT MUSCLE IMAGE SPECT MULT: CPT

## 2025-01-28 PROCEDURE — 93016 CV STRESS TEST SUPVJ ONLY: CPT | Performed by: INTERNAL MEDICINE

## 2025-01-28 PROCEDURE — A9500 TC99M SESTAMIBI: HCPCS | Performed by: INTERNAL MEDICINE

## 2025-01-28 PROCEDURE — 93018 CV STRESS TEST I&R ONLY: CPT | Performed by: INTERNAL MEDICINE

## 2025-01-28 PROCEDURE — 78452 HT MUSCLE IMAGE SPECT MULT: CPT | Performed by: INTERNAL MEDICINE

## 2025-01-28 PROCEDURE — 25010000002 REGADENOSON 0.4 MG/5ML SOLUTION: Performed by: INTERNAL MEDICINE

## 2025-01-28 PROCEDURE — 93017 CV STRESS TEST TRACING ONLY: CPT

## 2025-01-28 PROCEDURE — 34310000005 TECHNETIUM SESTAMIBI: Performed by: INTERNAL MEDICINE

## 2025-01-28 RX ORDER — REGADENOSON 0.08 MG/ML
0.4 INJECTION, SOLUTION INTRAVENOUS ONCE
Status: COMPLETED | OUTPATIENT
Start: 2025-01-28 | End: 2025-01-28

## 2025-01-28 RX ADMIN — REGADENOSON 0.4 MG: 0.08 INJECTION INTRAVENOUS at 14:30

## 2025-01-28 RX ADMIN — TECHNETIUM TC 99M SESTAMIBI 1 DOSE: 1 INJECTION INTRAVENOUS at 14:35

## 2025-01-28 RX ADMIN — TECHNETIUM TC 99M SESTAMIBI 1 DOSE: 1 INJECTION INTRAVENOUS at 12:30

## 2025-01-29 DIAGNOSIS — R94.39 ABNORMAL STRESS TEST: Primary | ICD-10-CM

## 2025-01-30 ENCOUNTER — TELEPHONE (OUTPATIENT)
Dept: CARDIOLOGY | Facility: CLINIC | Age: 75
End: 2025-01-30
Payer: MEDICARE

## 2025-01-30 NOTE — TELEPHONE ENCOUNTER
Rod Regalado MD Jackson, Kristina, RN  Abnormal stress test  Rec LHC off NOAC    Pt called and informed of the above results, verbalized understanding.    Order placed , procedure team aware of scheduling needs .

## 2025-02-04 ENCOUNTER — PREP FOR SURGERY (OUTPATIENT)
Dept: OTHER | Facility: HOSPITAL | Age: 75
End: 2025-02-04
Payer: MEDICARE

## 2025-02-04 DIAGNOSIS — I25.119 CORONARY ARTERY DISEASE INVOLVING NATIVE HEART WITH ANGINA PECTORIS, UNSPECIFIED VESSEL OR LESION TYPE: Primary | ICD-10-CM

## 2025-02-04 RX ORDER — ASPIRIN 81 MG/1
324 TABLET, CHEWABLE ORAL ONCE
OUTPATIENT
Start: 2025-02-04 | End: 2025-02-04

## 2025-02-04 RX ORDER — SODIUM CHLORIDE 9 MG/ML
40 INJECTION, SOLUTION INTRAVENOUS AS NEEDED
OUTPATIENT
Start: 2025-02-04

## 2025-02-04 RX ORDER — SODIUM CHLORIDE 0.9 % (FLUSH) 0.9 %
10 SYRINGE (ML) INJECTION AS NEEDED
OUTPATIENT
Start: 2025-02-04

## 2025-02-04 RX ORDER — SODIUM CHLORIDE 0.9 % (FLUSH) 0.9 %
10 SYRINGE (ML) INJECTION EVERY 12 HOURS SCHEDULED
OUTPATIENT
Start: 2025-02-04

## 2025-02-04 RX ORDER — ASPIRIN 81 MG/1
81 TABLET ORAL DAILY
OUTPATIENT
Start: 2025-02-05

## 2025-02-12 ENCOUNTER — HOSPITAL ENCOUNTER (OUTPATIENT)
Facility: HOSPITAL | Age: 75
Setting detail: HOSPITAL OUTPATIENT SURGERY
Discharge: HOME | End: 2025-02-12
Attending: INTERNAL MEDICINE | Admitting: INTERNAL MEDICINE
Payer: MEDICARE

## 2025-02-12 ENCOUNTER — APPOINTMENT (OUTPATIENT)
Dept: CARDIOLOGY | Facility: HOSPITAL | Age: 75
End: 2025-02-12
Payer: MEDICARE

## 2025-02-12 VITALS
OXYGEN SATURATION: 89 % | HEART RATE: 96 BPM | SYSTOLIC BLOOD PRESSURE: 124 MMHG | WEIGHT: 311 LBS | DIASTOLIC BLOOD PRESSURE: 64 MMHG | TEMPERATURE: 97.6 F | RESPIRATION RATE: 16 BRPM | HEIGHT: 70 IN | BODY MASS INDEX: 44.52 KG/M2

## 2025-02-12 DIAGNOSIS — R94.39 ABNORMAL STRESS TEST: ICD-10-CM

## 2025-02-12 DIAGNOSIS — I25.119 CORONARY ARTERY DISEASE INVOLVING NATIVE HEART WITH ANGINA PECTORIS, UNSPECIFIED VESSEL OR LESION TYPE: ICD-10-CM

## 2025-02-12 LAB
ALBUMIN SERPL-MCNC: 4.3 G/DL (ref 3.5–5.2)
ALBUMIN/GLOB SERPL: 1.5 G/DL
ALP SERPL-CCNC: 96 U/L (ref 39–117)
ALT SERPL W P-5'-P-CCNC: 24 U/L (ref 1–41)
ANION GAP SERPL CALCULATED.3IONS-SCNC: 14 MMOL/L (ref 5–15)
AST SERPL-CCNC: 19 U/L (ref 1–40)
BH CV ECHO MEAS - AO ROOT DIAM: 3.3 CM
BH CV ECHO MEAS - EDV(CUBED): 64 ML
BH CV ECHO MEAS - ESV(CUBED): 32.8 ML
BH CV ECHO MEAS - FS: 20 %
BH CV ECHO MEAS - IVS/LVPW: 1 CM
BH CV ECHO MEAS - IVSD: 1.4 CM
BH CV ECHO MEAS - LA DIMENSION: 3.3 CM
BH CV ECHO MEAS - LAT PEAK E' VEL: 12.7 CM/SEC
BH CV ECHO MEAS - LV MASS(C)D: 209 GRAMS
BH CV ECHO MEAS - LVIDD: 4 CM
BH CV ECHO MEAS - LVIDS: 3.2 CM
BH CV ECHO MEAS - LVOT AREA: 3.8 CM2
BH CV ECHO MEAS - LVOT DIAM: 2.2 CM
BH CV ECHO MEAS - LVPWD: 1.4 CM
BH CV ECHO MEAS - MED PEAK E' VEL: 10.6 CM/SEC
BH CV ECHO MEAS - MV DEC SLOPE: 548 CM/SEC2
BH CV ECHO MEAS - MV DEC TIME: 0.2 SEC
BH CV ECHO MEAS - MV E MAX VEL: 101 CM/SEC
BH CV ECHO MEAS - MV MAX PG: 3.7 MMHG
BH CV ECHO MEAS - MV MEAN PG: 1.8 MMHG
BH CV ECHO MEAS - MV P1/2T: 49.7 MSEC
BH CV ECHO MEAS - MV V2 VTI: 16.5 CM
BH CV ECHO MEAS - MVA(P1/2T): 4.4 CM2
BH CV ECHO MEAS - PA ACC TIME: 0.1 SEC
BH CV ECHO MEAS - PA V2 MAX: 72.8 CM/SEC
BH CV ECHO MEASUREMENTS AVERAGE E/E' RATIO: 8.67
BH CV VAS BP LEFT ARM: NORMAL MMHG
BH CV XLRA - TDI S': 7.1 CM/SEC
BILIRUB SERPL-MCNC: 0.5 MG/DL (ref 0–1.2)
BUN SERPL-MCNC: 23 MG/DL (ref 8–23)
BUN/CREAT SERPL: 18.4 (ref 7–25)
CALCIUM SPEC-SCNC: 9.4 MG/DL (ref 8.6–10.5)
CHLORIDE SERPL-SCNC: 100 MMOL/L (ref 98–107)
CO2 SERPL-SCNC: 23 MMOL/L (ref 22–29)
CREAT BLDA-MCNC: 1.4 MG/DL (ref 0.6–1.3)
CREAT SERPL-MCNC: 1.25 MG/DL (ref 0.76–1.27)
DEPRECATED RDW RBC AUTO: 45.7 FL (ref 37–54)
EGFRCR SERPLBLD CKD-EPI 2021: 60.8 ML/MIN/1.73
ERYTHROCYTE [DISTWIDTH] IN BLOOD BY AUTOMATED COUNT: 14.2 % (ref 12.3–15.4)
GLOBULIN UR ELPH-MCNC: 2.8 GM/DL
GLUCOSE SERPL-MCNC: 141 MG/DL (ref 65–99)
HCT VFR BLD AUTO: 42.9 % (ref 37.5–51)
HGB BLD-MCNC: 14.2 G/DL (ref 13–17.7)
MCH RBC QN AUTO: 29.3 PG (ref 26.6–33)
MCHC RBC AUTO-ENTMCNC: 33.1 G/DL (ref 31.5–35.7)
MCV RBC AUTO: 88.6 FL (ref 79–97)
PLATELET # BLD AUTO: 208 10*3/MM3 (ref 140–450)
PMV BLD AUTO: 10.3 FL (ref 6–12)
POTASSIUM SERPL-SCNC: 3.9 MMOL/L (ref 3.5–5.2)
PROT SERPL-MCNC: 7.1 G/DL (ref 6–8.5)
RBC # BLD AUTO: 4.84 10*6/MM3 (ref 4.14–5.8)
SODIUM SERPL-SCNC: 137 MMOL/L (ref 136–145)
WBC NRBC COR # BLD AUTO: 11.48 10*3/MM3 (ref 3.4–10.8)

## 2025-02-12 PROCEDURE — 93458 L HRT ARTERY/VENTRICLE ANGIO: CPT | Performed by: INTERNAL MEDICINE

## 2025-02-12 PROCEDURE — 25010000002 NICARDIPINE 2.5 MG/ML SOLUTION: Performed by: INTERNAL MEDICINE

## 2025-02-12 PROCEDURE — C1769 GUIDE WIRE: HCPCS | Performed by: INTERNAL MEDICINE

## 2025-02-12 PROCEDURE — 25510000001 IOPAMIDOL PER 1 ML: Performed by: INTERNAL MEDICINE

## 2025-02-12 PROCEDURE — 25010000002 SULFUR HEXAFLUORIDE MICROSPH 60.7-25 MG RECONSTITUTED SUSPENSION: Performed by: INTERNAL MEDICINE

## 2025-02-12 PROCEDURE — 80053 COMPREHEN METABOLIC PANEL: CPT | Performed by: INTERNAL MEDICINE

## 2025-02-12 PROCEDURE — 93306 TTE W/DOPPLER COMPLETE: CPT

## 2025-02-12 PROCEDURE — 25010000002 MIDAZOLAM PER 1 MG: Performed by: INTERNAL MEDICINE

## 2025-02-12 PROCEDURE — 25810000003 SODIUM CHLORIDE 0.9 % SOLUTION: Performed by: INTERNAL MEDICINE

## 2025-02-12 PROCEDURE — 82565 ASSAY OF CREATININE: CPT

## 2025-02-12 PROCEDURE — 93306 TTE W/DOPPLER COMPLETE: CPT | Performed by: INTERNAL MEDICINE

## 2025-02-12 PROCEDURE — 25010000002 LIDOCAINE PF 1% 1 % SOLUTION: Performed by: INTERNAL MEDICINE

## 2025-02-12 PROCEDURE — 36415 COLL VENOUS BLD VENIPUNCTURE: CPT

## 2025-02-12 PROCEDURE — 25010000002 HEPARIN (PORCINE) PER 1000 UNITS: Performed by: INTERNAL MEDICINE

## 2025-02-12 PROCEDURE — 85027 COMPLETE CBC AUTOMATED: CPT | Performed by: INTERNAL MEDICINE

## 2025-02-12 PROCEDURE — S0260 H&P FOR SURGERY: HCPCS | Performed by: INTERNAL MEDICINE

## 2025-02-12 PROCEDURE — C1894 INTRO/SHEATH, NON-LASER: HCPCS | Performed by: INTERNAL MEDICINE

## 2025-02-12 RX ORDER — SODIUM CHLORIDE 9 MG/ML
100 INJECTION, SOLUTION INTRAVENOUS CONTINUOUS
Status: DISCONTINUED | OUTPATIENT
Start: 2025-02-12 | End: 2025-02-12 | Stop reason: HOSPADM

## 2025-02-12 RX ORDER — SODIUM CHLORIDE 9 MG/ML
40 INJECTION, SOLUTION INTRAVENOUS AS NEEDED
Status: DISCONTINUED | OUTPATIENT
Start: 2025-02-12 | End: 2025-02-12 | Stop reason: HOSPADM

## 2025-02-12 RX ORDER — HEPARIN SODIUM 1000 [USP'U]/ML
INJECTION, SOLUTION INTRAVENOUS; SUBCUTANEOUS
Status: DISCONTINUED | OUTPATIENT
Start: 2025-02-12 | End: 2025-02-12 | Stop reason: HOSPADM

## 2025-02-12 RX ORDER — IOPAMIDOL 755 MG/ML
INJECTION, SOLUTION INTRAVASCULAR
Status: DISCONTINUED | OUTPATIENT
Start: 2025-02-12 | End: 2025-02-12 | Stop reason: HOSPADM

## 2025-02-12 RX ORDER — MIDAZOLAM HYDROCHLORIDE 1 MG/ML
INJECTION, SOLUTION INTRAMUSCULAR; INTRAVENOUS
Status: DISCONTINUED | OUTPATIENT
Start: 2025-02-12 | End: 2025-02-12 | Stop reason: HOSPADM

## 2025-02-12 RX ORDER — ASPIRIN 81 MG/1
324 TABLET, CHEWABLE ORAL ONCE
Status: COMPLETED | OUTPATIENT
Start: 2025-02-12 | End: 2025-02-12

## 2025-02-12 RX ORDER — LIDOCAINE HYDROCHLORIDE 10 MG/ML
INJECTION, SOLUTION EPIDURAL; INFILTRATION; INTRACAUDAL; PERINEURAL
Status: DISCONTINUED | OUTPATIENT
Start: 2025-02-12 | End: 2025-02-12 | Stop reason: HOSPADM

## 2025-02-12 RX ORDER — ASPIRIN 81 MG/1
81 TABLET ORAL DAILY
Status: DISCONTINUED | OUTPATIENT
Start: 2025-02-13 | End: 2025-02-12 | Stop reason: HOSPADM

## 2025-02-12 RX ORDER — ACETAMINOPHEN 325 MG/1
650 TABLET ORAL EVERY 4 HOURS PRN
Status: DISCONTINUED | OUTPATIENT
Start: 2025-02-12 | End: 2025-02-12 | Stop reason: HOSPADM

## 2025-02-12 RX ORDER — NITROGLYCERIN 0.4 MG/1
0.4 TABLET SUBLINGUAL
Status: DISCONTINUED | OUTPATIENT
Start: 2025-02-12 | End: 2025-02-12 | Stop reason: HOSPADM

## 2025-02-12 RX ORDER — SODIUM CHLORIDE 9 MG/ML
3 INJECTION, SOLUTION INTRAVENOUS CONTINUOUS
Status: ACTIVE | OUTPATIENT
Start: 2025-02-12 | End: 2025-02-12

## 2025-02-12 RX ORDER — ALBUTEROL SULFATE 90 UG/1
2 INHALANT RESPIRATORY (INHALATION) AS NEEDED
COMMUNITY

## 2025-02-12 RX ORDER — SODIUM CHLORIDE 0.9 % (FLUSH) 0.9 %
10 SYRINGE (ML) INJECTION AS NEEDED
Status: DISCONTINUED | OUTPATIENT
Start: 2025-02-12 | End: 2025-02-12 | Stop reason: HOSPADM

## 2025-02-12 RX ORDER — METOPROLOL SUCCINATE 100 MG/1
100 TABLET, EXTENDED RELEASE ORAL 2 TIMES DAILY
Qty: 180 TABLET | Refills: 1 | Status: SHIPPED | OUTPATIENT
Start: 2025-02-12

## 2025-02-12 RX ORDER — LISINOPRIL 20 MG/1
20 TABLET ORAL NIGHTLY
Start: 2025-02-12

## 2025-02-12 RX ORDER — SODIUM CHLORIDE 0.9 % (FLUSH) 0.9 %
10 SYRINGE (ML) INJECTION EVERY 12 HOURS SCHEDULED
Status: DISCONTINUED | OUTPATIENT
Start: 2025-02-12 | End: 2025-02-12 | Stop reason: HOSPADM

## 2025-02-12 RX ADMIN — SODIUM CHLORIDE 3 ML/KG/HR: 9 INJECTION, SOLUTION INTRAVENOUS at 07:00

## 2025-02-12 RX ADMIN — ASPIRIN 324 MG: 81 TABLET, CHEWABLE ORAL at 07:07

## 2025-02-12 RX ADMIN — SULFUR HEXAFLUORIDE 3 ML: KIT at 10:53

## 2025-02-12 NOTE — CONSULTS
Discussed and taught patient about type 2 diabetes self-management, risk factors, and importance of blood glucose control to reduce complications. Target blood glucose readings and A1c goals per ADA were reviewed. Reviewed with patient current A1c 5.7 and discussed its significance. Signs, symptoms, and treatment of hyperglycemia and hypoglycemia were discussed. Lifestyle changes such as physical activity with MD approval and healthy eating were encouraged. Stressed the importance of strict blood sugar control after surgery to prevent complications such as infection and to promote healing of incision. Encouraged pt to monitor blood sugar at home 1+  times per day and to call PCP if blood sugar is trending high. Encouraged to keep record of blood glucose readings to take to follow up appointment with PCP. 30 minutes in the care and education of this patient.

## 2025-02-12 NOTE — Clinical Note
The DP pulses are +1 bilaterally. The PT pulses are +1 bilaterally. The radial pulses are +2 bilaterally.

## 2025-02-12 NOTE — H&P
[facility] Cardiology History and Physical    02/12/2025       Subjective:      Rod Balderas  2503/1  1950  0    Zayra Dyson APRN  Primary cardiologist: Rod Regalado MD      Chief complaint: Angina, coronary artery disease      Problem List:  PAF   W MSSA bacteremia and ARF  S/p ECV 2/2019  Eliquis initiated   7/23 48 hr holter 69/102/176 100% afib  CAD           December 2012: A 3.5 x 88 Promus VERONICA stent to OM2. Nonobstructive disease. Otherwise normal LVEF.  Angina 11/2024  Abnormal [place] scan, 1/28/2025 Rod Regalado MD revealing a moderate size apical defect with moderate reversibility, LVEF 44%.  TID 1.24, multivessel dense coronary calcifications noted  Valvular heart disease  12/15 echo: Mild LVH, normal EF, mild AR.  2/14/2019 [facility]: EF 61 to 65%, moderate AI, mild TR, mild MR, no evidence of ALCIRA thrombus  6/23 echo OSH EF 45%   Dyslipidemia , November 2012:   LDL 90, total 132, HDL 38, triglycerides 50.  LE vascular w/u Dr. Mcelroy 2019  [facility] [place]?  Following staph infection following prostate surgery  W/o per Dr. Sánchez 2019  MSSA bacteremia w ARF requiring temporary HD 2/19  Morbid obesity, 5 feet 9 inches, 350 pounds, precedent 80-pound weight loss with diet and exercise in the 80s.  Obstructive sleep apnea on CPAP as of 2012.  Prostate ca 1/19 robotic sx -[facility]  June 2022 Covid positive     aspirin, 324 mg, Oral, Once   And  [START ON 2/13/2025] aspirin, 81 mg, Oral, Daily  sodium chloride, 10 mL, Intravenous, Q12H    Home medications:  1.  Eliquis 2.5 mg p.o. twice daily-last dose 2/9/2025  2.  Metoprolol succinate 100 mg daily  3.  Atorvastatin 10 mg daily  4.  Aspirin 81 mg daily  5.  Spironolactone 25 mg twice daily  6.  Omeprazole DR 20 mg daily  7.  Hydralazine 25 mg 3 times daily  8.  Duloxetine 60 mg daily  9.  Gabapentin 800 mg 3 times daily  10.  Metformin 1000 mg twice daily  11.  Oxybutynin ER 10 mg daily  12.  Loratadine 10 mg daily  13.  B12 1000 mg  twice daily  14.  Folic acid 1 mg daily  15.  Vitamin C 500 mg twice daily  16.  Furosemide 20 mg daily as needed  17.  Hydroxyzine 25 mg half tab twice daily as needed  18.  Albuterol as needed     is allergic to percocet [oxycodone-acetaminophen]; cefazolin; latex; latex, natural rubber; sulfa antibiotics; and oxycodone-acetaminophen.    HPI: Mr. Balderas is a 74-year-old white male, established patient of Dr. Rod Regalado who presents today to undergo left heart catheterization following an abnormal Lexiscan stress test which revealed a moderate size apical defect with moderate reversibility, LVEF of 44%.  Mr. Bragg also has a history of atrial fibrillation and is on Eliquis therapy.  Mr. Balderas is morbidly obese and performs very little activity.  He has had some chest discomfort at rest that was relieved with sublingual nitroglycerin.  He notes shortness of breath with minimal exertion.     Cardiac risk factors: advanced age (older than 55 for men, 65 for women), diabetes mellitus, dyslipidemia, family history of premature cardiovascular disease, hypertension, male gender, obesity (BMI >= 30 kg/m2), sedentary lifestyle, and smoking/ tobacco exposure.     History  Family History   Problem Relation Age of Onset    Heart disease Mother     Thyroid disease Mother     Hyperlipidemia Mother     Heart disease Father     Hyperlipidemia Father     Cancer Brother      Past Surgical History:   Procedure Laterality Date    CARDIAC CATHETERIZATION      CHOLECYSTECTOMY      CIRCUMCISION  06/12/2023    [facility]    COLONOSCOPY  2015    CORONARY ANGIOPLASTY WITH STENT PLACEMENT  12/2012 December 2012:  A 3.5 x 88 Promus VERONICA stent to OM2.  Nonobstructive disease.  Otherwise normal LVEF.    EYE SURGERY Bilateral     cataract    INSERTION HEMODIALYSIS CATHETER N/A 02/08/2019    Procedure: HEMODIALYSIS CATHETER INSERTION RIGHT INTERNAL JUGULAR;  Surgeon: Bishnu Dailey MD;  Location: [facility];  Service: General     INTERVENTIONAL RADIOLOGY PROCEDURE N/A 2019    Procedure: myelogram lumbar spine;  Surgeon: Darrell Jones MD;  Location:  [facility];  Service: Interventional Radiology    INTERVENTIONAL RADIOLOGY PROCEDURE N/A 2019    Procedure: myelogram cervical spine;  Surgeon: Darrell Jones MD;  Location:  [facility];  Service: Interventional Radiology    PROSTATECTOMY N/A 01/15/2019    Procedure: ROBOTIC PROSTATECTOMY WITH NODES;  Surgeon: Juanito Grace Jr., MD;  Location:  [facility];  Service: DaVinci    RETINAL DETACHMENT SURGERY Right       Past Medical History:   Diagnosis Date    Anemia     Anxiety and depression     Atrial fibrillation     Cancer     Chest pain     Coronary artery disease     Decubital ulcer     right side [place]    Diabetes mellitus     Dyslipidemia     Extremity pain     Heart disease     History of transfusion     Hypertension     Low back pain     TANIYA on CPAP     2-3     Osteoarthritis     Diffuse osteoarthritis.    Panic attacks     Prostate cancer     Sleep apnea     cpap    Spinal stenosis      Social History     Tobacco Use   Smoking Status Former    Current packs/day: 0.00    Types: Cigarettes    Quit date: 1984    Years since quittin.7   Smokeless Tobacco Former    Types: Chew    Quit date: 1989     Social History     Substance and Sexual Activity   Alcohol Use No     Past Surgical History:   Procedure Laterality Date    CARDIAC CATHETERIZATION      CHOLECYSTECTOMY      CIRCUMCISION  2023    [facility]    COLONOSCOPY      CORONARY ANGIOPLASTY WITH STENT PLACEMENT  2012:  A 3.5 x 88 Promus VERONICA stent to OM2.  Nonobstructive disease.  Otherwise normal LVEF.    EYE SURGERY Bilateral     cataract    INSERTION HEMODIALYSIS CATHETER N/A 2019    Procedure: HEMODIALYSIS CATHETER INSERTION RIGHT INTERNAL JUGULAR;  Surgeon: Bishnu Dailey MD;  Location:  [facility];  Service: General     "INTERVENTIONAL RADIOLOGY PROCEDURE N/A 07/05/2019    Procedure: myelogram lumbar spine;  Surgeon: Darrell Jones MD;  Location:  [facility];  Service: Interventional Radiology    INTERVENTIONAL RADIOLOGY PROCEDURE N/A 07/05/2019    Procedure: myelogram cervical spine;  Surgeon: Darrell Jones MD;  Location:  [facility];  Service: Interventional Radiology    PROSTATECTOMY N/A 01/15/2019    Procedure: ROBOTIC PROSTATECTOMY WITH NODES;  Surgeon: Juanito Grace Jr., MD;  Location:  [facility];  Service: DaVinci    RETINAL DETACHMENT SURGERY Right        Review of Systems  Review of Systems   Constitutional: Positive for malaise/fatigue.   HENT: Negative.     Eyes: Negative.    Cardiovascular:  Positive for chest pain and dyspnea on exertion.   Respiratory:  Positive for shortness of breath.    Endocrine: Negative.    Hematologic/Lymphatic: Negative.    Skin: Negative.    Musculoskeletal:  Positive for arthritis.   Gastrointestinal: Negative.    Genitourinary:  Positive for bladder incontinence.   Neurological: Negative.    Psychiatric/Behavioral: Negative.     Allergic/Immunologic: Negative.           Objective:     height is 177.8 cm (70\") and weight is 141 kg (311 lb 12.8 oz) (abnormal). His temporal temperature is 97.6 °F (36.4 °C). His blood pressure is 131/102 (abnormal) and his pulse is 104. His respiration is 16 and oxygen saturation is 98%.     Physical Exam  Constitutional:       Appearance: He is obese.   HENT:      Head: Normocephalic and atraumatic.      Nose: Nose normal.      Mouth/Throat:      Mouth: Mucous membranes are moist.      Pharynx: Oropharynx is clear.   Cardiovascular:      Rate and Rhythm: Tachycardia present. Rhythm irregular.      Pulses: Normal pulses.      Heart sounds: No murmur heard.     No friction rub. No gallop.   Pulmonary:      Effort: Pulmonary effort is normal.      Breath sounds: Normal breath sounds.   Abdominal:      General: Bowel " sounds are normal.      Palpations: Abdomen is soft.   Musculoskeletal:         General: Swelling present.      Cervical back: Normal range of motion and neck supple.      Comments: Trace edema bilaterally   Skin:     General: Skin is warm and dry.   Neurological:      General: No focal deficit present.      Mental Status: He is oriented to person, place, and time.   Psychiatric:         Mood and Affect: Mood normal.         Behavior: Behavior normal.         Thought Content: Thought content normal.         Judgment: Judgment normal.         Cardiographics  ECG: not performed  Echocardiogram: not done    Imaging  Chest x-ray: not performed     Lab Review   CMP: 2/10/2025  Glucose 125, sodium 138, potassium 4.2, chloride 101, CO2 24, BUN 26, creatinine 1.4    Lab Results   Component Value Date    WBC 11.48 (H) 02/12/2025    HGB 14.2 02/12/2025    HCT 42.9 02/12/2025    MCV 88.6 02/12/2025     02/12/2025           Assessment:   74-year-old hypertensive dyslipidemic obese diabetic with a known history of coronary disease with recurrent anginal symptoms and abnormal Lexiscan study revealing a moderate-sized apical defect, LVEF of 44%.        Plan:   1. patient undergo left heart catheterization plus or minus catheter-based intervention via the left radial approach under the direction of Dr. Pedraza  2.  The risk and benefits of the procedure have been reviewed with the patient and his wife and is agreeable to proceed.  3.  Further recommendations following above.    Scribed for Jacquelin Davis MD by Ekta Zaidi RN. 2/12/2025  06:57 Jacquelin GOODEN MD , personally performed the services described in this documentation as scribed by the above named individual in my presence, and it is both accurate and complete.  2/12/2025  07:04 EST

## 2025-02-12 NOTE — Clinical Note
Hemostasis started on the left radial artery. R-Band was used in achieving hemostasis. Radial compression device applied to vessel. Hemostasis achieved successfully. Closure device additional comment: 12 cc air

## 2025-02-13 ENCOUNTER — DOCUMENTATION (OUTPATIENT)
Dept: CARDIAC REHAB | Facility: HOSPITAL | Age: 75
End: 2025-02-13
Payer: MEDICARE

## 2025-03-13 ENCOUNTER — TELEPHONE (OUTPATIENT)
Dept: CARDIOLOGY | Facility: CLINIC | Age: 75
End: 2025-03-13
Payer: MEDICARE

## 2025-03-31 ENCOUNTER — OFFICE VISIT (OUTPATIENT)
Dept: CARDIOLOGY | Facility: CLINIC | Age: 75
End: 2025-03-31
Payer: MEDICARE

## 2025-03-31 VITALS
OXYGEN SATURATION: 96 % | WEIGHT: 311 LBS | HEIGHT: 70 IN | SYSTOLIC BLOOD PRESSURE: 114 MMHG | DIASTOLIC BLOOD PRESSURE: 74 MMHG | HEART RATE: 90 BPM | BODY MASS INDEX: 44.52 KG/M2

## 2025-03-31 DIAGNOSIS — I48.21 PERMANENT ATRIAL FIBRILLATION: ICD-10-CM

## 2025-03-31 DIAGNOSIS — I25.10 CORONARY ARTERY DISEASE INVOLVING NATIVE CORONARY ARTERY OF NATIVE HEART WITHOUT ANGINA PECTORIS: Primary | ICD-10-CM

## 2025-03-31 DIAGNOSIS — I10 PRIMARY HYPERTENSION: ICD-10-CM

## 2025-03-31 DIAGNOSIS — E78.2 MIXED HYPERLIPIDEMIA: ICD-10-CM

## 2025-03-31 PROCEDURE — 99214 OFFICE O/P EST MOD 30 MIN: CPT | Performed by: INTERNAL MEDICINE

## 2025-03-31 PROCEDURE — 3078F DIAST BP <80 MM HG: CPT | Performed by: INTERNAL MEDICINE

## 2025-03-31 PROCEDURE — 3074F SYST BP LT 130 MM HG: CPT | Performed by: INTERNAL MEDICINE

## 2025-03-31 NOTE — PROGRESS NOTES
[facility] Cardiology  Office Progress Note  Rod Balderas  1950  [address] [id number]       Visit Date: 03/31/25    PCP: Zayra Dyson APRN  [address] [address]    IDENTIFICATION: A 74 y.o. male former  from [facility]    PROBLEM LIST:   Chronic AF   W MSSA bacteremia and ARF  S/p ECV 2/2019  Eliquis initiated   7/23 48 hr holter 69/102/176 100% afib  CAD           December 2012: A 3.5 x 88 Promus VERONICA stent to OM2. Nonobstructive disease. Otherwise normal LVEF.  2/25 [facility] nonobst EF >55%  Valvular heart disease  12/15 echo: Mild LVH, normal EF, mild AR.  2/14/2019 [facility]: EF 61 to 65%, moderate AI, mild TR, mild MR, no evidence of ALCIRA thrombus  2/25 echo EF >55% lvh AV scler  Dyslipidemia , November 2012:   LDL 90, total 132, HDL 38, triglycerides 50.  LE vascular w/u Dr. Mcelroy 2019  [facility] [place]?  Following staph infection following prostate surgery  W/o per Dr. Sánchez 2019  MSSA bacteremia w ARF requiring temporary HD 2/19  Morbid obesity, 5 feet 9 inches, 350 pounds, precedent 80-pound weight loss with diet and exercise in the 80s.  Obstructive sleep apnea on CPAP as of 2012.  Prostate ca 1/19 robotic sx -[facility]  June 2022 Covid positive      CC:   Chief Complaint   Patient presents with    Angina at rest       Allergies  Allergies   Allergen Reactions    Percocet [Oxycodone-Acetaminophen] Anxiety    Cefazolin Confusion    Latex Hives    Latex, Natural Rubber Itching    Oxycodone Unknown - Low Severity    Sulfa Antibiotics Hives    Oxycodone-Acetaminophen Anxiety       Current Medications    Current Outpatient Medications:     albuterol sulfate  (90 Base) MCG/ACT inhaler, Inhale 2 puffs As Needed for Wheezing., Disp: , Rfl:     apixaban (ELIQUIS) 2.5 MG tablet tablet, Take 1 tablet by mouth 2 (Two) Times a Day., Disp: , Rfl:     aspirin 81 MG chewable tablet, Chew 1 tablet Daily., Disp: , Rfl:      atorvastatin (LIPITOR) 10 MG tablet, Take 1 tablet by mouth Daily., Disp: , Rfl:     DULoxetine (CYMBALTA) 60 MG capsule, Take 1 capsule by mouth Daily., Disp: , Rfl:     famotidine (PEPCID) 20 MG tablet, Take 1 tablet by mouth 2 (Two) Times a Day., Disp: , Rfl:     ferrous sulfate 325 (65 FE) MG tablet, Take 1 tablet by mouth 2 (Two) Times a Day., Disp: , Rfl:     folic acid (FOLVITE) 1 MG tablet, Take 1 tablet by mouth Daily., Disp: , Rfl:     furosemide (LASIX) 20 MG tablet, Take 1 tablet by mouth Daily., Disp: , Rfl:     gabapentin (NEURONTIN) 800 MG tablet, Take 1 tablet by mouth 3 (Three) Times a Day., Disp: , Rfl:     hydrALAZINE (APRESOLINE) 25 MG tablet, Take 1 tablet by mouth 3 (Three) Times a Day., Disp: , Rfl:     hydrOXYzine (ATARAX) 25 MG tablet, Take 1 tablet by mouth 3 (Three) Times a Day As Needed for Itching. 0.5 tablet bid prn, Disp: , Rfl:     lisinopril (PRINIVIL,ZESTRIL) 20 MG tablet, Take 1 tablet by mouth Every Night., Disp: , Rfl:     Loratadine 10 MG capsule, Take 1 capsule by mouth Daily., Disp: , Rfl:     metFORMIN (GLUCOPHAGE) 1000 MG tablet, Take 1 tablet by mouth 2 (Two) Times a Day With Meals., Disp: , Rfl:     metoprolol succinate XL (TOPROL-XL) 100 MG 24 hr tablet, Take 1 tablet by mouth 2 (Two) Times a Day., Disp: 180 tablet, Rfl: 1    nitroglycerin (NITROSTAT) 0.4 MG SL tablet, DISSOLVE 1 TABLET AT THE 1ST SIGN OF ATTACK, MAY REPEAT EVERY 5 MINUTES UNTIL RELIEF, IF PAIN PERSISTS AFTER 3 TABLETS IN 15 MIN CALL , Disp: , Rfl:     omeprazole (priLOSEC) 20 MG capsule, Take 1 capsule by mouth Every Morning., Disp: , Rfl:     oxybutynin XL (DITROPAN-XL) 10 MG 24 hr tablet, Take 1 tablet by mouth Daily., Disp: , Rfl:     Ozempic, 0.25 or 0.5 MG/DOSE, 2 MG/3ML solution pen-injector, INJECT 0.25 MG X4 WEEKS, THEN INCREASE TO 0.5 MG X4 WEEKS SUBCUTANEOUS ONCE A WEEK 28 DAYS, Disp: , Rfl:     spironolactone (ALDACTONE) 25 MG tablet, Take 1 tablet by mouth Daily., Disp: , Rfl:     vitamin  "B-12 (CYANOCOBALAMIN) 1000 MCG tablet, Take 1 tablet by mouth Daily., Disp: , Rfl:     vitamin C (ASCORBIC ACID) 250 MG tablet, Take 1 tablet by mouth Daily., Disp: , Rfl:       History of Present Illness   Rod Balderas is a 74 y.o. year old male here for follow up.  No recurrent chest pain symptoms since heart catheterization    OBJECTIVE:  Vitals:    03/31/25 1030   BP: 114/74   BP Location: Right arm   Patient Position: Sitting   Pulse: 90   SpO2: 96%   Weight: (!) 141 kg (311 lb)   Height: 177.8 cm (70\")           Body mass index is 44.62 kg/m².    Constitutional:       Appearance: Healthy appearance. Not in distress.   Neck:      Vascular: No JVR. JVD normal.   Pulmonary:      Effort: Pulmonary effort is normal.      Breath sounds: Normal breath sounds. No wheezing. No rhonchi. No rales.   Chest:      Chest wall: Not tender to palpatation.   Cardiovascular:      PMI at left midclavicular line. Normal rate. Irregularly irregular rhythm. Normal S1. Normal S2.       Murmurs: There is no murmur.      No gallop.  No click. No rub.   Pulses:     Intact distal pulses.   Edema:     Peripheral edema present.  Abdominal:      General: Bowel sounds are normal.      Palpations: Abdomen is soft.      Tenderness: There is no abdominal tenderness.   Musculoskeletal: Normal range of motion.         General: No tenderness. Skin:     General: Skin is warm and dry.   Neurological:      General: No focal deficit present.      Mental Status: Alert and oriented to person, place and time.         Diagnostic Data:  Procedures      ASSESSMENT:   Diagnosis Plan   1. Coronary artery disease involving native coronary artery of native heart without angina pectoris        2. Primary hypertension        3. Mixed hyperlipidemia        4. Permanent atrial fibrillation                    PLAN:      CAD nonobstructive - cont GDMT    Hypertension controlled current hydralazine  lisinopril metoprolol and addition of spironolactone.     Mixed " dyslipidemia controlled on statin therapy    Persistent A. fib [id number] 2 continued systemic anticoagulation and rate control    Rod Regalado MD, FACC

## (undated) DEVICE — SUT MNCRYL UROLOGICAL UR6 2/0 27IN Y605H

## (undated) DEVICE — DRSNG SURESITE WNDW 2.38X2.75

## (undated) DEVICE — ENCORE® LATEX MICRO SIZE 7.5, STERILE LATEX POWDER-FREE SURGICAL GLOVE: Brand: ENCORE

## (undated) DEVICE — MODEL AT P65, P/N 701554-001KIT CONTENTS: HAND CONTROLLER, 3-WAY HIGH-PRESSURE STOPCOCK WITH ROTATING END AND PREMIUM HIGH-PRESSURE TUBING: Brand: ANGIOTOUCH® KIT

## (undated) DEVICE — TOWEL,OR,DSP,ST,NATURAL,DLX,4/PK,20PK/CS: Brand: MEDLINE

## (undated) DEVICE — CATH DIAG EXPO M/ PK 5F FL4/FR4 PIG

## (undated) DEVICE — ANTIBACTERIAL UNDYED BRAIDED (POLYGLACTIN 910), SYNTHETIC ABSORBABLE SUTURE: Brand: COATED VICRYL

## (undated) DEVICE — PK MINOR SPLT 10

## (undated) DEVICE — PK CATH CARD 10

## (undated) DEVICE — DECANT BG O JET

## (undated) DEVICE — ENDOPATH XCEL BLADELESS TROCARS WITH STABILITY SLEEVES: Brand: ENDOPATH XCEL

## (undated) DEVICE — TIP COVER ACCESSORY

## (undated) DEVICE — ST TBG AIRSEAL FLTR TRI LUM

## (undated) DEVICE — SNAP KOVER: Brand: UNBRANDED

## (undated) DEVICE — SUT SILK 2/0 PS 18IN 1588H

## (undated) DEVICE — MODEL BT2000 P/N 700287-012KIT CONTENTS: MANIFOLD WITH SALINE AND CONTRAST PORTS, SALINE TUBING WITH SPIKE AND HAND SYRINGE, TRANSDUCER: Brand: BT2000 AUTOMATED MANIFOLD KIT

## (undated) DEVICE — ADULT, W/LG. BACK PAD, RADIOTRANSPARENT ELEMENT AND LEAD WIRE COMPATIBLE W/: Brand: DEFIBRILLATION ELECTRODES

## (undated) DEVICE — TROC ANCHORPORT BLADELES W/GRIP 12X120MM

## (undated) DEVICE — INTRAOPERATIVE COVER KIT, 10 PACK: Brand: SITE-RITE

## (undated) DEVICE — OBT BLADLES ENDOWRIST DAVINCI/S 8MM

## (undated) DEVICE — INTRO SHEATH PRELUDE IDEAL SPRNG COIL 021 6F 23X80CM

## (undated) DEVICE — BANDAGE,GAUZE,BULKEE II,4.5"X4.1YD,STRL: Brand: MEDLINE

## (undated) DEVICE — VIOLET POLYDIOXANONE POLYMER, SYNTHETIC ABSORBABLE SUTURE CLIPS: Brand: LAPRA-TY

## (undated) DEVICE — TRY L/P SFTY A/20G

## (undated) DEVICE — 3M™ TEGADERM™ CHG DRESSING 25/CARTON 4 CARTONS/CASE 1658: Brand: TEGADERM™

## (undated) DEVICE — SUT ETHLN 2/0 PS 18IN 585H

## (undated) DEVICE — KT CATH TAL PALINDROME RUBY 14.5F23CM

## (undated) DEVICE — GOWN,NON-REINFORCED,SIRUS,SET IN SLV,XL: Brand: MEDLINE

## (undated) DEVICE — SYR LUERLOK 20CC

## (undated) DEVICE — PAD HEMOST NEPTUNE 2X2IN

## (undated) DEVICE — SUT PDS MONO 0 36 CT1 VIL PDP346H

## (undated) DEVICE — LEX PROSTATE ACCESSORY PACK: Brand: MEDLINE INDUSTRIES, INC.

## (undated) DEVICE — ENDOPOUCH RETRIEVER SPECIMEN RETRIEVAL BAGS: Brand: ENDOPOUCH RETRIEVER

## (undated) DEVICE — CATH DIAG EXPO .045 FL3  5F 100CM

## (undated) DEVICE — SUT VIC 4/0 RB1 27IN VCP214H

## (undated) DEVICE — FLEX TIP DUPLO SPRAYER

## (undated) DEVICE — DRSNG WND BORDR/ADHS NONADHR/GZ LF 2X2IN STRL

## (undated) DEVICE — PK UROL DAVINCI 10

## (undated) DEVICE — VESSEL SEALER: Brand: ENDOWRIST;DAVINCI SI

## (undated) DEVICE — TR BAND RADIAL ARTERY COMPRESSION DEVICE: Brand: TR BAND

## (undated) DEVICE — SKIN AFFIX SURG ADHESIVE 72/CS 0.55ML: Brand: MEDLINE

## (undated) DEVICE — SUT MNCRYL PLS ANTIB UD 4/0 PS2 18IN

## (undated) DEVICE — GLV SURG SENSICARE MICRO PF LF 7.5 STRL

## (undated) DEVICE — GW INQWIRE FC PTFE STD J/1.5 .035 260

## (undated) DEVICE — NDL HYPO ECLPS SFTY 18G 1 1/2IN

## (undated) DEVICE — SUT MNCRYL UROLOG UR6 2/0 27IN UNDYED

## (undated) DEVICE — Device

## (undated) DEVICE — SUT VIC 0 TIES 18IN J912G